# Patient Record
Sex: FEMALE | Race: WHITE | NOT HISPANIC OR LATINO | Employment: OTHER | URBAN - METROPOLITAN AREA
[De-identification: names, ages, dates, MRNs, and addresses within clinical notes are randomized per-mention and may not be internally consistent; named-entity substitution may affect disease eponyms.]

---

## 2017-01-05 ENCOUNTER — ANTICOAGULATION THERAPY VISIT (OUTPATIENT)
Dept: NURSING | Facility: CLINIC | Age: 72
End: 2017-01-05
Payer: COMMERCIAL

## 2017-01-05 DIAGNOSIS — Z79.01 LONG-TERM (CURRENT) USE OF ANTICOAGULANTS: Primary | ICD-10-CM

## 2017-01-05 LAB — INR POINT OF CARE: 1.9 (ref 0.86–1.14)

## 2017-01-05 PROCEDURE — 85610 PROTHROMBIN TIME: CPT | Mod: QW

## 2017-01-05 PROCEDURE — 99207 ZZC NO CHARGE NURSE ONLY: CPT

## 2017-01-05 PROCEDURE — 36416 COLLJ CAPILLARY BLOOD SPEC: CPT

## 2017-01-05 NOTE — MR AVS SNAPSHOT
Nury Cárdenas   1/5/2017 9:15 AM   Anticoagulation Therapy Visit    Description:  71 year old female   Provider:  MONI ANTI COAG   Department:  [facility]           INR as of 1/5/2017     Selected INR 1.9! (1/5/2017)      Anticoagulation Summary as of 1/5/2017     INR goal 2.0-3.0   Selected INR 1.9! (1/5/2017)   Full instructions 4 mg every day   Next INR check 2/16/2017    Indications   Long-term (current) use of anticoagulants [Z79.01] [Z79.01]  PE (pulmonary embolism) [I26.99]         Your next Anticoagulation Clinic appointment(s)     Feb 16, 2017  9:15 AM   Anticoagulation Visit with MONI ANTI COAG   [facility] ([address] [address] [phone]              Contact Numbers     [facility]  Please call [phone] to cancel and/or reschedule your appointment   Please call [phone] with any problems or questions regarding your therapy.        January 2017 Details    Sun Mon Tue Wed Thu Fri Sat     1               2               3               4               5      4 mg   See details      6      4 mg         7      4 mg           8      4 mg         9      4 mg         10      4 mg         11      4 mg         12      4 mg         13      4 mg         14      4 mg           15      4 mg         16      4 mg         17      4 mg         18      4 mg         19      4 mg         20      4 mg         21      4 mg           22      4 mg         23      4 mg         24      4 mg         25      4 mg         26      4 mg         27      4 mg         28      4 mg           29      4 mg         30      4 mg         31      4 mg              Date Details   01/05 This INR check               How to take your warfarin dose     To take:  4 mg Take 2 of the 2 mg tablets.           February 2017 Details    Sun Mon Tue Wed Thu Fri Sat        1      4 mg         2      4 mg         3      4 mg         4      4 mg           5      4 mg         6       4 mg         7      4 mg         8      4 mg         9      4 mg         10      4 mg         11      4 mg           12      4 mg         13      4 mg         14      4 mg         15      4 mg         16            17               18                 19               20               21               22               23               24               25                 26               27               28                    Date Details   No additional details    Date of next INR:  2/16/2017         How to take your warfarin dose     To take:  4 mg Take 2 of the 2 mg tablets.

## 2017-01-05 NOTE — PROGRESS NOTES
ANTICOAGULATION FOLLOW-UP CLINIC VISIT    Patient Name:  Nury Cárdenas  Date:  1/5/2017  Contact Type:  Face to Face    SUBJECTIVE:     Patient Findings     Positives No Problem Findings           OBJECTIVE    INR PROTIME   Date Value Ref Range Status   01/05/2017 1.9* 0.86 - 1.14 Final       ASSESSMENT / PLAN  INR assessment THER    Recheck INR In: 6 WEEKS    INR Location Clinic      Anticoagulation Summary as of 1/5/2017     INR goal 2.0-3.0   Selected INR 1.9! (1/5/2017)   Maintenance plan 4 mg (2 mg x 2) every day   Full instructions 4 mg every day   Weekly total 28 mg   No change documented Brody Chirinos RN   Plan last modified Giselle Price RN (7/7/2016)   Next INR check 2/16/2017   Priority INR   Target end date     Indications   Long-term (current) use of anticoagulants [Z79.01] [Z79.01]  PE (pulmonary embolism) [I26.99]         Anticoagulation Episode Summary     INR check location     Preferred lab     Send INR reminders to [facility] CL[facility]    Comments       Anticoagulation Care Providers     Provider Role Specialty Phone number    Desireeakhil Phil Harris MD [facility] [phone]            See the Encounter Report to view Anticoagulation Flowsheet and Dosing Calendar (Go to Encounters tab in chart review, and find the Anticoagulation Therapy Visit)    RN reviewed patient's weekly warfarin dose.  Pt INR remains within therapeutic range.  Pt will continue with current dosing and monitoring as planned, based on physician directed care plan.    [facility] ANTI-COAG CLINIC     Brody Chirinos RN

## 2017-01-10 DIAGNOSIS — M31.30 GRANULOMATOSIS WITH POLYANGIITIS (H): ICD-10-CM

## 2017-01-10 LAB
ALBUMIN UR-MCNC: NEGATIVE MG/DL
ALT SERPL W P-5'-P-CCNC: 28 U/L (ref 0–50)
APPEARANCE UR: CLEAR
AST SERPL W P-5'-P-CCNC: 19 U/L (ref 0–45)
BASOPHILS # BLD AUTO: 0 10E9/L (ref 0–0.2)
BASOPHILS NFR BLD AUTO: 0.7 %
BILIRUB UR QL STRIP: NEGATIVE
COLOR UR AUTO: YELLOW
CREAT SERPL-MCNC: 0.74 MG/DL (ref 0.52–1.04)
CRP SERPL-MCNC: <2.9 MG/L (ref 0–8)
DIFFERENTIAL METHOD BLD: NORMAL
EOSINOPHIL # BLD AUTO: 0.1 10E9/L (ref 0–0.7)
EOSINOPHIL NFR BLD AUTO: 1 %
ERYTHROCYTE [DISTWIDTH] IN BLOOD BY AUTOMATED COUNT: 13.9 % (ref 10–15)
ERYTHROCYTE [SEDIMENTATION RATE] IN BLOOD BY WESTERGREN METHOD: 6 MM/H (ref 0–30)
GFR SERPL CREATININE-BSD FRML MDRD: 76 ML/MIN/1.7M2
GLUCOSE UR STRIP-MCNC: NEGATIVE MG/DL
HCT VFR BLD AUTO: 43.2 % (ref 35–47)
HGB BLD-MCNC: 14.1 G/DL (ref 11.7–15.7)
HGB UR QL STRIP: ABNORMAL
KETONES UR STRIP-MCNC: NEGATIVE MG/DL
LEUKOCYTE ESTERASE UR QL STRIP: NEGATIVE
LYMPHOCYTES # BLD AUTO: 0.9 10E9/L (ref 0.8–5.3)
LYMPHOCYTES NFR BLD AUTO: 16.1 %
MCH RBC QN AUTO: 31.4 PG (ref 26.5–33)
MCHC RBC AUTO-ENTMCNC: 32.6 G/DL (ref 31.5–36.5)
MCV RBC AUTO: 96 FL (ref 78–100)
MONOCYTES # BLD AUTO: 0.6 10E9/L (ref 0–1.3)
MONOCYTES NFR BLD AUTO: 10.9 %
NEUTROPHILS # BLD AUTO: 4.1 10E9/L (ref 1.6–8.3)
NEUTROPHILS NFR BLD AUTO: 71.3 %
NITRATE UR QL: NEGATIVE
PH UR STRIP: 7 PH (ref 5–7)
PLATELET # BLD AUTO: 206 10E9/L (ref 150–450)
RBC # BLD AUTO: 4.49 10E12/L (ref 3.8–5.2)
RBC #/AREA URNS AUTO: ABNORMAL /HPF (ref 0–2)
SP GR UR STRIP: <=1.005 (ref 1–1.03)
URN SPEC COLLECT METH UR: ABNORMAL
UROBILINOGEN UR STRIP-ACNC: 0.2 EU/DL (ref 0.2–1)
WBC # BLD AUTO: 5.8 10E9/L (ref 4–11)
WBC #/AREA URNS AUTO: ABNORMAL /HPF (ref 0–2)

## 2017-01-10 PROCEDURE — 36415 COLL VENOUS BLD VENIPUNCTURE: CPT | Performed by: INTERNAL MEDICINE

## 2017-01-10 PROCEDURE — 82565 ASSAY OF CREATININE: CPT | Performed by: INTERNAL MEDICINE

## 2017-01-10 PROCEDURE — 83516 IMMUNOASSAY NONANTIBODY: CPT | Performed by: INTERNAL MEDICINE

## 2017-01-10 PROCEDURE — 84450 TRANSFERASE (AST) (SGOT): CPT | Performed by: INTERNAL MEDICINE

## 2017-01-10 PROCEDURE — 84460 ALANINE AMINO (ALT) (SGPT): CPT | Performed by: INTERNAL MEDICINE

## 2017-01-10 PROCEDURE — 86140 C-REACTIVE PROTEIN: CPT | Performed by: INTERNAL MEDICINE

## 2017-01-10 PROCEDURE — 85652 RBC SED RATE AUTOMATED: CPT | Performed by: INTERNAL MEDICINE

## 2017-01-10 PROCEDURE — 85025 COMPLETE CBC W/AUTO DIFF WBC: CPT | Performed by: INTERNAL MEDICINE

## 2017-01-10 PROCEDURE — 81001 URINALYSIS AUTO W/SCOPE: CPT | Performed by: INTERNAL MEDICINE

## 2017-01-11 LAB — PROTEINASE3 IGG SER-ACNC: NORMAL AI (ref 0–0.9)

## 2017-01-11 NOTE — PROGRESS NOTES
Quick Note:    Results released to [facility]:  This is just brief comments on your labs. If you have questions regarding these results, please do not hesitate to ask me during the next clinic visit.   Basic blood cell counts, liver and kidney function labs within normal limits  Inflammatory markers are normal.   Urine again shows blood as it has in the past. We will watch this.   Sincerely    Iain Estes MD  ______

## 2017-01-19 DIAGNOSIS — Z79.01 CHRONIC ANTICOAGULATION: Primary | ICD-10-CM

## 2017-01-19 NOTE — TELEPHONE ENCOUNTER
warfarin (COUMADIN) 2 MG tablet    Last Written Prescription Date: 10/14/16  Last Fill Qty: 180, # refills: 0  Last Office Visit with G, P or [facility] prescribing provider: 11/10/16       Date and Result of Last PT/INR:   INR      1.9   1/5/2017  INR      2.2   11/22/2016  INR     7.45   1/27/2015  INR     1.40   10/31/2014

## 2017-01-23 RX ORDER — WARFARIN SODIUM 2 MG/1
TABLET ORAL
Qty: 180 TABLET | Refills: 0 | Status: SHIPPED | OUTPATIENT
Start: 2017-01-23 | End: 2017-04-20

## 2017-01-23 NOTE — TELEPHONE ENCOUNTER
Signed Prescriptions:                        Disp   Refills    warfarin (COUMADIN) 2 MG tablet            180 ta*0        Sig: TAKE 2 TABLETS(4 MG) BY MOUTH ONCE DAILY  Authorizing Provider: RUPERTO DUDLEY  Ordering User: BRODY PATEL RN refilled requested medication per [facility] protocol.  Brody Patel RN

## 2017-02-17 ENCOUNTER — ANTICOAGULATION THERAPY VISIT (OUTPATIENT)
Dept: NURSING | Facility: CLINIC | Age: 72
End: 2017-02-17
Payer: COMMERCIAL

## 2017-02-17 DIAGNOSIS — Z79.01 LONG-TERM (CURRENT) USE OF ANTICOAGULANTS: ICD-10-CM

## 2017-02-17 LAB — INR POINT OF CARE: 2.2 (ref 0.86–1.14)

## 2017-02-17 PROCEDURE — 36416 COLLJ CAPILLARY BLOOD SPEC: CPT

## 2017-02-17 PROCEDURE — 99207 ZZC NO CHARGE NURSE ONLY: CPT

## 2017-02-17 PROCEDURE — 85610 PROTHROMBIN TIME: CPT | Mod: QW

## 2017-02-17 NOTE — PROGRESS NOTES
ANTICOAGULATION FOLLOW-UP CLINIC VISIT    Patient Name:  Nury Cárdenas  Date:  2/17/2017  Contact Type:  Face to Face    SUBJECTIVE:     Patient Findings     Positives No Problem Findings           OBJECTIVE    INR Protime   Date Value Ref Range Status   02/17/2017 2.2 (A) 0.86 - 1.14 Final       ASSESSMENT / PLAN  INR assessment THER    Recheck INR In: 6 WEEKS    INR Location Clinic      Anticoagulation Summary as of 2/17/2017     INR goal 2.0-3.0   Today's INR 2.2   Maintenance plan 4 mg (2 mg x 2) every day   Full instructions 4 mg every day   Weekly total 28 mg   No change documented Ramy Juarez RN   Plan last modified Giselle Price RN (7/7/2016)   Next INR check 3/31/2017   Priority INR   Target end date     Indications   Long-term (current) use of anticoagulants [Z79.01] [Z79.01]  PE (pulmonary embolism) [I26.99]         Anticoagulation Episode Summary     INR check location     Preferred lab     Send INR reminders to [facility]    Comments       Anticoagulation Care Providers     Provider Role Specialty Phone number    Milena Phil Harris MD [facility] [phone]            See the Encounter Report to view Anticoagulation Flowsheet and Dosing Calendar (Go to Encounters tab in chart review, and find the Anticoagulation Therapy Visit)    Dosage adjustment made based on physician directed care plan.    Ramy Juarez, RN

## 2017-02-17 NOTE — MR AVS SNAPSHOT
Nury Cárdenas   2/17/2017 9:15 AM   Anticoagulation Therapy Visit    Description:  72 year old female   Provider:  MONI ANTI COAG   Department:  [facility]           INR as of 2/17/2017     Today's INR 2.2      Anticoagulation Summary as of 2/17/2017     INR goal 2.0-3.0   Today's INR 2.2   Full instructions 4 mg every day   Next INR check 3/31/2017    Indications   Long-term (current) use of anticoagulants [Z79.01] [Z79.01]  PE (pulmonary embolism) [I26.99]         Your next Anticoagulation Clinic appointment(s)     Mar 31, 2017  9:15 AM CDT   Anticoagulation Visit with MONI ANTI COAG   [facility] ([facility]    [address] [address] [phone]              Contact Numbers     [facility]  Please call [phone] to cancel and/or reschedule your appointment   Please call [phone] with any problems or questions regarding your therapy.        February 2017 Details    Sun Mon Tue Wed Thu Fri Sat        1               2               3               4                 5               6               7               8               9               10               11                 12               13               14               15               16               17      4 mg   See details      18      4 mg           19      4 mg         20      4 mg         21      4 mg         22      4 mg         23      4 mg         24      4 mg         25      4 mg           26      4 mg         27      4 mg         28      4 mg              Date Details   02/17 This INR check               How to take your warfarin dose     To take:  4 mg Take 2 of the 2 mg tablets.           March 2017 Details    Sun Mon Tue Wed u Fri Sat        1      4 mg         2      4 mg         3      4 mg         4      4 mg           5      4 mg         6      4 mg         7      4 mg         8      4 mg         9      4 mg         10      4 mg         11      4 mg           12       4 mg         13      4 mg         14      4 mg         15      4 mg         16      4 mg         17      4 mg         18      4 mg           19      4 mg         20      4 mg         21      4 mg         22      4 mg         23      4 mg         24      4 mg         25      4 mg           26      4 mg         27      4 mg         28      4 mg         29      4 mg         30      4 mg         31              Date Details   No additional details    Date of next INR:  3/31/2017         How to take your warfarin dose     To take:  4 mg Take 2 of the 2 mg tablets.

## 2017-03-08 DIAGNOSIS — M31.30 GRANULOMATOSIS WITH POLYANGIITIS (H): ICD-10-CM

## 2017-03-08 LAB
ALBUMIN UR-MCNC: NEGATIVE MG/DL
ALT SERPL W P-5'-P-CCNC: 26 U/L (ref 0–50)
APPEARANCE UR: CLEAR
AST SERPL W P-5'-P-CCNC: 21 U/L (ref 0–45)
BASOPHILS # BLD AUTO: 0 10E9/L (ref 0–0.2)
BASOPHILS NFR BLD AUTO: 0.6 %
BILIRUB UR QL STRIP: NEGATIVE
COLOR UR AUTO: YELLOW
CREAT SERPL-MCNC: 0.72 MG/DL (ref 0.52–1.04)
CRP SERPL-MCNC: <2.9 MG/L (ref 0–8)
DIFFERENTIAL METHOD BLD: NORMAL
EOSINOPHIL # BLD AUTO: 0 10E9/L (ref 0–0.7)
EOSINOPHIL NFR BLD AUTO: 0.8 %
ERYTHROCYTE [DISTWIDTH] IN BLOOD BY AUTOMATED COUNT: 13.9 % (ref 10–15)
ERYTHROCYTE [SEDIMENTATION RATE] IN BLOOD BY WESTERGREN METHOD: 6 MM/H (ref 0–30)
GFR SERPL CREATININE-BSD FRML MDRD: 80 ML/MIN/1.7M2
GLUCOSE UR STRIP-MCNC: NEGATIVE MG/DL
HCT VFR BLD AUTO: 41.4 % (ref 35–47)
HGB BLD-MCNC: 13.9 G/DL (ref 11.7–15.7)
HGB UR QL STRIP: ABNORMAL
KETONES UR STRIP-MCNC: NEGATIVE MG/DL
LEUKOCYTE ESTERASE UR QL STRIP: NEGATIVE
LYMPHOCYTES # BLD AUTO: 1 10E9/L (ref 0.8–5.3)
LYMPHOCYTES NFR BLD AUTO: 17.8 %
MCH RBC QN AUTO: 32 PG (ref 26.5–33)
MCHC RBC AUTO-ENTMCNC: 33.6 G/DL (ref 31.5–36.5)
MCV RBC AUTO: 95 FL (ref 78–100)
MONOCYTES # BLD AUTO: 0.5 10E9/L (ref 0–1.3)
MONOCYTES NFR BLD AUTO: 9.9 %
NEUTROPHILS # BLD AUTO: 3.8 10E9/L (ref 1.6–8.3)
NEUTROPHILS NFR BLD AUTO: 70.9 %
NITRATE UR QL: NEGATIVE
NON-SQ EPI CELLS #/AREA URNS LPF: ABNORMAL /LPF
PH UR STRIP: 6.5 PH (ref 5–7)
PLATELET # BLD AUTO: 208 10E9/L (ref 150–450)
RBC # BLD AUTO: 4.34 10E12/L (ref 3.8–5.2)
RBC #/AREA URNS AUTO: ABNORMAL /HPF (ref 0–2)
SP GR UR STRIP: <=1.005 (ref 1–1.03)
URN SPEC COLLECT METH UR: ABNORMAL
UROBILINOGEN UR STRIP-ACNC: 0.2 EU/DL (ref 0.2–1)
WBC # BLD AUTO: 5.3 10E9/L (ref 4–11)
WBC #/AREA URNS AUTO: ABNORMAL /HPF (ref 0–2)

## 2017-03-08 PROCEDURE — 85652 RBC SED RATE AUTOMATED: CPT | Performed by: INTERNAL MEDICINE

## 2017-03-08 PROCEDURE — 84460 ALANINE AMINO (ALT) (SGPT): CPT | Performed by: INTERNAL MEDICINE

## 2017-03-08 PROCEDURE — 81001 URINALYSIS AUTO W/SCOPE: CPT | Performed by: INTERNAL MEDICINE

## 2017-03-08 PROCEDURE — 84450 TRANSFERASE (AST) (SGOT): CPT | Performed by: INTERNAL MEDICINE

## 2017-03-08 PROCEDURE — 83516 IMMUNOASSAY NONANTIBODY: CPT | Performed by: INTERNAL MEDICINE

## 2017-03-08 PROCEDURE — 86140 C-REACTIVE PROTEIN: CPT | Performed by: INTERNAL MEDICINE

## 2017-03-08 PROCEDURE — 36415 COLL VENOUS BLD VENIPUNCTURE: CPT | Performed by: INTERNAL MEDICINE

## 2017-03-08 PROCEDURE — 85025 COMPLETE CBC W/AUTO DIFF WBC: CPT | Performed by: INTERNAL MEDICINE

## 2017-03-08 PROCEDURE — 82565 ASSAY OF CREATININE: CPT | Performed by: INTERNAL MEDICINE

## 2017-03-09 DIAGNOSIS — M31.30 WEGENER'S GRANULOMATOSIS: ICD-10-CM

## 2017-03-09 LAB — PROTEINASE3 IGG SER-ACNC: NORMAL AI (ref 0–0.9)

## 2017-03-12 NOTE — TELEPHONE ENCOUNTER
methotrexate 2.5 MG tablet CHEMO      Last Written Prescription Date:  12-21-16  Last Fill Quantity: 60,   # refills: 0  Last Office Visit: 11-10-16  Future Office visit:  3-15-17    CBC RESULTS:   Recent Labs   Lab Test  03/08/17 0913   WBC  5.3   RBC  4.34   HGB  13.9   HCT  41.4   MCV  95   MCH  32.0   MCHC  33.6   RDW  13.9   PLT  208       Creatinine   Date Value Ref Range Status   03/08/2017 0.72 0.52 - 1.04 mg/dL Final   ]    Liver Function Studies -   Recent Labs   Lab Test  03/08/17   0913   05/14/15   0753   PROTTOTAL   --    --   6.2*   ALBUMIN   --    --   3.7   BILITOTAL   --    --   0.4   ALKPHOS   --    --   76   AST  21   < >  28   ALT  26   < >  43    < > = values in this interval not displayed.       Kathleen M Doege RN

## 2017-03-15 ENCOUNTER — OFFICE VISIT (OUTPATIENT)
Dept: RHEUMATOLOGY | Facility: CLINIC | Age: 72
End: 2017-03-15
Attending: INTERNAL MEDICINE
Payer: MEDICARE

## 2017-03-15 VITALS
BODY MASS INDEX: 21.63 KG/M2 | HEART RATE: 75 BPM | OXYGEN SATURATION: 96 % | DIASTOLIC BLOOD PRESSURE: 82 MMHG | WEIGHT: 128 LBS | SYSTOLIC BLOOD PRESSURE: 136 MMHG

## 2017-03-15 DIAGNOSIS — M31.30 WEGENER'S GRANULOMATOSIS (GRANULOMATOSIS WITH POLYANGIITIS): ICD-10-CM

## 2017-03-15 PROCEDURE — 99212 OFFICE O/P EST SF 10 MIN: CPT | Mod: ZF

## 2017-03-15 RX ORDER — PREDNISONE 1 MG/1
TABLET ORAL
Qty: 210 TABLET | Refills: 1 | Status: ON HOLD | COMMUNITY
Start: 2017-03-15 | End: 2017-07-28

## 2017-03-15 ASSESSMENT — PAIN SCALES - GENERAL: PAINLEVEL: NO PAIN (0)

## 2017-03-15 NOTE — MR AVS SNAPSHOT
After Visit Summary   3/15/2017    Nury Cárdenas    MRN: [id number]           Patient Information     Date Of Birth          1945        Visit Information        Provider Department      3/15/2017 4:00 PM Iain Estes MD [facility] Rheumatology        Today's Diagnoses     Wegener's granulomatosis (granulomatosis with polyangiitis) (H)           Follow-ups after your visit        Follow-up notes from your care team     Return in about 3 months (around 6/15/2017).      Your next 10 appointments already scheduled     Mar 31, 2017  9:15 AM CDT   Anticoagulation Visit with FZ ANTI COAG   [facility]    [address] [address] [phone]              Who to contact     If you have questions or need follow up information about today's clinic visit or your schedule please contact [facility] RHEUMATOLOGY directly at [phone].  Normal or non-critical lab and imaging results will be communicated to you by [employer], letter or phone within 4 business days after the clinic has received the results. If you do not hear from us within 7 days, please contact the clinic through [facility] or phone. If you have a critical or abnormal lab result, we will notify you by phone as soon as possible.  Submit refill requests through [employer] or call your pharmacy and they will forward the refill request to us. Please allow 3 business days for your refill to be completed.          Additional Information About Your Visit        MyChart Information     [facility] gives you secure access to your electronic health record. If you see a primary care provider, you can also send messages to your care team and make appointments. If you have questions, please call your primary care clinic.  If you do not have a primary care provider, please call [phone] and they will assist you.        Care EveryWhere ID     This is your Care EveryWhere ID. This could be  used by other organizations to access your [place] medical records  [id number]        Your Vitals Were     Pulse Pulse Oximetry BMI (Body Mass Index)             75 96% 21.63 kg/m2          Blood Pressure from Last 3 Encounters:   03/15/17 136/82   11/10/16 134/81   09/28/16 118/66    Weight from Last 3 Encounters:   03/15/17 58.1 kg (128 lb)   11/10/16 59.8 kg (131 lb 12.8 oz)   09/28/16 59.6 kg (131 lb 8 oz)              Today, you had the following     No orders found for display         Today's Medication Changes          These changes are accurate as of: 3/15/17  4:49 PM.  If you have any questions, ask your nurse or doctor.               These medicines have changed or have updated prescriptions.        Dose/Directions    predniSONE 1 MG tablet   Commonly known as:  DELTASONE   This may have changed:  additional instructions   Used for:  Wegener's granulomatosis (granulomatosis with polyangiitis) (H)   Changed by:  Iain Estes MD        1mg Po daily.   Quantity:  210 tablet   Refills:  1                Primary Care Provider Office Phone # Fax #    Phil Abbott [phone]       [address] [address]        Thank you!     Thank you for choosing [facility] RHEUMATOLOGY  for your care. Our goal is always to provide you with excellent care. Hearing back from our patients is one way we can continue to improve our services. Please take a few minutes to complete the written survey that you may receive in the mail after your visit with us. Thank you!             Your Updated Medication List - Protect others around you: Learn how to safely use, store and throw away your medicines at www.disposemymeds.org.          This list is accurate as of: 3/15/17  4:49 PM.  Always use your most recent med list.                   Brand Name Dispense Instructions for use    acetaminophen 325 MG tablet    TYLENOL    100 tablet    Take 2 tablets (650 mg) by  mouth every 6 hours as needed for mild pain       calcium carbonate-vitamin D 500-400 MG-UNIT Tabs per tablet     180 tablet    Take 1 tablet by mouth 2 times daily       folic acid 1 MG tablet    FOLVITE    90 tablet    Take 1 tablet (1 mg) by mouth daily       methotrexate 2.5 MG tablet CHEMO     60 tablet    Take 5 tablets (12.5 mg) by mouth once a week       predniSONE 1 MG tablet    DELTASONE    210 tablet    1mg Po daily.       propranolol 20 MG tablet    INDERAL    90 tablet    TAKE 1 TABLET(20 MG) BY MOUTH DAILY       VITAMIN D3 PO      Take by mouth daily       warfarin 2 MG tablet    COUMADIN    180 tablet    TAKE 2 TABLETS(4 MG) BY MOUTH ONCE DAILY

## 2017-03-15 NOTE — PROGRESS NOTES
Results released to [facility]:  Chronic blood in urine.   PR3 antibody is normal.   Basic blood cell counts, liver and kidney function labs within normal limits  Inflammatory markers are normal.     Sincerely    Iain Estes MD

## 2017-03-15 NOTE — NURSING NOTE
"Chief Complaint   Patient presents with     RECHECK     inflammatory arthritis       Initial /82  Pulse 75  Wt 58.1 kg (128 lb)  SpO2 96%  BMI 21.63 kg/m2 Estimated body mass index is 21.63 kg/(m^2) as calculated from the following:    Height as of 11/10/16: 1.638 m (5' 4.5\").    Weight as of this encounter: 58.1 kg (128 lb).  Medication Reconciliation: complete    "

## 2017-03-15 NOTE — LETTER
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,constitutional symptoms (weight loss, fatigue), increased inflammatory markers  #2 Likely invasive pulmonary aspergillosis on immunosuppressed host - completed anti-fungal treatment 5/15  #3 Borderline Anti-CCP positive erosive inflammatory arthritis; Destructive arthritis in right wrist resulting in fusion;   #2 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 and dissecting hemorrhage noted in lung biopsy - likely related to #1  #3 Microscopic hematuria - evaluated by urology- has renal stone  #4 Pulmonary embolism - initiated anti-coagulation with coumadin in 2014; Followed by PCP  #5 S/p Rituxan infusions (350mg/m2 IV X4 in June/July 2014); MTX since 5/15  #6 Chronic prednisone therapy; osteopenia; fosamax initiated 2014 and then stopped because of side effect; takes Ca and Vit D; Had steroid induced myopathy which is resolved  #7 Monthly pentamidine inhalation for PCP prophylaxis - stopped Nov 2014  #8 Family history of mitochondrial muscle disease    The lung lesions noted in Oct 2014 CT chest are likely fungal. They are markedly improved with anti-fungal treatment.     Reviewed recent labs: 6/16  Chronic blood in urine.   PR3 antibody is normal.   Basic blood cell counts, liver and kidney function labs within normal limits  Inflammatory markers are normal.     CT chest 4/16 showed stability  . No significant interval change since the prior chest CT. No new or  enlarging lung lesions are evident. No cavitating lung masses are  evident.  2. Evidence of old granulomatous disease.    Left nose congestion- f/u with   "[facility]    GPA IS UNDER CONTROL.     Symptoms since Sep 2014 are likely related to ASPERGILLOSIS. More energetic and feeling dramatically better with voriconazole. \"She feels like a new person\". No worsening symptoms after completing voriconazole course.     Currently on prednisone 1 mg PO daily. Okay to stop prednisone end of this month.     S/p rituximab infusions in July 2014 for GPA.  Did not repeat rituximab for maintenance because of the adverse effect with invasive aspergillosis. TPMT indeterminate activity. So avoided azathioprine. Started METHOTREXATE FOR GPA MAINTENANCE after discussion with Dr. Sol (ID) and Dr. Tyler (pulmonology) who were both okay with MTX. MTX current dose 12.5 mg per week (had nausea with higher dose). Continue.     TB, HCV, HBV screening done and negative.   Folic acid 1mg daily.      Continue calcium and vitamin D. Fosamax was discontinued because of 'discomfort' in legs with fosamax. Patient reports it got better after stopping fosamax. DEXA 7/16 showed worsened osteopenia. Plan is to taper off of prednisone. Repeat DEXA 7/18    Patient wants to use prilosec 20mg three times per week. I told her it is okay to stop it. Patient wants to continue it as it helps with MTX related GI symptoms.     Most Recent Immunizations   Administered Date(s) Administered     TDAP (ADACEL AGES 11-64) 12/13/2007   Did  not want a pneumococcal shot in the past.     Return in about 3 months (around 6/15/2017).    No orders of the defined types were placed in this encounter.      Medications Discontinued During This Encounter   Medication Reason     mupirocin (BACTROBAN) 2 % ointment Therapy completed     predniSONE (DELTASONE) 1 MG tablet Reorder     Current Outpatient Prescriptions   Medication Sig Dispense Refill     predniSONE (DELTASONE) 1 MG tablet 1mg Po daily. 210 tablet 1     methotrexate 2.5 MG tablet CHEMO Take 5 tablets (12.5 mg) by mouth once a week 60 tablet 0     warfarin " (COUMADIN) 2 MG tablet TAKE 2 TABLETS(4 MG) BY MOUTH ONCE DAILY 180 tablet 0     propranolol (INDERAL) 20 MG tablet TAKE 1 TABLET(20 MG) BY MOUTH DAILY 90 tablet 1     folic acid (FOLVITE) 1 MG tablet Take 1 tablet (1 mg) by mouth daily 90 tablet 3     Cholecalciferol (VITAMIN D3 PO) Take by mouth daily       calcium carbonate-vitamin D 500-400 MG-UNIT TABS tablt Take 1 tablet by mouth 2 times daily 180 tablet 3     acetaminophen (TYLENOL) 325 MG tablet Take 2 tablets (650 mg) by mouth every 6 hours as needed for mild pain (Patient not taking: Reported on 3/15/2017) 100 tablet 0         Iain Estes MD    Division of Rheumatology  [facility]  Phone (Patients line): [phone]  Pager (healthcare professionals only): [phone]  Phone (healthcare professionals only line): [phone]            Active Problem List:     Patient Active Problem List    Diagnosis Date Noted     Long-term (current) use of anticoagulants [Z79.01] 03/18/2016     Priority: Medium     Chronic anticoagulation 04/23/2015     Priority: Medium     Per her rheumatologist, there is some association between granulomatosis with polyangiitis and pulmonary embolism. It is reasonable to continue warfarin indefinitely for therapy for this.        Calculus of kidney, right 02/11/2015     Priority: Medium     Cataract, mild, ou 01/08/2015     Priority: Medium     PE (pulmonary embolism) 10/31/2014     Priority: Medium     Dyspnea 10/24/2014     Priority: Medium     Chronic steroid use 08/15/2014     Priority: Medium     Granulomatosis with polyangiitis (H) 06/24/2014     Priority: Medium     Diagnosis updated by automated process. Provider to review and confirm.       Chronic constipation 10/15/2012     Priority: Medium     Inflammatory arthritis      Priority: Medium     thumb       Synovitis of wrist      Priority: Medium     right       Advanced directives, counseling/discussion 06/23/2011     Priority: Medium  "    Received outside advance directive.  HCD:Previously signed by patient and notarized by [employer].  scanned into EMR as Advance Directive/Living Will document. View document and details in Code Status History Report. Please see advance directive for specifics.  Megan Madsen RN  4/25/2012    Discussed advance care planning with patient; information given to patient to review. 6/23/2011            HYPERLIPIDEMIA LDL GOAL <160 10/31/2010     Priority: Medium     GERD (gastroesophageal reflux disease)      Priority: Medium     Fibroids      Priority: Medium     Migraines      Priority: Medium     Hearing loss      Priority: Medium     Osteopenia      Priority: Medium     Monitor closely due to steroid therapy.              History of Present Illness:     Chief Complaint   Patient presents with     RECHECK     inflammatory arthritis     Original presentation:  Nury Hathaway is a 69 yof with history of inflammatory arthritis admitted to the hospital from 6/14-6/23 for progressive cough, fatigue, and weight loss, found to have ANCA+ multisystem inflammatory disease and then diagnosed with granulomatosis with polyangitis.     Symptoms first began in January 2014 with fullness and hearing loss in the left ear. Since April 2014, she has had upper respiratory symptoms and sinus drainage with dry, hacking cough, progressive dyspnea, fatigue, fevers, chills and night sweats. She had a 10 lb weight loss between January and her hospitalization. Additional symptoms included hoarseness. \"hot band\" around eyes, blurred vision, increased clear discharge from the eyes, swelling of the hands with tight, shiny skin. She went to her PCP who got a CXR, followed by CT for patchiness on lungs. Had PET scan as well. Was admitted to the hospital on 6/14 for extreme fatigue and worsening symptoms. On the day of admission she also developed pain, erythema, and swelling on the bridge of her nose Symptoms were so severe that " she didn't care if she lived or .     During hospitalization in 2014 she was seen by Dr. Cabrera in the hospital. She was found to be C-anca PR3+ and was started on prednisone 50mg PO daily (see consult note from  for further details of hospitalization, all lab results included below). After her first dose of prednisone she felt significantly better. Less swelling in her hand and wrist. Pain is now gone. Back to doing ADLs. All symptoms improving. appetite increased with prednisone. She still experiences some nasal crusting and sinus drainage. Her hearing is improving though she still experiences some fullness and occasional popping. Her breathing has improved significantly and she is back to doing house work, walking stairs with only mild dyspnea and occasional coughing. She continues to have some eye drainage but her vision is improved. She had one infusion of rituximab 375mg/m2 on  (day of discharge), which she tolerated without incident. She also had her first dose of pentamidine (300 mg) on the day of discharge. Since discharge she has been taking 25 mg BID prednisone without significant side effect. She has also been taking calcium/vitamin D 400-500 mg-units BID in addition to her previous medications.     Rheumatologic history: Her right wrist has been affected with pain, swelling, and decreased ROM since . Wrist can't bend but still has use of fingers. She was seen at [facility] for wrist pain. Had synovectomy or the radiocarpal and midcarpal joints in  but symptoms recurred in 2 weeks. Samples were negative for infection or crystals, no biopsy was done initially. She also had numerous steroid injections into the wrist. She was referred to rheumatologist Dr. Hernández ([place]) who worked her up for rheumatoid arthritis (positive family history) and found extensive synovitis in the radiocarpal and midcarpal joints with effusions, multifocal erosions, and marrow edema by MRI. Lab work at that  time showed negative RF, negative CCP, negative roverto normal ESR, and normal CRP.  Dr. Hernández referred her back to [facility] to do a biopsy which showed acute and chronic synovitis, no rheumatoid nodules, no malignancy, no crystals. Rheumatologist recommended MTX, and then plaquenil but the patient declined both due to potential side effects. Some arthralgia in base of thumbs developing as well. No other joints affected. Has had DEXA scan in 2006 and 2009. The more recent showed osteopenia and recommended repeat scan in 2 years.     CRP >170 in 6/14. Sed rate >70  RF neg in 2009.   WBC 13 in 2014  Bronchoscopy 6/14: AFB, prelim AFB culture neg. Prelim nocardia culture neg. KOH prep neg.   chronic fungal antibodies negative  HBV, HCV, HIV neg.     Lung biopsy:6/14  Lung, left upper lobe, biopsy:   The clinical concern for  vasculitis is noted. While the presence of organizing pneumonitis and  dissecting hemorrhage may be seen in the setting of vasculitis, there is  no evidence of active vascular injury in this sample.    - Acute and organizing pneumonitis with focal acute lung injury  - Hemorrhage  - Trichrome stain shows patchy fibrosis   - Negative for malignancy    CT chest 5/14:  1. There are two left upper lobe nodules or nodular infiltrates as   well as a subpleural nodule or nodular infiltrate in the left lower   lobe and bilateral apical nodular infiltrates. Malignancy or an   atypical infection could have this appearance. Biopsy or PET/CT should   be considered.   2. No lymph node enlargement.   3. There is a subpleural nodule or pleural plaque at the right lung   base.    CT chest 6/14:  1. No evidence of pulmonary embolism.   2. No significant change in multiple bilateral pulmonary nodules and   irregular shaped masses with the largest in the left lung apex   measuring 3.1 x 5.3 cm. Differential diagnosis still includes   infection and neoplasm.   3. Mild scattered air trapping throughout the lungs suggesting    obstructive lung disease.    PET 6/14:  Impression:   1. Multiple bilateral pulmonary hypermetabolic opacities which have   slightly increased in size from 5/28/2014, several of which   demonstrate air bronchograms and the largest of which demonstrates   central hypoattenuation.   Given their radiographic appearance and evidence of short-term   interval increase in size, differential favors infectious etiology   including necrotizing pneumonia, neoplasm is not excluded and   radiographic followup to resolution is recommended.   Multiple prominent mediastinal nodes which demonstrate mild metabolic   activity, most likely reactive given suspicion for pneumonia.   2. Nonobstructing 8 mm stone in the inferior collecting system of the   right kidney.   3. 2.2 x 1.4 cm fluid density focus in the left adnexa, in this   presumably postmenopausal female, further evaluation recommended with   pelvic ultrasound, this most likely represents an ovarian or   paraovarian cyst.   Per the electronic medical record, patient has been started on   antibiotic therapy by her provider as documented in the clinical note   from office visit 6/4/2014.   Pelvic USG : 1. No left ovarian or paraovarian cyst identified.   2. Trace fluid in the endometrial cavity but no evidence for   endometrial thickening.   3. 1.4 cm myometrial fibroid, 0.5 cm partially calcified right uterine   lesion suggesting a smaller fibroid.    DEXA:   Osteopenia.    Comparison is made to previous study dated 12/14/106.   There has been significant decrease in bone density of the lumbar spine.   There has been a trend toward significant decrease in bone density of the   hip(s).    Interim history:  No hemoptysis. Rare Shortness of breath still there but not worse.   No significant morning stiffness or pain in hand joints    Interim history:   Nausea today. Feeling sick today.   Chills this AM.   No joint pain or stiffness.   Methotrexate 15mg PO weekly.   Prednisone  5mg PO daily.     Interim history 1/16  Tingling sensation in feet and hands are better.   Being treated with antibiotic for UTI.  No joint pains.   No joint stiffness.   Methotrexate 12.5 mg PO weekly.   Prednisone 5mg PO daily.     Interim history November 10, 2016   No active new symptoms except left nasal congestion.   No joint pains, hemoptysis, hematuria, shortness of breath (otherwise occasional non-exertional shortness of breath)    March 15, 2017  No active new symptoms except left nasal congestion.   No joint pains, hemoptysis, hematuria, shortness of breath (otherwise occasional non-exertional shortness of breath)  Bilateral CMC joint swelling and pain. 0/10 but with a hand shake 4/10  No hemoptysis, hematuria, shortness of breath (otherwise occasional non-exertional shortness of breath)  Prednisone dose 1mg PO daily.   On and off back muscle spasms - two episodes. Resolved.          Review of Systems:   Complete ROS negative except for symptoms mentioned in the HPI          Past Medical History:     Past Medical History   Diagnosis Date     Atypical pneumonia 6/14/2014     Coronary artery disease 1982     heart beat hard made me tired     Dyslipidemia      Fibroids      GERD (gastroesophageal reflux disease)      Granulomatosis with polyangiitis (Wegener's)      Hearing loss      Inflammatory arthritis      thumb     Migraines      MVP (mitral valve prolapse)      had an echo that was normal in 2007 she is on Inderal     Osteopenia      PE (pulmonary embolism) 10/2014     ? GPA associated, on warfarin      Synovitis of wrist 2009     right     Past Surgical History   Procedure Laterality Date     Tonsillectomy & adenoidectomy       Appendectomy       Tubal ligation       Cl aff surgical pathology       cone biopsy 1975     Surgical history of -   as a child     strabismus repair right eye     Hc esophagoscopy, diagnostic       Surgical history of -   8-2009     right wrist debridement     Optical  tracking system bronchoscopy  6/19/2014     Procedure: OPTICAL TRACKING SYSTEM BRONCHOSCOPY;  Surgeon: Angel Kathleen MD;  Location:  GI     Abdomen surgery       appendix out     Biopsy  1972     cone biopsy     Colonoscopy       Eye surgery       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     Tonsillectomy       as a child            Social History:     Social History     Occupational History     Not on file.     Social History Main Topics     Smoking status: Former Smoker     Packs/day: 2.00     Years: 18.00     Types: Cigarettes     Quit date: 1/1/1982     Smokeless tobacco: Never Used     Alcohol use No     Drug use: No     Sexual activity: No    with 3 children. Retired, previously worked in  in an office. Lives alone with cat. Currently non-smoker, smoked 25 years quitting in 1982. No etoh, no drugs.          Family History:     Family History   Problem Relation Age of Onset     Respiratory Mother      emphysema     Arthritis Maternal Grandmother      rheumatoid     Neurologic Disorder Paternal Grandmother      migraines     Alzheimer Disease Paternal Grandmother      CANCER Sister      stage 4 anal cancer age 62 years     Cancer - colorectal Brother      Aneurysm Mother      Chronic Obstructive Pulmonary Disease Mother      Colon Cancer Brother      Colon Cancer Sister      Other Cancer Father      Substance Abuse Sister      Anesthesia Reaction Daughter      Unknown/Adopted Paternal Grandfather      CANCER Father      lung cancer     CANCER Sister      HEART DISEASE Maternal Grandmother      unsure of details     HEART DISEASE Maternal Grandfather      Substance Abuse Sister      Thyroid Disease Mother      ,     OSTEOPOROSIS Mother             Allergies:     Allergies   Allergen Reactions     Adhesive Tape      Rash itches     Amoxicillin      vomiting     Augmentin      vomiting     Codeine      vomiting     Erythromycin      vomiting     Nickel      itches rash     Sulfa Drugs Itching      Tegaderm Alginate Ag      LW Other1: -ALL MEDICATIONS MAKE PATIENT VIOLENTLY ILL; ADHESIVE BANDAGES; NICKEL     Zithromax [Azithromycin Dihydrate]      Vomiting/ skin blisters            Medications:     Current Outpatient Prescriptions   Medication Sig Dispense Refill     predniSONE (DELTASONE) 1 MG tablet 1mg Po daily. 210 tablet 1     methotrexate 2.5 MG tablet CHEMO Take 5 tablets (12.5 mg) by mouth once a week 60 tablet 0     warfarin (COUMADIN) 2 MG tablet TAKE 2 TABLETS(4 MG) BY MOUTH ONCE DAILY 180 tablet 0     propranolol (INDERAL) 20 MG tablet TAKE 1 TABLET(20 MG) BY MOUTH DAILY 90 tablet 1     folic acid (FOLVITE) 1 MG tablet Take 1 tablet (1 mg) by mouth daily 90 tablet 3     Cholecalciferol (VITAMIN D3 PO) Take by mouth daily       calcium carbonate-vitamin D 500-400 MG-UNIT TABS tablt Take 1 tablet by mouth 2 times daily 180 tablet 3     acetaminophen (TYLENOL) 325 MG tablet Take 2 tablets (650 mg) by mouth every 6 hours as needed for mild pain (Patient not taking: Reported on 3/15/2017) 100 tablet 0            Physical Exam:   Blood pressure 136/82, pulse 75, weight 58.1 kg (128 lb), SpO2 96 %.  Wt Readings from Last 4 Encounters:   03/15/17 58.1 kg (128 lb)   11/10/16 59.8 kg (131 lb 12.8 oz)   09/28/16 59.6 kg (131 lb 8 oz)   08/02/16 57.8 kg (127 lb 8 oz)     Constitutional: well-developed, appearing stated age; cooperative, NAD  Eyes: nl EOM, PERRL, vision, conjunctiva, sclera non-erythematous, no drainage.   ENT: No mucous membrane lesions, normal saliva pool  Neck: no mass or thyroid enlargement  Resp: lungs clear to auscultation bilaterally. No wheezes or crackles,   CV: RRR, normal S1, S2, no murmurs, rubs or gallops, no edema  GI: not tested  : not tested  Lymph: no cervical, supraclavicular or epitrochlear nodes  MS: Right wrist fused.   All shoulder, elbow, MCP/PIP/DIP, spine, hip, knee, ankle, and foot MTP/IP joints were examined and  found normal except for left carpal joint which  has no ROM, tenderness on palpation, and effusion. No active synovitis or deformity in other joints. Full ROM in other joints.  Normal  strength. No dactylitis,  tenosynovitis, enthesopathy. 5/5 strength in all muscle groups.   Skin: no nail pitting, alopecia, rash, nodules or lesions. Irregular nail contours on some nails.   Psych: nl judgement, orientation, memory, affect.         Data:     Results for orders placed or performed in visit on 03/08/17   CBC with platelets differential   Result Value Ref Range    WBC 5.3 4.0 - 11.0 10e9/L    RBC Count 4.34 3.8 - 5.2 10e12/L    Hemoglobin 13.9 11.7 - 15.7 g/dL    Hematocrit 41.4 35.0 - 47.0 %    MCV 95 78 - 100 fl    MCH 32.0 26.5 - 33.0 pg    MCHC 33.6 31.5 - 36.5 g/dL    RDW 13.9 10.0 - 15.0 %    Platelet Count 208 150 - 450 10e9/L    Diff Method Automated Method     % Neutrophils 70.9 %    % Lymphocytes 17.8 %    % Monocytes 9.9 %    % Eosinophils 0.8 %    % Basophils 0.6 %    Absolute Neutrophil 3.8 1.6 - 8.3 10e9/L    Absolute Lymphocytes 1.0 0.8 - 5.3 10e9/L    Absolute Monocytes 0.5 0.0 - 1.3 10e9/L    Absolute Eosinophils 0.0 0.0 - 0.7 10e9/L    Absolute Basophils 0.0 0.0 - 0.2 10e9/L   AST   Result Value Ref Range    AST 21 0 - 45 U/L   ALT   Result Value Ref Range    ALT 26 0 - 50 U/L   Creatinine   Result Value Ref Range    Creatinine 0.72 0.52 - 1.04 mg/dL    GFR Estimate 80 >60 mL/min/1.7m2    GFR Estimate If Black >90   GFR Calc   >60 mL/min/1.7m2   CRP inflammation   Result Value Ref Range    CRP Inflammation <2.9 0.0 - 8.0 mg/L   Erythrocyte sedimentation rate auto   Result Value Ref Range    Sed Rate 6 0 - 30 mm/h   Proteinase 3 Antibody IgG   Result Value Ref Range    Proteinase 3 Antibody IgG  0.0 - 0.9 AI     <0.2  Negative   Antibody index (AI) values reflect qualitative changes in antibody   concentration that cannot be directly associated with clinical condition or   disease state.     UA with Microscopic reflex to Culture    Result Value Ref Range    Color Urine Yellow     Appearance Urine Clear     Glucose Urine Negative NEG mg/dL    Bilirubin Urine Negative NEG    Ketones Urine Negative NEG mg/dL    Specific Gravity Urine <=1.005 1.003 - 1.035    pH Urine 6.5 5.0 - 7.0 pH    Protein Albumin Urine Negative NEG mg/dL    Urobilinogen Urine 0.2 0.2 - 1.0 EU/dL    Nitrite Urine Negative NEG    Blood Urine Large (A) NEG    Leukocyte Esterase Urine Negative NEG    Source Midstream Urine     WBC Urine O - 2 0 - 2 /HPF    RBC Urine 5-10 (A) 0 - 2 /HPF    Squamous Epithelial /LPF Urine Few FEW /LPF         Iain Estes MD   Clinical   Division of Rheumatology  [facility]  Phone (Patients line): [phone]  Pager (healthcare professionals only): [phone]  Phone (healthcare professionals only line): [phone]

## 2017-03-15 NOTE — PROGRESS NOTES
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 and dissecting hemorrhage noted in lung biopsy - likely related to #1  #3 Microscopic hematuria - evaluated by urology- has renal stone  #4 Pulmonary embolism - initiated anti-coagulation with coumadin in 2014; Followed by PCP  #5 S/p Rituxan infusions (350mg/m2 IV X4 in June/July 2014); MTX since 5/15  #6 Chronic prednisone therapy; osteopenia; fosamax initiated 2014 and then stopped because of side effect; takes Ca and Vit D; Had steroid induced myopathy which is resolved  #7 Monthly pentamidine inhalation for PCP prophylaxis - stopped Nov 2014  #8 Family history of mitochondrial muscle disease    The lung lesions noted in Oct 2014 CT chest are likely fungal. They are markedly improved with anti-fungal treatment.     Reviewed recent labs: 6/16  Chronic blood in urine.   PR3 antibody is normal.   Basic blood cell counts, liver and kidney function labs within normal limits  Inflammatory markers are normal.     CT chest 4/16 showed stability  . No significant interval change since the prior chest CT. No new or  enlarging lung lesions are evident. No cavitating lung masses are  evident.  2. Evidence of old granulomatous disease.    Left nose congestion- f/u with Dr. Kennedy    GPA IS UNDER CONTROL.     Symptoms since Sep 2014 are likely related to  "ASPERGILLOSIS. More energetic and feeling dramatically better with voriconazole. \"She feels like a new person\". No worsening symptoms after completing voriconazole course.     Currently on prednisone 1 mg PO daily. Okay to stop prednisone end of this month.     S/p rituximab infusions in July 2014 for GPA.  Did not repeat rituximab for maintenance because of the adverse effect with invasive aspergillosis. TPMT indeterminate activity. So avoided azathioprine. Started METHOTREXATE FOR GPA MAINTENANCE after discussion with Dr. Sol (ID) and Dr. Tyler (pulmonology) who were both okay with MTX. MTX current dose 12.5 mg per week (had nausea with higher dose). Continue.     TB, HCV, HBV screening done and negative.   Folic acid 1mg daily.      Continue calcium and vitamin D. Fosamax was discontinued because of 'discomfort' in legs with fosamax. Patient reports it got better after stopping fosamax. DEXA 7/16 showed worsened osteopenia. Plan is to taper off of prednisone. Repeat DEXA 7/18    Patient wants to use prilosec 20mg three times per week. I told her it is okay to stop it. Patient wants to continue it as it helps with MTX related GI symptoms.     Most Recent Immunizations   Administered Date(s) Administered     TDAP (ADACEL AGES 11-64) 12/13/2007   Did  not want a pneumococcal shot in the past.     Return in about 3 months (around 6/15/2017).    No orders of the defined types were placed in this encounter.      Medications Discontinued During This Encounter   Medication Reason     mupirocin (BACTROBAN) 2 % ointment Therapy completed     predniSONE (DELTASONE) 1 MG tablet Reorder     Current Outpatient Prescriptions   Medication Sig Dispense Refill     predniSONE (DELTASONE) 1 MG tablet 1mg Po daily. 210 tablet 1     methotrexate 2.5 MG tablet CHEMO Take 5 tablets (12.5 mg) by mouth once a week 60 tablet 0     warfarin (COUMADIN) 2 MG tablet TAKE 2 TABLETS(4 MG) BY MOUTH ONCE DAILY 180 tablet 0     " propranolol (INDERAL) 20 MG tablet TAKE 1 TABLET(20 MG) BY MOUTH DAILY 90 tablet 1     folic acid (FOLVITE) 1 MG tablet Take 1 tablet (1 mg) by mouth daily 90 tablet 3     Cholecalciferol (VITAMIN D3 PO) Take by mouth daily       calcium carbonate-vitamin D 500-400 MG-UNIT TABS tablt Take 1 tablet by mouth 2 times daily 180 tablet 3     acetaminophen (TYLENOL) 325 MG tablet Take 2 tablets (650 mg) by mouth every 6 hours as needed for mild pain (Patient not taking: Reported on 3/15/2017) 100 tablet 0         Iain Estes MD    Division of Rheumatology  [facility]  Phone (Patients line): [phone]  Pager (healthcare professionals only): [phone]  Phone (healthcare professionals only line): [phone]            Active Problem List:     Patient Active Problem List    Diagnosis Date Noted     Long-term (current) use of anticoagulants [Z79.01] 03/18/2016     Priority: Medium     Chronic anticoagulation 04/23/2015     Priority: Medium     Per her rheumatologist, there is some association between granulomatosis with polyangiitis and pulmonary embolism. It is reasonable to continue warfarin indefinitely for therapy for this.        Calculus of kidney, right 02/11/2015     Priority: Medium     Cataract, mild, ou 01/08/2015     Priority: Medium     PE (pulmonary embolism) 10/31/2014     Priority: Medium     Dyspnea 10/24/2014     Priority: Medium     Chronic steroid use 08/15/2014     Priority: Medium     Granulomatosis with polyangiitis (H) 06/24/2014     Priority: Medium     Diagnosis updated by automated process. Provider to review and confirm.       Chronic constipation 10/15/2012     Priority: Medium     Inflammatory arthritis      Priority: Medium     thumb       Synovitis of wrist      Priority: Medium     right       Advanced directives, counseling/discussion 06/23/2011     Priority: Medium     Received outside advance directive.  HCD:Previously signed by patient and  "notarized by .  scanned into EMR as Advance Directive/Living Will document. View document and details in Code Status History Report. Please see advance directive for specifics.  Megan Madsen RN  2012    Discussed advance care planning with patient; information given to patient to review. 2011            HYPERLIPIDEMIA LDL GOAL <160 10/31/2010     Priority: Medium     GERD (gastroesophageal reflux disease)      Priority: Medium     Fibroids      Priority: Medium     Migraines      Priority: Medium     Hearing loss      Priority: Medium     Osteopenia      Priority: Medium     Monitor closely due to steroid therapy.              History of Present Illness:     Chief Complaint   Patient presents with     RECHECK     inflammatory arthritis     Original presentation:  Nury Hathaway is a 69 yof with history of inflammatory arthritis admitted to the hospital from - for progressive cough, fatigue, and weight loss, found to have ANCA+ multisystem inflammatory disease and then diagnosed with granulomatosis with polyangitis.     Symptoms first began in 2014 with fullness and hearing loss in the left ear. Since 2014, she has had upper respiratory symptoms and sinus drainage with dry, hacking cough, progressive dyspnea, fatigue, fevers, chills and night sweats. She had a 10 lb weight loss between January and her hospitalization. Additional symptoms included hoarseness. \"hot band\" around eyes, blurred vision, increased clear discharge from the eyes, swelling of the hands with tight, shiny skin. She went to her PCP who got a CXR, followed by CT for patchiness on lungs. Had PET scan as well. Was admitted to the hospital on  for extreme fatigue and worsening symptoms. On the day of admission she also developed pain, erythema, and swelling on the bridge of her nose Symptoms were so severe that she didn't care if she lived or .     During hospitalization in 2014 " she was seen by Dr. Cabrera in the hospital. She was found to be C-anca PR3+ and was started on prednisone 50mg PO daily (see consult note from 6/20 for further details of hospitalization, all lab results included below). After her first dose of prednisone she felt significantly better. Less swelling in her hand and wrist. Pain is now gone. Back to doing ADLs. All symptoms improving. appetite increased with prednisone. She still experiences some nasal crusting and sinus drainage. Her hearing is improving though she still experiences some fullness and occasional popping. Her breathing has improved significantly and she is back to doing house work, walking stairs with only mild dyspnea and occasional coughing. She continues to have some eye drainage but her vision is improved. She had one infusion of rituximab 375mg/m2 on 6/23 (day of discharge), which she tolerated without incident. She also had her first dose of pentamidine (300 mg) on the day of discharge. Since discharge she has been taking 25 mg BID prednisone without significant side effect. She has also been taking calcium/vitamin D 400-500 mg-units BID in addition to her previous medications.     Rheumatologic history: Her right wrist has been affected with pain, swelling, and decreased ROM since 2009. Wrist can't bend but still has use of fingers. She was seen at [facility] for wrist pain. Had synovectomy or the radiocarpal and midcarpal joints in 2010 but symptoms recurred in 2 weeks. Samples were negative for infection or crystals, no biopsy was done initially. She also had numerous steroid injections into the wrist. She was referred to rheumatologist Dr. Hernández ([place]) who worked her up for rheumatoid arthritis (positive family history) and found extensive synovitis in the radiocarpal and midcarpal joints with effusions, multifocal erosions, and marrow edema by MRI. Lab work at that time showed negative RF, negative CCP, negative roverto normal ESR, and normal CRP.   Dr. Hernánedz referred her back to [facility] to do a biopsy which showed acute and chronic synovitis, no rheumatoid nodules, no malignancy, no crystals. Rheumatologist recommended MTX, and then plaquenil but the patient declined both due to potential side effects. Some arthralgia in base of thumbs developing as well. No other joints affected. Has had DEXA scan in 2006 and 2009. The more recent showed osteopenia and recommended repeat scan in 2 years.     CRP >170 in 6/14. Sed rate >70  RF neg in 2009.   WBC 13 in 2014  Bronchoscopy 6/14: AFB, prelim AFB culture neg. Prelim nocardia culture neg. KOH prep neg.   chronic fungal antibodies negative  HBV, HCV, HIV neg.     Lung biopsy:6/14  Lung, left upper lobe, biopsy:   The clinical concern for  vasculitis is noted. While the presence of organizing pneumonitis and  dissecting hemorrhage may be seen in the setting of vasculitis, there is  no evidence of active vascular injury in this sample.    - Acute and organizing pneumonitis with focal acute lung injury  - Hemorrhage  - Trichrome stain shows patchy fibrosis   - Negative for malignancy    CT chest 5/14:  1. There are two left upper lobe nodules or nodular infiltrates as   well as a subpleural nodule or nodular infiltrate in the left lower   lobe and bilateral apical nodular infiltrates. Malignancy or an   atypical infection could have this appearance. Biopsy or PET/CT should   be considered.   2. No lymph node enlargement.   3. There is a subpleural nodule or pleural plaque at the right lung   base.    CT chest 6/14:  1. No evidence of pulmonary embolism.   2. No significant change in multiple bilateral pulmonary nodules and   irregular shaped masses with the largest in the left lung apex   measuring 3.1 x 5.3 cm. Differential diagnosis still includes   infection and neoplasm.   3. Mild scattered air trapping throughout the lungs suggesting   obstructive lung disease.    PET 6/14:  Impression:   1. Multiple bilateral  pulmonary hypermetabolic opacities which have   slightly increased in size from 5/28/2014, several of which   demonstrate air bronchograms and the largest of which demonstrates   central hypoattenuation.   Given their radiographic appearance and evidence of short-term   interval increase in size, differential favors infectious etiology   including necrotizing pneumonia, neoplasm is not excluded and   radiographic followup to resolution is recommended.   Multiple prominent mediastinal nodes which demonstrate mild metabolic   activity, most likely reactive given suspicion for pneumonia.   2. Nonobstructing 8 mm stone in the inferior collecting system of the   right kidney.   3. 2.2 x 1.4 cm fluid density focus in the left adnexa, in this   presumably postmenopausal female, further evaluation recommended with   pelvic ultrasound, this most likely represents an ovarian or   paraovarian cyst.   Per the electronic medical record, patient has been started on   antibiotic therapy by her provider as documented in the clinical note   from office visit 6/4/2014.   Pelvic USG : 1. No left ovarian or paraovarian cyst identified.   2. Trace fluid in the endometrial cavity but no evidence for   endometrial thickening.   3. 1.4 cm myometrial fibroid, 0.5 cm partially calcified right uterine   lesion suggesting a smaller fibroid.    DEXA:   Osteopenia.    Comparison is made to previous study dated 12/14/106.   There has been significant decrease in bone density of the lumbar spine.   There has been a trend toward significant decrease in bone density of the   hip(s).    Interim history:  No hemoptysis. Rare Shortness of breath still there but not worse.   No significant morning stiffness or pain in hand joints    Interim history:   Nausea today. Feeling sick today.   Chills this AM.   No joint pain or stiffness.   Methotrexate 15mg PO weekly.   Prednisone 5mg PO daily.     Interim history 1/16  Tingling sensation in feet and hands  are better.   Being treated with antibiotic for UTI.  No joint pains.   No joint stiffness.   Methotrexate 12.5 mg PO weekly.   Prednisone 5mg PO daily.     Interim history November 10, 2016   No active new symptoms except left nasal congestion.   No joint pains, hemoptysis, hematuria, shortness of breath (otherwise occasional non-exertional shortness of breath)    March 15, 2017  No active new symptoms except left nasal congestion.   No joint pains, hemoptysis, hematuria, shortness of breath (otherwise occasional non-exertional shortness of breath)  Bilateral CMC joint swelling and pain. 0/10 but with a hand shake 4/10  No hemoptysis, hematuria, shortness of breath (otherwise occasional non-exertional shortness of breath)  Prednisone dose 1mg PO daily.   On and off back muscle spasms - two episodes. Resolved.          Review of Systems:   Complete ROS negative except for symptoms mentioned in the HPI          Past Medical History:     Past Medical History   Diagnosis Date     Atypical pneumonia 6/14/2014     Coronary artery disease 1982     heart beat hard made me tired     Dyslipidemia      Fibroids      GERD (gastroesophageal reflux disease)      Granulomatosis with polyangiitis (Wegener's)      Hearing loss      Inflammatory arthritis      thumb     Migraines      MVP (mitral valve prolapse)      had an echo that was normal in 2007 she is on Inderal     Osteopenia      PE (pulmonary embolism) 10/2014     ? GPA associated, on warfarin      Synovitis of wrist 2009     right     Past Surgical History   Procedure Laterality Date     Tonsillectomy & adenoidectomy       Appendectomy       Tubal ligation       Cl aff surgical pathology       cone biopsy 1975     Surgical history of -   as a child     strabismus repair right eye     Hc esophagoscopy, diagnostic       Surgical history of -   8-2009     right wrist debridement     Optical tracking system bronchoscopy  6/19/2014     Procedure: OPTICAL TRACKING SYSTEM  BRONCHOSCOPY;  Surgeon: Angel Kathleen MD;  Location:  GI     Abdomen surgery       appendix out     Biopsy  1972     cone biopsy     Colonoscopy       Eye surgery       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     Tonsillectomy       as a child            Social History:     Social History     Occupational History     Not on file.     Social History Main Topics     Smoking status: Former Smoker     Packs/day: 2.00     Years: 18.00     Types: Cigarettes     Quit date: 1/1/1982     Smokeless tobacco: Never Used     Alcohol use No     Drug use: No     Sexual activity: No    with 3 children. Retired, previously worked in  in an office. Lives alone with cat. Currently non-smoker, smoked 25 years quitting in 1982. No etoh, no drugs.          Family History:     Family History   Problem Relation Age of Onset     Respiratory Mother      emphysema     Arthritis Maternal Grandmother      rheumatoid     Neurologic Disorder Paternal Grandmother      migraines     Alzheimer Disease Paternal Grandmother      CANCER Sister      stage 4 anal cancer age 62 years     Cancer - colorectal Brother      Aneurysm Mother      Chronic Obstructive Pulmonary Disease Mother      Colon Cancer Brother      Colon Cancer Sister      Other Cancer Father      Substance Abuse Sister      Anesthesia Reaction Daughter      Unknown/Adopted Paternal Grandfather      CANCER Father      lung cancer     CANCER Sister      HEART DISEASE Maternal Grandmother      unsure of details     HEART DISEASE Maternal Grandfather      Substance Abuse Sister      Thyroid Disease Mother      ,     OSTEOPOROSIS Mother             Allergies:     Allergies   Allergen Reactions     Adhesive Tape      Rash itches     Amoxicillin      vomiting     Augmentin      vomiting     Codeine      vomiting     Erythromycin      vomiting     Nickel      itches rash     Sulfa Drugs Itching     Tegaderm Alginate Ag      LW Other1: -ALL MEDICATIONS MAKE PATIENT VIOLENTLY  ILL; ADHESIVE BANDAGES; NICKEL     Zithromax [Azithromycin Dihydrate]      Vomiting/ skin blisters            Medications:     Current Outpatient Prescriptions   Medication Sig Dispense Refill     predniSONE (DELTASONE) 1 MG tablet 1mg Po daily. 210 tablet 1     methotrexate 2.5 MG tablet CHEMO Take 5 tablets (12.5 mg) by mouth once a week 60 tablet 0     warfarin (COUMADIN) 2 MG tablet TAKE 2 TABLETS(4 MG) BY MOUTH ONCE DAILY 180 tablet 0     propranolol (INDERAL) 20 MG tablet TAKE 1 TABLET(20 MG) BY MOUTH DAILY 90 tablet 1     folic acid (FOLVITE) 1 MG tablet Take 1 tablet (1 mg) by mouth daily 90 tablet 3     Cholecalciferol (VITAMIN D3 PO) Take by mouth daily       calcium carbonate-vitamin D 500-400 MG-UNIT TABS tablt Take 1 tablet by mouth 2 times daily 180 tablet 3     acetaminophen (TYLENOL) 325 MG tablet Take 2 tablets (650 mg) by mouth every 6 hours as needed for mild pain (Patient not taking: Reported on 3/15/2017) 100 tablet 0            Physical Exam:   Blood pressure 136/82, pulse 75, weight 58.1 kg (128 lb), SpO2 96 %.  Wt Readings from Last 4 Encounters:   03/15/17 58.1 kg (128 lb)   11/10/16 59.8 kg (131 lb 12.8 oz)   09/28/16 59.6 kg (131 lb 8 oz)   08/02/16 57.8 kg (127 lb 8 oz)     Constitutional: well-developed, appearing stated age; cooperative, NAD  Eyes: nl EOM, PERRL, vision, conjunctiva, sclera non-erythematous, no drainage.   ENT: No mucous membrane lesions, normal saliva pool  Neck: no mass or thyroid enlargement  Resp: lungs clear to auscultation bilaterally. No wheezes or crackles,   CV: RRR, normal S1, S2, no murmurs, rubs or gallops, no edema  GI: not tested  : not tested  Lymph: no cervical, supraclavicular or epitrochlear nodes  MS: Right wrist fused.   All shoulder, elbow, MCP/PIP/DIP, spine, hip, knee, ankle, and foot MTP/IP joints were examined and  found normal except for left carpal joint which has no ROM, tenderness on palpation, and effusion. No active synovitis or  deformity in other joints. Full ROM in other joints.  Normal  strength. No dactylitis,  tenosynovitis, enthesopathy. 5/5 strength in all muscle groups.   Skin: no nail pitting, alopecia, rash, nodules or lesions. Irregular nail contours on some nails.   Psych: nl judgement, orientation, memory, affect.         Data:     Results for orders placed or performed in visit on 03/08/17   CBC with platelets differential   Result Value Ref Range    WBC 5.3 4.0 - 11.0 10e9/L    RBC Count 4.34 3.8 - 5.2 10e12/L    Hemoglobin 13.9 11.7 - 15.7 g/dL    Hematocrit 41.4 35.0 - 47.0 %    MCV 95 78 - 100 fl    MCH 32.0 26.5 - 33.0 pg    MCHC 33.6 31.5 - 36.5 g/dL    RDW 13.9 10.0 - 15.0 %    Platelet Count 208 150 - 450 10e9/L    Diff Method Automated Method     % Neutrophils 70.9 %    % Lymphocytes 17.8 %    % Monocytes 9.9 %    % Eosinophils 0.8 %    % Basophils 0.6 %    Absolute Neutrophil 3.8 1.6 - 8.3 10e9/L    Absolute Lymphocytes 1.0 0.8 - 5.3 10e9/L    Absolute Monocytes 0.5 0.0 - 1.3 10e9/L    Absolute Eosinophils 0.0 0.0 - 0.7 10e9/L    Absolute Basophils 0.0 0.0 - 0.2 10e9/L   AST   Result Value Ref Range    AST 21 0 - 45 U/L   ALT   Result Value Ref Range    ALT 26 0 - 50 U/L   Creatinine   Result Value Ref Range    Creatinine 0.72 0.52 - 1.04 mg/dL    GFR Estimate 80 >60 mL/min/1.7m2    GFR Estimate If Black >90   GFR Calc   >60 mL/min/1.7m2   CRP inflammation   Result Value Ref Range    CRP Inflammation <2.9 0.0 - 8.0 mg/L   Erythrocyte sedimentation rate auto   Result Value Ref Range    Sed Rate 6 0 - 30 mm/h   Proteinase 3 Antibody IgG   Result Value Ref Range    Proteinase 3 Antibody IgG  0.0 - 0.9 AI     <0.2  Negative   Antibody index (AI) values reflect qualitative changes in antibody   concentration that cannot be directly associated with clinical condition or   disease state.     UA with Microscopic reflex to Culture   Result Value Ref Range    Color Urine Yellow     Appearance Urine Clear      Glucose Urine Negative NEG mg/dL    Bilirubin Urine Negative NEG    Ketones Urine Negative NEG mg/dL    Specific Gravity Urine <=1.005 1.003 - 1.035    pH Urine 6.5 5.0 - 7.0 pH    Protein Albumin Urine Negative NEG mg/dL    Urobilinogen Urine 0.2 0.2 - 1.0 EU/dL    Nitrite Urine Negative NEG    Blood Urine Large (A) NEG    Leukocyte Esterase Urine Negative NEG    Source Midstream Urine     WBC Urine O - 2 0 - 2 /HPF    RBC Urine 5-10 (A) 0 - 2 /HPF    Squamous Epithelial /LPF Urine Few FEW /LPF         Iain Estes MD   Clinical   Division of Rheumatology  [facility]  Phone (Patients line): [phone]  Pager (healthcare professionals only): [phone]  Phone (healthcare professionals only line): [phone]

## 2017-03-30 ENCOUNTER — MYC MEDICAL ADVICE (OUTPATIENT)
Dept: RHEUMATOLOGY | Facility: CLINIC | Age: 72
End: 2017-03-30

## 2017-03-30 NOTE — TELEPHONE ENCOUNTER
We have to manage this as a regular common cold with OTC cold medications. If this is persistent for > 10 days, then patient needs to report to us.   If she develops fever and if symptoms are progressively worsening, she needs to see PCP to make sure this is not bacterial infection.

## 2017-03-31 DIAGNOSIS — J01.00 ACUTE NON-RECURRENT MAXILLARY SINUSITIS: Primary | ICD-10-CM

## 2017-03-31 RX ORDER — FLUTICASONE PROPIONATE 50 MCG
2 SPRAY, SUSPENSION (ML) NASAL DAILY
Qty: 16 G | Refills: 1 | Status: ON HOLD | OUTPATIENT
Start: 2017-03-31 | End: 2017-07-28

## 2017-04-05 ENCOUNTER — ANTICOAGULATION THERAPY VISIT (OUTPATIENT)
Dept: NURSING | Facility: CLINIC | Age: 72
End: 2017-04-05
Payer: COMMERCIAL

## 2017-04-05 DIAGNOSIS — Z79.01 LONG-TERM (CURRENT) USE OF ANTICOAGULANTS: ICD-10-CM

## 2017-04-05 LAB — INR POINT OF CARE: 2 (ref 0.86–1.14)

## 2017-04-05 PROCEDURE — 85610 PROTHROMBIN TIME: CPT | Mod: QW

## 2017-04-05 PROCEDURE — 36416 COLLJ CAPILLARY BLOOD SPEC: CPT

## 2017-04-05 PROCEDURE — 99207 ZZC NO CHARGE NURSE ONLY: CPT

## 2017-04-05 NOTE — PROGRESS NOTES
ANTICOAGULATION FOLLOW-UP CLINIC VISIT    Patient Name:  Nury Cárdenas  Date:  4/5/2017  Contact Type:  Face to Face    SUBJECTIVE:     Patient Findings     Positives Change in medications (Patient stopped taking prednisone on 4/1.), No Problem Findings           OBJECTIVE    INR Protime   Date Value Ref Range Status   04/05/2017 2.0 (A) 0.86 - 1.14 Final       ASSESSMENT / PLAN  INR assessment THER    Recheck INR In: 6 WEEKS    INR Location Clinic      Anticoagulation Summary as of 4/5/2017     INR goal 2.0-3.0   Today's INR 2.0   Maintenance plan 4 mg (2 mg x 2) every day   Full instructions 4 mg every day   Weekly total 28 mg   No change documented Kirti Lacy RN   Plan last modified Giselle Price RN (7/7/2016)   Next INR check 5/17/2017   Priority INR   Target end date     Indications   Long-term (current) use of anticoagulants [Z79.01] [Z79.01]  PE (pulmonary embolism) [I26.99]         Anticoagulation Episode Summary     INR check location     Preferred lab     Send INR reminders to [facility]    Comments       Anticoagulation Care Providers     Provider Role Specialty Phone number    Milena Phil Harris MD [facility] [phone]            See the Encounter Report to view Anticoagulation Flowsheet and Dosing Calendar (Go to Encounters tab in chart review, and find the Anticoagulation Therapy Visit)    RN reviewed patient's weekly warfarin dose. Pt INR remains within therapeutic range. Pt will continue with current dosing and monitoring as planned, based on physician directed care plan.      Kirti Lacy, GABBI

## 2017-04-05 NOTE — MR AVS SNAPSHOT
Nury Cárdenas   4/5/2017 9:30 AM   Anticoagulation Therapy Visit    Description:  72 year old female   Provider:  MONI ANTI COAG   Department:  [facility]           INR as of 4/5/2017     Today's INR 2.0      Anticoagulation Summary as of 4/5/2017     INR goal 2.0-3.0   Today's INR 2.0   Full instructions 4 mg every day   Next INR check 5/17/2017    Indications   Long-term (current) use of anticoagulants [Z79.01] [Z79.01]  PE (pulmonary embolism) [I26.99]         Your next Anticoagulation Clinic appointment(s)     Apr 05, 2017  9:30 AM CDT   Anticoagulation Visit with MONI ANTI COAG   [facility] ([place] [id number] [facility] [id number] [phone]            May 17, 2017  9:30 AM CDT   Anticoagulation Visit with MONI ANTI COAG   [facility] ([place] [id number] [facility] [id number] [phone]              Contact Numbers     [facility]  Please call [phone] to cancel and/or reschedule your appointment   Please call [phone] with any problems or questions regarding your therapy.        April 2017 Details    Sun Mon Tue Wed Thu Fri Sat           1                 2               3               4               5      4 mg   See details      6      4 mg         7      4 mg         8      4 mg           9      4 mg         10      4 mg         11      4 mg         12      4 mg         13      4 mg         14      4 mg         15      4 mg           16      4 mg         17      4 mg         18      4 mg         19      4 mg         20      4 mg         21      4 mg         22      4 mg           23      4 mg         24      4 mg         25      4 mg         26      4 mg         27      4 mg         28      4 mg         29      4 mg           30      4 mg                Date Details   04/05 This INR check               How to take your warfarin dose     To take:  4 mg Take 2 of the 2 mg tablets.           May  2017 Details    Sun Mon Tue Wed Thu Fri Sat      1      4 mg         2      4 mg         3      4 mg         4      4 mg         5      4 mg         6      4 mg           7      4 mg         8      4 mg         9      4 mg         10      4 mg         11      4 mg         12      4 mg         13      4 mg           14      4 mg         15      4 mg         16      4 mg         17            18               19               20                 21               22               23               24               25               26               27                 28               29               30               31                   Date Details   No additional details    Date of next INR:  5/17/2017         How to take your warfarin dose     To take:  4 mg Take 2 of the 2 mg tablets.

## 2017-04-20 DIAGNOSIS — Z79.01 CHRONIC ANTICOAGULATION: ICD-10-CM

## 2017-04-20 RX ORDER — WARFARIN SODIUM 2 MG/1
TABLET ORAL
Qty: 180 TABLET | Refills: 0 | Status: SHIPPED | OUTPATIENT
Start: 2017-04-20 | End: 2017-07-24

## 2017-05-17 ENCOUNTER — ANTICOAGULATION THERAPY VISIT (OUTPATIENT)
Dept: NURSING | Facility: CLINIC | Age: 72
End: 2017-05-17
Payer: COMMERCIAL

## 2017-05-17 DIAGNOSIS — Z79.01 LONG-TERM (CURRENT) USE OF ANTICOAGULANTS: ICD-10-CM

## 2017-05-17 LAB — INR POINT OF CARE: 1.9 (ref 0.86–1.14)

## 2017-05-17 PROCEDURE — 99207 ZZC NO CHARGE NURSE ONLY: CPT

## 2017-05-17 PROCEDURE — 85610 PROTHROMBIN TIME: CPT | Mod: QW

## 2017-05-17 PROCEDURE — 36416 COLLJ CAPILLARY BLOOD SPEC: CPT

## 2017-05-17 NOTE — MR AVS SNAPSHOT
Nury Cárdenas   5/17/2017 9:30 AM   Anticoagulation Therapy Visit    Description:  72 year old female   Provider:  MONI ANTI COAG   Department:  [facility]           INR as of 5/17/2017     Today's INR 1.9!      Anticoagulation Summary as of 5/17/2017     INR goal 2.0-3.0   Today's INR 1.9!   Full instructions 4 mg every day   Next INR check 6/28/2017    Indications   Long-term (current) use of anticoagulants [Z79.01] [Z79.01]  PE (pulmonary embolism) [I26.99]         Your next Anticoagulation Clinic appointment(s)     May 17, 2017  9:30 AM CDT   Anticoagulation Visit with  ANTI COAG   [facility] ([facility]    [address] [place] [id number] [phone]            Jun 28, 2017  9:30 AM CDT   Anticoagulation Visit with MONI ANTI COAG   [facility] ([place] [id number] [place] [id number] [phone]              Contact Numbers     [facility]  Please call [phone] to cancel and/or reschedule your appointment   Please call [phone] with any problems or questions regarding your therapy.        May 2017 Details    Sun Mon Tue Wed Thu Fri Sat      1               2               3               4               5               6                 7               8               9               10               11               12               13                 14               15               16               17      4 mg   See details      18      4 mg         19      4 mg         20      4 mg           21      4 mg         22      4 mg         23      4 mg         24      4 mg         25      4 mg         26      4 mg         27      4 mg           28      4 mg         29      4 mg         30      4 mg         31      4 mg             Date Details   05/17 This INR check               How to take your warfarin dose     To take:  4 mg Take 2 of the 2 mg tablets.           June 2017 Details    Sun Mon Tue Wed  Thu Fri Sat         1      4 mg         2      4 mg         3      4 mg           4      4 mg         5      4 mg         6      4 mg         7      4 mg         8      4 mg         9      4 mg         10      4 mg           11      4 mg         12      4 mg         13      4 mg         14      4 mg         15      4 mg         16      4 mg         17      4 mg           18      4 mg         19      4 mg         20      4 mg         21      4 mg         22      4 mg         23      4 mg         24      4 mg           25      4 mg         26      4 mg         27      4 mg         28            29               30                 Date Details   No additional details    Date of next INR:  6/28/2017         How to take your warfarin dose     To take:  4 mg Take 2 of the 2 mg tablets.

## 2017-05-17 NOTE — PROGRESS NOTES
ANTICOAGULATION FOLLOW-UP CLINIC VISIT    Patient Name:  Nury Cárdenas  Date:  5/17/2017  Contact Type:  Face to Face    SUBJECTIVE:     Patient Findings     Positives No Problem Findings           OBJECTIVE    INR Protime   Date Value Ref Range Status   05/17/2017 1.9 (A) 0.86 - 1.14 Final       ASSESSMENT / PLAN  No question data found.  Anticoagulation Summary as of 5/17/2017     INR goal 2.0-3.0   Today's INR 1.9!   Maintenance plan 4 mg (2 mg x 2) every day   Full instructions 4 mg every day   Weekly total 28 mg   No change documented Kirti Lacy RN   Plan last modified Giselle Price RN (7/7/2016)   Next INR check 6/28/2017   Priority INR   Target end date     Indications   Long-term (current) use of anticoagulants [Z79.01] [Z79.01]  PE (pulmonary embolism) [I26.99]         Anticoagulation Episode Summary     INR check location     Preferred lab     Send INR reminders to [facility]    Comments       Anticoagulation Care Providers     Provider Role Specialty Phone number    Milena Phil Harris MD [facility] [phone]            See the Encounter Report to view Anticoagulation Flowsheet and Dosing Calendar (Go to Encounters tab in chart review, and find the Anticoagulation Therapy Visit)    RN reviewed patient's weekly warfarin dose. Pt INR remains within therapeutic range. Pt will continue with current dosing and monitoring as planned, based on physician directed care plan.      Kirti Lacy, GABBI

## 2017-05-26 DIAGNOSIS — G43.909 MIGRAINE WITHOUT STATUS MIGRAINOSUS, NOT INTRACTABLE, UNSPECIFIED MIGRAINE TYPE: ICD-10-CM

## 2017-05-26 RX ORDER — PROPRANOLOL HYDROCHLORIDE 20 MG/1
TABLET ORAL
Qty: 90 TABLET | Refills: 1 | Status: SHIPPED | OUTPATIENT
Start: 2017-05-26 | End: 2017-11-22

## 2017-05-26 NOTE — TELEPHONE ENCOUNTER
Prescription approved per [facility] Refill Protocol.  Kirti Lacy, RN - BC

## 2017-05-26 NOTE — TELEPHONE ENCOUNTER
propranolol (INDERAL) 20 MG tablet      Last Written Prescription Date: 12/1/16  Last Fill Quantity: 90, # refills: 1    Last Office Visit with [facility], P or [facility] prescribing provider:  3/15/17   Future Office Visit:        BP Readings from Last 3 Encounters:   03/15/17 136/82   11/10/16 134/81   09/28/16 118/66

## 2017-06-05 NOTE — PROGRESS NOTES
SUBJECTIVE:                                                    Nury Cárdenas is a 72 year old female who presents to clinic today for the following health issues:  Chief Complaint   Patient presents with     Sore     on back     Health Maintenance     Mammo,PHQ2, FALL RISK     Nury is a 73 yo F with Mercado's Granulomatosis, hx of PE on chronic anticoagulation here due to a red blister noted 5 days ago. It is not painful but uncomfortable when bra laces rub against it    Healthcare Maintenance:   Mammo    Problem list and histories reviewed & adjusted, as indicated.  Additional history: as documented    Patient Active Problem List   Diagnosis     GERD (gastroesophageal reflux disease)     Fibroids     Migraines     Hearing loss     Osteopenia     HYPERLIPIDEMIA LDL GOAL <160     Advanced directives, counseling/discussion     Inflammatory arthritis     Synovitis of wrist     Chronic constipation     Granulomatosis with polyangiitis (H)     Chronic steroid use     Dyspnea     PE (pulmonary embolism)     Cataract, mild, ou     Calculus of kidney, right     Chronic anticoagulation     Long-term (current) use of anticoagulants [Z79.01]     Past Surgical History:   Procedure Laterality Date     ABDOMEN SURGERY      appendix out     APPENDECTOMY       BIOPSY  1972    cone biopsy     CL AFF SURGICAL PATHOLOGY      cone biopsy 1975     COLONOSCOPY       EYE SURGERY      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     HC ESOPHAGOSCOPY, DIAGNOSTIC       OPTICAL TRACKING SYSTEM BRONCHOSCOPY  6/19/2014    Procedure: OPTICAL TRACKING SYSTEM BRONCHOSCOPY;  Surgeon: Angel Kathleen MD;  Location:  GI     SURGICAL HISTORY OF -   as a child    strabismus repair right eye     SURGICAL HISTORY OF -   8-2009    right wrist debridement     TONSILLECTOMY      as a child     TONSILLECTOMY & ADENOIDECTOMY       TUBAL LIGATION         Social History   Substance Use Topics     Smoking status: Former Smoker     Packs/day: 2.00      "Years: 18.00     Types: Cigarettes     Quit date: 1/1/1982     Smokeless tobacco: Never Used     Alcohol use No     Family History   Problem Relation Age of Onset     Respiratory Mother      emphysema     Aneurysm Mother      Chronic Obstructive Pulmonary Disease Mother      Thyroid Disease Mother      ,     OSTEOPOROSIS Mother      Other Cancer Father      CANCER Father      lung cancer     Arthritis Maternal Grandmother      rheumatoid     HEART DISEASE Maternal Grandmother      unsure of details     HEART DISEASE Maternal Grandfather      Neurologic Disorder Paternal Grandmother      migraines     Alzheimer Disease Paternal Grandmother      Unknown/Adopted Paternal Grandfather      CANCER Sister      stage 4 anal cancer age 62 years     Colon Cancer Sister      Cancer - colorectal Brother      Colon Cancer Brother      Substance Abuse Sister      Anesthesia Reaction Daughter            Reviewed and updated as needed this visit by clinical staff       Reviewed and updated as needed this visit by Provider         ROS:  Constitutional, HEENT, cardiovascular, pulmonary, gi and gu systems are negative, except as otherwise noted.    OBJECTIVE:                                                    /82  Pulse 75  Temp 96.6  F (35.9  C) (Oral)  Ht 5' 4.5\" (1.638 m)  Wt 126 lb (57.2 kg)  SpO2 97%  BMI 21.29 kg/m2  Body mass index is 21.29 kg/(m^2).  GENERAL: healthy, alert and no distress  NECK: no adenopathy and thyroid normal to palpation  RESP: lungs clear to auscultation - no rales, rhonchi or wheezes  CV: regular rate and rhythm, no murmur, click or rub, no peripheral edema   MS: no gross musculoskeletal defects noted, no edema  BACK: Rt upper: irritated papular mole. Appears benign.  Diagnostic Test Results:  none      ASSESSMENT/PLAN:                                                    .(D22.9) Melanocytic nevus, unspecified location: Rt upper back  (primary encounter diagnosis)  Comment: Irritated benign " mole  Plan: Watch mole for any changes in size or color    (Z23) Need for prophylactic vaccination against Streptococcus pneumoniae (pneumococcus)  Comment: Reports that was asked not to do by rheumatologist    (Z12.31) Encounter for screening mammogram for breast cancer  Plan: *MA Screening Digital Bilateral      Peter Steven Abbott MD  [facility]

## 2017-06-06 ENCOUNTER — RADIANT APPOINTMENT (OUTPATIENT)
Dept: MAMMOGRAPHY | Facility: CLINIC | Age: 72
End: 2017-06-06
Attending: FAMILY MEDICINE
Payer: COMMERCIAL

## 2017-06-06 ENCOUNTER — OFFICE VISIT (OUTPATIENT)
Dept: FAMILY MEDICINE | Facility: CLINIC | Age: 72
End: 2017-06-06
Payer: COMMERCIAL

## 2017-06-06 VITALS
BODY MASS INDEX: 20.99 KG/M2 | OXYGEN SATURATION: 97 % | WEIGHT: 126 LBS | DIASTOLIC BLOOD PRESSURE: 82 MMHG | TEMPERATURE: 96.6 F | HEART RATE: 75 BPM | SYSTOLIC BLOOD PRESSURE: 132 MMHG | HEIGHT: 65 IN

## 2017-06-06 DIAGNOSIS — Z12.31 ENCOUNTER FOR SCREENING MAMMOGRAM FOR BREAST CANCER: ICD-10-CM

## 2017-06-06 DIAGNOSIS — Z12.31 VISIT FOR SCREENING MAMMOGRAM: ICD-10-CM

## 2017-06-06 DIAGNOSIS — Z23 NEED FOR PROPHYLACTIC VACCINATION AGAINST STREPTOCOCCUS PNEUMONIAE (PNEUMOCOCCUS): ICD-10-CM

## 2017-06-06 DIAGNOSIS — D22.9 MELANOCYTIC NEVUS, UNSPECIFIED LOCATION: Primary | ICD-10-CM

## 2017-06-06 PROCEDURE — G0202 SCR MAMMO BI INCL CAD: HCPCS | Mod: TC

## 2017-06-06 PROCEDURE — 99212 OFFICE O/P EST SF 10 MIN: CPT | Performed by: FAMILY MEDICINE

## 2017-06-06 ASSESSMENT — PAIN SCALES - GENERAL: PAINLEVEL: NO PAIN (0)

## 2017-06-06 NOTE — MR AVS SNAPSHOT
After Visit Summary   6/6/2017    Nury Cárdenas    MRN: [id number]           Patient Information     Date Of Birth          1945        Visit Information        Provider Department      6/6/2017 12:40 PM Phil Abbott MD [facility]        Today's Diagnoses     Melanocytic nevus, unspecified location: Rt upper back    -  1    Need for prophylactic vaccination against Streptococcus pneumoniae (pneumococcus)        Encounter for screening mammogram for breast cancer          Care Instructions    [facility]    If you have any questions regarding to your visit please contact your care team:       Team Purple:   Clinic Hours Telephone Number   Dr. Shanti Berg     7am-7pm  Monday - Thursday   7am-5pm  Fridays  [phone]  (Appointment scheduling available 24/7)    Questions about your Visit?   Team Line:  [phone]   [facility] [place] [facility] 11am-9pm Monday-Friday Saturday-Sunday- 9am-5pm   [place] - 5pm-9pm Monday-Friday Saturday-Sunday- 9am-5pm  [phone] - [place]   [phone] - [place]       What options do I have for visits at the clinic other than the traditional office visit?  To expand how we care for you, many of our providers are utilizing electronic visits (e-visits) and telephone visits, when medically appropriate, for interactions with their patients rather than a visit in the clinic.   We also offer nurse visits for many medical concerns. Just like any other service, we will bill your insurance company for this type of visit based on time spent on the phone with your provider. Not all insurance companies cover these visits. Please check with your medical insurance if this type of visit is covered. You will be responsible for any charges that are not paid by your insurance.      E-visits via [employer]:  generally incur a $35.00 fee.  Telephone visits:  Time spent on  the phone: *charged based on time that is spent on the phone in increments of 10 minutes. Estimated cost:   5-10 mins $30.00   11-20 mins. $59.00   21-30 mins. $85.00     Use [employer] (secure email communication and access to your chart) to send your primary care provider a message or make an appointment. Ask someone on your Team how to sign up for [employer].  For a Price Quote for your services, please call our Consumer Price Line at [phone].  As always, Thank you for trusting us with your health care needs!    Nidia Bonilla MA            Follow-ups after your visit        Your next 10 appointments already scheduled     Jun 09, 2017 10:00 AM CDT   New Visit with Alvaro Maguire MD   [facility] ([facility])    [address] [place] [id number] [phone]            Jun 28, 2017  9:30 AM CDT   Anticoagulation Visit with RAAD ANTI COAG   [facility] ([address] [phone]              Future tests that were ordered for you today     Open Future Orders        Priority Expected Expires Ordered    *MA Screening Digital Bilateral Routine  6/6/2018 6/6/2017            Who to contact     If you have questions or need follow up information about today's clinic visit or your schedule please contact [facility] directly at [phone].  Normal or non-critical lab and imaging results will be communicated to you by MyChart, letter or phone within 4 business days after the clinic has received the results. If you do not hear from us within 7 days, please contact the clinic through MyChart or phone. If you have a critical or abnormal lab result, we will notify you by phone as soon as possible.  Submit refill requests through [employer] or call your pharmacy and they will forward the refill request to us. Please allow 3 business days for your refill to be completed.          Additional  "Information About Your Visit        MyChart Information     [employer] gives you secure access to your electronic health record. If you see a primary care provider, you can also send messages to your care team and make appointments. If you have questions, please call your primary care clinic.  If you do not have a primary care provider, please call [phone] and they will assist you.        Care EveryWhere ID     This is your Care EveryWhere ID. This could be used by other organizations to access your [place] medical records  [id number]        Your Vitals Were     Pulse Temperature Height Pulse Oximetry BMI (Body Mass Index)       75 96.6  F (35.9  C) (Oral) 5' 4.5\" (1.638 m) 97% 21.29 kg/m2        Blood Pressure from Last 3 Encounters:   06/06/17 132/82   03/15/17 136/82   11/10/16 134/81    Weight from Last 3 Encounters:   06/06/17 126 lb (57.2 kg)   03/15/17 128 lb (58.1 kg)   11/10/16 131 lb 12.8 oz (59.8 kg)               Primary Care Provider Office Phone # Fax #    Phil Abbott [phone]       [facility] [address] [place] [address]        Thank you!     Thank you for choosing [facility]  for your care. Our goal is always to provide you with excellent care. Hearing back from our patients is one way we can continue to improve our services. Please take a few minutes to complete the written survey that you may receive in the mail after your visit with us. Thank you!             Your Updated Medication List - Protect others around you: Learn how to safely use, store and throw away your medicines at www.disposemymeds.org.          This list is accurate as of: 6/6/17  1:12 PM.  Always use your most recent med list.                   Brand Name Dispense Instructions for use    acetaminophen 325 MG tablet    TYLENOL    100 tablet    Take 2 tablets (650 mg) by mouth every 6 hours as needed for mild pain       calcium carbonate-vitamin D 500-400 MG-UNIT " Tabs per tablet     180 tablet    Take 1 tablet by mouth 2 times daily       fluticasone 50 MCG/ACT spray    FLONASE    16 g    Spray 2 sprays into both nostrils daily       folic acid 1 MG tablet    FOLVITE    90 tablet    Take 1 tablet (1 mg) by mouth daily       methotrexate 2.5 MG tablet CHEMO     60 tablet    Take 5 tablets (12.5 mg) by mouth once a week       predniSONE 1 MG tablet    DELTASONE    210 tablet    1mg Po daily.       propranolol 20 MG tablet    INDERAL    90 tablet    TAKE 1 TABLET(20 MG) BY MOUTH DAILY       VITAMIN D3 PO      Take by mouth daily       warfarin 2 MG tablet    COUMADIN    180 tablet    TAKE 2 TABLETS(4 MG) BY MOUTH ONCE DAILY

## 2017-06-06 NOTE — NURSING NOTE
"Chief Complaint   Patient presents with     Sore     on back     Health Maintenance     Mammo,PHQ2, FALL RISK       Initial /82  Pulse 75  Temp 96.6  F (35.9  C) (Oral)  Ht 5' 4.5\" (1.638 m)  Wt 126 lb (57.2 kg)  SpO2 97%  BMI 21.29 kg/m2 Estimated body mass index is 21.29 kg/(m^2) as calculated from the following:    Height as of this encounter: 5' 4.5\" (1.638 m).    Weight as of this encounter: 126 lb (57.2 kg).  Medication Reconciliation: complete       Paty Linares CMA      "

## 2017-06-06 NOTE — PATIENT INSTRUCTIONS
[facility]    If you have any questions regarding to your visit please contact your care team:       Team Purple:   Clinic Hours Telephone Number   Dr. Shanti Berg     7am-7pm  Monday - Thursday   7am-5pm  Fridays  [phone]  (Appointment scheduling available 24/7)    Questions about your Visit?   Team Line:  [phone]   Urgent Care - [place] [facility] - 11am-9pm Monday-Friday Saturday-Sunday- 9am-5pm   [place] - 5pm-9pm Monday-Friday Saturday-Sunday- 9am-5pm  [phone] - [facility]  [phone] - [place]       What options do I have for visits at the clinic other than the traditional office visit?  To expand how we care for you, many of our providers are utilizing electronic visits (e-visits) and telephone visits, when medically appropriate, for interactions with their patients rather than a visit in the clinic.   We also offer nurse visits for many medical concerns. Just like any other service, we will bill your insurance company for this type of visit based on time spent on the phone with your provider. Not all insurance companies cover these visits. Please check with your medical insurance if this type of visit is covered. You will be responsible for any charges that are not paid by your insurance.      E-visits via [employer]:  generally incur a $35.00 fee.  Telephone visits:  Time spent on the phone: *charged based on time that is spent on the phone in increments of 10 minutes. Estimated cost:   5-10 mins $30.00   11-20 mins. $59.00   21-30 mins. $85.00     Use [employer] (secure email communication and access to your chart) to send your primary care provider a message or make an appointment. Ask someone on your Team how to sign up for [employer].  For a Price Quote for your services, please call our Consumer Price Line at [phone].  As always, Thank you for trusting us with your health care needs!    Nidia Bonilla MA

## 2017-06-08 ENCOUNTER — PRE VISIT (OUTPATIENT)
Dept: RHEUMATOLOGY | Facility: CLINIC | Age: 72
End: 2017-06-08

## 2017-06-08 NOTE — TELEPHONE ENCOUNTER
PREVISIT INFORMATION                                                    Nury Cárdenas scheduled for future visit at [facility].    Patient is scheduled to see Dr. Maguire 6/9/2017  Reason for visit: We[place]ers Granulomatosis  Referring provider Meera  Has patient seen previous specialist? Yes.  Name of provider Dr. Estes  Medical Records:  Available in chart.  Patient was previously seen at a [facility] or [facility] facility.    REVIEW                                                      New patient packet mailed to patient: N/A  Medication reconciliation complete: Yes      Current Outpatient Prescriptions   Medication Sig Dispense Refill     propranolol (INDERAL) 20 MG tablet TAKE 1 TABLET(20 MG) BY MOUTH DAILY 90 tablet 1     warfarin (COUMADIN) 2 MG tablet TAKE 2 TABLETS(4 MG) BY MOUTH ONCE DAILY 180 tablet 0     methotrexate 2.5 MG tablet CHEMO Take 5 tablets (12.5 mg) by mouth once a week 60 tablet 0     folic acid (FOLVITE) 1 MG tablet Take 1 tablet (1 mg) by mouth daily 90 tablet 3     Cholecalciferol (VITAMIN D3 PO) Take by mouth daily       acetaminophen (TYLENOL) 325 MG tablet Take 2 tablets (650 mg) by mouth every 6 hours as needed for mild pain 100 tablet 0     calcium carbonate-vitamin D 500-400 MG-UNIT TABS tablt Take 1 tablet by mouth 2 times daily 180 tablet 3     fluticasone (FLONASE) 50 MCG/ACT spray Spray 2 sprays into both nostrils daily (Patient not taking: Reported on 6/8/2017) 16 g 1     predniSONE (DELTASONE) 1 MG tablet 1mg Po daily. 210 tablet 1       Allergies: Adhesive tape; Amoxicillin; Augmentin; Codeine; Erythromycin; Nickel; Sulfa drugs; Tegaderm alginate ag; and Zithromax [azithromycin dihydrate]        PLAN/FOLLOW-UP NEEDED                                                          Patient Reminders Given:  Please, make sure you bring an updated list of your medications.   If you are having a procedure, please, present 15  minutes early.  If you need to cancel or reschedule,please call [phone].    Dano Collado

## 2017-06-09 ENCOUNTER — OFFICE VISIT (OUTPATIENT)
Dept: RHEUMATOLOGY | Facility: CLINIC | Age: 72
End: 2017-06-09
Payer: COMMERCIAL

## 2017-06-09 VITALS
BODY MASS INDEX: 21.45 KG/M2 | HEART RATE: 64 BPM | TEMPERATURE: 98.2 F | SYSTOLIC BLOOD PRESSURE: 131 MMHG | DIASTOLIC BLOOD PRESSURE: 73 MMHG | WEIGHT: 126.9 LBS

## 2017-06-09 DIAGNOSIS — M15.0 PRIMARY OSTEOARTHRITIS INVOLVING MULTIPLE JOINTS: ICD-10-CM

## 2017-06-09 DIAGNOSIS — Z79.60 LONG-TERM USE OF IMMUNOSUPPRESSANT MEDICATION: ICD-10-CM

## 2017-06-09 DIAGNOSIS — M31.30 GRANULOMATOSIS WITH POLYANGIITIS (H): Primary | ICD-10-CM

## 2017-06-09 DIAGNOSIS — M31.30 WEGENER'S GRANULOMATOSIS: ICD-10-CM

## 2017-06-09 LAB
ALBUMIN SERPL-MCNC: 3.9 G/DL (ref 3.4–5)
ALBUMIN UR-MCNC: NEGATIVE MG/DL
ALT SERPL W P-5'-P-CCNC: 29 U/L (ref 0–50)
APPEARANCE UR: CLEAR
AST SERPL W P-5'-P-CCNC: 23 U/L (ref 0–45)
BILIRUB UR QL STRIP: NEGATIVE
CK SERPL-CCNC: 75 U/L (ref 30–225)
COLOR UR AUTO: NORMAL
CREAT SERPL-MCNC: 0.71 MG/DL (ref 0.52–1.04)
CRP SERPL-MCNC: <2.9 MG/L (ref 0–8)
ERYTHROCYTE [DISTWIDTH] IN BLOOD BY AUTOMATED COUNT: 14.1 % (ref 10–15)
ERYTHROCYTE [SEDIMENTATION RATE] IN BLOOD BY WESTERGREN METHOD: 7 MM/H (ref 0–30)
GFR SERPL CREATININE-BSD FRML MDRD: 81 ML/MIN/1.7M2
GLUCOSE UR STRIP-MCNC: NEGATIVE MG/DL
HCT VFR BLD AUTO: 40.9 % (ref 35–47)
HGB BLD-MCNC: 14 G/DL (ref 11.7–15.7)
HGB UR QL STRIP: NEGATIVE
KETONES UR STRIP-MCNC: NEGATIVE MG/DL
LEUKOCYTE ESTERASE UR QL STRIP: NEGATIVE
MCH RBC QN AUTO: 32.1 PG (ref 26.5–33)
MCHC RBC AUTO-ENTMCNC: 34.2 G/DL (ref 31.5–36.5)
MCV RBC AUTO: 94 FL (ref 78–100)
NITRATE UR QL: NEGATIVE
PH UR STRIP: 7 PH (ref 5–7)
PLATELET # BLD AUTO: 195 10E9/L (ref 150–450)
RBC # BLD AUTO: 4.36 10E12/L (ref 3.8–5.2)
RBC #/AREA URNS AUTO: NORMAL /HPF (ref 0–2)
SP GR UR STRIP: 1 (ref 1–1.03)
URN SPEC COLLECT METH UR: NORMAL
UROBILINOGEN UR STRIP-MCNC: NORMAL MG/DL (ref 0–2)
WBC # BLD AUTO: 4.5 10E9/L (ref 4–11)
WBC #/AREA URNS AUTO: NORMAL /HPF (ref 0–2)

## 2017-06-09 PROCEDURE — 85027 COMPLETE CBC AUTOMATED: CPT | Performed by: INTERNAL MEDICINE

## 2017-06-09 PROCEDURE — 85652 RBC SED RATE AUTOMATED: CPT | Performed by: INTERNAL MEDICINE

## 2017-06-09 PROCEDURE — 99215 OFFICE O/P EST HI 40 MIN: CPT | Performed by: INTERNAL MEDICINE

## 2017-06-09 PROCEDURE — 36415 COLL VENOUS BLD VENIPUNCTURE: CPT | Performed by: INTERNAL MEDICINE

## 2017-06-09 PROCEDURE — 81001 URINALYSIS AUTO W/SCOPE: CPT | Performed by: INTERNAL MEDICINE

## 2017-06-09 PROCEDURE — 82550 ASSAY OF CK (CPK): CPT | Performed by: INTERNAL MEDICINE

## 2017-06-09 PROCEDURE — 86140 C-REACTIVE PROTEIN: CPT | Performed by: INTERNAL MEDICINE

## 2017-06-09 PROCEDURE — 82040 ASSAY OF SERUM ALBUMIN: CPT | Performed by: INTERNAL MEDICINE

## 2017-06-09 PROCEDURE — 84450 TRANSFERASE (AST) (SGOT): CPT | Performed by: INTERNAL MEDICINE

## 2017-06-09 PROCEDURE — 84460 ALANINE AMINO (ALT) (SGPT): CPT | Performed by: INTERNAL MEDICINE

## 2017-06-09 PROCEDURE — 82565 ASSAY OF CREATININE: CPT | Performed by: INTERNAL MEDICINE

## 2017-06-09 PROCEDURE — 83516 IMMUNOASSAY NONANTIBODY: CPT | Performed by: INTERNAL MEDICINE

## 2017-06-09 RX ORDER — FOLIC ACID 1 MG/1
1 TABLET ORAL DAILY
Qty: 90 TABLET | Refills: 3 | Status: SHIPPED | OUTPATIENT
Start: 2017-06-09 | End: 2017-12-26

## 2017-06-09 ASSESSMENT — PAIN SCALES - GENERAL: PAINLEVEL: NO PAIN (0)

## 2017-06-09 NOTE — NURSING NOTE
"Nury Cárdenas's goals for this visit include:   Chief Complaint   Patient presents with     RECHECK     Follow up Vasculitis former Dr. Estes       She requests these members of her care team be copied on today's visit information: yes     PCP: Phil Abbott    Referring Provider:  [place] [address]    Chief Complaint   Patient presents with     RECHECK     Follow up Vasculitis former Dr. Estes       Initial /76  Pulse 64  Temp 98.2  F (36.8  C)  Wt 57.6 kg (126 lb 14.4 oz)  BMI 21.45 kg/m2 Estimated body mass index is 21.45 kg/(m^2) as calculated from the following:    Height as of 6/6/17: 1.638 m (5' 4.5\").    Weight as of this encounter: 57.6 kg (126 lb 14.4 oz).  Medication Reconciliation: complete    Do you need any medication refills at today's visit?     "

## 2017-06-09 NOTE — PROGRESS NOTES
Ms. Cárdenas presents to transfer care for Granulomatosus with Polyangiitis diagnosed 6-2014. 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 Course complicated by severe upper airway inflammation, Wegener's lung and destructive/erosive joint disease. Treated initially with rituximab and high dose corticsteroids, and most recently with methotrexate 12.5 mg weekly. Folic acid is 1 mg daily. Prednisone taper complete on April 1st, 2017.    She notes very stiff right wrist, but this is pain free. Her hands are generally stiff. She has chronic left shoulder crepitans and stiffness. Otherwise her joint function and comfort are good. No use of aleve, ibuprofen or tylenol for joint pains. No AM stiffness to report. Energy is pretty good. She has some modest weight reduction that is unintentional. She describes some quadriceps pain and weakness that is stable.    Her lung function is very good. She is able to hike, and denies MCDANIEL chest pains or dyspnea. No history of kidney disease. No complaints of recent upper airway problems. Her eyes are very stable.    She received rituximab 375 mg/m2 weekly x4 as well high dose corticosteroid. She recently came off of prednisone, and is maintained with chronic methotrexate 12.5 mg weekly dose. She reports some somnolence and balance problems that last for 3 days after the dosing. She also takes vitamin D for osteopenia. Bisphosphonate therapy complicated by bone pains.    PMI:  Medical-invasive pulmonary aspirgillosis, GPA, osteoarthritis, DVT with pulmonary embolism, osteopenia (T = -2.2 7-2016), GERD, hyperlipidemia, +Tb exposure, nephrolithiasis  Surgical-lung biopsy, appendectomy, eye surgery, tonsillectomy, cone biopsy, right wrist synovectomy  Injuries-left wrist fracture, left 5th toe fracture    SH:  Lives at home alone. Former smoker. No EtOH. Tuberculosis exposure as a child. Ambidextrous.    FH:  Son with mitochondrial myopathy  Sibs dead with colon and anal cancer  Father dead with lung cancer  Mother  dead with emphysema and osteoporosis    ROS:  +weight loss  +tinnitus  +occasional dry cough  +joint swelling and pain  +easy bleeding and bruising  +fragile skin  +allergies to fosamax amoxicillin, augmentin, erythromycin, zithromax, nickel, codeine, adhesive tape  Remainder of the 14 point ROS obtained and found negative.    Physical Exam:  Constitutional: [id number] cooperative  Eyes: nl EOM, PERRLA, vision, conjunctiva, sclera  ENT: nl external ears, nose, hearing, lips, teeth, gums, throat  Neck: no mass or thyroid enlargement  Resp: lungs clear to auscultation, nl to palpation, nl effort  CV: RRR, no murmurs, rubs or gallops, no edema  GI: no ABD mass or tenderness, no HSM  : not tested  Lymph: no cervical or epitrochlear nodes  MS: +Right wrist with no flexion or infection; left wrist extension to 45 degrees; 1st CMC joint squaring bilaterally with some thumb laxity; right neck rotation 30 degrees right and 45 degrees left with head forward position; All other TMJ, neck, shoulder, elbow, wrist, hand, spine, hip, knee, ankle, and foot joints were examined and otherwise found normal. Normal  strength. Kyphosis. Normal tuck and prayer.  Skin: no nail pitting, alopecia, rash  Neuro: nl cranial nerves, strength, sensation, DTRs.   Psych: nl judgement, orientation, memory, affect.    Laboratory:    Component Proteinase 3 Antibody IgG   Latest Ref Rng & Units 0.0 - 0.9 AI   4/13/2009    6/14/2014    6/15/2014    6/20/2014 >8.0 (H) . . .   6/21/2014 8/7/2014 2.7 (H)   9/9/2014 0.5   10/31/2014 0.2   1/5/2015 <0.2 . . .   2/9/2015 0.2   3/2/2015 <0.2 . . .   6/19/2015 <0.2 . . .   7/20/2015 <0.2 . . .   8/10/2015 <0.2 . . .   10/14/2015 <0.2 . . .   12/28/2015 <0.2 . . .   3/23/2016 <0.2 . . .   6/27/2016 <0.2 . . .   9/2/2016 <0.2 . . .   11/4/2016 <0.2 . . .   1/10/2017 <0.2 . . .   3/8/2017 <0.2 . . .     Component Neutrophil Cytoplasmic IgG Antibody   Latest Ref Rng & Units    4/13/2009 6/14/2014     6/15/2014 1:80 (A) . . .   6/20/2014 6/21/2014 8/7/2014 <1:20 . . .   9/9/2014    10/31/2014 <1:20 . . .   1/5/2015    2/9/2015    3/2/2015    6/19/2015    7/20/2015    8/10/2015    10/14/2015    12/28/2015    3/23/2016    6/27/2016    9/2/2016    11/4/2016    1/10/2017    3/8/2017      Component      Latest Ref Rng & Units 3/8/2017   WBC      4.0 - 11.0 10e9/L 5.3   RBC Count      3.8 - 5.2 10e12/L 4.34   Hemoglobin      11.7 - 15.7 g/dL 13.9   Hematocrit      35.0 - 47.0 % 41.4   MCV      78 - 100 fl 95   MCH      26.5 - 33.0 pg 32.0   MCHC      31.5 - 36.5 g/dL 33.6   RDW      10.0 - 15.0 % 13.9   Platelet Count      150 - 450 10e9/L 208   Diff Method       Automated Method   % Neutrophils      % 70.9   % Lymphocytes      % 17.8   % Monocytes      % 9.9   % Eosinophils      % 0.8   % Basophils      % 0.6   Absolute Neutrophil      1.6 - 8.3 10e9/L 3.8   Absolute Lymphocytes      0.8 - 5.3 10e9/L 1.0   Absolute Monocytes      0.0 - 1.3 10e9/L 0.5   Absolute Eosinophils      0.0 - 0.7 10e9/L 0.0   Absolute Basophils      0.0 - 0.2 10e9/L 0.0   Color Urine       Yellow   Appearance Urine       Clear   Glucose Urine      NEG mg/dL Negative   Bilirubin Urine      NEG Negative   Ketones Urine      NEG mg/dL Negative   Specific Gravity Urine      1.003 - 1.035 <=1.005   pH Urine      5.0 - 7.0 pH 6.5   Protein Albumin Urine      NEG mg/dL Negative   Urobilinogen Urine      0.2 - 1.0 EU/dL 0.2   Nitrite Urine      NEG Negative   Blood Urine      NEG Large (A)   Leukocyte Esterase Urine      NEG Negative   Source       Midstream Urine   WBC Urine      0 - 2 /HPF O - 2   RBC Urine      0 - 2 /HPF 5-10 (A)   Squamous Epithelial /LPF Urine      FEW /LPF Few   Creatinine      0.52 - 1.04 mg/dL 0.72   GFR Estimate      >60 mL/min/1.7m2 80   GFR Estimate If Black      >60 mL/min/1.7m2 >90 . . .   AST      0 - 45 U/L 21   ALT      0 - 50 U/L 26   CRP Inflammation      0.0 - 8.0 mg/L <2.9   Sed Rate      0 - 30 mm/h 6    Proteinase 3 Antibody IgG      0.0 - 0.9 AI <0.2 . . .     Study Result   CT CHEST WITHOUT CONTRAST April 15, 2016 10:13 AM      HISTORY: Followup on Wegener's granulomatosis, Wegener's  granulomatosis without renal involvement.      COMPARISON: CT chest 5/12/2015.     TECHNIQUE: Axial images from the thoracic inlet to the lung bases are  performed with additional coronal reformatted images. No contrast is  utilized.     FINDINGS:      Chest: Mild scarring is noted at the lung apices bilaterally. A  cluster of tiny nodules is noted in the medial right lower lobe as  seen on prior exam.  These extend from series 3, image 36 down through  image 43. The largest measures 0.6 cm in size on image 41. Subpleural  scarring is noted in the anterior right middle lobe which is also  stable. Inferior lingular scarring on image 55 measures 0.8 cm in  diameter which appears unchanged. Additional areas of scarring  inferior lingula and inferior left lung base appears stable. Bandlike  area of scarring is noted in the posterior left lower lobe on image  31. Numerous additional 0.3 cm and smaller nodules are scattered  throughout both lungs. Area of architectural distortion in the medial  left upper lobe on image 17 is also stable. No new or enlarging lung  lesions are evident. No cavitating lung masses are appreciated.     No pleural or pericardial fluid. Heart is normal in size. Esophagus is  unremarkable. Thyroid gland appears normal. No enlarged axillary or  intrathoracic lymph nodes. A few calcified mediastinal nodes are  present consistent with old granulomatous disease. Thoracic aorta is  normal in caliber. Scattered calcified plaque is present. Limited  images upper abdomen are unremarkable. Mild degenerative spine changes  are present.  Bony structures are otherwise unremarkable.         IMPRESSION:  1. No significant interval change since the prior chest CT. No new or  enlarging lung lesions are evident. No cavitating  lung masses are  evident.  2. Evidence of old granulomatous disease.     ANTHONY BOLTON MD   Study Result   EXAMINATION: Right hand 3 views; left hand 3 views     DATE: 6/20/2014     HISTORY: Bilateral hand pain     FINDINGS: 3 views of each hand are obtained without prior for  comparison. Bones are diffusely osteopenic bilaterally. No displaced  fracture or acute bone abnormality of either hand is identified. There  is extensive joint space narrowing and osteophyte formation involving  the distal interphalangeal joints of both hands, with the most  significant involvement in the index fingers. No distinct erosions are  identified in the interphalangeal or metacarpophalangeal joints. There  is extensive probable erosive change with secondary developing  ankylosis across the intercarpal joint spaces on the right, as well as  erosive changes of the ulnar styloid. These findings may suggest an  underlying inflammatory arthropathy. There is uniform joint space  narrowing of the intercarpal joint spaces on the left, but lesser  erosive changes are noted.     IMPRESSION  IMPRESSION:  1. Profound osteopenia of both hands, with no acute, displaced  fracture identified.  2. Advanced osteoarthritis of the distal interphalangeal joints is  noted bilaterally, most significantly in the index fingers.  3. Extensive joint space narrowing identified in the right wrist, with  moderate involvement on the left. On the right, there is apparent  ankylosis, with likely extensive erosive changes throughout the carpus  and along the ulnar styloid. Findings are nonspecific, but can be seen  in the setting of an inflammatory arthropathy. Rheumatoid could be  considered, though the asymmetric appearance and ankylosis is somewhat  atypical.     UVALDO BRIZUELA MD       Narrative   The FVC, FEV1, FEV1/FVC ratio and CLG08-96% are within normal limits.  The inspiratory flow rates are within normal limits.  The diffusing capacity is normal.  The results  are within normal limits.  IMPRESSION:  Normal spirometry  Normal diffusion capacity  Compared with testing from 10/2/2014 there has been an increase in the FVC.  Kaitlyn Abdullahi MD  ____________________________________________MKAITLYN CLINE       Impression:    PR3-associated GPA-severe with upper airway, joint, and lung involvement. Responsive to rituximab. She is now 3 years out from diagnosis with no evidence of disease relapse and negative ANCA. She is currently managed with methotrexate to control relapse, but this is complicated by mild fatigue, dizziness and weight loss. Based on this we agree to continue the methotrexate at reduced dose 10 mg weekly. Continue the folic acid. Repeat the CK and PR3 today.    Osteoarthritis-primary in the hands. This is mild at present.    Osteoporosis-with osteopenia by DEXA but kyphosis suggests wilfred osteoporosis. We have discussed additional active management of bone disease, but she declines this at this time.    Long term management of immunosuppression-with stable lab testing. Continue with routine testing. Repeat quantiferon gold prior to any re-infusion of rituximab.    RTC in 4 months.

## 2017-06-09 NOTE — MR AVS SNAPSHOT
After Visit Summary   6/9/2017    Nury Cárdenas    MRN: [id number]           Patient Information     Date Of Birth          1945        Visit Information        Provider Department      6/9/2017 10:00 AM Alvaro Maguire MD [facility]        Today's Diagnoses     Granulomatosis with polyangiitis (H)    -  1    Long-term use of immunosuppressant medication        Primary osteoarthritis involving multiple joints        Wegener's granulomatosis (H)           Follow-ups after your visit        Follow-up notes from your care team     Return in about 4 months (around 10/9/2017).      Your next 10 appointments already scheduled     Jun 28, 2017  9:30 AM CDT   [facility] with  [facility]   [facility]    [address] [place] [id number] [phone]            Oct 13, 2017 11:30 AM CDT   Return Visit with Alvaro Maguire MD   [facility]    [address] [place] [id number] [phone]              Future tests that were ordered for you today     Open Standing Orders        Priority Remaining Interval Expires Ordered    CBC with platelets Routine 6/6 every 2 months 6/9/2018 6/9/2017    UA with Microscopic Routine 6/6 every 2 months 6/9/2018 6/9/2017    Creatinine Routine 6/6 every 2 months 6/9/2018 6/9/2017    AST Routine 6/6 every 2 months 6/9/2018 6/9/2017    ALT Routine 6/6 every 2 months 6/9/2018 6/9/2017    Albumin level Routine 6/6 every 2 months 6/9/2018 6/9/2017    CRP inflammation Routine 6/6 every 2 months 6/9/2018 6/9/2017    Erythrocyte sedimentation rate auto Routine 6/6 every 2 months 6/9/2018 6/9/2017    CK total Routine 6/6 every 2 months 6/9/2018 6/9/2017    Proteinase 3 Antibody IgG Routine 6/6 every 2 months 6/9/2018 6/9/2017            Who to contact     If you have questions or need follow up information about today's clinic visit  or your schedule please contact [facility] directly at [phone].  Normal or non-critical lab and imaging results will be communicated to you by [employer], letter or phone within 4 business days after the clinic has received the results. If you do not hear from us within 7 days, please contact the clinic through [employer] or phone. If you have a critical or abnormal lab result, we will notify you by phone as soon as possible.  Submit refill requests through [employer] or call your pharmacy and they will forward the refill request to us. Please allow 3 business days for your refill to be completed.          Additional Information About Your Visit        [employer] Information     [employer] gives you secure access to your electronic health record. If you see a primary care provider, you can also send messages to your care team and make appointments. If you have questions, please call your primary care clinic.  If you do not have a primary care provider, please call [phone] and they will assist you.      [employer] is an electronic gateway that provides easy, online access to your medical records. With [employer], you can request a clinic appointment, read your test results, renew a prescription or communicate with your care team.     To access your existing account, please contact your [facility] or call [phone] for assistance.        Care EveryWhere ID     This is your Care EveryWhere ID. This could be used by other organizations to access your [facility] medical records  [id number]        Your Vitals Were     Pulse Temperature BMI (Body Mass Index)             64 98.2  F (36.8  C) 21.45 kg/m2          Blood Pressure from Last 3 Encounters:   06/09/17 131/73   06/06/17 132/82   03/15/17 136/82    Weight from Last 3 Encounters:   06/09/17 57.6 kg (126 lb 14.4 oz)   06/06/17 57.2 kg (126 lb)   03/15/17 58.1 kg (128 lb)                 Today's Medication Changes          These  changes are accurate as of: 6/9/17 11:37 AM.  If you have any questions, ask your nurse or doctor.               These medicines have changed or have updated prescriptions.        Dose/Directions    methotrexate 2.5 MG tablet CHEMO   This may have changed:  how much to take   Used for:  Wegener's granulomatosis (H), Granulomatosis with polyangiitis (H), Long-term use of immunosuppressant medication, Primary osteoarthritis involving multiple joints   Changed by:  Alvaro Maguire MD        Dose:  10 mg   Take 4 tablets (10 mg) by mouth once a week   Quantity:  48 tablet   Refills:  1            Where to get your medicines      These medications were sent to [email or web address] [address] [facility] [address] AT [facility] & [place]  [address] [facility] [address]     Phone:  [phone]     folic acid 1 MG tablet    methotrexate 2.5 MG tablet CHEMO                Primary Care Provider Office Phone # Fax #    Phil Abbott [phone]       [facility] [address] [facility] [address]        Thank you!     Thank you for choosing [facility]  for your care. Our goal is always to provide you with excellent care. Hearing back from our patients is one way we can continue to improve our services. Please take a few minutes to complete the written survey that you may receive in the mail after your visit with us. Thank you!             Your Updated Medication List - Protect others around you: Learn how to safely use, store and throw away your medicines at www.disposemymeds.org.          This list is accurate as of: 6/9/17 11:37 AM.  Always use your most recent med list.                   Brand Name Dispense Instructions for use    acetaminophen 325 MG tablet    TYLENOL    100 tablet    Take 2 tablets (650 mg) by mouth every 6 hours as needed for mild pain       calcium carbonate-vitamin D 500-400 MG-UNIT Tabs per tablet     180  tablet    Take 1 tablet by mouth 2 times daily       fluticasone 50 MCG/ACT spray    FLONASE    16 g    Spray 2 sprays into both nostrils daily       folic acid 1 MG tablet    FOLVITE    90 tablet    Take 1 tablet (1 mg) by mouth daily       methotrexate 2.5 MG tablet CHEMO     48 tablet    Take 4 tablets (10 mg) by mouth once a week       predniSONE 1 MG tablet    DELTASONE    210 tablet    1mg Po daily.       propranolol 20 MG tablet    INDERAL    90 tablet    TAKE 1 TABLET(20 MG) BY MOUTH DAILY       VITAMIN D3 PO      Take by mouth daily       warfarin 2 MG tablet    COUMADIN    180 tablet    TAKE 2 TABLETS(4 MG) BY MOUTH ONCE DAILY

## 2017-06-09 NOTE — LETTER
6/9/2017      RE: Nury Cárdenas  [address] [facility] [address]       Ms. Cárdenas presents to transfer care for Granulomatosus with Polyangiitis diagnosed 6-2014. 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 Course complicated by severe upper airway inflammation, Wegener's lung and destructive/erosive joint disease. Treated initially with rituximab and high dose corticsteroids, and most recently with methotrexate 12.5 mg weekly. Folic acid is 1 mg daily. Prednisone taper complete on April 1st, 2017.    She notes very stiff right wrist, but this is pain free. Her hands are generally stiff. She has chronic left shoulder crepitans and stiffness. Otherwise her joint function and comfort are good. No use of aleve, ibuprofen or tylenol for joint pains. No AM stiffness to report. Energy is pretty good. She has some modest weight reduction that is unintentional. She describes some quadriceps pain and weakness that is stable.    Her lung function is very good. She is able to hike, and denies MCDANIEL chest pains or dyspnea. No history of kidney disease. No complaints of recent upper airway problems. Her eyes are very stable.    She received rituximab 375 mg/m2 weekly x4 as well high dose corticosteroid. She recently came off of prednisone, and is maintained with chronic methotrexate 12.5 mg weekly dose. She reports some somnolence and balance problems that last for 3 days after the dosing. She also takes vitamin D for osteopenia. Bisphosphonate therapy complicated by bone pains.    PMI:  Medical-invasive pulmonary aspirgillosis, GPA, osteoarthritis, DVT with pulmonary embolism, osteopenia (T = -2.2 7-2016), GERD, hyperlipidemia, +Tb exposure, nephrolithiasis  Surgical-lung biopsy, appendectomy, eye surgery, tonsillectomy, cone biopsy, right wrist synovectomy  Injuries-left wrist fracture, left 5th toe fracture    SH:  Lives at home alone. Former smoker. No EtOH. Tuberculosis exposure as a child. Ambidextrous.    FH:  Son with  mitochondrial myopathy  Sibs dead with colon and anal cancer  Father dead with lung cancer  Mother dead with emphysema and osteoporosis    ROS:  +weight loss  +tinnitus  +occasional dry cough  +joint swelling and pain  +easy bleeding and bruising  +fragile skin  +allergies to fosamax amoxicillin, augmentin, erythromycin, zithromax, nickel, codeine, adhesive tape  Remainder of the 14 point ROS obtained and found negative.    Physical Exam:  Constitutional: [id number] cooperative  Eyes: nl EOM, PERRLA, vision, conjunctiva, sclera  ENT: nl external ears, nose, hearing, lips, teeth, gums, throat  Neck: no mass or thyroid enlargement  Resp: lungs clear to auscultation, nl to palpation, nl effort  CV: RRR, no murmurs, rubs or gallops, no edema  GI: no ABD mass or tenderness, no HSM  : not tested  Lymph: no cervical or epitrochlear nodes  MS: +Right wrist with no flexion or infection; left wrist extension to 45 degrees; 1st CMC joint squaring bilaterally with some thumb laxity; right neck rotation 30 degrees right and 45 degrees left with head forward position; All other TMJ, neck, shoulder, elbow, wrist, hand, spine, hip, knee, ankle, and foot joints were examined and otherwise found normal. Normal  strength. Kyphosis. Normal tuck and prayer.  Skin: no nail pitting, alopecia, rash  Neuro: nl cranial nerves, strength, sensation, DTRs.   Psych: nl judgement, orientation, memory, affect.    Laboratory:    Component Proteinase 3 Antibody IgG   Latest Ref Rng & Units 0.0 - 0.9 AI   4/13/2009    6/14/2014    6/15/2014    6/20/2014 >8.0 (H) . . .   6/21/2014 8/7/2014 2.7 (H)   9/9/2014 0.5   10/31/2014 0.2   1/5/2015 <0.2 . . .   2/9/2015 0.2   3/2/2015 <0.2 . . .   6/19/2015 <0.2 . . .   7/20/2015 <0.2 . . .   8/10/2015 <0.2 . . .   10/14/2015 <0.2 . . .   12/28/2015 <0.2 . . .   3/23/2016 <0.2 . . .   6/27/2016 <0.2 . . .   9/2/2016 <0.2 . . .   11/4/2016 <0.2 . . .   1/10/2017 <0.2 . . .   3/8/2017 <0.2 . . .      Component Neutrophil Cytoplasmic IgG Antibody   Latest Ref Rng & Units    4/13/2009    6/14/2014    6/15/2014 1:80 (A) . . .   6/20/2014 6/21/2014 8/7/2014 <1:20 . . .   9/9/2014    10/31/2014 <1:20 . . .   1/5/2015    2/9/2015    3/2/2015    6/19/2015    7/20/2015    8/10/2015    10/14/2015    12/28/2015    3/23/2016    6/27/2016    9/2/2016    11/4/2016    1/10/2017    3/8/2017      Component      Latest Ref Rng & Units 3/8/2017   WBC      4.0 - 11.0 10e9/L 5.3   RBC Count      3.8 - 5.2 10e12/L 4.34   Hemoglobin      11.7 - 15.7 g/dL 13.9   Hematocrit      35.0 - 47.0 % 41.4   MCV      78 - 100 fl 95   MCH      26.5 - 33.0 pg 32.0   MCHC      31.5 - 36.5 g/dL 33.6   RDW      10.0 - 15.0 % 13.9   Platelet Count      150 - 450 10e9/L 208   Diff Method       Automated Method   % Neutrophils      % 70.9   % Lymphocytes      % 17.8   % Monocytes      % 9.9   % Eosinophils      % 0.8   % Basophils      % 0.6   Absolute Neutrophil      1.6 - 8.3 10e9/L 3.8   Absolute Lymphocytes      0.8 - 5.3 10e9/L 1.0   Absolute Monocytes      0.0 - 1.3 10e9/L 0.5   Absolute Eosinophils      0.0 - 0.7 10e9/L 0.0   Absolute Basophils      0.0 - 0.2 10e9/L 0.0   Color Urine       Yellow   Appearance Urine       Clear   Glucose Urine      NEG mg/dL Negative   Bilirubin Urine      NEG Negative   Ketones Urine      NEG mg/dL Negative   Specific Gravity Urine      1.003 - 1.035 <=1.005   pH Urine      5.0 - 7.0 pH 6.5   Protein Albumin Urine      NEG mg/dL Negative   Urobilinogen Urine      0.2 - 1.0 EU/dL 0.2   Nitrite Urine      NEG Negative   Blood Urine      NEG Large (A)   Leukocyte Esterase Urine      NEG Negative   Source       Midstream Urine   WBC Urine      0 - 2 /HPF O - 2   RBC Urine      0 - 2 /HPF 5-10 (A)   Squamous Epithelial /LPF Urine      FEW /LPF Few   Creatinine      0.52 - 1.04 mg/dL 0.72   GFR Estimate      >60 mL/min/1.7m2 80   GFR Estimate If Black      >60 mL/min/1.7m2 >90 . . .   AST      0 - 45 U/L 21    ALT      0 - 50 U/L 26   CRP Inflammation      0.0 - 8.0 mg/L <2.9   Sed Rate      0 - 30 mm/h 6   Proteinase 3 Antibody IgG      0.0 - 0.9 AI <0.2 . . .     Study Result   CT CHEST WITHOUT CONTRAST April 15, 2016 10:13 AM      HISTORY: Followup on Wegener's granulomatosis, Wegener's  granulomatosis without renal involvement.      COMPARISON: CT chest 5/12/2015.     TECHNIQUE: Axial images from the thoracic inlet to the lung bases are  performed with additional coronal reformatted images. No contrast is  utilized.     FINDINGS:      Chest: Mild scarring is noted at the lung apices bilaterally. A  cluster of tiny nodules is noted in the medial right lower lobe as  seen on prior exam.  These extend from series 3, image 36 down through  image 43. The largest measures 0.6 cm in size on image 41. Subpleural  scarring is noted in the anterior right middle lobe which is also  stable. Inferior lingular scarring on image 55 measures 0.8 cm in  diameter which appears unchanged. Additional areas of scarring  inferior lingula and inferior left lung base appears stable. Bandlike  area of scarring is noted in the posterior left lower lobe on image  31. Numerous additional 0.3 cm and smaller nodules are scattered  throughout both lungs. Area of architectural distortion in the medial  left upper lobe on image 17 is also stable. No new or enlarging lung  lesions are evident. No cavitating lung masses are appreciated.     No pleural or pericardial fluid. Heart is normal in size. Esophagus is  unremarkable. Thyroid gland appears normal. No enlarged axillary or  intrathoracic lymph nodes. A few calcified mediastinal nodes are  present consistent with old granulomatous disease. Thoracic aorta is  normal in caliber. Scattered calcified plaque is present. Limited  images upper abdomen are unremarkable. Mild degenerative spine changes  are present.  Bony structures are otherwise unremarkable.         IMPRESSION:  1. No significant  interval change since the prior chest CT. No new or  enlarging lung lesions are evident. No cavitating lung masses are  evident.  2. Evidence of old granulomatous disease.     ANTHONY BOLTON MD   Study Result   EXAMINATION: Right hand 3 views; left hand 3 views     DATE: 6/20/2014     HISTORY: Bilateral hand pain     FINDINGS: 3 views of each hand are obtained without prior for  comparison. Bones are diffusely osteopenic bilaterally. No displaced  fracture or acute bone abnormality of either hand is identified. There  is extensive joint space narrowing and osteophyte formation involving  the distal interphalangeal joints of both hands, with the most  significant involvement in the index fingers. No distinct erosions are  identified in the interphalangeal or metacarpophalangeal joints. There  is extensive probable erosive change with secondary developing  ankylosis across the intercarpal joint spaces on the right, as well as  erosive changes of the ulnar styloid. These findings may suggest an  underlying inflammatory arthropathy. There is uniform joint space  narrowing of the intercarpal joint spaces on the left, but lesser  erosive changes are noted.     IMPRESSION  IMPRESSION:  1. Profound osteopenia of both hands, with no acute, displaced  fracture identified.  2. Advanced osteoarthritis of the distal interphalangeal joints is  noted bilaterally, most significantly in the index fingers.  3. Extensive joint space narrowing identified in the right wrist, with  moderate involvement on the left. On the right, there is apparent  ankylosis, with likely extensive erosive changes throughout the carpus  and along the ulnar styloid. Findings are nonspecific, but can be seen  in the setting of an inflammatory arthropathy. Rheumatoid could be  considered, though the asymmetric appearance and ankylosis is somewhat  atypical.     UVALDO BRIZUELA MD       Narrative   The FVC, FEV1, FEV1/FVC ratio and MUB66-37% are within normal limits.   The inspiratory flow rates are within normal limits.  The diffusing capacity is normal.  The results are within normal limits.  IMPRESSION:  Normal spirometry  Normal diffusion capacity  Compared with testing from 10/2/2014 there has been an increase in the FVC.  Kaitlyn Abdullahi MD  ____________________________________________KAITLYN BERKOWITZ       Impression:    PR3-associated GPA-severe with upper airway, joint, and lung involvement. Responsive to rituximab. She is now 3 years out from diagnosis with no evidence of disease relapse and negative ANCA. She is currently managed with methotrexate to control relapse, but this is complicated by mild fatigue, dizziness and weight loss. Based on this we agree to continue the methotrexate at reduced dose 10 mg weekly. Continue the folic acid. Repeat the CK and PR3 today.    Osteoarthritis-primary in the hands. This is mild at present.    Osteoporosis-with osteopenia by DEXA but kyphosis suggests wilfred osteoporosis. We have discussed additional active management of bone disease, but she declines this at this time.    Long term management of immunosuppression-with stable lab testing. Continue with routine testing. Repeat quantiferon gold prior to any re-infusion of rituximab.    RTC in 4 months.    Alvaro Maguire MD

## 2017-06-12 LAB — PROTEINASE3 IGG SER-ACNC: NORMAL AI (ref 0–0.9)

## 2017-06-14 ENCOUNTER — MYC MEDICAL ADVICE (OUTPATIENT)
Dept: RHEUMATOLOGY | Facility: CLINIC | Age: 72
End: 2017-06-14

## 2017-06-14 DIAGNOSIS — G89.29 CHRONIC PAIN OF LEFT UPPER EXTREMITY: ICD-10-CM

## 2017-06-14 DIAGNOSIS — M79.602 CHRONIC PAIN OF LEFT UPPER EXTREMITY: ICD-10-CM

## 2017-06-14 DIAGNOSIS — M15.0 PRIMARY OSTEOARTHRITIS INVOLVING MULTIPLE JOINTS: Primary | ICD-10-CM

## 2017-06-15 NOTE — TELEPHONE ENCOUNTER
Will route [facility] message 6/15/17 to provider to review and advise.  Needing PT referral orders.

## 2017-06-16 NOTE — TELEPHONE ENCOUNTER
Call placed to patient to follow up on PT order referral.  Patient stated that she has sessions for Physical Therapy and will call with any questions or concerns.

## 2017-06-26 ENCOUNTER — THERAPY VISIT (OUTPATIENT)
Dept: PHYSICAL THERAPY | Facility: CLINIC | Age: 72
End: 2017-06-26
Payer: COMMERCIAL

## 2017-06-26 DIAGNOSIS — M54.2 CERVICALGIA: Primary | ICD-10-CM

## 2017-06-26 PROCEDURE — 97161 PT EVAL LOW COMPLEX 20 MIN: CPT | Mod: GP | Performed by: PHYSICAL THERAPIST

## 2017-06-26 PROCEDURE — 97110 THERAPEUTIC EXERCISES: CPT | Mod: GP | Performed by: PHYSICAL THERAPIST

## 2017-06-26 NOTE — MR AVS SNAPSHOT
After Visit Summary   6/26/2017    Nury Cárdenas    MRN: [id number]           Patient Information     Date Of Birth          1945        Visit Information        Provider Department      6/26/2017 10:40 AM Eliceo Vidal, PT [facility]        Today's Diagnoses     Cervicalgia    -  1       Follow-ups after your visit        Your next 10 appointments already scheduled     Jun 28, 2017  9:30 AM CDT   [facility]   [facility]    [address] [facility] [address] [facility] [address]   [phone]            Jul 03, 2017 10:40 AM CDT   TR[facility] Extremity with Eliceo Vidal, PT   [facility] ([facility] [place])    [address]  [address]  [place] [address]   [phone]            Jul 10, 2017 10:40 AM CDT   TR[facility] Extremity with Eliceo Vidal, PT   [facility] ([facility] [place])    [address]  [address]  [place] [address]   [phone]            Aug 14, 2017 10:00 AM CDT   LAB with [facility]Z LAB   [facility]    [address] [facility]  [place] [address] [phone]           Patient must bring picture ID.  Patient should be prepared to give a urine specimen  Please do not eat 10-12 hours before your appointment if you are coming in fasting for labs on lipids, cholesterol, or glucose (sugar).  Pregnant women should follow their Care Team instructions. Water with medications is okay. Do not drink coffee or other fluids.   If you have concerns about taking  your medications, please ask at office or if scheduling via [employer], send a message by clicking on Secure Messaging, Message Your Care Team.            Oct 04, 2017 10:00 AM CDT   LAB with FZ LAB   [facility] [place] ([facility])    [address]  [place] [address]   [phone]           Patient must bring picture ID.  Patient should be prepared to give a urine specimen  Please  do not eat 10-12 hours before your appointment if you are coming in fasting for labs on lipids, cholesterol, or glucose (sugar).  Pregnant women should follow their Care Team instructions. Water with medications is okay. Do not drink coffee or other fluids.   If you have concerns about taking  your medications, please ask at office or if scheduling via [employer], send a message by clicking on Secure Messaging, Message Your Care Team.            Oct 13, 2017 11:30 AM CDT   Return Visit with Alvaro Maguire MD   [facility]    [id number] [place] [address] [phone]              Who to contact     If you have questions or need follow up information about today's clinic visit or your schedule please contact [facility] directly at [phone].  Normal or non-critical lab and imaging results will be communicated to you by [employer], letter or phone within 4 business days after the clinic has received the results. If you do not hear from us within 7 days, please contact the clinic through [employer] or phone. If you have a critical or abnormal lab result, we will notify you by phone as soon as possible.  Submit refill requests through [employer] or call your pharmacy and they will forward the refill request to us. Please allow 3 business days for your refill to be completed.          Additional Information About Your Visit        [employer] Information     [employer] gives you secure access to your electronic health record. If you see a primary care provider, you can also send messages to your care team and make appointments. If you have questions, please call your primary care clinic.  If you do not have a primary care provider, please call [phone] and they will assist you.        Care EveryWhere ID     This is your Care EveryWhere ID. This could be used by other organizations to access your [facility] medical records  [id number]         Blood Pressure  from Last 3 Encounters:   06/09/17 131/73   06/06/17 132/82   03/15/17 136/82    Weight from Last 3 Encounters:   06/09/17 57.6 kg (126 lb 14.4 oz)   06/06/17 57.2 kg (126 lb)   03/15/17 58.1 kg (128 lb)              We Performed the Following     PT Eval, Low Complexity ([address])     Therapeutic Exercises        Primary Care Provider Office Phone # Fax #    Phil Abbott [phone]       [address] [address]        Equal Access to Services     [facility]: Hadii aad christina hadasho Somau, waaxda luqadaha, qaybta kaalmada adejose, lavell castillo . So [facility] [phone].    ATENCIÓN: Si habla español, tiene a fofana disposición servicios gratuitos de asistencia lingüística. [facility] [phone].    We comply with applicable federal civil rights laws and [place] laws. We do not discriminate on the basis of race, color, national origin, age, disability sex, sexual orientation or gender identity.            Thank you!     Thank you for choosing [facility]  for your care. Our goal is always to provide you with excellent care. Hearing back from our patients is one way we can continue to improve our services. Please take a few minutes to complete the written survey that you may receive in the mail after your visit with us. Thank you!             Your Updated Medication List - Protect others around you: Learn how to safely use, store and throw away your medicines at www.disposemymeds.org.          This list is accurate as of: 6/26/17 11:36 AM.  Always use your most recent med list.                   Brand Name Dispense Instructions for use Diagnosis    acetaminophen 325 MG tablet    TYLENOL    100 tablet    Take 2 tablets (650 mg) by mouth every 6 hours as needed for mild pain    Migraines, Pulmonary emboli (H)       calcium carbonate-vitamin D 500-400 MG-UNIT Tabs per tablet     180 tablet    Take 1 tablet by mouth 2 times daily     Osteopenia       fluticasone 50 MCG/ACT spray    FLONASE    16 g    Spray 2 sprays into both nostrils daily    Acute non-recurrent maxillary sinusitis       folic acid 1 MG tablet    FOLVITE    90 tablet    Take 1 tablet (1 mg) by mouth daily    Granulomatosis with polyangiitis (H), Long-term use of immunosuppressant medication, Primary osteoarthritis involving multiple joints, Wegener's granulomatosis (H)       methotrexate 2.5 MG tablet CHEMO     48 tablet    Take 4 tablets (10 mg) by mouth once a week    Wegener's granulomatosis (H), Granulomatosis with polyangiitis (H), Long-term use of immunosuppressant medication, Primary osteoarthritis involving multiple joints       predniSONE 1 MG tablet    DELTASONE    210 tablet    1mg Po daily.    Wegener's granulomatosis (granulomatosis with polyangiitis) (H)       propranolol 20 MG tablet    INDERAL    90 tablet    TAKE 1 TABLET(20 MG) BY MOUTH DAILY    Migraine without status migrainosus, not intractable, unspecified migraine type       VITAMIN D3 PO      Take by mouth daily        warfarin 2 MG tablet    COUMADIN    180 tablet    TAKE 2 TABLETS(4 MG) BY MOUTH ONCE DAILY    Chronic anticoagulation

## 2017-06-26 NOTE — PROGRESS NOTES
Subjective:    Patient is a 72 year old female presenting with rehab left ankle/foot hpi.                                      Pertinent medical history includes:  Rheumatoid arthritis, osteoporosis, migraines, menopausal and other.  Medical allergies: yes.  Other surgeries include:  Orthopedic surgery and other.  Current medications:  Other.  Current occupation is retired.                                    Objective:    System    Physical Exam    General     ROS    Assessment/Plan:

## 2017-06-26 NOTE — PROGRESS NOTES
Subjective:    Patient is a 72 year old female presenting with rehab cervical spine hpi.   Nury Cárdenas is a 72 year old female with a cervical spine condition.  Condition occurred with:  Insidious onset.  Condition occurred: for unknown reasons.  This is a chronic condition  Patient reports onset of neck pain and upper trunk pain in April 2017 without a specific mechanism of injury or change in daily routine.    Patient reports pain:  Cervical right side.    Pain is described as aching and is intermittent and reported as 4/10.  Associated symptoms:  Loss of motion/stiffness. Pain is worse during the day.  Symptoms are exacerbated by rotating head and looking up or down (looking down at her part time job or annabelle) and relieved by rest.  Since onset symptoms are gradually worsening.  Special testing: none.      General health as reported by patient is good.                      Red flags:  None as reported by the patient.                        Objective:    Standing Alignment:    Cervical/Thoracic:  Forward head and thoracic kyphosis increased  Shoulder/UE:  Rounded shoulders                                  Cervical/Thoracic Evaluation    AROM:  AROM Cervical:    Flexion:            WNL, painful  Extension:       Severe loss  Rotation:         Left: 40 deg     Right: 45 deg  Side Bend:      Left: 30 deg     Right:  30 deg      Headaches: none (none reported)  Cervical Myotomes:  Cervical myotomes: grossly 4/5 bilaterally.                      Cervical Dermatomes:  normal                    Cervical Palpation:    Tenderness present at Left:    Rhomboids; Upper Trap; Levator; Erector Spinae; Facet and Suboccipitals  Tenderness present at Right:    Rhomboids; Upper Trap; Levator; Erector Spinae; Facet and Suboccipitals    Cervical Stability/Joint Clearing:  normal      Spinal Segmental Conclusions:    Level:  Hypo at C3, C2, C4, C5, C6, C7, T1, T2, T3, T5, T4, T6, T7, T8, T9, T10, T11 and T12                                                 General     ROS    Assessment/Plan:      Patient is a 72 year old female with cervical complaints.    Patient has the following significant findings with corresponding treatment plan.                Diagnosis 1:  Neck pain/multiple joint pain  Pain -  hot/cold therapy, manual therapy, self management, education and home program  Decreased ROM/flexibility - manual therapy, therapeutic exercise, therapeutic activity and home program  Decreased joint mobility - manual therapy, therapeutic exercise, therapeutic activity and home program  Decreased strength - therapeutic exercise, therapeutic activities and home program  Decreased function - therapeutic activities and home program  Impaired posture - neuro re-education, therapeutic activities and home program    Therapy Evaluation Codes:   1) History comprised of:   Personal factors that impact the plan of care:      Time since onset of symptoms.    Comorbidity factors that impact the plan of care are:      inflammatory arthritis.     Medications impacting care: None.  2) Examination of Body Systems comprised of:   Body structures and functions that impact the plan of care:      Cervical spine.   Activity limitations that impact the plan of care are:      Bending, Lifting, Reading/Computer work, Sitting and Sleeping.  3) Clinical presentation characteristics are:   Stable/Uncomplicated.  4) Decision-Making    Low complexity using standardized patient assessment instrument and/or measureable assessment of functional outcome.  Cumulative Therapy Evaluation is: Low complexity.    Previous and current functional limitations:  (See Goal Flow Sheet for this information)    Short term and Long term goals: (See Goal Flow Sheet for this information)     Communication ability:  Patient appears to be able to clearly communicate and understand verbal and written communication and follow directions correctly.  Treatment Explanation - The following has been  discussed with the patient:   RX ordered/plan of care  Anticipated outcomes  Possible risks and side effects  This patient would benefit from PT intervention to resume normal activities.   Rehab potential is good.    Frequency:  1 X week, once daily  Duration:  for 6 weeks  Discharge Plan:  Achieve all LTG.  Independent in home treatment program.  Reach maximal therapeutic benefit.    Please refer to the daily flowsheet for treatment today, total treatment time and time spent performing 1:1 timed codes.

## 2017-06-26 NOTE — PROGRESS NOTES
Subjective:    HPI                    Objective:    System    Physical Exam    General     ROS    Assessment/Plan:

## 2017-07-03 ENCOUNTER — THERAPY VISIT (OUTPATIENT)
Dept: PHYSICAL THERAPY | Facility: CLINIC | Age: 72
End: 2017-07-03
Payer: COMMERCIAL

## 2017-07-03 DIAGNOSIS — M54.2 CERVICALGIA: Primary | ICD-10-CM

## 2017-07-03 PROCEDURE — 97140 MANUAL THERAPY 1/> REGIONS: CPT | Mod: GP | Performed by: PHYSICAL THERAPIST

## 2017-07-03 PROCEDURE — 97110 THERAPEUTIC EXERCISES: CPT | Mod: GP | Performed by: PHYSICAL THERAPIST

## 2017-07-10 ENCOUNTER — THERAPY VISIT (OUTPATIENT)
Dept: PHYSICAL THERAPY | Facility: CLINIC | Age: 72
End: 2017-07-10
Payer: COMMERCIAL

## 2017-07-10 DIAGNOSIS — M54.2 CERVICALGIA: Primary | ICD-10-CM

## 2017-07-10 DIAGNOSIS — M79.601 PAIN OF RIGHT UPPER EXTREMITY: ICD-10-CM

## 2017-07-10 PROCEDURE — 97110 THERAPEUTIC EXERCISES: CPT | Mod: GP | Performed by: PHYSICAL THERAPIST

## 2017-07-10 PROCEDURE — 97140 MANUAL THERAPY 1/> REGIONS: CPT | Mod: GP | Performed by: PHYSICAL THERAPIST

## 2017-07-17 ENCOUNTER — THERAPY VISIT (OUTPATIENT)
Dept: PHYSICAL THERAPY | Facility: CLINIC | Age: 72
End: 2017-07-17
Payer: COMMERCIAL

## 2017-07-17 DIAGNOSIS — M54.2 CERVICALGIA: Primary | ICD-10-CM

## 2017-07-17 PROCEDURE — 97140 MANUAL THERAPY 1/> REGIONS: CPT | Mod: GP | Performed by: PHYSICAL THERAPIST

## 2017-07-17 PROCEDURE — 97110 THERAPEUTIC EXERCISES: CPT | Mod: GP | Performed by: PHYSICAL THERAPIST

## 2017-07-17 NOTE — MR AVS SNAPSHOT
After Visit Summary   7/17/2017    Nury Cárdenas    MRN: [id number]           Patient Information     Date Of Birth          1945        Visit Information        Provider Department      7/17/2017 11:20 AM Eliceo Vidal, PT [facility]        Today's Diagnoses     Cervicalgia    -  1       Follow-ups after your visit        Your next 10 appointments already scheduled     Aug 14, 2017 10:00 AM CDT   LAB with FZ LAB   [facility]    [address] [facility] [address] [phone]           Patient must bring picture ID.  Patient should be prepared to give a urine specimen  Please do not eat 10-12 hours before your appointment if you are coming in fasting for labs on lipids, cholesterol, or glucose (sugar).  Pregnant women should follow their Care Team instructions. Water with medications is okay. Do not drink coffee or other fluids.   If you have concerns about taking  your medications, please ask at office or if scheduling via [employer] Ric[employer], send a message by clicking on Secure Messaging, Message Your Care Team.            Aug 14, 2017 10:40 AM CDT   TRISHA Extremity with Eliceo Vidal, PT   [facility] ([facility]    [address] [facility]  [address]  [place] [address]   [phone]            Aug 16, 2017  9:45 AM CDT   Anticoagulation Visit with FZ ANTI COAG   [facility]    [address] [facility] [place] [address] [phone]            Oct 04, 2017 10:00 AM CDT   LAB with FZ LAB   [facility]    [address] [facility] [address] [phone]           Patient must bring picture ID.  Patient should be prepared to give a urine specimen  Please do not eat 10-12 hours before your appointment if you are coming in fasting for labs on lipids, cholesterol, or glucose (sugar).  Pregnant women should follow their Care Team instructions.  Water with medications is okay. Do not drink coffee or other fluids.   If you have concerns about taking  your medications, please ask at office or if scheduling via Om[employer]Guide, send a message by clicking on Secure Messaging, Message Your Care Team.            Oct 13, 2017 11:30 AM CDT   Return Visit with Alvaro Maguire MD   [facility]    [address] [place] [address] [phone]              Who to contact     If you have questions or need follow up information about today's clinic visit or your schedule please contact [facility] directly at [phone].  Normal or non-critical lab and imaging results will be communicated to you by [facility] [employer], letter or phone within 4 business days after the clinic has received the results. If you do not hear from us within 7 days, please contact the clinic through BzzAgentt or phone. If you have a critical or abnormal lab result, we will notify you by phone as soon as possible.  Submit refill requests through OmniGuide or call your pharmacy and they will forward the refill request to us. Please allow 3 business days for your refill to be completed.          Additional Information About Your Visit        OmniGuide Information     Om[employer] gives you secure access to your electronic health record. If you see a primary care provider, you can also send messages to your care team and make appointments. If you have questions, please call your primary care clinic.  If you do not have a primary care provider, please call [phone] and they will assist you.        Care EveryWhere ID     This is your Care EveryWhere ID. This could be used by other organizations to access your [place] medical records  [id number]         Blood Pressure from Last 3 Encounters:   06/09/17 131/73   06/06/17 132/82   03/15/17 136/82    Weight from Last 3 Encounters:   06/09/17 57.6 kg (126 lb 14.4 oz)   06/06/17 57.2 kg (126 lb)   03/15/17 58.1  kg (128 lb)              We Performed the Following     Manual Ther Tech, 1+Regions, EA 15 min     Therapeutic Exercises        Primary Care Provider Office Phone # Fax #    Phil Abbott [phone]       [address] [address]        Equal Access to Services     [facility]: Hadii aad ku hadasho Soomaali, waaxda luqadaha, qaybta kaalmada adeegyada, waxay idiin hayaan adeeg kharash la'aan . So [facility] [phone].    ATENCIÓN: Si habla español, tiene a fofana disposición servicios gratuitos de asistencia lingüística. Llame al [phone].    We comply with applicable federal civil rights laws and [place] laws. We do not discriminate on the basis of race, color, national origin, age, disability sex, sexual orientation or gender identity.            Thank you!     Thank you for choosing [facility]  for your care. Our goal is always to provide you with excellent care. Hearing back from our patients is one way we can continue to improve our services. Please take a few minutes to complete the written survey that you may receive in the mail after your visit with us. Thank you!             Your Updated Medication List - Protect others around you: Learn how to safely use, store and throw away your medicines at www.disposemymeds.org.          This list is accurate as of: 7/17/17 11:58 AM.  Always use your most recent med list.                   Brand Name Dispense Instructions for use Diagnosis    acetaminophen 325 MG tablet    TYLENOL    100 tablet    Take 2 tablets (650 mg) by mouth every 6 hours as needed for mild pain    Migraines, Pulmonary emboli (H)       calcium carbonate-vitamin D 500-400 MG-UNIT Tabs per tablet     180 tablet    Take 1 tablet by mouth 2 times daily    Osteopenia       fluticasone 50 MCG/ACT spray    FLONASE    16 g    Spray 2 sprays into both nostrils daily    Acute non-recurrent maxillary sinusitis       folic acid 1 MG tablet     FOLVITE    90 tablet    Take 1 tablet (1 mg) by mouth daily    Granulomatosis with polyangiitis (H), Long-term use of immunosuppressant medication, Primary osteoarthritis involving multiple joints, Wegener's granulomatosis (H)       methotrexate 2.5 MG tablet CHEMO     48 tablet    Take 4 tablets (10 mg) by mouth once a week    Wegener's granulomatosis (H), Granulomatosis with polyangiitis (H), Long-term use of immunosuppressant medication, Primary osteoarthritis involving multiple joints       predniSONE 1 MG tablet    DELTASONE    210 tablet    1mg Po daily.    Wegener's granulomatosis (granulomatosis with polyangiitis) (H)       propranolol 20 MG tablet    INDERAL    90 tablet    TAKE 1 TABLET(20 MG) BY MOUTH DAILY    Migraine without status migrainosus, not intractable, unspecified migraine type       VITAMIN D3 PO      Take by mouth daily        warfarin 2 MG tablet    COUMADIN    180 tablet    TAKE 2 TABLETS(4 MG) BY MOUTH ONCE DAILY    Chronic anticoagulation

## 2017-07-21 ENCOUNTER — MYC MEDICAL ADVICE (OUTPATIENT)
Dept: FAMILY MEDICINE | Facility: CLINIC | Age: 72
End: 2017-07-21

## 2017-07-21 DIAGNOSIS — Z79.01 CHRONIC ANTICOAGULATION: ICD-10-CM

## 2017-07-24 RX ORDER — WARFARIN SODIUM 2 MG/1
TABLET ORAL
Qty: 60 TABLET | Refills: 0 | Status: SHIPPED | OUTPATIENT
Start: 2017-07-24 | End: 2017-08-22

## 2017-07-24 NOTE — TELEPHONE ENCOUNTER
Prescription approved per [facility] Refill Protocol.  Patient due for office visit for further refills    Leila Richey RN

## 2017-07-26 ENCOUNTER — MYC MEDICAL ADVICE (OUTPATIENT)
Dept: RHEUMATOLOGY | Facility: CLINIC | Age: 72
End: 2017-07-26

## 2017-07-26 ENCOUNTER — OFFICE VISIT (OUTPATIENT)
Dept: URGENT CARE | Facility: URGENT CARE | Age: 72
End: 2017-07-26
Payer: COMMERCIAL

## 2017-07-26 VITALS
DIASTOLIC BLOOD PRESSURE: 86 MMHG | TEMPERATURE: 98.6 F | WEIGHT: 127 LBS | BODY MASS INDEX: 21.46 KG/M2 | SYSTOLIC BLOOD PRESSURE: 142 MMHG | HEART RATE: 84 BPM | OXYGEN SATURATION: 98 %

## 2017-07-26 DIAGNOSIS — S80.812A CAT SCRATCH OF LEFT LOWER LEG, INITIAL ENCOUNTER: ICD-10-CM

## 2017-07-26 DIAGNOSIS — L03.116 CELLULITIS OF LEG, LEFT: Primary | ICD-10-CM
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
## 2017-07-26 PROCEDURE — 99214 OFFICE O/P EST MOD 30 MIN: CPT | Performed by: PHYSICIAN ASSISTANT

## 2017-07-26 NOTE — MR AVS SNAPSHOT
After Visit Summary   7/26/2017    Nury Cárdenas    MRN: [id number]           Patient Information     Date Of Birth          1945        Visit Information        Provider Department      7/26/2017 7:40 PM Cindy Yun PA-C [facility]        Today's Diagnoses     Cellulitis of leg, left    -  1    Cat scratch of left lower leg, initial encounter           Follow-ups after your visit        Your next 10 appointments already scheduled     Jul 27, 2017  7:30 AM CDT   SHORT with Josi Monte MD   [facility] ([facility])    [address] [facility] [address]   [phone]            Aug 14, 2017 10:00 AM CDT   LAB with FZ LAB   [facility] ([facility])    [address] [facility] [address] [phone]           Patient must bring picture ID. Patient should be prepared to give a urine specimen  Please do not eat 10-12 hours before your appointment if you are coming in fasting for labs on lipids, cholesterol, or glucose (sugar). Pregnant women should follow their Care Team instructions. Water with medications is okay. Do not drink coffee or other fluids. If you have concerns about taking  your medications, please ask at office or if scheduling via [facility], send a message by clicking on Secure Messaging, Message Your Care Team.            Aug 14, 2017 10:40 AM CDT   TRISHA Extremity with Eliceo Vidal, PT   [facility] ([facility]    [address] [facility]  [address]  [place] [address]   [phone]            Aug 16, 2017  9:45 AM CDT   Anticoagulation Visit with FZ ANTI COAG   [facility]    [address] [facility] [address] [phone]            Oct 04, 2017 10:00 AM CDT   LAB with FZ LAB   [facility] ([facility])    [address] [facility] [place] [address]   [phone]           Patient must  bring picture ID. Patient should be prepared to give a urine specimen  Please do not eat 10-12 hours before your appointment if you are coming in fasting for labs on lipids, cholesterol, or glucose (sugar). Pregnant women should follow their Care Team instructions. Water with medications is okay. Do not drink coffee or other fluids. If you have concerns about taking  your medications, please ask at office or if scheduling via Microl[employer]unchers, send a message by clicking on Secure Messaging, Message Your Care Team.            Oct 13, 2017 11:30 AM CDT   Return Visit with Alvaro Maguire MD   [facility]    [address] [place] [address] [phone]              Who to contact     If you have questions or need follow up information about today's clinic visit or your schedule please contact [facility] directly at [phone].  Normal or non-critical lab and imaging results will be communicated to you by fitogramhart, letter or phone within 4 business days after the clinic has received the results. If you do not hear from us within 7 days, please contact the clinic through [employer] or phone. If you have a critical or abnormal lab result, we will notify you by phone as soon as possible.  Submit refill requests through Microlaunchers or call your pharmacy and they will forward the refill request to us. Please allow 3 business days for your refill to be completed.          Additional Information About Your Visit        Microl[employer]unchers Information     [employer]hers gives you secure access to your electronic health record. If you see a primary care provider, you can also send messages to your care team and make appointments. If you have questions, please call your primary care clinic.  If you do not have a primary care provider, please call [phone] and they will assist you.        Care EveryWhere ID     This is your Care EveryWhere ID. This could be used by other  organizations to access your [place] medical records  [id number]        Your Vitals Were     Pulse Temperature Pulse Oximetry BMI (Body Mass Index)          84 98.6  F (37  C) (Oral) 98% 21.46 kg/m2         Blood Pressure from Last 3 Encounters:   07/26/17 142/86   06/09/17 131/73   06/06/17 132/82    Weight from Last 3 Encounters:   07/26/17 127 lb (57.6 kg)   06/09/17 126 lb 14.4 oz (57.6 kg)   06/06/17 126 lb (57.2 kg)              We Performed the Following     TDAP VACCINE (ADACEL)          Today's Medication Changes          These changes are accurate as of: 7/26/17  8:21 PM.  If you have any questions, ask your nurse or doctor.               Start taking these medicines.        Dose/Directions    amoxicillin-clavulanate 875-125 MG per tablet   Commonly known as:  AUGMENTIN   Used for:  Cellulitis of leg, left        Dose:  1 tablet   Take 1 tablet by mouth 2 times daily for 10 days   Quantity:  20 tablet   Refills:  0            Where to get your medicines      These medications were sent to [facility] [address] [place], [place] [address] - [address] [facility] [address] [address]     Phone:  [phone]     amoxicillin-clavulanate 875-125 MG per tablet                Primary Care Provider Office Phone # Fax #    Phil Abbott [phone]       [facility] [address] [place] [address]        Equal Access to Services     MARTÍN MYERS AH: Hadii abena wyatto Somau, waaxda luqadaha, qaybta kaalmada rocky, waxay idioriana angel. So [facility] [phone].    ATENCIÓN: Si habla español, tiene a fofana disposición servicios gratuitos de asistencia lingüística. Llame al [phone].    We comply with applicable federal civil rights laws and [place] laws. We do not discriminate on the basis of race, color, national origin, age, disability sex, sexual orientation or gender identity.             Thank you!     Thank you for choosing [facility]  for your care. Our goal is always to provide you with excellent care. Hearing back from our patients is one way we can continue to improve our services. Please take a few minutes to complete the written survey that you may receive in the mail after your visit with us. Thank you!             Your Updated Medication List - Protect others around you: Learn how to safely use, store and throw away your medicines at www.disposemymeds.org.          This list is accurate as of: 7/26/17  8:21 PM.  Always use your most recent med list.                   Brand Name Dispense Instructions for use Diagnosis    acetaminophen 325 MG tablet    TYLENOL    100 tablet    Take 2 tablets (650 mg) by mouth every 6 hours as needed for mild pain    Migraines, Pulmonary emboli (H)       amoxicillin-clavulanate 875-125 MG per tablet    AUGMENTIN    20 tablet    Take 1 tablet by mouth 2 times daily for 10 days    Cellulitis of leg, left       calcium carbonate-vitamin D 500-400 MG-UNIT Tabs per tablet     180 tablet    Take 1 tablet by mouth 2 times daily    Osteopenia       fluticasone 50 MCG/ACT spray    FLONASE    16 g    Spray 2 sprays into both nostrils daily    Acute non-recurrent maxillary sinusitis       folic acid 1 MG tablet    FOLVITE    90 tablet    Take 1 tablet (1 mg) by mouth daily    Granulomatosis with polyangiitis (H), Long-term use of immunosuppressant medication, Primary osteoarthritis involving multiple joints, Wegener's granulomatosis (H)       methotrexate 2.5 MG tablet CHEMO     48 tablet    Take 4 tablets (10 mg) by mouth once a week    Wegener's granulomatosis (H), Granulomatosis with polyangiitis (H), Long-term use of immunosuppressant medication, Primary osteoarthritis involving multiple joints       predniSONE 1 MG tablet    DELTASONE    210 tablet    1mg Po daily.    Wegener's granulomatosis (granulomatosis with polyangiitis) (H)       propranolol  20 MG tablet    INDERAL    90 tablet    TAKE 1 TABLET(20 MG) BY MOUTH DAILY    Migraine without status migrainosus, not intractable, unspecified migraine type       VITAMIN D3 PO      Take by mouth daily        warfarin 2 MG tablet    COUMADIN    60 tablet    TAKE 2 TABLETS(4 MG) BY MOUTH ONCE DAILY    Chronic anticoagulation

## 2017-07-27 ENCOUNTER — HOSPITAL ENCOUNTER (INPATIENT)
Facility: CLINIC | Age: 72
LOS: 2 days | Discharge: HOME | DRG: 603 | End: 2017-07-30
Attending: EMERGENCY MEDICINE | Admitting: FAMILY MEDICINE
Payer: MEDICARE

## 2017-07-27 DIAGNOSIS — D84.9 IMMUNOSUPPRESSION (H): ICD-10-CM

## 2017-07-27 DIAGNOSIS — Z79.01 LONG TERM (CURRENT) USE OF ANTICOAGULANTS: ICD-10-CM

## 2017-07-27 DIAGNOSIS — L03.116 CELLULITIS OF LEFT LOWER EXTREMITY: Primary | ICD-10-CM

## 2017-07-27 DIAGNOSIS — Z86.718 H/O DEEP VENOUS THROMBOSIS: ICD-10-CM

## 2017-07-27 DIAGNOSIS — M31.30 WEGENER'S GRANULOMATOSIS: ICD-10-CM
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
## 2017-07-27 LAB
ANION GAP SERPL CALCULATED.3IONS-SCNC: 7 MMOL/L (ref 3–14)
BASOPHILS # BLD AUTO: 0 10E9/L (ref 0–0.2)
BASOPHILS NFR BLD AUTO: 0.3 %
BUN SERPL-MCNC: 9 MG/DL (ref 7–30)
CALCIUM SERPL-MCNC: 9 MG/DL (ref 8.5–10.1)
CHLORIDE SERPL-SCNC: 106 MMOL/L (ref 94–109)
CO2 SERPL-SCNC: 27 MMOL/L (ref 20–32)
CREAT SERPL-MCNC: 0.69 MG/DL (ref 0.52–1.04)
CRP SERPL-MCNC: 24 MG/L (ref 0–8)
DIFFERENTIAL METHOD BLD: NORMAL
EOSINOPHIL # BLD AUTO: 0 10E9/L (ref 0–0.7)
EOSINOPHIL NFR BLD AUTO: 0.5 %
ERYTHROCYTE [DISTWIDTH] IN BLOOD BY AUTOMATED COUNT: 13.7 % (ref 10–15)
ERYTHROCYTE [SEDIMENTATION RATE] IN BLOOD BY WESTERGREN METHOD: 8 MM/H (ref 0–30)
GFR SERPL CREATININE-BSD FRML MDRD: 83 ML/MIN/1.7M2
GLUCOSE SERPL-MCNC: 118 MG/DL (ref 70–99)
HCT VFR BLD AUTO: 40 % (ref 35–47)
HGB BLD-MCNC: 13.7 G/DL (ref 11.7–15.7)
IMM GRANULOCYTES # BLD: 0 10E9/L (ref 0–0.4)
IMM GRANULOCYTES NFR BLD: 0.3 %
INR PPP: 2.09 (ref 0.86–1.14)
LYMPHOCYTES # BLD AUTO: 1.3 10E9/L (ref 0.8–5.3)
LYMPHOCYTES NFR BLD AUTO: 17.6 %
MCH RBC QN AUTO: 31.8 PG (ref 26.5–33)
MCHC RBC AUTO-ENTMCNC: 34.3 G/DL (ref 31.5–36.5)
MCV RBC AUTO: 93 FL (ref 78–100)
MONOCYTES # BLD AUTO: 0.8 10E9/L (ref 0–1.3)
MONOCYTES NFR BLD AUTO: 11 %
NEUTROPHILS # BLD AUTO: 5.3 10E9/L (ref 1.6–8.3)
NEUTROPHILS NFR BLD AUTO: 70.3 %
NRBC # BLD AUTO: 0 10*3/UL
NRBC BLD AUTO-RTO: 0 /100
PLATELET # BLD AUTO: 182 10E9/L (ref 150–450)
POTASSIUM SERPL-SCNC: 4 MMOL/L (ref 3.4–5.3)
RBC # BLD AUTO: 4.31 10E12/L (ref 3.8–5.2)
SODIUM SERPL-SCNC: 141 MMOL/L (ref 133–144)
WBC # BLD AUTO: 7.5 10E9/L (ref 4–11)

## 2017-07-27 PROCEDURE — 85025 COMPLETE CBC W/AUTO DIFF WBC: CPT | Performed by: EMERGENCY MEDICINE

## 2017-07-27 PROCEDURE — 25000128 H RX IP 250 OP 636: Performed by: EMERGENCY MEDICINE

## 2017-07-27 PROCEDURE — 85610 PROTHROMBIN TIME: CPT | Performed by: EMERGENCY MEDICINE

## 2017-07-27 PROCEDURE — 99285 EMERGENCY DEPT VISIT HI MDM: CPT | Mod: Z6 | Performed by: EMERGENCY MEDICINE

## 2017-07-27 PROCEDURE — 96360 HYDRATION IV INFUSION INIT: CPT | Performed by: EMERGENCY MEDICINE

## 2017-07-27 PROCEDURE — 25000132 ZZH RX MED GY IP 250 OP 250 PS 637: Mod: GY | Performed by: STUDENT IN AN ORGANIZED HEALTH CARE EDUCATION/TRAINING PROGRAM

## 2017-07-27 PROCEDURE — 85652 RBC SED RATE AUTOMATED: CPT | Performed by: EMERGENCY MEDICINE

## 2017-07-27 PROCEDURE — 99285 EMERGENCY DEPT VISIT HI MDM: CPT | Mod: 25 | Performed by: EMERGENCY MEDICINE

## 2017-07-27 PROCEDURE — 86140 C-REACTIVE PROTEIN: CPT | Performed by: EMERGENCY MEDICINE

## 2017-07-27 PROCEDURE — 25000128 H RX IP 250 OP 636: Performed by: STUDENT IN AN ORGANIZED HEALTH CARE EDUCATION/TRAINING PROGRAM

## 2017-07-27 PROCEDURE — 80048 BASIC METABOLIC PNL TOTAL CA: CPT | Performed by: EMERGENCY MEDICINE

## 2017-07-27 RX ORDER — FOLIC ACID 1 MG/1
1 TABLET ORAL DAILY
Status: DISCONTINUED | OUTPATIENT
Start: 2017-07-28 | End: 2017-07-30 | Stop reason: HOSPADM

## 2017-07-27 RX ORDER — PROPRANOLOL HYDROCHLORIDE 20 MG/1
20 TABLET ORAL EVERY MORNING
Status: DISCONTINUED | OUTPATIENT
Start: 2017-07-28 | End: 2017-07-30 | Stop reason: HOSPADM

## 2017-07-27 RX ORDER — PIPERACILLIN SODIUM, TAZOBACTAM SODIUM 3; .375 G/15ML; G/15ML
3.38 INJECTION, POWDER, LYOPHILIZED, FOR SOLUTION INTRAVENOUS ONCE
Status: COMPLETED | OUTPATIENT
Start: 2017-07-27 | End: 2017-07-28

## 2017-07-27 RX ORDER — DIPHENHYDRAMINE HCL 25 MG
50 CAPSULE ORAL ONCE
Status: COMPLETED | OUTPATIENT
Start: 2017-07-27 | End: 2017-07-27

## 2017-07-27 RX ORDER — LIDOCAINE 40 MG/G
CREAM TOPICAL
Status: DISCONTINUED | OUTPATIENT
Start: 2017-07-27 | End: 2017-07-28

## 2017-07-27 RX ORDER — VANCOMYCIN HYDROCHLORIDE 1 G/200ML
1000 INJECTION, SOLUTION INTRAVENOUS EVERY 12 HOURS
Status: DISCONTINUED | OUTPATIENT
Start: 2017-07-28 | End: 2017-07-28

## 2017-07-27 RX ORDER — PIPERACILLIN SODIUM, TAZOBACTAM SODIUM 3; .375 G/15ML; G/15ML
3.38 INJECTION, POWDER, LYOPHILIZED, FOR SOLUTION INTRAVENOUS EVERY 6 HOURS
Status: DISCONTINUED | OUTPATIENT
Start: 2017-07-28 | End: 2017-07-28

## 2017-07-27 RX ORDER — ACETAMINOPHEN 325 MG/1
650 TABLET ORAL EVERY 6 HOURS PRN
Status: DISCONTINUED | OUTPATIENT
Start: 2017-07-27 | End: 2017-07-30 | Stop reason: HOSPADM

## 2017-07-27 RX ADMIN — DIPHENHYDRAMINE HYDROCHLORIDE 50 MG: 25 CAPSULE ORAL at 22:31

## 2017-07-27 RX ADMIN — RANITIDINE HYDROCHLORIDE 50 MG: 25 INJECTION INTRAMUSCULAR; INTRAVENOUS at 22:34

## 2017-07-27 RX ADMIN — VANCOMYCIN HYDROCHLORIDE 1250 MG: 10 INJECTION, POWDER, LYOPHILIZED, FOR SOLUTION INTRAVENOUS at 21:03

## 2017-07-27 ASSESSMENT — ENCOUNTER SYMPTOMS
COUGH: 0
NUMBNESS: 0
WOUND: 1
GASTROINTESTINAL NEGATIVE: 1
CHILLS: 1

## 2017-07-27 NOTE — TELEPHONE ENCOUNTER
I see no contraindication to tetanus vaccination and she should get this.  I see no reason not to take the prescription as ordered.

## 2017-07-27 NOTE — PROGRESS NOTES
SUBJECTIVE:                                                      HPI  Nury RENETTA Cárdenas is a 72 year old female who presents today with a cat scratch/bite which occurred this morning.  Also accompanied by daughter today as well.  Sustained a cat scratch/bite to her left leg from a neighbor's cat she is watching.  Since then she has noticed pain, redness and swelling at the injury.  Reports bloody drainage but no fevers.  No radicular pain, numbness, tingling or weakness.  Pain is worse with palpation and movement and is relieved with rest.  Has been applying triple antibiotic ointment and dressing this with non-adhesive dressings.  She is currently on coumadin for PE/DVT and methotrexate for Wegener's.  She is due for a Td.  Cat is vaccinated.       Reviewed PMH.  Patient Active Problem List   Diagnosis     GERD (gastroesophageal reflux disease)     Fibroids     Migraines     Hearing loss     Osteopenia     HYPERLIPIDEMIA LDL GOAL <160     Advanced directives, counseling/discussion     Inflammatory arthritis     Synovitis of wrist     Chronic constipation     Granulomatosis with polyangiitis (H)     Chronic steroid use     Dyspnea     PE (pulmonary embolism)     Cataract, mild, ou     Calculus of kidney, right     Chronic anticoagulation     Long-term (current) use of anticoagulants [Z79.01]     Long-term use of immunosuppressant medication     Primary osteoarthritis involving multiple joints     Current Outpatient Prescriptions   Medication Sig Dispense Refill     warfarin (COUMADIN) 2 MG tablet TAKE 2 TABLETS(4 MG) BY MOUTH ONCE DAILY 60 tablet 0     methotrexate 2.5 MG tablet CHEMO Take 4 tablets (10 mg) by mouth once a week 48 tablet 1     folic acid (FOLVITE) 1 MG tablet Take 1 tablet (1 mg) by mouth daily 90 tablet 3     propranolol (INDERAL) 20 MG tablet TAKE 1 TABLET(20 MG) BY MOUTH DAILY 90 tablet 1     fluticasone (FLONASE) 50 MCG/ACT spray Spray 2 sprays into both nostrils daily 16 g 1     predniSONE  (DELTASONE) 1 MG tablet 1mg Po daily. 210 tablet 1     Cholecalciferol (VITAMIN D3 PO) Take by mouth daily       acetaminophen (TYLENOL) 325 MG tablet Take 2 tablets (650 mg) by mouth every 6 hours as needed for mild pain 100 tablet 0     calcium carbonate-vitamin D 500-400 MG-UNIT TABS tablt Take 1 tablet by mouth 2 times daily 180 tablet 3     Allergies   Allergen Reactions     Adhesive Tape      Rash itches     Amoxicillin      vomiting     Augmentin      vomiting     Codeine      vomiting     Erythromycin      vomiting     Nickel      itches rash     Sulfa Drugs Itching     Tegaderm Alginate Ag      LW Other1: -ALL MEDICATIONS MAKE PATIENT VIOLENTLY ILL; ADHESIVE BANDAGES; NICKEL     Zithromax [Azithromycin Dihydrate]      Vomiting/ skin blisters       ROS  All other ROS are negative.      Physical Exam   Constitutional: She is oriented to person, place, and time and well-developed, well-nourished, and in no distress. No distress.   Musculoskeletal:        Right lower leg: Normal. She exhibits no tenderness, no bony tenderness, no swelling, no edema, no deformity and no laceration.        Left lower leg: She exhibits tenderness and swelling. She exhibits no bony tenderness, no edema, no deformity and no laceration.        Legs:  Neurological: She is alert and oriented to person, place, and time. She has normal motor skills, normal sensation, normal strength and normal reflexes. Gait normal.   Skin: Skin is warm, dry and intact.   Distal pulses are 2+ and symmetric.  No peripheral edema.   Nursing note and vitals reviewed.        Assessment/Plan:  Cellulitis of leg, left:  Will start augmentin [id number] and take with food/probiotics to minimize GI upset.  Augmentin may increase levels of methotrexate and can cause toxicity but because she is on such a low dose (10mg per week) the pharmacist at [facility] did not feel that this should be an issue.  He recommended that she hold her methotrexate dose for this week  while she is on augmentin.  Watch for mouth sores and abnormal blood counts.  She will also discuss this with her rheumatologist as well.  RICE, tylenol as needed for pain.  To the ER if worsening pain, redness, purulent drainage or fevers.    -     amoxicillin-clavulanate (AUGMENTIN) 875-125 MG per tablet; Take 1 tablet by mouth 2 times daily for 10 days    Cat scratch of left lower leg, initial encounter:  Recommend Tdap due to cat bite/scratch.  She has declined this and would like to discuss this with her PCP first prior to getting the Tdap.     -     Cancel: TDAP VACCINE (ADACEL)          Cindy Yun PA-C'

## 2017-07-27 NOTE — NURSING NOTE
"Chief Complaint   Patient presents with     Cat Bite     left leg       Initial /86  Pulse 84  Temp 98.6  F (37  C) (Oral)  Wt 127 lb (57.6 kg)  SpO2 98%  BMI 21.46 kg/m2 Estimated body mass index is 21.46 kg/(m^2) as calculated from the following:    Height as of 6/6/17: 5' 4.5\" (1.638 m).    Weight as of this encounter: 127 lb (57.6 kg).  Medication Reconciliation: complete   Karen Becerra CMA      "

## 2017-07-27 NOTE — IP AVS SNAPSHOT
MRN:[id number]                      After Visit Summary   7/27/2017    Nury Cárdenas    MRN: [id number]           Thank you!     Thank you for choosing [facility] for your care. Our goal is always to provide you with excellent care. Hearing back from our patients is one way we can continue to improve our services. Please take a few minutes to complete the written survey that you may receive in the mail after you visit with us. Thank you!        Patient Information     Date Of Birth          1945        Designated Caregiver       Most Recent Value    Caregiver    Will someone help with your care after discharge? yes    Name of designated caregiver Ina    Phone number of caregiver [phone]    Caregiver address [address]      About your hospital stay     You were admitted on:  July 28, 2017 You last received care in the:  [address] [facility]    You were discharged on:  July 30, 2017        Reason for your hospital stay       You were admitted to the hospital for an infection of your left leg secondary to a cat scratch/bite.  You were started on IV antibiotics for 2 days and transitioned to oral antibiotics.  You improved considerably with the IV antibiotics.  Your dose of methotrexate was held on Friday 7/28/2017 and the plan is to hold the next dose on 8/4/2017.  You may resume methotrexate once antibiotics are complete and infection is resolving.                  Who to Call     For medical emergencies, please call 911.  For non-urgent questions about your medical care, please call your primary care provider or clinic, [phone]          Attending Provider     Provider Specialty    Elis Jackson MD Emergency Medicine    [facility], A[facility]ir Olivo [facility]       Primary Care Provider Office Phone # Fax #    Phil Abbott [phone]       When to contact your care team       Call your  primary doctor if you have any of the following: temperature greater than 101.4, increased shortness of breath, chest pain, increased drainage, increased swelling/redness or increased pain.            When to contact your care team       Call your primary doctor if you have any of the following: temperature greater than 100.4 F, increased redness and oozing from your wound, increased pain in her L leg                  After Care Instructions     Activity       Your activity upon discharge: activity as tolerated            Diet       Follow this diet upon discharge: Orders Placed This Encounter      Combination Diet Regular Diet Adult      Diet            Discharge Instructions       Once you see your primary care provider and finish your antibiotics as directed, you may begin methotrexate (Per Dr. Maguire) either on the following Friday or on another day of your choosing, and then continue methotrexate weekly            Monitor and record       Your wound (borders were marked on discharge)            Supplies       List the supplies the pt needs to go home: Vaseline gaze, kerlex            Wound care and dressings       Instructions to care for your wound at home: daily dressing changes with Vaseline gaze and kerlex.                  Follow-up Appointments     Adult [facility] Follow-up and recommended labs and tests       Follow up with primary care provider, Phil Abbott, within 2-3 days for hospital follow- up.  No follow up labs or test are needed. You wound needs to be evaluated and managed as needed.       Appointments on [place] and/or [facility] (with [facility] or [facility] provider or service). Call [phone] if you haven't heard regarding these appointments within 7 days of discharge.            Follow Up and recommended labs and tests       Follow up with primary care provider, Phil Abbott, within 7 days for hospital follow- up.  If infection is not resolving/resolved, consider longer  course of antibiotics                  Your next 10 appointments already scheduled     Aug 14, 2017 10:00 AM CDT   LAB with [facility]   [facility] ([facility])    [address] [place], [place] [address] [address]   [phone]           Patient must bring picture ID. Patient should be prepared to give a urine specimen  Please do not eat 10-12 hours before your appointment if you are coming in fasting for labs on lipids, cholesterol, or glucose (sugar). Pregnant women should follow their Care Team instructions. Water with medications is okay. Do not drink coffee or other fluids. If you have concerns about taking  your medications, please ask at office or if scheduling via [employer], send a message by clicking on Secure Messaging, Message Your Care Team.            Aug 14, 2017 10:40 AM CDT   [facility] Extremity with Eliceo Vidal, PT   [facility]    [address] [place] [place] [address] [address]   [phone]            Aug 16, 2017  9:45 AM CDT   Anticoagulation Visit with RAAD ANTI COAG   [facility] ([facility])    [address] [place], [place] [address] [address]   [phone]            Oct 04, 2017 10:00 AM CDT   LAB with RAAD LAB   [facility] ([facility])    [address] [place] [address]   [phone]           Patient must bring picture ID. Patient should be prepared to give a urine specimen  Please do not eat 10-12 hours before your appointment if you are coming in fasting for labs on lipids, cholesterol, or glucose (sugar). Pregnant women should follow their Care Team instructions. Water with medications is okay. Do not drink coffee or other fluids. If you have concerns about taking  your medications, please ask at office or if scheduling via [employer], send a message by clicking on Secure Messaging, Message Your Care Team.            Oct 13, 2017 11:30 AM CDT   Return Visit with Alvaro Maguire MD  "  [facility] ([facility])    [address] [address] [place] [address] [address]  [facility] [address] [phone]              Pending Results     No orders found from 7/25/2017 to 7/28/2017.            Statement of Approval     Ordered          07/30/17 1425  I have reviewed and agree with all the recommendations and orders detailed in this document.  EFFECTIVE NOW     Approved and electronically signed by:  Eliana Cain MD             Admission Information     Date & Time Provider Department Dept. Phone    7/27/2017 Sergio Suero MD [address] [facility] [place] [phone]      Your Vitals Were     Blood Pressure Pulse Temperature Respirations Height Weight    124/64 (BP Location: Left arm) 81 97.6  F (36.4  C) (Oral) 16 1.638 m (5' 4.5\") 56.8 kg (125 lb 4.8 oz)    Pulse Oximetry BMI (Body Mass Index)                93% 21.18 kg/m2          [email or web address] Information     "[employer] gives you secure access to your electronic health record. If you see a primary care provider, you can also send messages to your care team and make appointments. If you have questions, please call your primary care clinic.  If you do not have a primary care provider, please call [phone] and they will assist you.        Care EveryWhere ID     This is your Care EveryWhere ID. This could be used by other organizations to access your [facility] medical records  [id number]        Equal Access to Services     [facility] Center[facility] : Hadii abena bahena Somau, waaxda luqadaha, qaybta kaalmada rocky, lavell angel. So [facility] [phone].    ATENCIÓN: Si habla español, tiene a fofana disposición servicios gratuitos de asistencia lingüística. Llame al [phone].    We comply with applicable federal civil rights laws and [place] laws. We do not discriminate on the basis of race, color, national origin, age, disability sex, sexual orientation or gender identity.               Review of your medicines    "   START taking        Dose / Directions    lactobacillus rhamnosus (GG) capsule        Dose:  1 capsule   Take 1 capsule by mouth 3 times daily (before meals)   Quantity:  30 capsule   Refills:  0         CONTINUE these medicines which may have CHANGED, or have new prescriptions. If we are uncertain of the size of tablets/capsules you have at home, strength may be listed as something that might have changed.        Dose / Directions    * amoxicillin-clavulanate 875-125 MG per tablet   Commonly known as:  AUGMENTIN   This may have changed:  Another medication with the same name was added. Make sure you understand how and when to take each.   Used for:  Cellulitis of leg, left        Dose:  1 tablet   Take 1 tablet by mouth 2 times daily for 10 days   Quantity:  20 tablet   Refills:  0       * amoxicillin-clavulanate 875-125 MG per tablet   Commonly known as:  AUGMENTIN   Indication:  Skin and Soft Tissue Infection   This may have changed:  You were already taking a medication with the same name, and this prescription was added. Make sure you understand how and when to take each.        Dose:  1 tablet   Take 1 tablet by mouth every 12 hours   Quantity:  16 tablet   Refills:  0       methotrexate 2.5 MG tablet CHEMO   This may have changed:  additional instructions   Used for:  Wegener's granulomatosis (H), Granulomatosis with polyangiitis (H), Long-term use of immunosuppressant medication, Primary osteoarthritis involving multiple joints        Dose:  10 mg   Take 4 tablets (10 mg) by mouth once a week   Quantity:  48 tablet   Refills:  1       * Notice:  This list has 2 medication(s) that are the same as other medications prescribed for you. Read the directions carefully, and ask your doctor or other care provider to review them with you.      CONTINUE these medicines which have NOT CHANGED        Dose / Directions    acetaminophen 325 MG tablet   Commonly known as:  TYLENOL   Used for:  Migraines, Pulmonary emboli  (H)        Dose:  650 mg   Take 2 tablets (650 mg) by mouth every 6 hours as needed for mild pain   Quantity:  100 tablet   Refills:  0       CALCIUM CARBONATE PO        Dose:  1 tablet   Take 1 tablet by mouth 2 times daily Pt unsure of strength   Refills:  0       folic acid 1 MG tablet   Commonly known as:  FOLVITE   Used for:  Granulomatosis with polyangiitis (H), Long-term use of immunosuppressant medication, Primary osteoarthritis involving multiple joints, Wegener's granulomatosis (H)        Dose:  1 mg   Take 1 tablet (1 mg) by mouth daily   Quantity:  90 tablet   Refills:  3       propranolol 20 MG tablet   Commonly known as:  INDERAL   Used for:  Migraine without status migrainosus, not intractable, unspecified migraine type        TAKE 1 TABLET(20 MG) BY MOUTH DAILY   Quantity:  90 tablet   Refills:  1       VITAMIN D3 PO        Dose:  1 tablet   Take 1 tablet by mouth daily Pt unsure of strength   Refills:  0       warfarin 2 MG tablet   Commonly known as:  COUMADIN   Used for:  Chronic anticoagulation        TAKE 2 TABLETS(4 MG) BY MOUTH ONCE DAILY   Quantity:  60 tablet   Refills:  0            Where to get your medicines      Some of these will need a paper prescription and others can be bought over the counter. Ask your nurse if you have questions.     Bring a paper prescription for each of these medications     lactobacillus rhamnosus (GG) capsule                Protect others around you: Learn how to safely use, store and throw away your medicines at www.disposemymeds.org.             Medication List: This is a list of all your medications and when to take them. Check marks below indicate your daily home schedule. Keep this list as a reference.      Medications           Morning Afternoon Evening Bedtime As Needed    acetaminophen 325 MG tablet   Commonly known as:  TYLENOL   Take 2 tablets (650 mg) by mouth every 6 hours as needed for mild pain   Last time this was given:  650 mg on 7/28/2017   8:53 AM                                * amoxicillin-clavulanate 875-125 MG per tablet   Commonly known as:  AUGMENTIN   Take 1 tablet by mouth 2 times daily for 10 days                                * amoxicillin-clavulanate 875-125 MG per tablet   Commonly known as:  AUGMENTIN   Take 1 tablet by mouth every 12 hours                                CALCIUM CARBONATE PO   Take 1 tablet by mouth 2 times daily Pt unsure of strength                                folic acid 1 MG tablet   Commonly known as:  FOLVITE   Take 1 tablet (1 mg) by mouth daily   Last time this was given:  1 mg on 7/30/2017  8:09 AM                                lactobacillus rhamnosus (GG) capsule   Take 1 capsule by mouth 3 times daily (before meals)   Last time this was given:  1 capsule on 7/30/2017 11:41 AM                                methotrexate 2.5 MG tablet CHEMO   Take 4 tablets (10 mg) by mouth once a week                                propranolol 20 MG tablet   Commonly known as:  INDERAL   TAKE 1 TABLET(20 MG) BY MOUTH DAILY   Last time this was given:  20 mg on 7/30/2017  8:09 AM                                VITAMIN D3 PO   Take 1 tablet by mouth daily Pt unsure of strength                                warfarin 2 MG tablet   Commonly known as:  COUMADIN   TAKE 2 TABLETS(4 MG) BY MOUTH ONCE DAILY   Last time this was given:  4 mg on 7/29/2017  6:04 PM                                * Notice:  This list has 2 medication(s) that are the same as other medications prescribed for you. Read the directions carefully, and ask your doctor or other care provider to review them with you.

## 2017-07-27 NOTE — IP AVS SNAPSHOT
[address] [address]    Phone:  [phone]                                       After Visit Summary   7/27/2017    Nury Cárdenas    MRN: [id number]           After Visit Summary Signature Page     I have received my discharge instructions, and my questions have been answered. I have discussed any challenges I see with this plan with the nurse or doctor.    ..........................................................................................................................................  Patient/Patient Representative Signature      ..........................................................................................................................................  Patient Representative Print Name and Relationship to Patient    ..................................................               ................................................  Date                                            Time    ..........................................................................................................................................  Reviewed by Signature/Title    ...................................................              ..............................................  Date                                                            Time

## 2017-07-27 NOTE — TELEPHONE ENCOUNTER
"Call placed to patient to follow up on [employer] message regarding the cat bite/scratches.  Patient states that she did  the antibiotic ordered by the provider this morning and took one tablet as directed.  She states that so far she has tolerated the medication without any issues.  She states that she has a sensitivity to the Augmentin, and \"wouldn't call it a allergy per say.\"      She reports redness at the site of the bite/scratches.  She has circled the area with a marker to monitor site changes.  Denies any drainage from area.  Denies swelling but notes some warmth at the site.      Patient wants to make certain it is okay to receive the Tetanus Vaccination at this time, and would like Dr. Maguire to confirm that this is okay to do.    Will route message to Dr. Maguire.    Reviewed with patient that this writer will update patient with any new recommendations or orders as instructed by Dr. Maguire.  Patient verbalized understanding and agreeable to plan moving forward.          "

## 2017-07-28 PROBLEM — L03.90 CELLULITIS: Status: ACTIVE | Noted: 2017-07-28

## 2017-07-28 LAB
BASOPHILS # BLD AUTO: 0 10E9/L (ref 0–0.2)
BASOPHILS NFR BLD AUTO: 0.7 %
CRP SERPL-MCNC: 29 MG/L (ref 0–8)
DIFFERENTIAL METHOD BLD: NORMAL
EOSINOPHIL # BLD AUTO: 0.1 10E9/L (ref 0–0.7)
EOSINOPHIL NFR BLD AUTO: 1 %
ERYTHROCYTE [DISTWIDTH] IN BLOOD BY AUTOMATED COUNT: 13.8 % (ref 10–15)
HCT VFR BLD AUTO: 39.2 % (ref 35–47)
HGB BLD-MCNC: 13.4 G/DL (ref 11.7–15.7)
IMM GRANULOCYTES # BLD: 0 10E9/L (ref 0–0.4)
IMM GRANULOCYTES NFR BLD: 0.2 %
INR PPP: 1.9 (ref 0.86–1.14)
LYMPHOCYTES # BLD AUTO: 0.8 10E9/L (ref 0.8–5.3)
LYMPHOCYTES NFR BLD AUTO: 13.9 %
MCH RBC QN AUTO: 31.7 PG (ref 26.5–33)
MCHC RBC AUTO-ENTMCNC: 34.2 G/DL (ref 31.5–36.5)
MCV RBC AUTO: 93 FL (ref 78–100)
MONOCYTES # BLD AUTO: 0.7 10E9/L (ref 0–1.3)
MONOCYTES NFR BLD AUTO: 11.6 %
NEUTROPHILS # BLD AUTO: 4.3 10E9/L (ref 1.6–8.3)
NEUTROPHILS NFR BLD AUTO: 72.6 %
NRBC # BLD AUTO: 0 10*3/UL
NRBC BLD AUTO-RTO: 0 /100
PLATELET # BLD AUTO: 164 10E9/L (ref 150–450)
RBC # BLD AUTO: 4.23 10E12/L (ref 3.8–5.2)
WBC # BLD AUTO: 5.9 10E9/L (ref 4–11)

## 2017-07-28 PROCEDURE — 25000128 H RX IP 250 OP 636: Performed by: STUDENT IN AN ORGANIZED HEALTH CARE EDUCATION/TRAINING PROGRAM

## 2017-07-28 PROCEDURE — 25000125 ZZHC RX 250: Performed by: STUDENT IN AN ORGANIZED HEALTH CARE EDUCATION/TRAINING PROGRAM

## 2017-07-28 PROCEDURE — 86140 C-REACTIVE PROTEIN: CPT | Performed by: STUDENT IN AN ORGANIZED HEALTH CARE EDUCATION/TRAINING PROGRAM

## 2017-07-28 PROCEDURE — 25000128 H RX IP 250 OP 636: Performed by: EMERGENCY MEDICINE

## 2017-07-28 PROCEDURE — 25000132 ZZH RX MED GY IP 250 OP 250 PS 637: Mod: GY | Performed by: FAMILY MEDICINE

## 2017-07-28 PROCEDURE — 90715 TDAP VACCINE 7 YRS/> IM: CPT | Performed by: FAMILY MEDICINE

## 2017-07-28 PROCEDURE — 36415 COLL VENOUS BLD VENIPUNCTURE: CPT | Performed by: STUDENT IN AN ORGANIZED HEALTH CARE EDUCATION/TRAINING PROGRAM

## 2017-07-28 PROCEDURE — 25000128 H RX IP 250 OP 636: Performed by: FAMILY MEDICINE

## 2017-07-28 PROCEDURE — 36415 COLL VENOUS BLD VENIPUNCTURE: CPT | Performed by: FAMILY MEDICINE

## 2017-07-28 PROCEDURE — 25000132 ZZH RX MED GY IP 250 OP 250 PS 637: Mod: GY

## 2017-07-28 PROCEDURE — A9270 NON-COVERED ITEM OR SERVICE: HCPCS | Mod: GY

## 2017-07-28 PROCEDURE — 85025 COMPLETE CBC W/AUTO DIFF WBC: CPT | Performed by: STUDENT IN AN ORGANIZED HEALTH CARE EDUCATION/TRAINING PROGRAM

## 2017-07-28 PROCEDURE — 40000141 ZZH STATISTIC PERIPHERAL IV START W/O US GUIDANCE

## 2017-07-28 PROCEDURE — A9270 NON-COVERED ITEM OR SERVICE: HCPCS | Mod: GY | Performed by: STUDENT IN AN ORGANIZED HEALTH CARE EDUCATION/TRAINING PROGRAM

## 2017-07-28 PROCEDURE — 12000001 ZZH R&B MED SURG/OB UMMC

## 2017-07-28 PROCEDURE — 85610 PROTHROMBIN TIME: CPT | Performed by: FAMILY MEDICINE

## 2017-07-28 PROCEDURE — 25000132 ZZH RX MED GY IP 250 OP 250 PS 637: Mod: GY | Performed by: STUDENT IN AN ORGANIZED HEALTH CARE EDUCATION/TRAINING PROGRAM

## 2017-07-28 PROCEDURE — A9270 NON-COVERED ITEM OR SERVICE: HCPCS | Mod: GY | Performed by: FAMILY MEDICINE

## 2017-07-28 RX ORDER — WARFARIN SODIUM 4 MG/1
4 TABLET ORAL ONCE
Status: COMPLETED | OUTPATIENT
Start: 2017-07-28 | End: 2017-07-28

## 2017-07-28 RX ORDER — NALOXONE HYDROCHLORIDE 0.4 MG/ML
.1-.4 INJECTION, SOLUTION INTRAMUSCULAR; INTRAVENOUS; SUBCUTANEOUS
Status: DISCONTINUED | OUTPATIENT
Start: 2017-07-28 | End: 2017-07-30 | Stop reason: HOSPADM

## 2017-07-28 RX ORDER — WARFARIN SODIUM 4 MG/1
4 TABLET ORAL
Status: COMPLETED | OUTPATIENT
Start: 2017-07-28 | End: 2017-07-28

## 2017-07-28 RX ORDER — LIDOCAINE 40 MG/G
CREAM TOPICAL
Status: DISCONTINUED | OUTPATIENT
Start: 2017-07-28 | End: 2017-07-30 | Stop reason: HOSPADM

## 2017-07-28 RX ORDER — AMPICILLIN AND SULBACTAM 2; 1 G/1; G/1
3 INJECTION, POWDER, FOR SOLUTION INTRAMUSCULAR; INTRAVENOUS EVERY 6 HOURS
Status: COMPLETED | OUTPATIENT
Start: 2017-07-28 | End: 2017-07-30

## 2017-07-28 RX ORDER — LACTOBACILLUS RHAMNOSUS GG 10B CELL
1 CAPSULE ORAL
Status: DISCONTINUED | OUTPATIENT
Start: 2017-07-28 | End: 2017-07-30 | Stop reason: HOSPADM

## 2017-07-28 RX ADMIN — Medication 1 CAPSULE: at 12:36

## 2017-07-28 RX ADMIN — Medication 1 TABLET: at 19:54

## 2017-07-28 RX ADMIN — Medication 1 CAPSULE: at 17:19

## 2017-07-28 RX ADMIN — WARFARIN SODIUM 4 MG: 4 TABLET ORAL at 19:54

## 2017-07-28 RX ADMIN — FOLIC ACID 1 MG: 1 TABLET ORAL at 08:52

## 2017-07-28 RX ADMIN — PIPERACILLIN AND TAZOBACTAM 3.38 G: 3; .375 INJECTION, POWDER, LYOPHILIZED, FOR SOLUTION INTRAVENOUS; PARENTERAL at 06:14

## 2017-07-28 RX ADMIN — CLOSTRIDIUM TETANI TOXOID ANTIGEN (FORMALDEHYDE INACTIVATED), CORYNEBACTERIUM DIPHTHERIAE TOXOID ANTIGEN (FORMALDEHYDE INACTIVATED), BORDETELLA PERTUSSIS TOXOID ANTIGEN (GLUTARALDEHYDE INACTIVATED), BORDETELLA PERTUSSIS FILAMENTOUS HEMAGGLUTININ ANTIGEN (FORMALDEHYDE INACTIVATED), BORDETELLA PERTUSSIS PERTACTIN ANTIGEN, AND BORDETELLA PERTUSSIS FIMBRIAE 2/3 ANTIGEN 0.5 ML: 5; 2; 2.5; 5; 3; 5 INJECTION, SUSPENSION INTRAMUSCULAR at 17:20

## 2017-07-28 RX ADMIN — VANCOMYCIN HYDROCHLORIDE 1000 MG: 1 INJECTION, SOLUTION INTRAVENOUS at 08:54

## 2017-07-28 RX ADMIN — ACETAMINOPHEN 650 MG: 325 TABLET, FILM COATED ORAL at 08:53

## 2017-07-28 RX ADMIN — WARFARIN SODIUM 4 MG: 4 TABLET ORAL at 05:04

## 2017-07-28 RX ADMIN — Medication 1 TABLET: at 08:52

## 2017-07-28 RX ADMIN — AMPICILLIN SODIUM AND SULBACTAM SODIUM 3 G: 2; 1 INJECTION, POWDER, FOR SOLUTION INTRAMUSCULAR; INTRAVENOUS at 12:31

## 2017-07-28 RX ADMIN — AMPICILLIN SODIUM AND SULBACTAM SODIUM 3 G: 2; 1 INJECTION, POWDER, FOR SOLUTION INTRAMUSCULAR; INTRAVENOUS at 18:20

## 2017-07-28 RX ADMIN — PIPERACILLIN AND TAZOBACTAM 3.38 G: 3; .375 INJECTION, POWDER, LYOPHILIZED, FOR SOLUTION INTRAVENOUS; PARENTERAL at 00:39

## 2017-07-28 RX ADMIN — PROPRANOLOL HYDROCHLORIDE 20 MG: 20 TABLET ORAL at 08:52

## 2017-07-28 NOTE — H&P
Attestation:  This patient has been seen and evaluated by me, Aamir Carroll on 7/28/2017.  I saw and discussed the case with the primary resident and the care team. I agree with the findings and plan in this note. I have reviewed today's vital signs, medications, laboratory results and imaging results.    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    Has been cat-sitting for neighbor, at ~11:30 AM yesterday, cat was under the bed and lunged out and clawed left leg, unsure if bitten.  Cat is indoor cat, family verified up to date on vaccinations.  She went to urgent care that night and was prescribed augmentin, did not  prescription until following (7/27) morning and took one dose at that time.  Over course of day left leg became more swollen and erythematous, having pain when walking around wound, not in joints.  No fever measured, did have chills.  No cough, no nausea/vomiting/diarrhea, no abdominal pain.  Able to move foot and all toes, no joint swelling or discomfort. No other symptoms reported.    Reports Wegener's involves lungs but no known kidney involvement.  MTX dosing is once a week on Thursdays.  Has never had infection or needed antibiotics while on MTX.    Old records were reviewed, and any additions to  pertinent history are noted above.          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  Patient Active Problem List   Diagnosis     GERD (gastroesophageal reflux disease)     Fibroids     Migraines     Hearing loss     Osteopenia     HYPERLIPIDEMIA LDL GOAL <160     Advanced directives, counseling/discussion     Inflammatory arthritis     Synovitis of wrist     Chronic constipation     Granulomatosis with polyangiitis (H)     Chronic steroid use     Dyspnea     PE (pulmonary embolism)     Cataract, mild, ou     Calculus of kidney, right     Chronic anticoagulation     Long-term (current) use of anticoagulants [Z79.01]     Long-term use of immunosuppressant medication     Primary osteoarthritis involving multiple joints         Plan:   ## Cellulitis 2/2 cat scratch/bite:  Unclear if actual bite, likely only scratch but will cover regardless.  Only received one dose of augementin, but significantly worsening and increased area of erythema and swelling warrant IV antibiotic treatment.  No involvement of joint spaces at this time, no necrosis at this time.  Patient otherwise stable with no signs of sepsis currently.  At risk given possible immunocomprimised status 2/2 MTX.  - Zosyn for empiric coverage including pasteurella  - Vancomycin for possible skin jah and given immunomprimised status  - Too early for 'cat scratch disease' 2/2 bartonella, no adenopathy noted  - Will monitor extent of erythema closely, demarcated in ED  - Confirm last tetanus vaccine (2007 in epic), administer as appropriate    ## Red Man Syndrome:  While in ED developed erythema of face and neck with pruritis.  No airway concerns.  No other rash or hives.  No history of significant reaction to antibiotics in past although does report vomiting and GI  upset to most antibiotics in past but no true allergy (reviewed records and as per patient).  - Stopped vancomycin  - Ranitidine 50mg IV  - Benadryl 50mg oral  - Will monitor closely, if resolves quickly as expected can restart vancomycin at slow rate  - Monitor airway and for systemic hives/rash if true allergy developing    ## History of PE/DVT  ## Wegener's  Diagnosed with Wegener's after presented with DVT and bilateral PE, reports no known kidney involvement.  - MTX dosage due on 7/28  - Will discuss with rheumatologist in AM before administering, may hold given acute infection  - Pharmacy to dose coumadin  - continue daily folic acid  - Had been on steroids in past, finished taper in April    ## Other Chronic:  Continue home medications  - Propranolol 20mg qam  - Calcium/Vit D    Daily cares -   F: oral  E: wnl  N: regular diet  Lines: PIV  Activity:-Up as tolerated  CODE:Full Code  Prophylaxis: on coumadin  PCP communication:  - Phil Abbott  - Contacted at admission: No  - Concerns or updates: none  Dispo: Expected discharge date 1-2 days depending on clinical course     Discussed with Dr. Weaver and will be examined by faculty, Dr. Carroll.         Past Medical History:     Past Medical History:   Diagnosis Date     Atypical pneumonia 6/14/2014     Coronary artery disease 1982    heart beat hard made me tired     Dyslipidemia      Fibroids      GERD (gastroesophageal reflux disease)      Granulomatosis with polyangiitis (Wegener's)      Hearing loss      Inflammatory arthritis     thumb     Migraines      MVP (mitral valve prolapse)     had an echo that was normal in 2007 she is on Inderal     Osteopenia      PE (pulmonary embolism) 10/2014    ? GPA associated, on warfarin      Synovitis of wrist 2009    right             Past Surgical History:     Past Surgical History:   Procedure Laterality Date     ABDOMEN SURGERY      appendix out     APPENDECTOMY       BIOPSY  1972    cone biopsy     CL AFF  SURGICAL PATHOLOGY      cone biopsy 1975     COLONOSCOPY       EYE SURGERY      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     HC ESOPHAGOSCOPY, DIAGNOSTIC       OPTICAL TRACKING SYSTEM BRONCHOSCOPY  6/19/2014    Procedure: OPTICAL TRACKING SYSTEM BRONCHOSCOPY;  Surgeon: Angel Kathleen MD;  Location:  GI     SURGICAL HISTORY OF -   as a child    strabismus repair right eye     SURGICAL HISTORY OF -   8-2009    right wrist debridement     TONSILLECTOMY      as a child     TONSILLECTOMY & ADENOIDECTOMY       TUBAL LIGATION               Social History:     Social History   Substance Use Topics     Smoking status: Former Smoker     Packs/day: 2.00     Years: 18.00     Types: Cigarettes     Quit date: 1/1/1982     Smokeless tobacco: Never Used     Alcohol use No      Social history confirmed           Family History:     Family History   Problem Relation Age of Onset     Respiratory Mother      emphysema     Aneurysm Mother      Chronic Obstructive Pulmonary Disease Mother      Thyroid Disease Mother      ,     OSTEOPOROSIS Mother      Other Cancer Father      CANCER Father      lung cancer     Arthritis Maternal Grandmother      rheumatoid     HEART DISEASE Maternal Grandmother      unsure of details     HEART DISEASE Maternal Grandfather      Neurologic Disorder Paternal Grandmother      migraines     Alzheimer Disease Paternal Grandmother      Unknown/Adopted Paternal Grandfather      CANCER Sister      stage 4 anal cancer age 62 years     Colon Cancer Sister      Cancer - colorectal Brother      Colon Cancer Brother      Substance Abuse Sister      Anesthesia Reaction Daughter      Family history reviewed and updated in [facility]            Immunizations:   Immunizations are current          Allergies:   Adhesive tape; Amoxicillin; Augmentin; Codeine; Erythromycin; Nickel; Sulfa drugs; Tegaderm alginate ag; and Zithromax [azithromycin dihydrate]          Medications:     Current Facility-Administered Medications on File  "Prior to Encounter:  sodium chloride (PF) 0.9% PF flush 60 mL     Current Outpatient Prescriptions on File Prior to Encounter:  amoxicillin-clavulanate (AUGMENTIN) 875-125 MG per tablet Take 1 tablet by mouth 2 times daily for 10 days   warfarin (COUMADIN) 2 MG tablet TAKE 2 TABLETS(4 MG) BY MOUTH ONCE DAILY   methotrexate 2.5 MG tablet CHEMO Take 4 tablets (10 mg) by mouth once a week   folic acid (FOLVITE) 1 MG tablet Take 1 tablet (1 mg) by mouth daily   propranolol (INDERAL) 20 MG tablet TAKE 1 TABLET(20 MG) BY MOUTH DAILY   fluticasone (FLONASE) 50 MCG/ACT spray Spray 2 sprays into both nostrils daily   predniSONE (DELTASONE) 1 MG tablet 1mg Po daily.   Cholecalciferol (VITAMIN D3 PO) Take by mouth daily   acetaminophen (TYLENOL) 325 MG tablet Take 2 tablets (650 mg) by mouth every 6 hours as needed for mild pain   calcium carbonate-vitamin D 500-400 MG-UNIT TABS tablt Take 1 tablet by mouth 2 times daily            Review of Systems:   CONSTITUTIONAL: + chills, no fatigue, no unexpected change in weight  SKIN: + wound left leg with spreading erythema and swelling, no other worrisome rashes or lesions  EYES: no acute vision problems or changes  ENT: no ear problems, no mouth problems, no throat problems  RESP: no significant cough, no shortness of breath  CV: no chest pain, no palpitations, no new or worsening peripheral edema  GI: no nausea, no vomiting, no constipation, no diarrhea  : no frequency, no dysuria, no hematuria  NEURO: no weakness, no dizziness, no headaches  ENDOCRINE: no temperature intolerance, no skin/hair changes  PSYCHIATRIC: NEGATIVE for changes in mood or trouble with sleep            Physical Exam:     Vitals were reviewed  Patient Vitals for the past 8 hrs:   BP Temp Temp src Pulse Resp SpO2 Height Weight   07/27/17 2156 142/70 97.4  F (36.3  C) Oral 85 16 97 % 1.638 m (5' 4.5\") 57.2 kg (126 lb)     Constitutional:   awake, alert, cooperative, no apparent distress, and appears stated " age   Eyes:   Lids and lashes normal, pupils equal, round and reactive to light, extra ocular muscles intact, sclera clear, conjunctiva normal   ENT:   Normocephalic, without obvious abnormality, atraumatic, sinuses nontender on palpation, external ears without lesions, oral pharynx with moist mucous membranes   Neck:   Supple, symmetrical, trachea midline, no adenopathy   Hematologic / Lymphatic:   no cervical lymphadenopathy   Back:   Symmetric, some mild kyphosis, spinous processes are non-tender on palpation, paraspinous muscles are non-tender on palpation, no costal vertebral tenderness   Lungs:   No increased work of breathing, good air exchange, clear to auscultation bilaterally, no crackles or wheezing   Cardiovascular:   Normal apical impulse, regular rate and rhythm, normal S1 and S2, no S3 or S4, and no murmur noted   Abdomen:   soft, non-distended, non-tender, no masses palpated, no hepatosplenomegally   Genitounirinary:   declined   Musculoskeletal:   Left foot with FROM, sensation intact, able to move all toes, strength intact.  There is no redness, warmth, or swelling of the joints.  Full range of motion noted.  Motor strength is 5 out of 5 all extremities bilaterally.  Tone is normal.   Neurologic:   speech normal, mental status intact, muscle tone normal, muscle strength normal, CN II-XII intact: face symmetric, facial sensation to light touch equal, symmetric cheek puff, eyebrow raise, palate elevation. PERRL, no nystagmus. EOMI. Tongue midline.   Psychiatric:   General: normal, calm and normal eye contact  Level of consciousness: alert / normal  Affect: normal  Orientation: oriented to self, place, time and situation   Skin:   Wound left anterior foreleg.  4 visible punctures with some sanguinous seepage, no purulent discharge.  Significantly erythematous around wounds with old marking of initial region by urgent care night prior.  Has spread significantly since, new area marked in ED.  Tender to  palpation, warm to touch, has spread 2/3 of way up shin, down to medial aspect of ankle.  Mild swelling.  No adenopathy appreciated at this time.            Data:     Results for orders placed or performed during the hospital encounter of 07/27/17 (from the past 24 hour(s))   CBC with platelets differential   Result Value Ref Range    WBC 7.5 4.0 - 11.0 10e9/L    RBC Count 4.31 3.8 - 5.2 10e12/L    Hemoglobin 13.7 11.7 - 15.7 g/dL    Hematocrit 40.0 35.0 - 47.0 %    MCV 93 78 - 100 fl    MCH 31.8 26.5 - 33.0 pg    MCHC 34.3 31.5 - 36.5 g/dL    RDW 13.7 10.0 - 15.0 %    Platelet Count 182 150 - 450 10e9/L    Diff Method Automated Method     % Neutrophils 70.3 %    % Lymphocytes 17.6 %    % Monocytes 11.0 %    % Eosinophils 0.5 %    % Basophils 0.3 %    % Immature Granulocytes 0.3 %    Nucleated RBCs 0 0 /100    Absolute Neutrophil 5.3 1.6 - 8.3 10e9/L    Absolute Lymphocytes 1.3 0.8 - 5.3 10e9/L    Absolute Monocytes 0.8 0.0 - 1.3 10e9/L    Absolute Eosinophils 0.0 0.0 - 0.7 10e9/L    Absolute Basophils 0.0 0.0 - 0.2 10e9/L    Abs Immature Granulocytes 0.0 0 - 0.4 10e9/L    Absolute Nucleated RBC 0.0    Basic metabolic panel   Result Value Ref Range    Sodium 141 133 - 144 mmol/L    Potassium 4.0 3.4 - 5.3 mmol/L    Chloride 106 94 - 109 mmol/L    Carbon Dioxide 27 20 - 32 mmol/L    Anion Gap 7 3 - 14 mmol/L    Glucose 118 (H) 70 - 99 mg/dL    Urea Nitrogen 9 7 - 30 mg/dL    Creatinine 0.69 0.52 - 1.04 mg/dL    GFR Estimate 83 >60 mL/min/1.7m2    GFR Estimate If Black >90   GFR Calc   >60 mL/min/1.7m2    Calcium 9.0 8.5 - 10.1 mg/dL   INR   Result Value Ref Range    INR 2.09 (H) 0.86 - 1.14   CRP inflammation   Result Value Ref Range    CRP Inflammation 24.0 (H) 0.0 - 8.0 mg/L      All cardiac studies reviewed by me.   All imaging studies reviewed by me.      Admitting Physician:Terence Villarreal MD        ADDENDUM TO HISTORY AND PHYSICAL    Subjective:  Patient doing well overnight.  She says that he  "redness has improved on her left anteromedial leg.  Pain is slightly better.  She denies drainage of pus.  No fevers or chills.  She is able to bear weight.  No parasthesias, cool extremities, worsening pain, spreading redness.    Objective:  /77 (BP Location: Left arm)  Pulse 94  Temp 99.1  F (37.3  C) (Oral)  Resp 18  Ht 1.638 m (5' 4.5\")  Wt 57.2 kg (126 lb)  SpO2 95%  BMI 21.29 kg/m2  Gen:NAD, AAOx3  CVS:S1S2 present, no murmurs  Resp: Clear bilaterally  Ext: Dressing left distal leg and ankle.  Redness has decreased within the marked margins.  Dressing is clean/dry.  Mild tenderness with manipulation of dressing.  Antalgic gait.    Lab Results   Component Value Date    WBC 5.9 07/28/2017     Lab Results   Component Value Date    RBC 4.23 07/28/2017     Lab Results   Component Value Date    HGB 13.4 07/28/2017     Lab Results   Component Value Date    HCT 39.2 07/28/2017     Lab Results   Component Value Date    MCV 93 07/28/2017     Lab Results   Component Value Date    MCH 31.7 07/28/2017     Lab Results   Component Value Date    MCHC 34.2 07/28/2017     Lab Results   Component Value Date    RDW 13.8 07/28/2017     Lab Results   Component Value Date     07/28/2017     CRP 29 from 24 yesterday  INR 2.09    Assessment:   72 year old female with a history of DVT/PE on coumadin, Min's granulomatosis on methotrexate admitted for LLE cellulitis without abscess secondary to cat scratch/bite, improving clinically on IV antibiotics    Plan:  ## LLE cellulitis secondary to cat scratch/bite  - De-escelate antibiotics from vancomycin/zosyn to unasyn.  Continue IV antibiotics for 24 to 48 hours and possible longer given that patient is immunosuppressed and clinical picture, then consider de-escelating based on labs/clinical picture  - Tetanus shot (last immunization 9.5 years ago)  - Discussed methotrexate with Dr. Maguire, patient's rheumatologist and agrees to holding weekly methotrexate (currently " weekly doses on fridays) while on antibiotics for active infection  - Cover wounds with adaptic and dry dressing    ## History or recurent DVT/PE on coumadin  - Continue coumadin, goal INR 2-3    ## Min's Granulomatosis, in remission  - Hold Methotrexate this week and next week; may restart once off antibiotics per patient's primary rheumatologist Dr. Maguire.    Estuardo Garcia MD  PGY-1 Resident  [facility]  [phone]

## 2017-07-28 NOTE — SUMMARY OF CARE
Pt. Arrived to  at 2215.  Came with Daughter.  Has cellulitis from a cat scratch on her Left lower leg.  Pt. Came with glasses, sweatshirt,shorts, shirt, shoes, a pair of silver earrings, 2 rings,dentures and a medical bracelet.  Her daughter took her purse home with her.  Pt. Has redmans syndrome from a vanco dose in the ED.  Vanco was stopped upon arrival to    Iv does not work anymore.  Vascular was called.  Needs to finish Vanco and then needs zosyn.  Benadryl was given.  Pt.has a wrap over her left lower leg.  Pt. Oriented to room.  Vitals stable.

## 2017-07-28 NOTE — ED NOTES
Redness to LLE outlined and wound covered with Vaseline gauze and kerlix per Dr. Jackson's request.

## 2017-07-28 NOTE — ED NOTES
Pt presents ambulatory to triage with c/o cat scratches/bite to left lower leg. Wound is red, tender and swollen. States the cat is up to date on shots. States she was started on Augmentin and has taken one dose this morning. Also reports she is on Coumadin.

## 2017-07-28 NOTE — PHARMACY-VANCOMYCIN DOSING SERVICE
Pharmacy Vancomycin Initial Note  Date of Service 2017  Patient's  1945  72 year old, female    Indication: Skin and Soft Tissue Infection    Current estimated CrCl = Estimated Creatinine Clearance: 66 mL/min (based on Cr of 0.69).    Creatinine for last 3 days  2017:  8:56 PM Creatinine 0.69 mg/dL    Recent Vancomycin Level(s) for last 3 days  No results found for requested labs within last 72 hours.      Vancomycin IV Administrations (past 72 hours)                   vancomycin (VANCOCIN) 1,250 mg in NaCl 0.9 % 250 mL intermittent infusion (mg) 1,250 mg New Bag 17                Nephrotoxins and other renal medications (Future)    Start     Dose/Rate Route Frequency Ordered Stop    17 0900  vancomycin (VANCOCIN) 1000 mg in dextrose 5% 200 mL PREMIX      1,000 mg Intravenous EVERY 12 HOURS 17 2030  vancomycin place riley - receiving intermittent dosing      1 each Does not apply SEE ADMIN INSTRUCTIONS 17  piperacillin-tazobactam (ZOSYN) 3.375 g vial to attach to  mL bag     Comments:  DO NOT USE THIS FIELD FOR ADMIN INSTRUCTIONS; INFORMATION DOES NOT SHOW ON MAR. USE THE FIELD ABOVE MARKED ADMIN INSTRUCTIONS    3.375 g  100 mL/hr over 1 Hours Intravenous ONCE 17            Contrast Orders - past 72 hours     None                Plan:  1.  Give vancomycin 1250mg iv x1 now, then continue with vancomycin 1g iv q12 hours  2.  Goal Trough Level: 10-15 mg/L   3.  Pharmacy will check trough levels as appropriate in 1-3 Days.    4. Serum creatinine levels will be ordered daily for the first week of therapy and at least twice weekly for subsequent weeks.    5. [place] method utilized to dose vancomycin therapy: Method 2    Han Samuel, pharmD

## 2017-07-28 NOTE — PHARMACY-ANTICOAGULATION SERVICE
Clinical Pharmacy - Warfarin Dosing Consult     Pharmacy has been consulted to manage this patient s warfarin therapy.  Indication: DVT/PE Prophylaxis  Therapy Goal: INR 2-2.5  Warfarin Prior to Admission: Yes  Warfarin PTA Regimen: 4 mg daily  Dose Comments: Wegener's with history of bilateral PE, Range 2-2.5 as pt briuses easily    INR   Date Value Ref Range Status   07/27/2017 2.09 (H) 0.86 - 1.14 Final     INR Protime   Date Value Ref Range Status   05/17/2017 1.9 (A) 0.86 - 1.14 Final       Recommend warfarin 4 mg today (Home dose). This is a late dose for 7/27 as per pt did not take a dose PTA on 7/27.  Pharmacy will monitor Nuryjoanne Cárdenas daily and order warfarin doses to achieve specified goal.      Please contact pharmacy as soon as possible if the warfarin needs to be held for a procedure or if the warfarin goals change.

## 2017-07-28 NOTE — PHARMACY-ADMISSION MEDICATION HISTORY
Admission medication history interview status for the 7/27/2017 admission is complete. See Epic admission navigator for allergy information, pharmacy, prior to admission medications and immunization status.     Medication history interview sources:  patient, chart review    Changes made to PTA medication list (reason)  Added: None  Deleted:   - fluticasone nasal spray (per patient, has not taken in over a year, did not like)  - prednisone (per patient, was tapered off in April, 2017; replaced by MTX)  Changed:   - calcium carb/vitamin D BID --> calcium carbonate BID, vitamin D QD (per patient, patient is unsure of strengths, but gets them OTC)    Additional medication history information (including reliability of information, actions taken by pharmacist):    1. Patient was an excellent historian.  2. WARFARIN INFORMATION: patient takes 4 mg daily in the evening; patient's goal INR is 2-2.5 due to patient susceptible to purpura.  3. MTX INFORMATION: patient is on 10 mg of MTX which she takes once weekly on Friday's.  4. Patient unsure of OTC strengths of calcium carbonate & vitamin D.  5. Patient's primary pharmacy is [facility] in [place] (on file).      Prior to Admission medications    Medication Sig Last Dose Taking? Auth Provider   CALCIUM CARBONATE PO Take by mouth 2 times daily 7/27/2017 Yes Unknown, Entered By History   amoxicillin-clavulanate (AUGMENTIN) 875-125 MG per tablet Take 1 tablet by mouth 2 times daily for 10 days 7/27/2017 at 0800 Yes Cindy Yun PA-C   warfarin (COUMADIN) 2 MG tablet TAKE 2 TABLETS(4 MG) BY MOUTH ONCE DAILY 7/26/2017 at 2000 Yes Phil Abbott MD   methotrexate 2.5 MG tablet CHEMO Take 4 tablets (10 mg) by mouth once a week 7/21/2017 at 0800 Yes Alvaro Maguire MD   folic acid (FOLVITE) 1 MG tablet Take 1 tablet (1 mg) by mouth daily 7/27/2017 at 0800 Yes Alvaro Maguire MD   propranolol (INDERAL) 20 MG tablet TAKE 1 TABLET(20 MG) BY MOUTH DAILY 7/27/2017 at 0800  Yes Phil Abbott MD   Cholecalciferol (VITAMIN D3 PO) Take by mouth daily 7/27/2017 at 1200 Yes Reported, Patient   acetaminophen (TYLENOL) 325 MG tablet Take 2 tablets (650 mg) by mouth every 6 hours as needed for mild pain 7/26/2017 at 1200 Yes Ari Trinidad MD         Medication history completed by: Shankar Espinal, PharmD Student

## 2017-07-28 NOTE — PLAN OF CARE
Problem: Goal Outcome Summary  Goal: Goal Outcome Summary  Outcome: No Change  D/I/A: Patient A & O X 4, VS afebrile. Denies pain received scheduled medications as ordered, and receiving IV abx ampicillin .  Appetite good. Void adequate. LLE cellulitis, dressing change. Call light in reach, continue with plan of care. Update MD with change.

## 2017-07-28 NOTE — PLAN OF CARE
Problem: Goal Outcome Summary  Goal: Goal Outcome Summary  Outcome: No Change  Patient denies pain. Up independently to bathroom. Sleeping in-between cares. Left leg kerlix wrapped. Receiving IV abx throughout the night as ordered. Will continue to monitor and follow plan of care.

## 2017-07-28 NOTE — ED PROVIDER NOTES
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 Patient has been watching her neighbor's cat since Monday, however the patient was away for 2 days and returned home yesterday. She states yesterday at around 11 AM, the cat was under the bed, jumped out and grabbed the patient's left lower leg and then pulled away. Patient doesn't believe the cat bit her however she notes that it all happened fairly quickly so she is not certain. The cat is up to date on immunizations.     Patient was seen in Urgent Care later that evening and  the wound was cleaned and marked. She was prescribed a course of Augmentin and instructed to return if her condition does not improve. Patient reports taking one dose of Augmentin this morning. She reports increased pain, swelling, and redness of the wound. She notes redness has spread outside of the marked area adn into her ankle. Patient denies fevers however she does notes intermittent chills. She has taken her dose of coumadin today.         Current Medications: Methotrexate, Coumadin, lnderal, vitamin D3, and folic acid     I have reviewed the Medications, Allergies, Past Medical and Surgical History, and Social History in the Epic system.      PAST MEDICAL HISTORY:   Past Medical History:   Diagnosis Date     Atypical pneumonia 6/14/2014     Coronary artery disease 1982    heart beat hard made me tired     Dyslipidemia      Fibroids      GERD (gastroesophageal reflux disease)      Granulomatosis with polyangiitis (Wegener's)      Hearing loss      Inflammatory arthritis     thumb     Migraines      MVP (mitral valve prolapse)     had an echo that was normal in 2007 she is on Inderal     Osteopenia      PE  (pulmonary embolism) 10/2014    ? GPA associated, on warfarin      Synovitis of wrist 2009    right       PAST SURGICAL HISTORY:   Past Surgical History:   Procedure Laterality Date     ABDOMEN SURGERY      appendix out     APPENDECTOMY       BIOPSY  1972    cone biopsy     CL AFF SURGICAL PATHOLOGY      cone biopsy 1975     COLONOSCOPY       EYE SURGERY      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     HC ESOPHAGOSCOPY, DIAGNOSTIC       OPTICAL TRACKING SYSTEM BRONCHOSCOPY  6/19/2014    Procedure: OPTICAL TRACKING SYSTEM BRONCHOSCOPY;  Surgeon: Angel Kathelen MD;  Location:  GI     SURGICAL HISTORY OF -   as a child    strabismus repair right eye     SURGICAL HISTORY OF -   8-2009    right wrist debridement     TONSILLECTOMY      as a child     TONSILLECTOMY & ADENOIDECTOMY       TUBAL LIGATION         FAMILY HISTORY:   Family History   Problem Relation Age of Onset     Respiratory Mother      emphysema     Aneurysm Mother      Chronic Obstructive Pulmonary Disease Mother      Thyroid Disease Mother      ,     OSTEOPOROSIS Mother      Other Cancer Father      CANCER Father      lung cancer     Arthritis Maternal Grandmother      rheumatoid     HEART DISEASE Maternal Grandmother      unsure of details     HEART DISEASE Maternal Grandfather      Neurologic Disorder Paternal Grandmother      migraines     Alzheimer Disease Paternal Grandmother      Unknown/Adopted Paternal Grandfather      CANCER Sister      stage 4 anal cancer age 62 years     Colon Cancer Sister      Cancer - colorectal Brother      Colon Cancer Brother      Substance Abuse Sister      Anesthesia Reaction Daughter        SOCIAL HISTORY:   Social History   Substance Use Topics     Smoking status: Former Smoker     Packs/day: 2.00     Years: 18.00     Types: Cigarettes     Quit date: 1/1/1982     Smokeless tobacco: Never Used     Alcohol use No     Current Facility-Administered Medications   Medication     lidocaine 1 % 1 mL     lidocaine (LMX4) kit  "    sodium chloride (PF) 0.9% PF flush 3 mL     sodium chloride (PF) 0.9% PF flush 3 mL     piperacillin-tazobactam (ZOSYN) 3.375 g vial to attach to  mL bag     vancomycin place riley - receiving intermittent dosing     [START ON 7/28/2017] vancomycin (VANCOCIN) 1000 mg in dextrose 5% 200 mL PREMIX     Facility-Administered Medications Ordered in Other Encounters   Medication     sodium chloride (PF) 0.9% PF flush 60 mL        Allergies   Allergen Reactions     Adhesive Tape      Rash itches     Amoxicillin      vomiting     Augmentin      vomiting     Codeine      vomiting     Erythromycin      vomiting     Nickel      itches rash     Sulfa Drugs Itching     Tegaderm Alginate Ag      LW Other1: -ALL MEDICATIONS MAKE PATIENT VIOLENTLY ILL; ADHESIVE BANDAGES; NICKEL     Zithromax [Azithromycin Dihydrate]      Vomiting/ skin blisters       Review of Systems   Constitutional: Positive for chills.   HENT: Negative.    Respiratory: Negative for cough.    Gastrointestinal: Negative.    Skin: Positive for wound.   Neurological: Negative for numbness.   Hematological:        Anticoagulated   All other systems reviewed and are negative.         Physical Exam   BP: 113/63  Pulse: 89  Temp: 97.7  F (36.5  C)  Resp: 14  Height: 162.6 cm (5' 4\")  Weight: 57.6 kg (127 lb)  SpO2: 95 %    Physical Exam   Gen:A&Ox3, no acute distress  HEENT:PERRL, no facial tenderness or wounds, head atraumatic  CV:RRR without murmurs  PULM:Clear to auscultation bilaterally  Abd:soft, nontender, nondistended. Bowel sounds present and normal  UE:No traumatic injuries, skin normal  LE: right leg atraumatic. Left leg with + DP and PT pulses. Capillary refill 1 second. Cluster of puncture wounds on the inner aspect of the calf. No bleeding. Surrounding erythema, soft tissue swelling and petechial rash with warmth.   Neuro:CN II-XII intact, strength 5/5 throughout, sensation to the left LE intact, gait stable.       ED Course     ED Course "     Procedures       8:10 PM  The patient was seen and examined by Dr. Jackson in Room 4.     Critical Care time:  none  Labs Ordered and Resulted from Time of ED Arrival Up to the Time of Departure from the ED   BASIC METABOLIC PANEL - Abnormal; Notable for the following:        Result Value    Glucose 118 (*)     All other components within normal limits   INR - Abnormal; Notable for the following:     INR 2.09 (*)     All other components within normal limits   CRP INFLAMMATION - Abnormal; Notable for the following:     CRP Inflammation 24.0 (*)     All other components within normal limits   CBC WITH PLATELETS DIFFERENTIAL   ERYTHROCYTE SEDIMENTATION RATE AUTO   PERIPHERAL IV CATHETER            Assessments & Plan (with Medical Decision Making)   73 yo F with a hx of Wegener's granulomatosis, anticoagulated on coumadin for PE/DVT and immunosuppressed.   Here with left lower leg cellulitis after cat bite/scratch. Associated with chills, no fevers.  Vitals stable.  IV access obtained.   CBC and BMP unremarkable. INR therapeutic at 2.09.   Sed rate normal. CRP moderately elevated at 24.   Given pt's failure of outpatient abx, and immunosuppressed status pt was started on broad abx with vancomycin and zosyn.   Discussed with the IM triage hospitalist, Dr. Keith, and admitted to the IM service.       I have reviewed the nursing notes.    I have reviewed the findings, diagnosis, plan and need for follow up with the patient.    Current Discharge Medication List          Final diagnoses:   Cellulitis of right lower extremity     IHerson , am serving as a trained medical scribe to document services personally performed by Elis Jackson MD, based on the provider's statements to me.   IElis MD, was physically present and have reviewed and verified the accuracy of this note documented by Herson Freeman.    7/27/2017   [facility], EMERGENCY DEPARTMENT    MD JOHN Bingham  Elis Gar MD  07/27/17 6039
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
## 2017-07-29 LAB
ANION GAP SERPL CALCULATED.3IONS-SCNC: 5 MMOL/L (ref 3–14)
BASOPHILS # BLD AUTO: 0 10E9/L (ref 0–0.2)
BASOPHILS NFR BLD AUTO: 0.5 %
BUN SERPL-MCNC: 7 MG/DL (ref 7–30)
CALCIUM SERPL-MCNC: 8.4 MG/DL (ref 8.5–10.1)
CHLORIDE SERPL-SCNC: 109 MMOL/L (ref 94–109)
CO2 SERPL-SCNC: 27 MMOL/L (ref 20–32)
CREAT SERPL-MCNC: 0.63 MG/DL (ref 0.52–1.04)
CRP SERPL-MCNC: 24 MG/L (ref 0–8)
DIFFERENTIAL METHOD BLD: NORMAL
EOSINOPHIL # BLD AUTO: 0.1 10E9/L (ref 0–0.7)
EOSINOPHIL NFR BLD AUTO: 1.7 %
ERYTHROCYTE [DISTWIDTH] IN BLOOD BY AUTOMATED COUNT: 13.7 % (ref 10–15)
GFR SERPL CREATININE-BSD FRML MDRD: ABNORMAL ML/MIN/1.7M2
GLUCOSE SERPL-MCNC: 98 MG/DL (ref 70–99)
HCT VFR BLD AUTO: 38.6 % (ref 35–47)
HGB BLD-MCNC: 13.2 G/DL (ref 11.7–15.7)
IMM GRANULOCYTES # BLD: 0 10E9/L (ref 0–0.4)
IMM GRANULOCYTES NFR BLD: 0.2 %
INR PPP: 2.07 (ref 0.86–1.14)
LYMPHOCYTES # BLD AUTO: 0.9 10E9/L (ref 0.8–5.3)
LYMPHOCYTES NFR BLD AUTO: 14.6 %
MAGNESIUM SERPL-MCNC: 2.2 MG/DL (ref 1.6–2.3)
MCH RBC QN AUTO: 32 PG (ref 26.5–33)
MCHC RBC AUTO-ENTMCNC: 34.2 G/DL (ref 31.5–36.5)
MCV RBC AUTO: 94 FL (ref 78–100)
MONOCYTES # BLD AUTO: 0.6 10E9/L (ref 0–1.3)
MONOCYTES NFR BLD AUTO: 9.6 %
NEUTROPHILS # BLD AUTO: 4.4 10E9/L (ref 1.6–8.3)
NEUTROPHILS NFR BLD AUTO: 73.4 %
NRBC # BLD AUTO: 0 10*3/UL
NRBC BLD AUTO-RTO: 0 /100
PHOSPHATE SERPL-MCNC: 2.7 MG/DL (ref 2.5–4.5)
PLATELET # BLD AUTO: 163 10E9/L (ref 150–450)
POTASSIUM SERPL-SCNC: 3.6 MMOL/L (ref 3.4–5.3)
RBC # BLD AUTO: 4.13 10E12/L (ref 3.8–5.2)
SODIUM SERPL-SCNC: 140 MMOL/L (ref 133–144)
WBC # BLD AUTO: 6 10E9/L (ref 4–11)

## 2017-07-29 PROCEDURE — 25000132 ZZH RX MED GY IP 250 OP 250 PS 637: Mod: GY | Performed by: STUDENT IN AN ORGANIZED HEALTH CARE EDUCATION/TRAINING PROGRAM

## 2017-07-29 PROCEDURE — 85610 PROTHROMBIN TIME: CPT | Performed by: STUDENT IN AN ORGANIZED HEALTH CARE EDUCATION/TRAINING PROGRAM

## 2017-07-29 PROCEDURE — A9270 NON-COVERED ITEM OR SERVICE: HCPCS | Mod: GY | Performed by: STUDENT IN AN ORGANIZED HEALTH CARE EDUCATION/TRAINING PROGRAM

## 2017-07-29 PROCEDURE — 40000893 ZZH STATISTIC PT IP EVAL DEFER

## 2017-07-29 PROCEDURE — 36415 COLL VENOUS BLD VENIPUNCTURE: CPT | Performed by: STUDENT IN AN ORGANIZED HEALTH CARE EDUCATION/TRAINING PROGRAM

## 2017-07-29 PROCEDURE — 85025 COMPLETE CBC W/AUTO DIFF WBC: CPT | Performed by: STUDENT IN AN ORGANIZED HEALTH CARE EDUCATION/TRAINING PROGRAM

## 2017-07-29 PROCEDURE — 83735 ASSAY OF MAGNESIUM: CPT | Performed by: STUDENT IN AN ORGANIZED HEALTH CARE EDUCATION/TRAINING PROGRAM

## 2017-07-29 PROCEDURE — 25000125 ZZHC RX 250: Performed by: STUDENT IN AN ORGANIZED HEALTH CARE EDUCATION/TRAINING PROGRAM

## 2017-07-29 PROCEDURE — 84100 ASSAY OF PHOSPHORUS: CPT | Performed by: STUDENT IN AN ORGANIZED HEALTH CARE EDUCATION/TRAINING PROGRAM

## 2017-07-29 PROCEDURE — A9270 NON-COVERED ITEM OR SERVICE: HCPCS | Mod: GY | Performed by: FAMILY MEDICINE

## 2017-07-29 PROCEDURE — 12000001 ZZH R&B MED SURG/OB UMMC

## 2017-07-29 PROCEDURE — 40000982 ZZH STATISTICAL HAIKU-CANTO PHOTO

## 2017-07-29 PROCEDURE — 25000132 ZZH RX MED GY IP 250 OP 250 PS 637: Mod: GY | Performed by: FAMILY MEDICINE

## 2017-07-29 PROCEDURE — 86140 C-REACTIVE PROTEIN: CPT | Performed by: STUDENT IN AN ORGANIZED HEALTH CARE EDUCATION/TRAINING PROGRAM

## 2017-07-29 PROCEDURE — 80048 BASIC METABOLIC PNL TOTAL CA: CPT | Performed by: STUDENT IN AN ORGANIZED HEALTH CARE EDUCATION/TRAINING PROGRAM

## 2017-07-29 RX ORDER — WARFARIN SODIUM 4 MG/1
4 TABLET ORAL
Status: COMPLETED | OUTPATIENT
Start: 2017-07-29 | End: 2017-07-29

## 2017-07-29 RX ORDER — LACTOBACILLUS RHAMNOSUS GG 10B CELL
1 CAPSULE ORAL
Qty: 30 CAPSULE | Refills: 0 | Status: SHIPPED | OUTPATIENT
Start: 2017-07-29 | End: 2017-07-31

## 2017-07-29 RX ADMIN — WARFARIN SODIUM 4 MG: 4 TABLET ORAL at 18:04

## 2017-07-29 RX ADMIN — FOLIC ACID 1 MG: 1 TABLET ORAL at 08:33

## 2017-07-29 RX ADMIN — AMPICILLIN SODIUM AND SULBACTAM SODIUM 3 G: 2; 1 INJECTION, POWDER, FOR SOLUTION INTRAMUSCULAR; INTRAVENOUS at 18:04

## 2017-07-29 RX ADMIN — Medication 1 CAPSULE: at 08:33

## 2017-07-29 RX ADMIN — AMPICILLIN SODIUM AND SULBACTAM SODIUM 3 G: 2; 1 INJECTION, POWDER, FOR SOLUTION INTRAMUSCULAR; INTRAVENOUS at 00:17

## 2017-07-29 RX ADMIN — PROPRANOLOL HYDROCHLORIDE 20 MG: 20 TABLET ORAL at 08:33

## 2017-07-29 RX ADMIN — Medication 1 CAPSULE: at 18:04

## 2017-07-29 RX ADMIN — Medication 1 TABLET: at 08:33

## 2017-07-29 RX ADMIN — Medication 1 TABLET: at 20:47

## 2017-07-29 RX ADMIN — AMPICILLIN SODIUM AND SULBACTAM SODIUM 3 G: 2; 1 INJECTION, POWDER, FOR SOLUTION INTRAMUSCULAR; INTRAVENOUS at 11:58

## 2017-07-29 RX ADMIN — AMPICILLIN SODIUM AND SULBACTAM SODIUM 3 G: 2; 1 INJECTION, POWDER, FOR SOLUTION INTRAMUSCULAR; INTRAVENOUS at 23:34

## 2017-07-29 RX ADMIN — Medication 1 CAPSULE: at 11:58

## 2017-07-29 RX ADMIN — AMPICILLIN SODIUM AND SULBACTAM SODIUM 3 G: 2; 1 INJECTION, POWDER, FOR SOLUTION INTRAMUSCULAR; INTRAVENOUS at 05:35

## 2017-07-29 NOTE — PLAN OF CARE
Problem: Goal Outcome Summary  Goal: Goal Outcome Summary  OT/5B: DEFER-  Per chart review and discussion with PT, pt with no acute OT needs. Will defer evaluation and complete orders.

## 2017-07-29 NOTE — PROGRESS NOTES
[facility] - Inpatient daily progress note    Date of admission: 7/27/2017  Date of service: 7/29/2017.          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 admitted for LLE cellulitis without abscess secondary to cat scratch/bite, improving clinically on IV antibiotics  Patient Active Problem List   Diagnosis     GERD (gastroesophageal reflux disease)     Fibroids     Migraines     Hearing loss     Osteopenia     HYPERLIPIDEMIA LDL GOAL <160     Advanced directives, counseling/discussion     Inflammatory arthritis     Synovitis of wrist     Chronic constipation     Granulomatosis with polyangiitis (H)     Chronic steroid use     Dyspnea     PE (pulmonary embolism)     Cataract, mild, ou     Calculus of kidney, right     Chronic anticoagulation     Long-term (current) use of anticoagulants [Z79.01]     Long-term use of immunosuppressant medication     Primary osteoarthritis involving multiple joints     Cellulitis      Plan:   ## LLE cellulitis secondary to cat scratch/bite- Better- Less swelling/afebrile/less redness.   - Continue IV unasyn for 1 more day (48 hours total).  If improvement continues at that point will transition to PO augmentin and possible discharge home with outpatient follow up  - Tetanus shot (last immunization 9.5 years ago) given 7/28/2017  - Discussed methotrexate with Dr. Maguire, patient's rheumatologist and agrees to holding weekly methotrexate (currently weekly doses on fridays) while on antibiotics for active infection  - Cover wounds with adaptic and dry dressing until skin heals     ## History or recurent DVT/PE on coumadin, chronic, stable.   - Continue coumadin, goal INR 2-3     ## Min's Granulomatosis, in remission  - Hold Methotrexate this week and next week; may restart once off antibiotics per patient's primary rheumatologist   Ting.      Fluids:IVL  Electrolytes:WNL  Nutrition:Regular diet  Lines:PIV  Activity: -Up as tolerated  CODE: Full Code  Prophylaxis: Lovenox, mechanical  PCP communication:  - Phil Abbott  - Contacted at admission: No  - Concerns or updates: N/A  Dispo: Expected Discharge Date: 07/30/17               Interval History:   Nury Cárdenas is doing well overnight.  No acute overnight events.  Swelling, redness, pain improving.  No fevers or chills.  No drainage of pus.  OOB and ambulating.  No chest pain, shortness of breath            Review of Systems:   CONSTITUTIONAL: no fatigue, no unexpected change in weight  SKIN: ERYTHEMA, SWELLING, PUNCTURE WOUNDS WITH SEROSANGUINEOUS DRAINGE LEFT ANTEROMEDIAL LEG  EYES: no acute vision problems or changes  ENT: no ear problems, no mouth problems, no throat problems  RESP: no significant cough, no shortness of breath  CV: no chest pain, no palpitations, no new or worsening peripheral edema  GI: no nausea, no vomiting, no constipation, no diarrhea  : no frequency, no dysuria, no hematuria  NEURO: no weakness, no dizziness, no headaches  ENDOCRINE: no temperature intolerance, no skin/hair changes  PSYCHIATRIC: NEGATIVE for changes in mood or trouble with sleep  Review of Systems       Physical Exam (Resident / Clinician):     Vitals were reviewed  Patient Vitals for the past 12 hrs:   BP Temp Temp src Pulse Resp SpO2   07/29/17 0624 132/65 96.9  F (36.1  C) Oral 84 18 95 %   07/28/17 2300 115/62 97.4  F (36.3  C) Oral 85 18 94 %     I/O last 3 completed shifts:  In: 1020 [P.O.:720; I.V.:300]  Out: -     Physical Exam   Gen:NAD, AAOx3  CVS:S1S2 present, no murmurs  Resp: Clear bilaterally    Ext: Dressing left distal leg and ankle.  Redness and swelling significantly improved from yesterday. Dressing is clean/dry.  Mild tenderness with manipulation of dressing.  Antalgic gait.    Hematologic / Lymphatic:   no inguinal lymphadenopathy     Intake/Output Summary  (Last 24 hours) at 07/29/17 0715  Last data filed at 07/29/17 0537   Gross per 24 hour   Intake             1020 ml   Output                0 ml   Net             1020 ml           Data:   ROUTINE LABS (Last four results)       Data:  All laboratory data reviewed  Lab Results   Component Value Date    WBC 6.0 07/29/2017    HGB 13.2 07/29/2017    HCT 38.6 07/29/2017     07/29/2017     07/29/2017    POTASSIUM 3.6 07/29/2017    CHLORIDE 109 07/29/2017    CO2 27 07/29/2017    BUN 7 07/29/2017    CR 0.63 07/29/2017    GLC 98 07/29/2017    SED 8 07/27/2017    NTBNPI 671 06/14/2014    TROPI  10/28/2014     <0.015  The 99th percentile for upper reference range is 0.045 ug/L.  Troponin values in   the range of 0.045 - 0.120 ug/L may be associated with risks of adverse   clinical events.   Effective 7/30/2014, the reference range for this assay has changed to reflect   new instrumentation/methodology.      AST 23 06/09/2017    ALT 29 06/09/2017    ALKPHOS 76 05/14/2015    BILITOTAL 0.4 05/14/2015    INR 2.07 (H) 07/29/2017         Medications:   Current Facility-Administered Medications   Medication     naloxone (NARCAN) injection 0.1-0.4 mg     lidocaine 1 % 1 mL     lidocaine (LMX4) kit     sodium chloride (PF) 0.9% PF flush 3 mL     sodium chloride (PF) 0.9% PF flush 3 mL     Warfarin Therapy Reminder (Check START DATE - warfarin may be starting in the FUTURE)     ampicillin-sulbactam (UNASYN) 3 g vial to attach to  mL bag     lactobacillus rhamnosus (GG) (CULTURELL) capsule 1 capsule     acetaminophen (TYLENOL) tablet 650 mg     calcium-vitamin D (CALTRATE) 600-400 MG-UNIT per tablet 1 tablet     folic acid (FOLVITE) tablet 1 mg     propranolol (INDERAL) tablet 20 mg     Facility-Administered Medications Ordered in Other Encounters   Medication     sodium chloride (PF) 0.9% PF flush 60 mL       Caring Physician: Estuardo Garcia MD  [facility]  Pager Contact: see Physician sticky  note  -----------------------------  Physician Attestation   I, Sergio Suero, saw and evaluated this patient.  I discussed the patient with the resident and agree with plan of care as documented in the resident note.      I personally reviewed vital signs, medications and labs.    I personally spent 25 minutes on daily care activities.    Sergio Suero  Date of Service (when I saw the patient): 07/29/17

## 2017-07-29 NOTE — PLAN OF CARE
Problem: Goal Outcome Summary  Goal: Goal Outcome Summary  Outcome: Improving  Pt alert and oriented x 4. VSS, afebrile this shift. Administered IV abx as ordered. LLE dressing placed on day shift still clean, dry, intact. Continue to monitor right arm rash (former IV site), patient describes itching feeling getting worse. May want to treat as possible vesicant extravasation if condition worsens. No c/o pain or nausea. Pt up independently to bathroom, sleeping between cares. Continue plan of care, d/c within 1-2 days possible per provider notes.

## 2017-07-29 NOTE — PLAN OF CARE
Care from [id number]    Pt alert and oriented x 4. VSS. Began coumadin as ordered. Pt resting comfortably between cares. Will continue plan of care.

## 2017-07-29 NOTE — PLAN OF CARE
Problem: Goal Outcome Summary  Goal: Goal Outcome Summary  PT 5B: PT orders received and appreciated. Patient demonstrates independence with bed mobility, transfers, and gait ~200 feet. Presents with antalgic gait pattern favoring L LE, however, patient refusing to use cane or walker to ease gait. PT to complete orders as patient with no acute PT needs.

## 2017-07-30 VITALS
OXYGEN SATURATION: 93 % | BODY MASS INDEX: 20.88 KG/M2 | WEIGHT: 125.3 LBS | TEMPERATURE: 97.6 F | DIASTOLIC BLOOD PRESSURE: 64 MMHG | HEIGHT: 65 IN | SYSTOLIC BLOOD PRESSURE: 124 MMHG | RESPIRATION RATE: 16 BRPM | HEART RATE: 81 BPM

## 2017-07-30 LAB
ANION GAP SERPL CALCULATED.3IONS-SCNC: 7 MMOL/L (ref 3–14)
BASOPHILS # BLD AUTO: 0 10E9/L (ref 0–0.2)
BASOPHILS NFR BLD AUTO: 0.4 %
BUN SERPL-MCNC: 7 MG/DL (ref 7–30)
CALCIUM SERPL-MCNC: 9.2 MG/DL (ref 8.5–10.1)
CHLORIDE SERPL-SCNC: 104 MMOL/L (ref 94–109)
CO2 SERPL-SCNC: 28 MMOL/L (ref 20–32)
CREAT SERPL-MCNC: 0.7 MG/DL (ref 0.52–1.04)
CRP SERPL-MCNC: 16 MG/L (ref 0–8)
DIFFERENTIAL METHOD BLD: NORMAL
EOSINOPHIL # BLD AUTO: 0.1 10E9/L (ref 0–0.7)
EOSINOPHIL NFR BLD AUTO: 2.1 %
ERYTHROCYTE [DISTWIDTH] IN BLOOD BY AUTOMATED COUNT: 13.6 % (ref 10–15)
GFR SERPL CREATININE-BSD FRML MDRD: 82 ML/MIN/1.7M2
GLUCOSE SERPL-MCNC: 97 MG/DL (ref 70–99)
HCT VFR BLD AUTO: 41.1 % (ref 35–47)
HGB BLD-MCNC: 14 G/DL (ref 11.7–15.7)
IMM GRANULOCYTES # BLD: 0 10E9/L (ref 0–0.4)
IMM GRANULOCYTES NFR BLD: 0 %
INR PPP: 1.66 (ref 0.86–1.14)
LYMPHOCYTES # BLD AUTO: 0.9 10E9/L (ref 0.8–5.3)
LYMPHOCYTES NFR BLD AUTO: 19.5 %
MAGNESIUM SERPL-MCNC: 2.2 MG/DL (ref 1.6–2.3)
MCH RBC QN AUTO: 31.8 PG (ref 26.5–33)
MCHC RBC AUTO-ENTMCNC: 34.1 G/DL (ref 31.5–36.5)
MCV RBC AUTO: 93 FL (ref 78–100)
MONOCYTES # BLD AUTO: 0.5 10E9/L (ref 0–1.3)
MONOCYTES NFR BLD AUTO: 10.8 %
NEUTROPHILS # BLD AUTO: 3.2 10E9/L (ref 1.6–8.3)
NEUTROPHILS NFR BLD AUTO: 67.2 %
NRBC # BLD AUTO: 0 10*3/UL
NRBC BLD AUTO-RTO: 0 /100
PHOSPHATE SERPL-MCNC: 3.5 MG/DL (ref 2.5–4.5)
PLATELET # BLD AUTO: 185 10E9/L (ref 150–450)
POTASSIUM SERPL-SCNC: 3.6 MMOL/L (ref 3.4–5.3)
RBC # BLD AUTO: 4.4 10E12/L (ref 3.8–5.2)
SODIUM SERPL-SCNC: 140 MMOL/L (ref 133–144)
WBC # BLD AUTO: 4.8 10E9/L (ref 4–11)

## 2017-07-30 PROCEDURE — 80048 BASIC METABOLIC PNL TOTAL CA: CPT | Performed by: STUDENT IN AN ORGANIZED HEALTH CARE EDUCATION/TRAINING PROGRAM

## 2017-07-30 PROCEDURE — 84100 ASSAY OF PHOSPHORUS: CPT | Performed by: STUDENT IN AN ORGANIZED HEALTH CARE EDUCATION/TRAINING PROGRAM

## 2017-07-30 PROCEDURE — 85025 COMPLETE CBC W/AUTO DIFF WBC: CPT | Performed by: STUDENT IN AN ORGANIZED HEALTH CARE EDUCATION/TRAINING PROGRAM

## 2017-07-30 PROCEDURE — 25000132 ZZH RX MED GY IP 250 OP 250 PS 637: Mod: GY | Performed by: STUDENT IN AN ORGANIZED HEALTH CARE EDUCATION/TRAINING PROGRAM

## 2017-07-30 PROCEDURE — 86140 C-REACTIVE PROTEIN: CPT | Performed by: STUDENT IN AN ORGANIZED HEALTH CARE EDUCATION/TRAINING PROGRAM

## 2017-07-30 PROCEDURE — A9270 NON-COVERED ITEM OR SERVICE: HCPCS | Mod: GY | Performed by: STUDENT IN AN ORGANIZED HEALTH CARE EDUCATION/TRAINING PROGRAM

## 2017-07-30 PROCEDURE — 36415 COLL VENOUS BLD VENIPUNCTURE: CPT | Performed by: STUDENT IN AN ORGANIZED HEALTH CARE EDUCATION/TRAINING PROGRAM

## 2017-07-30 PROCEDURE — 25000125 ZZHC RX 250: Performed by: FAMILY MEDICINE

## 2017-07-30 PROCEDURE — 25000125 ZZHC RX 250: Performed by: STUDENT IN AN ORGANIZED HEALTH CARE EDUCATION/TRAINING PROGRAM

## 2017-07-30 PROCEDURE — 83735 ASSAY OF MAGNESIUM: CPT | Performed by: STUDENT IN AN ORGANIZED HEALTH CARE EDUCATION/TRAINING PROGRAM

## 2017-07-30 PROCEDURE — 85610 PROTHROMBIN TIME: CPT | Performed by: STUDENT IN AN ORGANIZED HEALTH CARE EDUCATION/TRAINING PROGRAM

## 2017-07-30 RX ORDER — WARFARIN SODIUM 5 MG/1
5 TABLET ORAL
Status: DISCONTINUED | OUTPATIENT
Start: 2017-07-30 | End: 2017-07-30 | Stop reason: HOSPADM

## 2017-07-30 RX ADMIN — Medication 1 TABLET: at 08:09

## 2017-07-30 RX ADMIN — AMPICILLIN SODIUM AND SULBACTAM SODIUM 3 G: 2; 1 INJECTION, POWDER, FOR SOLUTION INTRAMUSCULAR; INTRAVENOUS at 05:28

## 2017-07-30 RX ADMIN — AMPICILLIN SODIUM AND SULBACTAM SODIUM 3 G: 2; 1 INJECTION, POWDER, FOR SOLUTION INTRAMUSCULAR; INTRAVENOUS at 11:41

## 2017-07-30 RX ADMIN — Medication 1 CAPSULE: at 08:09

## 2017-07-30 RX ADMIN — Medication 1 CAPSULE: at 11:41

## 2017-07-30 RX ADMIN — PROPRANOLOL HYDROCHLORIDE 20 MG: 20 TABLET ORAL at 08:09

## 2017-07-30 RX ADMIN — FOLIC ACID 1 MG: 1 TABLET ORAL at 08:09

## 2017-07-30 NOTE — PLAN OF CARE
Problem: Goal Outcome Summary  Goal: Goal Outcome Summary  Outcome: Improving  Up in halls independently.  Dressing changed this afternoon, family was concerned, thought it was more red, Dr. Suero notified and assessed wound.  Dressing replaced.  Tolerating regular diet.  No c/o pain.  Plan is to discharge to home tomorrow with oral abx, will need teaching on wound care.Vascular access care consult ordered x 2 for vanco extravasation from 7/28.  Cold applied x 2, said it helped.

## 2017-07-30 NOTE — PLAN OF CARE
Problem: Goal Outcome Summary  Goal: Goal Outcome Summary  Outcome: Improving  Pt alert and oriented x 4. VSS. Administered IV abx as ordered. Pt states leg wound is less sore. Dressing is clean, dry and intact. Due for change during day shift. No c/o pain or nausea this shift. Pt up independently to bathroom, voiding spontaneously. Possible d/c to home today per provider notes. Continue plan of care.

## 2017-07-30 NOTE — PROGRESS NOTES
After Visit Summary was reviewed with the patient and her daughter and was given to the patient for home reference. She signed the signature page and this was placed in the chart. Cellulitis wound cares was reviewed with the patient and her daughter and a 3 day supply of dressing change supplies was sent with the patient. She will  discharge scripts on the way home. Refer to AVS, notes, MAR, and doc flow sheets for other specifics. Patient left [address] ambulatory, accompanied by her daughter who will drive her home and assist her with dressing changes.

## 2017-07-30 NOTE — PLAN OF CARE
Problem: Individualization  Goal: Patient Preferences     Patient alert and oriented x 4 and cooperative with nursing cares and medications. Appetite good for breakfast. Peripheral IV at TKO for IV Unasyn that is currently running and is discontinued after this dose with oral Augmentin starting. Cellulitis wound was cleaned and re-dressed by the doctors on morning rounds. Patient denies pain, appetite good, lungs are clear bilaterally, and she has been up ambulating between interruptions with her daughter. Plan to discharge this afternoon per doctors. Refer to doc flow sheets, MAR, and notes for other specifics. Continue nursing cares per care plan. Inform doctors of changes or concerns.

## 2017-07-31 ENCOUNTER — TELEPHONE (OUTPATIENT)
Dept: FAMILY MEDICINE | Facility: CLINIC | Age: 72
End: 2017-07-31

## 2017-07-31 ENCOUNTER — CARE COORDINATION (OUTPATIENT)
Dept: CARE COORDINATION | Facility: CLINIC | Age: 72
End: 2017-07-31

## 2017-07-31 DIAGNOSIS — L02.419 CELLULITIS AND ABSCESS OF LEG: Primary | ICD-10-CM

## 2017-07-31 DIAGNOSIS — L03.119 CELLULITIS AND ABSCESS OF LEG: Primary | ICD-10-CM

## 2017-07-31 RX ORDER — LACTOBACILLUS RHAMNOSUS GG 10B CELL
1 CAPSULE ORAL
Qty: 30 CAPSULE | Refills: 0 | Status: SHIPPED | OUTPATIENT
Start: 2017-07-31 | End: 2017-09-15

## 2017-07-31 RX ORDER — DIPHENHYDRAMINE HCL/ZINC ACET 2 %-0.1 %
1 AEROSOL, SPRAY (GRAM) TOPICAL DAILY
Qty: 20 CAPSULE | Refills: 1 | Status: CANCELLED | OUTPATIENT
Start: 2017-07-31 | End: 2017-08-20

## 2017-07-31 RX ORDER — LACTOBACILLUS RHAMNOSUS GG 10B CELL
1 CAPSULE ORAL
Qty: 30 CAPSULE | Refills: 0 | Status: SHIPPED | OUTPATIENT
Start: 2017-07-31 | End: 2017-07-31

## 2017-07-31 RX ORDER — LACTOBACILLUS ACIDOPHILUS 500MM CELL
1 CAPSULE ORAL DAILY
Qty: 20 CAPSULE | Refills: 1 | Status: ON HOLD | OUTPATIENT
Start: 2017-07-31 | End: 2017-08-02

## 2017-07-31 NOTE — TELEPHONE ENCOUNTER
After Visit Summary   7/31/2017    Shakir Linton    MRN: [id number]           Patient Information     Date Of Birth          2007        Visit Information        Provider Department      7/31/2017 1:45 PM Rhona Rodriguez MD [facility]        Today's Diagnoses     Acute otitis media of right ear with perforated tympanic membrane           Follow-ups after your visit        Your next 10 appointments already scheduled     Sep 26, 2017  6:45 PM CDT   Well Child with Mariella Reynoso MD   [facility]    [address]  [facility] [id number] [phone]              Who to contact     If you have questions or need follow up information about today's clinic visit or your schedule please contact [facility] directly at [phone].  Normal or non-critical lab and imaging results will be communicated to you by [employer], letter or phone within 4 business days after the clinic has received the results. If you do not hear from us within 7 days, please contact the clinic through [employer] or phone. If you have a critical or abnormal lab result, we will notify you by phone as soon as possible.  Submit refill requests through [employer] or call your pharmacy and they will forward the refill request to us. Please allow 3 business days for your refill to be completed.          Additional Information About Your Visit        MyChart Information     [employer] lets you send messages to your doctor, view your test results, renew your prescriptions, schedule appointments and more. To sign up, go to [email or web address], contact your [place] clinic or call [phone] during business hours.            Care EveryWhere ID     This is your Care EveryWhere ID. This could be used by other organizations to access your [place] medical records  [id number]        Your Vitals Were     Pulse Temperature Height Pulse  Prescription approved per [facility] Refill Protocol.  Kirti Lacy, RN - BC       "Oximetry BMI (Body Mass Index)       72 97.4  F (36.3  C) (Oral) 4' 3\" (1.295 m) 100% 17.03 kg/m2        Blood Pressure from Last 3 Encounters:   07/31/17 112/64   07/10/17 (!) 132/91   07/06/17 124/68    Weight from Last 3 Encounters:   07/31/17 63 lb (28.6 kg) (10 %)*   07/10/17 63 lb 0.8 oz (28.6 kg) (11 %)*   07/06/17 61 lb 8 oz (27.9 kg) (8 %)*     * Growth percentiles are based on [facility] 2-20 Years data.              Today, you had the following     No orders found for display         Today's Medication Changes          These changes are accurate as of: 7/31/17 11:59 PM.  If you have any questions, ask your nurse or doctor.               Start taking these medicines.        Dose/Directions    cefPROZIL 250 MG/5ML suspension   Commonly known as:  CEFZIL   Used for:  Acute otitis media of right ear with perforated tympanic membrane   Started by:  Rhona Rodriguez MD        Dose:  15 mg/kg/day   Take 4.2 mLs (210 mg) by mouth 2 times daily   Quantity:  100 mL   Refills:  0       ofloxacin 0.3 % otic solution   Commonly known as:  FLOXIN OTIC   Used for:  Acute otitis media of right ear with perforated tympanic membrane   Started by:  Rhona Rodriguez MD        Dose:  5 drop   Place 5 drops into the right ear 2 times daily   Quantity:  5 mL   Refills:  1            Where to get your medicines      These medications were sent to [facility]/pharmacy #[id number] [facility] [address], [place] [address]     Phone:  [phone]     cefPROZIL 250 MG/5ML suspension    ofloxacin 0.3 % otic solution                Primary Care Provider Office Phone # Fax #    Mariella Reynoso [phone]       [address]  [place] [address]        Equal Access to Services     MAMTA BHATTI AH: Hadii gogo Conway, zion tran, qaybta kaalshantanu hilton. [facility] [phone].    ATENCIÓN: Si matt linda, " tiene a ferrera disposición servicios gratuitos de asistencia lingüística. Marti steinberg [phone].    We comply with applicable federal civil rights laws and [place] laws. We do not discriminate on the basis of race, color, national origin, age, disability sex, sexual orientation or gender identity.            Thank you!     Thank you for choosing [facility]  for your care. Our goal is always to provide you with excellent care. Hearing back from our patients is one way we can continue to improve our services. Please take a few minutes to complete the written survey that you may receive in the mail after your visit with us. Thank you!             Your Updated Medication List - Protect others around you: Learn how to safely use, store and throw away your medicines at www.disposemymeds.org.          This list is accurate as of: 7/31/17 11:59 PM.  Always use your most recent med list.                   Brand Name Dispense Instructions for use Diagnosis    * albuterol 108 (90 BASE) MCG/ACT Inhaler    PROAIR HFA/PROVENTIL HFA/VENTOLIN HFA    2 Inhaler    Inhale 2 puffs into the lungs every 4 hours as needed for shortness of breath / dyspnea or wheezing    Cough, Other eczema, Attention deficit hyperactivity disorder (ADHD), combined type       * albuterol (2.5 MG/3ML) 0.083% neb solution     30 vial    Take 1 vial (2.5 mg) by nebulization every 4 hours as needed    Mild intermittent asthma without complication       cefPROZIL 250 MG/5ML suspension    CEFZIL    100 mL    Take 4.2 mLs (210 mg) by mouth 2 times daily    Acute otitis media of right ear with perforated tympanic membrane       ofloxacin 0.3 % otic solution    FLOXIN OTIC    5 mL    Place 5 drops into the right ear 2 times daily    Acute otitis media of right ear with perforated tympanic membrane       * Notice:  This list has 2 medication(s) that are the same as other medications prescribed for you. Read the directions carefully, and ask your  doctor or other care provider to review them with you.

## 2017-07-31 NOTE — PROGRESS NOTES
"onversation: Hospital F/U   (Newest Message First)   Leila Richey RN        7/31/17 1:16 PM   Note      Hospital/TCU/ED for chronic condition Discharge Protocol     \"Hi, my name is Leila Richey, a registered nurse, and I am calling from [facility].  I am calling to follow up and see how things are going for you after your recent emergency visit/hospital/TCU stay.\"     Tell me how you are doing now that you are home?\" doing good, today is more painful for patient. Patient keeping leg elevated.         Discharge Instructions     \"Let's review your discharge instructions.  What is/are the follow-up recommendations?  Pt. Response: f/u with pcp in 1 week     \"Has an appointment with your primary care provider been scheduled?\"                        Yes. (confirm)     \"When you see the provider, I would recommend that you bring your medications with you.\"     Medications     \"Tell me what changed about your medicines when you discharged?\"                                              Changes to chronic meds?                                              0-1     \"What questions do you have about your medications?\"                                              None                           New diagnoses of heart failure, COPD, diabetes, or MI?                                              No     Medication reconciliation completed? Yes  Was MTM referral placed (*Make sure to put transitions as reason for referral)?                        No     Call Summary     \"What questions or concerns do you have about your recent visit and your follow-up care?\"                          none     \"If you have questions or things don't continue to improve, we encourage you contact us through the main clinic number (give number).  Even if the clinic is not open, triage nurses are available 24/7 to help you.      We would like you to know that our clinic has extended hours (provide information).  We also have urgent care (provide details on " "closest location and hours/contact info)\"        \"Thank you for your time and take care!\"         Leila Richey RN                 Patient already called by care coordinator. No further calls needed  "

## 2017-07-31 NOTE — PROGRESS NOTES
SUBJECTIVE:                                                    Nury Cárdenas is a 72 year old female who presents to clinic today for the following health issues:    Hospital Follow-up Visit:    Hospital/Nursing Home/IP Rehab Facility: [facility]  Date of Admission: 07/27/2017  Date of Discharge: 07/30/2017  Reason(s) for Admission: cat bit resulting in severe cellulitis            Problems taking medications regularly:  None       Medication changes since discharge: None       Problems adhering to non-medication therapy:  None    Summary of hospitalization:  [facility] discharge summary reviewed  Diagnostic Tests/Treatments reviewed.  Follow up needed: with PCP  Other Healthcare Providers Involved in Patient s Care:         None  Update since discharge: worsened.     Post Discharge Medication Reconciliation: unable to reconcile discharge medications due to returning to hospital for possible readmission..  Plan of care communicated with patient and family     Coding guidelines for this visit:  Type of Medical   Decision Making Face-to-Face Visit       within 7 Days of discharge Face-to-Face Visit        within 14 days of discharge   Moderate Complexity [id number]   High Complexity [id number]              PROBLEMS TO ADD ON...    Problem list and histories reviewed & adjusted, as indicated.  Additional history: as documented    Patient Active Problem List   Diagnosis     GERD (gastroesophageal reflux disease)     Fibroids     Migraines     Hearing loss     Osteopenia     HYPERLIPIDEMIA LDL GOAL <160     Advanced directives, counseling/discussion     Inflammatory arthritis     Synovitis of wrist     Chronic constipation     Granulomatosis with polyangiitis (H)     Chronic steroid use     Dyspnea     PE (pulmonary embolism)     Cataract, mild, ou     Calculus of kidney, right     Chronic anticoagulation     Long-term (current) use of anticoagulants [Z79.01]     Long-term use of  immunosuppressant medication     Primary osteoarthritis involving multiple joints     Cellulitis     Cat scratch left lower extremity     Past Surgical History:   Procedure Laterality Date     ABDOMEN SURGERY      appendix out     APPENDECTOMY       BIOPSY  1972    cone biopsy     CL AFF SURGICAL PATHOLOGY      cone biopsy 1975     COLONOSCOPY       EYE SURGERY      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     HC ESOPHAGOSCOPY, DIAGNOSTIC       OPTICAL TRACKING SYSTEM BRONCHOSCOPY  6/19/2014    Procedure: OPTICAL TRACKING SYSTEM BRONCHOSCOPY;  Surgeon: Angel Kathleen MD;  Location:  GI     SURGICAL HISTORY OF -   as a child    strabismus repair right eye     SURGICAL HISTORY OF -   8-2009    right wrist debridement     TONSILLECTOMY      as a child     TONSILLECTOMY & ADENOIDECTOMY       TUBAL LIGATION         Social History   Substance Use Topics     Smoking status: Former Smoker     Packs/day: 2.00     Years: 18.00     Types: Cigarettes     Start date: 1/1/1960     Quit date: 1/1/1982     Smokeless tobacco: Never Used     Alcohol use No     Family History   Problem Relation Age of Onset     Respiratory Mother      emphysema     Aneurysm Mother      Chronic Obstructive Pulmonary Disease Mother      Thyroid Disease Mother      ,     OSTEOPOROSIS Mother      Other Cancer Father      CANCER Father      lung cancer     Arthritis Maternal Grandmother      rheumatoid     HEART DISEASE Maternal Grandmother      unsure of details     HEART DISEASE Maternal Grandfather      Neurologic Disorder Paternal Grandmother      migraines     Alzheimer Disease Paternal Grandmother      Unknown/Adopted Paternal Grandfather      CANCER Sister      stage 4 anal cancer age 62 years     Colon Cancer Sister      Cancer - colorectal Brother      Colon Cancer Brother      Substance Abuse Sister      Anesthesia Reaction Daughter              Reviewed and updated as needed this visit by clinical staff       Reviewed and updated as needed  "this visit by Provider         ROS:  Constitutional, HEENT, cardiovascular, pulmonary, gi and gu systems are negative, except as otherwise noted.      OBJECTIVE:   /70  Pulse 85  Temp 97.9  F (36.6  C) (Oral)  Ht 5' 4.5\" (1.638 m)  Wt 126 lb 6.4 oz (57.3 kg)  SpO2 95%  BMI 21.36 kg/m2  Body mass index is 21.36 kg/(m^2).  GENERAL:frail, alert and no distress  RESP: lungs clear to auscultation - no rales, rhonchi or wheezes  CV: regular rate and rhythm, no murmur, click or rub  ABDOMEN: soft, nontender, no masses and bowel sounds normal  MS: Left lower extremity; erythema which was regressing has increased with increased sensitivity to area affected by cellulitis.  Diagnostic Test Results:  none     ASSESSMENT/PLAN:   (Z09) Hospital discharge follow-up  (primary encounter diagnosis)  Comment: Discharged after improvement on IV antibiotics; been on oral augmentin but cellulitis worsening. Given immunosuppressed status, discussed evaluation in the ER and might need admission for IV antibiotics.   Plan: Return to hospital    (L03.116) Cellulitis of left lower extremity: worsening on oral , Active  Comment: Worsening after discharge  Plan:     (M31.30) Granulomatosis with polyangiitis (H)  Comment: on immunosuppressive therapy    (Z79.899) Long-term use of immunosuppressant medication  Comment: for  Granulocytosis, markers for severe infection might not be obvious.     Phil Abbott MD  [facility]  "

## 2017-07-31 NOTE — TELEPHONE ENCOUNTER
This patient was discharged from [facility] on 07/30/2017.    Discharge Diagnosis: Cellulitis of left lower extremity, Mercado's Granulomatosis    A follow-up visit has been scheduled.  08/01/2017    Number of ED/ER visits in the last 12 months:  1     Please follow-up with patient.    Brandy Heard,

## 2017-07-31 NOTE — TELEPHONE ENCOUNTER
"Hospital/TCU/ED for chronic condition Discharge Protocol    \"Hi, my name is Leila Richey, a registered nurse, and I am calling from [facility].  I am calling to follow up and see how things are going for you after your recent emergency visit/hospital/TCU stay.\"    Tell me how you are doing now that you are home?\" doing good, today is more painful for patient. Patient keeping leg elevated.       Discharge Instructions    \"Let's review your discharge instructions.  What is/are the follow-up recommendations?  Pt. Response: f/u with pcp in 1 week    \"Has an appointment with your primary care provider been scheduled?\"   Yes. (confirm)    \"When you see the provider, I would recommend that you bring your medications with you.\"    Medications    \"Tell me what changed about your medicines when you discharged?\"    Changes to chronic meds?    0-1    \"What questions do you have about your medications?\"    None     New diagnoses of heart failure, COPD, diabetes, or MI?    No    Medication reconciliation completed? Yes  Was MTM referral placed (*Make sure to put transitions as reason for referral)?   No    Call Summary    \"What questions or concerns do you have about your recent visit and your follow-up care?\"     none    \"If you have questions or things don't continue to improve, we encourage you contact us through the main clinic number (give number).  Even if the clinic is not open, triage nurses are available 24/7 to help you.     We would like you to know that our clinic has extended hours (provide information).  We also have urgent care (provide details on closest location and hours/contact info)\"      \"Thank you for your time and take care!\"       Leila Richey RN      "

## 2017-08-01 ENCOUNTER — OFFICE VISIT (OUTPATIENT)
Dept: FAMILY MEDICINE | Facility: CLINIC | Age: 72
End: 2017-08-01
Payer: COMMERCIAL

## 2017-08-01 ENCOUNTER — HOSPITAL ENCOUNTER (INPATIENT)
Facility: CLINIC | Age: 72
LOS: 4 days | Discharge: HOME | DRG: 603 | End: 2017-08-05
Attending: EMERGENCY MEDICINE | Admitting: FAMILY MEDICINE
Payer: MEDICARE

## 2017-08-01 VITALS
DIASTOLIC BLOOD PRESSURE: 70 MMHG | HEIGHT: 65 IN | BODY MASS INDEX: 21.06 KG/M2 | TEMPERATURE: 97.9 F | HEART RATE: 85 BPM | OXYGEN SATURATION: 95 % | SYSTOLIC BLOOD PRESSURE: 116 MMHG | WEIGHT: 126.4 LBS

## 2017-08-01 DIAGNOSIS — M31.30 GRANULOMATOSIS WITH POLYANGIITIS (H): ICD-10-CM

## 2017-08-01 DIAGNOSIS — Z79.01 CHRONIC ANTICOAGULATION: ICD-10-CM
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
## 2017-08-01 DIAGNOSIS — Z79.60 LONG-TERM USE OF IMMUNOSUPPRESSANT MEDICATION: ICD-10-CM

## 2017-08-01 DIAGNOSIS — S81.852A INFECTED CAT BITE OF LOWER LEG, LEFT, INITIAL ENCOUNTER: Primary | ICD-10-CM

## 2017-08-01 DIAGNOSIS — M15.0 PRIMARY OSTEOARTHRITIS INVOLVING MULTIPLE JOINTS: ICD-10-CM

## 2017-08-01 DIAGNOSIS — M31.30 WEGENER'S GRANULOMATOSIS: ICD-10-CM
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
## 2017-08-01 DIAGNOSIS — Z16.20 THERAPY FAILURE DUE TO ANTIBIOTIC RESISTANCE: ICD-10-CM

## 2017-08-01 DIAGNOSIS — Z09 HOSPITAL DISCHARGE FOLLOW-UP: Primary | ICD-10-CM

## 2017-08-01 DIAGNOSIS — L03.116 CELLULITIS OF LEFT LOWER EXTREMITY: ICD-10-CM

## 2017-08-01 DIAGNOSIS — L08.9 INFECTED CAT BITE OF LOWER LEG, LEFT, INITIAL ENCOUNTER: Primary | ICD-10-CM

## 2017-08-01 LAB
ANION GAP SERPL CALCULATED.3IONS-SCNC: 6 MMOL/L (ref 3–14)
BASOPHILS # BLD AUTO: 0 10E9/L (ref 0–0.2)
BASOPHILS NFR BLD AUTO: 0.8 %
BUN SERPL-MCNC: 10 MG/DL (ref 7–30)
CALCIUM SERPL-MCNC: 9.2 MG/DL (ref 8.5–10.1)
CHLORIDE SERPL-SCNC: 104 MMOL/L (ref 94–109)
CO2 SERPL-SCNC: 29 MMOL/L (ref 20–32)
CREAT SERPL-MCNC: 0.69 MG/DL (ref 0.52–1.04)
CRP SERPL-MCNC: 5.9 MG/L (ref 0–8)
DIFFERENTIAL METHOD BLD: NORMAL
EOSINOPHIL # BLD AUTO: 0.2 10E9/L (ref 0–0.7)
EOSINOPHIL NFR BLD AUTO: 3.3 %
ERYTHROCYTE [DISTWIDTH] IN BLOOD BY AUTOMATED COUNT: 13.4 % (ref 10–15)
ERYTHROCYTE [SEDIMENTATION RATE] IN BLOOD BY WESTERGREN METHOD: 12 MM/H (ref 0–30)
GFR SERPL CREATININE-BSD FRML MDRD: 83 ML/MIN/1.7M2
GLUCOSE SERPL-MCNC: 107 MG/DL (ref 70–99)
HCT VFR BLD AUTO: 39.2 % (ref 35–47)
HGB BLD-MCNC: 13.3 G/DL (ref 11.7–15.7)
IMM GRANULOCYTES # BLD: 0 10E9/L (ref 0–0.4)
IMM GRANULOCYTES NFR BLD: 0.2 %
LYMPHOCYTES # BLD AUTO: 1.3 10E9/L (ref 0.8–5.3)
LYMPHOCYTES NFR BLD AUTO: 25.8 %
MCH RBC QN AUTO: 31.5 PG (ref 26.5–33)
MCHC RBC AUTO-ENTMCNC: 33.9 G/DL (ref 31.5–36.5)
MCV RBC AUTO: 93 FL (ref 78–100)
MONOCYTES # BLD AUTO: 0.4 10E9/L (ref 0–1.3)
MONOCYTES NFR BLD AUTO: 8.6 %
NEUTROPHILS # BLD AUTO: 3 10E9/L (ref 1.6–8.3)
NEUTROPHILS NFR BLD AUTO: 61.3 %
NRBC # BLD AUTO: 0 10*3/UL
NRBC BLD AUTO-RTO: 0 /100
PLATELET # BLD AUTO: 210 10E9/L (ref 150–450)
POTASSIUM SERPL-SCNC: 3.7 MMOL/L (ref 3.4–5.3)
RBC # BLD AUTO: 4.22 10E12/L (ref 3.8–5.2)
SODIUM SERPL-SCNC: 139 MMOL/L (ref 133–144)
WBC # BLD AUTO: 4.9 10E9/L (ref 4–11)

## 2017-08-01 PROCEDURE — 80048 BASIC METABOLIC PNL TOTAL CA: CPT | Performed by: EMERGENCY MEDICINE

## 2017-08-01 PROCEDURE — 99214 OFFICE O/P EST MOD 30 MIN: CPT | Performed by: FAMILY MEDICINE

## 2017-08-01 PROCEDURE — 86140 C-REACTIVE PROTEIN: CPT | Performed by: EMERGENCY MEDICINE

## 2017-08-01 PROCEDURE — 25000125 ZZHC RX 250: Performed by: EMERGENCY MEDICINE

## 2017-08-01 PROCEDURE — 85652 RBC SED RATE AUTOMATED: CPT | Performed by: EMERGENCY MEDICINE

## 2017-08-01 PROCEDURE — 87040 BLOOD CULTURE FOR BACTERIA: CPT | Performed by: EMERGENCY MEDICINE

## 2017-08-01 PROCEDURE — 96365 THER/PROPH/DIAG IV INF INIT: CPT | Performed by: EMERGENCY MEDICINE

## 2017-08-01 PROCEDURE — 99285 EMERGENCY DEPT VISIT HI MDM: CPT | Mod: Z6 | Performed by: EMERGENCY MEDICINE

## 2017-08-01 PROCEDURE — 85025 COMPLETE CBC W/AUTO DIFF WBC: CPT | Performed by: EMERGENCY MEDICINE

## 2017-08-01 PROCEDURE — 12000008 ZZH R&B INTERMEDIATE UMMC

## 2017-08-01 PROCEDURE — 99285 EMERGENCY DEPT VISIT HI MDM: CPT | Mod: 25 | Performed by: EMERGENCY MEDICINE

## 2017-08-01 RX ORDER — AMOXICILLIN 250 MG
1-2 CAPSULE ORAL 2 TIMES DAILY PRN
Status: DISCONTINUED | OUTPATIENT
Start: 2017-08-01 | End: 2017-08-05 | Stop reason: HOSPADM

## 2017-08-01 RX ORDER — FOLIC ACID 1 MG/1
1 TABLET ORAL DAILY
Status: DISCONTINUED | OUTPATIENT
Start: 2017-08-02 | End: 2017-08-05 | Stop reason: HOSPADM

## 2017-08-01 RX ORDER — LACTOBACILLUS RHAMNOSUS GG 10B CELL
1 CAPSULE ORAL
Status: DISCONTINUED | OUTPATIENT
Start: 2017-08-02 | End: 2017-08-05 | Stop reason: HOSPADM

## 2017-08-01 RX ORDER — POLYETHYLENE GLYCOL 3350 17 G/17G
17 POWDER, FOR SOLUTION ORAL DAILY PRN
Status: DISCONTINUED | OUTPATIENT
Start: 2017-08-01 | End: 2017-08-05 | Stop reason: HOSPADM

## 2017-08-01 RX ORDER — AMPICILLIN AND SULBACTAM 2; 1 G/1; G/1
3 INJECTION, POWDER, FOR SOLUTION INTRAMUSCULAR; INTRAVENOUS ONCE
Status: COMPLETED | OUTPATIENT
Start: 2017-08-01 | End: 2017-08-01

## 2017-08-01 RX ORDER — PROPRANOLOL HYDROCHLORIDE 20 MG/1
20 TABLET ORAL DAILY
Status: DISCONTINUED | OUTPATIENT
Start: 2017-08-02 | End: 2017-08-05 | Stop reason: HOSPADM

## 2017-08-01 RX ORDER — AMPICILLIN AND SULBACTAM 2; 1 G/1; G/1
3 INJECTION, POWDER, FOR SOLUTION INTRAMUSCULAR; INTRAVENOUS EVERY 6 HOURS
Status: DISCONTINUED | OUTPATIENT
Start: 2017-08-02 | End: 2017-08-03

## 2017-08-01 RX ORDER — ACETAMINOPHEN 325 MG/1
650 TABLET ORAL EVERY 6 HOURS PRN
Status: DISCONTINUED | OUTPATIENT
Start: 2017-08-01 | End: 2017-08-05 | Stop reason: HOSPADM

## 2017-08-01 RX ORDER — ONDANSETRON 4 MG/1
4 TABLET, ORALLY DISINTEGRATING ORAL EVERY 6 HOURS PRN
Status: DISCONTINUED | OUTPATIENT
Start: 2017-08-01 | End: 2017-08-05 | Stop reason: HOSPADM

## 2017-08-01 RX ORDER — NALOXONE HYDROCHLORIDE 0.4 MG/ML
.1-.4 INJECTION, SOLUTION INTRAMUSCULAR; INTRAVENOUS; SUBCUTANEOUS
Status: DISCONTINUED | OUTPATIENT
Start: 2017-08-01 | End: 2017-08-05 | Stop reason: HOSPADM

## 2017-08-01 RX ORDER — ONDANSETRON 2 MG/ML
4 INJECTION INTRAMUSCULAR; INTRAVENOUS EVERY 6 HOURS PRN
Status: DISCONTINUED | OUTPATIENT
Start: 2017-08-01 | End: 2017-08-05 | Stop reason: HOSPADM

## 2017-08-01 RX ORDER — BISACODYL 10 MG
10 SUPPOSITORY, RECTAL RECTAL DAILY PRN
Status: DISCONTINUED | OUTPATIENT
Start: 2017-08-01 | End: 2017-08-05 | Stop reason: HOSPADM

## 2017-08-01 RX ADMIN — AMPICILLIN SODIUM AND SULBACTAM SODIUM 3 G: 2; 1 INJECTION, POWDER, FOR SOLUTION INTRAMUSCULAR; INTRAVENOUS at 23:16

## 2017-08-01 ASSESSMENT — ENCOUNTER SYMPTOMS
ABDOMINAL PAIN: 0
SHORTNESS OF BREATH: 0
FEVER: 0
COLOR CHANGE: 1

## 2017-08-01 NOTE — NURSING NOTE
"Chief Complaint   Patient presents with     Hospital F/U     [facility]        Initial /70  Pulse 85  Temp 97.9  F (36.6  C) (Oral)  Ht 5' 4.5\" (1.638 m)  Wt 126 lb 6.4 oz (57.3 kg)  SpO2 95%  BMI 21.36 kg/m2 Estimated body mass index is 21.36 kg/(m^2) as calculated from the following:    Height as of this encounter: 5' 4.5\" (1.638 m).    Weight as of this encounter: 126 lb 6.4 oz (57.3 kg).  Medication Reconciliation: complete     An RENE Sánchez    "

## 2017-08-01 NOTE — MR AVS SNAPSHOT
After Visit Summary   8/1/2017    Nury Cárdenas    MRN: [id number]           Patient Information     Date Of Birth          1945        Visit Information        Provider Department      8/1/2017 5:00 PM Phil Abbott MD [facility]        Today's Diagnoses     Cellulitis of left lower extremity: worsening on oral     -  1      Care Instructions    Cellulitis getting worse; discussed returning to hospital and might need to resume IV antibiotic(s).    [facility]    If you have any questions regarding to your visit please contact your care team:       Team Purple:   Clinic Hours Telephone Number   Dr. Shanti Berg     7am-7pm  Monday - Thursday   7am-5pm  Fridays  [phone]  (Appointment scheduling available 24/7)    Questions about your Visit?   Team Line:  [phone]   [facility] [place] [facility] 11am-9pm Monday-Friday Saturday-Sunday- 9am-5pm   [place] - 5pm-9pm Monday-Friday Saturday-Sunday- 9am-5pm  [phone] - [place]   [phone] - [place]       What options do I have for visits at the clinic other than the traditional office visit?  To expand how we care for you, many of our providers are utilizing electronic visits (e-visits) and telephone visits, when medically appropriate, for interactions with their patients rather than a visit in the clinic.   We also offer nurse visits for many medical concerns. Just like any other service, we will bill your insurance company for this type of visit based on time spent on the phone with your provider. Not all insurance companies cover these visits. Please check with your medical insurance if this type of visit is covered. You will be responsible for any charges that are not paid by your insurance.      E-visits via [employer]:  generally incur a $35.00 fee.  Telephone visits:  Time spent on the phone: *charged based on time that is  spent on the phone in increments of 10 minutes. Estimated cost:   5-10 mins $30.00   11-20 mins. $59.00   21-30 mins. $85.00     Use NicePeopleAtWorkhart (secure email communication and access to your chart) to send your primary care provider a message or make an appointment. Ask someone on your Team how to sign up for NanoG[employer]t.  For a Price Quote for your services, please call our Tel[employer] Git Line at [phone].  As always, Thank you for trusting us with your health care needs!      Discharged By: An            Follow-ups after your visit        Your next 10 appointments already scheduled     Aug 14, 2017 10:00 AM CDT   LAB with FZ LAB   [facility]    [address] [place] [place] [address] [address] [phone]           Patient must bring picture ID. Patient should be prepared to give a urine specimen  Please do not eat 10-12 hours before your appointment if you are coming in fasting for labs on lipids, cholesterol, or glucose (sugar). Pregnant women should follow their Care Team instructions. Water with medications is okay. Do not drink coffee or other fluids. If you have concerns about taking  your medications, please ask at office or if scheduling via Yellowsmith, send a message by clicking on Secure Messaging, Message Your Care Team.            Aug 14, 2017 10:40 AM CDT   TRISHA Extremity with Eliceo Vidal, PT   [facility]    [address]  [place] [address]   [phone]            Aug 16, 2017  9:45 AM CDT   Anticoagulation Visit with RAAD ANTI COAG   [facility]    [address] [facility] [address] [facility] [place] [address]   [phone]            Oct 04, 2017 10:00 AM CDT   LAB with FZ LAB   [facility] ([facility]    [address] [facility] [address] [phone]           Patient must bring picture ID. Patient should be prepared to give a urine specimen  Please do not  eat 10-12 hours before your appointment if you are coming in fasting for labs on lipids, cholesterol, or glucose (sugar). Pregnant women should follow their Care Team instructions. Water with medications is okay. Do not drink coffee or other fluids. If you have concerns about taking  your medications, please ask at office or if scheduling via Phorm, send a message by clicking on Secure Messaging, Message Your Care Team.            Oct 13, 2017 11:30 AM CDT   Return Visit with Alvaro Maguire MD   [facility]    [address] [place] [address] [phone]              Who to contact     If you have questions or need follow up information about today's clinic visit or your schedule please contact [facility] directly at [phone].  Normal or non-critical lab and imaging results will be communicated to you by ZS Pharmahart, letter or phone within 4 business days after the clinic has received the results. If you do not hear from us within 7 days, please contact the clinic through [employer] or phone. If you have a critical or abnormal lab result, we will notify you by phone as soon as possible.  Submit refill requests through Phorm or call your pharmacy and they will forward the refill request to us. Please allow 3 business days for your refill to be completed.          Additional Information About Your Visit        [employer]S [employer]ritus Information     Phorm gives you secure access to your electronic health record. If you see a primary care provider, you can also send messages to your care team and make appointments. If you have questions, please call your primary care clinic.  If you do not have a primary care provider, please call [phone] and they will assist you.        Care EveryWhere ID     This is your Care EveryWhere ID. This could be used by other organizations to access your [facility] medical records  [id number]        Your Vitals Were   "   Pulse Temperature Height Pulse Oximetry BMI (Body Mass Index)       85 97.9  F (36.6  C) (Oral) 5' 4.5\" (1.638 m) 95% 21.36 kg/m2        Blood Pressure from Last 3 Encounters:   08/01/17 116/70   07/30/17 124/64   07/26/17 142/86    Weight from Last 3 Encounters:   08/01/17 126 lb 6.4 oz (57.3 kg)   07/29/17 125 lb 4.8 oz (56.8 kg)   07/26/17 127 lb (57.6 kg)              Today, you had the following     No orders found for display         Today's Medication Changes          These changes are accurate as of: 8/1/17  5:49 PM.  If you have any questions, ask your nurse or doctor.               These medicines have changed or have updated prescriptions.        Dose/Directions    methotrexate 2.5 MG tablet CHEMO   This may have changed:  additional instructions   Used for:  Wegener's granulomatosis (H), Granulomatosis with polyangiitis (H), Long-term use of immunosuppressant medication, Primary osteoarthritis involving multiple joints        Dose:  10 mg   Take 4 tablets (10 mg) by mouth once a week   Quantity:  48 tablet   Refills:  1                Primary Care Provider Office Phone # Fax #    Phil Abbott [phone]       [address] [address]        Equal Access to Services     ABBI MYERS AH: Jonnie Carr, waaxda lureinaldo, qaybta kaallavell nicole. So [facility] [phone].    ATENCIÓN: Si habla español, tiene a fofana disposición servicios gratuitos de asistencia lingüística. Nancy al [phone].    We comply with applicable federal civil rights laws and [place] laws. We do not discriminate on the basis of race, color, national origin, age, disability sex, sexual orientation or gender identity.            Thank you!     Thank you for choosing [facility]  for your care. Our goal is always to provide you with excellent care. Hearing back from our patients is one way we can " continue to improve our services. Please take a few minutes to complete the written survey that you may receive in the mail after your visit with us. Thank you!             Your Updated Medication List - Protect others around you: Learn how to safely use, store and throw away your medicines at www.disposemymeds.org.          This list is accurate as of: 8/1/17  5:49 PM.  Always use your most recent med list.                   Brand Name Dispense Instructions for use Diagnosis    acetaminophen 325 MG tablet    TYLENOL    100 tablet    Take 2 tablets (650 mg) by mouth every 6 hours as needed for mild pain    Migraines, Pulmonary emboli (H)       Acidophilus Lactobacillus Caps     20 capsule    Take 1 each by mouth daily    Cellulitis and abscess of leg       * amoxicillin-clavulanate 875-125 MG per tablet    AUGMENTIN    20 tablet    Take 1 tablet by mouth 2 times daily for 10 days    Cellulitis of leg, left       * amoxicillin-clavulanate 875-125 MG per tablet    AUGMENTIN    16 tablet    Take 1 tablet by mouth every 12 hours    Cellulitis of left lower extremity       CALCIUM CARBONATE PO      Take 1 tablet by mouth 2 times daily Pt unsure of strength        folic acid 1 MG tablet    FOLVITE    90 tablet    Take 1 tablet (1 mg) by mouth daily    Granulomatosis with polyangiitis (H), Long-term use of immunosuppressant medication, Primary osteoarthritis involving multiple joints, Wegener's granulomatosis (H)       lactobacillus rhamnosus (GG) capsule     30 capsule    Take 1 capsule by mouth 3 times daily (before meals)    Cellulitis of left lower extremity       methotrexate 2.5 MG tablet CHEMO     48 tablet    Take 4 tablets (10 mg) by mouth once a week    Wegener's granulomatosis (H), Granulomatosis with polyangiitis (H), Long-term use of immunosuppressant medication, Primary osteoarthritis involving multiple joints       propranolol 20 MG tablet    INDERAL    90 tablet    TAKE 1 TABLET(20 MG) BY MOUTH DAILY     Migraine without status migrainosus, not intractable, unspecified migraine type       VITAMIN D3 PO      Take 1 tablet by mouth daily Pt unsure of strength        warfarin 2 MG tablet    COUMADIN    60 tablet    TAKE 2 TABLETS(4 MG) BY MOUTH ONCE DAILY    Chronic anticoagulation       * Notice:  This list has 2 medication(s) that are the same as other medications prescribed for you. Read the directions carefully, and ask your doctor or other care provider to review them with you.

## 2017-08-01 NOTE — IP AVS SNAPSHOT
MRN:[id number]                      After Visit Summary   8/1/2017    Nury Cárdenas    MRN: [id number]           Thank you!     Thank you for choosing [facility] for your care. Our goal is always to provide you with excellent care. Hearing back from our patients is one way we can continue to improve our services. Please take a few minutes to complete the written survey that you may receive in the mail after you visit with us. Thank you!        Patient Information     Date Of Birth          1945        Designated Caregiver       Most Recent Value    Caregiver    Will someone help with your care after discharge? no      About your hospital stay     You were admitted on:  August 1, 2017 You last received care in the:  [address] [facility]    You were discharged on:  August 5, 2017        Reason for your hospital stay       You were re-hospitalized for worsening cellulitis of your leg after switching to oral antibiotics.  You were given IV antibiotics for 2 days and transitioned to different oral antibiotics.  Your infection was monitored for more than 48 hours after switching antibiotics and your infection continued to improve on the oral antibiotics                  Who to Call     For medical emergencies, please call 911.  For non-urgent questions about your medical care, please call your primary care provider or clinic, [phone]          Attending Provider     Provider Specialty    Tan Rodriguez MD Emergency Medicine    Nury Singh MD Family Practice       Primary Care Provider Office Phone # Fax #    Phil Abbott [phone]       When to contact your care team       Call your primary doctor if you have any of the following: temperature greater than 101.4, increased swelling, increased pain, increased redness, or drainage of pus.                  After Care Instructions     Activity       Your activity upon discharge: activity as tolerated             Diet       Follow this diet upon discharge: Orders Placed This Encounter      Combination Diet Regular Diet Adult            Discharge Instructions       Resume your weekly methotrexate on any day of the week after you complete antibiotics.  If antibiotic course is increased by your primary care doctor, call your rheumatologist to discuss whether or not to restart methotrexate before the antibiotics are complete                  Follow-up Appointments     Follow Up and recommended labs and tests       Follow up with primary care provider, Phil Abbott, within 7 days for hospital follow- up.  Recommend INR as the antibiotics can affect your coumadin                  Your next 10 appointments already scheduled     Aug 14, 2017 10:00 AM CDT   LAB with [facility]   [facility]    [address] [facility] [address]   [phone]           Patient must bring picture ID. Patient should be prepared to give a urine specimen  Please do not eat 10-12 hours before your appointment if you are coming in fasting for labs on lipids, cholesterol, or glucose (sugar). Pregnant women should follow their Care Team instructions. Water with medications is okay. Do not drink coffee or other fluids. If you have concerns about taking  your medications, please ask at office or if scheduling via [employer], send a message by clicking on Secure Messaging, Message Your Care Team.            Aug 14, 2017 10:40 AM CDT   TRISHA Extremity with Eliceo Vidal, PT   [facility] [place]    [address]  [address]  [place] [address]   [phone]            Aug 16, 2017  9:45 AM CDT   Anticoagulation Visit with RAAD ANTI COAG   [facility] ([facility])    [address] [place] [address]   [phone]            Oct 04, 2017 10:00 AM CDT   LAB with FZ LAB   [facility] ([facility])    [address]  "[place] [address] [phone]           Patient must bring picture ID. Patient should be prepared to give a urine specimen  Please do not eat 10-12 hours before your appointment if you are coming in fasting for labs on lipids, cholesterol, or glucose (sugar). Pregnant women should follow their Care Team instructions. Water with medications is okay. Do not drink coffee or other fluids. If you have concerns about taking  your medications, please ask at office or if scheduling via [employer], send a message by clicking on Secure Messaging, Message Your Care Team.            Oct 13, 2017 11:30 AM CDT   [facility] Visit with Alvaro Maguire MD   [facility]    [address] [place] [address] [phone]              Pending Results     Date and Time Order Name Status Description    8/1/2017 2020 Blood Culture ONE site Preliminary             Statement of Approval     Ordered          08/05/17 2513  I have reviewed and agree with all the recommendations and orders detailed in this document.  EFFECTIVE NOW     Approved and electronically signed by:  Estuardo Garcia MD             Admission Information     Date & Time Provider Department Dept. Phone    8/1/2017 Nury Singh MD [address] [facility] [place] [phone]      Your Vitals Were     Blood Pressure Pulse Temperature Respirations Height Weight    122/74 (BP Location: Left arm) 78 97.3  F (36.3  C) (Oral) 16 1.626 m (5' 4\") 56.3 kg (124 lb 1.6 oz)    Pulse Oximetry BMI (Body Mass Index)                94% 21.3 kg/m2          [employer] Information     [employer] gives you secure access to your electronic health record. If you see a primary care provider, you can also send messages to your care team and make appointments. If you have questions, please call your primary care clinic.  If you do not have a primary care provider, please call [phone] and they will assist you.        Care " EveryWhere ID     This is your Care EveryWhere ID. This could be used by other organizations to access your [facility] medical records  [id number]        Equal Access to Services     ABBI MYERS : Jonnie Carr, angelina fonseca, latanyalevi mirandajung hidalgo, lavell andersonin hayaan robelaishwarya partida lamarcelesley angelMora Mccray [facility] [phone].    ATENCIÓN: Si habla español, tiene a fofana disposición servicios gratuitos de asistencia lingüística. Llame al [phone].    We comply with applicable federal civil rights laws and [place] laws. We do not discriminate on the basis of race, color, national origin, age, disability sex, sexual orientation or gender identity.               Review of your medicines      START taking        Dose / Directions    levofloxacin 750 MG tablet   Commonly known as:  LEVAQUIN   Indication:  Skin and Soft Tissue Infection, infection due to  a cat bite   Used for:  Infected cat bite of lower leg, left, initial encounter        Dose:  750 mg   Start taking on:  8/6/2017   Take 1 tablet (750 mg) by mouth daily for 10 days   Quantity:  10 tablet   Refills:  0       metroNIDAZOLE 500 MG tablet   Commonly known as:  FLAGYL   Indication:  Skin and Soft Tissue Infection, infection due to a cat bite   Used for:  Infected cat bite of lower leg, left, initial encounter        Dose:  500 mg   Take 1 tablet (500 mg) by mouth every 8 hours for 11 days   Quantity:  33 tablet   Refills:  0         CONTINUE these medicines which may have CHANGED, or have new prescriptions. If we are uncertain of the size of tablets/capsules you have at home, strength may be listed as something that might have changed.        Dose / Directions    methotrexate 2.5 MG tablet CHEMO   This may have changed:  additional instructions   Used for:  Wegener's granulomatosis (H), Granulomatosis with polyangiitis (H), Long-term use of immunosuppressant medication, Primary osteoarthritis involving multiple joints        Dose:  10 mg   Take 4  tablets (10 mg) by mouth once a week On Fridays; resume after antibiotics complete   Refills:  0         CONTINUE these medicines which have NOT CHANGED        Dose / Directions    acetaminophen 325 MG tablet   Commonly known as:  TYLENOL   Used for:  Migraines, Pulmonary emboli (H)        Dose:  650 mg   Take 2 tablets (650 mg) by mouth every 6 hours as needed for mild pain   Quantity:  100 tablet   Refills:  0       calcium-magnesium 500-250 MG Tabs per tablet   Commonly known as:  CALMAG        Dose:  1 tablet   Take 1 tablet by mouth 2 times daily (with meals)   Refills:  0       cholecalciferol 1000 UNITS Tabs        Dose:  1000 Units   Take 1,000 Units by mouth daily   Refills:  0       folic acid 1 MG tablet   Commonly known as:  FOLVITE   Used for:  Granulomatosis with polyangiitis (H), Long-term use of immunosuppressant medication, Primary osteoarthritis involving multiple joints, Wegener's granulomatosis (H)        Dose:  1 mg   Take 1 tablet (1 mg) by mouth daily   Quantity:  90 tablet   Refills:  3       lactobacillus rhamnosus (GG) capsule        Dose:  1 capsule   Take 1 capsule by mouth 3 times daily (before meals)   Quantity:  30 capsule   Refills:  0       propranolol 20 MG tablet   Commonly known as:  INDERAL   Used for:  Migraine without status migrainosus, not intractable, unspecified migraine type        TAKE 1 TABLET(20 MG) BY MOUTH DAILY   Quantity:  90 tablet   Refills:  1       warfarin 2 MG tablet   Commonly known as:  COUMADIN   Used for:  Chronic anticoagulation        TAKE 2 TABLETS(4 MG) BY MOUTH ONCE DAILY   Quantity:  60 tablet   Refills:  0         STOP taking     amoxicillin-clavulanate 875-125 MG per tablet   Commonly known as:  AUGMENTIN                Where to get your medicines      These medications were sent to [facility] [place], [place] - [address]  [address] [address]     Phone:  [phone]     levofloxacin 750 MG tablet     metroNIDAZOLE 500 MG tablet         Some of these will need a paper prescription and others can be bought over the counter. Ask your nurse if you have questions.     You don't need a prescription for these medications     methotrexate 2.5 MG tablet CHEMO                Protect others around you: Learn how to safely use, store and throw away your medicines at www.disposemymeds.org.             Medication List: This is a list of all your medications and when to take them. Check marks below indicate your daily home schedule. Keep this list as a reference.      Medications           Morning Afternoon Evening Bedtime As Needed    acetaminophen 325 MG tablet   Commonly known as:  TYLENOL   Take 2 tablets (650 mg) by mouth every 6 hours as needed for mild pain                                calcium-magnesium 500-250 MG Tabs per tablet   Commonly known as:  CALMAG   Take 1 tablet by mouth 2 times daily (with meals)                                cholecalciferol 1000 UNITS Tabs   Take 1,000 Units by mouth daily                                folic acid 1 MG tablet   Commonly known as:  FOLVITE   Take 1 tablet (1 mg) by mouth daily   Last time this was given:  1 mg on 8/5/2017  7:28 AM                                lactobacillus rhamnosus (GG) capsule   Take 1 capsule by mouth 3 times daily (before meals)   Last time this was given:  1 capsule on 8/5/2017 12:28 PM                                levofloxacin 750 MG tablet   Commonly known as:  LEVAQUIN   Take 1 tablet (750 mg) by mouth daily for 10 days   Start taking on:  8/6/2017   Last time this was given:  750 mg on 8/5/2017  7:28 AM                                methotrexate 2.5 MG tablet CHEMO   Take 4 tablets (10 mg) by mouth once a week On Fridays; resume after antibiotics complete                                metroNIDAZOLE 500 MG tablet   Commonly known as:  FLAGYL   Take 1 tablet (500 mg) by mouth every 8 hours for 11 days   Last time this was given:  500 mg  on 8/5/2017  5:45 AM                                propranolol 20 MG tablet   Commonly known as:  INDERAL   TAKE 1 TABLET(20 MG) BY MOUTH DAILY   Last time this was given:  20 mg on 8/5/2017  7:28 AM                                warfarin 2 MG tablet   Commonly known as:  COUMADIN   TAKE 2 TABLETS(4 MG) BY MOUTH ONCE DAILY   Last time this was given:  5 mg on 8/4/2017  7:42 PM

## 2017-08-01 NOTE — PATIENT INSTRUCTIONS
Cellulitis getting worse; discussed returning to hospital and might need to resume IV antibiotic(s).    [facility]    If you have any questions regarding to your visit please contact your care team:       Team Purple:   Clinic Hours Telephone Number   Dr. Shanti Berg     7am-7pm  Monday - Thursday   7am-5pm  Fridays  [phone]  (Appointment scheduling available 24/7)    Questions about your Visit?   Team Line:  [phone]   Urgent Care - [place] - 11am-9pm Monday-Friday Saturday-Sunday- 9am-5pm   [place] - 5pm-9pm Monday-Friday Saturday-Sunday- 9am-5pm  [phone] - [facility]  [phone] - [place]       What options do I have for visits at the clinic other than the traditional office visit?  To expand how we care for you, many of our providers are utilizing electronic visits (e-visits) and telephone visits, when medically appropriate, for interactions with their patients rather than a visit in the clinic.   We also offer nurse visits for many medical concerns. Just like any other service, we will bill your insurance company for this type of visit based on time spent on the phone with your provider. Not all insurance companies cover these visits. Please check with your medical insurance if this type of visit is covered. You will be responsible for any charges that are not paid by your insurance.      E-visits via [employer]:  generally incur a $35.00 fee.  Telephone visits:  Time spent on the phone: *charged based on time that is spent on the phone in increments of 10 minutes. Estimated cost:   5-10 mins $30.00   11-20 mins. $59.00   21-30 mins. $85.00     Use [employer] (secure email communication and access to your chart) to send your primary care provider a message or make an appointment. Ask someone on your Team how to sign up for [employer].  For a Price Quote for your services, please call our Consumer Price Line at  [phone].  As always, Thank you for trusting us with your health care needs!      Discharged By: An

## 2017-08-01 NOTE — IP AVS SNAPSHOT
[address] [address]    Phone:  [phone]                                       After Visit Summary   8/1/2017    Nury Cárdenas    MRN: [id number]           After Visit Summary Signature Page     I have received my discharge instructions, and my questions have been answered. I have discussed any challenges I see with this plan with the nurse or doctor.    ..........................................................................................................................................  Patient/Patient Representative Signature      ..........................................................................................................................................  Patient Representative Print Name and Relationship to Patient    ..................................................               ................................................  Date                                            Time    ..........................................................................................................................................  Reviewed by Signature/Title    ...................................................              ..............................................  Date                                                            Time

## 2017-08-02 LAB — INR PPP: 1.62 (ref 0.86–1.14)

## 2017-08-02 PROCEDURE — 36415 COLL VENOUS BLD VENIPUNCTURE: CPT | Performed by: FAMILY MEDICINE

## 2017-08-02 PROCEDURE — A9270 NON-COVERED ITEM OR SERVICE: HCPCS | Mod: GY | Performed by: FAMILY MEDICINE

## 2017-08-02 PROCEDURE — 25000132 ZZH RX MED GY IP 250 OP 250 PS 637: Mod: GY | Performed by: FAMILY MEDICINE

## 2017-08-02 PROCEDURE — 40000141 ZZH STATISTIC PERIPHERAL IV START W/O US GUIDANCE

## 2017-08-02 PROCEDURE — 99211 OFF/OP EST MAY X REQ PHY/QHP: CPT

## 2017-08-02 PROCEDURE — 85610 PROTHROMBIN TIME: CPT | Performed by: FAMILY MEDICINE

## 2017-08-02 PROCEDURE — 25000125 ZZHC RX 250: Performed by: FAMILY MEDICINE

## 2017-08-02 PROCEDURE — 12000008 ZZH R&B INTERMEDIATE UMMC

## 2017-08-02 PROCEDURE — 25000125 ZZHC RX 250

## 2017-08-02 RX ORDER — WARFARIN SODIUM 1 MG/1
4 TABLET ORAL ONCE
Status: COMPLETED | OUTPATIENT
Start: 2017-08-02 | End: 2017-08-02

## 2017-08-02 RX ORDER — WARFARIN SODIUM 5 MG/1
5 TABLET ORAL
Status: COMPLETED | OUTPATIENT
Start: 2017-08-02 | End: 2017-08-02

## 2017-08-02 RX ADMIN — FOLIC ACID 1 MG: 1 TABLET ORAL at 08:48

## 2017-08-02 RX ADMIN — AMPICILLIN SODIUM AND SULBACTAM SODIUM 3 G: 2; 1 INJECTION, POWDER, FOR SOLUTION INTRAMUSCULAR; INTRAVENOUS at 11:09

## 2017-08-02 RX ADMIN — WARFARIN SODIUM 4 MG: 1 TABLET ORAL at 01:48

## 2017-08-02 RX ADMIN — Medication 1 CAPSULE: at 08:48

## 2017-08-02 RX ADMIN — PROPRANOLOL HYDROCHLORIDE 20 MG: 20 TABLET ORAL at 08:48

## 2017-08-02 RX ADMIN — Medication 1 CAPSULE: at 12:17

## 2017-08-02 RX ADMIN — AMPICILLIN SODIUM AND SULBACTAM SODIUM 3 G: 2; 1 INJECTION, POWDER, FOR SOLUTION INTRAMUSCULAR; INTRAVENOUS at 04:58

## 2017-08-02 RX ADMIN — WARFARIN SODIUM 5 MG: 5 TABLET ORAL at 20:21

## 2017-08-02 RX ADMIN — AMPICILLIN SODIUM AND SULBACTAM SODIUM 3 G: 2; 1 INJECTION, POWDER, FOR SOLUTION INTRAMUSCULAR; INTRAVENOUS at 17:12

## 2017-08-02 RX ADMIN — Medication 1 CAPSULE: at 17:12

## 2017-08-02 ASSESSMENT — ENCOUNTER SYMPTOMS
LIGHT-HEADEDNESS: 0
ARTHRALGIAS: 0
COLOR CHANGE: 1
FEVER: 0
CONSTIPATION: 0
DIARRHEA: 0
COUGH: 0
HEADACHES: 0
ABDOMINAL PAIN: 0
NAUSEA: 0
MYALGIAS: 0
VOMITING: 0
RHINORRHEA: 0
DYSURIA: 0
SORE THROAT: 0
WOUND: 1
CHILLS: 0
SHORTNESS OF BREATH: 0

## 2017-08-02 NOTE — PROGRESS NOTES
Care Coordinator Progress Note     Admission Date/Time:  8/1/2017  Attending MD:  Nury Singh MD     Data  Chart reviewed, discussed with interdisciplinary team.   Patient was admitted for: Infected cat bite of lower leg, left, initial encounter.    Concerns with insurance coverage for discharge needs: None.  Current Living Situation: Patient lives alone.  Support System: Supportive and Involved  Services Involved: none prior to arrival  Transportation: Family or Friend will provide  Barriers to Discharge: pending medical clearance, dc with IV abx, dc planning in progress    Coordination of Care and Referrals: Provided patient/family with options for Home Infusion. 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   WOCN has seen pt and written recommendations for care of leg wound.  Per care team pt will dc with continued IV abx. Pts and her daughter would like referral and benefit check to be completed by [facility], referral faxed and called to [facility].  [facility]- [place] currently q 6 hrs.  Phone: [phone]  Fax: [phone]    Daughter and pt would like benefit check completed and if this is not covered would like to look into TCU or out pt infusion.  Pt could do out pt infusion if only 1-2 times daily abx, TCU if she is needing it more frequently.  Daughter requested [facility] in [place] as an option.       Plan  RNCC will continue to follow up on benefits and present options for pt and family.    Dc possible with in 1-2 days.     [facility]- referral called and sent  [phone]  Fax: [phone]    [facility]-opening confirmed for  bid  PH: [phone]  Fax: [phone]  Scheduling line: [phone]   Hours 8 am to 8 pm.      [facility]    Amanda Ramírez RNCC, BSN    [facility]  [address] [place] [id number]    [email or web address]    Office: [phone] Pager: [phone]

## 2017-08-02 NOTE — ED NOTES
Bed: ED12  Expected date: 8/1/17  Expected time:   Means of arrival: Car  Comments:  Nury Cárdenas worsened cellulitis from [facility]

## 2017-08-02 NOTE — PROGRESS NOTES
[facility] - Inpatient daily progress note    Date of admission: 8/1/2017  Date of service: 8/2/2017.         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 re-admitted for LLE cellulitis without abscess secondary to cat scratch/bite after failing outpatient oral antibiotics  Patient Active Problem List   Diagnosis     GERD (gastroesophageal reflux disease)     Fibroids     Migraines     Hearing loss     Osteopenia     HYPERLIPIDEMIA LDL GOAL <160     Advanced directives, counseling/discussion     Inflammatory arthritis     Synovitis of wrist     Chronic constipation     Granulomatosis with polyangiitis (H)     Chronic steroid use     Dyspnea     PE (pulmonary embolism)     Cataract, mild, ou     Calculus of kidney, right     Chronic anticoagulation     Long-term (current) use of anticoagulants [Z79.01]     Long-term use of immunosuppressant medication     Primary osteoarthritis involving multiple joints     Cellulitis     Cat scratch left lower extremity      Plan:   ## LLE cellulitis secondary to cat scratch/bite  ## Failed oral antibiotics (augmentin) after initial improvement on IV unasyn  Patient hospitalized 7/27-7/30 for cellulitis, improved clinically with IV unasyn (and had adverse reaction to vancomycin).  Transitioned to PO augmentin at discharge; readmitted due to worsening cellulitis on PO antibiotics.  - Continue IV unasyn (improved on unasyn last admission)  - Tetanus shot given last admission  - Consider ID consult  - Will consider less frequently dosed antibiotics and PICC line to facilitate outpatient IV antibiotic infusion therapy  - WOC consult     ## History or recurent DVT/PE on coumadin  - Continue coumadin, goal INR 2-3     ## Min's Granulomatosis, in remission  .- Discussed methotrexate with Dr. Maguire, patient's rheumatologist on her last admission (7/27-7/30) and agrees to holding  "weekly methotrexate (currently weekly doses on fridays) while on antibiotics for active infection.  Last dose 7/21/2017.  May restart any day of the week after antibiotics complete.    Fluids:PO  Electrolytes:WNL  Nutrition:Regular diet  Lines:PIV  Activity: -Up as tolerated  CODE: Full Code  Prophylaxis: mechanical (patient on coumadin for recurrent DVT hx)  PCP communication:  - Phil Abbott  - Contacted at admission: No  - Concerns or updates: N/A  Dispo: Expected discharge date 2-3 days with outpatient IV antibiotics              Interval History:   Nury Cárdenas is doing well.  Since receiving IV antibioitcs in the ED last night she says swelling and redness have improved.  No fevers or chills.  No drainage.  OOB and ambulating well.            Review of Systems:   Review of Systems   Constitutional: Negative for chills and fever.   HENT: Negative for rhinorrhea and sore throat.    Respiratory: Negative for cough and shortness of breath.    Cardiovascular: Negative for chest pain.   Gastrointestinal: Negative for abdominal pain, constipation, diarrhea, nausea and vomiting.   Genitourinary: Negative for dysuria.   Musculoskeletal: Negative for arthralgias and myalgias.   Skin: Positive for color change and wound.        Dark purple erythema left medial leg   Neurological: Negative for light-headedness and headaches.            Physical Exam (Resident / Clinician):     Vitals were reviewed  Patient Vitals for the past 12 hrs:   BP Temp Temp src Pulse Heart Rate Resp SpO2 Height Weight   08/02/17 0000 155/73 97.7  F (36.5  C) Oral 79 - 18 - - 57.3 kg (126 lb 4.8 oz)   08/01/17 2334 136/64 98.2  F (36.8  C) Oral 87 - 18 100 % - -   08/01/17 1932 136/64 98  F (36.7  C) Oral - 87 17 96 % 1.626 m (5' 4\") 57.2 kg (126 lb)          Physical Exam   Constitutional: She is oriented to person, place, and time. She appears well-developed and well-nourished. No distress.   HENT:   Head: Normocephalic and " atraumatic.   Neck: Neck supple.   Cardiovascular: Normal rate, regular rhythm and normal heart sounds.    No murmur heard.  Pulmonary/Chest: Effort normal and breath sounds normal. No respiratory distress. She has no wheezes. She has no rales.   Musculoskeletal: She exhibits no edema.   No inguinal lymphadenopathy   Neurological: She is alert and oriented to person, place, and time.   Skin: Skin is warm and dry. There is erythema.   Purple ecchymosis/erythema in close [place] around wounds.  No evidence of necrosis.  Bedsode US showed no underlying undrained fluid collection.  Erythema is now within boarder of penmarking from 8/1/17.  Still extends beyond erythema at the time of discharger 7/30/17   Psychiatric: She has a normal mood and affect.             Data:   ROUTINE LABS (Last four results)  CMP  Recent Labs  Lab 08/01/17 2012 07/30/17  0606 07/29/17  0636 07/27/17  2056    140 140 141   POTASSIUM 3.7 3.6 3.6 4.0   CHLORIDE 104 104 109 106   CO2 29 28 27 27   ANIONGAP 6 7 5 7   * 97 98 118*   BUN 10 7 7 9   CR 0.69 0.70 0.63 0.69   GFRESTIMATED 83 82 >90Non  GFR Calc 83   GFRESTBLACK >90African American GFR Calc >90African American GFR Calc >90African American GFR Calc >90African American GFR Calc   CL[place]ON 9.2 9.2 8.4* 9.0   MAG  --  2.2 2.2  --    PHOS  --  3.5 2.7  --      CBC  Recent Labs  Lab 08/01/17 2012 07/30/17  0606 07/29/17  0636 07/28/17  0642   WBC 4.9 4.8 6.0 5.9   RBC 4.22 4.40 4.13 4.23   HGB 13.3 14.0 13.2 13.4   HCT 39.2 41.1 38.6 39.2   MCV 93 93 94 93   MCH 31.5 31.8 32.0 31.7   MCHC 33.9 34.1 34.2 34.2   RDW 13.4 13.6 13.7 13.8    185 163 164     INR  Recent Labs  Lab 07/30/17  0606 07/29/17  0636 07/28/17  1058 07/27/17  2056   INR 1.66* 2.07* 1.90* 2.09*     CRP  Recent Labs  Lab 08/01/17 2012 07/30/17  0606 07/29/17  0636 07/28/17  0642   CRP 5.9 16.0* 24.0* 29.0*         Blood culture:  Invalid input(s): BC   Urine culture:  No results for input(s):  URC in the last 168 hours.  All cultures:    Recent Labs  Lab 08/01/17 2012   CULT No growth after 3 hours           Medications:     Current Facility-Administered Medications   Medication     Warfarin Therapy Reminder (Check START DATE - warfarin may be starting in the FUTURE)     acetaminophen (TYLENOL) tablet 650 mg     folic acid (FOLVITE) tablet 1 mg     lactobacillus rhamnosus (GG) (CULTURELL) capsule 1 capsule     propranolol (INDERAL) tablet 20 mg     naloxone (NARCAN) injection 0.1-0.4 mg     Patient is already receiving anticoagulation with heparin, enoxaparin (LOVENOX), warfarin (COUMADIN)  or other anticoagulant medication     senna-docusate (SENOKOT-S;PERICOLACE) 8.6-50 MG per tablet 1-2 tablet     polyethylene glycol (MIRALAX/GLYCOLAX) Packet 17 g     bisacodyl (DULCOLAX) Suppository 10 mg     ondansetron (ZOFRAN-ODT) ODT tab 4 mg    Or     ondansetron (ZOFRAN) injection 4 mg     ampicillin-sulbactam (UNASYN) 3 g vial to attach to  mL bag     Facility-Administered Medications Ordered in Other Encounters   Medication     sodium chloride (PF) 0.9% PF flush 60 mL       Caring Physician: Estuardo Garcia MD  [facility], [place]  Pager Contact: see Physician sticky note

## 2017-08-02 NOTE — ED NOTES
Patient is here for a wound check after receiving a cat bite last Wednesday.  Went into the clinic, was placed on augmentin. Last dose this morning.  Patient states wound has gotten worse since she has started taking the abx.

## 2017-08-02 NOTE — PHARMACY-ANTICOAGULATION SERVICE
Clinical Pharmacy - Warfarin Dosing Consult     Pharmacy has been consulted to manage this patient s warfarin therapy.  Indication: DVT/PE Prophylaxis  Therapy Goal: INR 2-2.5 (as pt bruises easily)  Warfarin Prior to Admission: Yes  Warfarin PTA Regimen: 4 mg daily  Significant drug interactions: Unasyn (increased risk of bleeding)    INR   Date Value Ref Range Status   07/30/2017 1.66 (H) 0.86 - 1.14 Final   07/29/2017 2.07 (H) 0.86 - 1.14 Final       Recommend warfarin 4 mg today.  Pharmacy will monitor Nury RENETTA Cárdenas daily and order warfarin doses to achieve specified goal.      Please contact pharmacy as soon as possible if the warfarin needs to be held for a procedure or if the warfarin goals change.

## 2017-08-02 NOTE — H&P
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     Initially the day before her first admission on 7/26 she was clawed by a neighbor's cat while house-sitting.  The cat was an indoor cat and up to date on vaccinations.  She was started on augmentin by urgent care at that time. Her leg became more swollen and due to her history of immunosuppression on MTX she came to the ED.      Patient was was initially started on vanc and zosyn for and then transitioned to IV unasyn the morning after admission.  She remained on IV unasyn for 48 hours and her cellulitis seemed to improve.  She was discharged on 7/29 (3 days ago) on augmentin.  Starting yesterday she noticed more pain in her left leg.  She had kept her leg wrapped and had her follow up with her PCP today and when he removed the dressing the redness had spread significantly outside the lines.  Her PCP recommended coming back to the hospital for treatment and monitoring.        She denies headaches, blurry vision, fever, chest pain, shortness of breath, abdominal pain, nausea, vomiting, constipation, and lower extremity edema.  She had mild diarrhea since stopping the probiotics 2 days ago.    ED course:   In the ED, labs were unremarkable although  patient has history of Mni's and is on MTX so her inflammatory markers may be unreliable.  Her CRP was normal at 5.9, her WBC was normal at 4.9, and her ESR was normal at 12.  Vitals were normal.  On physical examination, patient was noted to have significant worsening of her cellulitis since discharge and so started back on IV unasyn and transferred to inpatient floor for further evaluation.            Active Problem List :     Patient Active Problem List   Diagnosis     GERD (gastroesophageal reflux disease)     Fibroids     Migraines     Hearing loss     Osteopenia     HYPERLIPIDEMIA LDL GOAL <160     Advanced directives, counseling/discussion     Inflammatory arthritis     Synovitis of wrist     Chronic constipation     Granulomatosis with polyangiitis (H)     Chronic steroid use     Dyspnea     PE (pulmonary embolism)     Cataract, mild, ou     Calculus of kidney, right     Chronic anticoagulation     Long-term (current) use of anticoagulants [Z79.01]     Long-term use of immunosuppressant medication     Primary osteoarthritis involving multiple joints     Cellulitis     Cat scratch left lower extremity          Past Medical History:     Past Medical History:   Diagnosis Date     Atypical pneumonia 6/14/2014     Atypical pneumonia      Coronary artery disease 1982    heart beat hard made me tired     Dyslipidemia      Fibroids      GERD (gastroesophageal reflux disease)      Granulomatosis with polyangiitis (Wegener's)      Hearing loss      Inflammatory arthritis     thumb     Migraines      MVP (mitral valve prolapse)     had an echo that was normal in 2007 she is on Inderal     Osteopenia      PE (pulmonary embolism) 10/2014    ? GPA associated, on warfarin      Synovitis of wrist 2009    right           Past Surgical History:     Past Surgical History:   Procedure Laterality Date     ABDOMEN SURGERY      appendix out     APPENDECTOMY       BIOPSY  1972    cone biopsy     CL AFF SURGICAL PATHOLOGY       cone biopsy 1975     COLONOSCOPY       EYE SURGERY      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     HC ESOPHAGOSCOPY, DIAGNOSTIC       OPTICAL TRACKING SYSTEM BRONCHOSCOPY  6/19/2014    Procedure: OPTICAL TRACKING SYSTEM BRONCHOSCOPY;  Surgeon: Angel Kathleen MD;  Location:  GI     SURGICAL HISTORY OF -   as a child    strabismus repair right eye     SURGICAL HISTORY OF -   8-2009    right wrist debridement     TONSILLECTOMY      as a child     TONSILLECTOMY & ADENOIDECTOMY       TUBAL LIGATION             Social History:     Social History   Substance Use Topics     Smoking status: Former Smoker     Packs/day: 2.00     Years: 18.00     Types: Cigarettes     Start date: 1/1/1960     Quit date: 1/1/1982     Smokeless tobacco: Never Used     Alcohol use No          Family History:     Family History   Problem Relation Age of Onset     Respiratory Mother      emphysema     Aneurysm Mother      Chronic Obstructive Pulmonary Disease Mother      Thyroid Disease Mother      ,     OSTEOPOROSIS Mother      Other Cancer Father      CANCER Father      lung cancer     Arthritis Maternal Grandmother      rheumatoid     HEART DISEASE Maternal Grandmother      unsure of details     HEART DISEASE Maternal Grandfather      Neurologic Disorder Paternal Grandmother      migraines     Alzheimer Disease Paternal Grandmother      Unknown/Adopted Paternal Grandfather      CANCER Sister      stage 4 anal cancer age 62 years     Colon Cancer Sister      Cancer - colorectal Brother      Colon Cancer Brother      Substance Abuse Sister      Anesthesia Reaction Daughter      Family history reviewed and updated in [facility]            Immunizations:   Immunization status is up to date.  Last Tdap 7/28/17.          Allergies:   Adhesive tape; Amoxicillin; Augmentin; Codeine; Erythromycin; Nickel; Sulfa drugs; Tegaderm alginate ag; and Zithromax [azithromycin dihydrate]          Medications:     Current Facility-Administered Medications on File  Prior to Encounter:  sodium chloride (PF) 0.9% PF flush 60 mL     Current Outpatient Prescriptions on File Prior to Encounter:  lactobacillus rhamnosus, GG, (CULTURELL) capsule Take 1 capsule by mouth 3 times daily (before meals)   CALCIUM CARBONATE PO Take 1 tablet by mouth 2 times daily Pt unsure of strength   amoxicillin-clavulanate (AUGMENTIN) 875-125 MG per tablet Take 1 tablet by mouth 2 times daily for 10 days   warfarin (COUMADIN) 2 MG tablet TAKE 2 TABLETS(4 MG) BY MOUTH ONCE DAILY   folic acid (FOLVITE) 1 MG tablet Take 1 tablet (1 mg) by mouth daily   propranolol (INDERAL) 20 MG tablet TAKE 1 TABLET(20 MG) BY MOUTH DAILY   Cholecalciferol (VITAMIN D3 PO) Take 1 tablet by mouth daily Pt unsure of strength   Acidophilus Lactobacillus CAPS Take 1 each by mouth daily   amoxicillin-clavulanate (AUGMENTIN) 875-125 MG per tablet Take 1 tablet by mouth every 12 hours   methotrexate 2.5 MG tablet CHEMO Take 4 tablets (10 mg) by mouth once a week (Patient taking differently: Take 10 mg by mouth once a week On Fridays)   acetaminophen (TYLENOL) 325 MG tablet Take 2 tablets (650 mg) by mouth every 6 hours as needed for mild pain          Review of Systems:   ROS   CONSTITUTIONAL: + chills, no fever  SKIN: + wound left leg with spreading erythema and swelling outside of previously drawn lines, no other worrisome rashes or lesions  EYES: no acute vision problems or changes  ENT: no ear problems, no mouth problems, no throat problems  RESP: no significant cough, no shortness of breath  CV: no chest pain, no palpitations, no new or worsening peripheral edema  GI: no nausea, no vomiting, no constipation.  + diarrhea (since stopping the probiotics 2 days ago and eating yogurt).  : no frequency, no dysuria, no hematuria  NEURO: no weakness, no dizziness, no headaches  ENDOCRINE: no temperature intolerance, no skin/hair changes  PSYCHIATRIC: NEGATIVE for changes in mood or trouble with sleep         Physical Exam:   Vitals  were reviewed  Temp: 98  F (36.7  C) Temp src: Oral BP: 136/64   Heart Rate: 87 Resp: 17 SpO2: 96 % O2 Device: None (Room air)    Physical Exam  Constitutional: She is oriented to person, place, and time and well-developed, well-nourished, and in no distress. No distress.   HENT: Head: Normocephalic and atraumatic.   Right Ear: External ear normal.   Left Ear: External ear normal.   Mouth/Throat: Oropharynx is clear and moist. No oropharyngeal exudate.   Eyes: Conjunctivae are normal. No scleral icterus.   Cardiovascular: Normal rate and regular rhythm.  No murmur heard.  Pulmonary/Chest: Effort normal and breath sounds normal. She has no wheezes.   Abdominal: Soft. She exhibits no distension and no mass. There is no tenderness.   Musculoskeletal: She exhibits no edema on right leg, minimal edema surrounding wound on left leg.  Able to move all toes.  Sensation intact.  Strength intact.  Full range of motion.   Lymphadenopathy: She has no cervical adenopathy.   Neurological: She is alert and oriented to person, place, and time.  CN II-XII grossly intact.  Skin: Skin is warm and dry. She is not diaphoretic. Wound left anterior foreleg.  4 visible punctures with surrounding hematoma.  No drainage.  Erythema surrounding wounds extending past the original purple marking from 7/30.  Tender to palpation.  Minimal edema.    Psychiatric: Mood, memory, affect and judgment normal.                  Data:     Results for orders placed or performed during the hospital encounter of 08/01/17 (from the past 24 hour(s))   CBC with platelets differential   Result Value Ref Range    WBC 4.9 4.0 - 11.0 10e9/L    RBC Count 4.22 3.8 - 5.2 10e12/L    Hemoglobin 13.3 11.7 - 15.7 g/dL    Hematocrit 39.2 35.0 - 47.0 %    MCV 93 78 - 100 fl    MCH 31.5 26.5 - 33.0 pg    MCHC 33.9 31.5 - 36.5 g/dL    RDW 13.4 10.0 - 15.0 %    Platelet Count 210 150 - 450 10e9/L    Diff Method Automated Method     % Neutrophils 61.3 %    % Lymphocytes 25.8 %     % Monocytes 8.6 %    % Eosinophils 3.3 %    % Basophils 0.8 %    % Immature Granulocytes 0.2 %    Nucleated RBCs 0 0 /100    Absolute Neutrophil 3.0 1.6 - 8.3 10e9/L    Absolute Lymphocytes 1.3 0.8 - 5.3 10e9/L    Absolute Monocytes 0.4 0.0 - 1.3 10e9/L    Absolute Eosinophils 0.2 0.0 - 0.7 10e9/L    Absolute Basophils 0.0 0.0 - 0.2 10e9/L    Abs Immature Granulocytes 0.0 0 - 0.4 10e9/L    Absolute Nucleated RBC 0.0    Basic metabolic panel   Result Value Ref Range    Sodium 139 133 - 144 mmol/L    Potassium 3.7 3.4 - 5.3 mmol/L    Chloride 104 94 - 109 mmol/L    Carbon Dioxide 29 20 - 32 mmol/L    Anion Gap 6 3 - 14 mmol/L    Glucose 107 (H) 70 - 99 mg/dL    Urea Nitrogen 10 7 - 30 mg/dL    Creatinine 0.69 0.52 - 1.04 mg/dL    GFR Estimate 83 >60 mL/min/1.7m2    GFR Estimate If Black >90   GFR Calc   >60 mL/min/1.7m2    Calcium 9.2 8.5 - 10.1 mg/dL   Erythrocyte sedimentation rate auto   Result Value Ref Range    Sed Rate 12 0 - 30 mm/h   CRP inflammation   Result Value Ref Range    CRP Inflammation 5.9 0.0 - 8.0 mg/L      All cardiac studies reviewed by me.   All imaging studies reviewed by me.        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        Plan:    ## Cellulitis, recurrent vs incompletely treated  Cellulitis initially improved last admission with IV antibiotics (initially vanc and zosyn but deescalated to IV unasyn the morning after admission). Since being discharged on the augmentin, her cellulitis has worsened.  Initially cause was a cat scratch/bite.  Patient at risk given possible immunocompromise 2/2 MTX.  Patient restarted on IV unasyn in the ED since it seemed to work last admission.  Will consider ID consult or microbial team consult.   May need to monitor patient for longer course when deescalating to oral antibiotics.    - restart IV unasyn 3g Q6H  - continue probiotics  - monitor demarcated area closely  - [facility] nurse consult      ## History or recurent DVT/PE on coumadin  DVT and bilateral PE found incidentally on imaging (2014).  - pharmacy to dose coumadin, goal INR 2-3    ## Wegener's Granulomatosis, in remission  Diagnosed with Wegener's after presented with DVT and bilateral PE, reports no known kidney involvement.  On recent admission discussed methotrexate with Dr. Maguire, patient's rheumatologist and agreed to holding weekly methotrexate (currently weekly doses on fridays) while on antibiotics for active infection (up to 2 weeks). Plan to continue holding MTX.  Consider discussing with Rheumatology if any concerns.    - Last MTX dose 7/21  - continue daily folic acid  - Had been on steroids in past, finished taper in April    ## Reported mitral valve prolapse  Not seen on recent echo.  Propanolol seems to control patient's symptoms and she has not had any episodes since.    - continue propanolol 20mg daily      Daily cares -   F: PO  E: stable  N: Regular diet     Lines: PIV   Activity:-Up as tolerated  CODE: Full  Prophylaxis: on coumadin  PCP communication:  - Phil Abbott  - Contacted at admission: No  - Concerns or updates: None   Dispo: Expected discharge date: 2-3 days pending clinical improvement      Discussed with and examined by faculty Dr. Singh.    Han Magana,   Family Medicine Resident, PGY-3

## 2017-08-02 NOTE — PHARMACY-ADMISSION MEDICATION HISTORY
Admission medication history interview status for the 8/1/2017 admission is complete. See Epic admission navigator for allergy information, pharmacy, prior to admission medications and immunization status.     Medication history interview sources:  Patient    Changes made to PTA medication list (reason)  Added: none  Deleted: none  Changed: Calcium PO 1 tablet twice daily-->  Calcium - magnesium 500- 250 mg one tablet twice daily   Cholecalciferol PO 1 daily ---> Cholecalciferol 1000 units by mouth daily    Additional medication history information (including reliability of information, actions taken by pharmacist):  Patient readily recalls all current medication regimens by name, strength, and frequency of administration.    1.  ID  - Cats scratch/bite -  Patient started on Augmentin 875 -125 mg twice daily for 10 days on 7/27 AM. Patient presented later on 7/27 pm for worsening symptom associated with SSTI. Antibiotic coverage was broadened to Vanco/ Zosyn initially during admission 7/27-7/28 but then later narrowed to Unasyn 7/28-7/30 prior to being discharged on 7/30 with Augmentin 875-125 mg twice daily. Patient reports missing no doses after discharge before again for worsening symptoms of infection on 8/1.       2. 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 Patient's current warfarin schedule is 4 mg on all days. Her INR's are checked every 6 weeks at the [facility] in [place]. Patient reports her current INR goal is 2 - 2.5.      Prior to Admission medications    Medication Sig Last Dose Taking? Auth Provider   calcium-magnesium (CALMAG) 500-250 MG TABS per tablet Take 1 tablet by mouth 2 times daily (with meals) 8/1/2017 at am  Yes Unknown, Entered By History   cholecalciferol 1000 UNITS TABS Take 1,000 Units by mouth daily 8/1/2017 at am  Yes Unknown, Entered By History   lactobacillus rhamnosus, GG, (CULTURELL) capsule  Take 1 capsule by mouth 3 times daily (before meals) 8/1/2017 at Unknown time Yes Sergio Suero MD   amoxicillin-clavulanate (AUGMENTIN) 875-125 MG per tablet Take 1 tablet by mouth 2 times daily for 10 days 8/1/2017 at 0700 Yes Cindy Yun PA-C   warfarin (COUMADIN) 2 MG tablet TAKE 2 TABLETS(4 MG) BY MOUTH ONCE DAILY 7/31/2017 at pm  Yes Phil Abbott MD   folic acid (FOLVITE) 1 MG tablet Take 1 tablet (1 mg) by mouth daily 8/1/2017 at Unknown time Yes Alvaro Maguire MD   propranolol (INDERAL) 20 MG tablet TAKE 1 TABLET(20 MG) BY MOUTH DAILY 8/1/2017 at am  Yes Phil Abbott MD   acetaminophen (TYLENOL) 325 MG tablet Take 2 tablets (650 mg) by mouth every 6 hours as needed for mild pain Past Week at Unknown time Yes Ari Trinidad MD   methotrexate 2.5 MG tablet CHEMO Take 4 tablets (10 mg) by mouth once a week  Patient taking differently: Take 10 mg by mouth once a week On Fridays 7/21/2017 at am   Alvaro Maguire MD         Medication history completed by: Ari Vallecillo, Pharmacy Student

## 2017-08-02 NOTE — PLAN OF CARE
Problem: Individualization  Goal: Patient Preferences  Outcome: No Change  Vitals:     08/01/17 2334 08/02/17 0000 08/02/17 0718 08/02/17 1150   BP: 136/64 155/73 129/73 125/73   BP Location:       Right arm   Pulse: 87 79 85 69   Resp: 18 18 18 18   Temp: 98.2  F (36.8  C) 97.7  F (36.5  C) 97.2  F (36.2  C) 98.1  F (36.7  C)   TempSrc: Oral Oral Oral Oral   SpO2: 100%   98% 98%   Weight:   57.3 kg (126 lb 4.8 oz)       Height:           AVSS. No c/o pain. Pt reports some discomfort when she first steps on to her LLE but pain diminishes when she is up walking. LLE wound with x 4 dark scabbed spots. Minimal drainage. Marked redness area that continues to receede on LLE. Area is worm to touch compared to her RLE. Wound covered with Exudry and kept in place with Surgilast to avoid irritation from tapes. Pt up walking on unit independently. Continues on iv Unasyn q 6hrs. PIV saline locked between antibiotics. Good oral intake with no c/o nausea. Voiding and not saving. Stable. Continue with current cares.

## 2017-08-02 NOTE — PLAN OF CARE
Problem: Goal Outcome Summary  Goal: Goal Outcome Summary  Outcome: No Change  [id number]: Pt admitted from ED at 2345. A&Ox4, VSS. Up SBA, calls appropriately. Hx of Min's syndrome, DVTs/PEs, and mitral valve prolapse. Pt sustained cat bite to LLE 7/26/17. Admitted at [facility] 7/27-7/29 for IV abx. Returned to clinic today for eval and was told to come to ED for evaluation of worsening cellulitis around wound. Four puncture sites, no drainage. Genevieve wound very reddened. Elevated on pillow. RPIV TKO with intermittent Unasyn Q6 hours. Warfarin 4mg given overnight as patient missed HS dose. Denies pain. Regular diet, thin liquids. No  N/V/D. Voiding spont, no BM this shift. Will continue to monitor and follow POC.

## 2017-08-02 NOTE — ED PROVIDER NOTES
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 The patient was hospitalized here 7/27-7/29 under the [facility] service for management of a combination cat bite and scratch wound of the left lower leg. The cat was an indoor cat with up-to-date vaccinations. She failed outpatient Augmentin initially and thus was admitted here on 7/27 where she required IV antibiotics, initially of vancomycin and Zosyn, complicated by red man syndrome, eventually deescalating to IV Unasyn, prior to eventual transition to PO Augmentin to be continued at home. She had significant improvement in the cellulitis while inpatient evident at time of discharge. The patient reports that since her discharge 2 days ago, she has taken 4 doses of Augmentin (once Sunday afternoon, 2 yesterday, 1 this morning). Despite this, she has had recurrence of redness in the area of the wound, which has been worsening. The patient denies any fevers. She was previously on MTX but ceased this during her recent admission, to be continued after resolution of the infection.    Current Facility-Administered Medications   Medication     warfarin (COUMADIN) tablet 5 mg     Warfarin Therapy Reminder (Check START DATE - warfarin may be starting in the FUTURE)     acetaminophen (TYLENOL) tablet 650 mg     folic acid (FOLVITE) tablet 1 mg     lactobacillus rhamnosus (GG) (CULTURELL) capsule 1 capsule     propranolol (INDERAL) tablet 20 mg     naloxone (NARCAN) injection 0.1-0.4 mg     Patient is already receiving anticoagulation with heparin, enoxaparin (LOVENOX), warfarin (COUMADIN)  or other anticoagulant medication     senna-docusate (SENOKOT-S;PERICOLACE) 8.6-50 MG per tablet 1-2 tablet     polyethylene  glycol (MIRALAX/GLYCOLAX) Packet 17 g     bisacodyl (DULCOLAX) Suppository 10 mg     ondansetron (ZOFRAN-ODT) ODT tab 4 mg    Or     ondansetron (ZOFRAN) injection 4 mg     ampicillin-sulbactam (UNASYN) 3 g vial to attach to  mL bag     Facility-Administered Medications Ordered in Other Encounters   Medication     sodium chloride (PF) 0.9% PF flush 60 mL     Past Medical History:   Diagnosis Date     Atypical pneumonia 6/14/2014     Atypical pneumonia      Coronary artery disease 1982    heart beat hard made me tired     Dyslipidemia      Fibroids      GERD (gastroesophageal reflux disease)      Granulomatosis with polyangiitis (Wegener's)      Hearing loss      Inflammatory arthritis     thumb     Migraines      MVP (mitral valve prolapse)     had an echo that was normal in 2007 she is on Inderal     Osteopenia      PE (pulmonary embolism) 10/2014    ? GPA associated, on warfarin      Synovitis of wrist 2009    right       Past Surgical History:   Procedure Laterality Date     ABDOMEN SURGERY      appendix out     APPENDECTOMY       BIOPSY  1972    cone biopsy     CL AFF SURGICAL PATHOLOGY      cone biopsy 1975     COLONOSCOPY       EYE SURGERY      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     HC ESOPHAGOSCOPY, DIAGNOSTIC       OPTICAL TRACKING SYSTEM BRONCHOSCOPY  6/19/2014    Procedure: OPTICAL TRACKING SYSTEM BRONCHOSCOPY;  Surgeon: Angel Kathleen MD;  Location:  GI     SURGICAL HISTORY OF -   as a child    strabismus repair right eye     SURGICAL HISTORY OF -   8-2009    right wrist debridement     TONSILLECTOMY      as a child     TONSILLECTOMY & ADENOIDECTOMY       TUBAL LIGATION         Family History   Problem Relation Age of Onset     Respiratory Mother      emphysema     Aneurysm Mother      Chronic Obstructive Pulmonary Disease Mother      Thyroid Disease Mother      ,     OSTEOPOROSIS Mother      Other Cancer Father      CANCER Father      lung cancer     Arthritis Maternal Grandmother       "rheumatoid     HEART DISEASE Maternal Grandmother      unsure of details     HEART DISEASE Maternal Grandfather      Neurologic Disorder Paternal Grandmother      migraines     Alzheimer Disease Paternal Grandmother      Unknown/Adopted Paternal Grandfather      CANCER Sister      stage 4 anal cancer age 62 years     Colon Cancer Sister      Cancer - colorectal Brother      Colon Cancer Brother      Substance Abuse Sister      Anesthesia Reaction Daughter        Social History   Substance Use Topics     Smoking status: Former Smoker     Packs/day: 2.00     Years: 18.00     Types: Cigarettes     Start date: 1/1/1960     Quit date: 1/1/1982     Smokeless tobacco: Never Used     Alcohol use No        Allergies   Allergen Reactions     Vancomycin Other (See Comments)     Red man's syndrom     Adhesive Tape      Rash itches     Amoxicillin      vomiting     Augmentin      vomiting     Codeine      vomiting     Erythromycin      vomiting     Nickel      itches rash     Sulfa Drugs Itching     Tegaderm Alginate Ag      LW Other1: -ALL MEDICATIONS MAKE PATIENT VIOLENTLY ILL; ADHESIVE BANDAGES; NICKEL     Zithromax [Azithromycin Dihydrate]      Vomiting/ skin blisters       I have reviewed the Medications, Allergies, Past Medical and Surgical History, and Social History in the Epic system.    Review of Systems   Constitutional: Negative for fever.   Respiratory: Negative for shortness of breath.    Cardiovascular: Negative for chest pain.   Gastrointestinal: Negative for abdominal pain.   Skin: Positive for color change (worsening redness of LLE in the area of cat scratch).   All other systems reviewed and are negative.      Physical Exam   BP: 136/64  Heart Rate: 87  Temp: 98  F (36.7  C)  Resp: 17  Height: 162.6 cm (5' 4\")  Weight: 57.2 kg (126 lb)  SpO2: 96 %  Physical Exam   Constitutional: She is oriented to person, place, and time. Vital signs are normal. She appears well-developed and well-nourished.  Non-toxic " appearance. She does not appear ill. No distress.   HENT:   Head: Normocephalic and atraumatic.   Mouth/Throat: Oropharynx is clear and moist. No oropharyngeal exudate.   Eyes: Conjunctivae and EOM are normal. Pupils are equal, round, and reactive to light. No scleral icterus.   Neck: Normal range of motion. Neck supple. No JVD present. No tracheal deviation present. No thyromegaly present.   Cardiovascular: Normal rate, regular rhythm, normal heart sounds and intact distal pulses.  Exam reveals no gallop and no friction rub.    No murmur heard.  Pulmonary/Chest: Effort normal and breath sounds normal. No respiratory distress.   Abdominal: Soft. Bowel sounds are normal. She exhibits no distension and no mass. There is no tenderness.   Musculoskeletal: Normal range of motion. She exhibits no edema or tenderness.   Lymphadenopathy:     She has no cervical adenopathy.   Neurological: She is alert and oriented to person, place, and time. She has normal strength. No cranial nerve deficit or sensory deficit.   Skin: Skin is warm and dry. Ecchymosis noted. No rash noted. There is erythema. No pallor.        Psychiatric: She has a normal mood and affect. Her behavior is normal.   Nursing note and vitals reviewed.      ED Course     ED Course     Procedures        Labs Ordered and Resulted from Time of ED Arrival Up to the Time of Departure from the ED   BASIC METABOLIC PANEL - Abnormal; Notable for the following:        Result Value    Glucose 107 (*)     All other components within normal limits   CBC WITH PLATELETS DIFFERENTIAL   ERYTHROCYTE SEDIMENTATION RATE AUTO   CRP INFLAMMATION            Assessments & Plan (with Medical Decision Making)     This patient presented to the emergency department with worsening clinical symptoms of cellulitis from cat bite/scratch.  She is worsening despite oral antibiotics as an outpatient.  She does not appear septic, but given her relative immunocompromised state and worsening of  symptoms despite outpatient antibiotics, I feel that admitting the patient for reinitiation of IV antibiotics is warranted.  This was discussed with [facility] service who evaluated the patient in the emergency department.  Patient was admitted and transferred upstairs in stable condition.    I have reviewed the nursing notes.    I have reviewed the findings, diagnosis, plan and need for follow up with the patient.    Current Discharge Medication List          Final diagnoses:   Infected cat bite of lower leg, left, initial encounter     I, Jitendra Cary, am serving as a trained medical scribe to document services personally performed by Tan Rodriguez MD, based on the provider's statements to me.      I, Tan Rodriguez MD, was physically present and have reviewed and verified the accuracy of this note documented by Jitendra Cary.    8/1/2017   [facility], [place], EMERGENCY DEPARTMENT     Tan Rodriguez MD  08/03/17 0934

## 2017-08-02 NOTE — PROGRESS NOTES
"Social Work Services Progress Note    Hospital Day: 2  Date of Initial Social Work Evaluation:  Not yet completed  Collaborated with:  Chart review, team rounds, bedside nurse, pt, YRIS Patel    Data:  Pt was admitted with cellulitis from a cat bite and is currently on IV abx. Received update from bedside nurse that pt was requesting to see sw regarding some questions about coverage.     Intervention:  Sw met with pt in pt's room and introduced self and role. Pt reported she is doing well and that her \"owies\" are getting better. Pt explained that there are currently 4 options the medical team is considering for antibiotics, with a final plan still to be determined. Pt provided sw with card for [facility] and requested an IV abx benefit check and would like her demographics and med list faxed to [phone].   Sw explained sw role vs RNCC role and explained that the above information will be given to YRIS Rondon. Sw explained that final abx plan will determine whether or not pt qualifies for a TCU stay. Pt reported that her daughter does a lot of work with different TCUs and would be able to provide some TCU choices just in case this needs to be pursued. Pt plans to talk with her daughter about this.     Sw spoke with YRIS Patel, who reported that she is submitting a referral to pt's choice home care agency to see if pt can get coverage for Q6 IV abx and if this can't be approved it will be explored if pt can do BID IV abx and if that can't work either Amanda will notify sw that TCU needs to be explored. Until IV abx plan is definite TCU referrals cannot be made as facilities would need to know the abx plan before knowing if pt can be accepted.     Assessment:  See bedside RN, medical team notes    Plan:    Anticipated Disposition:  TBD, dependant on IV abx plan    Barriers to d/c plan:  Medical readiness, determination of IV abx plan    Follow Up:  TBD, sw will continue to follow for d/c planning    Felicia" ELVIA Carlos, MSW  7B   [phone] (pager) [phone]  8/2/2017

## 2017-08-02 NOTE — PROGRESS NOTES
[facility] Nurse Inpatient Wound Assessment     Initial Assessment of wound(s) on pt's:   Left lower leg        Data:   Patient History:      per MD note(s): Nury Cárdenas is a 72 year old  female with a significant past medical history of bilateral PE and a DVT 2/2 Wegener's, on coumadin and MTX, with recent hospitalization - for cellulitis who presents with recurrent cellulitis since discharge 3 days ago.      Initially the day before her first admission on  she was clawed by a neighbor's cat while house-sitting.  The cat was an indoor cat and up to date on vaccinations.  She was started on augmentin by urgent care at that time. Her leg became more swollen and due to her history of immunosuppression on MTX she came to the ED.       Moisture Management:  NA    Current Diet / Nutrition:           Active Diet Order      Combination Diet Regular Diet Adult                Jhoan Assessment and sub scores:   Jhoan Score  Av.6  Min: 20  Max: 22     Labs:         Recent Labs   Lab Test  17   1013  17   1143   14   0856   ALBUMIN   --    --    --   3.9   < >   --    HGB   --   13.3   < >  14.0   < >  13.7   RBC   --   4.22   < >  4.36   < >  4.42   WBC   --   4.9   < >  4.5   < >  7.8   PLT   --   210   < >  195   < >  244   INR  1.62*   --    < >   --    < >   --    A1C   --    --    --    --    --   5.5   CRP   --   5.9   < >  <2.9   < >  <2.9    < > = values in this interval not displayed.          Wound Assessment (location #1):   Left lower leg  Wound History:  See above      pic taken- 17      Wound Base: dried blood    Specific Dimensions (length x width x depth, in cm) :   4 small areas of dried blood vs thin superficial scab    Tunneling:  N/A    Undermining: N/A    Palpation of the wound bed:  Minimally firm    Slough appearance:  none    Eschar appearance:  none    Periwound Skin: intact and resolving erythema,      Color:  red    Temperature  warm    Drainage:  none.     Odor: none    Pain:  minimal and moderate with palpation          Intervention:     Patient's chart evaluated.      Wound(s) was assessed    Wound Care: was done:  Applied dried gauze and secured with surgilast     Orders  Written    Supplies  Reviewed    Discussed plan of care with Patient and Nurse          Assessment:       Traumatic injury to left lower extremity 2/2 clawed by a cat.   Resolving infection, she is on IV antibiotics.        Plan:     Nursing to notify the Provider(s) and re-consult the WOC Nurse if wound(s) deteriorate(s).    Plan of care for wound located on Left lower leg: Continue to cover with dry gauze and secure with Surgilast.    WOC Nurse will return: No further visit planned, will sign off.     Face to face time: <15 Minutes

## 2017-08-03 ENCOUNTER — HOME INFUSION (PRE-WILLOW HOME INFUSION) (OUTPATIENT)
Dept: PHARMACY | Facility: CLINIC | Age: 72
End: 2017-08-03

## 2017-08-03 LAB — INR PPP: 1.71 (ref 0.86–1.14)

## 2017-08-03 PROCEDURE — 40000556 ZZH STATISTIC PERIPHERAL IV START W US GUIDANCE

## 2017-08-03 PROCEDURE — A9270 NON-COVERED ITEM OR SERVICE: HCPCS | Mod: GY | Performed by: FAMILY MEDICINE

## 2017-08-03 PROCEDURE — 85610 PROTHROMBIN TIME: CPT | Performed by: FAMILY MEDICINE

## 2017-08-03 PROCEDURE — 12000008 ZZH R&B INTERMEDIATE UMMC

## 2017-08-03 PROCEDURE — 25000132 ZZH RX MED GY IP 250 OP 250 PS 637: Mod: GY | Performed by: FAMILY MEDICINE

## 2017-08-03 PROCEDURE — 36415 COLL VENOUS BLD VENIPUNCTURE: CPT | Performed by: FAMILY MEDICINE

## 2017-08-03 PROCEDURE — 25000125 ZZHC RX 250: Performed by: FAMILY MEDICINE

## 2017-08-03 RX ORDER — METRONIDAZOLE 500 MG/1
500 TABLET ORAL EVERY 8 HOURS SCHEDULED
Status: DISCONTINUED | OUTPATIENT
Start: 2017-08-03 | End: 2017-08-05 | Stop reason: HOSPADM

## 2017-08-03 RX ORDER — WARFARIN SODIUM 5 MG/1
5 TABLET ORAL
Status: COMPLETED | OUTPATIENT
Start: 2017-08-03 | End: 2017-08-03

## 2017-08-03 RX ORDER — LEVOFLOXACIN 750 MG/1
750 TABLET, FILM COATED ORAL DAILY
Status: DISCONTINUED | OUTPATIENT
Start: 2017-08-03 | End: 2017-08-05 | Stop reason: HOSPADM

## 2017-08-03 RX ADMIN — FOLIC ACID 1 MG: 1 TABLET ORAL at 08:06

## 2017-08-03 RX ADMIN — WARFARIN SODIUM 5 MG: 5 TABLET ORAL at 19:44

## 2017-08-03 RX ADMIN — Medication 1 CAPSULE: at 10:47

## 2017-08-03 RX ADMIN — METRONIDAZOLE 500 MG: 500 TABLET ORAL at 13:18

## 2017-08-03 RX ADMIN — Medication 1 CAPSULE: at 08:05

## 2017-08-03 RX ADMIN — LEVOFLOXACIN 750 MG: 750 TABLET, FILM COATED ORAL at 10:47

## 2017-08-03 RX ADMIN — METRONIDAZOLE 500 MG: 500 TABLET ORAL at 22:29

## 2017-08-03 RX ADMIN — Medication 1 CAPSULE: at 17:30

## 2017-08-03 RX ADMIN — PROPRANOLOL HYDROCHLORIDE 20 MG: 20 TABLET ORAL at 08:06

## 2017-08-03 RX ADMIN — AMPICILLIN SODIUM AND SULBACTAM SODIUM 3 G: 2; 1 INJECTION, POWDER, FOR SOLUTION INTRAMUSCULAR; INTRAVENOUS at 05:35

## 2017-08-03 RX ADMIN — AMPICILLIN SODIUM AND SULBACTAM SODIUM 3 G: 2; 1 INJECTION, POWDER, FOR SOLUTION INTRAMUSCULAR; INTRAVENOUS at 00:27

## 2017-08-03 ASSESSMENT — ENCOUNTER SYMPTOMS
WOUND: 1
SORE THROAT: 0
COLOR CHANGE: 1
CHILLS: 0
SHORTNESS OF BREATH: 0
HEADACHES: 0
LIGHT-HEADEDNESS: 0
COUGH: 0
CONSTIPATION: 0
RHINORRHEA: 0
ARTHRALGIAS: 0
NAUSEA: 0
ABDOMINAL PAIN: 0
MYALGIAS: 0
DIARRHEA: 0
VOMITING: 0
FEVER: 0
DYSURIA: 0

## 2017-08-03 NOTE — PLAN OF CARE
Problem: Goal Outcome Summary  Goal: Goal Outcome Summary  Outcome: No Change  Vitals:     08/02/17 1947 08/02/17 2224 08/03/17 0458 08/03/17 0700   BP: 125/69 127/66 143/68 134/77   BP Location: Right arm Right arm Right arm Right arm   Pulse: 81 77 78 76   Resp: 18 18 18 18   Temp: 97.3  F (36.3  C) 97.5  F (36.4  C) 97.5  F (36.4  C) 97.8  F (36.6  C)   TempSrc: Oral Oral Oral Oral   SpO2: 97% 93% 95% 95%   Weight:           Height:             Afebrile. VSS. O2 sats high 90s on RA. Lung sounds clear. Left lower leg reddened, redness within marked area, bite wounds scabbed, no drainage noted. Zosyn administered per orders. Pedal pulses present, CMS intact. Denies pain. Up to bathroom with SBA, voiding, not saving. Slept in between cares. Continue POC.

## 2017-08-03 NOTE — PROGRESS NOTES
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 re-admitted for LLE cellulitis without abscess secondary to cat scratch/bite after failing outpatient oral antibiotics  Patient Active Problem List   Diagnosis     GERD (gastroesophageal reflux disease)     Fibroids     Migraines     Hearing loss     Osteopenia     HYPERLIPIDEMIA LDL GOAL <160     Advanced directives, counseling/discussion     Inflammatory arthritis     Synovitis of wrist     Chronic constipation     Granulomatosis with polyangiitis (H)     Chronic steroid use     Dyspnea     PE (pulmonary embolism)     Cataract, mild, ou     Calculus of kidney, right     Chronic anticoagulation     Long-term (current) use of anticoagulants [Z79.01]     Long-term use of immunosuppressant medication     Primary osteoarthritis involving multiple joints     Cellulitis     Cat scratch left lower extremity      Plan:   ## LLE cellulitis secondary to cat scratch/bite  ## Failed oral antibiotics (augmentin) after initial improvement on IV unasyn  Vancomycin and Zosyn (7/27-7/28);   Unasyn (7/28-7/30)  Augmentin (7/30-8/1)  Unasyn (8/1-8/3)  Levofloxacin and metronidazole (8/3- present)  - Tetanus booster given 7/28  - Discussed PO antibiotic regimen with ID today  - Monitor next 24-48 hours to ensure continued improvement with PO antibiotics  - Lactobacillus for antibiotic associated diarrhea prophylaxis  - Plan for 14 day course antibiotics from re-admission (end date 8/15)     ## History or recurent DVT/PE on coumadin  - Continue coumadin, goal INR 2-3     ## Min's Granulomatosis, in remission  .- Discussed methotrexate with Dr. Maguire, patient's rheumatologist on her last admission (7/27-7/30) and agrees to holding weekly methotrexate (currently weekly doses  on fridays) while on antibiotics for active infection.  Last dose 7/21/2017.  May restart any day of the week after antibiotics complete.    Fluids:PO  Electrolytes:WNL  Nutrition:Regular diet  Lines:PIV  Activity: -Up as tolerated  CODE: Full Code  Prophylaxis: mechanical (patient on coumadin for recurrent DVT hx)  PCP communication:  - Phil Abbott  - Contacted at admission: No  - Concerns or updates: N/A  Dispo: Expected discharge date 1-2 days pending antibiotic plan              Interval History:   Nury Cárdenas is doing well.  She says she continues to improve with decreasing pain, redness, and swelling since receiving IV antibiotics.  No fevers or chills.  No drainage.  OOB and ambulating well.            Review of Systems:   Review of Systems   Constitutional: Negative for chills and fever.   HENT: Negative for rhinorrhea and sore throat.    Respiratory: Negative for cough and shortness of breath.    Cardiovascular: Negative for chest pain.   Gastrointestinal: Negative for abdominal pain, constipation, diarrhea, nausea and vomiting.   Genitourinary: Negative for dysuria.   Musculoskeletal: Negative for arthralgias and myalgias.   Skin: Positive for color change and wound.        Dark purple erythema left medial leg   Neurological: Negative for light-headedness and headaches.            Physical Exam (Resident / Clinician):     Vitals were reviewed  Patient Vitals for the past 12 hrs:   BP Temp Temp src Pulse Resp SpO2   08/03/17 0700 134/77 97.8  F (36.6  C) Oral 76 18 95 %   08/03/17 0458 143/68 97.5  F (36.4  C) Oral 78 18 95 %     I/O last 3 completed shifts:  In: 823 [P.O.:480; I.V.:343]  Out: -     Physical Exam   Constitutional: She is oriented to person, place, and time. She appears well-developed and well-nourished. No distress.   HENT:   Head: Normocephalic and atraumatic.   Neck: Neck supple.   Cardiovascular: Normal rate, regular rhythm and normal heart sounds.    No murmur  heard.  Pulmonary/Chest: Effort normal and breath sounds normal. No respiratory distress. She has no wheezes. She has no rales.   Musculoskeletal: She exhibits no edema.   No inguinal lymphadenopathy   Neurological: She is alert and oriented to person, place, and time.   Skin: Skin is warm and dry. There is erythema.   Purple ecchymosis/erythema in close [place] around wounds.  No evidence of necrosis.  Bedsode US showed no underlying undrained fluid collection.  Erythema continues to improve now within the marked area at the time of previous discharge 7/30/17   Psychiatric: She has a normal mood and affect.             Data:   ROUTINE LABS (Last four results)  CMP    Recent Labs  Lab 08/01/17 2012 07/30/17  0606 07/29/17  0636 07/27/17  2056    140 140 141   POTASSIUM 3.7 3.6 3.6 4.0   CHLORIDE 104 104 109 106   CO2 29 28 27 27   ANIONGAP 6 7 5 7   * 97 98 118*   BUN 10 7 7 9   CR 0.69 0.70 0.63 0.69   GFRESTIMATED 83 82 >90Non  GFR Calc 83   GFRESTBLACK >90African American GFR Calc >90African American GFR Calc >90African American GFR Calc >90African American GFR Calc   CLINTON 9.2 9.2 8.4* 9.0   MAG  --  2.2 2.2  --    PHOS  --  3.5 2.7  --      CBC    Recent Labs  Lab 08/01/17 2012 07/30/17  0606 07/29/17  0636 07/28/17  0642   WBC 4.9 4.8 6.0 5.9   RBC 4.22 4.40 4.13 4.23   HGB 13.3 14.0 13.2 13.4   HCT 39.2 41.1 38.6 39.2   MCV 93 93 94 93   MCH 31.5 31.8 32.0 31.7   MCHC 33.9 34.1 34.2 34.2   RDW 13.4 13.6 13.7 13.8    185 163 164     INR    Recent Labs  Lab 08/03/17  0823 08/02/17  1013 07/30/17  0606 07/29/17  0636   INR 1.71* 1.62* 1.66* 2.07*     CRP    Recent Labs  Lab 08/01/17 2012 07/30/17  0606 07/29/17  0636 07/28/17  0642   CRP 5.9 16.0* 24.0* 29.0*         Blood culture:  Invalid input(s): BC   Urine culture:  No results for input(s): URC in the last 168 hours.  All cultures:    Recent Labs  Lab 08/01/17 2012   CULT No growth after 2 days           Medications:      Current Facility-Administered Medications   Medication     warfarin (COUMADIN) tablet 5 mg     levofloxacin (LEVAQUIN) tablet 750 mg     metroNIDAZOLE (FLAGYL) tablet 500 mg     Warfarin Therapy Reminder (Check START DATE - warfarin may be starting in the FUTURE)     acetaminophen (TYLENOL) tablet 650 mg     folic acid (FOLVITE) tablet 1 mg     lactobacillus rhamnosus (GG) (CULTURELL) capsule 1 capsule     propranolol (INDERAL) tablet 20 mg     naloxone (NARCAN) injection 0.1-0.4 mg     Patient is already receiving anticoagulation with heparin, enoxaparin (LOVENOX), warfarin (COUMADIN)  or other anticoagulant medication     senna-docusate (SENOKOT-S;PERICOLACE) 8.6-50 MG per tablet 1-2 tablet     polyethylene glycol (MIRALAX/GLYCOLAX) Packet 17 g     bisacodyl (DULCOLAX) Suppository 10 mg     ondansetron (ZOFRAN-ODT) ODT tab 4 mg    Or     ondansetron (ZOFRAN) injection 4 mg     Facility-Administered Medications Ordered in Other Encounters   Medication     sodium chloride (PF) 0.9% PF flush 60 mL       Caring Physician: Estuardo Garcia MD  [facility], [place]'s  Pager Contact: see Physician sticky note

## 2017-08-03 NOTE — PLAN OF CARE
Problem: Individualization  Goal: Patient Preferences  Outcome: No Change  Vitals:     08/02/17 1947 08/02/17 2224 08/03/17 0458 08/03/17 0700   BP: 125/69 127/66 143/68 134/77   BP Location: Right arm Right arm Right arm Right arm   Pulse: 81 77 78 76   Resp: 18 18 18 18   Temp: 97.3  F (36.3  C) 97.5  F (36.4  C) 97.5  F (36.4  C) 97.8  F (36.6  C)   TempSrc: Oral Oral Oral Oral   SpO2: 97% 93% 95% 95%   Weight:           Height:           AVSS. Minimal c/o pain. LLE redness continues to decrease. Had a small old drainage that is dried up from earlier when she was up walking. Wound open to air. LLE elevated on pillow. OOB walking independently. Pt is voiding and not saving. Unasyn iv dc/ed this am. Pt is currently on both Flagyl 500mg po TID and Levaquin 750mg po qd started today. Plan is to continue to monitor LLE wound status tonight and response to the current antibiotic thearpy. Possible discharge tomorrow if wound continues to improve.

## 2017-08-03 NOTE — PROGRESS NOTES
Care Coordinator Progress Note     Admission Date/Time:  8/1/2017  Attending MD:  Nury Singh MD     Data  Chart reviewed, discussed with interdisciplinary team.   Patient was admitted for: Infected cat bite of lower leg, left, initial encounter.    Concerns with insurance coverage for discharge needs: None.  Current Living Situation: Patient lives alone.  Support System: Supportive and Involved  Services Involved: none prior to arrival  Transportation: Family or Friend will provide  Barriers to Discharge: pending medical clearance    Coordination of Care  8/3 1043: RNCC received report from [place] Resident and Attending Physicians - patient will be transitioned to PO antibiotic medication today and monitored inpatient for 24 hours. If the PO antibiotic fails plan would be to discharge on IV ABX as originally planned. Anticipate discharge tomorrow or Saturday depending on patients reaction to transition of antibiotics.    Nela SAUCEDAN RN PHN  Patient Care Management Coordinator for    [place], [place] 9, and 5A & 5B Off Service Patients  [facility]  Phone: [phone]  Pager: [phone]      8/3 0957: Copay for home IV ABX- med $69.25 per week and per [facility] charge for compounding medication is $20 per day for a total of $209.00 per week out of pocket.  She would not have home nursing coverage.  [facility] will find out if we can send pt to an out pt infusion center for the picc line cares weekly and then [facility] would just deliver meds-abx &  flushes and pump to administer.    [facility] intake phone: [phone]     Per [facility]: Pt has [employer] (it's a Medicare replacement of part B), will have coverage with no ded, 80% coverage up to max OOP $3400 ($40 met). She would have coverage for RN in home, does not need to be home bound for this service.  RNCC Amanda spoke with daughter Ina regarding  information and she states that their best case would be out pt infusion center if IV abx are needed and RN is not covered in the home through home infusion and pts insurance.  Per [facility] this is covered.  If out pt ABX then would need to be on a qd or bid schedule at the most and she would like [facility] out pt infusion center.    [facility]  PH: [phone]  Fax: [phone]  Scheduling line: [phone] #1   Hours 8 am to 8 pm.       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   [facility] has seen pt and written recommendations for care of leg wound.  Per care team pt will dc with continued IV abx. Pts and her daughter would like referral and benefit check to be completed by [facility], referral faxed and called to [facility].  [facility]- Unasyn currently q 6 hrs.  Phone: [phone]  Fax: [phone]    Daughter and pt would like benefit check completed and if this is not covered would like to look into TCU or out pt infusion.    Pt could do out pt infusion if only 1-2 times daily abx, TCU if she is needing it more frequently.  Daughter requested [facility] in [place] as an option.       Plan  RNCC will continue to follow up on benefits and present options for pt and family.    Dc possible with in 1-2 days.       Referrals: Provided patient/family with options for Home Infusion. Vs TCU vs out pt infusion center at [facility]- referral called and sent  [phone]  Fax: [phone]    [facility]-opening confirmed for bid  PH: [phone]  Fax: [phone]  Scheduling line: [phone] #1   Hours 8 am to 8 pm.      [facility]CU    Amanda Ramírez, RNCC, BSN     [facility]  [facility]  [address] [place] [address]    [email or web address]  [facility]    Office: [phone] Pager: [phone]

## 2017-08-03 NOTE — PROGRESS NOTES
RNCC Update:       Copay for home IV ABX- med $69.25 per week and per [facility] charge for compounding medication is $20 per day for a total of $209.00 per week out of pocket.  She would not have home nursing coverage.  [facility] will find out if we can send pt to an out pt infusion center for the picc line cares weekly and then [facility] would just deliver meds-abx &  flushes and pump to administer.    [facility] intake phone: [phone]    Per [facility]: Pt has [employer] (it's a Medicare replacement of part B), will have coverage with no ded, 80% coverage up to max OOP $3400 ($40 met). She would have coverage for RN in home, does not need to be home bound for this service.      Spoke with daughter Ina regarding information and she states that their best case would be out pt infusion center if IV abx are needed and RN is not covered in the home through home infusion and pts insurance.  Per [facility] this is covered.  If out pt ABX then would need to be on a qd or bid schedule at the most and she would like [facility] pt infusion center  [facility]  PH: [phone]  Fax: [phone]  Scheduling line: [phone] #1   Hours 8 am to 8 pm.      Amanda Ramírez, RNCC, BSN    [facility]    Medicine Group  [address] [place] [address]    [email or web address]  [email or web address]    Office: [phone] Pager: [phone]

## 2017-08-03 NOTE — PROGRESS NOTES
Therapy: IV abx   Insurance: [employer]  Ded: $0  Co-Insurance: 80%  Max Out of Pocket: $3400  Met: $40    In reference to admission on 8/1/17 to check coverage for IV abx.     Please contact Intake with any questions, [phone] or In Basket pool, [facility] ([id number]).

## 2017-08-03 NOTE — PLAN OF CARE
"Problem: Goal Outcome Summary  Goal: Goal Outcome Summary  Outcome: Improving  /66 (BP Location: Right arm)  Pulse 77  Temp 97.5  F (36.4  C) (Oral)  Resp 18  Ht 1.626 m (5' 4\")  Wt 56.3 kg (124 lb 1.6 oz)  SpO2 93%  BMI 21.3 kg/m2  Pt afebrile, vitals stable, denies pain, leg wound redness improving, scant drainage. PIV infiltrated- pt needs new PIV and IV antibiotics restarted. Pt may benefit from a midline IV due to failing peripheral IV's, twice infiltrated today. Tolerating regular diet, voiding, endorses some loose stools. Cont with plan of care.       "

## 2017-08-04 LAB — INR PPP: 1.88 (ref 0.86–1.14)

## 2017-08-04 PROCEDURE — A9270 NON-COVERED ITEM OR SERVICE: HCPCS | Mod: GY | Performed by: FAMILY MEDICINE

## 2017-08-04 PROCEDURE — 25000132 ZZH RX MED GY IP 250 OP 250 PS 637: Mod: GY | Performed by: FAMILY MEDICINE

## 2017-08-04 PROCEDURE — 36415 COLL VENOUS BLD VENIPUNCTURE: CPT | Performed by: FAMILY MEDICINE

## 2017-08-04 PROCEDURE — 12000008 ZZH R&B INTERMEDIATE UMMC

## 2017-08-04 PROCEDURE — 85610 PROTHROMBIN TIME: CPT | Performed by: FAMILY MEDICINE

## 2017-08-04 RX ORDER — WARFARIN SODIUM 5 MG/1
5 TABLET ORAL
Status: COMPLETED | OUTPATIENT
Start: 2017-08-04 | End: 2017-08-04

## 2017-08-04 RX ADMIN — Medication 1 CAPSULE: at 17:14

## 2017-08-04 RX ADMIN — LEVOFLOXACIN 750 MG: 750 TABLET, FILM COATED ORAL at 08:28

## 2017-08-04 RX ADMIN — METRONIDAZOLE 500 MG: 500 TABLET ORAL at 13:24

## 2017-08-04 RX ADMIN — Medication 1 CAPSULE: at 08:28

## 2017-08-04 RX ADMIN — WARFARIN SODIUM 5 MG: 5 TABLET ORAL at 19:42

## 2017-08-04 RX ADMIN — Medication 1 CAPSULE: at 13:24

## 2017-08-04 RX ADMIN — PROPRANOLOL HYDROCHLORIDE 20 MG: 20 TABLET ORAL at 08:28

## 2017-08-04 RX ADMIN — FOLIC ACID 1 MG: 1 TABLET ORAL at 08:28

## 2017-08-04 RX ADMIN — METRONIDAZOLE 500 MG: 500 TABLET ORAL at 05:50

## 2017-08-04 RX ADMIN — METRONIDAZOLE 500 MG: 500 TABLET ORAL at 21:52

## 2017-08-04 ASSESSMENT — ENCOUNTER SYMPTOMS
DYSURIA: 0
ARTHRALGIAS: 0
RHINORRHEA: 0
VOMITING: 0
MYALGIAS: 0
SHORTNESS OF BREATH: 0
ABDOMINAL PAIN: 0
COLOR CHANGE: 1
CHILLS: 0
COUGH: 0
LIGHT-HEADEDNESS: 0
CONSTIPATION: 0
FEVER: 0
SORE THROAT: 0
WOUND: 1
HEADACHES: 0
NAUSEA: 0
DIARRHEA: 0

## 2017-08-04 NOTE — PLAN OF CARE
Problem: Goal Outcome Summary  Goal: Goal Outcome Summary  Outcome: Improving  VSS, afebrile. Denies pain. Ambulating frequently. Appetite good. Dressing changed. Wound c/d/i. Noted redness to area. +2 edema. Improving. Will continue with POC.

## 2017-08-04 NOTE — PLAN OF CARE
"Problem: Goal Outcome Summary  Goal: Goal Outcome Summary  Outcome: Improving  /76 (BP Location: Left arm)  Pulse 76  Temp 96.3  F (35.7  C) (Oral)  Resp 16  Ht 1.626 m (5' 4\")  Wt 56.3 kg (124 lb 1.6 oz)  SpO2 95%  BMI 21.3 kg/m2  Afebrile. VSS. Lung sounds clear. Left lower leg reddened, redness within marked area, bite wounds scabbed, no drainage noted. Tolerating oral antibiotics per orders. Pedal pulses present, CMS intact. Denies pain. Up to bathroom with SBA, voiding, not saving. Slept in between cares. Continue POC.          "

## 2017-08-04 NOTE — PROGRESS NOTES
Social Work Services Progress Note    Hospital Day: 4  Collaborated with:  Chart review, team rounds, YRIS Rondon    Data:  Pt was being followed for potential TCU placement for IV abx. No referrals were made as abx plan was not finalized at time of meeting with pt.   Sw received update from YRIS Rondon that pt has been transitioned to oral abx, which pt will d/c on, and there is no need for TCU placement any longer.     Assessment:  See bedside RN, medical team notes    Plan:    Anticipated Disposition:  Home on oral antibiotics    Barriers to d/c plan:  Medical readiness    Follow Up:  Sw will sign off at this time as RNPAKO to assist with d/c to home, please page if sw needs arise.     Felicia Carlos, ELVIA, MSW  7B   [phone] (pager) [phone]  8/4/2017

## 2017-08-04 NOTE — PLAN OF CARE
"Problem: Goal Outcome Summary  Goal: Goal Outcome Summary  Outcome: Improving  /76 (BP Location: Left arm)  Pulse 76  Temp 96.3  F (35.7  C) (Oral)  Resp 16  Ht 1.626 m (5' 4\")  Wt 56.3 kg (124 lb 1.6 oz)  SpO2 95%  BMI 21.3 kg/m2  Pt afebrile, vitals are stable, pt denies pain. Up ambulating, tolerating regular diet, voiding not saving urine. Tolerating oral antibiotics, redness left LE has not changed. Cont to monitor.       "

## 2017-08-04 NOTE — DISCHARGE SUMMARY
[facility] Medicine  Discharge Summary    Nury Cárdenas MRN# [id number]   Age: 72 year old YOB: 1945     Date of Admission:  8/1/2017  Date of Discharge:  8/5/2017  Admitting Physician:  Nury Singh MD  Discharge Physician:  Sergio Suero MD  Contact: [phone]  Discharging Service:  [facility]     Primary Provider:   Phil Abbott  [address] [address]  [phone]          Discharge Disposition:   Discharged to home           Condition on Discharge:   Discharge condition: Stable   Code status on discharge: Full Code          Admission Diagnoses:   Infected cat bite of lower leg, left        Principle Discharge Problem:   Cellulitis left lower extremity secondary to cat bite/scratch         Additional Discharge Problems:     Patient Active Problem List   Diagnosis     GERD (gastroesophageal reflux disease)     Fibroids     Migraines     Hearing loss     Osteopenia     HYPERLIPIDEMIA LDL GOAL <160     Advanced directives, counseling/discussion     Inflammatory arthritis     Synovitis of wrist     Chronic constipation     Granulomatosis with polyangiitis (H)     Chronic steroid use     Dyspnea     PE (pulmonary embolism)     Cataract, mild, ou     Calculus of kidney, right     Chronic anticoagulation     Long-term (current) use of anticoagulants [Z79.01]     Long-term use of immunosuppressant medication     Primary osteoarthritis involving multiple joints     Cellulitis     Cat scratch left lower extremity            Medications Prior to Admission:       Current Facility-Administered Medications on File Prior to Encounter:  sodium chloride (PF) 0.9% PF flush 60 mL     Current Outpatient Prescriptions on File Prior to Encounter:  lactobacillus rhamnosus, GG, (CULTURELL) capsule Take 1 capsule by mouth 3 times daily (before meals)   warfarin (COUMADIN) 2 MG tablet TAKE 2  TABLETS(4 MG) BY MOUTH ONCE DAILY   folic acid (FOLVITE) 1 MG tablet Take 1 tablet (1 mg) by mouth daily   propranolol (INDERAL) 20 MG tablet TAKE 1 TABLET(20 MG) BY MOUTH DAILY   acetaminophen (TYLENOL) 325 MG tablet Take 2 tablets (650 mg) by mouth every 6 hours as needed for mild pain            Discharge Medications:        Review of your medicines      START taking       Dose / Directions    levofloxacin 750 MG tablet   Commonly known as:  LEVAQUIN   Indication:  Skin and Soft Tissue Infection, infection due to  a cat bite   Used for:  Infected cat bite of lower leg, left, initial encounter        Dose:  750 mg   Start taking on:  8/6/2017   Take 1 tablet (750 mg) by mouth daily for 10 days   Quantity:  10 tablet   Refills:  0       metroNIDAZOLE 500 MG tablet   Commonly known as:  FLAGYL   Indication:  Skin and Soft Tissue Infection, infection due to a cat bite   Used for:  Infected cat bite of lower leg, left, initial encounter        Dose:  500 mg   Take 1 tablet (500 mg) by mouth every 8 hours for 11 days   Quantity:  33 tablet   Refills:  0         CONTINUE these medicines which may have CHANGED, or have new prescriptions. If we are uncertain of the size of tablets/capsules you have at home, strength may be listed as something that might have changed.       Dose / Directions    methotrexate 2.5 MG tablet CHEMO   This may have changed:  additional instructions   Used for:  Wegener's granulomatosis (H), Granulomatosis with polyangiitis (H), Long-term use of immunosuppressant medication, Primary osteoarthritis involving multiple joints        Dose:  10 mg   Take 4 tablets (10 mg) by mouth once a week On Fridays; resume after antibiotics complete   Refills:  0         CONTINUE these medicines which have NOT CHANGED       Dose / Directions    acetaminophen 325 MG tablet   Commonly known as:  TYLENOL   Used for:  Migraines, Pulmonary emboli (H)        Dose:  650 mg   Take 2 tablets (650 mg) by mouth every 6  hours as needed for mild pain   Quantity:  100 tablet   Refills:  0       calcium-magnesium 500-250 MG Tabs per tablet   Commonly known as:  CALMAG        Dose:  1 tablet   Take 1 tablet by mouth 2 times daily (with meals)   Refills:  0       cholecalciferol 1000 UNITS Tabs        Dose:  1000 Units   Take 1,000 Units by mouth daily   Refills:  0       folic acid 1 MG tablet   Commonly known as:  FOLVITE   Used for:  Granulomatosis with polyangiitis (H), Long-term use of immunosuppressant medication, Primary osteoarthritis involving multiple joints, Wegener's granulomatosis (H)        Dose:  1 mg   Take 1 tablet (1 mg) by mouth daily   Quantity:  90 tablet   Refills:  3       lactobacillus rhamnosus (GG) capsule        Dose:  1 capsule   Take 1 capsule by mouth 3 times daily (before meals)   Quantity:  30 capsule   Refills:  0       propranolol 20 MG tablet   Commonly known as:  INDERAL   Used for:  Migraine without status migrainosus, not intractable, unspecified migraine type        TAKE 1 TABLET(20 MG) BY MOUTH DAILY   Quantity:  90 tablet   Refills:  1       warfarin 2 MG tablet   Commonly known as:  COUMADIN   Used for:  Chronic anticoagulation        TAKE 2 TABLETS(4 MG) BY MOUTH ONCE DAILY   Quantity:  60 tablet   Refills:  0         STOP taking          amoxicillin-clavulanate 875-125 MG per tablet   Commonly known as:  AUGMENTIN                Where to get your medicines      These medications were sent to [facility] - [place], [place] - [address]  [address] [facility] [address]     Phone:  [phone]      levofloxacin 750 MG tablet     metroNIDAZOLE 500 MG tablet         Some of these will need a paper prescription and others can be bought over the counter. Ask your nurse if you have questions.     You don't need a prescription for these medications      methotrexate 2.5 MG tablet CHEMO                  Discharge Instructions and Follow-Up:   Discharge diet: Regular    Discharge activity: Activity as tolerated   Discharge follow-up: Follow up with primary care provider in 7 days   Lines and drains: None    Wound care: May wash wound with soap and water, leave open to air.  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      Initially the day before her first admission on 7/26 she was clawed by a neighbor's cat while house-sitting.  The cat was an indoor cat and up to date on vaccinations.  She was started on augmentin by urgent care at that time. Her leg became more swollen and due to her history of immunosuppression on MTX she came to the ED.       Patient was was initially started on vanc and zosyn for and then transitioned to IV unasyn the morning after admission.  She remained on IV unasyn for 48 hours and her cellulitis seemed to improve.  She was discharged on 7/29 (3 days ago) on augmentin.  Starting yesterday she noticed more pain in her left leg.  She had kept her leg wrapped and had her follow up with her PCP today and when he removed the dressing the redness had spread significantly outside the lines.  Her PCP recommended coming back to the hospital for treatment and monitoring.         She denies headaches, blurry vision, fever, chest pain, shortness of breath, abdominal pain, nausea, vomiting, constipation, and lower extremity edema.  She had mild diarrhea since stopping the probiotics 2 days ago.     ED course:   In the ED, labs were unremarkable although patient has history of Min's and is on MTX so her inflammatory markers may be unreliable.  Her CRP was normal at 5.9, her WBC was normal at 4.9, and her ESR was normal at 12.  Vitals were normal.  On  physical examination, patient was noted to have significant worsening of her cellulitis since discharge and so started back on IV unasyn and transferred to inpatient floor for further evaluation.           Hospital Course/Discharge Plan by Problem:     ## LLE cellulitis secondary to cat scratch/bite - improving  ## Failed oral antibiotics (augmentin) therapy  Patient was admitted to the general medical floor where she stayed for the entirety of her hospitalization.  She was continued on IV unasyn for 48 hours with considerable improvement in her redness, swelling, pain.  The redness decreased within the margin that was marked at the time of her previous discharge.  The case was discussed with the infectious disease doctor on call who recommended oral levofloxacin and metronidazole to complete a 10-14 day total course of antibiotics (beginning the day of re-admission).  The patient was monitored clinically (because initial labs this admission including WBC, CRP, and ESR were all normal) and she demonstrated continued improvement for >48 hours on the new oral antibioitic regimen after the IV antibiotics were discontinued.  The patient was discharged home with instruction to follow up within 7 days.  If she fails oral antibiotics again she will need ID consult and IV antibiotics for the duration of treatment.    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 Maguire, patient's rheumatologist on her last admission (7/27-7/30) and agrees to holding weekly methotrexate (currently weekly doses on fridays) while on antibiotics for  active infection.  Last dose 7/21/2017.  May restart any day of the week after antibiotics complete.      Final Day of Progress before Discharge:         Interval History:  Patient feels well today.  She thinks the redness and swelling are a little better. She is still somewhat anxious this morning about going home since she previously failed oral antibiotics.  She denies fever, chills, purulent drainage.  She is OOB and ambulating well without assistance.  Pain is controlled without pain medication.        Physical Exam:  Vitals were reviewed  Patient Vitals for the past 12 hrs:   BP Temp Temp src Heart Rate Resp SpO2   08/05/17 0730 122/74 97.3  F (36.3  C) Oral 82 16 94 %   08/05/17 0200 - 96.7  F (35.9  C) Oral - - -     I/O last 3 completed shifts:  In: 480 [P.O.:480]  Out: -   Constitutional: She is oriented to person, place, and time. She appears well-developed and well-nourished. No distress.   HENT:   Head: Normocephalic and atraumatic.   Cardiovascular: Normal rate.    Pulmonary/Chest: Effort normal. No respiratory distress.   Musculoskeletal: She exhibits no edema.   No inguinal lymphadenopathy   Neurological: She is alert and oriented to person, place, and time.   Skin: Skin is warm and dry. There is erythema.   Erythema improved compared to yesterday's exam, now well within margins demarcated on 7/30.  Some induration with no fluctuance or expressible drainage around 4 x puncture wounds covered with eschars   Psychiatric: She has a normal mood and affect.            Data:  All laboratory data reviewed  Results for orders placed or performed during the hospital encounter of 08/01/17   CBC with platelets differential   Result Value Ref Range    WBC 4.9 4.0 - 11.0 10e9/L    RBC Count 4.22 3.8 - 5.2 10e12/L    Hemoglobin 13.3 11.7 - 15.7 g/dL    Hematocrit 39.2 35.0 - 47.0 %    MCV 93 78 - 100 fl    MCH 31.5 26.5 - 33.0 pg    MCHC 33.9 31.5 - 36.5 g/dL    RDW 13.4 10.0 - 15.0 %    Platelet Count 210 150 -  450 10e9/L    Diff Method Automated Method     % Neutrophils 61.3 %    % Lymphocytes 25.8 %    % Monocytes 8.6 %    % Eosinophils 3.3 %    % Basophils 0.8 %    % Immature Granulocytes 0.2 %    Nucleated RBCs 0 0 /100    Absolute Neutrophil 3.0 1.6 - 8.3 10e9/L    Absolute Lymphocytes 1.3 0.8 - 5.3 10e9/L    Absolute Monocytes 0.4 0.0 - 1.3 10e9/L    Absolute Eosinophils 0.2 0.0 - 0.7 10e9/L    Absolute Basophils 0.0 0.0 - 0.2 10e9/L    Abs Immature Granulocytes 0.0 0 - 0.4 10e9/L    Absolute Nucleated RBC 0.0    Basic metabolic panel   Result Value Ref Range    Sodium 139 133 - 144 mmol/L    Potassium 3.7 3.4 - 5.3 mmol/L    Chloride 104 94 - 109 mmol/L    Carbon Dioxide 29 20 - 32 mmol/L    Anion Gap 6 3 - 14 mmol/L    Glucose 107 (H) 70 - 99 mg/dL    Urea Nitrogen 10 7 - 30 mg/dL    Creatinine 0.69 0.52 - 1.04 mg/dL    GFR Estimate 83 >60 mL/min/1.7m2    GFR Estimate If Black >90   GFR Calc   >60 mL/min/1.7m2    Calcium 9.2 8.5 - 10.1 mg/dL   Erythrocyte sedimentation rate auto   Result Value Ref Range    Sed Rate 12 0 - 30 mm/h   CRP inflammation   Result Value Ref Range    CRP Inflammation 5.9 0.0 - 8.0 mg/L   INR   Result Value Ref Range    INR 1.62 (H) 0.86 - 1.14   INR   Result Value Ref Range    INR 1.71 (H) 0.86 - 1.14   INR   Result Value Ref Range    INR 1.88 (H) 0.86 - 1.14   INR   Result Value Ref Range    INR 2.01 (H) 0.86 - 1.14   Blood Culture ONE site   Result Value Ref Range    Specimen Description Blood Left Arm     Culture Micro No growth after 4 days     Micro Report Status Pending             Pending Results:   None      It was a pleasure to participate in the care of Nury Cárdenas.  If you have questions about her care, please do not hesitate to contact the [facility] team via the hospital becerra on which we are stationed.      [facility]  [place] - [facility] [address] - [phone]

## 2017-08-04 NOTE — PROGRESS NOTES
[facility] - Inpatient daily progress note    Date of admission: 8/1/2017  Date of service: 8/4/2017       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 re-admitted for LLE cellulitis without abscess secondary to cat scratch/bite after failing outpatient oral antibiotics  Patient Active Problem List   Diagnosis     GERD (gastroesophageal reflux disease)     Fibroids     Migraines     Hearing loss     Osteopenia     HYPERLIPIDEMIA LDL GOAL <160     Advanced directives, counseling/discussion     Inflammatory arthritis     Synovitis of wrist     Chronic constipation     Granulomatosis with polyangiitis (H)     Chronic steroid use     Dyspnea     PE (pulmonary embolism)     Cataract, mild, ou     Calculus of kidney, right     Chronic anticoagulation     Long-term (current) use of anticoagulants [Z79.01]     Long-term use of immunosuppressant medication     Primary osteoarthritis involving multiple joints     Cellulitis     Cat scratch left lower extremity      Plan:   ## LLE cellulitis secondary to cat scratch/bite - improving  ## Failed oral antibiotics (augmentin) therapy  Vancomycin and Zosyn (7/27-7/28);   Unasyn (7/28-7/30)  Augmentin (7/30-8/1)  Unasyn (8/1-8/3)  Levofloxacin and metronidazole (8/3- present)  - Tetanus booster given 7/28  - Monitor next 24 hours more to ensure continued improvement with PO antibiotics; if worsening symptoms will complete course of IV antibiotics  - Lactobacillus for antibiotic associated diarrhea prophylaxis  - Plan for 14 day course antibiotics from re-admission (end date 8/15)     ## History or recurent DVT/PE on coumadin  - Continue coumadin, goal INR 2-3     ## Min's Granulomatosis, in remission  .- Discussed methotrexate with Dr. Maguire, patient's rheumatologist on her last admission (7/27-7/30) and agrees to holding weekly methotrexate (currently weekly doses on  fridays) while on antibiotics for active infection.  Last dose 7/21/2017.  May restart any day of the week after antibiotics complete.    Fluids:PO  Electrolytes:WNL  Nutrition:Regular diet  Lines:PIV  Activity: -Up as tolerated  CODE: Full Code  Prophylaxis: mechanical (patient on coumadin for recurrent DVT hx)  PCP communication:  - Phil Abbott  - Contacted at admission: No  - Concerns or updates: N/A  Dispo: Expected discharge date 1-2 days pending continued improvement on oral antibiotics              Interval History:   Nury Cárdenas is doing well.  She thinks that the redness looks about the same as yesterday, maybe very slightly improved.  Pain improved and less tender.  Some intermittent bleeding from healing puncture sites.  No fevers or chills.  No purulent drainage.  OOB and ambulating well.            Review of Systems:   Review of Systems   Constitutional: Negative for chills and fever.   HENT: Negative for rhinorrhea and sore throat.    Respiratory: Negative for cough and shortness of breath.    Cardiovascular: Negative for chest pain.   Gastrointestinal: Negative for abdominal pain, constipation, diarrhea, nausea and vomiting.   Genitourinary: Negative for dysuria.   Musculoskeletal: Negative for arthralgias and myalgias.   Skin: Positive for color change and wound.        Dark purple erythema left medial leg   Neurological: Negative for light-headedness and headaches.            Physical Exam (Resident / Clinician):     Vitals were reviewed  Patient Vitals for the past 12 hrs:   BP Temp Temp src Pulse Resp SpO2   08/04/17 0810 125/61 98.2  F (36.8  C) Oral 74 16 95 %     I/O last 3 completed shifts:  In: 480 [P.O.:480]  Out: -     Physical Exam   Constitutional: She is oriented to person, place, and time. She appears well-developed and well-nourished. No distress.   HENT:   Head: Normocephalic and atraumatic.   Cardiovascular: Normal rate.    Pulmonary/Chest: Effort normal. No  respiratory distress.   Musculoskeletal: She exhibits no edema.   No inguinal lymphadenopathy   Neurological: She is alert and oriented to person, place, and time.   Skin: Skin is warm and dry. There is erythema.   Erythema improved compared to yesterday's exam, now within margins demarcated on 7/30.  Some induration with no fluctuance or expressible drainage around 4 x puncture wounds covered with eschars   Psychiatric: She has a normal mood and affect.             Data:   ROUTINE LABS (Last four results)  CMP    Recent Labs  Lab 08/01/17 2012 07/30/17  0606 07/29/17  0636    140 140   POTASSIUM 3.7 3.6 3.6   CHLORIDE 104 104 109   CO2 29 28 27   ANIONGAP 6 7 5   * 97 98   BUN 10 7 7   CR 0.69 0.70 0.63   GFRESTIMATED 83 82 >90Non  GFR Calc   GFRESTBLACK >90African American GFR Calc >90African American GFR Calc >90African American GFR Calc   CLINTON 9.2 9.2 8.4*   MAG  --  2.2 2.2   PHOS  --  3.5 2.7     CBC    Recent Labs  Lab 08/01/17 2012 07/30/17  0606 07/29/17  0636   WBC 4.9 4.8 6.0   RBC 4.22 4.40 4.13   HGB 13.3 14.0 13.2   HCT 39.2 41.1 38.6   MCV 93 93 94   MCH 31.5 31.8 32.0   MCHC 33.9 34.1 34.2   RDW 13.4 13.6 13.7    185 163     INR    Recent Labs  Lab 08/04/17  0647 08/03/17  0823 08/02/17  1013 07/30/17  0606   INR 1.88* 1.71* 1.62* 1.66*     CRP    Recent Labs  Lab 08/01/17 2012 07/30/17  0606 07/29/17  0636   CRP 5.9 16.0* 24.0*         Blood culture:  Invalid input(s): BC   Urine culture:  No results for input(s): URC in the last 168 hours.  All cultures:    Recent Labs  Lab 08/01/17  2012   CULT No growth after 3 days           Medications:     Current Facility-Administered Medications   Medication     warfarin (COUMADIN) tablet 5 mg     levofloxacin (LEVAQUIN) tablet 750 mg     metroNIDAZOLE (FLAGYL) tablet 500 mg     sodium chloride (PF) 0.9% PF flush 3 mL     Warfarin Therapy Reminder (Check START DATE - warfarin may be starting in the FUTURE)      acetaminophen (TYLENOL) tablet 650 mg     folic acid (FOLVITE) tablet 1 mg     lactobacillus rhamnosus (GG) (CULTURELL) capsule 1 capsule     propranolol (INDERAL) tablet 20 mg     naloxone (NARCAN) injection 0.1-0.4 mg     Patient is already receiving anticoagulation with heparin, enoxaparin (LOVENOX), warfarin (COUMADIN)  or other anticoagulant medication     senna-docusate (SENOKOT-S;PERICOLACE) 8.6-50 MG per tablet 1-2 tablet     polyethylene glycol (MIRALAX/GLYCOLAX) Packet 17 g     bisacodyl (DULCOLAX) Suppository 10 mg     ondansetron (ZOFRAN-ODT) ODT tab 4 mg    Or     ondansetron (ZOFRAN) injection 4 mg     Facility-Administered Medications Ordered in Other Encounters   Medication     sodium chloride (PF) 0.9% PF flush 60 mL       Caring Physician: Estuardo Garcia MD  [facility]  Pager Contact: see Physician sticky note

## 2017-08-04 NOTE — PROGRESS NOTES
Care Coordinator- Discharge Planning     Admission Date/Time:  8/1/2017  Attending MD:  Nury Singh MD     Data  Date of initial CC assessment:  8/2/2017  Chart reviewed, discussed with interdisciplinary team.   Patient was admitted for:   1. Infected cat bite of lower leg, left, initial encounter    2. Granulomatosis with polyangiitis (H)         Assessment  Concerns with insurance coverage for discharge needs: None.  Current Living Situation: Patient lives alone.  Support System: Supportive and Involved  Services Involved: none prior to arrival  Transportation: Family or Friend will provide  Barriers to Discharge: pending medical clearance    Coordination of Care   8/4: Chart reviewed and plan of care discussed with [facility] Resident and Attending Physician; patient has high anxiety regarding a second discharge on oral antibiotics.   Therefore patient discharge is planned for tomorrow after monitoring an additional 24 hours (total of 48 hours) on the new oral antibiotics has been proven successful.   Presently no discharge needs are identified. RN Care Coordinator will remain available for needs that may arise.     8/3: RNCC received report from [facility] Resident and Attending Physicians - patient will be transitioned to PO antibiotic medication today and monitored inpatient for 24 hours.  If the PO antibiotics fail plan would be to discharge on IV ABX as originally planned (please see prior RNCC progress note for IV ABX Insurance information).   Anticipate discharge home Saturday depending on patients reaction to transition of oral antibiotics.     Plan  Anticipated Discharge Date:  Saturday 8/5/2017  Anticipated Discharge Plan:  Home    CTS Handoff completed:  NO    Nela SAUCEDAN RN PHN  Patient Care Management Coordinator for    [place], and 5A & 5B Off Service Patients  [facility]  Phone: [phone]  Pager: [phone]

## 2017-08-04 NOTE — PLAN OF CARE
Problem: Goal Outcome Summary  Goal: Goal Outcome Summary  Outcome: No Change  Pt transferred from ICU to  this evening, pt is DNR/DNI, on comfort cares. Pt is confused and intermittently agitated and restless- bedside attendant remains at bedside. Daughter is at bedside, supportive. Bilateral chest-tubes to single pleura vac to suction- small output, G-tube to gravity- small green output. Phryngostomy tube discontinued prior to transfer to floor- dressing C/D/I. [place] patent-minimal output, pt is NPO except small ice-chips for comfort, no IV fluids. IV dilaudid, IV Lorazepam given for pain and anxiety. Cont to monitor pt.

## 2017-08-04 NOTE — PLAN OF CARE
Problem: Goal Outcome Summary  Goal: Goal Outcome Summary  Outcome: Adequate for Discharge Date Met:  08/04/17  Vitals:     08/03/17 1545 08/03/17 1951 08/03/17 2224 08/04/17 0810   BP: 121/71 125/68 131/76 125/61   BP Location: Right arm Left arm Left arm Left arm   Pulse:       74   Resp: 16 16 16 16   Temp: 98.4  F (36.9  C) 96.5  F (35.8  C) 96.3  F (35.7  C) 98.2  F (36.8  C)   TempSrc: Oral Oral Oral Oral   SpO2: 96% 95% 95% 95%   Weight:           Height:             Afebrile. VSS. Lung sounds clear. Left lower leg reddened, redness within marked area, bite wounds scabbed, no drainage noted. Tolerating oral antibiotics per orders. Pedal pulses present, CMS intact. Denies pain. Up to bathroom with SBA, voiding, not saving. Slept in between cares. Possible d/c tomorrow. Continue POC.

## 2017-08-05 VITALS
RESPIRATION RATE: 16 BRPM | OXYGEN SATURATION: 94 % | TEMPERATURE: 97.3 F | SYSTOLIC BLOOD PRESSURE: 122 MMHG | BODY MASS INDEX: 21.19 KG/M2 | HEIGHT: 64 IN | HEART RATE: 78 BPM | WEIGHT: 124.1 LBS | DIASTOLIC BLOOD PRESSURE: 74 MMHG

## 2017-08-05 LAB — INR PPP: 2.01 (ref 0.86–1.14)

## 2017-08-05 PROCEDURE — A9270 NON-COVERED ITEM OR SERVICE: HCPCS | Mod: GY | Performed by: FAMILY MEDICINE

## 2017-08-05 PROCEDURE — 36415 COLL VENOUS BLD VENIPUNCTURE: CPT | Performed by: FAMILY MEDICINE

## 2017-08-05 PROCEDURE — 85610 PROTHROMBIN TIME: CPT | Performed by: FAMILY MEDICINE

## 2017-08-05 PROCEDURE — 25000132 ZZH RX MED GY IP 250 OP 250 PS 637: Mod: GY | Performed by: FAMILY MEDICINE

## 2017-08-05 RX ORDER — METRONIDAZOLE 500 MG/1
500 TABLET ORAL EVERY 8 HOURS
Qty: 33 TABLET | Refills: 0 | Status: SHIPPED | OUTPATIENT
Start: 2017-08-05 | End: 2017-08-15

## 2017-08-05 RX ORDER — LEVOFLOXACIN 750 MG/1
750 TABLET, FILM COATED ORAL DAILY
Qty: 10 TABLET | Refills: 0 | Status: SHIPPED | OUTPATIENT
Start: 2017-08-06 | End: 2017-08-15

## 2017-08-05 RX ORDER — WARFARIN SODIUM 1 MG/1
4 TABLET ORAL
Status: DISCONTINUED | OUTPATIENT
Start: 2017-08-05 | End: 2017-08-05 | Stop reason: HOSPADM

## 2017-08-05 RX ADMIN — FOLIC ACID 1 MG: 1 TABLET ORAL at 07:28

## 2017-08-05 RX ADMIN — LEVOFLOXACIN 750 MG: 750 TABLET, FILM COATED ORAL at 07:28

## 2017-08-05 RX ADMIN — Medication 1 CAPSULE: at 12:28

## 2017-08-05 RX ADMIN — METRONIDAZOLE 500 MG: 500 TABLET ORAL at 13:43

## 2017-08-05 RX ADMIN — Medication 1 CAPSULE: at 07:27

## 2017-08-05 RX ADMIN — METRONIDAZOLE 500 MG: 500 TABLET ORAL at 05:45

## 2017-08-05 RX ADMIN — PROPRANOLOL HYDROCHLORIDE 20 MG: 20 TABLET ORAL at 07:28

## 2017-08-05 NOTE — PLAN OF CARE
Problem: Goal Outcome Summary  Goal: Goal Outcome Summary  Outcome: No Change  AVSS.  Pt denies pain overnight.  LLE is red, area marked & SHIMON.  No drainage noted. Pt up to bathroom and voided x1.  Probable discharge today.  Cont POC.

## 2017-08-05 NOTE — PLAN OF CARE
Problem: Goal Outcome Summary  Goal: Goal Outcome Summary  Outcome: Adequate for Discharge Date Met:  08/05/17  Admitted for cellulitis from cat bite. Afebrile. Vitals stable. RA. Denies pain. Tolerating diet. Voiding spontaneously. BM today. Ambulating independently. Adequate for d/c. Skin: Inflammation to LLE, 4 scabs from cat bite. AVS printed and reviewed with pt. D/c instructions including diet, activity, care of LLE and s/s of worsening infections, new medications, and f/u appt discussed with pt. Pt verbalizes understanding. PIV removed. Pt ambulates independently to pharmacy and front door. Vy will pick pt up in lobby. Belongings sent with pt.

## 2017-08-05 NOTE — PLAN OF CARE
Problem: Goal Outcome Summary  Goal: Goal Outcome Summary  Outcome: No Change  0802-9864: A&O, VSS on RA. Denied pain and nausea. Up ad elier, ambulating frequently. LLE wound unchanged; bite scabbed over, reddened area, and minor edema present. CDI & no drainage noted. On regular diet. PIV patent and SL'ed. Plan for possible d/c tomorrow.

## 2017-08-07 ENCOUNTER — CARE COORDINATION (OUTPATIENT)
Dept: CARE COORDINATION | Facility: CLINIC | Age: 72
End: 2017-08-07

## 2017-08-07 ENCOUNTER — TELEPHONE (OUTPATIENT)
Dept: FAMILY MEDICINE | Facility: CLINIC | Age: 72
End: 2017-08-07

## 2017-08-07 ENCOUNTER — MYC MEDICAL ADVICE (OUTPATIENT)
Dept: FAMILY MEDICINE | Facility: CLINIC | Age: 72
End: 2017-08-07

## 2017-08-07 ENCOUNTER — TELEPHONE (OUTPATIENT)
Dept: NURSING | Facility: CLINIC | Age: 72
End: 2017-08-07

## 2017-08-07 LAB
BACTERIA SPEC CULT: NO GROWTH
MICRO REPORT STATUS: NORMAL
SPECIMEN SOURCE: NORMAL

## 2017-08-07 NOTE — TELEPHONE ENCOUNTER
This patient was discharged from [facility] on 08/05/2017.    Discharge Diagnosis: Infected Cat Bite of lower leg left.    A follow-up visit has been scheduled.  08/11/2017    Number of ED/ER visits in the last 12 months:  2     Please follow-up with patient.    Brandy Heard,

## 2017-08-07 NOTE — TELEPHONE ENCOUNTER
ED / Discharge Outreach Protocol    Patient Contact    Attempt # 1    Was call answered?  No.  Left message on voicemail with information to call me back.    Leila Richey RN

## 2017-08-07 NOTE — TELEPHONE ENCOUNTER
"Hospital/TCU/ED for chronic condition Discharge Protocol    \"Hi, my name is Leila Richey, a registered nurse, and I am calling from [facility].  I am calling to follow up and see how things are going for you after your recent emergency visit/hospital/TCU stay.\"    Tell me how you are doing now that you are home?\"Doing okay, was in hospital for 5 days.  Patient has concerns about lump on knee- see mychart encounter.       Discharge Instructions    \"Let's review your discharge instructions.  What is/are the follow-up recommendations?  Pt. Response: f/u with PCP    \"Has an appointment with your primary care provider been scheduled?\"   Yes. (confirm)    \"When you see the provider, I would recommend that you bring your medications with you.\"    Medications    \"Tell me what changed about your medicines when you discharged?\"    Changes to chronic meds?    2 or more - Epic MTM referral needed    \"What questions do you have about your medications?\"    None     New diagnoses of heart failure, COPD, diabetes, or MI?    No            On warfarin: \"Were you given any recommendations for follow-up with the anticoagulation clinic?\" Yes - need to schedule/reschedule Anticoagulation clinic appointment    Medication reconciliation completed? Yes  Was MTM referral placed (*Make sure to put transitions as reason for referral)?   No- patient declined   Call Summary    \"What questions or concerns do you have about your recent visit and your follow-up care?\"     See Mobifusionhart message    \"If you have questions or things don't continue to improve, we encourage you contact us through the main clinic number (give number).  Even if the clinic is not open, triage nurses are available 24/7 to help you.     We would like you to know that our clinic has extended hours (provide information).  We also have urgent care (provide details on closest location and hours/contact info)\"      \"Thank you for your time and take care!\"         Leila Richey RN    "

## 2017-08-07 NOTE — PROGRESS NOTES
Patient was seen at [facility] from 8/1 to 8/5          [facility]  Discharge Summary     Nury Cárdenas MRN# [id number]   Age: 72 year old YOB: 1945      Date of Admission:                                      8/1/2017  Date of Discharge:                                      8/5/2017  Admitting Physician:                                   Nury Singh MD  Discharge Physician:                                  Sergio Suero MD  Contact: [phone]  Discharging Service:                                   [facility]

## 2017-08-08 NOTE — PROGRESS NOTES
SUBJECTIVE:                                                    Nury Cárdenas is a 72 year old female who presents to clinic today for the following health issues:    Hospital Follow-up Visit:    Hospital/Nursing Home/IP Rehab Facility: [facility]  Date of Admission: 08/01/2017   Date of Discharge: 08/05/2017  Reason(s) for Admission: Infected Cat Bite     Currently on Levaquin and is getting better, asked to elevate leg as much as possible. Will restart methotraxate 1 week after cellulitis is healed. INR was a little elevated and necessary adjustments have been made.         Problems taking medications regularly:  None       Medication changes since discharge: None       Problems adhering to non-medication therapy:  None    Summary of hospitalization:  [facility] discharge summary reviewed  Diagnostic Tests/Treatments reviewed.  Follow up needed: none  Other Healthcare Providers Involved in Patient s Care:         Specialist appointment - follow with rheumatology  Update since discharge: improved.     Post Discharge Medication Reconciliation: discharge medications reconciled, continue medications without change.  Plan of care communicated with patient and family     Coding guidelines for this visit:  Type of Medical   Decision Making Face-to-Face Visit       within 7 Days of discharge Face-to-Face Visit        within 14 days of discharge   Moderate Complexity 02490 [id number]   High Complexity 71549 [id number]              PROBLEMS TO ADD ON...    Problem list and histories reviewed & adjusted, as indicated.  Additional history: as documented    Patient Active Problem List   Diagnosis     GERD (gastroesophageal reflux disease)     Fibroids     Migraines     Hearing loss     Osteopenia     HYPERLIPIDEMIA LDL GOAL <160     Advanced directives, counseling/discussion     Inflammatory arthritis     Synovitis of wrist     Chronic constipation     Granulomatosis with polyangiitis (H)     Chronic  steroid use     Dyspnea     PE (pulmonary embolism)     Cataract, mild, ou     Calculus of kidney, right     Chronic anticoagulation     Long-term (current) use of anticoagulants [Z79.01]     Long-term use of immunosuppressant medication     Primary osteoarthritis involving multiple joints     Cellulitis     Cat scratch left lower extremity     Past Surgical History:   Procedure Laterality Date     ABDOMEN SURGERY      appendix out     APPENDECTOMY       BIOPSY  1972    cone biopsy     CL AFF SURGICAL PATHOLOGY      cone biopsy 1975     COLONOSCOPY       EYE SURGERY      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     HC ESOPHAGOSCOPY, DIAGNOSTIC       OPTICAL TRACKING SYSTEM BRONCHOSCOPY  6/19/2014    Procedure: OPTICAL TRACKING SYSTEM BRONCHOSCOPY;  Surgeon: Angel Kathleen MD;  Location:  GI     SURGICAL HISTORY OF -   as a child    strabismus repair right eye     SURGICAL HISTORY OF -   8-2009    right wrist debridement     TONSILLECTOMY      as a child     TONSILLECTOMY & ADENOIDECTOMY       TUBAL LIGATION         Social History   Substance Use Topics     Smoking status: Former Smoker     Packs/day: 2.00     Years: 18.00     Types: Cigarettes     Start date: 1/1/1960     Quit date: 1/1/1982     Smokeless tobacco: Never Used     Alcohol use No     Family History   Problem Relation Age of Onset     Respiratory Mother      emphysema     Aneurysm Mother      Chronic Obstructive Pulmonary Disease Mother      Thyroid Disease Mother      ,     OSTEOPOROSIS Mother      Other Cancer Father      CANCER Father      lung cancer     Arthritis Maternal Grandmother      rheumatoid     HEART DISEASE Maternal Grandmother      unsure of details     HEART DISEASE Maternal Grandfather      Neurologic Disorder Paternal Grandmother      migraines     Alzheimer Disease Paternal Grandmother      Unknown/Adopted Paternal Grandfather      CANCER Sister      stage 4 anal cancer age 62 years     Colon Cancer Sister      Cancer - colorectal  Brother      Colon Cancer Brother      Substance Abuse Sister      Anesthesia Reaction Daughter          Reviewed and updated as needed this visit by clinical staff       Reviewed and updated as needed this visit by Provider       ROS:  Constitutional, HEENT, cardiovascular, pulmonary, gi and gu systems are negative, except as otherwise noted.      OBJECTIVE:   /68  Pulse 81  Temp 97  F (36.1  C) (Oral)  Wt 127 lb 3.2 oz (57.7 kg)  SpO2 97%  BMI 21.83 kg/m2  Body mass index is 21.83 kg/(m^2).  GENERAL: frail elderly lady, alert and no distress  NECK: no adenopathy and thyroid normal to palpation  RESP: lungs clear to auscultation - no rales, rhonchi or wheezes  CV: regular rate and rhythm, no murmur, click or rub, no peripheral edema   ABDOMEN: soft, nontender, no masses and bowel sounds normal  Lt LEG     Erythema on the leg regressing, still has bullous lesion on lower border, not warm.     Diagnostic Test Results:  none     ASSESSMENT/PLAN:     (Z09) Hospital discharge follow-up  (primary encounter diagnosis)  Comment: Admitted for cellulitis and is improving greatly    (L03.116) Cellulitis of left lower extremity  Comment: Erythema regressing, no swelling and significant at this time. Still on antibiotic    (Z79.01) Chronic anticoagulation  Comment: INR 3.9, will skip todays dose of warfarin  Plan: Recheck INR in 1 week    (M31.30) Granulomatosis with polyangiitis (H)  Comment: Following with rheumatology, Methotraxate has been held due to infection.    (Z79.899) Long-term use of immunosuppressant medication  Comment: On Methotraxate, held at this time. Will restart a week after infection is cleared.    Follow up with completion of antibiotic in 1 week (mychart) or sooner with concerns    Phil Abbott MD  [facility]

## 2017-08-11 ENCOUNTER — ANTICOAGULATION THERAPY VISIT (OUTPATIENT)
Dept: NURSING | Facility: CLINIC | Age: 72
End: 2017-08-11
Payer: COMMERCIAL

## 2017-08-11 ENCOUNTER — OFFICE VISIT (OUTPATIENT)
Dept: FAMILY MEDICINE | Facility: CLINIC | Age: 72
End: 2017-08-11
Payer: COMMERCIAL

## 2017-08-11 ENCOUNTER — MYC MEDICAL ADVICE (OUTPATIENT)
Dept: RHEUMATOLOGY | Facility: CLINIC | Age: 72
End: 2017-08-11

## 2017-08-11 VITALS
WEIGHT: 127.2 LBS | TEMPERATURE: 97 F | DIASTOLIC BLOOD PRESSURE: 68 MMHG | BODY MASS INDEX: 21.83 KG/M2 | SYSTOLIC BLOOD PRESSURE: 106 MMHG | OXYGEN SATURATION: 97 % | HEART RATE: 81 BPM

## 2017-08-11 DIAGNOSIS — Z79.01 LONG-TERM (CURRENT) USE OF ANTICOAGULANTS: ICD-10-CM

## 2017-08-11 DIAGNOSIS — Z79.60 LONG-TERM USE OF IMMUNOSUPPRESSANT MEDICATION: ICD-10-CM

## 2017-08-11 DIAGNOSIS — L03.116 CELLULITIS OF LEFT LOWER EXTREMITY: ICD-10-CM

## 2017-08-11 DIAGNOSIS — M31.30 GRANULOMATOSIS WITH POLYANGIITIS (H): ICD-10-CM

## 2017-08-11 DIAGNOSIS — Z09 HOSPITAL DISCHARGE FOLLOW-UP: Primary | ICD-10-CM

## 2017-08-11 DIAGNOSIS — Z79.01 CHRONIC ANTICOAGULATION: ICD-10-CM

## 2017-08-11 LAB — INR POINT OF CARE: 3.9 (ref 0.86–1.14)

## 2017-08-11 PROCEDURE — 99495 TRANSJ CARE MGMT MOD F2F 14D: CPT | Performed by: FAMILY MEDICINE

## 2017-08-11 PROCEDURE — 85610 PROTHROMBIN TIME: CPT | Mod: QW

## 2017-08-11 PROCEDURE — 99207 ZZC NO CHARGE NURSE ONLY: CPT

## 2017-08-11 PROCEDURE — 36416 COLLJ CAPILLARY BLOOD SPEC: CPT

## 2017-08-11 NOTE — MR AVS SNAPSHOT
After Visit Summary   8/11/2017    Nury Cárdenas    MRN: [id number]           Patient Information     Date Of Birth          1945        Visit Information        Provider Department      8/11/2017 9:20 AM Phil Abbott MD [facility]        Today's Diagnoses     Hospital discharge follow-up    -  1    Cellulitis of left lower extremity        Chronic anticoagulation        Granulomatosis with polyangiitis (H)        Long-term use of immunosuppressant medication          Care Instructions    [facility]    If you have any questions regarding to your visit please contact your care team:       Team Purple:   Clinic Hours Telephone Number   Dr. Shanti Berg     7am-7pm  Monday - Thursday   7am-5pm  Fridays  [phone]  (Appointment scheduling available 24/7)    Questions about your Visit?   Team Line:  [phone]   [facility] [place] [facility] - 11am-9pm Monday-Friday Saturday-Sunday- 9am-5pm   [place] - 5pm-9pm Monday-Friday Saturday-Sunday- 9am-5pm  [phone] - [place]   [phone] - [place]       What options do I have for visits at the clinic other than the traditional office visit?  To expand how we care for you, many of our providers are utilizing electronic visits (e-visits) and telephone visits, when medically appropriate, for interactions with their patients rather than a visit in the clinic.   We also offer nurse visits for many medical concerns. Just like any other service, we will bill your insurance company for this type of visit based on time spent on the phone with your provider. Not all insurance companies cover these visits. Please check with your medical insurance if this type of visit is covered. You will be responsible for any charges that are not paid by your insurance.      E-visits via [employer]:  generally incur a $35.00 fee.  Telephone visits:  Time spent on  the phone: *charged based on time that is spent on the phone in increments of 10 minutes. Estimated cost:   5-10 mins $30.00   11-20 mins. $59.00   21-30 mins. $85.00     Use [employer] (secure email communication and access to your chart) to send your primary care provider a message or make an appointment. Ask someone on your Team how to sign up for [employer].  For a Price Quote for your services, please call our Consumer Price Line at [phone].  As always, Thank you for trusting us with your health care needs!    Nidia Bonilla MA            Follow-ups after your visit        Your next 10 appointments already scheduled     Aug 14, 2017 10:00 AM CDT   LAB with [facility] LAB   [facility]    [address] [address]   [phone]           Patient must bring picture ID. Patient should be prepared to give a urine specimen  Please do not eat 10-12 hours before your appointment if you are coming in fasting for labs on lipids, cholesterol, or glucose (sugar). Pregnant women should follow their Care Team instructions. Water with medications is okay. Do not drink coffee or other fluids. If you have concerns about taking  your medications, please ask at office or if scheduling via [employer], send a message by clicking on Secure Messaging, Message Your Care Team.            Aug 18, 2017  9:45 AM CDT   Anticoagulation Visit with [facility]Z ANTI COAG   [facility]    [address] [facility] [address]   [phone]            Oct 04, 2017 10:00 AM CDT   LAB with FZ LAB   [facility] ([facility]    [address] [address]   [phone]           Patient must bring picture ID. Patient should be prepared to give a urine specimen  Please do not eat 10-12 hours before your appointment if you are coming in fasting for labs on lipids, cholesterol, or glucose (sugar). Pregnant women should  follow their Care Team instructions. Water with medications is okay. Do not drink coffee or other fluids. If you have concerns about taking  your medications, please ask at office or if scheduling via [employer], send a message by clicking on Secure Messaging, Message Your Care Team.            Oct 13, 2017 11:30 AM CDT   Return Visit with Alvaro Maguire MD   [facility]    [address] [place] [address] [phone]              Who to contact     If you have questions or need follow up information about today's clinic visit or your schedule please contact [facility] directly at [phone].  Normal or non-critical lab and imaging results will be communicated to you by Object[employer], letter or phone within 4 business days after the clinic has received the results. If you do not hear from us within 7 days, please contact the clinic through [employer] or phone. If you have a critical or abnormal lab result, we will notify you by phone as soon as possible.  Submit refill requests through [employer] or call your pharmacy and they will forward the refill request to us. Please allow 3 business days for your refill to be completed.          Additional Information About Your Visit        [employer] Information     [employer] gives you secure access to your electronic health record. If you see a primary care provider, you can also send messages to your care team and make appointments. If you have questions, please call your primary care clinic.  If you do not have a primary care provider, please call [phone] and they will assist you.        Care EveryWhere ID     This is your Care EveryWhere ID. This could be used by other organizations to access your [place] medical records  [id number]        Your Vitals Were     Pulse Temperature Pulse Oximetry BMI (Body Mass Index)          81 97  F (36.1  C) (Oral) 97% 21.83 kg/m2         Blood Pressure from Last 3  Encounters:   08/11/17 106/68   08/05/17 122/74   08/01/17 116/70    Weight from Last 3 Encounters:   08/11/17 127 lb 3.2 oz (57.7 kg)   08/02/17 124 lb 1.6 oz (56.3 kg)   08/01/17 126 lb 6.4 oz (57.3 kg)              Today, you had the following     No orders found for display       Primary Care Provider Office Phone # Fax #    Phil Abbott [phone]       [address] [address]        Equal Access to Services     [facility]: Hadii abena vasquez hadshady Somau, waaxda lureinaldo, qaybta kaalmada rocyk, lavell castillo . So [facility] [phone].    ATENCIÓN: Si habla español, tiene a fofana disposición servicios gratuitos de asistencia lingüística. [facility] [phone].    We comply with applicable federal civil rights laws and [place] laws. We do not discriminate on the basis of race, color, national origin, age, disability sex, sexual orientation or gender identity.            Thank you!     Thank you for choosing [facility]  for your care. Our goal is always to provide you with excellent care. Hearing back from our patients is one way we can continue to improve our services. Please take a few minutes to complete the written survey that you may receive in the mail after your visit with us. Thank you!             Your Updated Medication List - Protect others around you: Learn how to safely use, store and throw away your medicines at www.disposemymeds.org.          This list is accurate as of: 8/11/17  9:57 AM.  Always use your most recent med list.                   Brand Name Dispense Instructions for use Diagnosis    acetaminophen 325 MG tablet    TYLENOL    100 tablet    Take 2 tablets (650 mg) by mouth every 6 hours as needed for mild pain    Migraines, Pulmonary emboli (H)       calcium-magnesium 500-250 MG Tabs per tablet    CALMAG     Take 1 tablet by mouth 2 times daily (with meals)        cholecalciferol 1000 UNITS Tabs       Take 1,000 Units by mouth daily        folic acid 1 MG tablet    FOLVITE    90 tablet    Take 1 tablet (1 mg) by mouth daily    Granulomatosis with polyangiitis (H), Long-term use of immunosuppressant medication, Primary osteoarthritis involving multiple joints, Wegener's granulomatosis (H)       lactobacillus rhamnosus (GG) capsule     30 capsule    Take 1 capsule by mouth 3 times daily (before meals)    Cellulitis of left lower extremity       levofloxacin 750 MG tablet    LEVAQUIN    10 tablet    Take 1 tablet (750 mg) by mouth daily for 10 days    Infected cat bite of lower leg, left, initial encounter       methotrexate 2.5 MG tablet CHEMO      Take 4 tablets (10 mg) by mouth once a week On Fridays; resume after antibiotics complete    Wegener's granulomatosis (H), Granulomatosis with polyangiitis (H), Long-term use of immunosuppressant medication, Primary osteoarthritis involving multiple joints       metroNIDAZOLE 500 MG tablet    FLAGYL    33 tablet    Take 1 tablet (500 mg) by mouth every 8 hours for 11 days    Infected cat bite of lower leg, left, initial encounter       propranolol 20 MG tablet    INDERAL    90 tablet    TAKE 1 TABLET(20 MG) BY MOUTH DAILY    Migraine without status migrainosus, not intractable, unspecified migraine type       warfarin 2 MG tablet    COUMADIN    60 tablet    TAKE 2 TABLETS(4 MG) BY MOUTH ONCE DAILY    Chronic anticoagulation

## 2017-08-11 NOTE — PATIENT INSTRUCTIONS
[facility]    If you have any questions regarding to your visit please contact your care team:       Team Purple:   Clinic Hours Telephone Number   Dr. Shanti Berg     7am-7pm  Monday - Thursday   7am-5pm  Fridays  [phone]  (Appointment scheduling available 24/7)    Questions about your Visit?   Team Line:  [phone]   Urgent Care - [place] and [facility] - 11am-9pm Monday-Friday Saturday-Sunday- 9am-5pm   [place] - 5pm-9pm Monday-Friday Saturday-Sunday- 9am-5pm  [phone] - [facility]  [phone] - [place]       What options do I have for visits at the clinic other than the traditional office visit?  To expand how we care for you, many of our providers are utilizing electronic visits (e-visits) and telephone visits, when medically appropriate, for interactions with their patients rather than a visit in the clinic.   We also offer nurse visits for many medical concerns. Just like any other service, we will bill your insurance company for this type of visit based on time spent on the phone with your provider. Not all insurance companies cover these visits. Please check with your medical insurance if this type of visit is covered. You will be responsible for any charges that are not paid by your insurance.      E-visits via [employer]:  generally incur a $35.00 fee.  Telephone visits:  Time spent on the phone: *charged based on time that is spent on the phone in increments of 10 minutes. Estimated cost:   5-10 mins $30.00   11-20 mins. $59.00   21-30 mins. $85.00     Use [employer] (secure email communication and access to your chart) to send your primary care provider a message or make an appointment. Ask someone on your Team how to sign up for [employer].  For a Price Quote for your services, please call our Consumer Price Line at [phone].  As always, Thank you for trusting us with your health care needs!    Nidia Bonilla MA

## 2017-08-11 NOTE — MR AVS SNAPSHOT
Nury Cárdenas   8/11/2017 9:00 AM   Anticoagulation Therapy Visit    Description:  72 year old female   Provider:  [facility]   Department:  [facility]           INR as of 8/11/2017     Today's INR 3.9!      Anticoagulation Summary as of 8/11/2017     INR goal 2.0-3.0   Today's INR 3.9!   Full instructions 8/11: Hold; Otherwise 4 mg every day   Next INR check 8/18/2017    Indications   Long-term (current) use of anticoagulants [Z79.01] [Z79.01]  PE (pulmonary embolism) [I26.99]         Your next Anticoagulation Clinic appointment(s)     Aug 16, 2017  9:45 AM CDT   Anticoagulation Visit with  ANTI COAG   [facility] ([place] [id number] [facility] [address] [phone]            Aug 18, 2017  9:45 AM CDT   Anticoagulation Visit with  ANTI COAG   [facility] ([place] [id number] [facility] [address] [phone]              Contact Numbers     [facility]  Please call [phone] to cancel and/or reschedule your appointment   Please call [phone] with any problems or questions regarding your therapy.        August 2017 Details    Sun Mon Tue Wed Thu Fri Sat       1               2               3               4               5                 6               7               8               9               10               11      Hold   See details      12      4 mg           13      4 mg         14      4 mg         15      4 mg         16      4 mg         17      4 mg         18            19                 20               21               22               23               24               25               26                 27               28               29               30               31                  Date Details   08/11 This INR check       Date of next INR:  8/18/2017         How to take your warfarin dose     To take:  4 mg Take 2 of the 2 mg tablets.    Hold Do not take your  warfarin dose. See the Details table to the right for additional instructions.

## 2017-08-11 NOTE — NURSING NOTE
"Chief Complaint   Patient presents with     Hospital F/U     [facility]       Initial /68  Pulse 81  Temp 97  F (36.1  C) (Oral)  Wt 127 lb 3.2 oz (57.7 kg)  SpO2 97%  BMI 21.83 kg/m2 Estimated body mass index is 21.83 kg/(m^2) as calculated from the following:    Height as of 8/1/17: 5' 4\" (1.626 m).    Weight as of this encounter: 127 lb 3.2 oz (57.7 kg).  Medication Reconciliation: complete     Yara Bunn MA    "

## 2017-08-11 NOTE — TELEPHONE ENCOUNTER
Klaus Arrington,    Thank you for your message and update.  I am so sorry to hear that you were hospitalized for this.  Please keep us updated and let us know where things stand with your antibiotic treatment.      I will update Dr. Maguire.    Please do not hesitate to call with questions or concerns.      Take Siobhan Mendez RN   Rheumatology Care Coordinator   [phone]

## 2017-08-11 NOTE — PROGRESS NOTES
ANTICOAGULATION FOLLOW-UP CLINIC VISIT    Patient Name:  Nury Cárdenas  Date:  8/11/2017  Contact Type:  Face to Face    SUBJECTIVE:     Patient Findings     Positives No Problem Findings           OBJECTIVE    INR Protime   Date Value Ref Range Status   08/11/2017 3.9 (A) 0.86 - 1.14 Final       ASSESSMENT / PLAN  INR assessment SUPRA    Recheck INR In: 1 WEEK    INR Location Clinic      Anticoagulation Summary as of 8/11/2017     INR goal 2.0-3.0   Today's INR 3.9!   Maintenance plan 4 mg (2 mg x 2) every day   Full instructions 8/11: Hold; Otherwise 4 mg every day   Weekly total 28 mg   Plan last modified Giselle Price RN (7/7/2016)   Next INR check 8/18/2017   Priority INR   Target end date     Indications   Long-term (current) use of anticoagulants [Z79.01] [Z79.01]  PE (pulmonary embolism) [I26.99]         Anticoagulation Episode Summary     INR check location     Preferred lab     Send INR reminders to [facility] CL[facility]    Comments       Anticoagulation Care Providers     Provider Role Specialty Phone number    Yfsayxc Phil Harris MD [facility] [phone]            See the Encounter Report to view Anticoagulation Flowsheet and Dosing Calendar (Go to Encounters tab in chart review, and find the Anticoagulation Therapy Visit)    Dosage adjustment made based on physician directed care plan. Reviewed both bleeding and clotting signs and symptoms with patient this visit. Pt. has no further questions or concerns.  Will call with any changes to diet, medications, or missed doses at INR line [phone].      Cindy Shelby RN

## 2017-08-14 DIAGNOSIS — M31.30 WEGENER'S GRANULOMATOSIS: ICD-10-CM

## 2017-08-14 DIAGNOSIS — M15.0 PRIMARY OSTEOARTHRITIS INVOLVING MULTIPLE JOINTS: ICD-10-CM

## 2017-08-14 DIAGNOSIS — M31.30 GRANULOMATOSIS WITH POLYANGIITIS (H): ICD-10-CM

## 2017-08-14 DIAGNOSIS — Z79.60 LONG-TERM USE OF IMMUNOSUPPRESSANT MEDICATION: ICD-10-CM

## 2017-08-14 LAB
ALBUMIN SERPL-MCNC: 3.2 G/DL (ref 3.4–5)
ALBUMIN UR-MCNC: NEGATIVE MG/DL
ALT SERPL W P-5'-P-CCNC: 27 U/L (ref 0–50)
APPEARANCE UR: CLEAR
AST SERPL W P-5'-P-CCNC: 25 U/L (ref 0–45)
BILIRUB UR QL STRIP: NEGATIVE
CK SERPL-CCNC: 74 U/L (ref 30–225)
COLOR UR AUTO: YELLOW
CREAT SERPL-MCNC: 0.66 MG/DL (ref 0.52–1.04)
CRP SERPL-MCNC: <2.9 MG/L (ref 0–8)
ERYTHROCYTE [DISTWIDTH] IN BLOOD BY AUTOMATED COUNT: 13.9 % (ref 10–15)
ERYTHROCYTE [SEDIMENTATION RATE] IN BLOOD BY WESTERGREN METHOD: 8 MM/H (ref 0–30)
GFR SERPL CREATININE-BSD FRML MDRD: 87 ML/MIN/1.7M2
GLUCOSE UR STRIP-MCNC: NEGATIVE MG/DL
HCT VFR BLD AUTO: 39.1 % (ref 35–47)
HGB BLD-MCNC: 13.6 G/DL (ref 11.7–15.7)
HGB UR QL STRIP: ABNORMAL
KETONES UR STRIP-MCNC: NEGATIVE MG/DL
LEUKOCYTE ESTERASE UR QL STRIP: NEGATIVE
MCH RBC QN AUTO: 32.2 PG (ref 26.5–33)
MCHC RBC AUTO-ENTMCNC: 34.8 G/DL (ref 31.5–36.5)
MCV RBC AUTO: 93 FL (ref 78–100)
NITRATE UR QL: NEGATIVE
PH UR STRIP: 6 PH (ref 5–7)
PLATELET # BLD AUTO: 253 10E9/L (ref 150–450)
RBC # BLD AUTO: 4.22 10E12/L (ref 3.8–5.2)
RBC #/AREA URNS AUTO: ABNORMAL /HPF (ref 0–2)
SP GR UR STRIP: 1.02 (ref 1–1.03)
URN SPEC COLLECT METH UR: ABNORMAL
UROBILINOGEN UR STRIP-ACNC: 0.2 EU/DL (ref 0.2–1)
WBC # BLD AUTO: 6.5 10E9/L (ref 4–11)
WBC #/AREA URNS AUTO: ABNORMAL /HPF (ref 0–2)

## 2017-08-14 PROCEDURE — 82040 ASSAY OF SERUM ALBUMIN: CPT | Performed by: INTERNAL MEDICINE

## 2017-08-14 PROCEDURE — 85652 RBC SED RATE AUTOMATED: CPT | Performed by: INTERNAL MEDICINE

## 2017-08-14 PROCEDURE — 82565 ASSAY OF CREATININE: CPT | Performed by: INTERNAL MEDICINE

## 2017-08-14 PROCEDURE — 84450 TRANSFERASE (AST) (SGOT): CPT | Performed by: INTERNAL MEDICINE

## 2017-08-14 PROCEDURE — 84460 ALANINE AMINO (ALT) (SGPT): CPT | Performed by: INTERNAL MEDICINE

## 2017-08-14 PROCEDURE — 81001 URINALYSIS AUTO W/SCOPE: CPT | Performed by: INTERNAL MEDICINE

## 2017-08-14 PROCEDURE — 86140 C-REACTIVE PROTEIN: CPT | Performed by: INTERNAL MEDICINE

## 2017-08-14 PROCEDURE — 85027 COMPLETE CBC AUTOMATED: CPT | Performed by: INTERNAL MEDICINE

## 2017-08-14 PROCEDURE — 36415 COLL VENOUS BLD VENIPUNCTURE: CPT | Performed by: INTERNAL MEDICINE

## 2017-08-14 PROCEDURE — 82550 ASSAY OF CK (CPK): CPT | Performed by: INTERNAL MEDICINE

## 2017-08-14 PROCEDURE — 83516 IMMUNOASSAY NONANTIBODY: CPT | Performed by: INTERNAL MEDICINE

## 2017-08-15 LAB — PROTEINASE3 IGG SER-ACNC: <0.2 AI (ref 0–0.9)

## 2017-08-17 ENCOUNTER — MYC MEDICAL ADVICE (OUTPATIENT)
Dept: FAMILY MEDICINE | Facility: CLINIC | Age: 72
End: 2017-08-17

## 2017-08-18 ENCOUNTER — MYC MEDICAL ADVICE (OUTPATIENT)
Dept: RHEUMATOLOGY | Facility: CLINIC | Age: 72
End: 2017-08-18

## 2017-08-18 ENCOUNTER — ANTICOAGULATION THERAPY VISIT (OUTPATIENT)
Dept: NURSING | Facility: CLINIC | Age: 72
End: 2017-08-18
Payer: COMMERCIAL

## 2017-08-18 DIAGNOSIS — Z79.01 LONG-TERM (CURRENT) USE OF ANTICOAGULANTS: ICD-10-CM

## 2017-08-18 LAB — INR POINT OF CARE: 3.7 (ref 0.86–1.14)

## 2017-08-18 PROCEDURE — 85610 PROTHROMBIN TIME: CPT | Mod: QW

## 2017-08-18 PROCEDURE — 36416 COLLJ CAPILLARY BLOOD SPEC: CPT

## 2017-08-18 PROCEDURE — 99207 ZZC NO CHARGE NURSE ONLY: CPT

## 2017-08-18 NOTE — PROGRESS NOTES
ANTICOAGULATION FOLLOW-UP CLINIC VISIT    Patient Name:  Nury Cárdenas  Date:  8/18/2017  Contact Type:  Face to Face    SUBJECTIVE:     Patient Findings     Positives Antibiotic use or infection, No Problem Findings           OBJECTIVE    INR Protime   Date Value Ref Range Status   08/18/2017 3.7 (A) 0.86 - 1.14 Final       ASSESSMENT / PLAN  No question data found.  Anticoagulation Summary as of 8/18/2017     INR goal 2.0-3.0   Today's INR 3.7!   Maintenance plan 4 mg (2 mg x 2) every day   Full instructions 8/18: Hold; 8/19: 2 mg; Otherwise 4 mg every day   Weekly total 28 mg   Plan last modified Giselle Price RN (7/7/2016)   Next INR check 8/24/2017   Priority INR   Target end date     Indications   Long-term (current) use of anticoagulants [Z79.01] [Z79.01]  PE (pulmonary embolism) [I26.99]         Anticoagulation Episode Summary     INR check location     Preferred lab     Send INR reminders to [facility]    Comments       Anticoagulation Care Providers     Provider Role Specialty Phone number    Milena Phil Harris MD [facility] [phone]            See the Encounter Report to view Anticoagulation Flowsheet and Dosing Calendar (Go to Encounters tab in chart review, and find the Anticoagulation Therapy Visit)    Dosage adjustment made based on physician directed care plan.    Kirti Lacy RN

## 2017-08-18 NOTE — MR AVS SNAPSHOT
Nury Cárdenas   8/18/2017 9:45 AM   Anticoagulation Therapy Visit    Description:  72 year old female   Provider:  MONI ANTI COAG   Department:  [facility]           INR as of 8/18/2017     Today's INR 3.7!      Anticoagulation Summary as of 8/18/2017     INR goal 2.0-3.0   Today's INR 3.7!   Full instructions 8/18: Hold; 8/19: 2 mg; Otherwise 4 mg every day   Next INR check 8/24/2017    Indications   Long-term (current) use of anticoagulants [Z79.01] [Z79.01]  PE (pulmonary embolism) [I26.99]         Your next Anticoagulation Clinic appointment(s)     Aug 24, 2017  9:45 AM CDT   Anticoagulation Visit with MOIN ANTI TATE   [facility] ([facility]    [address] [id number] [phone]              Contact Numbers     [facility]  Please call [phone] to cancel and/or reschedule your appointment   Please call [phone] with any problems or questions regarding your therapy.        August 2017 Details    Sun Mon Tue Wed Thu Fri Sat       1               2               3               4               5                 6               7               8               9               10               11               12                 13               14               15               16               17               18      Hold   See details      19      2 mg           20      4 mg         21      4 mg         22      4 mg         23      4 mg         24            25               26                 27               28               29               30               31                  Date Details   08/18 This INR check       Date of next INR:  8/24/2017         How to take your warfarin dose     To take:  2 mg Take 1 of the 2 mg tablets.    To take:  4 mg Take 2 of the 2 mg tablets.    Hold Do not take your warfarin dose. See the Details table to the right for additional instructions.

## 2017-08-18 NOTE — TELEPHONE ENCOUNTER
Saw patient during INR check: cellulitis dried out, skin pulling together, no swelling or redness. Completes antibiotic over the weekend, no need to continue. Skin dry will apply ointment (bacitracin) to moisten skin.  For restarting methotrexate, she will touch base with rheumatology.

## 2017-08-21 NOTE — TELEPHONE ENCOUNTER
Mary Arrington, I checked with the Pharmacist here and she stated patients that are immunocompromised should not take probiotics. So you should NOT be taking it. Call if any questions or concerns.

## 2017-08-22 ENCOUNTER — TELEPHONE (OUTPATIENT)
Dept: RHEUMATOLOGY | Facility: CLINIC | Age: 72
End: 2017-08-22

## 2017-08-22 DIAGNOSIS — Z79.01 CHRONIC ANTICOAGULATION: ICD-10-CM

## 2017-08-22 NOTE — TELEPHONE ENCOUNTER
Follow up call placed to patient to check-in to review new plan related to restarting methotrexate and holding the probiotic.    Patient stated that she has been taking the probiotic, as she discussed this with her pharmacist.  This writer reviewed Dr. Maguire's recent communication from 8/18/17 at 0813.  Patient stated that she wasn't aware of that and will stop the probiotic today.  Patient did state that she received the communication to restart her methotrexate and will do so this Friday, as scheduled.      Encouraged patient to call with any additional concerns or questions.  Patient expressed appreciation for follow up.           Alvaro Maguire MD        8:13 AM   Note      Mary Arrington, I checked with the Pharmacist here and she stated patients that are immunocompromised should not take probiotics. So you should NOT be taking it. Call if any questions or concerns.

## 2017-08-23 NOTE — TELEPHONE ENCOUNTER
warfarin (COUMADIN) 2 MG tablet    Last Written Prescription Date: 7/24/2017  Last Fill Qty: 60, # refills: 0  Last Office Visit with [facility], [facility] or [facility] prescribing provider: 8/11/2017  Next 5 appointments (look out 90 days)     Oct 13, 2017 11:30 AM CDT   Return Visit with Alvaro Maguire MD   [facility]    [address] [place] [id number] [phone]                   Date and Result of Last PT/INR:   Lab Results   Component Value Date    INR 3.7 08/18/2017    INR 3.9 08/11/2017    INR 2.01 08/05/2017    INR 1.88 08/04/2017

## 2017-08-24 ENCOUNTER — ANTICOAGULATION THERAPY VISIT (OUTPATIENT)
Dept: NURSING | Facility: CLINIC | Age: 72
End: 2017-08-24
Payer: COMMERCIAL

## 2017-08-24 DIAGNOSIS — Z79.01 LONG-TERM (CURRENT) USE OF ANTICOAGULANTS: ICD-10-CM

## 2017-08-24 LAB — INR POINT OF CARE: 2.1 (ref 0.86–1.14)

## 2017-08-24 PROCEDURE — 36416 COLLJ CAPILLARY BLOOD SPEC: CPT

## 2017-08-24 PROCEDURE — 99207 ZZC NO CHARGE NURSE ONLY: CPT

## 2017-08-24 PROCEDURE — 85610 PROTHROMBIN TIME: CPT | Mod: QW

## 2017-08-24 RX ORDER — WARFARIN SODIUM 2 MG/1
TABLET ORAL
Qty: 180 TABLET | Refills: 0 | Status: SHIPPED | OUTPATIENT
Start: 2017-08-24 | End: 2017-11-22

## 2017-08-24 NOTE — MR AVS SNAPSHOT
Nury Cárdenas   8/24/2017 9:45 AM   Anticoagulation Therapy Visit    Description:  72 year old female   Provider:  MONI ANTI COAG   Department:  [facility]           INR as of 8/24/2017     Today's INR 2.1      Anticoagulation Summary as of 8/24/2017     INR goal 2.0-3.0   Today's INR 2.1   Full instructions 4 mg every day   Next INR check 9/11/2017    Indications   Long-term (current) use of anticoagulants [Z79.01] [Z79.01]  PE (pulmonary embolism) [I26.99]         Your next Anticoagulation Clinic appointment(s)     Sep 11, 2017  9:45 AM CDT   Anticoagulation Visit with MONI ANTI COAG   [facility] ([facility]    [address] [id number] [phone]              Contact Numbers     [facility]  Please call [phone] to cancel and/or reschedule your appointment   Please call [phone] with any problems or questions regarding your therapy.        August 2017 Details    Sun Mon Tue Wed u Fri Sat       1               2               3               4               5                 6               7               8               9               10               11               12                 13               14               15               16               17               18               19                 20               21               22               23               24      4 mg   See details      25      4 mg         26      4 mg           27      4 mg         28      4 mg         29      4 mg         30      4 mg         31      4 mg            Date Details   08/24 This INR check               How to take your warfarin dose     To take:  4 mg Take 2 of the 2 mg tablets.           September 2017 Details    Sun Mon Tue Wed u Fri Sat          1      4 mg         2      4 mg           3      4 mg         4      4 mg         5      4 mg         6      4 mg         7      4 mg         8      4 mg         9      4 mg           10      4 mg          11            12               13               14               15               16                 17               18               19               20               21               22               23                 24               25               26               27               28               29               30                Date Details   No additional details    Date of next INR:  9/11/2017         How to take your warfarin dose     To take:  4 mg Take 2 of the 2 mg tablets.

## 2017-08-24 NOTE — PROGRESS NOTES
ANTICOAGULATION FOLLOW-UP CLINIC VISIT    Patient Name:  Nury Cárdenas  Date:  8/24/2017  Contact Type:  Face to Face    SUBJECTIVE:     Patient Findings     Positives No Problem Findings           OBJECTIVE    INR Protime   Date Value Ref Range Status   08/24/2017 2.1 (A) 0.86 - 1.14 Final       ASSESSMENT / PLAN  No question data found.  Anticoagulation Summary as of 8/24/2017     INR goal 2.0-3.0   Today's INR 2.1   Maintenance plan 4 mg (2 mg x 2) every day   Full instructions 4 mg every day   Weekly total 28 mg   No change documented Kirti Lacy, RN   Plan last modified Giselle Price RN (7/7/2016)   Next INR check 9/11/2017   Priority INR   Target end date     Indications   Long-term (current) use of anticoagulants [Z79.01] [Z79.01]  PE (pulmonary embolism) [I26.99]         Anticoagulation Episode Summary     INR check location     Preferred lab     Send INR reminders to [facility] CLINIC    Comments       Anticoagulation Care Providers     Provider Role Specialty Phone number    Milena Phil Harris MD [facility] [phone]            See the Encounter Report to view Anticoagulation Flowsheet and Dosing Calendar (Go to Encounters tab in chart review, and find the Anticoagulation Therapy Visit)    Dosage adjustment made based on physician directed care plan.    Kirti Lacy, GABBI

## 2017-08-24 NOTE — TELEPHONE ENCOUNTER
Prescription approved per [facility] Refill Protocol.  Kirti Lacy, RN - BC

## 2017-09-11 ENCOUNTER — ANTICOAGULATION THERAPY VISIT (OUTPATIENT)
Dept: NURSING | Facility: CLINIC | Age: 72
End: 2017-09-11
Payer: COMMERCIAL

## 2017-09-11 DIAGNOSIS — Z79.01 LONG-TERM (CURRENT) USE OF ANTICOAGULANTS: ICD-10-CM

## 2017-09-11 LAB — INR POINT OF CARE: 1.4 (ref 0.86–1.14)

## 2017-09-11 PROCEDURE — 99207 ZZC NO CHARGE NURSE ONLY: CPT

## 2017-09-11 PROCEDURE — 85610 PROTHROMBIN TIME: CPT | Mod: QW

## 2017-09-11 PROCEDURE — 36416 COLLJ CAPILLARY BLOOD SPEC: CPT

## 2017-09-11 NOTE — MR AVS SNAPSHOT
Nury Cárdenas   9/11/2017 9:45 AM   Anticoagulation Therapy Visit    Description:  72 year old female   Provider:  MONI ANTI COAG   Department:  [facility]           INR as of 9/11/2017     Today's INR 1.4!      Anticoagulation Summary as of 9/11/2017     INR goal 2.0-3.0   Today's INR 1.4!   Full instructions 9/11: 7 mg; 9/12: 6 mg; Otherwise 4 mg every day   Next INR check 9/15/2017    Indications   Long-term (current) use of anticoagulants [Z79.01] [Z79.01]  PE (pulmonary embolism) [I26.99]         Your next Anticoagulation Clinic appointment(s)     Sep 15, 2017  9:30 AM CDT   Anticoagulation Visit with MONI ANTI COAGILL   [facility] ([facility]    [address] [place] [id number] [phone]              Contact Numbers     [facility]  Please call [phone] to cancel and/or reschedule your appointment   Please call [phone] with any problems or questions regarding your therapy.        September 2017 Details    Sun Mon Tue Wed Thu Fri Sat          1               2                 3               4               5               6               7               8               9                 10               11      7 mg   See details      12      6 mg         13      4 mg         14      4 mg         15            16                 17               18               19               20               21               22               23                 24               25               26               27               28               29               30                Date Details   09/11 This INR check       Date of next INR:  9/15/2017         How to take your warfarin dose     To take:  4 mg Take 2 of the 2 mg tablets.    To take:  6 mg Take 3 of the 2 mg tablets.    To take:  7 mg Take 3.5 of the 2 mg tablets.

## 2017-09-11 NOTE — PROGRESS NOTES
ANTICOAGULATION FOLLOW-UP CLINIC VISIT    Patient Name:  Nury Cárdenas  Date:  9/11/2017  Contact Type:  Face to Face    SUBJECTIVE:     Patient Findings     Positives Unexplained INR or factor level change           OBJECTIVE    INR Protime   Date Value Ref Range Status   09/11/2017 1.4 (A) 0.86 - 1.14 Final       ASSESSMENT / PLAN  INR assessment SUB    Recheck INR In: 4 DAYS    INR Location Clinic      Anticoagulation Summary as of 9/11/2017     INR goal 2.0-3.0   Today's INR 1.4!   Maintenance plan 4 mg (2 mg x 2) every day   Full instructions 9/11: 7 mg; 9/12: 6 mg; Otherwise 4 mg every day   Weekly total 28 mg   Plan last modified Giselle Price RN (7/7/2016)   Next INR check 9/15/2017   Priority INR   Target end date     Indications   Long-term (current) use of anticoagulants [Z79.01] [Z79.01]  PE (pulmonary embolism) [I26.99]         Anticoagulation Episode Summary     INR check location     Preferred lab     Send INR reminders to [facility] CL[facility]    Comments       Anticoagulation Care Providers     Provider Role Specialty Phone number    Celiaapoorva Phil Harris MD [facility] [phone]            See the Encounter Report to view Anticoagulation Flowsheet and Dosing Calendar (Go to Encounters tab in chart review, and find the Anticoagulation Therapy Visit)    Dosage adjustment made based on physician directed care plan.    Ramy Juarez RN

## 2017-09-13 NOTE — PROGRESS NOTES
SUBJECTIVE:   Nury Cárdenas is a 72 year old female who presents to clinic today for the following health issues:    Chief Complaint   Patient presents with     RECHECK     cat bite      Wound LLE; leg;inner lower third with scabbing, mild erythema, non tender, not warm.  Started after a cat bite, was treated aggressively with antibiotic and has improved gradually.    Propranolol for Migraines   Been taking for a while, insurance recommending that she tries something else, she however would rather buy it herself than to change.    Problem list and histories reviewed & adjusted, as indicated.  Additional history: as documented    Patient Active Problem List   Diagnosis     GERD (gastroesophageal reflux disease)     Fibroids     Migraines     Hearing loss     Osteopenia     HYPERLIPIDEMIA LDL GOAL <160     Advanced directives, counseling/discussion     Inflammatory arthritis     Synovitis of wrist     Chronic constipation     Granulomatosis with polyangiitis (H)     Chronic steroid use     Dyspnea     PE (pulmonary embolism)     Cataract, mild, ou     Calculus of kidney, right     Chronic anticoagulation     Long-term (current) use of anticoagulants [Z79.01]     Long-term use of immunosuppressant medication     Primary osteoarthritis involving multiple joints     Cellulitis     Cat scratch left lower extremity     Past Surgical History:   Procedure Laterality Date     ABDOMEN SURGERY      appendix out     APPENDECTOMY       BIOPSY  1972    cone biopsy     CL AFF SURGICAL PATHOLOGY      cone biopsy 1975     COLONOSCOPY       EYE SURGERY      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     HC ESOPHAGOSCOPY, DIAGNOSTIC       OPTICAL TRACKING SYSTEM BRONCHOSCOPY  6/19/2014    Procedure: OPTICAL TRACKING SYSTEM BRONCHOSCOPY;  Surgeon: Angel Kathleen MD;  Location:  GI     SURGICAL HISTORY OF -   as a child    strabismus repair right eye     SURGICAL HISTORY OF -   8-2009    right wrist debridement     TONSILLECTOMY    "   as a child     TONSILLECTOMY & ADENOIDECTOMY       TUBAL LIGATION         Social History   Substance Use Topics     Smoking status: Former Smoker     Packs/day: 2.00     Years: 18.00     Types: Cigarettes     Start date: 1/1/1960     Quit date: 1/1/1982     Smokeless tobacco: Never Used     Alcohol use No     Family History   Problem Relation Age of Onset     Respiratory Mother      emphysema     Aneurysm Mother      Chronic Obstructive Pulmonary Disease Mother      Thyroid Disease Mother      ,     OSTEOPOROSIS Mother      Other Cancer Father      CANCER Father      lung cancer     Arthritis Maternal Grandmother      rheumatoid     HEART DISEASE Maternal Grandmother      unsure of details     HEART DISEASE Maternal Grandfather      Neurologic Disorder Paternal Grandmother      migraines     Alzheimer Disease Paternal Grandmother      Unknown/Adopted Paternal Grandfather      CANCER Sister      stage 4 anal cancer age 62 years     Colon Cancer Sister      Cancer - colorectal Brother      Colon Cancer Brother      Substance Abuse Sister      Anesthesia Reaction Daughter          Reviewed and updated as needed this visit by Provider       ROS:  Constitutional, HEENT, cardiovascular, pulmonary, gi and gu systems are negative, except as otherwise noted.      OBJECTIVE:   /70  Pulse 74  Temp 97.3  F (36.3  C) (Oral)  Resp 14  Ht 5' 4\" (1.626 m)  Wt 123 lb (55.8 kg)  SpO2 97%  Breastfeeding? No  BMI 21.11 kg/m2  Body mass index is 21.11 kg/(m^2).  GENERAL: healthy, alert and no distress  NECK: no adenopathy and thyroid normal to palpation  RESP: lungs clear to auscultation - no rales, rhonchi or wheezes  CV: regular rate and rhythm, no murmur, click or rub, no   LLE: Healing superficial wound lower leg; with dry scabs, no swelling or tenderness, still has residual erythema.  Diagnostic Test Results:  none     ASSESSMENT/PLAN:     (M90.317A) Wound of left leg, subsequent encounter: Slow healing  (primary " encounter diagnosis)  Comment: Healing well; almost completely healed. Has dry scab. Application of lotion or cream okay. Elevation no longer necessary    (G43.009) Nonintractable migraine, unspecified migraine type  Comment: In remission, taking propranolol. Insurance not covering  Plan: Affordable patient would like to buy it herself    Follow up prn or sooner with concerns    Phil Abbott MD  [facility]

## 2017-09-15 ENCOUNTER — ANTICOAGULATION THERAPY VISIT (OUTPATIENT)
Dept: NURSING | Facility: CLINIC | Age: 72
End: 2017-09-15
Payer: COMMERCIAL

## 2017-09-15 ENCOUNTER — OFFICE VISIT (OUTPATIENT)
Dept: FAMILY MEDICINE | Facility: CLINIC | Age: 72
End: 2017-09-15
Payer: COMMERCIAL

## 2017-09-15 VITALS
BODY MASS INDEX: 21 KG/M2 | RESPIRATION RATE: 14 BRPM | HEART RATE: 74 BPM | OXYGEN SATURATION: 97 % | SYSTOLIC BLOOD PRESSURE: 124 MMHG | DIASTOLIC BLOOD PRESSURE: 70 MMHG | HEIGHT: 64 IN | WEIGHT: 123 LBS | TEMPERATURE: 97.3 F

## 2017-09-15 DIAGNOSIS — G43.009 NONINTRACTABLE MIGRAINE, UNSPECIFIED MIGRAINE TYPE: ICD-10-CM

## 2017-09-15 DIAGNOSIS — Z79.01 LONG-TERM (CURRENT) USE OF ANTICOAGULANTS: ICD-10-CM

## 2017-09-15 DIAGNOSIS — S81.802D WOUND OF LEFT LEG, SUBSEQUENT ENCOUNTER: Primary | ICD-10-CM

## 2017-09-15 LAB — INR POINT OF CARE: 2 (ref 0.86–1.14)

## 2017-09-15 PROCEDURE — 99213 OFFICE O/P EST LOW 20 MIN: CPT | Performed by: FAMILY MEDICINE

## 2017-09-15 PROCEDURE — 85610 PROTHROMBIN TIME: CPT | Mod: QW

## 2017-09-15 PROCEDURE — 99207 ZZC NO CHARGE NURSE ONLY: CPT

## 2017-09-15 PROCEDURE — 36416 COLLJ CAPILLARY BLOOD SPEC: CPT

## 2017-09-15 ASSESSMENT — PAIN SCALES - GENERAL: PAINLEVEL: NO PAIN (0)

## 2017-09-15 NOTE — PROGRESS NOTES
ANTICOAGULATION FOLLOW-UP CLINIC VISIT    Patient Name:  Nury Cárdenas  Date:  9/15/2017  Contact Type:  Face to Face    SUBJECTIVE:     Patient Findings     Positives No Problem Findings           OBJECTIVE    INR Protime   Date Value Ref Range Status   09/15/2017 2.0 (A) 0.86 - 1.14 Final       ASSESSMENT / PLAN  No question data found.  Anticoagulation Summary as of 9/15/2017     INR goal 2.0-3.0   Today's INR 2.0   Maintenance plan 4 mg (2 mg x 2) every day   Full instructions 4 mg every day   Weekly total 28 mg   No change documented Kirti Lacy, RN   Plan last modified Giselle Price RN (7/7/2016)   Next INR check 9/22/2017   Priority INR   Target end date     Indications   Long-term (current) use of anticoagulants [Z79.01] [Z79.01]  PE (pulmonary embolism) [I26.99]         Anticoagulation Episode Summary     INR check location     Preferred lab     Send INR reminders to [facility] CLINIC    Comments       Anticoagulation Care Providers     Provider Role Specialty Phone number    Milena Phil Harris MD [facility] [phone]            See the Encounter Report to view Anticoagulation Flowsheet and Dosing Calendar (Go to Encounters tab in chart review, and find the Anticoagulation Therapy Visit)    Dosage adjustment made based on physician directed care plan.    Kirti Lacy, GABBI

## 2017-09-15 NOTE — PATIENT INSTRUCTIONS
[facility]    If you have any questions regarding to your visit please contact your care team:       Team Purple:   Clinic Hours Telephone Number   Dr. Shanti Berg     7am-7pm  Monday - Thursday   7am-5pm  Fridays  [phone]  (Appointment scheduling available 24/7)    Questions about your Visit?   Team Line:  [phone]   Urgent Care - [place] and [facility] - 11am-9pm Monday-Friday Saturday-Sunday- 9am-5pm   [place] - 5pm-9pm Monday-Friday Saturday-Sunday- 9am-5pm  [phone] - [facility]  [phone] - [place]       What options do I have for visits at the clinic other than the traditional office visit?  To expand how we care for you, many of our providers are utilizing electronic visits (e-visits) and telephone visits, when medically appropriate, for interactions with their patients rather than a visit in the clinic.   We also offer nurse visits for many medical concerns. Just like any other service, we will bill your insurance company for this type of visit based on time spent on the phone with your provider. Not all insurance companies cover these visits. Please check with your medical insurance if this type of visit is covered. You will be responsible for any charges that are not paid by your insurance.      E-visits via [employer]:  generally incur a $35.00 fee.  Telephone visits:  Time spent on the phone: *charged based on time that is spent on the phone in increments of 10 minutes. Estimated cost:   5-10 mins $30.00   11-20 mins. $59.00   21-30 mins. $85.00     Use [employer] (secure email communication and access to your chart) to send your primary care provider a message or make an appointment. Ask someone on your Team how to sign up for [employer].  For a Price Quote for your services, please call our Consumer Price Line at [phone].  As always, Thank you for trusting us with your health care needs!    Discharged by [facility]  CMA

## 2017-09-15 NOTE — MR AVS SNAPSHOT
Nury Cárdenas   9/15/2017 9:30 AM   Anticoagulation Therapy Visit    Description:  72 year old female   Provider:  MONI ANTI COAG   Department:  [facility]           INR as of 9/15/2017     Today's INR 2.0      Anticoagulation Summary as of 9/15/2017     INR goal 2.0-3.0   Today's INR 2.0   Full instructions 4 mg every day   Next INR check 9/22/2017    Indications   Long-term (current) use of anticoagulants [Z79.01] [Z79.01]  PE (pulmonary embolism) [I26.99]         Your next Anticoagulation Clinic appointment(s)     Sep 15, 2017  9:30 AM CDT   [facility] MONI ANTI COAG   [facility]    [address] [address] [phone]            Sep 22, 2017 11:15 AM CDT   Anticoagulation Visit with MONI ANTI COAG   [facility] ([place] [id number] [address] [phone]              Contact Numbers     [facility]  Please call [phone] to cancel and/or reschedule your appointment   Please call [phone] with any problems or questions regarding your therapy.        September 2017 Details    Sun Mon Tue Wed Thu Fri Sat          1               2                 3               4               5               6               7               8               9                 10               11               12               13               14               15      4 mg   See details      16      4 mg           17      4 mg         18      4 mg         19      4 mg         20      4 mg         21      4 mg         22            23                 24               25               26               27               28               29               30                Date Details   09/15 This INR check       Date of next INR:  9/22/2017         How to take your warfarin dose     To take:  4 mg Take 2 of the 2 mg tablets.

## 2017-09-15 NOTE — MR AVS SNAPSHOT
After Visit Summary   9/15/2017    Nury Cárdenas    MRN: [id number]           Patient Information     Date Of Birth          1945        Visit Information        Provider Department      9/15/2017 8:20 AM Phil Abbott MD [facility]        Today's Diagnoses     Wound of left leg, subsequent encounter: Slow healing    -  1    Cat scratch left lower extremity        Granulomatosis with polyangiitis (H)        Long-term use of immunosuppressant medication          Care Instructions    [facility]    If you have any questions regarding to your visit please contact your care team:       Team Purple:   Clinic Hours Telephone Number   Dr. Shanti Berg     7am-7pm  Monday - Thursday   7am-5pm  Fridays  [phone]  (Appointment scheduling available 24/7)    Questions about your Visit?   Team Line:  [phone]   [facility] [place] [facility] [facility] 11am-9pm Monday-Friday Saturday-Sunday- 9am-5pm   [place] - 5pm-9pm Monday-Friday Saturday-Sunday- 9am-5pm  [phone] - [place]   [phone] - [place]       What options do I have for visits at the clinic other than the traditional office visit?  To expand how we care for you, many of our providers are utilizing electronic visits (e-visits) and telephone visits, when medically appropriate, for interactions with their patients rather than a visit in the clinic.   We also offer nurse visits for many medical concerns. Just like any other service, we will bill your insurance company for this type of visit based on time spent on the phone with your provider. Not all insurance companies cover these visits. Please check with your medical insurance if this type of visit is covered. You will be responsible for any charges that are not paid by your insurance.      E-visits via Wordeo:  generally incur a $35.00 fee.  Telephone visits:  Time spent on the phone:  *charged based on time that is spent on the phone in increments of 10 minutes. Estimated cost:   5-10 mins $30.00   11-20 mins. $59.00   21-30 mins. $85.00     Use [employer] Customhart (secure email communication and access to your chart) to send your primary care provider a message or make an appointment. Ask someone on your Team how to sign up for [employer].  For a Price Quote for your services, please call our Consumer Price Line at [phone].  As always, Thank you for trusting us with your health care needs!    Discharged by TERESA Castano            Follow-ups after your visit        Your next 10 appointments already scheduled     Sep 15, 2017  9:30 AM CDT   Anticoagulation Visit with RAAD ANTI COAG   [facility] ([facility])    [address] [place] [id number] [phone]            Oct 04, 2017 10:00 AM CDT   LAB with RAAD LAB   [facility] ([address] [id number] [phone]           Patient must bring picture ID. Patient should be prepared to give a urine specimen  Please do not eat 10-12 hours before your appointment if you are coming in fasting for labs on lipids, cholesterol, or glucose (sugar). Pregnant women should follow their Care Team instructions. Water with medications is okay. Do not drink coffee or other fluids. If you have concerns about taking  your medications, please ask at office or if scheduling via [employer], send a message by clicking on Secure Messaging, Message Your Care Team.            Oct 13, 2017 11:30 AM CDT   Return Visit with Alvaro Maguire MD   [facility]    [address] [place] [address] [phone]              Who to contact     If you have questions or need follow up information about today's clinic visit or your schedule please contact [facility] directly at [phone].  Normal or non-critical lab  "and imaging results will be communicated to you by MyChart, letter or phone within 4 business days after the clinic has received the results. If you do not hear from us within 7 days, please contact the clinic through PeeplePasst or phone. If you have a critical or abnormal lab result, we will notify you by phone as soon as possible.  Submit refill requests through [facility] or call your pharmacy and they will forward the refill request to us. Please allow 3 business days for your refill to be completed.          Additional Information About Your Visit        [employer]WorkHands Information     [facility] gives you secure access to your electronic health record. If you see a primary care provider, you can also send messages to your care team and make appointments. If you have questions, please call your primary care clinic.  If you do not have a primary care provider, please call [phone] and they will assist you.        Care EveryWhere ID     This is your Care EveryWhere ID. This could be used by other organizations to access your [place] medical records  [id number]        Your Vitals Were     Pulse Temperature Respirations Height Pulse Oximetry Breastfeeding?    74 97.3  F (36.3  C) (Oral) 14 5' 4\" (1.626 m) 97% No    BMI (Body Mass Index)                   21.11 kg/m2            Blood Pressure from Last 3 Encounters:   09/15/17 124/70   08/11/17 106/68   08/05/17 122/74    Weight from Last 3 Encounters:   09/15/17 123 lb (55.8 kg)   08/11/17 127 lb 3.2 oz (57.7 kg)   08/02/17 124 lb 1.6 oz (56.3 kg)              Today, you had the following     No orders found for display       Primary Care Provider Office Phone # Fax #    Phil Abbott [phone] [address] [facility] [place] [id number]        Equal Access to Services     MARTÍN MYERS : Jonnie Carr, waaxda luqadaha, qaybta kaalmada rocky, lavell angel. So [facility] [phone].    ATENCIÓN: Si " jacobo beltran, tiene a fofana disposición servicios gratuitos de asistencia lingüística. Nancy leahy [phone].    We comply with applicable federal civil rights laws and [place] laws. We do not discriminate on the basis of race, color, national origin, age, disability sex, sexual orientation or gender identity.            Thank you!     Thank you for choosing [facility]  for your care. Our goal is always to provide you with excellent care. Hearing back from our patients is one way we can continue to improve our services. Please take a few minutes to complete the written survey that you may receive in the mail after your visit with us. Thank you!             Your Updated Medication List - Protect others around you: Learn how to safely use, store and throw away your medicines at www.disposemymeds.org.          This list is accurate as of: 9/15/17  8:56 AM.  Always use your most recent med list.                   Brand Name Dispense Instructions for use Diagnosis    acetaminophen 325 MG tablet    TYLENOL    100 tablet    Take 2 tablets (650 mg) by mouth every 6 hours as needed for mild pain    Migraines, Pulmonary emboli (H)       calcium-magnesium 500-250 MG Tabs per tablet    CALMAG     Take 1 tablet by mouth 2 times daily (with meals)        cholecalciferol 1000 UNITS Tabs      Take 1,000 Units by mouth daily        folic acid 1 MG tablet    FOLVITE    90 tablet    Take 1 tablet (1 mg) by mouth daily    Granulomatosis with polyangiitis (H), Long-term use of immunosuppressant medication, Primary osteoarthritis involving multiple joints, Wegener's granulomatosis (H)       methotrexate 2.5 MG tablet CHEMO      Take 4 tablets (10 mg) by mouth once a week On Fridays; resume after antibiotics complete    Wegener's granulomatosis (H), Granulomatosis with polyangiitis (H), Long-term use of immunosuppressant medication, Primary osteoarthritis involving multiple joints       propranolol 20 MG tablet    INDERAL     90 tablet    TAKE 1 TABLET(20 MG) BY MOUTH DAILY    Migraine without status migrainosus, not intractable, unspecified migraine type       warfarin 2 MG tablet    COUMADIN    180 tablet    TAKE 2 TABLETS(4 MG) BY MOUTH EVERY DAY    Chronic anticoagulation

## 2017-09-15 NOTE — NURSING NOTE
"Chief Complaint   Patient presents with     RECHECK     cat bite        Initial /70  Pulse 74  Temp 97.3  F (36.3  C) (Oral)  Resp 14  Ht 5' 4\" (1.626 m)  Wt 123 lb (55.8 kg)  SpO2 97%  Breastfeeding? No  BMI 21.11 kg/m2 Estimated body mass index is 21.11 kg/(m^2) as calculated from the following:    Height as of this encounter: 5' 4\" (1.626 m).    Weight as of this encounter: 123 lb (55.8 kg).  Medication Reconciliation: complete   Dana Colindres CMA (AAMA)      "

## 2017-09-21 ENCOUNTER — MYC MEDICAL ADVICE (OUTPATIENT)
Dept: FAMILY MEDICINE | Facility: CLINIC | Age: 72
End: 2017-09-21

## 2017-09-21 DIAGNOSIS — L03.119 CELLULITIS OF LOWER EXTREMITY, UNSPECIFIED LATERALITY: Primary | ICD-10-CM

## 2017-09-22 ENCOUNTER — ANTICOAGULATION THERAPY VISIT (OUTPATIENT)
Dept: NURSING | Facility: CLINIC | Age: 72
End: 2017-09-22
Payer: COMMERCIAL

## 2017-09-22 DIAGNOSIS — Z79.01 LONG-TERM (CURRENT) USE OF ANTICOAGULANTS: ICD-10-CM

## 2017-09-22 LAB — INR POINT OF CARE: 2 (ref 0.86–1.14)

## 2017-09-22 PROCEDURE — 85610 PROTHROMBIN TIME: CPT | Mod: QW

## 2017-09-22 PROCEDURE — 36416 COLLJ CAPILLARY BLOOD SPEC: CPT

## 2017-09-22 PROCEDURE — 99207 ZZC NO CHARGE NURSE ONLY: CPT

## 2017-09-22 RX ORDER — MUPIROCIN 20 MG/G
OINTMENT TOPICAL 3 TIMES DAILY
Qty: 22 G | Refills: 1 | Status: SHIPPED | OUTPATIENT
Start: 2017-09-22 | End: 2017-10-13

## 2017-09-22 NOTE — TELEPHONE ENCOUNTER
There is a spot/bump in the middle, is it oozing any fluid? I want us to use a topical antibiotic ointment around that spot; and I recommend Mupirocin which covers stubborn bugs; apply a small amount and let us check spot in a week or sooner if appears to worsen.  I have sent a prescription to the pharmacy (stop vaseline or the other topical antibiotic).

## 2017-09-22 NOTE — MR AVS SNAPSHOT
Nury Cárdenas   9/22/2017 11:15 AM   Anticoagulation Therapy Visit    Description:  72 year old female   Provider:  MONI ANTI COAG   Department:  [facility]           INR as of 9/22/2017     Today's INR 2.0      Anticoagulation Summary as of 9/22/2017     INR goal 2.0-3.0   Today's INR 2.0   Full instructions 4 mg every day   Next INR check 10/11/2017    Indications   Long-term (current) use of anticoagulants [Z79.01] [Z79.01]  PE (pulmonary embolism) [I26.99]         Your next Anticoagulation Clinic appointment(s)     Oct 11, 2017  9:15 AM CDT   Anticoagulation Visit with MONI ANTI COAG   [facility] ([facility]    [address] [address] [phone]              Contact Numbers     [facility]  Please call [phone] to cancel and/or reschedule your appointment   Please call [phone] with any problems or questions regarding your therapy.        September 2017 Details    Sun Mon Tue Wed u Fri Sat          1               2                 3               4               5               6               7               8               9                 10               11               12               13               14               15               16                 17               18               19               20               21               22      4 mg   See details      23      4 mg           24      4 mg         25      4 mg         26      4 mg         27      4 mg         28      4 mg         29      4 mg         30      4 mg          Date Details   09/22 This INR check               How to take your warfarin dose     To take:  4 mg Take 2 of the 2 mg tablets.           October 2017 Details    Sun Mon Tue Wed u Fri Sat     1      4 mg         2      4 mg         3      4 mg         4      4 mg         5      4 mg         6      4 mg         7      4 mg           8      4 mg         9      4 mg         10      4 mg         11             12               13               14                 15               16               17               18               19               20               21                 22               23               24               25               26               27               28                 29               30               31                    Date Details   No additional details    Date of next INR:  10/11/2017         How to take your warfarin dose     To take:  4 mg Take 2 of the 2 mg tablets.

## 2017-09-22 NOTE — PROGRESS NOTES
ANTICOAGULATION FOLLOW-UP CLINIC VISIT    Patient Name:  Nury Cárdenas  Date:  9/22/2017  Contact Type:  Face to Face    SUBJECTIVE:     Patient Findings     Positives No Problem Findings           OBJECTIVE    INR Protime   Date Value Ref Range Status   09/22/2017 2.0 (A) 0.86 - 1.14 Final       ASSESSMENT / PLAN  No question data found.  Anticoagulation Summary as of 9/22/2017     INR goal 2.0-3.0   Today's INR 2.0   Maintenance plan 4 mg (2 mg x 2) every day   Full instructions 4 mg every day   Weekly total 28 mg   No change documented Kirti Lacy, RN   Plan last modified Giselle Price RN (7/7/2016)   Next INR check 10/11/2017   Priority INR   Target end date     Indications   Long-term (current) use of anticoagulants [Z79.01] [Z79.01]  PE (pulmonary embolism) [I26.99]         Anticoagulation Episode Summary     INR check location     Preferred lab     Send INR reminders to [facility]    Comments       Anticoagulation Care Providers     Provider Role Specialty Phone number    Milena Phil Harris MD [facility] [phone]            See the Encounter Report to view Anticoagulation Flowsheet and Dosing Calendar (Go to Encounters tab in chart review, and find the Anticoagulation Therapy Visit)    Dosage adjustment made based on physician directed care plan.    Kirti Lacy, GABBI

## 2017-10-02 ENCOUNTER — OFFICE VISIT (OUTPATIENT)
Dept: FAMILY MEDICINE | Facility: CLINIC | Age: 72
End: 2017-10-02
Payer: COMMERCIAL

## 2017-10-02 VITALS
TEMPERATURE: 97 F | DIASTOLIC BLOOD PRESSURE: 84 MMHG | BODY MASS INDEX: 21.51 KG/M2 | OXYGEN SATURATION: 99 % | WEIGHT: 126 LBS | HEART RATE: 78 BPM | HEIGHT: 64 IN | SYSTOLIC BLOOD PRESSURE: 126 MMHG

## 2017-10-02 DIAGNOSIS — M25.562 ACUTE PAIN OF LEFT KNEE: Primary | ICD-10-CM

## 2017-10-02 PROCEDURE — 99213 OFFICE O/P EST LOW 20 MIN: CPT | Performed by: FAMILY MEDICINE

## 2017-10-02 ASSESSMENT — PAIN SCALES - GENERAL: PAINLEVEL: NO PAIN (1)

## 2017-10-02 NOTE — MR AVS SNAPSHOT
After Visit Summary   10/2/2017    Nury Cárdenas    MRN: [id number]           Patient Information     Date Of Birth          1945        Visit Information        Provider Department      10/2/2017 5:40 PM Phil Abbott MD [facility]        Today's Diagnoses     Acute pain of left knee    -  1      Care Instructions    [facility]    If you have any questions regarding to your visit please contact your care team:       Team Purple:   Clinic Hours Telephone Number   Dr. Shanti Berg     7am-7pm  Monday - Thursday   7am-5pm  Fridays  [phone]  (Appointment scheduling available 24/7)    Questions about your Visit?   Team Line:  [phone]   Urgent [facility] - [place] [facility] - 11am-9pm Monday-Friday Saturday-Sunday- 9am-5pm   [place] - 5pm-9pm Monday-Friday Saturday-Sunday- 9am-5pm  [phone] - [place]   [phone] - [place]       What options do I have for visits at the clinic other than the traditional office visit?  To expand how we care for you, many of our providers are utilizing electronic visits (e-visits) and telephone visits, when medically appropriate, for interactions with their patients rather than a visit in the clinic.   We also offer nurse visits for many medical concerns. Just like any other service, we will bill your insurance company for this type of visit based on time spent on the phone with your provider. Not all insurance companies cover these visits. Please check with your medical insurance if this type of visit is covered. You will be responsible for any charges that are not paid by your insurance.      E-visits via FanFound:  generally incur a $35.00 fee.  Telephone visits:  Time spent on the phone: *charged based on time that is spent on the phone in increments of 10 minutes. Estimated cost:   5-10 mins $30.00   11-20 mins. $59.00   21-30 mins. $85.00     Use  [email or web address] (secure email communication and access to your chart) to send your primary care provider a message or make an appointment. Ask someone on your Team how to sign up for GroupSwimt.  For a Price Quote for your services, please call our Consumer Price Line at [phone].  As always, Thank you for trusting us with your health care needs!    Discharge by RENE CABELLO             Follow-ups after your visit        Your next 10 appointments already scheduled     Oct 04, 2017 10:00 AM CDT   LAB with RAAD LAB   [facility]    [address] [id number] [phone]           Patient must bring picture ID. Patient should be prepared to give a urine specimen  Please do not eat 10-12 hours before your appointment if you are coming in fasting for labs on lipids, cholesterol, or glucose (sugar). Pregnant women should follow their Care Team instructions. Water with medications is okay. Do not drink coffee or other fluids. If you have concerns about taking  your medications, please ask at office or if scheduling via GroupSwimt, send a message by clicking on Secure Messaging, Message Your Care Team.            Oct 11, 2017  9:15 AM CDT   Anticoagulation Visit with RAAD ANTI COAG   [facility] ([facility]    [address] [address] [phone]            Oct 13, 2017 11:30 AM CDT   Return Visit with Alvaro Maguire MD   [facility]    [address] [place] [address] [phone]              Who to contact     If you have questions or need follow up information about today's clinic visit or your schedule please contact [facility] directly at [phone].  Normal or non-critical lab and imaging results will be communicated to you by MyChart, letter or phone within 4 business days after the clinic has received the results. If you do not hear from us  "within 7 days, please contact the clinic through [employer] or phone. If you have a critical or abnormal lab result, we will notify you by phone as soon as possible.  Submit refill requests through [employer] or call your pharmacy and they will forward the refill request to us. Please allow 3 business days for your refill to be completed.          Additional Information About Your Visit        [employer]door Information     [employer] gives you secure access to your electronic health record. If you see a primary care provider, you can also send messages to your care team and make appointments. If you have questions, please call your primary care clinic.  If you do not have a primary care provider, please call [phone] and they will assist you.        Care EveryWhere ID     This is your Care EveryWhere ID. This could be used by other organizations to access your [place] medical records  [id number]        Your Vitals Were     Pulse Temperature Height Pulse Oximetry BMI (Body Mass Index)       78 97  F (36.1  C) (Oral) 5' 4\" (1.626 m) 99% 21.63 kg/m2        Blood Pressure from Last 3 Encounters:   10/02/17 126/84   09/15/17 124/70   08/11/17 106/68    Weight from Last 3 Encounters:   10/02/17 126 lb (57.2 kg)   09/15/17 123 lb (55.8 kg)   08/11/17 127 lb 3.2 oz (57.7 kg)              Today, you had the following     No orders found for display       Primary Care Provider Office Phone # Fax #    Phil Abbott [phone]       [address] [facility] [address] [facility] [address]        Equal Access to Services     [facility]: Hadii aad ku hadasho Soomaali, waaxda luqadaha, qaybta kaalmada rocky, lavell castillo . So [facility] [phone].    ATENCIÓN: Si habla español, tiene a fofana disposición servicios gratuitos de asistencia lingüística. Llame al [phone].    We comply with applicable federal civil rights laws and [place] laws. We do not discriminate on the basis of race, color, " national origin, age, disability, sex, sexual orientation, or gender identity.            Thank you!     Thank you for choosing [facility]  for your care. Our goal is always to provide you with excellent care. Hearing back from our patients is one way we can continue to improve our services. Please take a few minutes to complete the written survey that you may receive in the mail after your visit with us. Thank you!             Your Updated Medication List - Protect others around you: Learn how to safely use, store and throw away your medicines at www.disposemymeds.org.          This list is accurate as of: 10/2/17  5:59 PM.  Always use your most recent med list.                   Brand Name Dispense Instructions for use Diagnosis    acetaminophen 325 MG tablet    TYLENOL    100 tablet    Take 2 tablets (650 mg) by mouth every 6 hours as needed for mild pain    Migraines, Pulmonary emboli (H)       calcium-magnesium 500-250 MG Tabs per tablet    CALMAG     Take 1 tablet by mouth 2 times daily (with meals)        cholecalciferol 1000 UNITS Tabs      Take 1,000 Units by mouth daily        folic acid 1 MG tablet    FOLVITE    90 tablet    Take 1 tablet (1 mg) by mouth daily    Granulomatosis with polyangiitis (H), Long-term use of immunosuppressant medication, Primary osteoarthritis involving multiple joints, Wegener's granulomatosis (H)       methotrexate 2.5 MG tablet CHEMO      Take 4 tablets (10 mg) by mouth once a week On Fridays; resume after antibiotics complete    Wegener's granulomatosis (H), Granulomatosis with polyangiitis (H), Long-term use of immunosuppressant medication, Primary osteoarthritis involving multiple joints       mupirocin 2 % ointment    BACTROBAN    22 g    Apply topically 3 times daily    Cellulitis of lower extremity, unspecified laterality       propranolol 20 MG tablet    INDERAL    90 tablet    TAKE 1 TABLET(20 MG) BY MOUTH DAILY    Migraine without status migrainosus, not  intractable, unspecified migraine type       warfarin 2 MG tablet    COUMADIN    180 tablet    TAKE 2 TABLETS(4 MG) BY MOUTH EVERY DAY    Chronic anticoagulation

## 2017-10-02 NOTE — PROGRESS NOTES
SUBJECTIVE:   Nury Cárdenas is a 72 year old female who presents to clinic today for the following health issues:    Joint Pain    Onset: x3 days     Description:   Location: left knee  Character: Sharp and Dull ache    Intensity: mild    Progression of Symptoms: better    Accompanying Signs & Symptoms:  Other symptoms: none    History:   Previous similar pain: no       Precipitating factors:   Trauma or overuse: YES    Alleviating factors:  Improved by: rest/inactivity    Therapies Tried and outcome: None    The pain got so bad that it made her knee give out. Walking down steps hurts worse that even level or up.     Cat bite wound:   Healing well a little scab left.    Problem list and histories reviewed & adjusted, as indicated.  Additional history: as documented    Patient Active Problem List   Diagnosis     GERD (gastroesophageal reflux disease)     Fibroids     Migraines     Hearing loss     Osteopenia     HYPERLIPIDEMIA LDL GOAL <160     Advanced directives, counseling/discussion     Inflammatory arthritis     Synovitis of wrist     Chronic constipation     Granulomatosis with polyangiitis (H)     Chronic steroid use     Dyspnea     PE (pulmonary embolism)     Cataract, mild, ou     Calculus of kidney, right     Chronic anticoagulation     Long-term (current) use of anticoagulants [Z79.01]     Long-term use of immunosuppressant medication     Primary osteoarthritis involving multiple joints     Cellulitis     Cat scratch left lower extremity     Past Surgical History:   Procedure Laterality Date     ABDOMEN SURGERY      appendix out     APPENDECTOMY       BIOPSY  1972    cone biopsy     CL AFF SURGICAL PATHOLOGY      cone biopsy 1975     COLONOSCOPY       EYE SURGERY      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     HC ESOPHAGOSCOPY, DIAGNOSTIC       OPTICAL TRACKING SYSTEM BRONCHOSCOPY  6/19/2014    Procedure: OPTICAL TRACKING SYSTEM BRONCHOSCOPY;  Surgeon: Angel Kathleen MD;  Location: [facility]      "SURGICAL HISTORY OF -   as a child    strabismus repair right eye     SURGICAL HISTORY OF -   8-2009    right wrist debridement     TONSILLECTOMY      as a child     TONSILLECTOMY & ADENOIDECTOMY       TUBAL LIGATION         Social History   Substance Use Topics     Smoking status: Former Smoker     Packs/day: 2.00     Years: 18.00     Types: Cigarettes     Start date: 1/1/1960     Quit date: 1/1/1982     Smokeless tobacco: Never Used     Alcohol use No     Family History   Problem Relation Age of Onset     Respiratory Mother      emphysema     Aneurysm Mother      Chronic Obstructive Pulmonary Disease Mother      Thyroid Disease Mother      ,     OSTEOPOROSIS Mother      Other Cancer Father      CANCER Father      lung cancer     Arthritis Maternal Grandmother      rheumatoid     HEART DISEASE Maternal Grandmother      unsure of details     HEART DISEASE Maternal Grandfather      Neurologic Disorder Paternal Grandmother      migraines     Alzheimer Disease Paternal Grandmother      Unknown/Adopted Paternal Grandfather      CANCER Sister      stage 4 anal cancer age 62 years     Colon Cancer Sister      Cancer - colorectal Brother      Colon Cancer Brother      Substance Abuse Sister      Anesthesia Reaction Daughter            Reviewed and updated as needed this visit by clinical staff   ROS:  Constitutional, HEENT, cardiovascular, pulmonary, gi and gu systems are negative, except as otherwise noted.      OBJECTIVE:   /84  Pulse 78  Temp 97  F (36.1  C) (Oral)  Ht 5' 4\" (1.626 m)  Wt 126 lb (57.2 kg)  SpO2 99%  BMI 21.63 kg/m2  Body mass index is 21.63 kg/(m^2).  GENERAL: healthy, alert and no distress  NECK: no adenopathy and thyroid normal to palpation  RESP: lungs clear to auscultation - no rales, rhonchi or wheezes  CV: regular rate and rhythm, no murmur, click or rub, no peripheral edema   MS:   Lt Knee: No tenderness at this time  Diagnostic Test Results:  none     ASSESSMENT/PLAN:     (M25.562) " Acute pain of left knee  (primary encounter diagnosis)  Comment: Episodic, non specific knee arthralgia, resolved at this time.   Plan: Stay active and knee exercises. Use of cane during long walks discussed.      Phil Abbott MD  [facility]

## 2017-10-02 NOTE — NURSING NOTE
"Chief Complaint   Patient presents with     Musculoskeletal Problem       Initial /84  Pulse 78  Temp 97  F (36.1  C) (Oral)  Ht 5' 4\" (1.626 m)  Wt 126 lb (57.2 kg)  SpO2 99%  BMI 21.63 kg/m2 Estimated body mass index is 21.63 kg/(m^2) as calculated from the following:    Height as of this encounter: 5' 4\" (1.626 m).    Weight as of this encounter: 126 lb (57.2 kg).  Medication Reconciliation: complete     Saida Chavez MA       "

## 2017-10-02 NOTE — PATIENT INSTRUCTIONS
[facility]    If you have any questions regarding to your visit please contact your care team:       Team Purple:   Clinic Hours Telephone Number   Dr. Shanti Berg     7am-7pm  Monday - Thursday   7am-5pm  Fridays  [phone]  (Appointment scheduling available 24/7)    Questions about your Visit?   Team Line:  [phone]   Urgent Care - [place] and [facility] - 11am-9pm Monday-Friday Saturday-Sunday- 9am-5pm   [place] - 5pm-9pm Monday-Friday Saturday-Sunday- 9am-5pm  [phone] - [facility]  [phone] - [place]       What options do I have for visits at the clinic other than the traditional office visit?  To expand how we care for you, many of our providers are utilizing electronic visits (e-visits) and telephone visits, when medically appropriate, for interactions with their patients rather than a visit in the clinic.   We also offer nurse visits for many medical concerns. Just like any other service, we will bill your insurance company for this type of visit based on time spent on the phone with your provider. Not all insurance companies cover these visits. Please check with your medical insurance if this type of visit is covered. You will be responsible for any charges that are not paid by your insurance.      E-visits via [employer]:  generally incur a $35.00 fee.  Telephone visits:  Time spent on the phone: *charged based on time that is spent on the phone in increments of 10 minutes. Estimated cost:   5-10 mins $30.00   11-20 mins. $59.00   21-30 mins. $85.00     Use [employer] (secure email communication and access to your chart) to send your primary care provider a message or make an appointment. Ask someone on your Team how to sign up for [employer].  For a Price Quote for your services, please call our Consumer Price Line at [phone].  As always, Thank you for trusting us with your health care needs!    Discharge by RENE CABELLO

## 2017-10-04 DIAGNOSIS — Z79.60 LONG-TERM USE OF IMMUNOSUPPRESSANT MEDICATION: ICD-10-CM

## 2017-10-04 DIAGNOSIS — M31.30 GRANULOMATOSIS WITH POLYANGIITIS (H): ICD-10-CM

## 2017-10-04 DIAGNOSIS — M15.0 PRIMARY OSTEOARTHRITIS INVOLVING MULTIPLE JOINTS: ICD-10-CM

## 2017-10-04 DIAGNOSIS — M31.30 WEGENER'S GRANULOMATOSIS: ICD-10-CM

## 2017-10-04 LAB
ALBUMIN SERPL-MCNC: 3.6 G/DL (ref 3.4–5)
ALT SERPL W P-5'-P-CCNC: 26 U/L (ref 0–50)
AST SERPL W P-5'-P-CCNC: 18 U/L (ref 0–45)
CREAT SERPL-MCNC: 0.71 MG/DL (ref 0.52–1.04)
CRP SERPL-MCNC: <2.9 MG/L (ref 0–8)
ERYTHROCYTE [DISTWIDTH] IN BLOOD BY AUTOMATED COUNT: 13.8 % (ref 10–15)
GFR SERPL CREATININE-BSD FRML MDRD: 80 ML/MIN/1.7M2
HCT VFR BLD AUTO: 41.9 % (ref 35–47)
HGB BLD-MCNC: 14.2 G/DL (ref 11.7–15.7)
MCH RBC QN AUTO: 31.6 PG (ref 26.5–33)
MCHC RBC AUTO-ENTMCNC: 33.9 G/DL (ref 31.5–36.5)
MCV RBC AUTO: 93 FL (ref 78–100)
PLATELET # BLD AUTO: 199 10E9/L (ref 150–450)
RBC # BLD AUTO: 4.5 10E12/L (ref 3.8–5.2)
WBC # BLD AUTO: 5.7 10E9/L (ref 4–11)

## 2017-10-04 PROCEDURE — 86140 C-REACTIVE PROTEIN: CPT | Performed by: INTERNAL MEDICINE

## 2017-10-04 PROCEDURE — 85027 COMPLETE CBC AUTOMATED: CPT | Performed by: INTERNAL MEDICINE

## 2017-10-04 PROCEDURE — 84450 TRANSFERASE (AST) (SGOT): CPT | Performed by: INTERNAL MEDICINE

## 2017-10-04 PROCEDURE — 36415 COLL VENOUS BLD VENIPUNCTURE: CPT | Performed by: INTERNAL MEDICINE

## 2017-10-04 PROCEDURE — 82040 ASSAY OF SERUM ALBUMIN: CPT | Performed by: INTERNAL MEDICINE

## 2017-10-04 PROCEDURE — 84460 ALANINE AMINO (ALT) (SGPT): CPT | Performed by: INTERNAL MEDICINE

## 2017-10-04 PROCEDURE — 82565 ASSAY OF CREATININE: CPT | Performed by: INTERNAL MEDICINE

## 2017-10-06 DIAGNOSIS — M31.30 WEGENER'S GRANULOMATOSIS: ICD-10-CM

## 2017-10-06 DIAGNOSIS — M31.30 GRANULOMATOSIS WITH POLYANGIITIS (H): ICD-10-CM

## 2017-10-06 DIAGNOSIS — M15.0 PRIMARY OSTEOARTHRITIS INVOLVING MULTIPLE JOINTS: ICD-10-CM

## 2017-10-06 DIAGNOSIS — Z79.60 LONG-TERM USE OF IMMUNOSUPPRESSANT MEDICATION: ICD-10-CM

## 2017-10-06 LAB
ALBUMIN UR-MCNC: NEGATIVE MG/DL
APPEARANCE UR: CLEAR
BILIRUB UR QL STRIP: NEGATIVE
CK SERPL-CCNC: 70 U/L (ref 30–225)
COLOR UR AUTO: YELLOW
ERYTHROCYTE [SEDIMENTATION RATE] IN BLOOD BY WESTERGREN METHOD: 7 MM/H (ref 0–30)
GLUCOSE UR STRIP-MCNC: NEGATIVE MG/DL
HGB UR QL STRIP: NEGATIVE
KETONES UR STRIP-MCNC: NEGATIVE MG/DL
LEUKOCYTE ESTERASE UR QL STRIP: NEGATIVE
NITRATE UR QL: NEGATIVE
PH UR STRIP: 7 PH (ref 5–7)
SOURCE: NORMAL
SP GR UR STRIP: <=1.005 (ref 1–1.03)
UROBILINOGEN UR STRIP-ACNC: 0.2 EU/DL (ref 0.2–1)

## 2017-10-06 PROCEDURE — 81003 URINALYSIS AUTO W/O SCOPE: CPT | Performed by: INTERNAL MEDICINE

## 2017-10-06 PROCEDURE — 36415 COLL VENOUS BLD VENIPUNCTURE: CPT | Performed by: INTERNAL MEDICINE

## 2017-10-06 PROCEDURE — 85652 RBC SED RATE AUTOMATED: CPT | Performed by: INTERNAL MEDICINE

## 2017-10-06 PROCEDURE — 82550 ASSAY OF CK (CPK): CPT | Performed by: INTERNAL MEDICINE

## 2017-10-06 PROCEDURE — 83516 IMMUNOASSAY NONANTIBODY: CPT | Performed by: INTERNAL MEDICINE

## 2017-10-09 LAB — PROTEINASE3 IGG SER-ACNC: <0.2 AI (ref 0–0.9)

## 2017-10-11 ENCOUNTER — ANTICOAGULATION THERAPY VISIT (OUTPATIENT)
Dept: NURSING | Facility: CLINIC | Age: 72
End: 2017-10-11
Payer: COMMERCIAL

## 2017-10-11 DIAGNOSIS — Z79.01 LONG-TERM (CURRENT) USE OF ANTICOAGULANTS: ICD-10-CM

## 2017-10-11 LAB — INR POINT OF CARE: 2.1 (ref 0.86–1.14)

## 2017-10-11 PROCEDURE — 36416 COLLJ CAPILLARY BLOOD SPEC: CPT

## 2017-10-11 PROCEDURE — 99207 ZZC NO CHARGE NURSE ONLY: CPT

## 2017-10-11 PROCEDURE — 85610 PROTHROMBIN TIME: CPT | Mod: QW

## 2017-10-11 NOTE — PROGRESS NOTES
ANTICOAGULATION FOLLOW-UP CLINIC VISIT    Patient Name:  Nury Cárdenas  Date:  10/11/2017  Contact Type:  Face to Face    SUBJECTIVE:     Patient Findings     Positives No Problem Findings           OBJECTIVE    INR Protime   Date Value Ref Range Status   10/11/2017 2.1 (A) 0.86 - 1.14 Final       ASSESSMENT / PLAN  No question data found.  Anticoagulation Summary as of 10/11/2017     INR goal 2.0-3.0   Today's INR 2.1   Maintenance plan 4 mg (2 mg x 2) every day   Full instructions 4 mg every day   Weekly total 28 mg   No change documented Kirti Lacy, RN   Plan last modified Giselle Price RN (7/7/2016)   Next INR check 11/8/2017   Priority INR   Target end date     Indications   Long-term (current) use of anticoagulants [Z79.01] [Z79.01]  PE (pulmonary embolism) [I26.99]         Anticoagulation Episode Summary     INR check location     Preferred lab     Send INR reminders to [facility]    Comments       Anticoagulation Care Providers     Provider Role Specialty Phone number    Milena Phil Harris MD [facility] [phone]            See the Encounter Report to view Anticoagulation Flowsheet and Dosing Calendar (Go to Encounters tab in chart review, and find the Anticoagulation Therapy Visit)    Dosage adjustment made based on physician directed care plan.    Kirti Lacy, GABBI

## 2017-10-11 NOTE — MR AVS SNAPSHOT
Nury Cárdenas   10/11/2017 9:15 AM   Anticoagulation Therapy Visit    Description:  72 year old female   Provider:  [facility]   Department:  [facility]           INR as of 10/11/2017     Today's INR 2.1      Anticoagulation Summary as of 10/11/2017     INR goal 2.0-3.0   Today's INR 2.1   Full instructions 4 mg every day   Next INR check 11/8/2017    Indications   Long-term (current) use of anticoagulants [Z79.01] [Z79.01]  PE (pulmonary embolism) [I26.99]         Your next Anticoagulation Clinic appointment(s)     Nov 08, 2017  9:30 AM CST   Anticoagulation Visit with [facility]   [facility]    [address] [address] [phone]              Contact Numbers     [facility]  Please call [phone] to cancel and/or reschedule your appointment   Please call [phone] with any problems or questions regarding your therapy.        October 2017 Details    Sun Mon Tue Wed Thu Fri Sat     1               2               3               4               5               6               7                 8               9               10               11      4 mg   See details      12      4 mg         13      4 mg         14      4 mg           15      4 mg         16      4 mg         17      4 mg         18      4 mg         19      4 mg         20      4 mg         21      4 mg           22      4 mg         23      4 mg         24      4 mg         25      4 mg         26      4 mg         27      4 mg         28      4 mg           29      4 mg         30      4 mg         31      4 mg              Date Details   10/11 This INR check               How to take your warfarin dose     To take:  4 mg Take 2 of the 2 mg tablets.           November 2017 Details    Sun Mon Tue Wed u Fri Sat        1      4 mg         2      4 mg         3      4 mg         4      4 mg           5      4 mg         6      4 mg         7      4 mg         8             9               10               11                 12               13               14               15               16               17               18                 19               20               21               22               23               24               25                 26               27               28               29               30                  Date Details   No additional details    Date of next INR:  11/8/2017         How to take your warfarin dose     To take:  4 mg Take 2 of the 2 mg tablets.

## 2017-10-13 ENCOUNTER — OFFICE VISIT (OUTPATIENT)
Dept: RHEUMATOLOGY | Facility: CLINIC | Age: 72
End: 2017-10-13
Payer: COMMERCIAL

## 2017-10-13 VITALS
OXYGEN SATURATION: 97 % | SYSTOLIC BLOOD PRESSURE: 127 MMHG | BODY MASS INDEX: 20.54 KG/M2 | TEMPERATURE: 97 F | HEART RATE: 59 BPM | HEIGHT: 65 IN | WEIGHT: 123.3 LBS | DIASTOLIC BLOOD PRESSURE: 77 MMHG

## 2017-10-13 DIAGNOSIS — Z79.60 LONG-TERM USE OF IMMUNOSUPPRESSANT MEDICATION: ICD-10-CM

## 2017-10-13 DIAGNOSIS — M15.0 PRIMARY OSTEOARTHRITIS INVOLVING MULTIPLE JOINTS: ICD-10-CM

## 2017-10-13 DIAGNOSIS — M31.30 WEGENER'S GRANULOMATOSIS: ICD-10-CM

## 2017-10-13 DIAGNOSIS — M31.30 GRANULOMATOSIS WITH POLYANGIITIS (H): ICD-10-CM

## 2017-10-13 PROCEDURE — 99214 OFFICE O/P EST MOD 30 MIN: CPT | Performed by: INTERNAL MEDICINE

## 2017-10-13 ASSESSMENT — PAIN SCALES - GENERAL: PAINLEVEL: NO PAIN (0)

## 2017-10-13 NOTE — LETTER
10/13/2017        RE: Nury Cárdenas  [address] [address]        Ms. 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 Course complicated by severe upper airway inflammation, Wegener's lung and destructive/erosive joint disease. Treated initially with rituximab 375 mg/m2 x4 and high dose corticsteroids, and most recently with methotrexate 10 mg weekly. Folic acid is 1 mg daily. No history of relapse. Prednisone taper complete on April 1st, 2017.    She reports a cat bite to her left leg in July that became infected and was treated with IV ATBs. She currently has one shallow dry scar on the skin. The area around the bite is now darkened and indurate.     She denies any relapse in the GPA in the interim. She denies fatigue, weight loss, or dyspnea. She has never had a GPA relapse.    She has some gelling phenomena in the legs. No AM stiffness. She can also have some chronic muscular back pain.    She restarted the methotrexate 10 mg weekly and folic acid after stopping her antibiotics, and she has tolerated this reduced dose well.    PMI:  Medical-invasive pulmonary aspirgillosis, GPA, osteoarthritis, DVT with pulmonary embolism, osteopenia (T = -2.2 7-2016), GERD, hyperlipidemia, +Tb exposure, nephrolithiasis  Surgical-lung biopsy, appendectomy, eye surgery, tonsillectomy, cone biopsy, right wrist synovectomy  Injuries-left wrist fracture, left 5th toe fracture    SH:  Lives at home alone. Former smoker. No EtOH. Tuberculosis exposure as a child. Ambidextrous.    FH:  Son with mitochondrial myopathy  Sibs dead with colon and anal cancer  Father dead with lung cancer  Mother dead with emphysema and osteoporosis    PMSF history personally obtained and updated by me this visit.    ROS:  +reduced vision  +leg ulcer  +scratchy cough  +tickle in throat  +allergies to fosamax amoxicillin, augmentin, erythromycin, zithromax, nickel, codeine,  adhesive tape  Remainder of the 14 point ROS obtained and found negative.    Physical Exam:  Constitutional: WD-WN-WG cooperative; +rare dry cough  Eyes: nl EOM, PERRLA, vision, conjunctiva, sclera  ENT: nl external ears, nose, hearing, lips, teeth, gums, throat  Neck: no mass or thyroid enlargement  Resp: lungs clear to auscultation, nl to palpation, nl effort  CV: RRR, no murmurs, rubs or gallops, no edema  GI: no ABD mass or tenderness, no HSM  : not tested  Lymph: no cervical or epitrochlear nodes  MS: +Right wrist with no flexion or extension; left wrist extension to 45 degrees; 1st CMC joint squaring bilaterally with some thumb laxity; right neck rotation 30 degrees right and 45 degrees left with head forward position; All other TMJ, neck, shoulder, elbow, wrist, hand, spine, hip, knee, ankle, and foot joints were examined and otherwise found normal. Normal  strength. Kyphosis. Normal tuck and prayer.  Skin: no nail pitting, alopecia, rash; +dry crusting 1/2 cm lesion on the left shin with surrounding induration and hyperpigmentation  Neuro: nl cranial nerves, strength, sensation, DTRs.   Psych: nl judgement, orientation, memory, affect.    Laboratory:    Component      Latest Ref Rng & Units 10/4/2017 10/6/2017 10/11/2017   Color Urine        Yellow    Appearance Urine        Clear    Glucose Urine      NEG:Negative mg/dL  Negative    Bilirubin Urine      NEG:Negative  Negative    Ketones Urine      NEG:Negative mg/dL  Negative    Specific Gravity Urine      1.003 - 1.035  <=1.005    Blood Urine      NEG:Negative  Negative    pH Urine      5.0 - 7.0 pH  7.0    Protein Albumin Urine      NEG:Negative mg/dL  Negative    Urobilinogen Urine      0.2 - 1.0 EU/dL  0.2    Nitrite Urine      NEG:Negative  Negative    Leukocyte Esterase Urine      NEG:Negative  Negative    Source        Midstream Urine    WBC      4.0 - 11.0 10e9/L 5.7     RBC Count      3.8 - 5.2 10e12/L 4.50     Hemoglobin      11.7 - 15.7  g/dL 14.2     Hematocrit      35.0 - 47.0 % 41.9     MCV      78 - 100 fl 93     MCH      26.5 - 33.0 pg 31.6     MCHC      31.5 - 36.5 g/dL 33.9     RDW      10.0 - 15.0 % 13.8     Platelet Count      150 - 450 10e9/L 199     Creatinine      0.52 - 1.04 mg/dL 0.71     GFR Estimate      >60 mL/min/1.7m2 80     GFR Estimate If Black      >60 mL/min/1.7m2 >90     AST      0 - 45 U/L 18     ALT      0 - 50 U/L 26     Albumin      3.4 - 5.0 g/dL 3.6     CRP Inflammation      0.0 - 8.0 mg/L <2.9     Sed Rate      0 - 30 mm/h  7    CK Total      30 - 225 U/L  70    Proteinase 3 Antibody IgG      0.0 - 0.9 AI  <0.2    INR      0.86 - 1.14   2.1 (A)     Impression:    PR3-associated GPA-severe with upper airway, joint, and lung involvement. I see no important sign of vasculitic disease activity. Furthermore, her course was complicated by another infection. No history of GPA relapse. No evidence of systemic inflammation or ANCA by laboratory testing.  Based on all of this, we agree to discontinue the methotrexate and monitor her off of immunosuppression. She agrees to this plan. Get PFTs now. Would get a CT scan should her cough worsen.    Osteoporosis-she declines treatment at this time.    Long term management of immunosuppression-with history of increased episodes of infection. Immunosuppression will now be stopped.    RTC in 4 months.      Sincerely,        Alvaro Maguire MD

## 2017-10-13 NOTE — PROGRESS NOTES
Ms. 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 Course complicated by severe upper airway inflammation, Wegener's lung and destructive/erosive joint disease. Treated initially with rituximab 375 mg/m2 x4 and high dose corticsteroids, and most recently with methotrexate 10 mg weekly. Folic acid is 1 mg daily. No history of relapse. Prednisone taper complete on April 1st, 2017.    She reports a cat bite to her left leg in July that became infected and was treated with IV ATBs. She currently has one shallow dry scar on the skin. The area around the bite is now darkened and indurate.     She denies any relapse in the GPA in the interim. She denies fatigue, weight loss, or dyspnea. She has never had a GPA relapse.    She has some gelling phenomena in the legs. No AM stiffness. She can also have some chronic muscular back pain.    She restarted the methotrexate 10 mg weekly and folic acid after stopping her antibiotics, and she has tolerated this reduced dose well.    PMI:  Medical-invasive pulmonary aspirgillosis, GPA, osteoarthritis, DVT with pulmonary embolism, osteopenia (T = -2.2 7-2016), GERD, hyperlipidemia, +Tb exposure, nephrolithiasis  Surgical-lung biopsy, appendectomy, eye surgery, tonsillectomy, cone biopsy, right wrist synovectomy  Injuries-left wrist fracture, left 5th toe fracture    SH:  Lives at home alone. Former smoker. No EtOH. Tuberculosis exposure as a child. Ambidextrous.    FH:  Son with mitochondrial myopathy  Sibs dead with colon and anal cancer  Father dead with lung cancer  Mother dead with emphysema and osteoporosis    PMSF history personally obtained and updated by me this visit.    ROS:  +reduced vision  +leg ulcer  +scratchy cough  +tickle in throat  +allergies to fosamax amoxicillin, augmentin, erythromycin, zithromax, nickel, codeine, adhesive tape  Remainder of the 14 point ROS obtained and found negative.    Physical  Exam:  Constitutional: WD-WN-WG cooperative; +rare dry cough  Eyes: nl EOM, PERRLA, vision, conjunctiva, sclera  ENT: nl external ears, nose, hearing, lips, teeth, gums, throat  Neck: no mass or thyroid enlargement  Resp: lungs clear to auscultation, nl to palpation, nl effort  CV: RRR, no murmurs, rubs or gallops, no edema  GI: no ABD mass or tenderness, no HSM  : not tested  Lymph: no cervical or epitrochlear nodes  MS: +Right wrist with no flexion or extension; left wrist extension to 45 degrees; 1st CMC joint squaring bilaterally with some thumb laxity; right neck rotation 30 degrees right and 45 degrees left with head forward position; All other TMJ, neck, shoulder, elbow, wrist, hand, spine, hip, knee, ankle, and foot joints were examined and otherwise found normal. Normal  strength. Kyphosis. Normal tuck and prayer.  Skin: no nail pitting, alopecia, rash; +dry crusting 1/2 cm lesion on the left shin with surrounding induration and hyperpigmentation  Neuro: nl cranial nerves, strength, sensation, DTRs.   Psych: nl judgement, orientation, memory, affect.    Laboratory:    Component      Latest Ref Rng & Units 10/4/2017 10/6/2017 10/11/2017   Color Urine        Yellow    Appearance Urine        Clear    Glucose Urine      NEG:Negative mg/dL  Negative    Bilirubin Urine      NEG:Negative  Negative    Ketones Urine      NEG:Negative mg/dL  Negative    Specific Gravity Urine      1.003 - 1.035  <=1.005    Blood Urine      NEG:Negative  Negative    pH Urine      5.0 - 7.0 pH  7.0    Protein Albumin Urine      NEG:Negative mg/dL  Negative    Urobilinogen Urine      0.2 - 1.0 EU/dL  0.2    Nitrite Urine      NEG:Negative  Negative    Leukocyte Esterase Urine      NEG:Negative  Negative    Source        Midstream Urine    WBC      4.0 - 11.0 10e9/L 5.7     RBC Count      3.8 - 5.2 10e12/L 4.50     Hemoglobin      11.7 - 15.7 g/dL 14.2     Hematocrit      35.0 - 47.0 % 41.9     MCV      78 - 100 fl 93     MCH       26.5 - 33.0 pg 31.6     MCHC      31.5 - 36.5 g/dL 33.9     RDW      10.0 - 15.0 % 13.8     Platelet Count      150 - 450 10e9/L 199     Creatinine      0.52 - 1.04 mg/dL 0.71     GFR Estimate      >60 mL/min/1.7m2 80     GFR Estimate If Black      >60 mL/min/1.7m2 >90     AST      0 - 45 U/L 18     ALT      0 - 50 U/L 26     Albumin      3.4 - 5.0 g/dL 3.6     CRP Inflammation      0.0 - 8.0 mg/L <2.9     Sed Rate      0 - 30 mm/h  7    CK Total      30 - 225 U/L  70    Proteinase 3 Antibody IgG      0.0 - 0.9 AI  <0.2    INR      0.86 - 1.14   2.1 (A)     Impression:    PR3-associated GPA-severe with upper airway, joint, and lung involvement. I see no important sign of vasculitic disease activity. Furthermore, her course was complicated by another infection. No history of GPA relapse. No evidence of systemic inflammation or ANCA by laboratory testing.  Based on all of this, we agree to discontinue the methotrexate and monitor her off of immunosuppression. She agrees to this plan. Get PFTs now. Would get a CT scan should her cough worsen.    Osteoporosis-she declines treatment at this time.    Long term management of immunosuppression-with history of increased episodes of infection. Immunosuppression will now be stopped.    RTC in 4 months.

## 2017-10-13 NOTE — NURSING NOTE
"Nury RENETTA Cárdenas's goals for this visit include: return  She requests these members of her care team be copied on today's visit information:     PCP: Phil Abbott    Referring Provider:  No referring provider defined for this encounter.    Chief Complaint   Patient presents with     RECHECK       Initial /77  Pulse 59  Temp 97  F (36.1  C) (Oral)  Ht 1.638 m (5' 4.5\")  Wt 55.9 kg (123 lb 4.8 oz)  SpO2 97%  BMI 20.84 kg/m2 Estimated body mass index is 20.84 kg/(m^2) as calculated from the following:    Height as of this encounter: 1.638 m (5' 4.5\").    Weight as of this encounter: 55.9 kg (123 lb 4.8 oz).  Medication Reconciliation: complete    Do you need any medication refills at today's visit? n  "

## 2017-10-13 NOTE — MR AVS SNAPSHOT
After Visit Summary   10/13/2017    Nury Cárdenas    MRN: [id number]           Patient Information     Date Of Birth          1945        Visit Information        Provider Department      10/13/2017 11:30 AM Alvaro Maguire MD [facility]        Today's Diagnoses     Granulomatosis with polyangiitis (H)        Long-term use of immunosuppressant medication        Primary osteoarthritis involving multiple joints        Wegener's granulomatosis (H)           Follow-ups after your visit        Follow-up notes from your care team     Return in about 4 months (around 2/13/2018).      Your next 10 appointments already scheduled     Oct 16, 2017  2:00 PM CDT   [facility] Visit with PFT LAB   [facility]    [address] [place] [address] [phone]           Bring a current list of meds and any records pertaining to this visit. For Physicals, please bring immunization records and any forms needing to be filled out. Please arrive 10 minutes early to complete paperwork.            Nov 08, 2017  9:30 AM CST   Anticoagulation Visit with FZ ANTI COAG   [facility] ([facility]    [address] [place] [address]   [phone]            Feb 23, 2018  8:00 AM CST   Return Visit with Alvaro Maguire MD   [facility]    [address] [place] [address] [phone]              Future tests that were ordered for you today     Open Future Orders        Priority Expected Expires Ordered    General PFT Lab (Please always keep checked) Routine  10/13/2018 10/13/2017    Pulmonary Function Test Routine  12/30/2026 10/13/2017            Who to contact     If you have questions or need follow up information about today's clinic visit or your schedule please contact [facility] directly at [phone].  Normal or  "non-critical lab and imaging results will be communicated to you by MyChart, letter or phone within 4 business days after the clinic has received the results. If you do not hear from us within 7 days, please contact the clinic through Palm or phone. If you have a critical or abnormal lab result, we will notify you by phone as soon as possible.  Submit refill requests through Palm or call your pharmacy and they will forward the refill request to us. Please allow 3 business days for your refill to be completed.          Additional Information About Your Visit        Palm Information     Palm gives you secure access to your electronic health record. If you see a primary care provider, you can also send messages to your care team and make appointments. If you have questions, please call your primary care clinic.  If you do not have a primary care provider, please call [phone] and they will assist you.      Palm is an electronic gateway that provides easy, online access to your medical records. With Palm, you can request a clinic appointment, read your test results, renew a prescription or communicate with your care team.     To access your existing account, please contact your [facility] or call [phone] for assistance.        Care EveryWhere ID     This is your Care EveryWhere ID. This could be used by other organizations to access your [place] medical records  [id number]        Your Vitals Were     Pulse Temperature Height Pulse Oximetry BMI (Body Mass Index)       59 97  F (36.1  C) (Oral) 1.638 m (5' 4.5\") 97% 20.84 kg/m2        Blood Pressure from Last 3 Encounters:   10/13/17 127/77   10/02/17 126/84   09/15/17 124/70    Weight from Last 3 Encounters:   10/13/17 55.9 kg (123 lb 4.8 oz)   10/02/17 57.2 kg (126 lb)   09/15/17 55.8 kg (123 lb)                 Today's Medication Changes          These changes are accurate as of: 10/13/17 12:17 PM.  If you " have any questions, ask your nurse or doctor.               Stop taking these medicines if you haven't already. Please contact your care team if you have questions.     methotrexate 2.5 MG tablet CHEMO   Stopped by:  Alvaro Maguire MD                    Primary Care Provider Office Phone # Fax #    Phil Abbott [phone] [address] [facility]  [facility] [address]        Equal Access to Services     [facility]: Hadii aad ku hadasho Soomaali, waaxda luqadaha, qaybta kaalmada adeegyada, waxay idiin hayaan adeeg florsh laumer . So [facility] [phone].    ATENCIÓN: Si habla español, tiene a fofana disposición servicios gratuitos de asistencia lingüística. Hectorame al [phone].    We comply with applicable federal civil rights laws and [place] laws. We do not discriminate on the basis of race, color, national origin, age, disability, sex, sexual orientation, or gender identity.            Thank you!     Thank you for choosing [facility]  for your care. Our goal is always to provide you with excellent care. Hearing back from our patients is one way we can continue to improve our services. Please take a few minutes to complete the written survey that you may receive in the mail after your visit with us. Thank you!             Your Updated Medication List - Protect others around you: Learn how to safely use, store and throw away your medicines at www.disposemymeds.org.          This list is accurate as of: 10/13/17 12:17 PM.  Always use your most recent med list.                   Brand Name Dispense Instructions for use Diagnosis    acetaminophen 325 MG tablet    TYLENOL    100 tablet    Take 2 tablets (650 mg) by mouth every 6 hours as needed for mild pain    Migraines, Pulmonary emboli (H)       calcium-magnesium 500-250 MG Tabs per tablet    CALMAG     Take 1 tablet by mouth 2 times daily (with meals)        cholecalciferol 1000 UNITS Tabs      Take 1,000 Units by  mouth daily        folic acid 1 MG tablet    FOLVITE    90 tablet    Take 1 tablet (1 mg) by mouth daily    Granulomatosis with polyangiitis (H), Long-term use of immunosuppressant medication, Primary osteoarthritis involving multiple joints, Wegener's granulomatosis (H)       propranolol 20 MG tablet    INDERAL    90 tablet    TAKE 1 TABLET(20 MG) BY MOUTH DAILY    Migraine without status migrainosus, not intractable, unspecified migraine type       warfarin 2 MG tablet    COUMADIN    180 tablet    TAKE 2 TABLETS(4 MG) BY MOUTH EVERY DAY    Chronic anticoagulation

## 2017-10-16 ENCOUNTER — OFFICE VISIT (OUTPATIENT)
Dept: NURSING | Facility: CLINIC | Age: 72
End: 2017-10-16
Payer: COMMERCIAL

## 2017-10-16 DIAGNOSIS — Z79.60 LONG-TERM USE OF IMMUNOSUPPRESSANT MEDICATION: ICD-10-CM

## 2017-10-16 DIAGNOSIS — M15.0 PRIMARY OSTEOARTHRITIS INVOLVING MULTIPLE JOINTS: ICD-10-CM

## 2017-10-16 DIAGNOSIS — M31.30 WEGENER'S GRANULOMATOSIS: ICD-10-CM

## 2017-10-16 DIAGNOSIS — M31.30 GRANULOMATOSIS WITH POLYANGIITIS (H): ICD-10-CM

## 2017-10-16 PROCEDURE — 99207 ZZC DROP WITH A PROCEDURE: CPT | Performed by: INTERNAL MEDICINE

## 2017-10-16 PROCEDURE — 94729 DIFFUSING CAPACITY: CPT | Performed by: INTERNAL MEDICINE

## 2017-10-16 PROCEDURE — 94375 RESPIRATORY FLOW VOLUME LOOP: CPT | Performed by: INTERNAL MEDICINE

## 2017-10-16 NOTE — LETTER
November 29, 2017      Nury Cárdenas  [address] [address]        Dear ,    We are writing to inform you of your test results.    The lung function testing is normal.     These results do not require a change.  Please discuss at your next visit.  It was a pleasure to see you at your recent visit. Please let me know if you have any questions or concerns.     Resulted Orders   General PFT Lab (Please always keep checked)   Result Value Ref Range    FVC-Pred 2.71 L    FVC-Pre 2.85 L    FVC-%Pred-Pre 105 %    FEV1-Pre 2.07 L    FEV1-%Pred-Pre 98 %    FEV1FVC-Pred 78 %    FEV1FVC-Pre 72 %    FEFMax-Pred 5.32 L/sec    FEFMax-Pre 7.03 L/sec    FEFMax-%Pred-Pre 132 %    FEF2575-Pred 1.74 L/sec    FEF2575-Pre 1.38 L/sec    UEP8689-%Pred-Pre 79 %    ExpTime-Pre 8.13 sec    FIFMax-Pre 3.66 L/sec    VC-Pred 2.95 L    VC-Pre 2.78 L    VC-%Pred-Pre 94 %    IC-Pred 2.14 L    IC-Pre 1.98 L    IC-%Pred-Pre 92 %    ERV-Pred 0.81 L    ERV-Pre 0.80 L    ERV-%Pred-Pre 98 %    FEV1FEV6-Pred 79 %    FEV1FEV6-Pre 73 %    DLCOunc-Pred 20.00 ml/min/mmHg    DLCOunc-Pre 17.57 ml/min/mmHg    DLCOunc-%Pred-Pre 87 %    VA-Pre 4.61 L    VA-%Pred-Pre 91 %    FEV1SVC-Pred 71 %    FEV1SVC-Pre 74 %    Narrative    The FVC, FEV1, FEV1/FVC ratio and MEA11-86% are within normal limits.  The inspiratory flow rates are within normal limits.  The diffusing capacity is normal.  However, the diffusing capacity was not corrected for the patient's hemoglobin.  The results are within normal limits.  IMPRESSION:  Normal Pulmonary Function  ____________________________________________M.D. PERLMAN, DAVID       If you have any questions or concerns, please call the clinic at the number listed above.       Sincerely,    Alvaro Maguire MD   Professor of Medicine   Director, Division of Rheumatic and Autoimmune Diseases

## 2017-10-16 NOTE — MR AVS SNAPSHOT
After Visit Summary   10/16/2017    Nury Cárdenas    MRN: [id number]           Patient Information     Date Of Birth          1945        Visit Information        Provider Department      10/16/2017 2:00 PM PFT LAB [facility]        Today's Diagnoses     Granulomatosis with polyangiitis (H)        Long-term use of immunosuppressant medication        Primary osteoarthritis involving multiple joints        Wegener's granulomatosis (H)           Follow-ups after your visit        Your next 10 appointments already scheduled     Nov 08, 2017  9:30 AM CST   Anticoagulation Visit with RAAD ANTI COAG   [facility]    [address] [place] [id number] [phone]            Feb 23, 2018  8:00 AM CST   Return Visit with Alvaro Maguire MD   [facility] ([facility])    [address] [place] [address] [phone]              Who to contact     If you have questions or need follow up information about today's clinic visit or your schedule please contact [facility] directly at [phone].  Normal or non-critical lab and imaging results will be communicated to you by MyChart, letter or phone within 4 business days after the clinic has received the results. If you do not hear from us within 7 days, please contact the clinic through MyChart or phone. If you have a critical or abnormal lab result, we will notify you by phone as soon as possible.  Submit refill requests through [employer] or call your pharmacy and they will forward the refill request to us. Please allow 3 business days for your refill to be completed.          Additional Information About Your Visit        MyChart Information     Apica gives you secure access to your electronic health record. If you see a primary care provider, you can also send messages to your care team and make appointments. If you  have questions, please call your primary care clinic.  If you do not have a primary care provider, please call [phone] and they will assist you.      HeadMix is an electronic gateway that provides easy, online access to your medical records. With HeadMix, you can request a clinic appointment, read your test results, renew a prescription or communicate with your care team.     To access your existing account, please contact your [facility] or call [phone] for assistance.        Care EveryWhere ID     This is your Care EveryWhere ID. This could be used by other organizations to access your [place] medical records  [id number]         Blood Pressure from Last 3 Encounters:   10/13/17 127/77   10/02/17 126/84   09/15/17 124/70    Weight from Last 3 Encounters:   10/13/17 55.9 kg (123 lb 4.8 oz)   10/02/17 57.2 kg (126 lb)   09/15/17 55.8 kg (123 lb)              We Performed the Following     General PFT Lab (Please always keep checked)     HC DIFFUSING CAPACITY     Pulmonary Function Test        Primary Care Provider Office Phone # Fax #    Phil Abbott [phone] [address] [facility] [address]        Equal Access to Services     ABBI MYERS : Hadii aad ku hadasho Soomaali, waaxda luqadaha, qaybta kaalmada adeegyada, waxay andersonin haynat angel. So [facility] [phone].    ATENCIÓN: Si habla español, tiene a fofana disposición servicios gratuitos de asistencia lingüística. Llame al [phone].    We comply with applicable federal civil rights laws and [place] laws. We do not discriminate on the basis of race, color, national origin, age, disability, sex, sexual orientation, or gender identity.            Thank you!     Thank you for choosing [facility]  for your care. Our goal is always to provide you with excellent care. Hearing back from our patients is one way we can continue to improve our services.  Please take a few minutes to complete the written survey that you may receive in the mail after your visit with us. Thank you!             Your Updated Medication List - Protect others around you: Learn how to safely use, store and throw away your medicines at www.disposemymeds.org.          This list is accurate as of: 10/16/17  2:58 PM.  Always use your most recent med list.                   Brand Name Dispense Instructions for use Diagnosis    acetaminophen 325 MG tablet    TYLENOL    100 tablet    Take 2 tablets (650 mg) by mouth every 6 hours as needed for mild pain    Migraines, Pulmonary emboli (H)       calcium-magnesium 500-250 MG Tabs per tablet    CALMAG     Take 1 tablet by mouth 2 times daily (with meals)        cholecalciferol 1000 UNITS Tabs      Take 1,000 Units by mouth daily        folic acid 1 MG tablet    FOLVITE    90 tablet    Take 1 tablet (1 mg) by mouth daily    Granulomatosis with polyangiitis (H), Long-term use of immunosuppressant medication, Primary osteoarthritis involving multiple joints, Wegener's granulomatosis (H)       propranolol 20 MG tablet    INDERAL    90 tablet    TAKE 1 TABLET(20 MG) BY MOUTH DAILY    Migraine without status migrainosus, not intractable, unspecified migraine type       warfarin 2 MG tablet    COUMADIN    180 tablet    TAKE 2 TABLETS(4 MG) BY MOUTH EVERY DAY    Chronic anticoagulation

## 2017-10-20 ENCOUNTER — THERAPY VISIT (OUTPATIENT)
Dept: PHYSICAL THERAPY | Facility: CLINIC | Age: 72
End: 2017-10-20
Payer: COMMERCIAL

## 2017-10-20 DIAGNOSIS — M54.2 CERVICALGIA: Primary | ICD-10-CM

## 2017-10-20 PROCEDURE — 97110 THERAPEUTIC EXERCISES: CPT | Mod: GP | Performed by: PHYSICAL THERAPIST

## 2017-10-20 PROCEDURE — 97140 MANUAL THERAPY 1/> REGIONS: CPT | Mod: GP | Performed by: PHYSICAL THERAPIST

## 2017-10-20 NOTE — MR AVS SNAPSHOT
After Visit Summary   10/20/2017    Nury Cárdenas    MRN: [id number]           Patient Information     Date Of Birth          1945        Visit Information        Provider Department      10/20/2017 2:10 PM Eliceo Vidal, PT [facility]        Today's Diagnoses     Cervicalgia    -  1       Follow-ups after your visit        Your next 10 appointments already scheduled     Oct 30, 2017 11:30 AM CDT   TRISHA Extremity with Natalie Shipman PTA   [facility] ([facility]    [address] [facility]  [address]  [facility] [address]   [phone]            Nov 08, 2017  9:30 AM CST   Anticoagulation Visit with RAAD ANTI COAG   [facility] ([facility])    [address] [facility] [address] [phone]            Feb 23, 2018  8:00 AM CST   Return Visit with Alvaro Maguire MD   [facility] ([facility])    [address] [place] [id number] [phone]              Who to contact     If you have questions or need follow up information about today's clinic visit or your schedule please contact [facility] directly at [phone].  Normal or non-critical lab and imaging results will be communicated to you by MyChart, letter or phone within 4 business days after the clinic has received the results. If you do not hear from us within 7 days, please contact the clinic through MyChart or phone. If you have a critical or abnormal lab result, we will notify you by phone as soon as possible.  Submit refill requests through [employer] or call your pharmacy and they will forward the refill request to us. Please allow 3 business days for your refill to be completed.          Additional Information About Your Visit        [employer] Information     [employer] gives you secure access to your electronic health record. If you see a primary care provider, you can also send messages to your care team and make appointments. If  you have questions, please call your primary care clinic.  If you do not have a primary care provider, please call [phone] and they will assist you.        Care EveryWhere ID     This is your Care EveryWhere ID. This could be used by other organizations to access your [place] medical records  [id number]         Blood Pressure from Last 3 Encounters:   10/13/17 127/77   10/02/17 126/84   09/15/17 124/70    Weight from Last 3 Encounters:   10/13/17 55.9 kg (123 lb 4.8 oz)   10/02/17 57.2 kg (126 lb)   09/15/17 55.8 kg (123 lb)              We Performed the Following     Manual Ther Tech, 1+Regions, EA 15 min     Therapeutic Exercises        Primary Care Provider Office Phone # Fax #    Phil Abbott [phone] [address] [facility]  [place] [address]        Equal Access to Services     MARTÍN MYERS : Hadii aad ku hadasho Soomaali, waaxda luqadaha, qaybta kaalmada adeegyada, waxay andersonin haymessin sharath castillo . So [facility] [phone].    ATENCIÓN: Si habla español, tiene a fofana disposición servicios gratuitos de asistencia lingüística. Llame al [phone].    We comply with applicable federal civil rights laws and [place] laws. We do not discriminate on the basis of race, color, national origin, age, disability, sex, sexual orientation, or gender identity.            Thank you!     Thank you for choosing [facility]  for your care. Our goal is always to provide you with excellent care. Hearing back from our patients is one way we can continue to improve our services. Please take a few minutes to complete the written survey that you may receive in the mail after your visit with us. Thank you!             Your Updated Medication List - Protect others around you: Learn how to safely use, store and throw away your medicines at www.disposemymeds.org.          This list is accurate as of: 10/20/17  3:01 PM.  Always use your most recent med list.                   Brand Name  Dispense Instructions for use Diagnosis    acetaminophen 325 MG tablet    TYLENOL    100 tablet    Take 2 tablets (650 mg) by mouth every 6 hours as needed for mild pain    Migraines, Pulmonary emboli (H)       calcium-magnesium 500-250 MG Tabs per tablet    CALMAG     Take 1 tablet by mouth 2 times daily (with meals)        cholecalciferol 1000 UNITS Tabs      Take 1,000 Units by mouth daily        folic acid 1 MG tablet    FOLVITE    90 tablet    Take 1 tablet (1 mg) by mouth daily    Granulomatosis with polyangiitis (H), Long-term use of immunosuppressant medication, Primary osteoarthritis involving multiple joints, Wegener's granulomatosis (H)       propranolol 20 MG tablet    INDERAL    90 tablet    TAKE 1 TABLET(20 MG) BY MOUTH DAILY    Migraine without status migrainosus, not intractable, unspecified migraine type       warfarin 2 MG tablet    COUMADIN    180 tablet    TAKE 2 TABLETS(4 MG) BY MOUTH EVERY DAY    Chronic anticoagulation

## 2017-10-20 NOTE — PROGRESS NOTES
Subjective:    HPI                    Objective:    System    Physical Exam    General     ROS    Assessment/Plan:      PROGRESS  REPORT    Progress reporting period is from6/26/2017 to 10/20/2017.       SUBJECTIVE  Subjective changes noted by patient:   Subjective: Patient returns to physical therapy after several months due to being sedentary in a hospital while being treated for an infected cat bite. Patient reports her neck pain is mostly over her upper traps    Current pain level is  Current Pain level: 0/10.     Previous pain level was   Initial Pain level: 5/10.   Changes in function:  Yes (See Goal flowsheet attached for changes in current functional level)  Adverse reaction to treatment or activity: None    OBJECTIVE  Changes noted in objective findings:  Yes,   Objective: Poor postural awareness with forward head and rounded shoulders. significant tenderness over suboccipitals.Hypomobile thoracic spine. Cervical AROM flexion WNL, extension moderate limitation, SB left 30 deg, SB right 20 deg, rotation left 30 deg, SB right 30 deg. Cervical myotomes grossly 4/5 bilaterally    ASSESSMENT/PLAN  Updated problem list and treatment plan: Diagnosis 1:  Neck pain  Pain -  hot/cold therapy, manual therapy, self management, education and home program  Decreased ROM/flexibility - manual therapy, therapeutic exercise, therapeutic activity and home program  Decreased joint mobility - manual therapy, therapeutic exercise, therapeutic activity and home program  Decreased strength - therapeutic exercise, therapeutic activities and home program  Impaired muscle performance - neuro re-education and home program  Decreased function - therapeutic activities and home program  Impaired posture - neuro re-education, therapeutic activities and home program  STG/LTGs have been met or progress has been made towards goals:  Yes (See Goal flow sheet completed today.)  Assessment of Progress: The patient's condition has  exacerbated.  Self Management Plans:  Patient has been instructed in a home treatment program.  Patient  has been instructed in self management of symptoms.  I have re-evaluated this patient and find that the nature, scope, duration and intensity of the therapy is appropriate for the medical condition of the patient.  Nury continues to require the following intervention to meet STG and LTG's:  PT    Recommendations:  This patient would benefit from continued therapy.     Frequency:  1 X week, once daily  Duration:  for 4 weeks          Please refer to the daily flowsheet for treatment today, total treatment time and time spent performing 1:1 timed codes.

## 2017-10-30 ENCOUNTER — THERAPY VISIT (OUTPATIENT)
Dept: PHYSICAL THERAPY | Facility: CLINIC | Age: 72
End: 2017-10-30
Payer: COMMERCIAL

## 2017-10-30 DIAGNOSIS — M54.2 CERVICALGIA: Primary | ICD-10-CM

## 2017-10-30 PROCEDURE — 97110 THERAPEUTIC EXERCISES: CPT | Mod: GP

## 2017-10-30 PROCEDURE — 97140 MANUAL THERAPY 1/> REGIONS: CPT | Mod: GP

## 2017-10-30 NOTE — PROGRESS NOTES
SUBJECTIVE:   Nury Cárdenas is a 72 year old female who presents to clinic today for the following health issues:    Chief Complaint   Patient presents with     RECHECK     cat bit on left leg, last scab fell off but still having pain      Health Maintenance     Colon Cancer Screening     Scab on Lt Leg: wound been healing very slowly now almost complete    Due for colonosocopy: 11/19/2019   The USPSTF recommends screening for adults age 50 to 75 years. Most guidelines recommend that screening for colorectal cancer stop when the patient's life expectancy is less than 10 years. The USPSTF guidelines recommend that patients over age 85 not be screened, and recommend against screening in adults 76 to 85 years, unless there are individual considerations that favor screening    Will need to hold warfarin for 5 days.  Granulomatosis:   Rheumatology stopped Methotraxate.    Problem list and histories reviewed & adjusted, as indicated.  Additional history: as documented    Patient Active Problem List   Diagnosis     GERD (gastroesophageal reflux disease)     Fibroids     Migraines     Hearing loss     Osteopenia     HYPERLIPIDEMIA LDL GOAL <160     Advanced directives, counseling/discussion     Inflammatory arthritis     Synovitis of wrist     Chronic constipation     Granulomatosis with polyangiitis (H)     Chronic steroid use     Dyspnea     PE (pulmonary embolism)     Cataract, mild, ou     Calculus of kidney, right     Chronic anticoagulation     Long-term (current) use of anticoagulants [Z79.01]     Long-term use of immunosuppressant medication     Primary osteoarthritis involving multiple joints     Cellulitis     Cat scratch left lower extremity     Cervicalgia     Past Surgical History:   Procedure Laterality Date     ABDOMEN SURGERY      appendix out     APPENDECTOMY       BIOPSY  1972    cone biopsy     CL AFF SURGICAL PATHOLOGY      cone biopsy 1975     COLONOSCOPY       EYE SURGERY      lazy eye corrected  muscle on both     HC ESOPHAGOSCOPY, DIAGNOSTIC       OPTICAL TRACKING SYSTEM BRONCHOSCOPY  6/19/2014    Procedure: OPTICAL TRACKING SYSTEM BRONCHOSCOPY;  Surgeon: Angel Kathleen MD;  Location:  GI     SURGICAL HISTORY OF -   as a child    strabismus repair right eye     SURGICAL HISTORY OF -   8-2009    right wrist debridement     TONSILLECTOMY      as a child     TONSILLECTOMY & ADENOIDECTOMY       TUBAL LIGATION         Social History   Substance Use Topics     Smoking status: Former Smoker     Packs/day: 2.00     Years: 18.00     Types: Cigarettes     Start date: 1/1/1960     Quit date: 1/1/1982     Smokeless tobacco: Never Used     Alcohol use No     Family History   Problem Relation Age of Onset     Respiratory Mother      emphysema     Aneurysm Mother      Chronic Obstructive Pulmonary Disease Mother      Thyroid Disease Mother      ,     OSTEOPOROSIS Mother      Other Cancer Father      CANCER Father      lung cancer     Arthritis Maternal Grandmother      rheumatoid     HEART DISEASE Maternal Grandmother      unsure of details     HEART DISEASE Maternal Grandfather      Neurologic Disorder Paternal Grandmother      migraines     Alzheimer Disease Paternal Grandmother      Unknown/Adopted Paternal Grandfather      CANCER Sister      stage 4 anal cancer age 62 years     Colon Cancer Sister      Cancer - colorectal Brother      Colon Cancer Brother      Substance Abuse Sister      Anesthesia Reaction Daughter          Reviewed and updated as needed this visit by clinical staffTobacco  Allergies  Meds       ROS:  Constitutional, HEENT, cardiovascular, pulmonary, gi and gu systems are negative, except as otherwise noted.      OBJECTIVE:   /60  Pulse 74  Temp 96.2  F (35.7  C) (Oral)  Wt 122 lb 8 oz (55.6 kg)  SpO2 96%  BMI 20.7 kg/m2  Body mass index is 20.7 kg/(m^2).  GENERAL: frail, alert and no distress  NECK: no adenopathy and thyroid normal to palpation  RESP: lungs clear to  auscultation - no rales, rhonchi or wheezes  CV: regular rate and rhythm, normal S1 S2, no S3 or S4, no murmur, click or rub  ABDOMEN: soft, nontender, no masses and bowel sounds normal  Lt LEG: small dark scan on shin with a little scarring of surrounding skin with tenderness on upper border.  Diagnostic Test Results:  none     ASSESSMENT/PLAN:     (Z09) Follow up  (primary encounter diagnosis)  Comment: Scab on normal healing process; pain could be sensitivity from superficial nerve endings but should she notice redness and swelling should return    (E78.5) Hyperlipidemia LDL goal <160  Comment: Due for LDL check  Plan: Lipid panel reflex to direct LDL Fasting    (Z12.11) Screen for colon cancer  Comment: Will call GI and schedule; prefers where had one and prep was moderate  Plan:GASTROENTEROLOGY ADULT REF PROCEDURE ONLY    (Z13.1) Screening for diabetes mellitus  Plan: Glucose      Phil Abbott MD  [facility]

## 2017-10-31 ENCOUNTER — OFFICE VISIT (OUTPATIENT)
Dept: FAMILY MEDICINE | Facility: CLINIC | Age: 72
End: 2017-10-31
Payer: COMMERCIAL

## 2017-10-31 VITALS
TEMPERATURE: 96.2 F | HEART RATE: 74 BPM | BODY MASS INDEX: 20.7 KG/M2 | OXYGEN SATURATION: 96 % | DIASTOLIC BLOOD PRESSURE: 60 MMHG | WEIGHT: 122.5 LBS | SYSTOLIC BLOOD PRESSURE: 100 MMHG

## 2017-10-31 DIAGNOSIS — Z12.11 SCREEN FOR COLON CANCER: ICD-10-CM

## 2017-10-31 DIAGNOSIS — Z09 FOLLOW UP: Primary | ICD-10-CM

## 2017-10-31 DIAGNOSIS — Z23 NEED FOR PROPHYLACTIC VACCINATION AND INOCULATION AGAINST INFLUENZA: ICD-10-CM

## 2017-10-31 DIAGNOSIS — Z23 NEED FOR PROPHYLACTIC VACCINATION AGAINST STREPTOCOCCUS PNEUMONIAE (PNEUMOCOCCUS): ICD-10-CM

## 2017-10-31 DIAGNOSIS — Z13.1 SCREENING FOR DIABETES MELLITUS: ICD-10-CM

## 2017-10-31 DIAGNOSIS — E78.5 HYPERLIPIDEMIA LDL GOAL <160: ICD-10-CM

## 2017-10-31 PROCEDURE — 99213 OFFICE O/P EST LOW 20 MIN: CPT | Performed by: FAMILY MEDICINE

## 2017-10-31 ASSESSMENT — PAIN SCALES - GENERAL: PAINLEVEL: NO PAIN (0)

## 2017-10-31 NOTE — MR AVS SNAPSHOT
After Visit Summary   10/31/2017    Nury Cárdenas    MRN: [id number]           Patient Information     Date Of Birth          1945        Visit Information        Provider Department      10/31/2017 1:40 PM Phil Abbott MD [facility]        Today's Diagnoses     Follow up    -  1    Hyperlipidemia LDL goal <160        Screen for colon cancer        Need for prophylactic vaccination and inoculation against influenza        Need for prophylactic vaccination against Streptococcus pneumoniae (pneumococcus)        Screening for diabetes mellitus          Care Instructions    [facility]    If you have any questions regarding to your visit please contact your care team:       Team Purple:   Clinic Hours Telephone Number   Dr. Shanti Prabhakar   7am-7pm  Monday - Thursday   7am-5pm  Fridays  [phone]  (Appointment scheduling available 24/7)    Questions about your Visit?   Team Line:  [phone]   Urgent Care - [place] and [place] - 11am-9pm Monday-Friday Saturday-Sunday- 9am-5pm   [place] - 5pm-9pm Monday-Friday Saturday-Sunday- 9am-5pm  [phone] - [place]   [phone] - [place]       What options do I have for visits at the clinic other than the traditional office visit?  To expand how we care for you, many of our providers are utilizing electronic visits (e-visits) and telephone visits, when medically appropriate, for interactions with their patients rather than a visit in the clinic.   We also offer nurse visits for many medical concerns. Just like any other service, we will bill your insurance company for this type of visit based on time spent on the phone with your provider. Not all insurance companies cover these visits. Please check with your medical insurance if this type of visit is covered. You will be responsible for any charges that are not paid by your  insurance.      E-visits via [employer]:  generally incur a $35.00 fee.  Telephone visits:  Time spent on the phone: *charged based on time that is spent on the phone in increments of 10 minutes. Estimated cost:   5-10 mins $30.00   11-20 mins. $59.00   21-30 mins. $85.00     Use [employer] (secure email communication and access to your chart) to send your primary care provider a message or make an appointment. Ask someone on your Team how to sign up for [employer].  For a Price Quote for your services, please call our Consumer Price Line at [phone].  As always, Thank you for trusting us with your health care needs!    Nidia Bonilla MA            Follow-ups after your visit        Additional Services     GASTROENTEROLOGY ADULT REF PROCEDURE ONLY       Last Lab Result: Creatinine (mg/dL)       Date                     Value                 10/04/2017               0.71             ----------  There is no height or weight on file to calculate BMI.     Needed:  No  Language:  English    Patient will be contacted to schedule procedure.     Please be aware that coverage of these services is subject to the terms and limitations of your health insurance plan.  Call member services at your health plan with any benefit or coverage questions.  Any procedures must be performed at a [place] facility OR coordinated by your clinic's referral office.    Please bring the following with you to your appointment:    (1) Any X-Rays, CTs or MRIs which have been performed.  Contact the facility where they were done to arrange for  prior to your scheduled appointment.    (2) List of current medications   (3) This referral request   (4) Any documents/labs given to you for this referral                  Your next 10 appointments already scheduled     Nov 08, 2017  9:30 AM CST   Anticoagulation Visit with RAAD ANTI COAG   [facility] [place] ([facility] [place])    [address]  [place] [address]    [phone]            Feb 23, 2018  8:00 AM CST   Return Visit with Alvaro Maguire MD   [facility]    [address] [place] [address] [phone]              Future tests that were ordered for you today     Open Future Orders        Priority Expected Expires Ordered    Lipid panel reflex to direct LDL Fasting Routine 12/13/2017 10/31/2018 10/31/2017    Glucose Routine 12/13/2017 10/31/2018 10/31/2017            Who to contact     If you have questions or need follow up information about today's clinic visit or your schedule please contact [facility] directly at [phone].  Normal or non-critical lab and imaging results will be communicated to you by MyChart, letter or phone within 4 business days after the clinic has received the results. If you do not hear from us within 7 days, please contact the clinic through [employer] or phone. If you have a critical or abnormal lab result, we will notify you by phone as soon as possible.  Submit refill requests through [employer]u or call your pharmacy and they will forward the refill request to us. Please allow 3 business days for your refill to be completed.          Additional Information About Your Visit        Quu Information     Quu gives you secure access to your electronic health record. If you see a primary care provider, you can also send messages to your care team and make appointments. If you have questions, please call your primary care clinic.  If you do not have a primary care provider, please call [phone] and they will assist you.        Care EveryWhere ID     This is your Care EveryWhere ID. This could be used by other organizations to access your [place] medical records  [id number]        Your Vitals Were     Pulse Temperature Pulse Oximetry BMI (Body Mass Index)          74 96.2  F (35.7  C) (Oral) 96% 20.7 kg/m2         Blood Pressure from Last 3  Encounters:   10/31/17 100/60   10/13/17 127/77   10/02/17 126/84    Weight from Last 3 Encounters:   10/31/17 122 lb 8 oz (55.6 kg)   10/13/17 123 lb 4.8 oz (55.9 kg)   10/02/17 126 lb (57.2 kg)              We Performed the Following     GASTROENTEROLOGY ADULT REF PROCEDURE ONLY        Primary Care Provider Office Phone # Fax #    Phil Abbott [phone]       [address] [address]        Equal Access to Services     [facility]: Hadii aad ku hadasho Soomaali, waaxda luqadaha, qaybta kaalmada rocky, lavell castillo . So [facility] [phone].    ATENCIÓN: Si habla español, tiene a fofana disposición servicios gratuitos de asistencia lingüística. [facility] [phone].    We comply with applicable federal civil rights laws and [place] laws. We do not discriminate on the basis of race, color, national origin, age, disability, sex, sexual orientation, or gender identity.            Thank you!     Thank you for choosing [facility]  for your care. Our goal is always to provide you with excellent care. Hearing back from our patients is one way we can continue to improve our services. Please take a few minutes to complete the written survey that you may receive in the mail after your visit with us. Thank you!             Your Updated Medication List - Protect others around you: Learn how to safely use, store and throw away your medicines at www.disposemymeds.org.          This list is accurate as of: 10/31/17  2:17 PM.  Always use your most recent med list.                   Brand Name Dispense Instructions for use Diagnosis    acetaminophen 325 MG tablet    TYLENOL    100 tablet    Take 2 tablets (650 mg) by mouth every 6 hours as needed for mild pain    Migraines, Pulmonary emboli (H)       calcium-magnesium 500-250 MG Tabs per tablet    CALMAG     Take 1 tablet by mouth 2 times daily (with meals)        cholecalciferol 1000 UNITS Tabs       Take 1,000 Units by mouth daily        folic acid 1 MG tablet    FOLVITE    90 tablet    Take 1 tablet (1 mg) by mouth daily    Granulomatosis with polyangiitis (H), Long-term use of immunosuppressant medication, Primary osteoarthritis involving multiple joints, Wegener's granulomatosis (H)       propranolol 20 MG tablet    INDERAL    90 tablet    TAKE 1 TABLET(20 MG) BY MOUTH DAILY    Migraine without status migrainosus, not intractable, unspecified migraine type       warfarin 2 MG tablet    COUMADIN    180 tablet    TAKE 2 TABLETS(4 MG) BY MOUTH EVERY DAY    Chronic anticoagulation

## 2017-10-31 NOTE — NURSING NOTE
"Chief Complaint   Patient presents with     RECHECK     cat bit on left leg, last scab fell off but still having pain      Health Maintenance     Colon Cancer Screening       Initial /60  Pulse 74  Temp 96.2  F (35.7  C) (Oral)  Wt 122 lb 8 oz (55.6 kg)  SpO2 96%  BMI 20.7 kg/m2 Estimated body mass index is 20.7 kg/(m^2) as calculated from the following:    Height as of 10/13/17: 5' 4.5\" (1.638 m).    Weight as of this encounter: 122 lb 8 oz (55.6 kg).  Medication Reconciliation: complete    "

## 2017-11-08 ENCOUNTER — ANTICOAGULATION THERAPY VISIT (OUTPATIENT)
Dept: NURSING | Facility: CLINIC | Age: 72
End: 2017-11-08
Payer: COMMERCIAL

## 2017-11-08 ENCOUNTER — TELEPHONE (OUTPATIENT)
Dept: NURSING | Facility: CLINIC | Age: 72
End: 2017-11-08

## 2017-11-08 DIAGNOSIS — Z79.01 CHRONIC ANTICOAGULATION: ICD-10-CM

## 2017-11-08 DIAGNOSIS — Z79.01 LONG-TERM (CURRENT) USE OF ANTICOAGULANTS: ICD-10-CM

## 2017-11-08 DIAGNOSIS — M31.30 WEGENER'S GRANULOMATOSIS: Primary | ICD-10-CM

## 2017-11-08 LAB — INR POINT OF CARE: 1.9 (ref 0.86–1.14)

## 2017-11-08 PROCEDURE — 85610 PROTHROMBIN TIME: CPT | Mod: QW

## 2017-11-08 PROCEDURE — 99207 ZZC NO CHARGE NURSE ONLY: CPT

## 2017-11-08 PROCEDURE — 36416 COLLJ CAPILLARY BLOOD SPEC: CPT

## 2017-11-08 NOTE — MR AVS SNAPSHOT
Nury Cárdenas   11/8/2017 9:30 AM   Anticoagulation Therapy Visit    Description:  72 year old female   Provider:  MONI ANTI COAG   Department:  [facility]           INR as of 11/8/2017     Today's INR 1.9!      Anticoagulation Summary as of 11/8/2017     INR goal 2.0-3.0   Today's INR 1.9!   Full instructions 4 mg every day   Next INR check 12/13/2017    Indications   Long-term (current) use of anticoagulants [Z79.01] [Z79.01]  PE (pulmonary embolism) [I26.99]         Your next Anticoagulation Clinic appointment(s)     Dec 13, 2017  9:00 AM CST   Anticoagulation Visit with MONI ANTI COAG   [facility] ([facility]    [address] [address] [phone]              Contact Numbers     [facility]  Please call [phone] to cancel and/or reschedule your appointment   Please call [phone] with any problems or questions regarding your therapy.        November 2017 Details    Sun Mon Tue Wed Thu Fri Sat        1               2               3               4                 5               6               7               8      4 mg   See details      9      4 mg         10      4 mg         11      4 mg           12      4 mg         13      4 mg         14      4 mg         15      4 mg         16      4 mg         17      4 mg         18      4 mg           19      4 mg         20      4 mg         21      4 mg         22      4 mg         23      4 mg         24      4 mg         25      4 mg           26      4 mg         27      4 mg         28      4 mg         29      4 mg         30      4 mg            Date Details   11/08 This INR check               How to take your warfarin dose     To take:  4 mg Take 2 of the 2 mg tablets.           December 2017 Details    Sun Mon Tue Wed Thu Fri Sat          1      4 mg         2      4 mg           3      4 mg         4      4 mg         5      4 mg         6      4 mg         7      4 mg         8      4  mg         9      4 mg           10      4 mg         11      4 mg         12      4 mg         13            14               15               16                 17               18               19               20               21               22               23                 24               25               26               27               28               29               30                 31                      Date Details   No additional details    Date of next INR:  12/13/2017         How to take your warfarin dose     To take:  4 mg Take 2 of the 2 mg tablets.

## 2017-11-08 NOTE — PROGRESS NOTES
ANTICOAGULATION FOLLOW-UP CLINIC VISIT    Patient Name:  Nury Cárdenas  Date:  11/8/2017  Contact Type:  Face to Face    SUBJECTIVE:     Patient Findings     Positives Bruising (Bruising on wrists)           OBJECTIVE    INR Protime   Date Value Ref Range Status   11/08/2017 1.9 (A) 0.86 - 1.14 Final       ASSESSMENT / PLAN  INR assessment THER    Recheck INR In: 5 WEEKS    INR Location Clinic      Anticoagulation Summary as of 11/8/2017     INR goal 2.0-3.0   Today's INR 1.9!   Maintenance plan 4 mg (2 mg x 2) every day   Full instructions 4 mg every day   Weekly total 28 mg   No change documented Kirti Lacy, RN   Plan last modified Giselle Price RN (7/7/2016)   Next INR check 12/13/2017   Priority INR   Target end date Indefinite    Indications   Long-term (current) use of anticoagulants [Z79.01] [Z79.01]  PE (pulmonary embolism) [I26.99]         Anticoagulation Episode Summary     INR check location     Preferred lab     Send INR reminders to [facility] CL[facility]IC    Comments       Anticoagulation Care Providers     Provider Role Specialty Phone number    Phil Abbott MD [facility] [phone]            See the Encounter Report to view Anticoagulation Flowsheet and Dosing Calendar (Go to Encounters tab in chart review, and find the Anticoagulation Therapy Visit)    Dosage adjustment made based on physician directed care plan.    Kirti Lacy, GABBI

## 2017-11-27 LAB
DLCOUNC-%PRED-PRE: 87 %
DLCOUNC-PRE: 17.57 ML/MIN/MMHG
DLCOUNC-PRED: 20 ML/MIN/MMHG
ERV-%PRED-PRE: 98 %
ERV-PRE: 0.8 L
ERV-PRED: 0.81 L
EXPTIME-PRE: 8.13 SEC
FEF2575-%PRED-PRE: 79 %
FEF2575-PRE: 1.38 L/SEC
FEF2575-PRED: 1.74 L/SEC
FEFMAX-%PRED-PRE: 132 %
FEFMAX-PRE: 7.03 L/SEC
FEFMAX-PRED: 5.32 L/SEC
FEV1-%PRED-PRE: 98 %
FEV1-PRE: 2.07 L
FEV1FEV6-PRE: 73 %
FEV1FEV6-PRED: 79 %
FEV1FVC-PRE: 72 %
FEV1FVC-PRED: 78 %
FEV1SVC-PRE: 74 %
FEV1SVC-PRED: 71 %
FIFMAX-PRE: 3.66 L/SEC
FVC-%PRED-PRE: 105 %
FVC-PRE: 2.85 L
FVC-PRED: 2.71 L
IC-%PRED-PRE: 92 %
IC-PRE: 1.98 L
IC-PRED: 2.14 L
VA-%PRED-PRE: 91 %
VA-PRE: 4.61 L
VC-%PRED-PRE: 94 %
VC-PRE: 2.78 L
VC-PRED: 2.95 L

## 2017-12-05 ENCOUNTER — TRANSFERRED RECORDS (OUTPATIENT)
Dept: HEALTH INFORMATION MANAGEMENT | Facility: CLINIC | Age: 72
End: 2017-12-05

## 2017-12-13 ENCOUNTER — ANTICOAGULATION THERAPY VISIT (OUTPATIENT)
Dept: NURSING | Facility: CLINIC | Age: 72
End: 2017-12-13
Payer: COMMERCIAL

## 2017-12-13 DIAGNOSIS — E78.5 HYPERLIPIDEMIA LDL GOAL <160: ICD-10-CM

## 2017-12-13 DIAGNOSIS — Z13.1 SCREENING FOR DIABETES MELLITUS: ICD-10-CM

## 2017-12-13 DIAGNOSIS — M31.30 GRANULOMATOSIS WITH POLYANGIITIS (H): ICD-10-CM

## 2017-12-13 DIAGNOSIS — M31.30 WEGENER'S GRANULOMATOSIS: ICD-10-CM

## 2017-12-13 DIAGNOSIS — Z79.01 LONG-TERM (CURRENT) USE OF ANTICOAGULANTS: ICD-10-CM

## 2017-12-13 DIAGNOSIS — Z79.60 LONG-TERM USE OF IMMUNOSUPPRESSANT MEDICATION: ICD-10-CM

## 2017-12-13 DIAGNOSIS — M15.0 PRIMARY OSTEOARTHRITIS INVOLVING MULTIPLE JOINTS: ICD-10-CM

## 2017-12-13 LAB
ALBUMIN SERPL-MCNC: 3.9 G/DL (ref 3.4–5)
ALBUMIN UR-MCNC: NEGATIVE MG/DL
ALT SERPL W P-5'-P-CCNC: 25 U/L (ref 0–50)
APPEARANCE UR: CLEAR
AST SERPL W P-5'-P-CCNC: 24 U/L (ref 0–45)
BILIRUB UR QL STRIP: NEGATIVE
CHOLEST SERPL-MCNC: 250 MG/DL
CK SERPL-CCNC: 108 U/L (ref 30–225)
COLOR UR AUTO: YELLOW
CREAT SERPL-MCNC: 0.76 MG/DL (ref 0.52–1.04)
CRP SERPL-MCNC: <2.9 MG/L (ref 0–8)
ERYTHROCYTE [DISTWIDTH] IN BLOOD BY AUTOMATED COUNT: 13.9 % (ref 10–15)
ERYTHROCYTE [SEDIMENTATION RATE] IN BLOOD BY WESTERGREN METHOD: 6 MM/H (ref 0–30)
GFR SERPL CREATININE-BSD FRML MDRD: 74 ML/MIN/1.7M2
GLUCOSE SERPL-MCNC: 98 MG/DL (ref 70–99)
GLUCOSE UR STRIP-MCNC: NEGATIVE MG/DL
HCT VFR BLD AUTO: 43.5 % (ref 35–47)
HDLC SERPL-MCNC: 101 MG/DL
HGB BLD-MCNC: 14.6 G/DL (ref 11.7–15.7)
HGB UR QL STRIP: NEGATIVE
INR POINT OF CARE: 2.3 (ref 0.86–1.14)
KETONES UR STRIP-MCNC: NEGATIVE MG/DL
LDLC SERPL CALC-MCNC: 129 MG/DL
LEUKOCYTE ESTERASE UR QL STRIP: NEGATIVE
MCH RBC QN AUTO: 30.9 PG (ref 26.5–33)
MCHC RBC AUTO-ENTMCNC: 33.6 G/DL (ref 31.5–36.5)
MCV RBC AUTO: 92 FL (ref 78–100)
NITRATE UR QL: NEGATIVE
NONHDLC SERPL-MCNC: 149 MG/DL
PH UR STRIP: 7 PH (ref 5–7)
PLATELET # BLD AUTO: 203 10E9/L (ref 150–450)
RBC # BLD AUTO: 4.72 10E12/L (ref 3.8–5.2)
SOURCE: NORMAL
SP GR UR STRIP: 1.01 (ref 1–1.03)
TRIGL SERPL-MCNC: 101 MG/DL
UROBILINOGEN UR STRIP-ACNC: 0.2 EU/DL (ref 0.2–1)
WBC # BLD AUTO: 4.8 10E9/L (ref 4–11)

## 2017-12-13 PROCEDURE — 82040 ASSAY OF SERUM ALBUMIN: CPT | Performed by: INTERNAL MEDICINE

## 2017-12-13 PROCEDURE — 85652 RBC SED RATE AUTOMATED: CPT | Performed by: INTERNAL MEDICINE

## 2017-12-13 PROCEDURE — 83516 IMMUNOASSAY NONANTIBODY: CPT | Performed by: INTERNAL MEDICINE

## 2017-12-13 PROCEDURE — 82550 ASSAY OF CK (CPK): CPT | Performed by: INTERNAL MEDICINE

## 2017-12-13 PROCEDURE — 84450 TRANSFERASE (AST) (SGOT): CPT | Performed by: INTERNAL MEDICINE

## 2017-12-13 PROCEDURE — 85027 COMPLETE CBC AUTOMATED: CPT | Performed by: INTERNAL MEDICINE

## 2017-12-13 PROCEDURE — 85610 PROTHROMBIN TIME: CPT | Mod: QW

## 2017-12-13 PROCEDURE — 82565 ASSAY OF CREATININE: CPT | Performed by: INTERNAL MEDICINE

## 2017-12-13 PROCEDURE — 82947 ASSAY GLUCOSE BLOOD QUANT: CPT | Performed by: INTERNAL MEDICINE

## 2017-12-13 PROCEDURE — 80061 LIPID PANEL: CPT | Performed by: INTERNAL MEDICINE

## 2017-12-13 PROCEDURE — 36416 COLLJ CAPILLARY BLOOD SPEC: CPT

## 2017-12-13 PROCEDURE — 86140 C-REACTIVE PROTEIN: CPT | Performed by: INTERNAL MEDICINE

## 2017-12-13 PROCEDURE — 99207 ZZC NO CHARGE NURSE ONLY: CPT

## 2017-12-13 PROCEDURE — 81003 URINALYSIS AUTO W/O SCOPE: CPT | Performed by: INTERNAL MEDICINE

## 2017-12-13 PROCEDURE — 84460 ALANINE AMINO (ALT) (SGPT): CPT | Performed by: INTERNAL MEDICINE

## 2017-12-13 NOTE — PROGRESS NOTES
ANTICOAGULATION FOLLOW-UP CLINIC VISIT    Patient Name:  Nury Cárdenas  Date:  12/13/2017  Contact Type:  Face to Face    SUBJECTIVE:     Patient Findings     Positives No Problem Findings           OBJECTIVE    INR Protime   Date Value Ref Range Status   12/13/2017 2.3 (A) 0.86 - 1.14 Final       ASSESSMENT / PLAN  INR assessment THER    Recheck INR In: 2 WEEKS    INR Location Clinic      Anticoagulation Summary as of 12/13/2017     INR goal 2.0-3.0   Today's INR 2.3   Maintenance plan 4 mg (2 mg x 2) every day   Full instructions 12/15: Hold; 12/16: Hold; 12/17: Hold; 12/18: Hold; 12/19: Hold; Otherwise 4 mg every day   Weekly total 28 mg   Plan last modified Giselle Price RN (7/7/2016)   Next INR check 12/27/2017   Priority INR   Target end date Indefinite    Indications   Long-term (current) use of anticoagulants [Z79.01] [Z79.01]  PE (pulmonary embolism) [I26.99]         Anticoagulation Episode Summary     INR check location     Preferred lab     Send INR reminders to [facility] CL[facility]    Comments       Anticoagulation Care Providers     Provider Role Specialty Phone number    Phil Abbott MD [facility] [phone]            See the Encounter Report to view Anticoagulation Flowsheet and Dosing Calendar (Go to Encounters tab in chart review, and find the Anticoagulation Therapy Visit)    Dosage adjustment made based on physician directed care plan.    Kirti Lacy RN

## 2017-12-13 NOTE — MR AVS SNAPSHOT
Nury Cárdenas   12/13/2017 9:00 AM   Anticoagulation Therapy Visit    Description:  72 year old female   Provider:  MONI ANTI COAG   Department:  [facility]           INR as of 12/13/2017     Today's INR 2.3      Anticoagulation Summary as of 12/13/2017     INR goal 2.0-3.0   Today's INR 2.3   Full instructions 12/15: Hold; 12/16: Hold; 12/17: Hold; 12/18: Hold; 12/19: Hold; Otherwise 4 mg every day   Next INR check 12/27/2017    Indications   Long-term (current) use of anticoagulants [Z79.01] [Z79.01]  PE (pulmonary embolism) [I26.99]         Your next Anticoagulation Clinic appointment(s)     Dec 13, 2017  9:00 AM CST   Anticoagulation Visit with MONI ANTI COAG   [facility] ([facility]    [address] [place] [address] [phone]            Dec 27, 2017  9:30 AM CST   Anticoagulation Visit with MONI ANTI COAG   [facility] ([address] [address] [phone]              Contact Numbers     [facility]  Please call [phone] to cancel and/or reschedule your appointment   Please call [phone] with any problems or questions regarding your therapy.        December 2017 Details    Sun Mon Tue Wed Thu Fri Sat          1               2                 3               4               5               6               7               8               9                 10               11               12               13      4 mg   See details      14      4 mg         15      Hold         16      Hold           17      Hold         18      Hold         19      Hold         20      4 mg         21      4 mg         22      4 mg         23      4 mg           24      4 mg         25      4 mg         26      4 mg         27            28               29               30                 31                      Date Details   12/13 This INR check       Date of next INR:  12/27/2017         How to take  your warfarin dose     To take:  4 mg Take 2 of the 2 mg tablets.    Hold Do not take your warfarin dose. See the Details table to the right for additional instructions.

## 2017-12-14 LAB — PROTEINASE3 IGG SER-ACNC: <0.2 AI (ref 0–0.9)

## 2017-12-14 NOTE — PROGRESS NOTES
[facility]  [address] [place] [id number]  [phone]  Dept: [phone]    PRE-OP EVALUATION:  Today's date: 2017    Nury Cárdenas (: 1945) presents for pre-operative evaluation assessment as requested by Dr. Uday Petersen.  She requires evaluation and anesthesia risk assessment prior to undergoing surgery/procedure for treatment of Macula Hole  .  Proposed procedure: Vitrectomy left eye     Date of Surgery/ Procedure: 2018  Time of Surgery/ Procedure: 7:30am  Hospital/Surgical Facility: [facility]  Fax:   [facility] Center:         [phone]  Preop Center: :                 [phone]  Primary Physician: Phil Abbott  Type of Anesthesia Anticipated: to be determined    Patient has a Health Care Directive or Living Will:  YES     Preop Questions 2017   1.  Do you have a history of heart attack, stroke, stent, bypass or surgery on an artery in the head, neck, heart or legs? No   2.  Do you ever have any pain or discomfort in your chest? No   3.  Do you have a history of  Heart Failure? No   4.   Are you troubled by shortness of breath when:  walking on a level surface, or up a slight hill, or at night? No   5.  Do you currently have a cold, bronchitis or other respiratory infection? No   6.  Do you have a cough, shortness of breath, or wheezing? No   7.  Do you sometimes get pains in the calves of your legs when you walk? No   8. Do you or anyone in your family have previous history of blood clots? YES - Self    9.  Do you or does anyone in your family have a serious bleeding problem such as prolonged bleeding following surgeries or cuts? No   10. Have you ever had problems with anemia or been told to take iron pills? No   11. Have you had any abnormal blood loss such as black, tarry or bloody stools, or abnormal vaginal bleeding? No   12. Have you ever had a blood transfusion? No   13. Have you or any of your relatives ever  had problems with anesthesia? YES - Daughter and Self    14. Do you have sleep apnea, excessive snoring or daytime drowsiness? No   15. Do you have any prosthetic heart valves? No   16. Do you have prosthetic joints? No   17. Is there any chance that you may be pregnant? No     HPI:                                                      Brief HPI related to upcoming procedure: Left Vitrectomy    See problem list for active medical problems.  Problems all longstanding and stable, except as noted/documented.  See ROS for pertinent symptoms related to these conditions.                                                                                                  .    Been off Methotrexate: 8/2017.  On Warfarin for history of PE with Granulomatosis.    MEDICAL HISTORY:                                                    Patient Active Problem List    Diagnosis Date Noted     Cervicalgia 10/30/2017     Priority: Medium     Cat scratch left lower extremity 07/29/2017     Priority: Medium     Cellulitis 07/28/2017     Priority: Medium     Long-term use of immunosuppressant medication 06/09/2017     Priority: Medium     Primary osteoarthritis involving multiple joints 06/09/2017     Priority: Medium     Long-term (current) use of anticoagulants [Z79.01] 03/18/2016     Priority: Medium     Chronic anticoagulation 04/23/2015     Priority: Medium     Per her rheumatologist, there is some association between granulomatosis with polyangiitis and pulmonary embolism. It is reasonable to continue warfarin indefinitely for therapy for this.        Calculus of kidney, right 02/11/2015     Priority: Medium     Cataract, mild, ou 01/08/2015     Priority: Medium     PE (pulmonary embolism) 10/31/2014     Priority: Medium     Dyspnea 10/24/2014     Priority: Medium     Chronic steroid use 08/15/2014     Priority: Medium     Granulomatosis with polyangiitis (H) 06/24/2014     Priority: Medium     Diagnosis updated by automated process.  Provider to review and confirm.       Chronic constipation 10/15/2012     Priority: Medium     Inflammatory arthritis      Priority: Medium     thumb       Synovitis of wrist      Priority: Medium     right       Advanced directives, counseling/discussion 06/23/2011     Priority: Medium     Received outside advance directive.  HCD:Previously signed by patient and notarized by .  scanned into EMR as Advance Directive/Living Will document. View document and details in Code Status History Report. Please see advance directive for specifics.  Megan Madsen RN  4/25/2012    Discussed advance care planning with patient; information given to patient to review. 6/23/2011            HYPERLIPIDEMIA LDL GOAL <160 10/31/2010     Priority: Medium     GERD (gastroesophageal reflux disease)      Priority: Medium     Fibroids      Priority: Medium     Migraines      Priority: Medium     Hearing loss      Priority: Medium     Osteopenia      Priority: Medium     Monitor closely due to steroid therapy.        Past Medical History:   Diagnosis Date     Atypical pneumonia 6/14/2014     Atypical pneumonia      Atypical pneumonia      Coronary artery disease 1982    heart beat hard made me tired     Dyslipidemia      Fibroids      GERD (gastroesophageal reflux disease)      Granulomatosis with polyangiitis (Wegener's)      Hearing loss      Inflammatory arthritis     thumb     Migraines      MVP (mitral valve prolapse)     had an echo that was normal in 2007 she is on Inderal     Osteopenia      PE (pulmonary embolism) 10/2014    ? GPA associated, on warfarin      Synovitis of wrist 2009    right     Past Surgical History:   Procedure Laterality Date     ABDOMEN SURGERY      appendix out     APPENDECTOMY       BIOPSY  1972    cone biopsy     CL AFF SURGICAL PATHOLOGY      cone biopsy 1975     COLONOSCOPY       EYE SURGERY      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     HC ESOPHAGOSCOPY, DIAGNOSTIC       OPTICAL TRACKING  SYSTEM BRONCHOSCOPY  6/19/2014    Procedure: OPTICAL TRACKING SYSTEM BRONCHOSCOPY;  Surgeon: Angel Kathleen MD;  Location:  GI     SURGICAL HISTORY OF -   as a child    strabismus repair right eye     SURGICAL HISTORY OF -   8-2009    right wrist debridement     TONSILLECTOMY      as a child     TONSILLECTOMY & ADENOIDECTOMY       TUBAL LIGATION       Current Outpatient Prescriptions   Medication Sig Dispense Refill     warfarin (COUMADIN) 2 MG tablet TAKE 2 TABLETS(4 MG) BY MOUTH EVERY  tablet 0     propranolol (INDERAL) 20 MG tablet TAKE 1 TABLET(20 MG) BY MOUTH DAILY 90 tablet 2     calcium-magnesium (CALMAG) 500-250 MG TABS per tablet Take 1 tablet by mouth 2 times daily (with meals)       cholecalciferol 1000 UNITS TABS Take 1,000 Units by mouth daily       acetaminophen (TYLENOL) 325 MG tablet Take 2 tablets (650 mg) by mouth every 6 hours as needed for mild pain 100 tablet 0     OTC products: None, except as noted above    Allergies   Allergen Reactions     Vancomycin Other (See Comments)     Red man's syndrom     Adhesive Tape      Rash itches     Amoxicillin      vomiting     Augmentin      vomiting     Codeine      vomiting     Erythromycin      vomiting     Nickel      itches rash     Sulfa Drugs Itching     Tegaderm Alginate Ag      LW Other1: -ALL MEDICATIONS MAKE PATIENT VIOLENTLY ILL; ADHESIVE BANDAGES; NICKEL     Zithromax [Azithromycin Dihydrate]      Vomiting/ skin blisters      Latex Allergy: NO    Social History   Substance Use Topics     Smoking status: Former Smoker     Packs/day: 2.00     Years: 18.00     Types: Cigarettes     Start date: 1/1/1960     Quit date: 1/1/1982     Smokeless tobacco: Never Used     Alcohol use No     History   Drug Use No       REVIEW OF SYSTEMS:                                                    Constitutional, HEENT, cardiovascular, pulmonary, gi and gu systems are negative, except as otherwise noted.    EXAM:                                          "           /80  Pulse 68  Temp 96.9  F (36.1  C) (Oral)  Ht 5' 4.5\" (1.638 m)  Wt 122 lb (55.3 kg)  SpO2 98%  BMI 20.62 kg/m2    GENERAL APPEARANCE: frail elderly lady alert and no distress     EYES: EOMI, PERRL     HENT: ear canals and TM's normal and nose and mouth without ulcers or lesions     NECK: no adenopathy and thyroid normal to palpation     RESP: lungs clear to auscultation - no rales, rhonchi or wheezes     CV: regular rates and rhythm, normal S1 S2, no S3 or S4 and no murmur, click or rub     ABDOMEN:  soft, nontender, no HSM or masses and bowel sounds normal     MS: extremities normal- no gross deformities noted, no evidence of inflammation in joints     NEURO: Normal strength and tone, sensory exam grossly normal, mentation intact and speech normal     PSYCH: mentation appears normal. and affect normal/bright    DIAGNOSTICS:                                                    No labs or EKG required for low risk surgery (cataract, skin procedure, breast biopsy, etc)    Recent Labs   Lab Test  12/13/17   0836 12/13/17 11/08/17   10/04/17   0955   08/01/17 2012 07/30/17   0606   12/01/14   0856   HGB  14.6   --    --    --   14.2   < >  13.3  14.0   < >  13.7   PLT  203   --    --    --   199   < >  210  185   < >  244   INR   --   2.3*  1.9*   < >   --    < >   --   1.66*   < >   --    NA   --    --    --    --    --    --   139  140   < >  141   POTASSIUM   --    --    --    --    --    --   3.7  3.6   < >  3.7   CR  0.76   --    --    --   0.71   < >  0.69  0.70   < >  0.76   A1C   --    --    --    --    --    --    --    --    --   5.5    < > = values in this interval not displayed.      IMPRESSION:                                                    Reason for surgery/procedure: Macular hole/Vitrectomy Lt Eye  Diagnosis/reason for consult: Macular hole/PreOP    The proposed surgical procedure is considered LOW risk.    REVISED CARDIAC RISK INDEX  The patient has the following serious " cardiovascular risks for perioperative complications such as (MI, PE, VFib and 3  AV Block):  No serious cardiac risks  INTERPRETATION: 1 risks: Class II (low risk - 0.9% complication rate)    The patient has the following additional risks for perioperative complications:  Chronic anticoagulation with warfarin.      ICD-10-CM    1. Preop general physical exam Z01.818    2. Macular hole of left eye H35.342    3. Chronic anticoagulation Z79.01    4. Granulomatosis with polyangiitis (H) M31.30    5. Screen for colon cancer Z12.11    6. Need for prophylactic vaccination and inoculation against influenza Z23    7. Need for prophylactic vaccination against Streptococcus pneumoniae (pneumococcus) Z23        RECOMMENDATIONS:                                                      --Patient is to take all scheduled medications on the day of surgery EXCEPT for modifications listed below.    Anticoagulant or Antiplatelet Medication Use  WARFARIN: Bleeding risk is low for this procedure and warfarin should be continued in the perioperative period; as long as INR is less than 3.0 per Ophthalmologist.      APPROVAL GIVEN to proceed with proposed procedure, without further diagnostic evaluation       Signed Electronically by: Phil Abbott MD    Copy of this evaluation report is provided to requesting physician.    [place] Preop Guidelines    Praveen Ochoa  : 6

## 2017-12-14 NOTE — PATIENT INSTRUCTIONS
Before Your Surgery      Call your surgeon if there is any change in your health. This includes signs of a cold or flu (such as a sore throat, runny nose, cough, rash or fever).    Do not smoke, drink alcohol or take over the counter medicine (unless your surgeon or primary care doctor tells you to) for the 24 hours before and after surgery.    If you take prescribed drugs: Follow your doctor s orders about which medicines to take and which to stop until after surgery.    Eating and drinking prior to surgery: follow the instructions from your surgeon    Take a shower or bath the night before surgery. Use the soap your surgeon gave you to gently clean your skin. If you do not have soap from your surgeon, use your regular soap. Do not shave or scrub the surgery site.  Wear clean pajamas and have clean sheets on your bed.     [facility]    If you have any questions regarding to your visit please contact your care team:       Team Purple:   Clinic Hours Telephone Number   Dr. Shanti Prabhakar   7am-7pm  Monday - Thursday   7am-5pm  Fridays  [phone]  (Appointment scheduling available 24/7)    Questions about your Visit?   Team Line:  [phone]   Urgent Care - [place] and [facility] - 11am-9pm Monday-Friday Saturday-Sunday- 9am-5pm   [place] - 5pm-9pm Monday-Friday Saturday-Sunday- 9am-5pm  [phone] - [place]   [phone] - [place]       What options do I have for visits at the clinic other than the traditional office visit?  To expand how we care for you, many of our providers are utilizing electronic visits (e-visits) and telephone visits, when medically appropriate, for interactions with their patients rather than a visit in the clinic.   We also offer nurse visits for many medical concerns. Just like any other service, we will bill your insurance company for this type of visit based on time spent on the phone  with your provider. Not all insurance companies cover these visits. Please check with your medical insurance if this type of visit is covered. You will be responsible for any charges that are not paid by your insurance.      E-visits via [employer]:  generally incur a $35.00 fee.  Telephone visits:  Time spent on the phone: *charged based on time that is spent on the phone in increments of 10 minutes. Estimated cost:   5-10 mins $30.00   11-20 mins. $59.00   21-30 mins. $85.00     Use [employer] (secure email communication and access to your chart) to send your primary care provider a message or make an appointment. Ask someone on your Team how to sign up for [employer].  For a Price Quote for your services, please call our Consumer Price Line at [phone].  As always, Thank you for trusting us with your health care needs!    Discharge by RENE CABELLO

## 2017-12-20 ENCOUNTER — SURGERY (OUTPATIENT)
Age: 72
End: 2017-12-20

## 2017-12-20 ENCOUNTER — HOSPITAL ENCOUNTER (OUTPATIENT)
Facility: AMBULATORY SURGERY CENTER | Age: 72
Discharge: HOME | End: 2017-12-20
Attending: INTERNAL MEDICINE | Admitting: INTERNAL MEDICINE
Payer: COMMERCIAL

## 2017-12-20 VITALS
SYSTOLIC BLOOD PRESSURE: 126 MMHG | HEART RATE: 76 BPM | RESPIRATION RATE: 16 BRPM | DIASTOLIC BLOOD PRESSURE: 74 MMHG | OXYGEN SATURATION: 97 % | TEMPERATURE: 97.5 F

## 2017-12-20 DIAGNOSIS — Z79.01 LONG-TERM (CURRENT) USE OF ANTICOAGULANTS: Primary | ICD-10-CM

## 2017-12-20 LAB
COLONOSCOPY: NORMAL
INR BLD: 1 (ref 0.86–1.14)

## 2017-12-20 PROCEDURE — 36416 COLLJ CAPILLARY BLOOD SPEC: CPT | Performed by: FAMILY MEDICINE

## 2017-12-20 PROCEDURE — 88305 TISSUE EXAM BY PATHOLOGIST: CPT | Performed by: INTERNAL MEDICINE

## 2017-12-20 PROCEDURE — G8918 PT W/O PREOP ORDER IV AB PRO: HCPCS

## 2017-12-20 PROCEDURE — 45385 COLONOSCOPY W/LESION REMOVAL: CPT

## 2017-12-20 PROCEDURE — 85610 PROTHROMBIN TIME: CPT | Mod: QW | Performed by: FAMILY MEDICINE

## 2017-12-20 PROCEDURE — G8907 PT DOC NO EVENTS ON DISCHARG: HCPCS

## 2017-12-20 RX ORDER — ONDANSETRON 2 MG/ML
4 INJECTION INTRAMUSCULAR; INTRAVENOUS
Status: DISCONTINUED | OUTPATIENT
Start: 2017-12-20 | End: 2017-12-21 | Stop reason: HOSPADM

## 2017-12-20 RX ORDER — FENTANYL CITRATE 50 UG/ML
INJECTION, SOLUTION INTRAMUSCULAR; INTRAVENOUS PRN
Status: DISCONTINUED | OUTPATIENT
Start: 2017-12-20 | End: 2017-12-20 | Stop reason: HOSPADM

## 2017-12-20 RX ORDER — LIDOCAINE 40 MG/G
CREAM TOPICAL
Status: DISCONTINUED | OUTPATIENT
Start: 2017-12-20 | End: 2017-12-21 | Stop reason: HOSPADM

## 2017-12-20 RX ADMIN — FENTANYL CITRATE 50 MCG: 50 INJECTION, SOLUTION INTRAMUSCULAR; INTRAVENOUS at 10:02

## 2017-12-20 RX ADMIN — FENTANYL CITRATE 50 MCG: 50 INJECTION, SOLUTION INTRAMUSCULAR; INTRAVENOUS at 09:57

## 2017-12-21 LAB — COPATH REPORT: NORMAL

## 2017-12-26 ENCOUNTER — OFFICE VISIT (OUTPATIENT)
Dept: FAMILY MEDICINE | Facility: CLINIC | Age: 72
End: 2017-12-26
Payer: COMMERCIAL

## 2017-12-26 VITALS
WEIGHT: 122 LBS | DIASTOLIC BLOOD PRESSURE: 80 MMHG | HEIGHT: 65 IN | BODY MASS INDEX: 20.33 KG/M2 | TEMPERATURE: 96.9 F | HEART RATE: 68 BPM | SYSTOLIC BLOOD PRESSURE: 126 MMHG | OXYGEN SATURATION: 98 %

## 2017-12-26 DIAGNOSIS — Z79.01 CHRONIC ANTICOAGULATION: ICD-10-CM

## 2017-12-26 DIAGNOSIS — Z23 NEED FOR PROPHYLACTIC VACCINATION AGAINST STREPTOCOCCUS PNEUMONIAE (PNEUMOCOCCUS): ICD-10-CM

## 2017-12-26 DIAGNOSIS — Z12.11 SCREEN FOR COLON CANCER: ICD-10-CM

## 2017-12-26 DIAGNOSIS — Z23 NEED FOR PROPHYLACTIC VACCINATION AND INOCULATION AGAINST INFLUENZA: ICD-10-CM

## 2017-12-26 DIAGNOSIS — H35.342 MACULAR HOLE OF LEFT EYE: ICD-10-CM

## 2017-12-26 DIAGNOSIS — M31.30 GRANULOMATOSIS WITH POLYANGIITIS (H): ICD-10-CM

## 2017-12-26 DIAGNOSIS — Z01.818 PREOP GENERAL PHYSICAL EXAM: Primary | ICD-10-CM

## 2017-12-26 PROCEDURE — 99214 OFFICE O/P EST MOD 30 MIN: CPT | Performed by: FAMILY MEDICINE

## 2017-12-26 ASSESSMENT — PAIN SCALES - GENERAL: PAINLEVEL: NO PAIN (0)

## 2017-12-26 NOTE — MR AVS SNAPSHOT
After Visit Summary   12/26/2017    Nury Cárdenas    MRN: [id number]           Patient Information     Date Of Birth          1945        Visit Information        Provider Department      12/26/2017 10:20 AM Phil Abbott MD [facility]        Today's Diagnoses     Preop general physical exam    -  1    Macular hole of left eye        Chronic anticoagulation        Granulomatosis with polyangiitis (H)        Screen for colon cancer        Need for prophylactic vaccination and inoculation against influenza        Need for prophylactic vaccination against Streptococcus pneumoniae (pneumococcus)          Care Instructions      Before Your Surgery      Call your surgeon if there is any change in your health. This includes signs of a cold or flu (such as a sore throat, runny nose, cough, rash or fever).    Do not smoke, drink alcohol or take over the counter medicine (unless your surgeon or primary care doctor tells you to) for the 24 hours before and after surgery.    If you take prescribed drugs: Follow your doctor s orders about which medicines to take and which to stop until after surgery.    Eating and drinking prior to surgery: follow the instructions from your surgeon    Take a shower or bath the night before surgery. Use the soap your surgeon gave you to gently clean your skin. If you do not have soap from your surgeon, use your regular soap. Do not shave or scrub the surgery site.  Wear clean pajamas and have clean sheets on your bed.     [facility]    If you have any questions regarding to your visit please contact your care team:       Team Purple:   Clinic Hours Telephone Number   Dr. Shanti Prabhakar   7am-7pm  Monday - Thursday   7am-5pm  Fridays  [phone]  (Appointment scheduling available 24/7)    Questions about your Visit?   Team Line:  [phone]   Urgent Care - [place] and  [place] - 11am-9pm Monday-Friday Saturday-Sunday- 9am-5pm   [place] - 5pm-9pm Monday-Friday Saturday-Sunday- 9am-5pm  [phone] - [place]  [phone] - [place]       What options do I have for visits at the clinic other than the traditional office visit?  To expand how we care for you, many of our providers are utilizing electronic visits (e-visits) and telephone visits, when medically appropriate, for interactions with their patients rather than a visit in the clinic.   We also offer nurse visits for many medical concerns. Just like any other service, we will bill your insurance company for this type of visit based on time spent on the phone with your provider. Not all insurance companies cover these visits. Please check with your medical insurance if this type of visit is covered. You will be responsible for any charges that are not paid by your insurance.      E-visits via [employer]:  generally incur a $35.00 fee.  Telephone visits:  Time spent on the phone: *charged based on time that is spent on the phone in increments of 10 minutes. Estimated cost:   5-10 mins $30.00   11-20 mins. $59.00   21-30 mins. $85.00     Use [employer] (secure email communication and access to your chart) to send your primary care provider a message or make an appointment. Ask someone on your Team how to sign up for [employer].  For a Price Quote for your services, please call our Consumer Price Line at [phone].  As always, Thank you for trusting us with your health care needs!    Discharge by RENE CBAELLO             Follow-ups after your visit        Your next 10 appointments already scheduled     Dec 27, 2017  9:30 AM CST   Anticoagulation Visit with RAAD ANTI COAG   [facility]    [address] [place] [address]   [phone]            Feb 14, 2018  8:45 AM CST   LAB with RAAD LAB   [facility]    [address]  [place]  [place] [address]   [phone]           Please do not eat 10-12 hours before your appointment if you are coming in fasting for labs on lipids, cholesterol, or glucose (sugar). This does not apply to pregnant women. Water, hot tea and black coffee (with nothing added) are okay. Do not drink other fluids, diet soda or chew gum.            Feb 23, 2018  8:00 AM CST   Return Visit with Alvaro Maguire MD   [facility]    [address] [place] [id number] [phone]              Who to contact     If you have questions or need follow up information about today's clinic visit or your schedule please contact [facility] directly at [phone].  Normal or non-critical lab and imaging results will be communicated to you by MyChart, letter or phone within 4 business days after the clinic has received the results. If you do not hear from us within 7 days, please contact the clinic through [employer] or phone. If you have a critical or abnormal lab result, we will notify you by phone as soon as possible.  Submit refill requests through [employer] or call your pharmacy and they will forward the refill request to us. Please allow 3 business days for your refill to be completed.          Additional Information About Your Visit        [employer] Information     [employer] gives you secure access to your electronic health record. If you see a primary care provider, you can also send messages to your care team and make appointments. If you have questions, please call your primary care clinic.  If you do not have a primary care provider, please call [phone] and they will assist you.        Care EveryWhere ID     This is your Care EveryWhere ID. This could be used by other organizations to access your [place] medical records  [id number]        Your Vitals Were     Pulse Temperature Height Pulse Oximetry BMI (Body Mass Index)       68 96.9  F (36.1  " C) (Oral) 5' 4.5\" (1.638 m) 98% 20.62 kg/m2        Blood Pressure from Last 3 Encounters:   12/26/17 126/80   12/20/17 126/74   10/31/17 100/60    Weight from Last 3 Encounters:   12/26/17 122 lb (55.3 kg)   10/31/17 122 lb 8 oz (55.6 kg)   10/13/17 123 lb 4.8 oz (55.9 kg)              Today, you had the following     No orders found for display       Primary Care Provider Office Phone # Fax #    Phil Abbott [phone]       [address] [facility] [address]        Equal Access to Services     [facility] LUCIA : Jonnie wyatto Somau, waaxda luqadaha, qaybta kaalmada adeegyada, lavell angel. So [facility] [phone].    ATENCIÓN: Si habla español, tiene a fofana disposición servicios gratuitos de asistencia lingüística. [facility] [phone].    We comply with applicable federal civil rights laws and [place] laws. We do not discriminate on the basis of race, color, national origin, age, disability, sex, sexual orientation, or gender identity.            Thank you!     Thank you for choosing [facility]  for your care. Our goal is always to provide you with excellent care. Hearing back from our patients is one way we can continue to improve our services. Please take a few minutes to complete the written survey that you may receive in the mail after your visit with us. Thank you!             Your Updated Medication List - Protect others around you: Learn how to safely use, store and throw away your medicines at www.disposemymeds.org.          This list is accurate as of: 12/26/17 11:02 AM.  Always use your most recent med list.                   Brand Name Dispense Instructions for use Diagnosis    acetaminophen 325 MG tablet    TYLENOL    100 tablet    Take 2 tablets (650 mg) by mouth every 6 hours as needed for mild pain    Migraines, Pulmonary emboli (H)       calcium-magnesium 500-250 MG Tabs per tablet    CALMAG     Take 1 tablet by mouth " 2 times daily (with meals)        cholecalciferol 1000 UNITS Tabs      Take 1,000 Units by mouth daily        propranolol 20 MG tablet    INDERAL    90 tablet    TAKE 1 TABLET(20 MG) BY MOUTH DAILY    Migraine without status migrainosus, not intractable, unspecified migraine type       warfarin 2 MG tablet    COUMADIN    180 tablet    TAKE 2 TABLETS(4 MG) BY MOUTH EVERY DAY    Chronic anticoagulation

## 2017-12-26 NOTE — NURSING NOTE
"Chief Complaint   Patient presents with     Pre-Op Exam     Health Maintenance     Physical       Initial /80  Pulse 68  Temp 96.9  F (36.1  C) (Oral)  Ht 5' 4.5\" (1.638 m)  Wt 122 lb (55.3 kg)  SpO2 98%  BMI 20.62 kg/m2 Estimated body mass index is 20.62 kg/(m^2) as calculated from the following:    Height as of this encounter: 5' 4.5\" (1.638 m).    Weight as of this encounter: 122 lb (55.3 kg).  Medication Reconciliation: complete     Saida Chavez MA       "

## 2017-12-27 ENCOUNTER — ANTICOAGULATION THERAPY VISIT (OUTPATIENT)
Dept: NURSING | Facility: CLINIC | Age: 72
End: 2017-12-27
Payer: COMMERCIAL

## 2017-12-27 DIAGNOSIS — Z79.01 LONG-TERM (CURRENT) USE OF ANTICOAGULANTS: ICD-10-CM

## 2017-12-27 DIAGNOSIS — M31.30 GRANULOMATOSIS WITH POLYANGIITIS (H): Primary | ICD-10-CM

## 2017-12-27 LAB — INR POINT OF CARE: 1.5 (ref 0.86–1.14)

## 2017-12-27 PROCEDURE — 85610 PROTHROMBIN TIME: CPT | Mod: QW

## 2017-12-27 PROCEDURE — 36416 COLLJ CAPILLARY BLOOD SPEC: CPT

## 2017-12-27 PROCEDURE — 99207 ZZC NO CHARGE NURSE ONLY: CPT

## 2017-12-27 NOTE — MR AVS SNAPSHOT
Nury Cárdenas   12/27/2017 9:30 AM   Anticoagulation Therapy Visit    Description:  72 year old female   Provider:  MONI ANTI COAG   Department:  [facility]           INR as of 12/27/2017     Today's INR 1.5!      Anticoagulation Summary as of 12/27/2017     INR goal 2.0-3.0   Today's INR 1.5!   Full instructions 12/27: 6 mg; Otherwise 4 mg every day   Next INR check 1/4/2018    Indications   Long-term (current) use of anticoagulants [Z79.01] [Z79.01]  PE (pulmonary embolism) [I26.99]         Your next Anticoagulation Clinic appointment(s)     Jan 04, 2018  9:30 AM CST   Anticoagulation Visit with MONI ANTI COAGILL   [facility] ([facility]    [address] [place] [address] [phone]              Contact Numbers     [facility]  Please call [phone] to cancel and/or reschedule your appointment   Please call [phone] with any problems or questions regarding your therapy.        December 2017 Details    Sun Mon Tue Wed u Fri Sat          1               2                 3               4               5               6               7               8               9                 10               11               12               13               14               15               16                 17               18               19               20               21               22               23                 24               25               26               27      6 mg   See details      28      4 mg         29      4 mg         30      4 mg           31      4 mg                Date Details   12/27 This INR check               How to take your warfarin dose     To take:  4 mg Take 2 of the 2 mg tablets.    To take:  6 mg Take 3 of the 2 mg tablets.           January 2018 Details    Sun Mon Tue Wed u Fri Sat      1      4 mg         2      4 mg         3      4 mg         4            5               6                 7               8                9               10               11               12               13                 14               15               16               17               18               19               20                 21               22               23               24               25               26               27                 28               29               30               31                   Date Details   No additional details    Date of next INR:  1/4/2018         How to take your warfarin dose     To take:  4 mg Take 2 of the 2 mg tablets.

## 2017-12-27 NOTE — TELEPHONE ENCOUNTER
Medication/Dose: methotrexate 2.5 MG tablet CHEMO  Take 4 tablets (10 mg) by mouth once a week Take 4 tablets (10mg) by mouth weekly  Last Written Prescription Date: 8/4/17  Last Fill Quantity: 48, # refills: 0  Last Office Visit with Rheumatology Provider: 10/13/17    Next 5 appointments (look out 90 days)     Feb 23, 2018  8:00 AM CST   Return Visit with Alvaro Maguire MD   [facility] ([facility])    [address] [address] [place] [address] [phone]                   WBC   Date Value Ref Range Status   12/13/2017 4.8 4.0 - 11.0 10e9/L Final     RBC Count   Date Value Ref Range Status   12/13/2017 4.72 3.8 - 5.2 10e12/L Final     Hemoglobin   Date Value Ref Range Status   12/13/2017 14.6 11.7 - 15.7 g/dL Final     Hematocrit   Date Value Ref Range Status   12/13/2017 43.5 35.0 - 47.0 % Final     MCV   Date Value Ref Range Status   12/13/2017 92 78 - 100 fl Final     MCH   Date Value Ref Range Status   12/13/2017 30.9 26.5 - 33.0 pg Final     MCHC   Date Value Ref Range Status   12/13/2017 33.6 31.5 - 36.5 g/dL Final     RDW   Date Value Ref Range Status   12/13/2017 13.9 10.0 - 15.0 % Final     Platelet Count   Date Value Ref Range Status   12/13/2017 203 150 - 450 10e9/L Final     AST   Date Value Ref Range Status   12/13/2017 24 0 - 45 U/L Final     ALT   Date Value Ref Range Status   12/13/2017 25 0 - 50 U/L Final     Creatinine   Date Value Ref Range Status   12/13/2017 0.76 0.52 - 1.04 mg/dL Final     Albumin   Date Value Ref Range Status   12/13/2017 3.9 3.4 - 5.0 g/dL Final     Color Urine (no units)   Date Value   12/13/2017 Yellow     Appearance Urine (no units)   Date Value   12/13/2017 Clear     Glucose Urine (mg/dL)   Date Value   12/13/2017 Negative     Bilirubin Urine (no units)   Date Value   12/13/2017 Negative     Ketones Urine (mg/dL)   Date Value   12/13/2017 Negative     Specific Gravity Urine (no units)   Date Value   12/13/2017 1.015     pH  Urine (pH)   Date Value   12/13/2017 7.0     Protein Albumin Urine (mg/dL)   Date Value   12/13/2017 Negative     Urobilinogen Urine (EU/dL)   Date Value   12/13/2017 0.2     Nitrite Urine (no units)   Date Value   12/13/2017 Negative     Leukocyte Esterase Urine (no units)   Date Value   12/13/2017 Negative     Prescription pended per Rheumatology Refill Protocol for 90 day supply and 0 refills.  Yodit Garrett LPN  [facility]  Rheumatology

## 2017-12-27 NOTE — PROGRESS NOTES
ANTICOAGULATION FOLLOW-UP CLINIC VISIT    Patient Name:  Nury Cárdenas  Date:  12/27/2017  Contact Type:  Face to Face    SUBJECTIVE:     Patient Findings     Positives Intentional hold of therapy, No Problem Findings           OBJECTIVE    INR Protime   Date Value Ref Range Status   12/27/2017 1.5 (A) 0.86 - 1.14 Final       ASSESSMENT / PLAN  INR assessment SUB    Recheck INR In: 1 WEEK    INR Location Clinic      Anticoagulation Summary as of 12/27/2017     INR goal 2.0-3.0   Today's INR 1.5!   Maintenance plan 4 mg (2 mg x 2) every day   Full instructions 12/27: 6 mg; Otherwise 4 mg every day   Weekly total 28 mg   Plan last modified Giselle Price RN (7/7/2016)   Next INR check 1/4/2018   Priority INR   Target end date Indefinite    Indications   Long-term (current) use of anticoagulants [Z79.01] [Z79.01]  PE (pulmonary embolism) [I26.99]         Anticoagulation Episode Summary     INR check location     Preferred lab     Send INR reminders to [facility] CL[facility]    Comments       Anticoagulation Care Providers     Provider Role Specialty Phone number    Phil Abbott MD [facility] [phone]            See the Encounter Report to view Anticoagulation Flowsheet and Dosing Calendar (Go to Encounters tab in chart review, and find the Anticoagulation Therapy Visit)    Dosage adjustment made based on physician directed care plan.    Kirti Lacy RN

## 2018-01-04 ENCOUNTER — ANTICOAGULATION THERAPY VISIT (OUTPATIENT)
Dept: NURSING | Facility: CLINIC | Age: 73
End: 2018-01-04
Payer: COMMERCIAL

## 2018-01-04 DIAGNOSIS — Z79.01 LONG-TERM (CURRENT) USE OF ANTICOAGULANTS: ICD-10-CM

## 2018-01-04 LAB — INR POINT OF CARE: 2.1 (ref 0.86–1.14)

## 2018-01-04 PROCEDURE — 99207 ZZC NO CHARGE NURSE ONLY: CPT

## 2018-01-04 PROCEDURE — 85610 PROTHROMBIN TIME: CPT | Mod: QW

## 2018-01-04 PROCEDURE — 36416 COLLJ CAPILLARY BLOOD SPEC: CPT

## 2018-01-04 NOTE — PROGRESS NOTES
ANTICOAGULATION FOLLOW-UP CLINIC VISIT    Patient Name:  Nury Cárdenas  Date:  1/4/2018  Contact Type:  Face to Face    SUBJECTIVE:     Patient Findings     Positives No Problem Findings           OBJECTIVE    INR Protime   Date Value Ref Range Status   01/04/2018 2.1 (A) 0.86 - 1.14 Final       ASSESSMENT / PLAN  INR assessment THER    Recheck INR In: 3 WEEKS    INR Location Clinic      Anticoagulation Summary as of 1/4/2018     INR goal 2.0-3.0   Today's INR 2.1   Maintenance plan 4 mg (2 mg x 2) every day   Full instructions 4 mg every day   Weekly total 28 mg   No change documented Kirti Lacy, RN   Plan last modified Giselle Price RN (7/7/2016)   Next INR check 1/25/2018   Priority INR   Target end date Indefinite    Indications   Long-term (current) use of anticoagulants [Z79.01] [Z79.01]  PE (pulmonary embolism) [I26.99]         Anticoagulation Episode Summary     INR check location     Preferred lab     Send INR reminders to [facility]    Comments       Anticoagulation Care Providers     Provider Role Specialty Phone number    DesireePhil landaverde MD [facility] [phone]            See the Encounter Report to view Anticoagulation Flowsheet and Dosing Calendar (Go to Encounters tab in chart review, and find the Anticoagulation Therapy Visit)    Dosage adjustment made based on physician directed care plan.    Kirti Lacy RN

## 2018-01-22 PROBLEM — M54.2 CERVICALGIA: Status: RESOLVED | Noted: 2017-10-30 | Resolved: 2018-01-22

## 2018-01-22 NOTE — PROGRESS NOTES
Subjective:  HPI                    Objective:  System    Physical Exam    General     ROS    Assessment/Plan:    DISCHARGE REPORT  Patient did not return to PT following her 2nd PT session. See below for the most recent subjective and objective measures from that session  SUBJECTIVE  Subjective changes noted by patient:   Subjective: Patient doesnt feel as stiff anymore and she feels her posture has improved and she reports less pain    Current pain level is  Current Pain level: 0/10.     Previous pain level was   Initial Pain level: 5/10.   Changes in function:  Yes (See Goal flowsheet attached for changes in current functional level)  Adverse reaction to treatment or activity: None    OBJECTIVE  Changes noted in objective findings:  Yes,   Objective: significantly reduced forward head posture with improved cervical AROM into extension     ASSESSMENT/PLAN  Updated problem list and treatment plan: Diagnosis 1:  Neck pain    STG/LTGs have been met or progress has been made towards goals:  Yes (See Goal flow sheet completed today.)  Assessment of Progress: The patient has not returned to therapy. Current status is unknown.  Self Management Plans:  Patient has been instructed in a home treatment program.  Patient  has been instructed in self management of symptoms.  I have re-evaluated this patient and find that the nature, scope, duration and intensity of the therapy is appropriate for the medical condition of the patient.    Recommendations:  This patient is to be discharged from therapy and continue their home treatment program.    Please refer to the daily flowsheet for treatment today, total treatment time and time spent performing 1:1 timed codes.

## 2018-01-25 ENCOUNTER — ANTICOAGULATION THERAPY VISIT (OUTPATIENT)
Dept: NURSING | Facility: CLINIC | Age: 73
End: 2018-01-25
Payer: COMMERCIAL

## 2018-01-25 DIAGNOSIS — Z79.01 LONG-TERM (CURRENT) USE OF ANTICOAGULANTS: ICD-10-CM

## 2018-01-25 LAB — INR POINT OF CARE: 2.2 (ref 0.86–1.14)

## 2018-01-25 PROCEDURE — 36416 COLLJ CAPILLARY BLOOD SPEC: CPT

## 2018-01-25 PROCEDURE — 99207 ZZC NO CHARGE NURSE ONLY: CPT

## 2018-01-25 PROCEDURE — 85610 PROTHROMBIN TIME: CPT | Mod: QW

## 2018-01-25 NOTE — MR AVS SNAPSHOT
Nury Cárdenas   1/25/2018 9:30 AM   Anticoagulation Therapy Visit    Description:  72 year old female   Provider:  MONI ANTI COAG   Department:  [facility]           INR as of 1/25/2018     Today's INR 2.2      Anticoagulation Summary as of 1/25/2018     INR goal 2.0-3.0   Today's INR 2.2   Full instructions 4 mg every day   Next INR check 2/22/2018    Indications   Long-term (current) use of anticoagulants [Z79.01] [Z79.01]  PE (pulmonary embolism) [I26.99]         Your next Anticoagulation Clinic appointment(s)     Jan 25, 2018  9:30 AM CST   [facility]agulation [facility] with MONI ANTI COAG   [facility]    [address] [place] [id number] [phone]            Feb 22, 2018  9:30 AM CST   Anticoagulation Visit with MONI ANTI COAG   [facility]    [address] [place] [id number] [phone]              Contact Numbers     [facility]  Please call [phone] to cancel and/or reschedule your appointment   Please call [phone] with any problems or questions regarding your therapy.        January 2018 Details    Sun Mon Tue Wed Thu Fri Sat      1               2               3               4               5               6                 7               8               9               10               11               12               13                 14               15               16               17               18               19               20                 21               22               23               24               25      4 mg   See details      26      4 mg         27      4 mg           28      4 mg         29      4 mg         30      4 mg         31      4 mg             Date Details   01/25 This INR check               How to take your warfarin dose     To take:  4 mg Take 2 of the 2 mg tablets.           February 2018 Details    Sun Mon Tue Wed Thu Fri Sat         1       4 mg         2      4 mg         3      4 mg           4      4 mg         5      4 mg         6      4 mg         7      4 mg         8      4 mg         9      4 mg         10      4 mg           11      4 mg         12      4 mg         13      4 mg         14      4 mg         15      4 mg         16      4 mg         17      4 mg           18      4 mg         19      4 mg         20      4 mg         21      4 mg         22            23               24                 25               26               27               28                   Date Details   No additional details    Date of next INR:  2/22/2018         How to take your warfarin dose     To take:  4 mg Take 2 of the 2 mg tablets.

## 2018-01-25 NOTE — PROGRESS NOTES
ANTICOAGULATION FOLLOW-UP CLINIC VISIT    Patient Name:  Nury Cárdenas  Date:  1/25/2018  Contact Type:  Face to Face    SUBJECTIVE:     Patient Findings     Positives No Problem Findings           OBJECTIVE    INR Protime   Date Value Ref Range Status   01/25/2018 2.2 (A) 0.86 - 1.14 Final       ASSESSMENT / PLAN  INR assessment THER    Recheck INR In: 4 WEEKS    INR Location Clinic      Anticoagulation Summary as of 1/25/2018     INR goal 2.0-3.0   Today's INR 2.2   Maintenance plan 4 mg (2 mg x 2) every day   Full instructions 4 mg every day   Weekly total 28 mg   No change documented Kirti Lacy, RN   Plan last modified Giselle Price RN (7/7/2016)   Next INR check 2/22/2018   Priority INR   Target end date Indefinite    Indications   Long-term (current) use of anticoagulants [Z79.01] [Z79.01]  PE (pulmonary embolism) [I26.99]         Anticoagulation Episode Summary     INR check location     Preferred lab     Send INR reminders to [facility]    Comments       Anticoagulation Care Providers     Provider Role Specialty Phone number    DesireePhil landaverde MD [facility] [phone]            See the Encounter Report to view Anticoagulation Flowsheet and Dosing Calendar (Go to Encounters tab in chart review, and find the Anticoagulation Therapy Visit)    Dosage adjustment made based on physician directed care plan.    Kirti Lacy RN

## 2018-02-14 DIAGNOSIS — M31.30 WEGENER'S GRANULOMATOSIS: ICD-10-CM

## 2018-02-14 DIAGNOSIS — Z79.60 LONG-TERM USE OF IMMUNOSUPPRESSANT MEDICATION: ICD-10-CM

## 2018-02-14 DIAGNOSIS — M31.30 GRANULOMATOSIS WITH POLYANGIITIS (H): ICD-10-CM

## 2018-02-14 DIAGNOSIS — M15.0 PRIMARY OSTEOARTHRITIS INVOLVING MULTIPLE JOINTS: ICD-10-CM

## 2018-02-14 LAB
ALBUMIN SERPL-MCNC: 3.6 G/DL (ref 3.4–5)
ALBUMIN UR-MCNC: NEGATIVE MG/DL
ALT SERPL W P-5'-P-CCNC: 24 U/L (ref 0–50)
AMORPH CRY #/AREA URNS HPF: ABNORMAL /HPF
APPEARANCE UR: CLEAR
AST SERPL W P-5'-P-CCNC: 21 U/L (ref 0–45)
BILIRUB UR QL STRIP: NEGATIVE
CK SERPL-CCNC: 82 U/L (ref 30–225)
COLOR UR AUTO: YELLOW
CREAT SERPL-MCNC: 0.73 MG/DL (ref 0.52–1.04)
CRP SERPL-MCNC: <2.9 MG/L (ref 0–8)
ERYTHROCYTE [DISTWIDTH] IN BLOOD BY AUTOMATED COUNT: 13.6 % (ref 10–15)
ERYTHROCYTE [SEDIMENTATION RATE] IN BLOOD BY WESTERGREN METHOD: 4 MM/H (ref 0–30)
GFR SERPL CREATININE-BSD FRML MDRD: 78 ML/MIN/1.7M2
GLUCOSE UR STRIP-MCNC: NEGATIVE MG/DL
HCT VFR BLD AUTO: 44.2 % (ref 35–47)
HGB BLD-MCNC: 14.9 G/DL (ref 11.7–15.7)
HGB UR QL STRIP: NEGATIVE
KETONES UR STRIP-MCNC: NEGATIVE MG/DL
LEUKOCYTE ESTERASE UR QL STRIP: NEGATIVE
MCH RBC QN AUTO: 30.8 PG (ref 26.5–33)
MCHC RBC AUTO-ENTMCNC: 33.7 G/DL (ref 31.5–36.5)
MCV RBC AUTO: 92 FL (ref 78–100)
NITRATE UR QL: NEGATIVE
PH UR STRIP: 7.5 PH (ref 5–7)
PLATELET # BLD AUTO: 186 10E9/L (ref 150–450)
RBC # BLD AUTO: 4.83 10E12/L (ref 3.8–5.2)
RBC #/AREA URNS AUTO: ABNORMAL /HPF
SOURCE: ABNORMAL
SP GR UR STRIP: 1.01 (ref 1–1.03)
UROBILINOGEN UR STRIP-ACNC: 0.2 EU/DL (ref 0.2–1)
WBC # BLD AUTO: 5.3 10E9/L (ref 4–11)
WBC #/AREA URNS AUTO: ABNORMAL /HPF

## 2018-02-14 PROCEDURE — 82040 ASSAY OF SERUM ALBUMIN: CPT | Performed by: INTERNAL MEDICINE

## 2018-02-14 PROCEDURE — 84450 TRANSFERASE (AST) (SGOT): CPT | Performed by: INTERNAL MEDICINE

## 2018-02-14 PROCEDURE — 36415 COLL VENOUS BLD VENIPUNCTURE: CPT | Performed by: INTERNAL MEDICINE

## 2018-02-14 PROCEDURE — 85027 COMPLETE CBC AUTOMATED: CPT | Performed by: INTERNAL MEDICINE

## 2018-02-14 PROCEDURE — 83516 IMMUNOASSAY NONANTIBODY: CPT | Performed by: INTERNAL MEDICINE

## 2018-02-14 PROCEDURE — 86140 C-REACTIVE PROTEIN: CPT | Performed by: INTERNAL MEDICINE

## 2018-02-14 PROCEDURE — 81001 URINALYSIS AUTO W/SCOPE: CPT | Performed by: INTERNAL MEDICINE

## 2018-02-14 PROCEDURE — 85652 RBC SED RATE AUTOMATED: CPT | Performed by: INTERNAL MEDICINE

## 2018-02-14 PROCEDURE — 84460 ALANINE AMINO (ALT) (SGPT): CPT | Performed by: INTERNAL MEDICINE

## 2018-02-14 PROCEDURE — 82565 ASSAY OF CREATININE: CPT | Performed by: INTERNAL MEDICINE

## 2018-02-14 PROCEDURE — 82550 ASSAY OF CK (CPK): CPT | Performed by: INTERNAL MEDICINE

## 2018-02-15 LAB — PROTEINASE3 IGG SER-ACNC: <0.2 AI (ref 0–0.9)

## 2018-02-22 ENCOUNTER — ANTICOAGULATION THERAPY VISIT (OUTPATIENT)
Dept: NURSING | Facility: CLINIC | Age: 73
End: 2018-02-22
Payer: COMMERCIAL

## 2018-02-22 DIAGNOSIS — Z79.01 LONG-TERM (CURRENT) USE OF ANTICOAGULANTS: ICD-10-CM

## 2018-02-22 LAB — INR POINT OF CARE: 2.2 (ref 0.86–1.14)

## 2018-02-22 PROCEDURE — 99207 ZZC NO CHARGE NURSE ONLY: CPT

## 2018-02-22 PROCEDURE — 36416 COLLJ CAPILLARY BLOOD SPEC: CPT

## 2018-02-22 PROCEDURE — 85610 PROTHROMBIN TIME: CPT | Mod: QW

## 2018-02-22 NOTE — PROGRESS NOTES
ANTICOAGULATION FOLLOW-UP CLINIC VISIT    Patient Name:  Nury Cárdenas  Date:  2/22/2018  Contact Type:  Face to Face    SUBJECTIVE:     Patient Findings     Positives No Problem Findings           OBJECTIVE    INR Protime   Date Value Ref Range Status   02/22/2018 2.2 (A) 0.86 - 1.14 Final       ASSESSMENT / PLAN  INR assessment THER    Recheck INR In: 5 WEEKS    INR Location Clinic      Anticoagulation Summary as of 2/22/2018     INR goal 2.0-3.0   Today's INR 2.2   Maintenance plan 4 mg (2 mg x 2) every day   Full instructions 4 mg every day   Weekly total 28 mg   No change documented Kirti Lacy, RN   Plan last modified Giselle Price RN (7/7/2016)   Next INR check 3/29/2018   Priority INR   Target end date Indefinite    Indications   Long-term (current) use of anticoagulants [Z79.01] [Z79.01]  PE (pulmonary embolism) [I26.99]         Anticoagulation Episode Summary     INR check location     Preferred lab     Send INR reminders to [facility]    Comments       Anticoagulation Care Providers     Provider Role Specialty Phone number    LauritaPhil celis MD [facility] [phone]            See the Encounter Report to view Anticoagulation Flowsheet and Dosing Calendar (Go to Encounters tab in chart review, and find the Anticoagulation Therapy Visit)    Dosage adjustment made based on physician directed care plan.    Kirti Lacy RN

## 2018-02-22 NOTE — MR AVS SNAPSHOT
Nury Linaresert   2/22/2018 9:00 AM   Anticoagulation Therapy Visit    Description:  73 year old female   Provider:  MONI ANTI COAG   Department:  [facility]           INR as of 2/22/2018     Today's INR 2.2      Anticoagulation Summary as of 2/22/2018     INR goal 2.0-3.0   Today's INR 2.2   Full instructions 4 mg every day   Next INR check 3/29/2018    Indications   Long-term (current) use of anticoagulants [Z79.01] [Z79.01]  PE (pulmonary embolism) [I26.99]         Your next Anticoagulation Clinic appointment(s)     Mar 29, 2018  9:00 AM CDT   Anticoagulation Visit with MONI ANTI COAG   [facility] ([facility]    [address] [facility] [address] [phone]              Contact Numbers     [facility]  Please call [phone] to cancel and/or reschedule your appointment   Please call [phone] with any problems or questions regarding your therapy.        February 2018 Details    Sun Mon Tue Wed Thu Fri Sat         1               2               3                 4               5               6               7               8               9               10                 11               12               13               14               15               16               17                 18               19               20               21               22      4 mg   See details      23      4 mg         24      4 mg           25      4 mg         26      4 mg         27      4 mg         28      4 mg             Date Details   02/22 This INR check               How to take your warfarin dose     To take:  4 mg Take 2 of the 2 mg tablets.           March 2018 Details    Sun Mon Tue Wed Thu Fri Sat         1      4 mg         2      4 mg         3      4 mg           4      4 mg         5      4 mg         6      4 mg         7      4 mg         8      4 mg         9      4 mg         10      4 mg           11      4 mg         12      4 mg         13       4 mg         14      4 mg         15      4 mg         16      4 mg         17      4 mg           18      4 mg         19      4 mg         20      4 mg         21      4 mg         22      4 mg         23      4 mg         24      4 mg           25      4 mg         26      4 mg         27      4 mg         28      4 mg         29            30               31                Date Details   No additional details    Date of next INR:  3/29/2018         How to take your warfarin dose     To take:  4 mg Take 2 of the 2 mg tablets.

## 2018-02-23 ENCOUNTER — OFFICE VISIT (OUTPATIENT)
Dept: RHEUMATOLOGY | Facility: CLINIC | Age: 73
End: 2018-02-23
Payer: COMMERCIAL

## 2018-02-23 VITALS
BODY MASS INDEX: 20.33 KG/M2 | OXYGEN SATURATION: 97 % | HEIGHT: 65 IN | TEMPERATURE: 98 F | DIASTOLIC BLOOD PRESSURE: 73 MMHG | SYSTOLIC BLOOD PRESSURE: 129 MMHG | WEIGHT: 122 LBS | HEART RATE: 74 BPM

## 2018-02-23 DIAGNOSIS — M31.30 GRANULOMATOSIS WITH POLYANGIITIS (H): Primary | ICD-10-CM

## 2018-02-23 DIAGNOSIS — I26.99 OTHER PULMONARY EMBOLISM WITHOUT ACUTE COR PULMONALE, UNSPECIFIED CHRONICITY (H): ICD-10-CM

## 2018-02-23 DIAGNOSIS — Z79.60 LONG-TERM USE OF IMMUNOSUPPRESSANT MEDICATION: ICD-10-CM

## 2018-02-23 PROCEDURE — 99214 OFFICE O/P EST MOD 30 MIN: CPT | Performed by: INTERNAL MEDICINE

## 2018-02-23 NOTE — PROGRESS NOTES
Ms. 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 Course complicated by severe upper airway inflammation, Wegener's lung and destructive/erosive joint disease. Treated initially with rituximab 375 mg/m2 x4 and high dose corticsteroids, and most recently with methotrexate.  No history of relapse. Prednisone taper complete on April 1st, 2017. No recent methotrexate.    She complains of some red rash eruption over the bridge of her nose and right cheek. This has lasted for one month, but it comes and goes. She notes some obstruction of her left nares. Her exercise tolerance remains fine. No fatigue and no AM stiffness.    Her joints are good. No loss of function or new deformity. No pain, swelling, redness, or warmth. No fevers, chills, sweats or weight loss.    PMI:  Medical-invasive pulmonary aspirgillosis, GPA, osteoarthritis, DVT with pulmonary embolism, osteopenia (T = -2.2 7-2016), GERD, hyperlipidemia, +Tb exposure, nephrolithiasis  Surgical-lung biopsy, appendectomy, eye surgery, tonsillectomy, cone biopsy, right wrist synovectomy  Injuries-left wrist fracture, left 5th toe fracture    SH:  Lives at home alone. Former smoker. No EtOH. Tuberculosis exposure as a child. Ambidextrous.    FH:  Son with mitochondrial myopathy  Sibs dead with colon and anal cancer  Father dead with lung cancer  Mother dead with emphysema and osteoporosis    PMSF history personally obtained and updated by me this visit in the clinic.    ROS:  +paresthesia of the toes  +allergies to fosamax amoxicillin, augmentin, erythromycin, zithromax, nickel, codeine, adhesive tape  Remainder of the 14 point ROS obtained and found negative.    Physical Exam:  Constitutional: WD-WN-WG cooperative; +rare dry cough  Eyes: nl EOM, PERRLA, vision, conjunctiva, sclera  ENT: nl external ears, nose, hearing, lips, teeth, gums, throat  Neck: no mass or thyroid enlargement  Resp: lungs clear to  auscultation, nl to palpation, nl effort  CV: RRR, no murmurs, rubs or gallops, no edema  GI: no ABD mass or tenderness, no HSM  : not tested  Lymph: no cervical or epitrochlear nodes  MS: +Right wrist with no flexion or extension; left wrist extension to 45 degrees; 1st CMC joint squaring bilaterally with some thumb laxity; right neck rotation 30 degrees right and 45 degrees left with head forward position and minimal neck extension; All other TMJ, neck, shoulder, elbow, wrist, hand, spine, hip, knee, ankle, and foot joints were examined and otherwise found normal. Normal  strength. Kyphosis. Normal tuck and prayer.  Skin: no nail pitting, alopecia; +dry erythematous papules over the nose and left cheek  Neuro: nl cranial nerves, strength, sensation, DTRs.   Psych: nl judgement, orientation, memory, affect.    Laboratory:    Component Results   Component Value Flag Ref Range Units Status Collected Lab   FVC-Pred 2.71   L  10/16/2017  1:59    FVC-Pre 2.85   L  10/16/2017  1:59    FVC-%Pred-Pre 105   %  10/16/2017  1:59    FEV1-Pre 2.07   L  10/16/2017  1:59    FEV1-%Pred-Pre 98   %  10/16/2017  1:59    FEV1FVC-Pred 78   %  10/16/2017  1:59    FEV1FVC-Pre 72   %  10/16/2017  1:59    FEFMax-Pred 5.32   L/sec  10/16/2017  1:59    FEFMax-Pre 7.03   L/sec  10/16/2017  1:59    FEFMax-%Pred-Pre 132   %  10/16/2017  1:59    FEF2575-Pred 1.74   L/sec  10/16/2017  1:59    FEF2575-Pre 1.38   L/sec  10/16/2017  1:59    QEZ4796-%Pred-Pre 79   %  10/16/2017  1:59    ExpTime-Pre 8.13   sec  10/16/2017  1:59    FIFMax-Pre 3.66   L/sec  10/16/2017  1:59    VC-Pred 2.95   L  10/16/2017  1:59    VC-Pre 2.78   L  10/16/2017  1:59    VC-%Pred-Pre 94   %  10/16/2017  1:59    IC-Pred 2.14   L  10/16/2017  1:59    IC-Pre 1.98   L  10/16/2017  1:59    IC-%Pred-Pre 92   %  10/16/2017  1:59    ERV-Pred 0.81   L   10/16/2017  1:59    ERV-Pre 0.80   L  10/16/2017  1:59    ERV-%Pred-Pre 98   %  10/16/2017  1:59    FEV1FEV6-Pred 79   %  10/16/2017  1:59    FEV1FEV6-Pre 73   %  10/16/2017  1:59    DLCOunc-Pred 20.00   ml/min/mmHg  10/16/2017  1:59    DLCOunc-Pre 17.57   ml/min/mmHg  10/16/2017  1:59    DLCOunc-%Pred-Pre 87   %  10/16/2017  1:59    VA-Pre 4.61   L  10/16/2017  1:59    VA-%Pred-Pre 91   %  10/16/2017  1:59    FEV1SVC-Pred 71   %  10/16/2017  1:59    FEV1SVC-Pre 74   %  10/16/2017  1:59    Narrative   The FVC, FEV1, FEV1/FVC ratio and QSO25-70% are within normal limits.  The inspiratory flow rates are within normal limits.  The diffusing capacity is normal.  However, the diffusing capacity was not corrected for the patient's hemoglobin.  The results are within normal limits.  IMPRESSION:  Normal Pulmonary Function  ____________________________________________MMoraD. PERLMAN, DAVID       Component      Latest Ref Rng & Units 2/14/2018 2/22/2018   Color Urine       Yellow    Appearance Urine       Clear    Glucose Urine      NEG:Negative mg/dL Negative    Bilirubin Urine      NEG:Negative Negative    Ketones Urine      NEG:Negative mg/dL Negative    Specific Gravity Urine      1.003 - 1.035 1.010    pH Urine      5.0 - 7.0 pH 7.5 (H)    Protein Albumin Urine      NEG:Negative mg/dL Negative    Urobilinogen Urine      0.2 - 1.0 EU/dL 0.2    Nitrite Urine      NEG:Negative Negative    Blood Urine      NEG:Negative Negative    Leukocyte Esterase Urine      NEG:Negative Negative    Source       Midstream Urine    WBC Urine      OTO2:O - 2 /HPF O - 2    RBC Urine      OTO2:O - 2 /HPF O - 2    Amorphous Crystals      NEG:Negative /HPF Few (A)    WBC      4.0 - 11.0 10e9/L 5.3    RBC Count      3.8 - 5.2 10e12/L 4.83    Hemoglobin      11.7 - 15.7 g/dL 14.9    Hematocrit      35.0 - 47.0 % 44.2    MCV      78 - 100 fl 92    MCH      26.5 - 33.0 pg 30.8     MCHC      31.5 - 36.5 g/dL 33.7    RDW      10.0 - 15.0 % 13.6    Platelet Count      150 - 450 10e9/L 186    Creatinine      0.52 - 1.04 mg/dL 0.73    GFR Estimate      >60 mL/min/1.7m2 78    GFR Estimate If Black      >60 mL/min/1.7m2 >90    AST      0 - 45 U/L 21    ALT      0 - 50 U/L 24    Albumin      3.4 - 5.0 g/dL 3.6    CRP Inflammation      0.0 - 8.0 mg/L <2.9    Sed Rate      0 - 30 mm/h 4    CK Total      30 - 225 U/L 82    Proteinase 3 Antibody IgG      0.0 - 0.9 AI <0.2    INR      0.86 - 1.14  2.2 (A)       Impression:    PR3-associated GPA-now with no evidence of active vasculitis. Good resolution of lung disease with only rare cough. Good function. I think she has low risk for relapse. Based on this we will continue to monitor off of immunosuppression.    Osteoporosis-she declines treatment at this time. But she would consider a repeat DEXA later this year. Consideration could be given to Forteo, given she has failed alendronate.    Long term management of immunosuppression-with history of increased episodes of infection. Immunosuppression will now be stopped.    RTC in October.

## 2018-02-23 NOTE — NURSING NOTE
"Nury Cárdenas's goals for this visit include: Granulomatosis with polyangiitis   She requests these members of her care team be copied on today's visit information: no    PCP: Phil Abbott    Referring Provider:  No referring provider defined for this encounter.    Chief Complaint   Patient presents with     RECHECK       Initial /73 (BP Location: Left arm, Patient Position: Chair, Cuff Size: Adult Regular)  Pulse 74  Temp 98  F (36.7  C) (Oral)  Ht 1.638 m (5' 4.5\")  Wt 55.3 kg (122 lb)  SpO2 97%  BMI 20.62 kg/m2 Estimated body mass index is 20.62 kg/(m^2) as calculated from the following:    Height as of this encounter: 1.638 m (5' 4.5\").    Weight as of this encounter: 55.3 kg (122 lb).  Medication Reconciliation: complete    Do you need any medication refills at today's visit? no    "

## 2018-02-23 NOTE — MR AVS SNAPSHOT
After Visit Summary   2/23/2018    Nury Cárdenas    MRN: [id number]           Patient Information     Date Of Birth          1945        Visit Information        Provider Department      2/23/2018 8:00 AM Alvaro Maguire MD [facility]        Today's Diagnoses     Granulomatosis with polyangiitis (H)    -  1    Long-term use of immunosuppressant medication        Other pulmonary embolism without acute cor pulmonale, unspecified chronicity (H)           Follow-ups after your visit        Additional Services     ONC/HEME ADULT REFERRAL       Your provider has referred you to: [facility]: Cancer Care/Hematology (All Cancer Related Services) - [place] [phone]   [email or web address]    Please be aware that coverage of these services is subject to the terms and limitations of your health insurance plan.  Call member services at your health plan with any benefit or coverage questions.      Please bring the following with you to your appointment:    (1) Any X-Rays, CTs or MRIs which have been performed.  Contact the facility where they were done to arrange for  prior to your scheduled appointment.   (2) List of current medications  (3) This referral request   (4) Any documents/labs given to you for this referral                  Follow-up notes from your care team     Return for return in October.      Your next 10 appointments already scheduled     Mar 12, 2018  2:45 PM CDT   New Visit with Isael Richter MD   [facility] ([facility]    [address] [place] [id number] [phone]            Mar 29, 2018  9:00 AM CDT   Anticoagulation Visit with RAAD ANTI COAG   [facility] ([facility])    [address] [place] [id number]   [phone]            Oct 12, 2018 10:30 AM CDT   LAB with LAB FIRST FLOOR [facility]  ([facility])    [address] [place] [id number]   [phone]           Please do not eat 10-12 hours before your appointment if you are coming in fasting for labs on lipids, cholesterol, or glucose (sugar). This does not apply to pregnant women. Water, hot tea and black coffee (with nothing added) are okay. Do not drink other fluids, diet soda or chew gum.            Oct 12, 2018 11:00 AM CDT   Return Visit with Alvaro Maguire MD   [facility] ([facility])    [address] [place] [id number]   [phone]              Future tests that were ordered for you today     Open Standing Orders        Priority Remaining Interval Expires Ordered    CBC with platelets differential Routine 3/3 every 6 months 2/23/2019 2/23/2018    Comprehensive metabolic panel Routine 3/3 every 6 months 2/23/2019 2/23/2018    CRP inflammation Routine 3/3 every 6 months 2/23/2019 2/23/2018    Erythrocyte sedimentation rate auto Routine 3/3 every 6 months 2/23/2019 2/23/2018    Antineutrophil cytoplasmic Erika IgG Routine 3/3 every 6 months 2/23/2019 2/23/2018    UA with Microscopic Routine 3/3 every 6 months 2/23/2019 2/23/2018            Who to contact     If you have questions or need follow up information about today's clinic visit or your schedule please contact [facility] directly at [phone].  Normal or non-critical lab and imaging results will be communicated to you by MyChart, letter or phone within 4 business days after the clinic has received the results. If you do not hear from us within 7 days, please contact the clinic through MyChart or phone. If you have a critical or abnormal lab result, we will notify you by phone as soon as possible.  Submit refill requests through [employer] or call your pharmacy and they will forward the refill request to us. Please allow 3 business days for your refill to be completed.           "Additional Information About Your Visit        [employer] Information     "[employer] gives you secure access to your electronic health record. If you see a primary care provider, you can also send messages to your care team and make appointments. If you have questions, please call your primary care clinic.  If you do not have a primary care provider, please call [phone] and they will assist you.      "[employer] is an electronic gateway that provides easy, online access to your medical records. With "[employer] you can request a clinic appointment, read your test results, renew a prescription or communicate with your care team.     To access your existing account, please contact your [facility] or call [phone] for assistance.        Care EveryWhere ID     This is your Care EveryWhere ID. This could be used by other organizations to access your [facility] medical records  [id number]        Your Vitals Were     Pulse Temperature Height Pulse Oximetry BMI (Body Mass Index)       74 98  F (36.7  C) (Oral) 1.638 m (5' 4.5\") 97% 20.62 kg/m2        Blood Pressure from Last 3 Encounters:   02/23/18 129/73   12/26/17 126/80   12/20/17 126/74    Weight from Last 3 Encounters:   02/23/18 55.3 kg (122 lb)   12/26/17 55.3 kg (122 lb)   10/31/17 55.6 kg (122 lb 8 oz)              We Performed the Following     ONC/HEME ADULT REFERRAL        Primary Care Provider Office Phone # Fax #    Phil Abbott [phone] [address] [facility] [address]        Equal Access to Services     [facility]: Hadii aad ku hadasho Soomaali, waaxda luqadaha, qaybta kaalmada rocky, lavell castillo . So [facility] [phone].    ATENCIÓN: Si habla español, tiene a fofana disposición servicios gratuitos de asistencia lingüística. Llame al [phone].    We comply with applicable federal civil rights laws and [place] laws. We do not discriminate on the basis of " race, color, national origin, age, disability, sex, sexual orientation, or gender identity.            Thank you!     Thank you for choosing [facility]  for your care. Our goal is always to provide you with excellent care. Hearing back from our patients is one way we can continue to improve our services. Please take a few minutes to complete the written survey that you may receive in the mail after your visit with us. Thank you!             Your Updated Medication List - Protect others around you: Learn how to safely use, store and throw away your medicines at www.disposemymeds.org.          This list is accurate as of 2/23/18  8:44 AM.  Always use your most recent med list.                   Brand Name Dispense Instructions for use Diagnosis    acetaminophen 325 MG tablet    TYLENOL    100 tablet    Take 2 tablets (650 mg) by mouth every 6 hours as needed for mild pain    Migraines, Pulmonary emboli (H)       calcium-magnesium 500-250 MG Tabs per tablet    CALMAG     Take 1 tablet by mouth 2 times daily (with meals)        cholecalciferol 1000 UNITS Tabs      Take 1,000 Units by mouth daily        propranolol 20 MG tablet    INDERAL    90 tablet    TAKE 1 TABLET(20 MG) BY MOUTH DAILY    Migraine without status migrainosus, not intractable, unspecified migraine type       warfarin 2 MG tablet    COUMADIN    180 tablet    TAKE 2 TABLETS(4 MG) BY MOUTH EVERY DAY    Chronic anticoagulation

## 2018-03-12 ENCOUNTER — ONCOLOGY VISIT (OUTPATIENT)
Dept: ONCOLOGY | Facility: CLINIC | Age: 73
End: 2018-03-12
Payer: COMMERCIAL

## 2018-03-12 VITALS
WEIGHT: 122.19 LBS | BODY MASS INDEX: 20.36 KG/M2 | DIASTOLIC BLOOD PRESSURE: 88 MMHG | SYSTOLIC BLOOD PRESSURE: 150 MMHG | TEMPERATURE: 97.7 F | HEART RATE: 67 BPM | HEIGHT: 65 IN | OXYGEN SATURATION: 97 %

## 2018-03-12 DIAGNOSIS — Z86.711 HISTORY OF PULMONARY EMBOLISM: Primary | ICD-10-CM

## 2018-03-12 PROCEDURE — 99204 OFFICE O/P NEW MOD 45 MIN: CPT | Performed by: INTERNAL MEDICINE

## 2018-03-12 ASSESSMENT — PAIN SCALES - GENERAL: PAINLEVEL: NO PAIN (0)

## 2018-03-12 NOTE — NURSING NOTE
"Oncology Rooming Note    March 12, 2018 2:52 PM   Nury Cárdenas is a 73 year old female who presents for:    Chief Complaint   Patient presents with     Oncology Clinic Visit     PE/Rec in system     Initial Vitals: /88  Pulse 67  Temp 97.7  F (36.5  C)  Ht 1.638 m (5' 4.5\")  Wt 55.4 kg (122 lb 3 oz)  SpO2 97%  BMI 20.65 kg/m2 Estimated body mass index is 20.65 kg/(m^2) as calculated from the following:    Height as of this encounter: 1.638 m (5' 4.5\").    Weight as of this encounter: 55.4 kg (122 lb 3 oz). Body surface area is 1.59 meters squared.  No Pain (0) Comment: Data Unavailable   No LMP recorded. Patient is postmenopausal.  Allergies reviewed: Yes  Medications reviewed: Yes    Medications: Medication refills not needed today.  Pharmacy name entered into [facility]:    [facility] - [address] [facility] - [place], [place] - [address] AT [facility]        5 minutes for nursing intake (face to face time)     Babs Pereira LPN              "

## 2018-03-12 NOTE — PROGRESS NOTES
DATE OF VISIT: Mar 12, 2018    REASON FOR REFERRAL:   History of pulmonary embolism    CHIEF COMPLAINT:   Chief Complaint   Patient presents with     Oncology Clinic Visit     PE/Rec in system       HISTORY OF PRESENT ILLNESS:     73 -year-old female with past medical history significant for Wegener's granulomatosis diagnosed in 06 2014. She was subsequently treated with Rituxan as well as methotrexate and high-dose steroids initially. Follow-up CT scan in October 2014 she was noted to have bilateral small pulmonary emboli and was started on anticoagulation with Coumadin. She has been on Coumadin since then and has been tolerating it well with no complications of bleeding or excess bruising.     For the last couple of years, Min granulomatosis has been under good control and appears to be in remission as she has not required any treatment. She recently saw her rheumatologist and given excellent control of the rheumatological disorder, question the role for continuing of Coumadin. Referred to hematology for further discussion.    She is accompanied in the clinic today by her daughter. Clinically doing well with no active complaints. No Prior history of DVT/PE prior to the one in 2014. Denies chest pain, dyspnea on exertion, leg pain/leg swelling, bleeding/excess bruising, dominant pain, weight loss. She is up-to-date with her screening tests.    Family history significant for mitochondrial disorder [place]-Bianka syndrome in his son as well as who also has developed blood clot and is on anticoagulation. No other family history of venous, embolism     REVIEW OF SYSTEMS:      ROS: 14 point ROS neg other than the symptoms noted above in the HPI.    PAST MEDICAL HISTORY:   Past Medical History:   Diagnosis Date     Atypical pneumonia 6/14/2014     Atypical pneumonia      Atypical pneumonia      Coronary artery disease 1982    heart beat hard made me tired     Dyslipidemia      Fibroids      GERD (gastroesophageal reflux  disease)      Granulomatosis with polyangiitis (Wegener's)      Hearing loss      Inflammatory arthritis     thumb     Migraines      MVP (mitral valve prolapse)     had an echo that was normal in 2007 she is on Inderal     Osteopenia      PE (pulmonary embolism) 10/2014    ? GPA associated, on warfarin      Synovitis of wrist 2009    right       PAST SURGICAL HISTORY:   Past Surgical History:   Procedure Laterality Date     ABDOMEN SURGERY      appendix out     APPENDECTOMY       BIOPSY  1972    cone biopsy     CL AFF SURGICAL PATHOLOGY      cone biopsy 1975     COLONOSCOPY       COLONOSCOPY WITH CO2 INSUFFLATION N/A 12/20/2017    Procedure: COLONOSCOPY WITH CO2 INSUFFLATION;  Screening  BMI: 20.7  Pharmacy: [facility]  [phone]  [facility]  Referring: [facility]  Patient get ill from [facility];  Surgeon: Duane, William Charles, MD;  Location:  OR     EYE SURGERY      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     HC ESOPHAGOSCOPY, DIAGNOSTIC       OPTICAL TRACKING SYSTEM BRONCHOSCOPY  6/19/2014    Procedure: OPTICAL TRACKING SYSTEM BRONCHOSCOPY;  Surgeon: Angel Kathleen MD;  Location:  GI     SURGICAL HISTORY OF -   as a child    strabismus repair right eye     SURGICAL HISTORY OF -   8-2009    right wrist debridement     TONSILLECTOMY      as a child     TONSILLECTOMY & ADENOIDECTOMY       TUBAL LIGATION         ALLERGIES:   Allergies as of 03/12/2018 - Review Complete 03/12/2018   Allergen Reaction Noted     Vancomycin Other (See Comments) 08/02/2017     Adhesive tape  04/22/2010     Amoxicillin  04/22/2010     Augmentin  04/22/2010     Codeine  04/22/2010     Erythromycin  04/22/2010     Nickel  04/22/2010     Sulfa drugs Itching 06/23/2014     Tegaderm alginate ag  06/30/2014     Zithromax [azithromycin dihydrate]  04/22/2010       MEDICATIONS:   Current Outpatient Prescriptions   Medication Sig Dispense Refill     warfarin (COUMADIN) 2 MG tablet TAKE 2 TABLETS(4 MG) BY MOUTH EVERY   "tablet 0     propranolol (INDERAL) 20 MG tablet TAKE 1 TABLET(20 MG) BY MOUTH DAILY 90 tablet 1     calcium-magnesium (CALMAG) 500-250 MG TABS per tablet Take 1 tablet by mouth 2 times daily (with meals)       cholecalciferol 1000 UNITS TABS Take 1,000 Units by mouth daily       acetaminophen (TYLENOL) 325 MG tablet Take 2 tablets (650 mg) by mouth every 6 hours as needed for mild pain 100 tablet 0        FAMILY HISTORY:   Family History   Problem Relation Age of Onset     Respiratory Mother      emphysema     Aneurysm Mother      Chronic Obstructive Pulmonary Disease Mother      Thyroid Disease Mother      ,     OSTEOPOROSIS Mother      Other Cancer Father      CANCER Father      lung cancer     Arthritis Maternal Grandmother      rheumatoid     HEART DISEASE Maternal Grandmother      unsure of details     HEART DISEASE Maternal Grandfather      Neurologic Disorder Paternal Grandmother      migraines     Alzheimer Disease Paternal Grandmother      Unknown/Adopted Paternal Grandfather      CANCER Sister      stage 4 anal cancer age 62 years     Colon Cancer Sister      Cancer - colorectal Brother      Colon Cancer Brother      Substance Abuse Sister      Anesthesia Reaction Daughter        SOCIAL HISTORY:   Social History     Social History     Marital status:      Spouse name: N/A     Number of children: N/A     Years of education: N/A     Social History Main Topics     Smoking status: Former Smoker     Packs/day: 2.00     Years: 18.00     Types: Cigarettes     Start date: 1/1/1960     Quit date: 1/1/1982     Smokeless tobacco: Never Used     Alcohol use No     Drug use: No     Sexual activity: No     Other Topics Concern     Parent/Sibling W/ Cabg, Mi Or Angioplasty Before 65f 55m? No     Social History Narrative       PHYSICAL EXAMINATION:   /88  Pulse 67  Temp 97.7  F (36.5  C)  Ht 1.638 m (5' 4.5\")  Wt 55.4 kg (122 lb 3 oz)  SpO2 97%  BMI 20.65 kg/m2  Wt Readings from Last 10 Encounters: "   03/12/18 55.4 kg (122 lb 3 oz)   02/23/18 55.3 kg (122 lb)   12/26/17 55.3 kg (122 lb)   10/31/17 55.6 kg (122 lb 8 oz)   10/13/17 55.9 kg (123 lb 4.8 oz)   10/02/17 57.2 kg (126 lb)   09/15/17 55.8 kg (123 lb)   08/11/17 57.7 kg (127 lb 3.2 oz)   08/02/17 56.3 kg (124 lb 1.6 oz)   08/01/17 57.3 kg (126 lb 6.4 oz)        Exam:  Constitutional: healthy, alert and no distress  Head: Normocephalic. No masses, lesions, tenderness or abnormalities  Neck: Neck supple. No adenopathy.  ENT: ENT exam normal, no neck nodes or sinus tenderness  Cardiovascular: S1S2  Respiratory: Lungs clear  Gastrointestinal: Abdomen soft, non-tender. BS normal.   Musculoskeletal: extremities normal  Skin: no suspicious lesions or rashes  Neurologic: Gait normal. Sensation grossly WNL.  Psychiatric: mentation appears normal and affect normal/bright  Hematologic/Lymphatic/Immunologic: Normal cervical lymph nodes        LABORATORY RESULTS:    Recent Labs   Lab Test  02/14/18   0847  12/13/17   0836   08/01/17 2012 07/30/17   0606 07/29/17   0636   NA   --    --    --   139  140  140   POTASSIUM   --    --    --   3.7  3.6  3.6   CHLORIDE   --    --    --   104  104  109   BUN   --    --    --   10  7  7   CR  0.73  0.76   < >  0.69  0.70  0.63   GLC   --   98   --   107*  97  98   CLINTON   --    --    --   9.2  9.2  8.4*   MAG   --    --    --    --   2.2  2.2   PHOS   --    --    --    --   3.5  2.7    < > = values in this interval not displayed.     Recent Labs   Lab Test  02/14/18   0847  12/13/17   0836  10/04/17   0955   08/01/17 2012 07/30/17 0606 07/29/17   0636   WBC  5.3  4.8  5.7   < >  4.9  4.8  6.0   HGB  14.9  14.6  14.2   < >  13.3  14.0  13.2   PLT  186  203  199   < >  210  185  163   MCV  92  92  93   < >  93  93  94   NEUTROPHIL   --    --    --    --   61.3  67.2  73.4    < > = values in this interval not displayed.     Recent Labs   Lab Test  02/14/18   0847  12/13/17   0836  10/04/17   0955   05/14/15   0753   04/24/15   1009  04/13/15   0927   BILITOTAL   --    --    --    --   0.4  0.3  0.3   ALKPHOS   --    --    --    --   76  74  78   ALT  24  25  26   < >  43  59*  91*   AST  21  24  18   < >  28  31  50*   ALBUMIN  3.6  3.9  3.6   < >  3.7  3.7  3.7    < > = values in this interval not displayed.     TSH   Date Value Ref Range Status   10/13/2014 1.00 0.40 - 4.00 mU/L Final     Comment:     Effective 7/30/2014, the reference range for this assay has changed to reflect   new instrumentation/methodology.     ]    IMAGING RESULTS:  US RLE 10/29/14  IMPRESSION:  1. Occlusive thrombus within the right femoral vein, likely subacute.  2. Subacute nonocclusive thrombus within the distal right common  femoral vein and popliteal vein.    10/28/14    IMPRESSION  1. A few acute emboli in the central pulmonary arteries bilaterally.  2. A few patchy opacities scattered within both lungs, including a  dominant 4 x 2 cm opacity in the left lower lobe. These are new since  the comparison study dated 6/14/2014; however, there were several  other patchy areas of consolidation in different locations on the  comparison study that have nearly completely resolved. The findings  are nonspecific, but compatible with the given diagnosis of Wegener's  granulomatosis.      ASSESSMENT AND PLAN:    73 -year-old female with past medical history significant for Wegener's granulomatosis diagnosed in 06/ 2014 appears to be in good control currently who was referred to hematology clinic today for recommendations on anticoagulation given history of venous thromboembolism.    - History of venous thromboembolism likely secondary to Wegener's  She was informed that it is a reported increased risk of venous thromboembolism in patients with underlying diagnosis of Wegener's granulomatosis. Had an extensive discussion with her today about the benefits and the risk of undergoing lesion in the current setting. She was informed about the potential risk of  recurrent DVT/PE. She has tolerated anticoagulation well so far with no complications.  My recommendation would be to continue with anticoagulation indefinitely consideration to discontinue it in future if she develops any contraindications( bleeding/excess bruising).      Return to clinic as needed     The patient is ready to learn, no apparent learning barriers were identified, Diagnosis and treatment plans were explained to the patient. The patient expressed understanding of the content. The patient questions were answered to her satisfaction.    Chart documentation with Dragon Voice recognition Software. Although reviewed after completion, some words and grammatical errors may remain.    Isael Richter MD  Attending Physician   Hematology/Medical Oncology

## 2018-03-12 NOTE — Clinical Note
3/12/2018         RE: Nury Cárdenas  [address] [facility] [id number]        Dear Colleague,    Thank you for referring your patient, Nury Cárdenas, to the [facility]. Please see a copy of my visit note below.    DATE OF VISIT: Mar 12, 2018    REASON FOR REFERRAL:   History of pulmonary embolism    CHIEF COMPLAINT:   Chief Complaint   Patient presents with     Oncology Clinic Visit     PE/Rec in system       HISTORY OF PRESENT ILLNESS:     73 -year-old female with past medical history significant for Wegener's granulomatosis diagnosed in 06 2014. She was subsequently treated with Rituxan as well as methotrexate and high-dose steroids initially. Follow-up CT scan in October 2014 she was noted to have bilateral small pulmonary emboli and was started on anticoagulation with Coumadin. She has been on Coumadin since then and has been tolerating it well with no complications of bleeding or excess bruising.     For the last couple of years, Min granulomatosis has been under good control and appears to be in remission as she has not required any treatment. She recently saw her rheumatologist and given excellent control of the rheumatological disorder, question the role for continuing of Coumadin. Referred to hematology for further discussion.    She is accompanied in the clinic today by her daughter. Clinically doing well with no active complaints. No Prior history of DVT/PE prior to the one in 2014. Denies chest pain, dyspnea on exertion, leg pain/leg swelling, bleeding/excess bruising, dominant pain, weight loss. She is up-to-date with her screening tests.    Family history significant for mitochondrial disorder [place] syndrome in his son as well as who also has developed blood clot and is on anticoagulation. No other family history of venous, embolism     REVIEW OF SYSTEMS:      ROS: 14 point ROS neg other than the symptoms noted above in the HPI.    PAST MEDICAL  HISTORY:   Past Medical History:   Diagnosis Date     Atypical pneumonia 6/14/2014     Atypical pneumonia      Atypical pneumonia      Coronary artery disease 1982    heart beat hard made me tired     Dyslipidemia      Fibroids      GERD (gastroesophageal reflux disease)      Granulomatosis with polyangiitis (Wegener's)      Hearing loss      Inflammatory arthritis     thumb     Migraines      MVP (mitral valve prolapse)     had an echo that was normal in 2007 she is on Inderal     Osteopenia      PE (pulmonary embolism) 10/2014    ? GPA associated, on warfarin      Synovitis of wrist 2009    right       PAST SURGICAL HISTORY:   Past Surgical History:   Procedure Laterality Date     ABDOMEN SURGERY      appendix out     APPENDECTOMY       BIOPSY  1972    cone biopsy     CL AFF SURGICAL PATHOLOGY      cone biopsy 1975     COLONOSCOPY       COLONOSCOPY WITH CO2 INSUFFLATION N/A 12/20/2017    Procedure: COLONOSCOPY WITH CO2 INSUFFLATION;  Screening  BMI: 20.7  [facility]  [phone]  [facility]  Referring: [facility]  Patient get ill from [facility];  Surgeon: Duane, William Charles, MD;  Location:  OR     EYE SURGERY      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     HC ESOPHAGOSCOPY, DIAGNOSTIC       OPTICAL TRACKING SYSTEM BRONCHOSCOPY  6/19/2014    Procedure: OPTICAL TRACKING SYSTEM BRONCHOSCOPY;  Surgeon: Angel Kathleen MD;  Location:  GI     SURGICAL HISTORY OF -   as a child    strabismus repair right eye     SURGICAL HISTORY OF -   8-2009    right wrist debridement     TONSILLECTOMY      as a child     TONSILLECTOMY & ADENOIDECTOMY       TUBAL LIGATION         ALLERGIES:   Allergies as of 03/12/2018 - Review Complete 03/12/2018   Allergen Reaction Noted     Vancomycin Other (See Comments) 08/02/2017     Adhesive tape  04/22/2010     Amoxicillin  04/22/2010     Augmentin  04/22/2010     Codeine  04/22/2010     Erythromycin  04/22/2010     Nickel  04/22/2010     Sulfa drugs Itching  06/23/2014     Tegaderm alginate ag  06/30/2014     Zithromax [azithromycin dihydrate]  04/22/2010       MEDICATIONS:   Current Outpatient Prescriptions   Medication Sig Dispense Refill     warfarin (COUMADIN) 2 MG tablet TAKE 2 TABLETS(4 MG) BY MOUTH EVERY  tablet 0     propranolol (INDERAL) 20 MG tablet TAKE 1 TABLET(20 MG) BY MOUTH DAILY 90 tablet 1     calcium-magnesium (CALMAG) 500-250 MG TABS per tablet Take 1 tablet by mouth 2 times daily (with meals)       cholecalciferol 1000 UNITS TABS Take 1,000 Units by mouth daily       acetaminophen (TYLENOL) 325 MG tablet Take 2 tablets (650 mg) by mouth every 6 hours as needed for mild pain 100 tablet 0        FAMILY HISTORY:   Family History   Problem Relation Age of Onset     Respiratory Mother      emphysema     Aneurysm Mother      Chronic Obstructive Pulmonary Disease Mother      Thyroid Disease Mother      ,     OSTEOPOROSIS Mother      Other Cancer Father      CANCER Father      lung cancer     Arthritis Maternal Grandmother      rheumatoid     HEART DISEASE Maternal Grandmother      unsure of details     HEART DISEASE Maternal Grandfather      Neurologic Disorder Paternal Grandmother      migraines     Alzheimer Disease Paternal Grandmother      Unknown/Adopted Paternal Grandfather      CANCER Sister      stage 4 anal cancer age 62 years     Colon Cancer Sister      Cancer - colorectal Brother      Colon Cancer Brother      Substance Abuse Sister      Anesthesia Reaction Daughter        SOCIAL HISTORY:   Social History     Social History     Marital status:      Spouse name: N/A     Number of children: N/A     Years of education: N/A     Social History Main Topics     Smoking status: Former Smoker     Packs/day: 2.00     Years: 18.00     Types: Cigarettes     Start date: 1/1/1960     Quit date: 1/1/1982     Smokeless tobacco: Never Used     Alcohol use No     Drug use: No     Sexual activity: No     Other Topics Concern     Parent/Sibling W/  "Cabg, Mi Or Angioplasty Before 65f 55m? No     Social History Narrative       PHYSICAL EXAMINATION:   /88  Pulse 67  Temp 97.7  F (36.5  C)  Ht 1.638 m (5' 4.5\")  Wt 55.4 kg (122 lb 3 oz)  SpO2 97%  BMI 20.65 kg/m2  Wt Readings from Last 10 Encounters:   03/12/18 55.4 kg (122 lb 3 oz)   02/23/18 55.3 kg (122 lb)   12/26/17 55.3 kg (122 lb)   10/31/17 55.6 kg (122 lb 8 oz)   10/13/17 55.9 kg (123 lb 4.8 oz)   10/02/17 57.2 kg (126 lb)   09/15/17 55.8 kg (123 lb)   08/11/17 57.7 kg (127 lb 3.2 oz)   08/02/17 56.3 kg (124 lb 1.6 oz)   08/01/17 57.3 kg (126 lb 6.4 oz)        Exam:  Constitutional: healthy, alert and no distress  Head: Normocephalic. No masses, lesions, tenderness or abnormalities  Neck: Neck supple. No adenopathy.  ENT: ENT exam normal, no neck nodes or sinus tenderness  Cardiovascular: S1S2  Respiratory: Lungs clear  Gastrointestinal: Abdomen soft, non-tender. BS normal.   Musculoskeletal: extremities normal  Skin: no suspicious lesions or rashes  Neurologic: Gait normal. Sensation grossly WNL.  Psychiatric: mentation appears normal and affect normal/bright  Hematologic/Lymphatic/Immunologic: Normal cervical lymph nodes        LABORATORY RESULTS:    Recent Labs   Lab Test  02/14/18   0847  12/13/17   0836   08/01/17 2012 07/30/17   0606  07/29/17   0636   NA   --    --    --   139  140  140   POTASSIUM   --    --    --   3.7  3.6  3.6   CHLORIDE   --    --    --   104  104  109   BUN   --    --    --   10  7  7   CR  0.73  0.76   < >  0.69  0.70  0.63   GLC   --   98   --   107*  97  98   CLINTON   --    --    --   9.2  9.2  8.4*   MAG   --    --    --    --   2.2  2.2   PHOS   --    --    --    --   3.5  2.7    < > = values in this interval not displayed.     Recent Labs   Lab Test  02/14/18   0847  12/13/17   0836  10/04/17   0955   08/01/17 2012 07/30/17   0606  07/29/17   0636   WBC  5.3  4.8  5.7   < >  4.9  4.8  6.0   HGB  14.9  14.6  14.2   < >  13.3  14.0  13.2   PLT  186  203  " 199   < >  210  185  163   MCV  92  92  93   < >  93  93  94   NEUTROPHIL   --    --    --    --   61.3  67.2  73.4    < > = values in this interval not displayed.     Recent Labs   Lab Test  02/14/18   0847  12/13/17   0836  10/04/17   0955   05/14/15   0753  04/24/15   1009  04/13/15   0927   BILITOTAL   --    --    --    --   0.4  0.3  0.3   ALKPHOS   --    --    --    --   76  74  78   ALT  24  25  26   < >  43  59*  91*   AST  21  24  18   < >  28  31  50*   ALBUMIN  3.6  3.9  3.6   < >  3.7  3.7  3.7    < > = values in this interval not displayed.     TSH   Date Value Ref Range Status   10/13/2014 1.00 0.40 - 4.00 mU/L Final     Comment:     Effective 7/30/2014, the reference range for this assay has changed to reflect   new instrumentation/methodology.     ]    IMAGING RESULTS:  US RLE 10/29/14  IMPRESSION:  1. Occlusive thrombus within the right femoral vein, likely subacute.  2. Subacute nonocclusive thrombus within the distal right common  femoral vein and popliteal vein.    10/28/14    IMPRESSION  1. A few acute emboli in the central pulmonary arteries bilaterally.  2. A few patchy opacities scattered within both lungs, including a  dominant 4 x 2 cm opacity in the left lower lobe. These are new since  the comparison study dated 6/14/2014; however, there were several  other patchy areas of consolidation in different locations on the  comparison study that have nearly completely resolved. The findings  are nonspecific, but compatible with the given diagnosis of Wegener's  granulomatosis.      ASSESSMENT AND PLAN:    73 -year-old female with past medical history significant for Wegener's granulomatosis diagnosed in 06/ 2014 appears to be in good control currently who was referred to hematology clinic today for recommendations on anticoagulation given history of venous thromboembolism.    - History of venous thromboembolism likely secondary to Wegener's  She was informed that it is a reported increased risk  of venous thromboembolism in patients with underlying diagnosis of Wegener's granulomatosis. Had an extensive discussion with her today about the benefits and the risk of undergoing lesion in the current setting. She was informed about the potential risk of recurrent DVT/PE. She has tolerated anticoagulation well so far with no complications.  My recommendation would be to continue with anticoagulation indefinitely consideration to discontinue it in future if she develops any contraindications( bleeding/excess bruising).      Return to clinic as needed     The patient is ready to learn, no apparent learning barriers were identified, Diagnosis and treatment plans were explained to the patient. The patient expressed understanding of the content. The patient questions were answered to her satisfaction.    Chart documentation with Dragon Voice recognition Software. Although reviewed after completion, some words and grammatical errors may remain.    Isael Richter MD  Attending Physician   Hematology/Medical Oncology        Again, thank you for allowing me to participate in the care of your patient.        Sincerely,        Isael Richter MD

## 2018-03-12 NOTE — MR AVS SNAPSHOT
After Visit Summary   3/12/2018    Nury Cárdenas    MRN: [id number]           Patient Information     Date Of Birth          1945        Visit Information        Provider Department      3/12/2018 2:45 PM Isael Richter MD [facility]        Today's Diagnoses     History of pulmonary embolism    -  1       Follow-ups after your visit        Follow-up notes from your care team     Return if symptoms worsen or fail to improve.      Your next 10 appointments already scheduled     Mar 29, 2018  9:00 AM CDT   Antico[facility] Visit with RAAD ANTI COAG   [facility]    [address] [place] [address]   [phone]            Oct 12, 2018 10:30 AM CDT   LAB with LAB FIRST FLOOR [facility] ([facility])    [address] [place] [address] [phone]           Please do not eat 10-12 hours before your appointment if you are coming in fasting for labs on lipids, cholesterol, or glucose (sugar). This does not apply to pregnant women. Water, hot tea and black coffee (with nothing added) are okay. Do not drink other fluids, diet soda or chew gum.            Oct 12, 2018 11:00 AM CDT   Return Visit with Alvaro Maguire MD   [facility]    [address] [place] [address] [phone]              Who to contact     If you have questions or need follow up information about today's clinic visit or your schedule please contact [facility] directly at [phone].  Normal or non-critical lab and imaging results will be communicated to you by MyChart, letter or phone within 4 business days after the clinic has received the results. If you do not hear from us within 7 days, please contact the clinic through MyChart or phone. If you have a critical or abnormal lab result, we will notify you by phone as soon  "as possible.  Submit refill requests through [employer] or call your pharmacy and they will forward the refill request to us. Please allow 3 business days for your refill to be completed.          Additional Information About Your Visit        [employer] Information     [employer] gives you secure access to your electronic health record. If you see a primary care provider, you can also send messages to your care team and make appointments. If you have questions, please call your primary care clinic.  If you do not have a primary care provider, please call [phone] and they will assist you.      [employer] is an electronic gateway that provides easy, online access to your medical records. With [employer], you can request a clinic appointment, read your test results, renew a prescription or communicate with your care team.     To access your existing account, please contact your [facility] or call [phone] for assistance.        Care EveryWhere ID     This is your Care EveryWhere ID. This could be used by other organizations to access your [facility] medical records  [id number]        Your Vitals Were     Pulse Temperature Height Pulse Oximetry BMI (Body Mass Index)       67 97.7  F (36.5  C) 1.638 m (5' 4.5\") 97% 20.65 kg/m2        Blood Pressure from Last 3 Encounters:   No data found for BP    Weight from Last 3 Encounters:   No data found for Wt              Today, you had the following     No orders found for display       Primary Care Provider Office Phone # Fax #    Phil Abbott [phone]       [address] [facility] [address]        Equal Access to Services     [facility] AH: Hadii aad ku hadasho Soomaali, waaxda luqadaha, qaybta kaalmada adeegyada, lavell carr'nat . So [facility] [phone].    ATENCIÓN: Si habla español, tiene a fofana disposición servicios gratuitos de asistencia lingüística. Llame al [phone].    We comply " with applicable federal civil rights laws and [place] laws. We do not discriminate on the basis of race, color, national origin, age, disability, sex, sexual orientation, or gender identity.            Thank you!     Thank you for choosing [facility]  for your care. Our goal is always to provide you with excellent care. Hearing back from our patients is one way we can continue to improve our services. Please take a few minutes to complete the written survey that you may receive in the mail after your visit with us. Thank you!             Your Updated Medication List - Protect others around you: Learn how to safely use, store and throw away your medicines at www.disposemymeds.org.          This list is accurate as of 3/12/18 11:59 PM.  Always use your most recent med list.                   Brand Name Dispense Instructions for use Diagnosis    acetaminophen 325 MG tablet    TYLENOL    100 tablet    Take 2 tablets (650 mg) by mouth every 6 hours as needed for mild pain    Migraines, Pulmonary emboli (H)       calcium-magnesium 500-250 MG Tabs per tablet    CALMAG     Take 1 tablet by mouth 2 times daily (with meals)        cholecalciferol 1000 UNITS Tabs      Take 1,000 Units by mouth daily        propranolol 20 MG tablet    INDERAL    90 tablet    TAKE 1 TABLET(20 MG) BY MOUTH DAILY    Migraine without status migrainosus, not intractable, unspecified migraine type       warfarin 2 MG tablet    COUMADIN    180 tablet    TAKE 2 TABLETS(4 MG) BY MOUTH EVERY DAY    Chronic anticoagulation

## 2018-03-16 ENCOUNTER — TRANSFERRED RECORDS (OUTPATIENT)
Dept: HEALTH INFORMATION MANAGEMENT | Facility: CLINIC | Age: 73
End: 2018-03-16

## 2018-03-29 ENCOUNTER — ANTICOAGULATION THERAPY VISIT (OUTPATIENT)
Dept: NURSING | Facility: CLINIC | Age: 73
End: 2018-03-29
Payer: COMMERCIAL

## 2018-03-29 DIAGNOSIS — Z79.01 LONG-TERM (CURRENT) USE OF ANTICOAGULANTS: ICD-10-CM

## 2018-03-29 DIAGNOSIS — I26.99 OTHER PULMONARY EMBOLISM WITHOUT ACUTE COR PULMONALE, UNSPECIFIED CHRONICITY (H): ICD-10-CM

## 2018-03-29 LAB — INR POINT OF CARE: 2 (ref 0.86–1.14)

## 2018-03-29 PROCEDURE — 36416 COLLJ CAPILLARY BLOOD SPEC: CPT

## 2018-03-29 PROCEDURE — 99207 ZZC NO CHARGE NURSE ONLY: CPT

## 2018-03-29 PROCEDURE — 85610 PROTHROMBIN TIME: CPT | Mod: QW

## 2018-03-29 NOTE — PROGRESS NOTES
ANTICOAGULATION FOLLOW-UP CLINIC VISIT    Patient Name:  Nury Cárdenas  Date:  3/29/2018  Contact Type:  Face to Face    SUBJECTIVE:     Patient Findings     Positives No Problem Findings           OBJECTIVE    INR Protime   Date Value Ref Range Status   03/29/2018 2.0 (A) 0.86 - 1.14 Final       ASSESSMENT / PLAN  INR assessment THER    Recheck INR In: 6 WEEKS    INR Location Clinic      Anticoagulation Summary as of 3/29/2018     INR goal 2.0-3.0   Today's INR 2.0   Maintenance plan 4 mg (2 mg x 2) every day   Full instructions 4 mg every day   Weekly total 28 mg   No change documented Kirti Lacy, RN   Plan last modified Giselle Price RN (7/7/2016)   Next INR check 5/9/2018   Priority INR   Target end date Indefinite    Indications   Long-term (current) use of anticoagulants [Z79.01] [Z79.01]  Pulmonary embolism (H) [I26.99]         Anticoagulation Episode Summary     INR check location     Preferred lab     Send INR reminders to [facility] CL[facility]    Comments       Anticoagulation Care Providers     Provider Role Specialty Phone number    LauritaPhil celis MD [facility] [phone]            See the Encounter Report to view Anticoagulation Flowsheet and Dosing Calendar (Go to Encounters tab in chart review, and find the Anticoagulation Therapy Visit)    Dosage adjustment made based on physician directed care plan.    Kirti Lacy RN

## 2018-03-29 NOTE — MR AVS SNAPSHOT
Nury Cárdenas   3/29/2018 9:00 AM   Anticoagulation Therapy Visit    Description:  73 year old female   Provider:  MONI ANTI COAG   Department:  [facility]           INR as of 3/29/2018     Today's INR 2.0      Anticoagulation Summary as of 3/29/2018     INR goal 2.0-3.0   Today's INR 2.0   Full instructions 4 mg every day   Next INR check 5/9/2018    Indications   Long-term (current) use of anticoagulants [Z79.01] [Z79.01]  Pulmonary embolism (H) [I26.99]         Your next Anticoagulation Clinic appointment(s)     Mar 29, 2018  9:00 AM CDT   Anticoagulation Visit with MONI ANTI COAG   [facility] ([address] [address] [phone]            May 09, 2018  9:30 AM CDT   Anticoagulation Visit with MONI ANTI COAG   [facility] ([place] [id number] [facility] [address] [phone]              Contact Numbers     [facility]  Please call [phone] to cancel and/or reschedule your appointment   Please call [phone] with any problems or questions regarding your therapy.        March 2018 Details    Sun Mon Tue Wed Thu Fri Sat         1               2               3                 4               5               6               7               8               9               10                 11               12               13               14               15               16               17                 18               19               20               21               22               23               24                 25               26               27               28               29      4 mg   See details      30      4 mg         31      4 mg          Date Details   03/29 This INR check               How to take your warfarin dose     To take:  4 mg Take 2 of the 2 mg tablets.           April 2018 Details    Sun Mon Tue Wed u Fri Sat     1      4 mg         2      4 mg          3      4 mg         4      4 mg         5      4 mg         6      4 mg         7      4 mg           8      4 mg         9      4 mg         10      4 mg         11      4 mg         12      4 mg         13      4 mg         14      4 mg           15      4 mg         16      4 mg         17      4 mg         18      4 mg         19      4 mg         20      4 mg         21      4 mg           22      4 mg         23      4 mg         24      4 mg         25      4 mg         26      4 mg         27      4 mg         28      4 mg           29      4 mg         30      4 mg               Date Details   No additional details            How to take your warfarin dose     To take:  4 mg Take 2 of the 2 mg tablets.           May 2018 Details    Sun Mon Tue Wed Thu Fri Sat       1      4 mg         2      4 mg         3      4 mg         4      4 mg         5      4 mg           6      4 mg         7      4 mg         8      4 mg         9            10               11               12                 13               14               15               16               17               18               19                 20               21               22               23               24               25               26                 27               28               29               30               31                  Date Details   No additional details    Date of next INR:  5/9/2018         How to take your warfarin dose     To take:  4 mg Take 2 of the 2 mg tablets.

## 2018-04-11 DIAGNOSIS — Z79.01 CHRONIC ANTICOAGULATION: ICD-10-CM

## 2018-04-11 RX ORDER — WARFARIN SODIUM 2 MG/1
TABLET ORAL
Qty: 180 TABLET | Refills: 0 | Status: SHIPPED | OUTPATIENT
Start: 2018-04-11 | End: 2018-07-02

## 2018-05-09 ENCOUNTER — ANTICOAGULATION THERAPY VISIT (OUTPATIENT)
Dept: NURSING | Facility: CLINIC | Age: 73
End: 2018-05-09
Payer: COMMERCIAL

## 2018-05-09 DIAGNOSIS — I26.99 OTHER PULMONARY EMBOLISM WITHOUT ACUTE COR PULMONALE, UNSPECIFIED CHRONICITY (H): ICD-10-CM

## 2018-05-09 DIAGNOSIS — Z79.01 LONG-TERM (CURRENT) USE OF ANTICOAGULANTS: ICD-10-CM

## 2018-05-09 LAB — INR POINT OF CARE: 2 (ref 0.86–1.14)

## 2018-05-09 PROCEDURE — 85610 PROTHROMBIN TIME: CPT | Mod: QW

## 2018-05-09 PROCEDURE — 36416 COLLJ CAPILLARY BLOOD SPEC: CPT

## 2018-05-09 PROCEDURE — 99207 ZZC NO CHARGE NURSE ONLY: CPT

## 2018-05-09 NOTE — MR AVS SNAPSHOT
Nury Cárdenas   5/9/2018 9:30 AM   Anticoagulation Therapy Visit    Description:  73 year old female   Provider:  MONI ANTI COAG   Department:  [facility]           INR as of 5/9/2018     Today's INR 2.0      Anticoagulation Summary as of 5/9/2018     INR goal 2.0-3.0   Today's INR 2.0   Full instructions 4 mg every day   Next INR check 6/20/2018    Indications   Long-term (current) use of anticoagulants [Z79.01] [Z79.01]  Pulmonary embolism (H) [I26.99]         Your next Anticoagulation Clinic appointment(s)     Jun 20, 2018  9:30 AM CDT   Anticoagulation Visit with MONI ANTI COAG   [facility] ([facility]    [address] [facility] [address] [phone]              Contact Numbers     [facility]  Please call [phone] to cancel and/or reschedule your appointment   Please call [phone] with any problems or questions regarding your therapy.        May 2018 Details    Sun Mon Tue Wed Thu Fri Sat       1               2               3               4               5                 6               7               8               9      4 mg   See details      10      4 mg         11      4 mg         12      4 mg           13      4 mg         14      4 mg         15      4 mg         16      4 mg         17      4 mg         18      4 mg         19      4 mg           20      4 mg         21      4 mg         22      4 mg         23      4 mg         24      4 mg         25      4 mg         26      4 mg           27      4 mg         28      4 mg         29      4 mg         30      4 mg         31      4 mg            Date Details   05/09 This INR check               How to take your warfarin dose     To take:  4 mg Take 2 of the 2 mg tablets.           June 2018 Details    Sun Mon Tue Wed Thu Fri Sat          1      4 mg         2      4 mg           3      4 mg         4      4 mg         5      4 mg         6      4 mg         7      4 mg         8      4  mg         9      4 mg           10      4 mg         11      4 mg         12      4 mg         13      4 mg         14      4 mg         15      4 mg         16      4 mg           17      4 mg         18      4 mg         19      4 mg         20            21               22               23                 24               25               26               27               28               29               30                Date Details   No additional details    Date of next INR:  6/20/2018         How to take your warfarin dose     To take:  4 mg Take 2 of the 2 mg tablets.

## 2018-05-09 NOTE — PROGRESS NOTES
ANTICOAGULATION FOLLOW-UP CLINIC VISIT    Patient Name:  Nury Cárdenas  Date:  5/9/2018  Contact Type:  Face to Face    SUBJECTIVE:     Patient Findings     Positives No Problem Findings           OBJECTIVE    INR Protime   Date Value Ref Range Status   05/09/2018 2.0 (A) 0.86 - 1.14 Final       ASSESSMENT / PLAN  INR assessment THER    Recheck INR In: 6 WEEKS    INR Location Clinic      Anticoagulation Summary as of 5/9/2018     INR goal 2.0-3.0   Today's INR 2.0   Maintenance plan 4 mg (2 mg x 2) every day   Full instructions 4 mg every day   Weekly total 28 mg   No change documented Katharine Rizzo RN   Plan last modified Giselle Price RN (7/7/2016)   Next INR check 6/20/2018   Priority INR   Target end date Indefinite    Indications   Long-term (current) use of anticoagulants [Z79.01] [Z79.01]  Pulmonary embolism (H) [I26.99]         Anticoagulation Episode Summary     INR check location     Preferred lab     Send INR reminders to [facility]    Comments       Anticoagulation Care Providers     Provider Role Specialty Phone number    DesireePhil landaverde MD [facility] [phone]            See the Encounter Report to view Anticoagulation Flowsheet and Dosing Calendar (Go to Encounters tab in chart review, and find the Anticoagulation Therapy Visit)    Dosage adjustment made based on physician directed care plan.    Katharine Rizzo RN

## 2018-05-29 ENCOUNTER — TRANSFERRED RECORDS (OUTPATIENT)
Dept: HEALTH INFORMATION MANAGEMENT | Facility: CLINIC | Age: 73
End: 2018-05-29

## 2018-06-12 ENCOUNTER — MYC MEDICAL ADVICE (OUTPATIENT)
Dept: RHEUMATOLOGY | Facility: CLINIC | Age: 73
End: 2018-06-12

## 2018-06-12 DIAGNOSIS — A60.09 HERPES GENITALIS IN WOMEN: Primary | ICD-10-CM

## 2018-06-13 NOTE — TELEPHONE ENCOUNTER
Encounter routed to Dr. Granger to address medication refill request for valACYclovir (VALTREX) 500 MG tablet.    Lyly SALGUERO RN, BSN  Pulmonary Care Coordinator

## 2018-06-14 RX ORDER — VALACYCLOVIR HYDROCHLORIDE 500 MG/1
500 TABLET, FILM COATED ORAL 2 TIMES DAILY
Qty: 6 TABLET | Refills: 1 | Status: SHIPPED | OUTPATIENT
Start: 2018-06-14 | End: 2018-10-12

## 2018-06-20 ENCOUNTER — ANTICOAGULATION THERAPY VISIT (OUTPATIENT)
Dept: NURSING | Facility: CLINIC | Age: 73
End: 2018-06-20
Payer: COMMERCIAL

## 2018-06-20 DIAGNOSIS — I26.99 OTHER PULMONARY EMBOLISM WITHOUT ACUTE COR PULMONALE, UNSPECIFIED CHRONICITY (H): ICD-10-CM

## 2018-06-20 DIAGNOSIS — Z79.01 LONG-TERM (CURRENT) USE OF ANTICOAGULANTS: ICD-10-CM

## 2018-06-20 LAB — INR POINT OF CARE: 2.1 (ref 0.86–1.14)

## 2018-06-20 PROCEDURE — 99207 ZZC NO CHARGE NURSE ONLY: CPT

## 2018-06-20 PROCEDURE — 36416 COLLJ CAPILLARY BLOOD SPEC: CPT

## 2018-06-20 PROCEDURE — 85610 PROTHROMBIN TIME: CPT | Mod: QW

## 2018-06-20 NOTE — PROGRESS NOTES
ANTICOAGULATION FOLLOW-UP CLINIC VISIT    Patient Name:  Nury Cárdenas  Date:  6/20/2018  Contact Type:  Face to Face    SUBJECTIVE:     Patient Findings     Positives No Problem Findings           OBJECTIVE    INR Protime   Date Value Ref Range Status   06/20/2018 2.1 (A) 0.86 - 1.14 Final       ASSESSMENT / PLAN  INR assessment THER    Recheck INR In: 6 WEEKS    INR Location Clinic      Anticoagulation Summary as of 6/20/2018     INR goal 2.0-3.0   Today's INR 2.1   Warfarin maintenance plan 4 mg (2 mg x 2) every day   Full warfarin instructions 4 mg every day   Weekly warfarin total 28 mg   No change documented Kirti Lacy RN   Plan last modified Giselle Price RN (7/7/2016)   Next INR check 8/1/2018   Priority INR   Target end date Indefinite    Indications   Long-term (current) use of anticoagulants [Z79.01] [Z79.01]  Pulmonary embolism (H) [I26.99]         Anticoagulation Episode Summary     INR check location     Preferred lab     Send INR reminders to [facility]    Comments       Anticoagulation Care Providers     Provider Role Specialty Phone number    Phil Abbott MD [facility] [phone]            See the Encounter Report to view Anticoagulation Flowsheet and Dosing Calendar (Go to Encounters tab in chart review, and find the Anticoagulation Therapy Visit)    Dosage adjustment made based on physician directed care plan.    Kirti Lacy RN

## 2018-06-20 NOTE — MR AVS SNAPSHOT
Nury Cárdenas   6/20/2018 9:30 AM   Anticoagulation Therapy Visit    Description:  73 year old female   Provider:  MONI ANTI COAG   Department:  [facility]           INR as of 6/20/2018     Today's INR 2.1      Anticoagulation Summary as of 6/20/2018     INR goal 2.0-3.0   Today's INR 2.1   Full warfarin instructions 4 mg every day   Next INR check 8/1/2018    Indications   Long-term (current) use of anticoagulants [Z79.01] [Z79.01]  Pulmonary embolism (H) [I26.99]         Your next Anticoagulation Clinic appointment(s)     Jun 20, 2018  9:30 AM CDT   Anticoagulation Visit with MONI ANTI COAG   [facility] ([address] [address] [phone]            Aug 01, 2018  9:30 AM CDT   Anticoagulation Visit with MONI ANTI COAG   [facility] ([address] [address] [phone]              Contact Numbers     [facility] [address] [facility]  Please call [phone] to cancel and/or reschedule your appointment   Please call [phone] with any problems or questions regarding your therapy.        June 2018 Details    Sun Mon Tue Wed Thu Fri Sat          1               2                 3               4               5               6               7               8               9                 10               11               12               13               14               15               16                 17               18               19               20      4 mg   See details      21      4 mg         22      4 mg         23      4 mg           24      4 mg         25      4 mg         26      4 mg         27      4 mg         28      4 mg         29      4 mg         30      4 mg          Date Details   06/20 This INR check               How to take your warfarin dose     To take:  4 mg Take 2 of the 2 mg tablets.           July 2018 Details    Sun Mon Tue Wed Thu Fri Sat     1      4 mg          2      4 mg         3      4 mg         4      4 mg         5      4 mg         6      4 mg         7      4 mg           8      4 mg         9      4 mg         10      4 mg         11      4 mg         12      4 mg         13      4 mg         14      4 mg           15      4 mg         16      4 mg         17      4 mg         18      4 mg         19      4 mg         20      4 mg         21      4 mg           22      4 mg         23      4 mg         24      4 mg         25      4 mg         26      4 mg         27      4 mg         28      4 mg           29      4 mg         30      4 mg         31      4 mg              Date Details   No additional details            How to take your warfarin dose     To take:  4 mg Take 2 of the 2 mg tablets.           August 2018 Details    Sun Mon Tue Wed Thu Fri Sat        1            2               3               4                 5               6               7               8               9               10               11                 12               13               14               15               16               17               18                 19               20               21               22               23               24               25                 26               27               28               29               30               31                 Date Details   No additional details    Date of next INR:  8/1/2018         How to take your warfarin dose     To take:  4 mg Take 2 of the 2 mg tablets.

## 2018-06-22 DIAGNOSIS — G43.909 MIGRAINE WITHOUT STATUS MIGRAINOSUS, NOT INTRACTABLE, UNSPECIFIED MIGRAINE TYPE: ICD-10-CM

## 2018-06-22 RX ORDER — PROPRANOLOL HYDROCHLORIDE 20 MG/1
TABLET ORAL
Qty: 90 TABLET | Refills: 0 | Status: SHIPPED | OUTPATIENT
Start: 2018-06-22 | End: 2018-10-29

## 2018-06-22 NOTE — TELEPHONE ENCOUNTER
Pending Prescriptions:                       Disp   Refills    propranolol (INDERAL) 20 MG tablet        90 tab*1            Sig: TAKE 1 TABLET(20 MG) BY MOUTH DAILY    Britany refill given. Patient must follow up with provider before any further refills will be sent.     Nita Atkins RN

## 2018-06-22 NOTE — TELEPHONE ENCOUNTER
Requested Prescriptions   Pending Prescriptions Disp Refills     propranolol (INDERAL) 20 MG tablet 90 tablet 1     Sig: TAKE 1 TABLET(20 MG) BY MOUTH DAILY    There is no refill protocol information for this order        Last Written Prescription Date:  1/19/2018  Last Fill Quantity: 90,  # refills: 1   Last office visit: 12/26/2017 with prescribing provider:  Milena   Future Office Visit:

## 2018-07-02 ENCOUNTER — OFFICE VISIT (OUTPATIENT)
Dept: FAMILY MEDICINE | Facility: CLINIC | Age: 73
End: 2018-07-02
Payer: COMMERCIAL

## 2018-07-02 VITALS
HEIGHT: 65 IN | DIASTOLIC BLOOD PRESSURE: 82 MMHG | WEIGHT: 120.8 LBS | TEMPERATURE: 97.9 F | RESPIRATION RATE: 13 BRPM | HEART RATE: 65 BPM | OXYGEN SATURATION: 98 % | SYSTOLIC BLOOD PRESSURE: 140 MMHG | BODY MASS INDEX: 20.12 KG/M2

## 2018-07-02 DIAGNOSIS — I10 HTN, GOAL BELOW 140/90: ICD-10-CM

## 2018-07-02 DIAGNOSIS — R20.2 PARESTHESIA: Primary | ICD-10-CM

## 2018-07-02 DIAGNOSIS — I26.99 OTHER PULMONARY EMBOLISM WITHOUT ACUTE COR PULMONALE, UNSPECIFIED CHRONICITY (H): ICD-10-CM

## 2018-07-02 PROCEDURE — 99213 OFFICE O/P EST LOW 20 MIN: CPT | Performed by: FAMILY MEDICINE

## 2018-07-02 RX ORDER — AMLODIPINE BESYLATE 2.5 MG/1
2.5 TABLET ORAL DAILY
Qty: 30 TABLET | Refills: 1 | Status: SHIPPED | OUTPATIENT
Start: 2018-07-02 | End: 2018-07-16

## 2018-07-02 NOTE — NURSING NOTE
"Chief Complaint   Patient presents with     Pain     Deep Sharp Ache, on the right side of the forehead      Initial /68 (BP Location: Left arm, Patient Position: Chair, Cuff Size: Adult Regular)  Pulse 65  Temp 97.9  F (36.6  C) (Oral)  Resp 13  Ht 5' 4.5\" (1.638 m)  Wt 120 lb 12.8 oz (54.8 kg)  SpO2 98%  Breastfeeding? No  BMI 20.42 kg/m2 Estimated body mass index is 20.42 kg/(m^2) as calculated from the following:    Height as of this encounter: 5' 4.5\" (1.638 m).    Weight as of this encounter: 120 lb 12.8 oz (54.8 kg).  BP completed using cuff size: sam Coronado  "

## 2018-07-02 NOTE — MR AVS SNAPSHOT
After Visit Summary   7/2/2018    Nury Cárdenas    MRN: [id number]           Patient Information     Date Of Birth          1945        Visit Information        Provider Department      7/2/2018 10:00 AM Phil Abbott MD [facility]        Today's Diagnoses     Paresthesia: forehead Rt    -  1    HTN, goal below 140/90        Other pulmonary embolism without acute cor pulmonale, unspecified chronicity (H)          Care Instructions    [facility]    If you have any questions regarding to your visit please contact your care team:       Team Purple:   Clinic Hours Telephone Number   Dr. Shanti Prabhakar   7am-7pm  Monday - Thursday   7am-5pm  Fridays  [phone]  (Appointment scheduling available 24/7)    Questions about your recent visit?   Team Line:  [phone]   Urgent Care - [place] and [facility] - 11am-9pm Monday-Friday Saturday-Sunday- 9am-5pm   [place] - 5pm-9pm Monday-Friday Saturday-Sunday- 9am-5pm  [phone] - [place]  [phone] - [place]       What options do I have for a visit other than an office visit? We offer electronic visits (e-visits) and telephone visits, when medically appropriate.  Please check with your medical insurance to see if these types of visits are covered, as you will be responsible for any charges that are not paid by your insurance.      You can use [address] (secure electronic communication) to access to your chart, send your primary care provider a message, or make an appointment. Ask a team member how to get started.     For a price quote for your services, please call our Consumer Price Line at [phone] or our Imaging Cost estimation line at [phone] (for imaging tests).    Eze Coronado            Follow-ups after your visit        Follow-up notes from your care team     Return in about 2 weeks (around 7/16/2018).      Your next 10  appointments already scheduled     Aug 01, 2018  9:30 AM CDT   Anticoagulation Visit with FZ ANTI COAG   [facility] ([facility])    [address]  [place] [address] [phone]            Oct 12, 2018 10:30 AM CDT   LAB with LAB FIRST FLOOR [facility] ([facility])    [address] [place] [phone]           Please do not eat 10-12 hours before your appointment if you are coming in fasting for labs on lipids, cholesterol, or glucose (sugar). This does not apply to pregnant women. Water, hot tea and black coffee (with nothing added) are okay. Do not drink other fluids, diet soda or chew gum.            Oct 12, 2018 11:00 AM CDT   Return Visit with Alvaro Maguire MD   [facility] ([facility])    [address] [place] [phone]              Who to contact     If you have questions or need follow up information about today's clinic visit or your schedule please contact [facility] directly at [phone].  Normal or non-critical lab and imaging results will be communicated to you by bluebottlebizhart, letter or phone within 4 business days after the clinic has received the results. If you do not hear from us within 7 days, please contact the clinic through [employer]bottle[employer] or phone. If you have a critical or abnormal lab result, we will notify you by phone as soon as possible.  Submit refill requests through [employer] or call your pharmacy and they will forward the refill request to us. Please allow 3 business days for your refill to be completed.          Additional Information About Your Visit        [employer] Information     [employer] gives you secure access to your electronic health record. If you see a primary care provider, you can also send messages to your care team and make appointments. If you have questions, please call your primary care  "clinic.  If you do not have a primary care provider, please call [phone] and they will assist you.        Care EveryWhere ID     This is your Care EveryWhere ID. This could be used by other organizations to access your [facility] medical records  [id number]        Your Vitals Were     Pulse Temperature Respirations Height Pulse Oximetry Breastfeeding?    65 97.9  F (36.6  C) (Oral) 13 5' 4.5\" (1.638 m) 98% No    BMI (Body Mass Index)                   20.42 kg/m2            Blood Pressure from Last 3 Encounters:   07/02/18 140/82   03/12/18 150/88   02/23/18 129/73    Weight from Last 3 Encounters:   07/02/18 120 lb 12.8 oz (54.8 kg)   03/12/18 122 lb 3 oz (55.4 kg)   02/23/18 122 lb (55.3 kg)              Today, you had the following     No orders found for display         Today's Medication Changes          These changes are accurate as of 7/2/18 10:48 AM.  If you have any questions, ask your nurse or doctor.               Start taking these medicines.        Dose/Directions    amLODIPine 2.5 MG tablet   Commonly known as:  NORVASC   Used for:  HTN, goal below 140/90   Started by:  Phil Abbott MD        Dose:  2.5 mg   Take 1 tablet (2.5 mg) by mouth daily   Quantity:  30 tablet   Refills:  1            Where to get your medicines      These medications were sent to [facility] [place]  [place] - [address] [place] [address]  [address], [place] [address]     Phone:  [phone]     amLODIPine 2.5 MG tablet                Primary Care Provider Office Phone # Fax #    Phil Abbott [phone] [address]  [place] [address]        Equal Access to Services     [facility] LUCIA AH: Jonnie wyatto Somau, waaxda luqadaha, qaybta kaalmada adeegyada, lavell angel. So [facility] [phone].    ATENCIÓN: Si habla español, tiene a fofana disposición servicios gratuitos de asistencia lingüística. Llame al " [phone].    We comply with applicable federal civil rights laws and [place] laws. We do not discriminate on the basis of race, color, national origin, age, disability, sex, sexual orientation, or gender identity.            Thank you!     Thank you for choosing [facility]  for your care. Our goal is always to provide you with excellent care. Hearing back from our patients is one way we can continue to improve our services. Please take a few minutes to complete the written survey that you may receive in the mail after your visit with us. Thank you!             Your Updated Medication List - Protect others around you: Learn how to safely use, store and throw away your medicines at www.disposemymeds.org.          This list is accurate as of 7/2/18 10:48 AM.  Always use your most recent med list.                   Brand Name Dispense Instructions for use Diagnosis    acetaminophen 325 MG tablet    TYLENOL    100 tablet    Take 2 tablets (650 mg) by mouth every 6 hours as needed for mild pain    Migraines, Pulmonary emboli (H)       amLODIPine 2.5 MG tablet    NORVASC    30 tablet    Take 1 tablet (2.5 mg) by mouth daily    HTN, goal below 140/90       calcium-magnesium 500-250 MG Tabs per tablet    CALMAG     Take 1 tablet by mouth 2 times daily (with meals)        cholecalciferol 1000 units Tabs      Take 1,000 Units by mouth daily        propranolol 20 MG tablet    INDERAL    90 tablet    TAKE 1 TABLET(20 MG) BY MOUTH DAILY. Follow up for further refills    Migraine without status migrainosus, not intractable, unspecified migraine type       valACYclovir 500 MG tablet    VALTREX    6 tablet    Take 1 tablet (500 mg) by mouth 2 times daily    Herpes genitalis in women       warfarin 2 MG tablet    COUMADIN    180 tablet    TAKE 2 TABLETS(4 MG) BY MOUTH EVERY DAY    Chronic anticoagulation

## 2018-07-02 NOTE — PATIENT INSTRUCTIONS
[facility]    If you have any questions regarding to your visit please contact your care team:       Team Purple:   Clinic Hours Telephone Number   Dr. Shanti Prabhakar   7am-7pm  Monday - Thursday   7am-5pm  Fridays  [phone]  (Appointment scheduling available 24/7)    Questions about your recent visit?   Team Line:  [phone]   Urgent Care - [place] and [facility] - 11am-9pm Monday-Friday Saturday-Sunday- 9am-5pm   [place] - 5pm-9pm Monday-Friday Saturday-Sunday- 9am-5pm  [phone] - [place]  [phone] - [place]       What options do I have for a visit other than an office visit? We offer electronic visits (e-visits) and telephone visits, when medically appropriate.  Please check with your medical insurance to see if these types of visits are covered, as you will be responsible for any charges that are not paid by your insurance.      You can use [employer] (secure electronic communication) to access to your chart, send your primary care provider a message, or make an appointment. Ask a team member how to get started.     For a price quote for your services, please call our Consumer Price Line at [phone] or our Imaging Cost estimation line at [phone] (for imaging tests).    Eze Coronado

## 2018-07-02 NOTE — PROGRESS NOTES
SUBJECTIVE:   Nury Cárdenas is a 73 year old female who presents to clinic today for the following health issues:    Pain    Duration: x intermitted for months    Description (location/character/radiation): Right side of the forehead     Intensity:  moderate    Accompanying signs and symptoms: pain with pressure    History (similar episodes/previous evaluation): None    Precipitating or alleviating factors: None    Therapies tried and outcome: None     73-year-old female with a history of granulomatosis presenting with a concern about a sore spot on the forehead; on and off.  She does not have an pain unless she rubs or touches her forehead.    Elevated BP   Been elevated in last few months.  BP Readings from Last 6 Encounters:   07/02/18 146/68   03/12/18 150/88   02/23/18 129/73   12/26/17 126/80   12/20/17 126/74   10/31/17 100/60     Long term use of Propranolol:   Was having pounding heart beat and episodes of getting dizzy/weakness spells and since started on propranolol.    HCM:   Due for a pneumonia shot and a shingles shots.  Due to granulomatosis patient hesitant to do any shots      Problem list and histories reviewed & adjusted, as indicated.  Additional history: as documented    Patient Active Problem List   Diagnosis     GERD (gastroesophageal reflux disease)     Fibroids     Migraines     Hearing loss     Osteopenia     HYPERLIPIDEMIA LDL GOAL <160     Advanced directives, counseling/discussion     Inflammatory arthritis     Synovitis of wrist     Chronic constipation     Granulomatosis with polyangiitis (H)     Chronic steroid use     Dyspnea     Pulmonary embolism (H)     Cataract, mild, ou     Calculus of kidney, right     Chronic anticoagulation     Long-term (current) use of anticoagulants [Z79.01]     Long-term use of immunosuppressant medication     Primary osteoarthritis involving multiple joints     Cellulitis     Cat scratch left lower extremity     Other pulmonary embolism without  acute cor pulmonale, unspecified chronicity (H)     Past Surgical History:   Procedure Laterality Date     ABDOMEN SURGERY      appendix out     APPENDECTOMY       BIOPSY  1972    cone biopsy     CL AFF SURGICAL PATHOLOGY      cone biopsy 1975     COLONOSCOPY       COLONOSCOPY WITH CO2 INSUFFLATION N/A 12/20/2017    Procedure: COLONOSCOPY WITH CO2 INSUFFLATION;  Screening  BMI: 20.7  Pharmacy: [facility]  [phone]  Medica/Medicare  Referring: [facility]  Patient get ill from [facility];  Surgeon: Duane, William Charles, MD;  Location: [facility] OR     EYE SURGERY      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     HC ESOPHAGOSCOPY, DIAGNOSTIC       OPTICAL TRACKING SYSTEM BRONCHOSCOPY  6/19/2014    Procedure: OPTICAL TRACKING SYSTEM BRONCHOSCOPY;  Surgeon: Angel Kathleen MD;  Location:  GI     SOFT TISSUE SURGERY      remove senovial fluid from right wrist     SURGICAL HISTORY OF -   as a child    strabismus repair right eye     SURGICAL HISTORY OF -   8-2009    right wrist debridement     TONSILLECTOMY      as a child     TONSILLECTOMY & ADENOIDECTOMY       TUBAL LIGATION         Social History   Substance Use Topics     Smoking status: Former Smoker     Packs/day: 2.00     Years: 20.00     Types: Cigarettes     Start date: 2/8/1961     Quit date: 8/1/1983     Smokeless tobacco: Never Used     Alcohol use No     Family History   Problem Relation Age of Onset     Respiratory Mother      emphysema     Aneurysm Mother      Chronic Obstructive Pulmonary Disease Mother      Thyroid Disease Mother      ,     Osteoperosis Mother      Other Cancer Father      Cancer Father      lung cancer     Arthritis Maternal Grandmother      rheumatoid     HEART DISEASE Maternal Grandmother      unsure of details     HEART DISEASE Maternal Grandfather      Neurologic Disorder Paternal Grandmother      migraines     Alzheimer Disease Paternal Grandmother      Unknown/Adopted Paternal Grandfather      Cancer Sister      stage 4 anal  "cancer age 62 years     Colon Cancer Sister      Cancer - colorectal Brother      Colon Cancer Brother      Substance Abuse Sister      Anesthesia Reaction Daughter          Reviewed and updated as needed this visit by clinical staff  Tobacco  Allergies  Meds  Med Hx  Surg Hx  Fam Hx  Soc Hx      ROS:  Constitutional, HEENT, cardiovascular, pulmonary, gi and gu systems are negative, except as otherwise noted.    OBJECTIVE:     /68 (BP Location: Left arm, Patient Position: Chair, Cuff Size: Adult Regular)  Pulse 65  Temp 97.9  F (36.6  C) (Oral)  Resp 13  Ht 5' 4.5\" (1.638 m)  Wt 120 lb 12.8 oz (54.8 kg)  SpO2 98%  Breastfeeding? No  BMI 20.42 kg/m2  Body mass index is 20.42 kg/(m^2).  GENERAL: healthy, alert and no distress  FACE: Rt forehead; vague localized patch with brownish discoloration and scattered papular spots, center sensitive to touch.  NECK: no adenopathy and thyroid normal to palpation  RESP: lungs clear to auscultation - no rales, rhonchi or wheezes  CV: regular rate and rhythm, normal S1 S2, no S3 or S4, no murmur, click or rub, no peripheral edema  MS: no gross musculoskeletal defects noted, no edema    ASSESSMENT/PLAN:     (R20.2) Paresthesia: forehead Rt  (primary encounter diagnosis)  Comment: Etiology not clear, possible possibly neuralgia from a past viral exposure.  Plan: Expectant management    (I10) HTN, goal below 140/90  Comment: Blood pressure has been elevated recently, discussed starting a low-dose of amlodipine and recheck in 1 week.  Plan: amLODIPine (NORVASC) 2.5 MG tablet    (I26.99) Other pulmonary embolism without acute cor pulmonale, unspecified chronicity (H)  Comment: On warfarin    Check BP in 1-2 weeks or sooner with concerns.    Phil Abbott MD  [facility]  "

## 2018-07-03 ENCOUNTER — ALLIED HEALTH/NURSE VISIT (OUTPATIENT)
Dept: FAMILY MEDICINE | Facility: CLINIC | Age: 73
End: 2018-07-03
Payer: COMMERCIAL

## 2018-07-03 ENCOUNTER — MYC MEDICAL ADVICE (OUTPATIENT)
Dept: FAMILY MEDICINE | Facility: CLINIC | Age: 73
End: 2018-07-03

## 2018-07-03 VITALS — DIASTOLIC BLOOD PRESSURE: 80 MMHG | SYSTOLIC BLOOD PRESSURE: 130 MMHG

## 2018-07-03 DIAGNOSIS — Z01.30 BP CHECK: Primary | ICD-10-CM

## 2018-07-03 PROCEDURE — 99207 ZZC NO CHARGE NURSE ONLY: CPT | Performed by: FAMILY MEDICINE

## 2018-07-03 NOTE — MR AVS SNAPSHOT
After Visit Summary   7/3/2018    Nury Cárdenas    MRN: [id number]           Patient Information     Date Of Birth          1945        Visit Information        Provider Department      7/3/2018 10:25 AM Phil Abbott MD [facility]        Today's Diagnoses     BP check    -  1       Follow-ups after your visit        Your next 10 appointments already scheduled     Jul 16, 2018 11:00 AM CDT   Office Visit with Phil Abbott MD   [facility]    [address] [facility] [address]   [phone]           Bring a current list of meds and any records pertaining to this visit. For Physicals, please bring immunization records and any forms needing to be filled out. Please arrive 10 minutes early to complete paperwork.            Aug 01, 2018  9:30 AM CDT   Anticoagulation Visit with FZ ANTI COAG   [facility] ([facility])    [address] [place] [id number] [phone]            Oct 12, 2018 10:30 AM CDT   LAB with LAB FIRST FLOOR [facility] ([facility])    [address] [place] [id number] [phone]           Please do not eat 10-12 hours before your appointment if you are coming in fasting for labs on lipids, cholesterol, or glucose (sugar). This does not apply to pregnant women. Water, hot tea and black coffee (with nothing added) are okay. Do not drink other fluids, diet soda or chew gum.            Oct 12, 2018 11:00 AM CDT   Return Visit with Alvaro Maguire MD   [facility] ([facility]    [address] [facility] [phone]              Who to contact     If you have questions or need follow up information about today's clinic visit or your schedule please contact [facility] directly at [phone].  Normal or non-critical lab and  imaging results will be communicated to you by MyChart, letter or phone within 4 business days after the clinic has received the results. If you do not hear from us within 7 days, please contact the clinic through [employer] or phone. If you have a critical or abnormal lab result, we will notify you by phone as soon as possible.  Submit refill requests through [employer] or call your pharmacy and they will forward the refill request to us. Please allow 3 business days for your refill to be completed.          Additional Information About Your Visit        [employer] Information     [employer] gives you secure access to your electronic health record. If you see a primary care provider, you can also send messages to your care team and make appointments. If you have questions, please call your primary care clinic.  If you do not have a primary care provider, please call [phone] and they will assist you.        Care EveryWhere ID     This is your Care EveryWhere ID. This could be used by other organizations to access your [place] medical records  [id number]         Blood Pressure from Last 3 Encounters:   07/03/18 130/80   07/02/18 140/82   03/12/18 150/88    Weight from Last 3 Encounters:   07/02/18 120 lb 12.8 oz (54.8 kg)   03/12/18 122 lb 3 oz (55.4 kg)   02/23/18 122 lb (55.3 kg)              Today, you had the following     No orders found for display       Primary Care Provider Office Phone # Fax #    Phil Abbott [phone] [address] [facility] [address]        Equal Access to Services     MARTÍN MYERS : Hadii abena ku hadasho Soprabhuali, waaxda luqadaha, qaybta kaalmada rocky, lavell angel. So [facility] [phone].    ATENCIÓN: Si habla español, tiene a fofana disposición servicios gratuitos de asistencia lingüística. Llame al [phone].    We comply with applicable federal civil rights laws and [place] laws. We do not discriminate on the basis  of race, color, national origin, age, disability, sex, sexual orientation, or gender identity.            Thank you!     Thank you for choosing [facility]  for your care. Our goal is always to provide you with excellent care. Hearing back from our patients is one way we can continue to improve our services. Please take a few minutes to complete the written survey that you may receive in the mail after your visit with us. Thank you!             Your Updated Medication List - Protect others around you: Learn how to safely use, store and throw away your medicines at www.disposemymeds.org.          This list is accurate as of 7/3/18 10:26 AM.  Always use your most recent med list.                   Brand Name Dispense Instructions for use Diagnosis    acetaminophen 325 MG tablet    TYLENOL    100 tablet    Take 2 tablets (650 mg) by mouth every 6 hours as needed for mild pain    Migraines, Pulmonary emboli (H)       amLODIPine 2.5 MG tablet    NORVASC    30 tablet    Take 1 tablet (2.5 mg) by mouth daily    HTN, goal below 140/90       calcium-magnesium 500-250 MG Tabs per tablet    CALMAG     Take 1 tablet by mouth 2 times daily (with meals)        cholecalciferol 1000 units Tabs      Take 1,000 Units by mouth daily        propranolol 20 MG tablet    INDERAL    90 tablet    TAKE 1 TABLET(20 MG) BY MOUTH DAILY. Follow up for further refills    Migraine without status migrainosus, not intractable, unspecified migraine type       valACYclovir 500 MG tablet    VALTREX    6 tablet    Take 1 tablet (500 mg) by mouth 2 times daily    Herpes genitalis in women       warfarin 2 MG tablet    COUMADIN    180 tablet    TAKE 2 TABLETS(4 MG) BY MOUTH EVERY DAY    Chronic anticoagulation

## 2018-07-03 NOTE — PROGRESS NOTES
Nury Cárdenas is enrolled/participating in the retail pharmacy Blood Pressure Goals Achievement Program (BPGAP).  Nury Cárdenas was evaluated at [facility] on July 3, 2018 at which time her blood pressure was:    BP Readings from Last 3 Encounters:   07/03/18 130/80   07/02/18 140/82   03/12/18 150/88     Reviewed lifestyle modifications for blood pressure control and reduction: including making healthy food choices, managing weight, getting regular exercise, smoking cessation, reducing alcohol consumption, monitoring blood pressure regularly.     Nury Cárdenas is not experiencing symptoms.    Follow-Up: BP is at goal of < 140/90mmHg (patient 18+ years of age with or without diabetes).  Recommended follow-up at pharmacy in 6 months.     Recommendation to Provider: None at this time.     Nury Cárdenas was evaluated for enrollment into the PGEN study today.    Patient eligible for enrollment:  No  Patient interested in enrollment:  No    Completed by: Thank you,  Berenice Leavitt, PharmD  [facility]  [phone]

## 2018-07-05 ENCOUNTER — ALLIED HEALTH/NURSE VISIT (OUTPATIENT)
Dept: FAMILY MEDICINE | Facility: CLINIC | Age: 73
End: 2018-07-05
Payer: COMMERCIAL

## 2018-07-05 VITALS — DIASTOLIC BLOOD PRESSURE: 72 MMHG | SYSTOLIC BLOOD PRESSURE: 128 MMHG

## 2018-07-05 DIAGNOSIS — Z01.30 BP CHECK: Primary | ICD-10-CM

## 2018-07-05 PROCEDURE — 99207 ZZC NO CHARGE NURSE ONLY: CPT | Performed by: FAMILY MEDICINE

## 2018-07-05 NOTE — PROGRESS NOTES
Nury Cárdenas is enrolled/participating in the retail pharmacy Blood Pressure Goals Achievement Program (BPGAP).  Nury Cárdenas was evaluated at [facility] on July 5, 2018 at which time her blood pressure was:    BP Readings from Last 3 Encounters:   07/05/18 128/72   07/03/18 130/80   07/02/18 140/82     Reviewed lifestyle modifications for blood pressure control and reduction: including making healthy food choices, managing weight, getting regular exercise, smoking cessation, reducing alcohol consumption, monitoring blood pressure regularly.     Nury Cárdenas is not experiencing symptoms.    Follow-Up: BP is at goal of < 140/90mmHg (patient 18+ years of age with or without diabetes).  Recommended follow-up at pharmacy in 6 months.     Recommendation to Provider: None at this time.     Nury Cárdenas was evaluated for enrollment into the PGEN study today.    Patient eligible for enrollment:  No  Patient interested in enrollment:  No    Completed by: Thank you,  Berenice Leavitt, PharmD  [facility]  [phone]

## 2018-07-06 ENCOUNTER — ALLIED HEALTH/NURSE VISIT (OUTPATIENT)
Dept: FAMILY MEDICINE | Facility: CLINIC | Age: 73
End: 2018-07-06
Payer: COMMERCIAL

## 2018-07-06 VITALS — SYSTOLIC BLOOD PRESSURE: 128 MMHG | DIASTOLIC BLOOD PRESSURE: 74 MMHG

## 2018-07-06 DIAGNOSIS — Z01.30 BP CHECK: Primary | ICD-10-CM

## 2018-07-06 PROCEDURE — 99207 ZZC NO CHARGE NURSE ONLY: CPT | Performed by: FAMILY MEDICINE

## 2018-07-06 NOTE — PROGRESS NOTES
Nury Cárdenas is enrolled/participating in the retail pharmacy Blood Pressure Goals Achievement Program (BPGAP).  Nury Cárdenas was evaluated at [facility] on July 6, 2018 at which time her blood pressure was:    BP Readings from Last 3 Encounters:   07/06/18 128/74   07/05/18 128/72   07/03/18 130/80     Reviewed lifestyle modifications for blood pressure control and reduction: including making healthy food choices, managing weight, getting regular exercise, smoking cessation, reducing alcohol consumption, monitoring blood pressure regularly.     Nury Cárdenas is not experiencing symptoms.    Follow-Up: BP is at goal of < 140/90mmHg (patient 18+ years of age with or without diabetes).  Recommended follow-up at pharmacy in 6 months.     Recommendation to Provider: none    Nury Cárdenas was evaluated for enrollment into the PGEN study today.    Patient eligible for enrollment:  No  Patient interested in enrollment:  No    Completed by: Sierra Wynn RPh  [facility]  Phone [phone]  Fax      [phone]

## 2018-07-06 NOTE — MR AVS SNAPSHOT
After Visit Summary   7/6/2018    Nury Cárdenas    MRN: [id number]           Patient Information     Date Of Birth          1945        Visit Information        Provider Department      7/6/2018 12:14 PM Phil Abbott MD [facility]        Today's Diagnoses     BP check    -  1       Follow-ups after your visit        Your next 10 appointments already scheduled     Jul 16, 2018 11:00 AM CDT   Office Visit with Phil Abbott MD   [facility] ([facility])    [address] [facility] [address]   [phone]           Bring a current list of meds and any records pertaining to this visit. For Physicals, please bring immunization records and any forms needing to be filled out. Please arrive 10 minutes early to complete paperwork.            Aug 01, 2018  9:30 AM CDT   Anticoagulation Visit with FZ ANTI COAG   [facility] ([facility])    [address] [place] [address] [phone]            Oct 12, 2018 10:30 AM CDT   LAB with LAB FIRST FLOOR [facility] ([facility])    [address] [place] [address] [phone]           Please do not eat 10-12 hours before your appointment if you are coming in fasting for labs on lipids, cholesterol, or glucose (sugar). This does not apply to pregnant women. Water, hot tea and black coffee (with nothing added) are okay. Do not drink other fluids, diet soda or chew gum.            Oct 12, 2018 11:00 AM CDT   Return Visit with Alvaro Maguire MD   [facility] ([facility])    [address] [facility] [address] [phone]              Who to contact     If you have questions or need follow up information about today's clinic visit or your schedule please contact [facility]\"" directly at [phone].  Normal or non-critical lab and  imaging results will be communicated to you by MyChart, letter or phone within 4 business days after the clinic has received the results. If you do not hear from us within 7 days, please contact the clinic through Velomedixt or phone. If you have a critical or abnormal lab result, we will notify you by phone as soon as possible.  Submit refill requests through Blottr or call your pharmacy and they will forward the refill request to us. Please allow 3 business days for your refill to be completed.          Additional Information About Your Visit        Inteligistics[employer]OrganizedWisdom Information     Blottr gives you secure access to your electronic health record. If you see a primary care provider, you can also send messages to your care team and make appointments. If you have questions, please call your primary care clinic.  If you do not have a primary care provider, please call [phone] and they will assist you.        Care EveryWhere ID     This is your Care EveryWhere ID. This could be used by other organizations to access your [place] medical records  [id number]         Blood Pressure from Last 3 Encounters:   07/06/18 128/74   07/05/18 128/72   07/03/18 130/80    Weight from Last 3 Encounters:   07/02/18 120 lb 12.8 oz (54.8 kg)   03/12/18 122 lb 3 oz (55.4 kg)   02/23/18 122 lb (55.3 kg)              Today, you had the following     No orders found for display       Primary Care Provider Office Phone # Fax #    Phil Abbott [phone] [address] [facility] [address]        Equal Access to Services     MARTÍN MYERS : Hadii abena ku hadasho Soprabhuali, waaxda luqadaha, qaybta kaalmada rocky, lavell angel. So [facility] [phone].    ATENCIÓN: Si habla español, tiene a fofana disposición servicios gratuitos de asistencia lingüística. Llame al [phone].    We comply with applicable federal civil rights laws and [place] laws. We do not discriminate on the basis  of race, color, national origin, age, disability, sex, sexual orientation, or gender identity.            Thank you!     Thank you for choosing [facility]  for your care. Our goal is always to provide you with excellent care. Hearing back from our patients is one way we can continue to improve our services. Please take a few minutes to complete the written survey that you may receive in the mail after your visit with us. Thank you!             Your Updated Medication List - Protect others around you: Learn how to safely use, store and throw away your medicines at www.disposemymeds.org.          This list is accurate as of 7/6/18 11:59 PM.  Always use your most recent med list.                   Brand Name Dispense Instructions for use Diagnosis    acetaminophen 325 MG tablet    TYLENOL    100 tablet    Take 2 tablets (650 mg) by mouth every 6 hours as needed for mild pain    Migraines, Pulmonary emboli (H)       amLODIPine 2.5 MG tablet    NORVASC    30 tablet    Take 1 tablet (2.5 mg) by mouth daily    HTN, goal below 140/90       calcium-magnesium 500-250 MG Tabs per tablet    CALMAG     Take 1 tablet by mouth 2 times daily (with meals)        cholecalciferol 1000 units Tabs      Take 1,000 Units by mouth daily        propranolol 20 MG tablet    INDERAL    90 tablet    TAKE 1 TABLET(20 MG) BY MOUTH DAILY. Follow up for further refills    Migraine without status migrainosus, not intractable, unspecified migraine type       valACYclovir 500 MG tablet    VALTREX    6 tablet    Take 1 tablet (500 mg) by mouth 2 times daily    Herpes genitalis in women       warfarin 2 MG tablet    COUMADIN    180 tablet    TAKE 2 TABLETS(4 MG) BY MOUTH EVERY DAY    Chronic anticoagulation

## 2018-07-06 NOTE — Clinical Note
Blood Pressure in pharmacy today 128/74, sending as FYI only Sierra Wynn, [facility] Phone [phone] Fax      [phone]

## 2018-07-10 ENCOUNTER — ALLIED HEALTH/NURSE VISIT (OUTPATIENT)
Dept: FAMILY MEDICINE | Facility: CLINIC | Age: 73
End: 2018-07-10
Payer: COMMERCIAL

## 2018-07-10 VITALS — DIASTOLIC BLOOD PRESSURE: 64 MMHG | SYSTOLIC BLOOD PRESSURE: 100 MMHG

## 2018-07-10 DIAGNOSIS — Z01.30 BP CHECK: Primary | ICD-10-CM

## 2018-07-10 PROCEDURE — 99207 ZZC NO CHARGE NURSE ONLY: CPT | Performed by: FAMILY MEDICINE

## 2018-07-10 NOTE — PROGRESS NOTES
Nury Cárdenas is enrolled/participating in the retail pharmacy Blood Pressure Goals Achievement Program (BPGAP).  Nury Cárdenas was evaluated at [facility] on July 10, 2018 at which time her blood pressure was:    BP Readings from Last 3 Encounters:   07/10/18 100/64   07/06/18 128/74   07/05/18 128/72     Reviewed lifestyle modifications for blood pressure control and reduction: including making healthy food choices, managing weight, getting regular exercise, smoking cessation, reducing alcohol consumption, monitoring blood pressure regularly.     Nury Cárdenas is not experiencing symptoms.    Follow-Up: BP is at goal of < 140/90mmHg (patient 18+ years of age with or without diabetes).  Recommended follow-up at pharmacy in 6 months.     Recommendation to Provider: none    Nury Cárdenas was evaluated for enrollment into the PGEN study today.    Patient eligible for enrollment:  No  Patient interested in enrollment:  No    Completed by: Sierra Wynn RPh  [facility]  Phone [phone]  Fax      [phone]

## 2018-07-10 NOTE — MR AVS SNAPSHOT
After Visit Summary   7/10/2018    Nury Cárdenas    MRN: [id number]           Patient Information     Date Of Birth          1945        Visit Information        Provider Department      7/10/2018 3:49 PM Phil Abbott MD [facility]        Today's Diagnoses     BP check    -  1       Follow-ups after your visit        Your next 10 appointments already scheduled     Aug 01, 2018  9:30 AM CDT   Anticoagulation Visit with FZ ANTI COAG   [facility] ([facility])    [address] [facility] [address] [phone]            Oct 12, 2018 10:30 AM CDT   [facility]    [address] [place] [address] [phone]           Please do not eat 10-12 hours before your appointment if you are coming in fasting for labs on lipids, cholesterol, or glucose (sugar). This does not apply to pregnant women. Water, hot tea and black coffee (with nothing added) are okay. Do not drink other fluids, diet soda or chew gum.            Oct 12, 2018 11:00 AM CDT   Return Visit with Alvaro Maguire MD   [facility] ([facility])    [address] [place] [address] [phone]              Who to contact     If you have questions or need follow up information about today's clinic visit or your schedule please contact [facility] [place] [facility] directly at [phone].  Normal or non-critical lab and imaging results will be communicated to you by MyChart, letter or phone within 4 business days after the clinic has received the results. If you do not hear from us within 7 days, please contact the clinic through MyChart or phone. If you have a critical or abnormal lab result, we will notify you by phone as soon as possible.  Submit refill requests through H-care or call your pharmacy and they will forward the refill  request to us. Please allow 3 business days for your refill to be completed.          Additional Information About Your Visit        MyChart Information     Enjoihart gives you secure access to your electronic health record. If you see a primary care provider, you can also send messages to your care team and make appointments. If you have questions, please call your primary care clinic.  If you do not have a primary care provider, please call [phone] and they will assist you.        Care EveryWhere ID     This is your Care EveryWhere ID. This could be used by other organizations to access your [place] medical records  [id number]         Blood Pressure from Last 3 Encounters:   07/20/18 110/62   07/16/18 122/70   07/13/18 119/66    Weight from Last 3 Encounters:   07/16/18 121 lb 8 oz (55.1 kg)   07/02/18 120 lb 12.8 oz (54.8 kg)   03/12/18 122 lb 3 oz (55.4 kg)              Today, you had the following     No orders found for display       Primary Care Provider Office Phone # Fax #    Phil Abbott [phone]       [address] [facility] [address]        Equal Access to Services     MARTÍN MYERS AH: Hadii aad ku hadasho Soprabhuali, waaxda luqadaha, qaybta kaalmada adeegyada, lavell angel. So [facility] [phone].    ATENCIÓN: Si habla español, tiene a fofana disposición servicios gratuitos de asistencia lingüística. [facility] [phone].    We comply with applicable federal civil rights laws and [place] laws. We do not discriminate on the basis of race, color, national origin, age, disability, sex, sexual orientation, or gender identity.            Thank you!     Thank you for choosing [facility]  for your care. Our goal is always to provide you with excellent care. Hearing back from our patients is one way we can continue to improve our services. Please take a few minutes to complete the written survey that you may receive in the mail after  your visit with us. Thank you!             Your Updated Medication List - Protect others around you: Learn how to safely use, store and throw away your medicines at www.disposemymeds.org.          This list is accurate as of 7/10/18 11:59 PM.  Always use your most recent med list.                   Brand Name Dispense Instructions for use Diagnosis    acetaminophen 325 MG tablet    TYLENOL    100 tablet    Take 2 tablets (650 mg) by mouth every 6 hours as needed for mild pain    Migraines, Pulmonary emboli (H)       calcium-magnesium 500-250 MG Tabs per tablet    CALMAG     Take 1 tablet by mouth 2 times daily (with meals)        cholecalciferol 1000 units Tabs      Take 1,000 Units by mouth daily        propranolol 20 MG tablet    INDERAL    90 tablet    TAKE 1 TABLET(20 MG) BY MOUTH DAILY. Follow up for further refills    Migraine without status migrainosus, not intractable, unspecified migraine type       valACYclovir 500 MG tablet    VALTREX    6 tablet    Take 1 tablet (500 mg) by mouth 2 times daily    Herpes genitalis in women       warfarin 2 MG tablet    COUMADIN    180 tablet    TAKE 2 TABLETS(4 MG) BY MOUTH EVERY DAY    Chronic anticoagulation

## 2018-07-11 ENCOUNTER — ALLIED HEALTH/NURSE VISIT (OUTPATIENT)
Dept: FAMILY MEDICINE | Facility: CLINIC | Age: 73
End: 2018-07-11
Payer: COMMERCIAL

## 2018-07-11 VITALS — SYSTOLIC BLOOD PRESSURE: 118 MMHG | DIASTOLIC BLOOD PRESSURE: 64 MMHG

## 2018-07-11 DIAGNOSIS — Z01.30 BP CHECK: Primary | ICD-10-CM

## 2018-07-11 PROCEDURE — 99207 ZZC NO CHARGE NURSE ONLY: CPT | Performed by: FAMILY MEDICINE

## 2018-07-11 NOTE — PROGRESS NOTES
Nury Cárdenas is enrolled/participating in the retail pharmacy Blood Pressure Goals Achievement Program (BPGAP).  Nury Cárdenas was evaluated at [facility] on July 11, 2018 at which time her blood pressure was:    BP Readings from Last 3 Encounters:   07/11/18 118/64   07/10/18 100/64   07/06/18 128/74     Reviewed lifestyle modifications for blood pressure control and reduction: including making healthy food choices, managing weight, getting regular exercise, smoking cessation, reducing alcohol consumption, monitoring blood pressure regularly.     Nury Cárdenas is not experiencing symptoms.    Follow-Up: BP is at goal of < 140/90mmHg (patient 18+ years of age with or without diabetes).  Recommended follow-up at pharmacy in 6 months.     Recommendation to Provider: None at this time.    Nury Cárdenas was evaluated for enrollment into the PGEN study today.    Patient eligible for enrollment:  No  Patient interested in enrollment:  No    Completed by: Thank you,  Berenice Leavitt, PharmD  [facility]  [phone]

## 2018-07-11 NOTE — MR AVS SNAPSHOT
After Visit Summary   7/11/2018    Nury Cárdenas    MRN: [id number]           Patient Information     Date Of Birth          1945        Visit Information        Provider Department      7/11/2018 8:20 AM Phil Abbott MD [facility]        Today's Diagnoses     BP check    -  1       Follow-ups after your visit        Your next 10 appointments already scheduled     Jul 16, 2018 11:00 AM CDT   Office Visit with Phil Abbott MD   [facility] ([facility])    [address] [facility] [address]   [phone]           Bring a current list of meds and any records pertaining to this visit. For Physicals, please bring immunization records and any forms needing to be filled out. Please arrive 10 minutes early to complete paperwork.            Aug 01, 2018  9:30 AM CDT   Anticoagulation Visit with FZ ANTI COAG   [facility] ([facility])    [address] [facility] [address] [phone]            Oct 12, 2018 10:30 AM CDT   LAB with LAB FIRST FLOOR [facility] ([facility])    [address] [place] [address] [phone]           Please do not eat 10-12 hours before your appointment if you are coming in fasting for labs on lipids, cholesterol, or glucose (sugar). This does not apply to pregnant women. Water, hot tea and black coffee (with nothing added) are okay. Do not drink other fluids, diet soda or chew gum.            Oct 12, 2018 11:00 AM CDT   Return Visit with Alvaro Maguire MD   [facility] ([facility])    [address] [facility] [address] [phone]              Who to contact     If you have questions or need follow up information about today's clinic visit or your schedule please contact [facility] directly at [phone].  Normal or non-critical lab and  imaging results will be communicated to you by MyChart, letter or phone within 4 business days after the clinic has received the results. If you do not hear from us within 7 days, please contact the clinic through Kranemt or phone. If you have a critical or abnormal lab result, we will notify you by phone as soon as possible.  Submit refill requests through GuestDriven or call your pharmacy and they will forward the refill request to us. Please allow 3 business days for your refill to be completed.          Additional Information About Your Visit        [facility] Information     GuestDriven gives you secure access to your electronic health record. If you see a primary care provider, you can also send messages to your care team and make appointments. If you have questions, please call your primary care clinic.  If you do not have a primary care provider, please call [phone] and they will assist you.        Care EveryWhere ID     This is your Care EveryWhere ID. This could be used by other organizations to access your [place] medical records  [id number]         Blood Pressure from Last 3 Encounters:   07/11/18 118/64   07/10/18 100/64   07/06/18 128/74    Weight from Last 3 Encounters:   07/02/18 120 lb 12.8 oz (54.8 kg)   03/12/18 122 lb 3 oz (55.4 kg)   02/23/18 122 lb (55.3 kg)              Today, you had the following     No orders found for display       Primary Care Provider Office Phone # Fax #    Phil Abbott [phone] [address] [facility] [address]        Equal Access to Services     MARTÍN MYERS : Hadii abena ku hadasho Soprabhuali, waaxda luqadaha, qaybta kaalmada rocky, lavell angel. So [facility] [phone].    ATENCIÓN: Si habla español, tiene a fofana disposición servicios gratuitos de asistencia lingüística. Llame al [phone].    We comply with applicable federal civil rights laws and [place] laws. We do not discriminate on the basis  of race, color, national origin, age, disability, sex, sexual orientation, or gender identity.            Thank you!     Thank you for choosing [facility]  for your care. Our goal is always to provide you with excellent care. Hearing back from our patients is one way we can continue to improve our services. Please take a few minutes to complete the written survey that you may receive in the mail after your visit with us. Thank you!             Your Updated Medication List - Protect others around you: Learn how to safely use, store and throw away your medicines at www.disposemymeds.org.          This list is accurate as of 7/11/18  8:22 AM.  Always use your most recent med list.                   Brand Name Dispense Instructions for use Diagnosis    acetaminophen 325 MG tablet    TYLENOL    100 tablet    Take 2 tablets (650 mg) by mouth every 6 hours as needed for mild pain    Migraines, Pulmonary emboli (H)       amLODIPine 2.5 MG tablet    NORVASC    30 tablet    Take 1 tablet (2.5 mg) by mouth daily    HTN, goal below 140/90       calcium-magnesium 500-250 MG Tabs per tablet    CALMAG     Take 1 tablet by mouth 2 times daily (with meals)        cholecalciferol 1000 units Tabs      Take 1,000 Units by mouth daily        propranolol 20 MG tablet    INDERAL    90 tablet    TAKE 1 TABLET(20 MG) BY MOUTH DAILY. Follow up for further refills    Migraine without status migrainosus, not intractable, unspecified migraine type       valACYclovir 500 MG tablet    VALTREX    6 tablet    Take 1 tablet (500 mg) by mouth 2 times daily    Herpes genitalis in women       warfarin 2 MG tablet    COUMADIN    180 tablet    TAKE 2 TABLETS(4 MG) BY MOUTH EVERY DAY    Chronic anticoagulation

## 2018-07-12 ENCOUNTER — ALLIED HEALTH/NURSE VISIT (OUTPATIENT)
Dept: FAMILY MEDICINE | Facility: CLINIC | Age: 73
End: 2018-07-12
Payer: COMMERCIAL

## 2018-07-12 VITALS — DIASTOLIC BLOOD PRESSURE: 72 MMHG | SYSTOLIC BLOOD PRESSURE: 104 MMHG

## 2018-07-12 DIAGNOSIS — Z01.30 BP CHECK: Primary | ICD-10-CM

## 2018-07-12 PROCEDURE — 99207 ZZC NO CHARGE NURSE ONLY: CPT | Performed by: FAMILY MEDICINE

## 2018-07-12 NOTE — PROGRESS NOTES
Nury Cárdenas is enrolled/participating in the retail pharmacy Blood Pressure Goals Achievement Program (BPGAP).  Nury Cárdenas was evaluated at [facility] on July 12, 2018 at which time her blood pressure was:    BP Readings from Last 3 Encounters:   07/12/18 104/72   07/11/18 118/64   07/10/18 100/64     Reviewed lifestyle modifications for blood pressure control and reduction: including making healthy food choices, managing weight, getting regular exercise, smoking cessation, reducing alcohol consumption, monitoring blood pressure regularly.     Nury Cárdenas is not experiencing symptoms.    Follow-Up: BP is at goal of < 140/90mmHg (patient 18+ years of age with or without diabetes).  Recommended follow-up at pharmacy in 6 months.     Recommendation to Provider: None at this time.     Nury Cárdenas was evaluated for enrollment into the PGEN study today.    Patient eligible for enrollment:  No  Patient interested in enrollment:  No    Completed by: Thank you,  Berenice Leavitt, PharmD  [facility]  [phone]

## 2018-07-12 NOTE — MR AVS SNAPSHOT
After Visit Summary   7/12/2018    Nury Cárdenas    MRN: [id number]           Patient Information     Date Of Birth          1945        Visit Information        Provider Department      7/12/2018 1:00 PM Phil Abbott MD [facility]        Today's Diagnoses     BP check    -  1       Follow-ups after your visit        Your next 10 appointments already scheduled     Aug 01, 2018  9:30 AM CDT   Anticoagulation Visit with FZ ANTI COAG   [facility] ([facility])    [address] [facility] [address] [phone]            Oct 12, 2018 10:30 AM CDT   LAB with LAB FIRST FLOOR [facility] ([facility])    [address] [place] [address] [phone]           Please do not eat 10-12 hours before your appointment if you are coming in fasting for labs on lipids, cholesterol, or glucose (sugar). This does not apply to pregnant women. Water, hot tea and black coffee (with nothing added) are okay. Do not drink other fluids, diet soda or chew gum.            Oct 12, 2018 11:00 AM CDT   Return Visit with Alvaro Maguire MD   [facility] ([facility])    [address] [place] [address] [phone]              Who to contact     If you have questions or need follow up information about today's clinic visit or your schedule please contact [facility] directly at [phone].  Normal or non-critical lab and imaging results will be communicated to you by MyChart, letter or phone within 4 business days after the clinic has received the results. If you do not hear from us within 7 days, please contact the clinic through MyChart or phone. If you have a critical or abnormal lab result, we will notify you by phone as soon as possible.  Submit refill requests through Campus [employer] or call your pharmacy and they will forward the refill  request to us. Please allow 3 business days for your refill to be completed.          Additional Information About Your Visit        MyChart Information     [employer] gives you secure access to your electronic health record. If you see a primary care provider, you can also send messages to your care team and make appointments. If you have questions, please call your primary care clinic.  If you do not have a primary care provider, please call [phone] and they will assist you.        Care EveryWhere ID     This is your Care EveryWhere ID. This could be used by other organizations to access your [facility] medical records  [id number]         Blood Pressure from Last 3 Encounters:   07/20/18 110/62   07/16/18 122/70   07/13/18 119/66    Weight from Last 3 Encounters:   07/16/18 121 lb 8 oz (55.1 kg)   07/02/18 120 lb 12.8 oz (54.8 kg)   03/12/18 122 lb 3 oz (55.4 kg)              Today, you had the following     No orders found for display       Primary Care Provider Office Phone # Fax #    Phil Abbott [phone]       [address] [facility] [address]        Equal Access to Services     MARTÍN MYERS AH: Hadii aad ku hadasho Soprabhuali, waaxda luqadaha, qaybta kaalmada adeegyada, lavell angel. So [facility] [phone].    ATENCIÓN: Si habla español, tiene a fofana disposición servicios gratuitos de asistencia lingüística. [facility] [phone].    We comply with applicable federal civil rights laws and [place] laws. We do not discriminate on the basis of race, color, national origin, age, disability, sex, sexual orientation, or gender identity.            Thank you!     Thank you for choosing [facility]  for your care. Our goal is always to provide you with excellent care. Hearing back from our patients is one way we can continue to improve our services. Please take a few minutes to complete the written survey that you may receive in the mail after  your visit with us. Thank you!             Your Updated Medication List - Protect others around you: Learn how to safely use, store and throw away your medicines at www.disposemymeds.org.          This list is accurate as of 7/12/18 11:59 PM.  Always use your most recent med list.                   Brand Name Dispense Instructions for use Diagnosis    acetaminophen 325 MG tablet    TYLENOL    100 tablet    Take 2 tablets (650 mg) by mouth every 6 hours as needed for mild pain    Migraines, Pulmonary emboli (H)       calcium-magnesium 500-250 MG Tabs per tablet    CALMAG     Take 1 tablet by mouth 2 times daily (with meals)        cholecalciferol 1000 units Tabs      Take 1,000 Units by mouth daily        propranolol 20 MG tablet    INDERAL    90 tablet    TAKE 1 TABLET(20 MG) BY MOUTH DAILY. Follow up for further refills    Migraine without status migrainosus, not intractable, unspecified migraine type       valACYclovir 500 MG tablet    VALTREX    6 tablet    Take 1 tablet (500 mg) by mouth 2 times daily    Herpes genitalis in women       warfarin 2 MG tablet    COUMADIN    180 tablet    TAKE 2 TABLETS(4 MG) BY MOUTH EVERY DAY    Chronic anticoagulation

## 2018-07-13 ENCOUNTER — ALLIED HEALTH/NURSE VISIT (OUTPATIENT)
Dept: FAMILY MEDICINE | Facility: CLINIC | Age: 73
End: 2018-07-13
Payer: COMMERCIAL

## 2018-07-13 VITALS — DIASTOLIC BLOOD PRESSURE: 66 MMHG | SYSTOLIC BLOOD PRESSURE: 119 MMHG

## 2018-07-13 DIAGNOSIS — Z01.30 BP CHECK: Primary | ICD-10-CM

## 2018-07-13 PROCEDURE — 99207 ZZC NO CHARGE NURSE ONLY: CPT | Performed by: FAMILY MEDICINE

## 2018-07-13 NOTE — PROGRESS NOTES
Nury Cárdenas is enrolled/participating in the retail pharmacy Blood Pressure Goals Achievement Program (BPGAP).  Nury Cárdenas was evaluated at [facility] on July 13, 2018 at which time her blood pressure was:    BP Readings from Last 3 Encounters:   07/13/18 119/66   07/12/18 104/72   07/11/18 118/64     Reviewed lifestyle modifications for blood pressure control and reduction: including making healthy food choices, managing weight, getting regular exercise, smoking cessation, reducing alcohol consumption, monitoring blood pressure regularly.     Nury Cárdenas is not experiencing symptoms.    Follow-Up: BP is at goal of < 140/90mmHg (patient 18+ years of age with or without diabetes).  Recommended follow-up at pharmacy in 6 months.     Recommendation to Provider: None at this time.     Nury Cárdenas was evaluated for enrollment into the PGEN study today.    Patient eligible for enrollment:  No  Patient interested in enrollment:  No    Completed by: Thank you,  Berenice Leavitt, PharmD  [facility]  [phone]

## 2018-07-13 NOTE — MR AVS SNAPSHOT
After Visit Summary   7/13/2018    Nury Cárdenas    MRN: [id number]           Patient Information     Date Of Birth          1945        Visit Information        Provider Department      7/13/2018 10:42 AM Phil Abbott MD [facility]        Today's Diagnoses     BP check    -  1       Follow-ups after your visit        Your next 10 appointments already scheduled     Jul 16, 2018 11:00 AM CDT   Office Visit with Phil Abbott MD   [facility] ([facility])    [address] [facility] [address]   [phone]           Bring a current list of meds and any records pertaining to this visit. For Physicals, please bring immunization records and any forms needing to be filled out. Please arrive 10 minutes early to complete paperwork.            Aug 01, 2018  9:30 AM CDT   Anticoagulation Visit with FZ ANTI COAG   [facility] ([facility])    [address] [place] [address] [phone]            Oct 12, 2018 10:30 AM CDT   LAB with LAB FIRST FLOOR [facility] ([facility])    [address] [place] [address] [phone]           Please do not eat 10-12 hours before your appointment if you are coming in fasting for labs on lipids, cholesterol, or glucose (sugar). This does not apply to pregnant women. Water, hot tea and black coffee (with nothing added) are okay. Do not drink other fluids, diet soda or chew gum.            Oct 12, 2018 11:00 AM CDT   Return Visit with Alvaro Maguire MD   [facility] ([facility])    [address] [facility] [address] [phone]              Who to contact     If you have questions or need follow up information about today's clinic visit or your schedule please contact [facility] directly at [phone].  Normal or non-critical lab and  imaging results will be communicated to you by MyChart, letter or phone within 4 business days after the clinic has received the results. If you do not hear from us within 7 days, please contact the clinic through Wangdaizhijiat or phone. If you have a critical or abnormal lab result, we will notify you by phone as soon as possible.  Submit refill requests through [employer] or call your pharmacy and they will forward the refill request to us. Please allow 3 business days for your refill to be completed.          Additional Information About Your Visit        VitrueharKihon Information     [employer] gives you secure access to your electronic health record. If you see a primary care provider, you can also send messages to your care team and make appointments. If you have questions, please call your primary care clinic.  If you do not have a primary care provider, please call [phone] and they will assist you.        Care EveryWhere ID     This is your Care EveryWhere ID. This could be used by other organizations to access your [place] medical records  [id number]         Blood Pressure from Last 3 Encounters:   07/13/18 119/66   07/12/18 104/72   07/11/18 118/64    Weight from Last 3 Encounters:   07/02/18 120 lb 12.8 oz (54.8 kg)   03/12/18 122 lb 3 oz (55.4 kg)   02/23/18 122 lb (55.3 kg)              Today, you had the following     No orders found for display       Primary Care Provider Office Phone # Fax #    Phil Abbott [phone] [address] [facility] [address]        Equal Access to Services     MARTÍN MYERS : Hadii abena ku hadasho Soprabhuali, waaxda luqadaha, qaybta kaalmada rocky, lavell angel. So [facility] [phone].    ATENCIÓN: Si habla español, tiene a fofana disposición servicios gratuitos de asistencia lingüística. Llame al [phone].    We comply with applicable federal civil rights laws and [place] laws. We do not discriminate on the basis  of race, color, national origin, age, disability, sex, sexual orientation, or gender identity.            Thank you!     Thank you for choosing [facility]  for your care. Our goal is always to provide you with excellent care. Hearing back from our patients is one way we can continue to improve our services. Please take a few minutes to complete the written survey that you may receive in the mail after your visit with us. Thank you!             Your Updated Medication List - Protect others around you: Learn how to safely use, store and throw away your medicines at www.disposemymeds.org.          This list is accurate as of 7/13/18 10:46 AM.  Always use your most recent med list.                   Brand Name Dispense Instructions for use Diagnosis    acetaminophen 325 MG tablet    TYLENOL    100 tablet    Take 2 tablets (650 mg) by mouth every 6 hours as needed for mild pain    Migraines, Pulmonary emboli (H)       amLODIPine 2.5 MG tablet    NORVASC    30 tablet    Take 1 tablet (2.5 mg) by mouth daily    HTN, goal below 140/90       calcium-magnesium 500-250 MG Tabs per tablet    CALMAG     Take 1 tablet by mouth 2 times daily (with meals)        cholecalciferol 1000 units Tabs      Take 1,000 Units by mouth daily        propranolol 20 MG tablet    INDERAL    90 tablet    TAKE 1 TABLET(20 MG) BY MOUTH DAILY. Follow up for further refills    Migraine without status migrainosus, not intractable, unspecified migraine type       valACYclovir 500 MG tablet    VALTREX    6 tablet    Take 1 tablet (500 mg) by mouth 2 times daily    Herpes genitalis in women       warfarin 2 MG tablet    COUMADIN    180 tablet    TAKE 2 TABLETS(4 MG) BY MOUTH EVERY DAY    Chronic anticoagulation

## 2018-07-16 ENCOUNTER — OFFICE VISIT (OUTPATIENT)
Dept: FAMILY MEDICINE | Facility: CLINIC | Age: 73
End: 2018-07-16
Payer: COMMERCIAL

## 2018-07-16 VITALS
WEIGHT: 121.5 LBS | BODY MASS INDEX: 20.24 KG/M2 | SYSTOLIC BLOOD PRESSURE: 122 MMHG | TEMPERATURE: 97.5 F | HEIGHT: 65 IN | RESPIRATION RATE: 15 BRPM | DIASTOLIC BLOOD PRESSURE: 70 MMHG | HEART RATE: 70 BPM | OXYGEN SATURATION: 99 %

## 2018-07-16 DIAGNOSIS — R03.0 ELEVATED BLOOD PRESSURE READING WITHOUT DIAGNOSIS OF HYPERTENSION: Primary | ICD-10-CM

## 2018-07-16 PROCEDURE — 99213 OFFICE O/P EST LOW 20 MIN: CPT | Performed by: FAMILY MEDICINE

## 2018-07-16 NOTE — MR AVS SNAPSHOT
After Visit Summary   7/16/2018    Nury Cárdenas    MRN: [id number]           Patient Information     Date Of Birth          1945        Visit Information        Provider Department      7/16/2018 11:00 AM Phil Abbott MD [facility]        Today's Diagnoses     Elevated blood pressure reading without diagnosis of hypertension    -  1      Care Instructions    [facility]    If you have any questions regarding to your visit please contact your care team:       Team Purple:   Clinic Hours Telephone Number   Dr. Shanti Prabhakar   7am-7pm  Monday - Thursday   7am-5pm  Fridays  [phone]  (Appointment scheduling available 24/7)    Questions about your recent visit?   Team Line:  [phone]   Urgent Care - [place] and [facility] - 11am-9pm Monday-Friday Saturday-Sunday- 9am-5pm   [place] - 5pm-9pm Monday-Friday Saturday-Sunday- 9am-5pm  [phone] - [place]  [phone] [facility]       What options do I have for a visit other than an office visit? We offer electronic visits (e-visits) and telephone visits, when medically appropriate.  Please check with your medical insurance to see if these types of visits are covered, as you will be responsible for any charges that are not paid by your insurance.      You can use [employer] (secure electronic communication) to access to your chart, send your primary care provider a message, or make an appointment. Ask a team member how to get started.     For a price quote for your services, please call our Consumer Price Line at [phone] or our Imaging Cost estimation line at [phone] (for imaging tests).    Eze Coronado            Follow-ups after your visit        Your next 10 appointments already scheduled     Aug 01, 2018  9:30 AM CDT   Anticoagulation Visit with FZ ANTI COAG   [facility] ([facility])    [address] [place] [address] [address]   [phone]            Oct 12, 2018 10:30 AM CDT   LAB with LAB FIRST FLOOR [facility] ([facility])    [address] [place] [address] [phone]           Please do not eat 10-12 hours before your appointment if you are coming in fasting for labs on lipids, cholesterol, or glucose (sugar). This does not apply to pregnant women. Water, hot tea and black coffee (with nothing added) are okay. Do not drink other fluids, diet soda or chew gum.            Oct 12, 2018 11:00 AM CDT   Return Visit with Alvaro Maguire MD   [facility] ([facility])    [address] [place] [address] [phone]              Who to contact     If you have questions or need follow up information about today's clinic visit or your schedule please contact [facility] directly at [phone].  Normal or non-critical lab and imaging results will be communicated to you by Dato [employer], letter or phone within 4 business days after the clinic has received the results. If you do not hear from us within 7 days, please contact the clinic through Carminet or phone. If you have a critical or abnormal lab result, we will notify you by phone as soon as possible.  Submit refill requests through Carmine or call your pharmacy and they will forward the refill request to us. Please allow 3 business days for your refill to be completed.          Additional Information About Your Visit        Carmine Information     Carmine gives you secure access to your electronic health record. If you see a primary care provider, you can also send messages to your care team and make appointments. If you have questions, please call your primary care clinic.  If you do not have a primary care provider, please call [phone] and they will assist you.        Care EveryWhere ID     This is your Care EveryWhere ID.  "This could be used by other organizations to access your [place] medical records  [id number]        Your Vitals Were     Pulse Temperature Respirations Height Pulse Oximetry Breastfeeding?    70 97.5  F (36.4  C) (Oral) 15 5' 4.5\" (1.638 m) 99% No    BMI (Body Mass Index)                   20.53 kg/m2            Blood Pressure from Last 3 Encounters:   07/16/18 122/70   07/13/18 119/66   07/12/18 104/72    Weight from Last 3 Encounters:   07/16/18 121 lb 8 oz (55.1 kg)   07/02/18 120 lb 12.8 oz (54.8 kg)   03/12/18 122 lb 3 oz (55.4 kg)              Today, you had the following     No orders found for display       Primary Care Provider Office Phone # Fax #    Phil Abbott [phone]       [address] [facility] [address]        Equal Access to Services     [facility]: Hadii aad ku hadasho Soomaali, waaxda luqadaha, qaybta kaalmada adeegyada, waxay idiin haymessin sharath conradaramavis laumer . So [facility] [phone].    ATENCIÓN: Si habla español, tiene a fofana disposición servicios gratuitos de asistencia lingüística. Llame al [phone].    We comply with applicable federal civil rights laws and [place] laws. We do not discriminate on the basis of race, color, national origin, age, disability, sex, sexual orientation, or gender identity.            Thank you!     Thank you for choosing [facility]  for your care. Our goal is always to provide you with excellent care. Hearing back from our patients is one way we can continue to improve our services. Please take a few minutes to complete the written survey that you may receive in the mail after your visit with us. Thank you!             Your Updated Medication List - Protect others around you: Learn how to safely use, store and throw away your medicines at www.disposemymeds.org.          This list is accurate as of 7/16/18 11:16 AM.  Always use your most recent med list.                   Brand Name Dispense Instructions " for use Diagnosis    acetaminophen 325 MG tablet    TYLENOL    100 tablet    Take 2 tablets (650 mg) by mouth every 6 hours as needed for mild pain    Migraines, Pulmonary emboli (H)       calcium-magnesium 500-250 MG Tabs per tablet    CALMAG     Take 1 tablet by mouth 2 times daily (with meals)        cholecalciferol 1000 units Tabs      Take 1,000 Units by mouth daily        propranolol 20 MG tablet    INDERAL    90 tablet    TAKE 1 TABLET(20 MG) BY MOUTH DAILY. Follow up for further refills    Migraine without status migrainosus, not intractable, unspecified migraine type       valACYclovir 500 MG tablet    VALTREX    6 tablet    Take 1 tablet (500 mg) by mouth 2 times daily    Herpes genitalis in women       warfarin 2 MG tablet    COUMADIN    180 tablet    TAKE 2 TABLETS(4 MG) BY MOUTH EVERY DAY    Chronic anticoagulation

## 2018-07-16 NOTE — NURSING NOTE
"Chief Complaint   Patient presents with     Hypertension     2 week follow up for HTN     Initial /70 (BP Location: Left arm, Patient Position: Chair, Cuff Size: Adult Regular)  Pulse 70  Temp 97.5  F (36.4  C) (Oral)  Resp 15  Ht 5' 4.5\" (1.638 m)  Wt 121 lb 8 oz (55.1 kg)  SpO2 99%  Breastfeeding? No  BMI 20.53 kg/m2 Estimated body mass index is 20.53 kg/(m^2) as calculated from the following:    Height as of this encounter: 5' 4.5\" (1.638 m).    Weight as of this encounter: 121 lb 8 oz (55.1 kg).  BP completed using cuff size: sam Coronado  "

## 2018-07-16 NOTE — PATIENT INSTRUCTIONS
[facility]    If you have any questions regarding to your visit please contact your care team:       Team Purple:   Clinic Hours Telephone Number   Dr. Shanti Prabhakar   7am-7pm  Monday - Thursday   7am-5pm  Fridays  [phone]  (Appointment scheduling available 24/7)    Questions about your recent visit?   Team Line:  [phone]   Urgent Care - [place] and [facility] - 11am-9pm Monday-Friday Saturday-Sunday- 9am-5pm   [place] - 5pm-9pm Monday-Friday Saturday-Sunday- 9am-5pm  [phone] - [place]  [phone] - [place]       What options do I have for a visit other than an office visit? We offer electronic visits (e-visits) and telephone visits, when medically appropriate.  Please check with your medical insurance to see if these types of visits are covered, as you will be responsible for any charges that are not paid by your insurance.      You can use [employer] (secure electronic communication) to access to your chart, send your primary care provider a message, or make an appointment. Ask a team member how to get started.     For a price quote for your services, please call our Consumer Price Line at [phone] or our Imaging Cost estimation line at [phone] (for imaging tests).    Eze Coronado

## 2018-07-16 NOTE — PROGRESS NOTES
SUBJECTIVE:   Nury Cárdenas is a 73 year old female with stable Granulomatosis with polyangiitis who presents to clinic today for the following health issues:    Hypertension Follow-up    Outpatient blood pressures are being checked at home.  Results are 120-100/70-60.    Low Salt Diet: low salt  BP Readings from Last 6 Encounters:   07/16/18 122/70   07/13/18 119/66   07/12/18 104/72   07/11/18 118/64   07/10/18 100/64   07/06/18 128/74     Patient decided to make some lifestyle adjustments mainly tailored towards reducing her stress and her BP been within normal limits.  She says she will want to avoid too many medications and will make the necessary sacrifices and has given up a lot of social media.      Amount of exercise or physical activity: 6-7 days/week for an average of 15-30 minutes    Problems taking medications regularly: No    Medication side effects: none    Diet: regular (no restrictions) and low salt    Problem list and histories reviewed & adjusted, as indicated.  Additional history: as documented    Patient Active Problem List   Diagnosis     GERD (gastroesophageal reflux disease)     Fibroids     Migraines     Hearing loss     Osteopenia     HYPERLIPIDEMIA LDL GOAL <160     Advanced directives, counseling/discussion     Inflammatory arthritis     Synovitis of wrist     Chronic constipation     Granulomatosis with polyangiitis (H)     Chronic steroid use     Dyspnea     Pulmonary embolism (H)     Cataract, mild, ou     Calculus of kidney, right     Chronic anticoagulation     Long-term (current) use of anticoagulants [Z79.01]     Long-term use of immunosuppressant medication     Primary osteoarthritis involving multiple joints     Cellulitis     Cat scratch left lower extremity     Other pulmonary embolism without acute cor pulmonale, unspecified chronicity (H)     Past Surgical History:   Procedure Laterality Date     ABDOMEN SURGERY      appendix out     APPENDECTOMY       BIOPSY  1972     cone biopsy     CL AFF SURGICAL PATHOLOGY      cone biopsy 1975     COLONOSCOPY       COLONOSCOPY WITH CO2 INSUFFLATION N/A 12/20/2017    Procedure: COLONOSCOPY WITH CO2 INSUFFLATION;  Screening  BMI: 20.7  Pharmacy: [facility]  [phone]  [facility]  Referring: Milena  Patient get ill from [facility];  Surgeon: Duane, William Charles, MD;  Location:  OR     EYE SURGERY      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     HC ESOPHAGOSCOPY, DIAGNOSTIC       OPTICAL TRACKING SYSTEM BRONCHOSCOPY  6/19/2014    Procedure: OPTICAL TRACKING SYSTEM BRONCHOSCOPY;  Surgeon: Angel Kathleen MD;  Location:  GI     SOFT TISSUE SURGERY      remove senovial fluid from right wrist     SURGICAL HISTORY OF -   as a child    strabismus repair right eye     SURGICAL HISTORY OF -   8-2009    right wrist debridement     TONSILLECTOMY      as a child     TONSILLECTOMY & ADENOIDECTOMY       TUBAL LIGATION         Social History   Substance Use Topics     Smoking status: Former Smoker     Packs/day: 2.00     Years: 20.00     Types: Cigarettes     Start date: 2/8/1961     Quit date: 8/1/1983     Smokeless tobacco: Never Used     Alcohol use No     Family History   Problem Relation Age of Onset     Respiratory Mother      emphysema     Aneurysm Mother      Chronic Obstructive Pulmonary Disease Mother      Thyroid Disease Mother      ,     Osteoperosis Mother      Other Cancer Father      Cancer Father      lung cancer     Arthritis Maternal Grandmother      rheumatoid     HEART DISEASE Maternal Grandmother      unsure of details     HEART DISEASE Maternal Grandfather      Neurologic Disorder Paternal Grandmother      migraines     Alzheimer Disease Paternal Grandmother      Unknown/Adopted Paternal Grandfather      Cancer Sister      stage 4 anal cancer age 62 years     Colon Cancer Sister      Cancer - colorectal Brother      Colon Cancer Brother      Substance Abuse Sister      Anesthesia Reaction Daughter       "    Reviewed and updated as needed this visit by clinical staff    ROS:  Constitutional, HEENT, cardiovascular, pulmonary, gi and gu systems are negative, except as otherwise noted.    OBJECTIVE:     /70 (BP Location: Left arm, Patient Position: Chair, Cuff Size: Adult Regular)  Pulse 70  Temp 97.5  F (36.4  C) (Oral)  Resp 15  Ht 5' 4.5\" (1.638 m)  Wt 121 lb 8 oz (55.1 kg)  SpO2 99%  Breastfeeding? No  BMI 20.53 kg/m2  Body mass index is 20.53 kg/(m^2).  GENERAL: Pleasant frail elderly female, alert and no distress  NECK: no adenopathy and thyroid normal to palpation  RESP: lungs clear to auscultation - no rales, rhonchi or wheezes  CV: regular rate and rhythm, normal S1 S2, no S3 or S4, no murmur, click or rub, no peripheral edema.  ABDOMEN: soft, nontender, no masses and bowel sounds normal  MS: no gross musculoskeletal defects noted, no edema    Diagnostic Test Results:  none     ASSESSMENT/PLAN:     (R03.0) Elevated blood pressure reading without diagnosis of hypertension  (primary encounter diagnosis)  Comment: BP reading are within normal range. Maintain the stress free lifestyle and reduce salt intake  Plan: Lifestyle modification.    Follow up in 3 months or sooner with concerns    Phil Abbott MD  [facility]  "

## 2018-07-20 ENCOUNTER — ALLIED HEALTH/NURSE VISIT (OUTPATIENT)
Dept: FAMILY MEDICINE | Facility: CLINIC | Age: 73
End: 2018-07-20
Payer: COMMERCIAL

## 2018-07-20 VITALS — DIASTOLIC BLOOD PRESSURE: 62 MMHG | SYSTOLIC BLOOD PRESSURE: 110 MMHG

## 2018-07-20 DIAGNOSIS — Z01.30 BP CHECK: Primary | ICD-10-CM

## 2018-07-20 PROCEDURE — 99207 ZZC NO CHARGE NURSE ONLY: CPT | Performed by: FAMILY MEDICINE

## 2018-07-20 NOTE — PROGRESS NOTES
Nury Cárdenas is enrolled/participating in the retail pharmacy Blood Pressure Goals Achievement Program (BPGAP).  Nury Cárdenas was evaluated at [facility] [place] on July 20, 2018 at which time her blood pressure was:    BP Readings from Last 3 Encounters:   07/20/18 110/62   07/16/18 122/70   07/13/18 119/66     Reviewed lifestyle modifications for blood pressure control and reduction: including making healthy food choices, managing weight, getting regular exercise, smoking cessation, reducing alcohol consumption, monitoring blood pressure regularly.     Nury Cárdenas is not experiencing symptoms.    Follow-Up: BP is at goal of < 150/90 mmHg (patient 60+ years of age without diabetes).  Recommended follow-up at pharmacy in 6 months.     Recommendation to Provider: n/a    Nury Cárdenas was evaluated for enrollment into the PGEN study today.    Patient eligible for enrollment:  No  Patient interested in enrollment:  No    Completed by: Betzaida Bonilla  Pharm.D.  [facility]  Float Pharmacist  On Behalf of [facility]

## 2018-07-20 NOTE — MR AVS SNAPSHOT
After Visit Summary   7/20/2018    Nury Cárdenas    MRN: [id number]           Patient Information     Date Of Birth          1945        Visit Information        Provider Department      7/20/2018 9:25 AM Phil Abbott MD [facility]        Today's Diagnoses     BP check    -  1       Follow-ups after your visit        Your next 10 appointments already scheduled     Aug 01, 2018  9:30 AM CDT   Anticoagulation Visit with FZ ANTI COAG   [facility] ([facility])    [address] [address] [phone]            Oct 12, 2018 10:30 AM CDT   LAB with LAB FIRST FLOOR [facility] ([facility])    [address] [place] [address] [phone]           Please do not eat 10-12 hours before your appointment if you are coming in fasting for labs on lipids, cholesterol, or glucose (sugar). This does not apply to pregnant women. Water, hot tea and black coffee (with nothing added) are okay. Do not drink other fluids, diet soda or chew gum.            Oct 12, 2018 11:00 AM CDT   Return Visit with Alvaro Maguire MD   [facility] ([facility])    [address] [place] [address] [phone]              Who to contact     If you have questions or need follow up information about today's clinic visit or your schedule please contact [facility] directly at [phone].  Normal or non-critical lab and imaging results will be communicated to you by MyChart, letter or phone within 4 business days after the clinic has received the results. If you do not hear from us within 7 days, please contact the clinic through MyChart or phone. If you have a critical or abnormal lab result, we will notify you by phone as soon as possible.  Submit refill requests through [employer] or call your pharmacy and they will forward the refill  request to us. Please allow 3 business days for your refill to be completed.          Additional Information About Your Visit        MyChart Information     [employer] gives you secure access to your electronic health record. If you see a primary care provider, you can also send messages to your care team and make appointments. If you have questions, please call your primary care clinic.  If you do not have a primary care provider, please call [phone] and they will assist you.        Care EveryWhere ID     This is your Care EveryWhere ID. This could be used by other organizations to access your [facility] medical records  [id number]         Blood Pressure from Last 3 Encounters:   07/20/18 110/62   07/16/18 122/70   07/13/18 119/66    Weight from Last 3 Encounters:   07/16/18 121 lb 8 oz (55.1 kg)   07/02/18 120 lb 12.8 oz (54.8 kg)   03/12/18 122 lb 3 oz (55.4 kg)              Today, you had the following     No orders found for display       Primary Care Provider Office Phone # Fax #    Phil Abbott [phone]       [address] [facility] [address]        Equal Access to Services     MARTÍN MYERS AH: Hadii aad ku hadasho Soprabhuali, waaxda luqadaha, qaybta kaalmada adeegyada, lavell angel. So [facility] [phone].    ATENCIÓN: Si habla español, tiene a fofana disposición servicios gratuitos de asistencia lingüística. [facility] [phone].    We comply with applicable federal civil rights laws and [place] laws. We do not discriminate on the basis of race, color, national origin, age, disability, sex, sexual orientation, or gender identity.            Thank you!     Thank you for choosing [facility]  for your care. Our goal is always to provide you with excellent care. Hearing back from our patients is one way we can continue to improve our services. Please take a few minutes to complete the written survey that you may receive in the mail after  your visit with us. Thank you!             Your Updated Medication List - Protect others around you: Learn how to safely use, store and throw away your medicines at www.disposemymeds.org.          This list is accurate as of 7/20/18  9:26 AM.  Always use your most recent med list.                   Brand Name Dispense Instructions for use Diagnosis    acetaminophen 325 MG tablet    TYLENOL    100 tablet    Take 2 tablets (650 mg) by mouth every 6 hours as needed for mild pain    Migraines, Pulmonary emboli (H)       calcium-magnesium 500-250 MG Tabs per tablet    CALMAG     Take 1 tablet by mouth 2 times daily (with meals)        cholecalciferol 1000 units Tabs      Take 1,000 Units by mouth daily        propranolol 20 MG tablet    INDERAL    90 tablet    TAKE 1 TABLET(20 MG) BY MOUTH DAILY. Follow up for further refills    Migraine without status migrainosus, not intractable, unspecified migraine type       valACYclovir 500 MG tablet    VALTREX    6 tablet    Take 1 tablet (500 mg) by mouth 2 times daily    Herpes genitalis in women       warfarin 2 MG tablet    COUMADIN    180 tablet    TAKE 2 TABLETS(4 MG) BY MOUTH EVERY DAY    Chronic anticoagulation

## 2018-07-27 ENCOUNTER — ALLIED HEALTH/NURSE VISIT (OUTPATIENT)
Dept: FAMILY MEDICINE | Facility: CLINIC | Age: 73
End: 2018-07-27
Payer: COMMERCIAL

## 2018-07-27 VITALS — SYSTOLIC BLOOD PRESSURE: 108 MMHG | DIASTOLIC BLOOD PRESSURE: 70 MMHG

## 2018-07-27 DIAGNOSIS — Z01.30 BP CHECK: Primary | ICD-10-CM

## 2018-07-27 PROCEDURE — 99207 ZZC NO CHARGE NURSE ONLY: CPT | Performed by: FAMILY MEDICINE

## 2018-07-27 NOTE — PROGRESS NOTES
Nury Cárdenas is enrolled/participating in the retail pharmacy Blood Pressure Goals Achievement Program (BPGAP).  Nury Cárdenas was evaluated at [facility] on July 27, 2018 at which time her blood pressure was:    BP Readings from Last 3 Encounters:   07/27/18 108/70   07/20/18 110/62   07/16/18 122/70     Reviewed lifestyle modifications for blood pressure control and reduction: including making healthy food choices, managing weight, getting regular exercise, smoking cessation, reducing alcohol consumption, monitoring blood pressure regularly.     Nury Cárdenas is not experiencing symptoms.    Follow-Up: BP is at goal of < 140/90mmHg (patient 18+ years of age with or without diabetes).  Recommended follow-up at pharmacy in 6 months.     Recommendation to Provider: None at this time.     Nury Cárdenas was evaluated for enrollment into the PGEN study today.    Patient eligible for enrollment:  No  Patient interested in enrollment:  No    Completed by: Thank you,  Berenice Leavitt, PharmD  [facility]  [phone]

## 2018-07-27 NOTE — MR AVS SNAPSHOT
After Visit Summary   7/27/2018    Nury Cárdenas    MRN: [id number]           Patient Information     Date Of Birth          1945        Visit Information        Provider Department      7/27/2018 10:23 AM Phil Abbott MD [facility] [place]        Today's Diagnoses     BP check    -  1       Follow-ups after your visit        Your next 10 appointments already scheduled     Aug 01, 2018  9:30 AM CDT   Anticoagulation Visit with FZ ANTI COAG   [facility] [place] ([facility] [place])    [address] [facility] [address] [phone]            Oct 12, 2018 10:30 AM CDT   LAB with LAB FIRST FLOOR [facility] ([facility])    [address] [place] [address] [phone]           Please do not eat 10-12 hours before your appointment if you are coming in fasting for labs on lipids, cholesterol, or glucose (sugar). This does not apply to pregnant women. Water, hot tea and black coffee (with nothing added) are okay. Do not drink other fluids, diet soda or chew gum.            Oct 12, 2018 11:00 AM CDT   Return Visit with Alvaro Maguire MD   [facility] ([facility])    [address] [place] [address] [phone]              Who to contact     If you have questions or need follow up information about today's clinic visit or your schedule please contact [facility] directly at [phone].  Normal or non-critical lab and imaging results will be communicated to you by MyChart, letter or phone within 4 business days after the clinic has received the results. If you do not hear from us within 7 days, please contact the clinic through MyChart or phone. If you have a critical or abnormal lab result, we will notify you by phone as soon as possible.  Submit refill requests through [employer] or call your pharmacy and they will forward the refill  request to us. Please allow 3 business days for your refill to be completed.          Additional Information About Your Visit        MyChart Information     [employer] gives you secure access to your electronic health record. If you see a primary care provider, you can also send messages to your care team and make appointments. If you have questions, please call your primary care clinic.  If you do not have a primary care provider, please call [phone] and they will assist you.        Care EveryWhere ID     This is your Care EveryWhere ID. This could be used by other organizations to access your [facility] medical records  [id number]         Blood Pressure from Last 3 Encounters:   07/27/18 108/70   07/20/18 110/62   07/16/18 122/70    Weight from Last 3 Encounters:   07/16/18 121 lb 8 oz (55.1 kg)   07/02/18 120 lb 12.8 oz (54.8 kg)   03/12/18 122 lb 3 oz (55.4 kg)              Today, you had the following     No orders found for display       Primary Care Provider Office Phone # Fax #    Phil Abbott [phone] [address] [facility] [address]        Equal Access to Services     MARTÍN MYERS AH: Hadii aad ku hadasho Soprabhuali, waaxda luqadaha, qaybta kaalmada adeegyada, lavell angel. So [facility] [phone].    ATENCIÓN: Si habla español, tiene a fofana disposición servicios gratuitos de asistencia lingüística. [facility] [phone].    We comply with applicable federal civil rights laws and [place] laws. We do not discriminate on the basis of race, color, national origin, age, disability, sex, sexual orientation, or gender identity.            Thank you!     Thank you for choosing [facility]  for your care. Our goal is always to provide you with excellent care. Hearing back from our patients is one way we can continue to improve our services. Please take a few minutes to complete the written survey that you may receive in the mail after  your visit with us. Thank you!             Your Updated Medication List - Protect others around you: Learn how to safely use, store and throw away your medicines at www.disposemymeds.org.          This list is accurate as of 7/27/18 10:25 AM.  Always use your most recent med list.                   Brand Name Dispense Instructions for use Diagnosis    acetaminophen 325 MG tablet    TYLENOL    100 tablet    Take 2 tablets (650 mg) by mouth every 6 hours as needed for mild pain    Migraines, Pulmonary emboli (H)       calcium-magnesium 500-250 MG Tabs per tablet    CALMAG     Take 1 tablet by mouth 2 times daily (with meals)        cholecalciferol 1000 units Tabs      Take 1,000 Units by mouth daily        propranolol 20 MG tablet    INDERAL    90 tablet    TAKE 1 TABLET(20 MG) BY MOUTH DAILY. Follow up for further refills    Migraine without status migrainosus, not intractable, unspecified migraine type       valACYclovir 500 MG tablet    VALTREX    6 tablet    Take 1 tablet (500 mg) by mouth 2 times daily    Herpes genitalis in women       warfarin 2 MG tablet    COUMADIN    180 tablet    TAKE 2 TABLETS(4 MG) BY MOUTH EVERY DAY    Chronic anticoagulation

## 2018-08-01 ENCOUNTER — ANTICOAGULATION THERAPY VISIT (OUTPATIENT)
Dept: NURSING | Facility: CLINIC | Age: 73
End: 2018-08-01
Payer: COMMERCIAL

## 2018-08-01 DIAGNOSIS — Z79.01 LONG-TERM (CURRENT) USE OF ANTICOAGULANTS: ICD-10-CM

## 2018-08-01 DIAGNOSIS — I26.99 OTHER PULMONARY EMBOLISM WITHOUT ACUTE COR PULMONALE, UNSPECIFIED CHRONICITY (H): ICD-10-CM

## 2018-08-01 LAB — INR POINT OF CARE: 2.3 (ref 0.86–1.14)

## 2018-08-01 PROCEDURE — 36416 COLLJ CAPILLARY BLOOD SPEC: CPT

## 2018-08-01 PROCEDURE — 99207 ZZC NO CHARGE NURSE ONLY: CPT

## 2018-08-01 PROCEDURE — 85610 PROTHROMBIN TIME: CPT | Mod: QW

## 2018-08-01 NOTE — MR AVS SNAPSHOT
Nury Cárdenas   8/1/2018 9:30 AM   Anticoagulation Therapy Visit    Description:  73 year old female   Provider:  MONI ANTI COAG   Department:  [facility]           INR as of 8/1/2018     Today's INR 2.3      Anticoagulation Summary as of 8/1/2018     INR goal 2.0-3.0   Today's INR 2.3   Full warfarin instructions 4 mg every day   Next INR check 9/12/2018    Indications   Long-term (current) use of anticoagulants [Z79.01] [Z79.01]  Pulmonary embolism (H) [I26.99]         Your next Anticoagulation Clinic appointment(s)     Aug 01, 2018  9:30 AM CDT   Anticoagulation Visit with MONI ANTI COAG   [facility] ([place] [id number] [facility] [address] [phone]            Sep 12, 2018  9:30 AM CDT   Anticoagulation Visit with MONI ANTI COAG   [facility] ([address] [address] [phone]              Contact Numbers     [facility]  Please call [phone] to cancel and/or reschedule your appointment   Please call [phone] with any problems or questions regarding your therapy.        August 2018 Details    Sun Mon Tue Wed Thu Fri Sat        1      4 mg   See details      2      4 mg         3      4 mg         4      4 mg           5      4 mg         6      4 mg         7      4 mg         8      4 mg         9      4 mg         10      4 mg         11      4 mg           12      4 mg         13      4 mg         14      4 mg         15      4 mg         16      4 mg         17      4 mg         18      4 mg           19      4 mg         20      4 mg         21      4 mg         22      4 mg         23      4 mg         24      4 mg         25      4 mg           26      4 mg         27      4 mg         28      4 mg         29      4 mg         30      4 mg         31      4 mg           Date Details   08/01 This INR check               How to take your warfarin dose     To take:  4 mg Take 2  of the 2 mg tablets.           September 2018 Details    Sun Mon Tue Wed Thu Fri Sat           1      4 mg           2      4 mg         3      4 mg         4      4 mg         5      4 mg         6      4 mg         7      4 mg         8      4 mg           9      4 mg         10      4 mg         11      4 mg         12            13               14               15                 16               17               18               19               20               21               22                 23               24               25               26               27               28               29                 30                      Date Details   No additional details    Date of next INR:  9/12/2018         How to take your warfarin dose     To take:  4 mg Take 2 of the 2 mg tablets.

## 2018-08-01 NOTE — PROGRESS NOTES
ANTICOAGULATION FOLLOW-UP CLINIC VISIT    Patient Name:  Nury Cárdenas  Date:  8/1/2018  Contact Type:  Face to Face    SUBJECTIVE:     Patient Findings     Positives No Problem Findings           OBJECTIVE    INR Protime   Date Value Ref Range Status   08/01/2018 2.3 (A) 0.86 - 1.14 Final       ASSESSMENT / PLAN  INR assessment THER    Recheck INR In: 6 WEEKS    INR Location Clinic      Anticoagulation Summary as of 8/1/2018     INR goal 2.0-3.0   Today's INR 2.3   Warfarin maintenance plan 4 mg (2 mg x 2) every day   Full warfarin instructions 4 mg every day   Weekly warfarin total 28 mg   No change documented Kirti Lacy RN   Plan last modified Giselle Price RN (7/7/2016)   Next INR check 9/12/2018   Priority INR   Target end date Indefinite    Indications   Long-term (current) use of anticoagulants [Z79.01] [Z79.01]  Pulmonary embolism (H) [I26.99]         Anticoagulation Episode Summary     INR check location     Preferred lab     Send INR reminders to [facility]    Comments       Anticoagulation Care Providers     Provider Role Specialty Phone number    Phil Abbott MD [facility] [phone]            See the Encounter Report to view Anticoagulation Flowsheet and Dosing Calendar (Go to Encounters tab in chart review, and find the Anticoagulation Therapy Visit)    Dosage adjustment made based on physician directed care plan.    Kirti Lacy RN

## 2018-08-03 ENCOUNTER — ALLIED HEALTH/NURSE VISIT (OUTPATIENT)
Dept: FAMILY MEDICINE | Facility: CLINIC | Age: 73
End: 2018-08-03
Payer: COMMERCIAL

## 2018-08-03 VITALS — SYSTOLIC BLOOD PRESSURE: 132 MMHG | DIASTOLIC BLOOD PRESSURE: 74 MMHG

## 2018-08-03 DIAGNOSIS — Z01.30 BP CHECK: Primary | ICD-10-CM

## 2018-08-03 PROCEDURE — 99207 ZZC NO CHARGE NURSE ONLY: CPT | Performed by: FAMILY MEDICINE

## 2018-08-03 NOTE — PROGRESS NOTES
Nury Cárdenas is enrolled/participating in the retail pharmacy Blood Pressure Goals Achievement Program (BPGAP).  Nury Cárdenas was evaluated at [facility] on August 3, 2018 at which time her blood pressure was:    BP Readings from Last 3 Encounters:   08/03/18 132/74   07/27/18 108/70   07/20/18 110/62     Reviewed lifestyle modifications for blood pressure control and reduction: including making healthy food choices, managing weight, getting regular exercise, smoking cessation, reducing alcohol consumption, monitoring blood pressure regularly.     Nury Cárdenas is not experiencing symptoms.    Follow-Up: BP is at goal of < 150/90 mmHg (patient 60+ years of age without diabetes).  Recommended follow-up at pharmacy in 6 months.     Recommendation to Provider: The patient is currently at goal of < 150/90 mmHg with today's reading of 132/74 mmHg. The patient will be followed-up in the future for an additional blood pressure reading. The patient expressed concern of 132/74 mmHg of being higher than usual. The patient stated she experiences no dizziness or other side effects. No changes recommended at this time.    Completed by:  Sean Root  [facility]  Student, P4  [facility]

## 2018-08-03 NOTE — MR AVS SNAPSHOT
After Visit Summary   8/3/2018    Nury Cárdenas    MRN: [id number]           Patient Information     Date Of Birth          1945        Visit Information        Provider Department      8/3/2018 8:17 AM Phil Abbott MD [facility]        Today's Diagnoses     BP check    -  1       Follow-ups after your visit        Your next 10 appointments already scheduled     Sep 12, 2018  9:30 AM CDT   Anticoagulation Visit with FZ ANTI COAG   [facility] ([facility])    [address] [address] [phone]            Oct 12, 2018 10:30 AM CDT   LAB with LAB FIRST FLOOR [facility] ([facility])    [address] [place] [address] [phone]           Please do not eat 10-12 hours before your appointment if you are coming in fasting for labs on lipids, cholesterol, or glucose (sugar). This does not apply to pregnant women. Water, hot tea and black coffee (with nothing added) are okay. Do not drink other fluids, diet soda or chew gum.            Oct 12, 2018 11:00 AM CDT   Return Visit with Alvaro Maguire MD   [facility] ([facility])    [address] [place] [address] [phone]              Who to contact     If you have questions or need follow up information about today's clinic visit or your schedule please contact [facility] directly at [phone].  Normal or non-critical lab and imaging results will be communicated to you by MyChart, letter or phone within 4 business days after the clinic has received the results. If you do not hear from us within 7 days, please contact the clinic through MyChart or phone. If you have a critical or abnormal lab result, we will notify you by phone as soon as possible.  Submit refill requests through [employer] or call your pharmacy and they will forward the  refill request to us. Please allow 3 business days for your refill to be completed.          Additional Information About Your Visit        [employer] Information     [employer] gives you secure access to your electronic health record. If you see a primary care provider, you can also send messages to your care team and make appointments. If you have questions, please call your primary care clinic.  If you do not have a primary care provider, please call [phone] and they will assist you.        Care EveryWhere ID     This is your Care EveryWhere ID. This could be used by other organizations to access your [place] medical records  [id number]         Blood Pressure from Last 3 Encounters:   08/03/18 132/74   07/27/18 108/70   07/20/18 110/62    Weight from Last 3 Encounters:   07/16/18 55.1 kg (121 lb 8 oz)   07/02/18 54.8 kg (120 lb 12.8 oz)   03/12/18 55.4 kg (122 lb 3 oz)              Today, you had the following     No orders found for display       Primary Care Provider Office Phone # Fax #    Phil Abbott [phone]       [address] [place] [address]        Equal Access to Services     MARTÍN MYERS : Hadii abena vasquez hadasho Soprabhuali, waaxda luqadaha, qaybta kaalmada adeegyada, lavell angel. So [facility] [phone].    ATENCIÓN: Si habla español, tiene a fofana disposición servicios gratuitos de asistencia lingüística. Nancy al [phone].    We comply with applicable federal civil rights laws and [place] laws. We do not discriminate on the basis of race, color, national origin, age, disability, sex, sexual orientation, or gender identity.            Thank you!     Thank you for choosing [facility]  for your care. Our goal is always to provide you with excellent care. Hearing back from our patients is one way we can continue to improve our services. Please take a few minutes to complete the written survey that you may receive in the  mail after your visit with us. Thank you!             Your Updated Medication List - Protect others around you: Learn how to safely use, store and throw away your medicines at www.disposemymeds.org.          This list is accurate as of 8/3/18  8:38 AM.  Always use your most recent med list.                   Brand Name Dispense Instructions for use Diagnosis    acetaminophen 325 MG tablet    TYLENOL    100 tablet    Take 2 tablets (650 mg) by mouth every 6 hours as needed for mild pain    Migraines, Pulmonary emboli (H)       calcium-magnesium 500-250 MG Tabs per tablet    CALMAG     Take 1 tablet by mouth 2 times daily (with meals)        cholecalciferol 1000 units Tabs      Take 1,000 Units by mouth daily        propranolol 20 MG tablet    INDERAL    90 tablet    TAKE 1 TABLET(20 MG) BY MOUTH DAILY. Follow up for further refills    Migraine without status migrainosus, not intractable, unspecified migraine type       valACYclovir 500 MG tablet    VALTREX    6 tablet    Take 1 tablet (500 mg) by mouth 2 times daily    Herpes genitalis in women       warfarin 2 MG tablet    COUMADIN    180 tablet    TAKE 2 TABLETS(4 MG) BY MOUTH EVERY DAY    Chronic anticoagulation

## 2018-08-24 ENCOUNTER — ALLIED HEALTH/NURSE VISIT (OUTPATIENT)
Dept: FAMILY MEDICINE | Facility: CLINIC | Age: 73
End: 2018-08-24
Payer: COMMERCIAL

## 2018-08-24 VITALS — SYSTOLIC BLOOD PRESSURE: 120 MMHG | DIASTOLIC BLOOD PRESSURE: 68 MMHG

## 2018-08-24 DIAGNOSIS — Z01.30 BP CHECK: Primary | ICD-10-CM

## 2018-08-24 PROCEDURE — 99207 ZZC NO CHARGE NURSE ONLY: CPT | Performed by: FAMILY MEDICINE

## 2018-08-24 NOTE — MR AVS SNAPSHOT
After Visit Summary   8/24/2018    Nury Cárdenas    MRN: [id number]           Patient Information     Date Of Birth          1945        Visit Information        Provider Department      8/24/2018 10:13 AM Phil Abbott MD [facility]        Today's Diagnoses     BP check    -  1       Follow-ups after your visit        Your next 10 appointments already scheduled     Sep 12, 2018  9:30 AM CDT   Anticoagulation Visit with FZ ANTI COAG   [facility] ([facility])    [address] [address] [phone]            Oct 12, 2018 10:30 AM CDT   LAB with LAB FIRST FLOOR [facility] ([facility])    [address] [place] [address] [phone]           Please do not eat 10-12 hours before your appointment if you are coming in fasting for labs on lipids, cholesterol, or glucose (sugar). This does not apply to pregnant women. Water, hot tea and black coffee (with nothing added) are okay. Do not drink other fluids, diet soda or chew gum.            Oct 12, 2018 11:00 AM CDT   Return Visit with Alvaro Maguire MD   [facility] ([facility])    [address] [place] [address] [phone]              Who to contact     If you have questions or need follow up information about today's clinic visit or your schedule please contact [facility] directly at [phone].  Normal or non-critical lab and imaging results will be communicated to you by MyChart, letter or phone within 4 business days after the clinic has received the results. If you do not hear from us within 7 days, please contact the clinic through MyChart or phone. If you have a critical or abnormal lab result, we will notify you by phone as soon as possible.  Submit refill requests through "[employer]" or call your pharmacy and they will forward the refill  request to us. Please allow 3 business days for your refill to be completed.          Additional Information About Your Visit        MyChart Information     [employer] gives you secure access to your electronic health record. If you see a primary care provider, you can also send messages to your care team and make appointments. If you have questions, please call your primary care clinic.  If you do not have a primary care provider, please call [phone] and they will assist you.        Care EveryWhere ID     This is your Care EveryWhere ID. This could be used by other organizations to access your [facility] medical records  [id number]         Blood Pressure from Last 3 Encounters:   08/24/18 120/68   08/03/18 132/74   07/27/18 108/70    Weight from Last 3 Encounters:   07/16/18 121 lb 8 oz (55.1 kg)   07/02/18 120 lb 12.8 oz (54.8 kg)   03/12/18 122 lb 3 oz (55.4 kg)              Today, you had the following     No orders found for display       Primary Care Provider Office Phone # Fax #    Phil Abbott [phone] [address] [address]        Equal Access to Services     MARTÍN MYERS AH: Hadii aad ku hadasho Soprabhuali, waaxda luqadaha, qaybta kaalmada adeegyada, lavell angel. So [facility] [phone].    ATENCIÓN: Si habla español, tiene a fofana disposición servicios gratuitos de asistencia lingüística. [facility] [phone].    We comply with applicable federal civil rights laws and [place] laws. We do not discriminate on the basis of race, color, national origin, age, disability, sex, sexual orientation, or gender identity.            Thank you!     Thank you for choosing [facility]  for your care. Our goal is always to provide you with excellent care. Hearing back from our patients is one way we can continue to improve our services. Please take a few minutes to complete the written survey that you may receive in the mail after  your visit with us. Thank you!             Your Updated Medication List - Protect others around you: Learn how to safely use, store and throw away your medicines at www.disposemymeds.org.          This list is accurate as of 8/24/18 10:15 AM.  Always use your most recent med list.                   Brand Name Dispense Instructions for use Diagnosis    acetaminophen 325 MG tablet    TYLENOL    100 tablet    Take 2 tablets (650 mg) by mouth every 6 hours as needed for mild pain    Migraines, Pulmonary emboli (H)       calcium-magnesium 500-250 MG Tabs per tablet    CALMAG     Take 1 tablet by mouth 2 times daily (with meals)        cholecalciferol 1000 units Tabs      Take 1,000 Units by mouth daily        propranolol 20 MG tablet    INDERAL    90 tablet    TAKE 1 TABLET(20 MG) BY MOUTH DAILY. Follow up for further refills    Migraine without status migrainosus, not intractable, unspecified migraine type       valACYclovir 500 MG tablet    VALTREX    6 tablet    Take 1 tablet (500 mg) by mouth 2 times daily    Herpes genitalis in women       warfarin 2 MG tablet    COUMADIN    180 tablet    TAKE 2 TABLETS(4 MG) BY MOUTH EVERY DAY    Chronic anticoagulation

## 2018-08-24 NOTE — PROGRESS NOTES
Nury Cárdenas is enrolled/participating in the retail pharmacy Blood Pressure Goals Achievement Program (BPGAP).  Nury Cárdenas was evaluated at [facility] on August 24, 2018 at which time her blood pressure was:    BP Readings from Last 3 Encounters:   08/24/18 120/68   08/03/18 132/74   07/27/18 108/70     Reviewed lifestyle modifications for blood pressure control and reduction: including making healthy food choices, managing weight, getting regular exercise, smoking cessation, reducing alcohol consumption, monitoring blood pressure regularly.     Nury Cárdenas is not experiencing symptoms.    Follow-Up: BP is at goal of < 140/90mmHg (patient 18+ years of age with or without diabetes).  Recommended follow-up at pharmacy in 6 months.     Recommendation to Provider: None at this time.     Nury Cárdenas was evaluated for enrollment into the PGEN study today.    Patient eligible for enrollment:  No  Patient interested in enrollment:  No    Completed by: Thank you,  Berenice Leavitt, PharmD  [facility]  [phone]

## 2018-08-27 NOTE — PROGRESS NOTES
SUBJECTIVE:   Nury Cárdenas is a 73 year old female who presents to clinic today for the following health issues:    Musculoskeletal problem/pain      Duration: 1 day    Description  Location: right lower leg    Intensity:  moderate, severe    Accompanying signs and symptoms: pain    History  Previous similar problem: YES  Previous evaluation:  none    Precipitating or alleviating factors:  Trauma or overuse: no   Aggravating factors include: if touched    Therapies tried and outcome: rest/inactivity    Pain on the lateral aspect of the RLE; with pain and sensitivity of the skin.  Been happening about once a week but was worse yesterday but is better overnight.    Problem list and histories reviewed & adjusted, as indicated.  Additional history: as documented    Patient Active Problem List   Diagnosis     GERD (gastroesophageal reflux disease)     Fibroids     Migraines     Hearing loss     Osteopenia     HYPERLIPIDEMIA LDL GOAL <160     Advanced directives, counseling/discussion     Inflammatory arthritis     Synovitis of wrist     Chronic constipation     Granulomatosis with polyangiitis (H)     Chronic steroid use     Dyspnea     Pulmonary embolism (H)     Cataract, mild, ou     Calculus of kidney, right     Chronic anticoagulation     Long-term (current) use of anticoagulants [Z79.01]     Long-term use of immunosuppressant medication     Primary osteoarthritis involving multiple joints     Cellulitis     Cat scratch left lower extremity     Other pulmonary embolism without acute cor pulmonale, unspecified chronicity (H)     Past Surgical History:   Procedure Laterality Date     ABDOMEN SURGERY      appendix out     APPENDECTOMY       BIOPSY  1972    cone biopsy     CL AFF SURGICAL PATHOLOGY      cone biopsy 1975     COLONOSCOPY       COLONOSCOPY WITH CO2 INSUFFLATION N/A 12/20/2017    Procedure: COLONOSCOPY WITH CO2 INSUFFLATION;  Screening  BMI: 20.7  Pharmacy: [facility]  [phone]  [facility]  Referring: Milena  Patient get ill from [facility];  Surgeon: Duane, William Charles, MD;  Location:  OR     EYE SURGERY      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     HC ESOPHAGOSCOPY, DIAGNOSTIC       OPTICAL TRACKING SYSTEM BRONCHOSCOPY  6/19/2014    Procedure: OPTICAL TRACKING SYSTEM BRONCHOSCOPY;  Surgeon: Angel Kathleen MD;  Location:  GI     SOFT TISSUE SURGERY      remove senovial fluid from right wrist     SURGICAL HISTORY OF -   as a child    strabismus repair right eye     SURGICAL HISTORY OF -   8-2009    right wrist debridement     TONSILLECTOMY      as a child     TONSILLECTOMY & ADENOIDECTOMY       TUBAL LIGATION         Social History   Substance Use Topics     Smoking status: Former Smoker     Packs/day: 2.00     Years: 20.00     Types: Cigarettes     Start date: 2/8/1961     Quit date: 8/1/1983     Smokeless tobacco: Never Used     Alcohol use No     Family History   Problem Relation Age of Onset     Respiratory Mother      emphysema     Aneurysm Mother      Chronic Obstructive Pulmonary Disease Mother      Thyroid Disease Mother      ,     Osteoperosis Mother      Other Cancer Father      Cancer Father      lung cancer     Arthritis Maternal Grandmother      rheumatoid     HEART DISEASE Maternal Grandmother      unsure of details     HEART DISEASE Maternal Grandfather      Neurologic Disorder Paternal Grandmother      migraines     Alzheimer Disease Paternal Grandmother      Unknown/Adopted Paternal Grandfather      Cancer Sister      stage 4 anal cancer age 62 years     Colon Cancer Sister      Cancer - colorectal Brother      Colon Cancer Brother      Substance Abuse Sister      Anesthesia Reaction Daughter          Reviewed and updated as needed this visit by clinical staff  Tobacco  Allergies  Meds  Med Hx  Surg Hx  Fam Hx  Soc Hx      ROS:  Constitutional, HEENT, cardiovascular, pulmonary, gi and gu systems are negative, except as  otherwise noted.    OBJECTIVE:     /76  Pulse 65  Resp 14  Wt 121 lb 8 oz (55.1 kg)  SpO2 100%  BMI 20.53 kg/m2  Body mass index is 20.53 kg/(m^2).  GENERAL: healthy, alert and no distress  NECK: no adenopathy and thyroid normal to palpation  RESP: lungs clear to auscultation - no rales, rhonchi or wheezes  CV: regular rate and rhythm, normal S1 S2, no S3 or S4, no murmur, click or rub, no peripheral edema   ABDOMEN: soft, nontender, no masses and bowel sounds normal  MS: RLE:  no gross musculoskeletal defects noted, no edema, vague sensitivity on lateral shin    ASSESSMENT/PLAN:     (R20.3) Hyperesthesia: Rt Leg  (primary encounter diagnosis)  Comment: Cause not clear, but spontaneously getting better  Plan: Expectant management    (M79.661) Pain of right lower leg  Comment: Tylenol as needed      Phil Abbott MD  [facility]

## 2018-08-28 ENCOUNTER — OFFICE VISIT (OUTPATIENT)
Dept: FAMILY MEDICINE | Facility: CLINIC | Age: 73
End: 2018-08-28
Payer: COMMERCIAL

## 2018-08-28 VITALS
SYSTOLIC BLOOD PRESSURE: 124 MMHG | HEART RATE: 65 BPM | BODY MASS INDEX: 20.53 KG/M2 | WEIGHT: 121.5 LBS | OXYGEN SATURATION: 100 % | DIASTOLIC BLOOD PRESSURE: 76 MMHG | RESPIRATION RATE: 14 BRPM

## 2018-08-28 DIAGNOSIS — R20.3 HYPERESTHESIA: Primary | ICD-10-CM

## 2018-08-28 DIAGNOSIS — M79.661 PAIN OF RIGHT LOWER LEG: ICD-10-CM

## 2018-08-28 PROCEDURE — 99213 OFFICE O/P EST LOW 20 MIN: CPT | Performed by: FAMILY MEDICINE

## 2018-08-28 NOTE — PATIENT INSTRUCTIONS
[facility]    If you have any questions regarding to your visit please contact your care team:       Team Purple:   Clinic Hours Telephone Number   Dr. Shanti Prabhakar   7am-7pm  Monday - Thursday   7am-5pm  Fridays  [phone]  (Appointment scheduling available 24/7)    Questions about your recent visit?   Team Line:  [phone]   Urgent Care - [place] and [facility] - 11am-9pm Monday-Friday Saturday-Sunday- 9am-5pm   [place] - 5pm-9pm Monday-Friday Saturday-Sunday- 9am-5pm  [phone] - [place]  [phone] - [place]       What options do I have for a visit other than an office visit? We offer electronic visits (e-visits) and telephone visits, when medically appropriate.  Please check with your medical insurance to see if these types of visits are covered, as you will be responsible for any charges that are not paid by your insurance.      You can use [employer] (secure electronic communication) to access to your chart, send your primary care provider a message, or make an appointment. Ask a team member how to get started.     For a price quote for your services, please call our Consumer Price Line at [phone] or our Imaging Cost estimation line at [phone] (for imaging tests).    Eze Coronado

## 2018-08-28 NOTE — MR AVS SNAPSHOT
After Visit Summary   8/28/2018    Nury Cárdenas    MRN: [id number]           Patient Information     Date Of Birth          1945        Visit Information        Provider Department      8/28/2018 1:40 PM Phil Abbott MD [facility]        Today's Diagnoses     Hyperesthesia: Rt Leg    -  1    Pain of right lower leg          Care Instructions    [facility]    If you have any questions regarding to your visit please contact your care team:       Team Purple:   Clinic Hours Telephone Number   Dr. Shanti Prabhakar   7am-7pm  Monday - Thursday   7am-5pm  Fridays  [phone]  (Appointment scheduling available 24/7)    Questions about your recent visit?   Team Line:  [phone]   Urgent Care - [place] and [facility] - 11am-9pm Monday-Friday Saturday-Sunday- 9am-5pm   [place] - 5pm-9pm Monday-Friday Saturday-Sunday- 9am-5pm  [phone] - [place]  [phone] - [place]       What options do I have for a visit other than an office visit? We offer electronic visits (e-visits) and telephone visits, when medically appropriate.  Please check with your medical insurance to see if these types of visits are covered, as you will be responsible for any charges that are not paid by your insurance.      You can use Gekko (secure electronic communication) to access to your chart, send your primary care provider a message, or make an appointment. Ask a team member how to get started.     For a price quote for your services, please call our Consumer Price Line at [phone] or our Imaging Cost estimation line at [phone] (for imaging tests).    Eze Coronado            Follow-ups after your visit        Your next 10 appointments already scheduled     Sep 12, 2018  9:30 AM CDT   Anticoagulation Visit with RAAD ANTI COAG   [facility] ([facility])    [address] [place] [address]  [place] [address]   [phone]            Oct 12, 2018 10:30 AM CDT   LAB with LAB [facility]OR [facility]    [address] [place] [id number] [phone]           Please do not eat 10-12 hours before your appointment if you are coming in fasting for labs on lipids, cholesterol, or glucose (sugar). This does not apply to pregnant women. Water, hot tea and black coffee (with nothing added) are okay. Do not drink other fluids, diet soda or chew gum.            Oct 12, 2018 11:00 AM CDT   [facility] Visit with Al[facility]o [facility]uire [facility]   [facility]    [address] [place] [id number] [phone]              Who to contact     If you have questions or need follow up information about today's clinic visit or your schedule please contact [facility] directly at [phone].  Normal or non-critical lab and imaging results will be communicated to you by 36Krhart, letter or phone within 4 business days after the clinic has received the results. If you do not hear from us within 7 days, please contact the clinic through Playtikat or phone. If you have a critical or abnormal lab result, we will notify you by phone as soon as possible.  Submit refill requests through TechForward or call your pharmacy and they will forward the refill request to us. Please allow 3 business days for your refill to be completed.          Additional Information About Your Visit        36Krhar[employer] Information     TechForward gives you secure access to your electronic health record. If you see a primary care provider, you can also send messages to your care team and make appointments. If you have questions, please call your primary care clinic.  If you do not have a primary care provider, please call [phone] and they will assist you.        Care EveryWhere ID     This is your Care EveryWhere ID. This could be  used by other organizations to access your [place] medical records  [id number]        Your Vitals Were     Pulse Respirations Pulse Oximetry BMI (Body Mass Index)          65 14 100% 20.53 kg/m2         Blood Pressure from Last 3 Encounters:   08/28/18 124/76   08/24/18 120/68   08/03/18 132/74    Weight from Last 3 Encounters:   08/28/18 121 lb 8 oz (55.1 kg)   07/16/18 121 lb 8 oz (55.1 kg)   07/02/18 120 lb 12.8 oz (54.8 kg)              Today, you had the following     No orders found for display       Primary Care Provider Office Phone # Fax #    Phil Abbott [phone] [address] [facility] [address]        Equal Access to Services     [facility]: Hadii aad ku hadasho Soomaali, waaxda luqadaha, qaybta kaalmada rocky, lavell castillo . So [facility] [phone].    ATENCIÓN: Si habla español, tiene a fofana disposición servicios gratuitos de asistencia lingüística. Nancy al [phone].    We comply with applicable federal civil rights laws and [place] laws. We do not discriminate on the basis of race, color, national origin, age, disability, sex, sexual orientation, or gender identity.            Thank you!     Thank you for choosing [facility]  for your care. Our goal is always to provide you with excellent care. Hearing back from our patients is one way we can continue to improve our services. Please take a few minutes to complete the written survey that you may receive in the mail after your visit with us. Thank you!             Your Updated Medication List - Protect others around you: Learn how to safely use, store and throw away your medicines at www.disposemymeds.org.          This list is accurate as of 8/28/18  2:10 PM.  Always use your most recent med list.                   Brand Name Dispense Instructions for use Diagnosis    acetaminophen 325 MG tablet    TYLENOL    100 tablet    Take 2 tablets (650 mg) by mouth  every 6 hours as needed for mild pain    Migraines, Pulmonary emboli (H)       calcium-magnesium 500-250 MG Tabs per tablet    CALMAG     Take 1 tablet by mouth 2 times daily (with meals)        cholecalciferol 1000 units Tabs      Take 1,000 Units by mouth daily        propranolol 20 MG tablet    INDERAL    90 tablet    TAKE 1 TABLET(20 MG) BY MOUTH DAILY. Follow up for further refills    Migraine without status migrainosus, not intractable, unspecified migraine type       valACYclovir 500 MG tablet    VALTREX    6 tablet    Take 1 tablet (500 mg) by mouth 2 times daily    Herpes genitalis in women       warfarin 2 MG tablet    COUMADIN    180 tablet    TAKE 2 TABLETS(4 MG) BY MOUTH EVERY DAY    Chronic anticoagulation

## 2018-09-07 ENCOUNTER — OFFICE VISIT (OUTPATIENT)
Dept: FAMILY MEDICINE | Facility: CLINIC | Age: 73
End: 2018-09-07
Payer: COMMERCIAL

## 2018-09-07 VITALS
OXYGEN SATURATION: 95 % | WEIGHT: 121.6 LBS | RESPIRATION RATE: 13 BRPM | DIASTOLIC BLOOD PRESSURE: 70 MMHG | HEIGHT: 65 IN | HEART RATE: 75 BPM | SYSTOLIC BLOOD PRESSURE: 134 MMHG | TEMPERATURE: 97.5 F | BODY MASS INDEX: 20.26 KG/M2

## 2018-09-07 DIAGNOSIS — T14.8XXA SUPERFICIAL GRAZE: Primary | ICD-10-CM

## 2018-09-07 PROCEDURE — 99213 OFFICE O/P EST LOW 20 MIN: CPT | Performed by: FAMILY MEDICINE

## 2018-09-07 NOTE — PATIENT INSTRUCTIONS
[facility]    If you have any questions regarding to your visit please contact your care team:       Team Purple:   Clinic Hours Telephone Number   Dr. Shanti Prabhakar   7am-7pm  Monday - Thursday   7am-5pm  Fridays  [phone]  (Appointment scheduling available 24/7)   Urgent Care - [place] and [facility] - 11am-9pm Monday-Friday Saturday-Sunday- 9am-5pm   [place] - 5pm-9pm Monday-Friday Saturday-Sunday- 9am-5pm  [phone] - [place]  [phone] - [place]       What options do I have for a visit other than an office visit? We offer electronic visits (e-visits) and telephone visits, when medically appropriate.  Please check with your medical insurance to see if these types of visits are covered, as you will be responsible for any charges that are not paid by your insurance.      You can use [employer] (secure electronic communication) to access to your chart, send your primary care provider a message, or make an appointment. Ask a team member how to get started.     For a price quote for your services, please call our Consumer Price Line at [phone] or our Imaging Cost estimation line at [phone] (for imaging tests).    Eze Coronado

## 2018-09-07 NOTE — MR AVS SNAPSHOT
After Visit Summary   9/7/2018    Nury Cárdenas    MRN: [id number]           Patient Information     Date Of Birth          1945        Visit Information        Provider Department      9/7/2018 10:00 AM Phil Abbott MD [facility]        Today's Diagnoses     Superficial grazes: RUE    -  1      Care Instructions    [facility]    If you have any questions regarding to your visit please contact your care team:       Team Purple:   Clinic Hours Telephone Number   Dr. Shanti Prabhakar   7am-7pm  Monday - Thursday   7am-5pm  Fridays  [phone]  (Appointment scheduling available 24/7)   Urgent Care - [place] and [facility] - 11am-9pm Monday-Friday Saturday-Sunday- 9am-5pm   [place] - 5pm-9pm Monday-Friday Saturday-Sunday- 9am-5pm  [phone] - [place]  [phone] - [place]       What options do I have for a visit other than an office visit? We offer electronic visits (e-visits) and telephone visits, when medically appropriate.  Please check with your medical insurance to see if these types of visits are covered, as you will be responsible for any charges that are not paid by your insurance.      You can use [employer] (secure electronic communication) to access to your chart, send your primary care provider a message, or make an appointment. Ask a team member how to get started.     For a price quote for your services, please call our Consumer Price Line at [phone] or our Imaging Cost estimation line at [phone] (for imaging tests).    Eze Coronado            Follow-ups after your visit        Your next 10 appointments already scheduled     Sep 12, 2018  9:30 AM CDT   Anticoagulation Visit with RAAD ANTI COAG   [facility] ([facility])    [address] [facility] [address]   [phone]            Oct 12, 2018 10:30 AM CDT   LAB with LAB  FIRST FLOOR [facility] ([facility])    [address] [place] [id number] [phone]           Please do not eat 10-12 hours before your appointment if you are coming in fasting for labs on lipids, cholesterol, or glucose (sugar). This does not apply to pregnant women. Water, hot tea and black coffee (with nothing added) are okay. Do not drink other fluids, diet soda or chew gum.            Oct 12, 2018 11:00 AM CDT   Return Visit with Alvaro Maguire MD   [facility] ([facility])    [address] [place] [id number] [phone]              Who to contact     If you have questions or need follow up information about today's clinic visit or your schedule please contact [facility] directly at [phone].  Normal or non-critical lab and imaging results will be communicated to you by MyChart, letter or phone within 4 business days after the clinic has received the results. If you do not hear from us within 7 days, please contact the clinic through Valconhart or phone. If you have a critical or abnormal lab result, we will notify you by phone as soon as possible.  Submit refill requests through [employer] or call your pharmacy and they will forward the refill request to us. Please allow 3 business days for your refill to be completed.          Additional Information About Your Visit        Valconhart Information     [employer] gives you secure access to your electronic health record. If you see a primary care provider, you can also send messages to your care team and make appointments. If you have questions, please call your primary care clinic.  If you do not have a primary care provider, please call [phone] and they will assist you.        Care EveryWhere ID     This is your Care EveryWhere ID. This could be used by other organizations to access your [place] medical records  [id number]       "  Your Vitals Were     Pulse Temperature Respirations Height Pulse Oximetry Breastfeeding?    75 97.5  F (36.4  C) (Oral) 13 5' 4.5\" (1.638 m) 95% No    BMI (Body Mass Index)                   20.55 kg/m2            Blood Pressure from Last 3 Encounters:   09/07/18 134/70   08/28/18 124/76   08/24/18 120/68    Weight from Last 3 Encounters:   09/07/18 121 lb 9.6 oz (55.2 kg)   08/28/18 121 lb 8 oz (55.1 kg)   07/16/18 121 lb 8 oz (55.1 kg)              We Performed the Following     Dressing        Primary Care Provider Office Phone # Fax #    Phil Abbott [phone] [address] [facility] [address]        Equal Access to Services     [facility]: Hadii aad ku hadasho Somau, waaxda luqadaha, qaybta kaalmada rocky, lavell castillo . So [facility] [place] [facility] [phone].    ATENCIÓN: Si habla español, tiene a fofana disposición servicios gratuitos de asistencia lingüística. Nancy al [phone].    We comply with applicable federal civil rights laws and [place] laws. We do not discriminate on the basis of race, color, national origin, age, disability, sex, sexual orientation, or gender identity.            Thank you!     Thank you for choosing [facility]  for your care. Our goal is always to provide you with excellent care. Hearing back from our patients is one way we can continue to improve our services. Please take a few minutes to complete the written survey that you may receive in the mail after your visit with us. Thank you!             Your Updated Medication List - Protect others around you: Learn how to safely use, store and throw away your medicines at www.disposemymeds.org.          This list is accurate as of 9/7/18 10:28 AM.  Always use your most recent med list.                   Brand Name Dispense Instructions for use Diagnosis    acetaminophen 325 MG tablet    TYLENOL    100 tablet    Take 2 tablets (650 mg) by mouth every 6 hours " as needed for mild pain    Migraines, Pulmonary emboli (H)       calcium-magnesium 500-250 MG Tabs per tablet    CALMAG     Take 1 tablet by mouth 2 times daily (with meals)        cholecalciferol 1000 units Tabs      Take 1,000 Units by mouth daily        propranolol 20 MG tablet    INDERAL    90 tablet    TAKE 1 TABLET(20 MG) BY MOUTH DAILY. Follow up for further refills    Migraine without status migrainosus, not intractable, unspecified migraine type       valACYclovir 500 MG tablet    VALTREX    6 tablet    Take 1 tablet (500 mg) by mouth 2 times daily    Herpes genitalis in women       warfarin 2 MG tablet    COUMADIN    180 tablet    TAKE 2 TABLETS(4 MG) BY MOUTH EVERY DAY    Chronic anticoagulation

## 2018-09-07 NOTE — NURSING NOTE
"Chief Complaint   Patient presents with     Check Scratches     on right arm     Health Maintenance     Fall Risk, Pneumococcal, Wellness Visit, ADP, Eye Exam      Initial /70 (BP Location: Left arm, Patient Position: Chair, Cuff Size: Adult Regular)  Pulse 75  Temp 97.5  F (36.4  C) (Oral)  Resp 13  Ht 5' 4.5\" (1.638 m)  Wt 121 lb 9.6 oz (55.2 kg)  SpO2 95%  Breastfeeding? No  BMI 20.55 kg/m2 Estimated body mass index is 20.55 kg/(m^2) as calculated from the following:    Height as of this encounter: 5' 4.5\" (1.638 m).    Weight as of this encounter: 121 lb 9.6 oz (55.2 kg).  BP completed using cuff size: sam Coronado  "

## 2018-09-07 NOTE — PROGRESS NOTES
SUBJECTIVE:   Nury Cárdenas is a 73 year old female who presents to clinic today for the following health issues:    Chief Complaint   Patient presents with     Check Scratches     on right arm     Health Maintenance     Fall Risk, Pneumococcal, Wellness Visit, ADP, Eye Exam      Patient was working in her yard yesterday when grazed her arm. She cleaned the wound and applied dressing but wants it checked to make sure is not getting infected.  Tetanus shot is up to date.    Problem list and histories reviewed & adjusted, as indicated.  Additional history: as documented    Patient Active Problem List   Diagnosis     GERD (gastroesophageal reflux disease)     Fibroids     Migraines     Hearing loss     Osteopenia     HYPERLIPIDEMIA LDL GOAL <160     Advanced directives, counseling/discussion     Inflammatory arthritis     Synovitis of wrist     Chronic constipation     Granulomatosis with polyangiitis (H)     Chronic steroid use     Dyspnea     Pulmonary embolism (H)     Cataract, mild, ou     Calculus of kidney, right     Chronic anticoagulation     Long-term (current) use of anticoagulants [Z79.01]     Long-term use of immunosuppressant medication     Primary osteoarthritis involving multiple joints     Cellulitis     Cat scratch left lower extremity     Other pulmonary embolism without acute cor pulmonale, unspecified chronicity (H)     Past Surgical History:   Procedure Laterality Date     ABDOMEN SURGERY      appendix out     APPENDECTOMY       BIOPSY  1972    cone biopsy     CL AFF SURGICAL PATHOLOGY      cone biopsy 1975     COLONOSCOPY       COLONOSCOPY WITH CO2 INSUFFLATION N/A 12/20/2017    Procedure: COLONOSCOPY WITH CO2 INSUFFLATION;  Screening  BMI: 20.7  Pharmacy: [facility]  [phone]  Medica/Medicare  Referring: [facility]  Patient get ill from [employer];  Surgeon: Duane, William Charles, MD;  Location: [facility] OR     EYE SURGERY      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     HC ESOPHAGOSCOPY,  "DIAGNOSTIC       OPTICAL TRACKING SYSTEM BRONCHOSCOPY  6/19/2014    Procedure: OPTICAL TRACKING SYSTEM BRONCHOSCOPY;  Surgeon: Angel Kathleen MD;  Location:  GI     SOFT TISSUE SURGERY      remove senovial fluid from right wrist     SURGICAL HISTORY OF -   as a child    strabismus repair right eye     SURGICAL HISTORY OF -   8-2009    right wrist debridement     TONSILLECTOMY      as a child     TONSILLECTOMY & ADENOIDECTOMY       TUBAL LIGATION         Social History   Substance Use Topics     Smoking status: Former Smoker     Packs/day: 2.00     Years: 20.00     Types: Cigarettes     Start date: 2/8/1961     Quit date: 8/1/1983     Smokeless tobacco: Never Used     Alcohol use No     Family History   Problem Relation Age of Onset     Respiratory Mother      emphysema     Aneurysm Mother      Chronic Obstructive Pulmonary Disease Mother      Thyroid Disease Mother      ,     Osteoporosis Mother      Other Cancer Father      Cancer Father      lung cancer     Arthritis Maternal Grandmother      rheumatoid     HEART DISEASE Maternal Grandmother      unsure of details     HEART DISEASE Maternal Grandfather      Neurologic Disorder Paternal Grandmother      migraines     Alzheimer Disease Paternal Grandmother      Unknown/Adopted Paternal Grandfather      Cancer Sister      stage 4 anal cancer age 62 years     Colon Cancer Sister      Cancer - colorectal Brother      Colon Cancer Brother      Substance Abuse Sister      Anesthesia Reaction Daughter          Reviewed and updated as needed this visit by clinical staff    ROS:  Constitutional, HEENT, cardiovascular, pulmonary, gi and gu systems are negative, except as otherwise noted.    OBJECTIVE:     /70 (BP Location: Left arm, Patient Position: Chair, Cuff Size: Adult Regular)  Pulse 75  Temp 97.5  F (36.4  C) (Oral)  Resp 13  Ht 5' 4.5\" (1.638 m)  Wt 121 lb 9.6 oz (55.2 kg)  SpO2 95%  Breastfeeding? No  BMI 20.55 kg/m2  Body mass index is 20.55 " kg/(m^2).  GENERAL: healthy, alert and no distress  SKIN: 2 superficial grazes in Rt forearm, clean.  NECK: no adenopathy and thyroid normal to palpation  RESP: lungs clear to auscultation - no rales, rhonchi or wheezes  CV: regular rate and rhythm, normal S1 S2, no S3 or S4, no murmur, click or rub, no   MS: no gross musculoskeletal defects noted, no edema    Diagnostic Test Results:  none     ASSESSMENT/PLAN:     1. Superficial grazes: LATRICE PAYAN discussed basic concepts of wound care and made specific recommendations to keep the injured skin moist and clean to facilitate healing.  Plan: Dressing.    Call or return to clinic prn if these symptoms worsen or fail to improve as anticipated in 1 week.    Phil Abbott MD  [facility]

## 2018-09-07 NOTE — NURSING NOTE
Patient has 2 V shaped skin tears on the top of right forearm.  Wound cleansed with normal saline, skin is approximated and not bleeding.  1 steri strip applied to each wound to hold skin together.  Patient had large foam bandages covering area with minimal blood noted.  She is sensitive to adhesives but has not reacted to the adhesive on the foam bandage.  advised to remove steri strips if reactions occurs and to call clinic.  Wound covered with non adherent dressing and covered with foam dressing patient brought. Patient denied pain during dressing change.  Patient will watch for signs of infection and call or return to clinic symptoms occur.    Leila Richey RN

## 2018-09-12 ENCOUNTER — ANTICOAGULATION THERAPY VISIT (OUTPATIENT)
Dept: NURSING | Facility: CLINIC | Age: 73
End: 2018-09-12
Payer: COMMERCIAL

## 2018-09-12 DIAGNOSIS — Z79.01 LONG-TERM (CURRENT) USE OF ANTICOAGULANTS: ICD-10-CM

## 2018-09-12 DIAGNOSIS — I26.99 OTHER PULMONARY EMBOLISM WITHOUT ACUTE COR PULMONALE, UNSPECIFIED CHRONICITY (H): ICD-10-CM

## 2018-09-12 LAB — INR POINT OF CARE: 2.1 (ref 0.86–1.14)

## 2018-09-12 PROCEDURE — 36416 COLLJ CAPILLARY BLOOD SPEC: CPT

## 2018-09-12 PROCEDURE — 99207 ZZC NO CHARGE NURSE ONLY: CPT

## 2018-09-12 PROCEDURE — 85610 PROTHROMBIN TIME: CPT | Mod: QW

## 2018-09-12 NOTE — MR AVS SNAPSHOT
Nury Cárdenas   9/12/2018 9:30 AM   Anticoagulation Therapy Visit    Description:  73 year old female   Provider:  MONI ANTI COAG   Department:  [facility] Nurse           INR as of 9/12/2018     Today's INR 2.1      Anticoagulation Summary as of 9/12/2018     INR goal 2.0-3.0   Today's INR 2.1   Full warfarin instructions 4 mg every day   Next INR check 10/25/2018    Indications   Long-term (current) use of anticoagulants [Z79.01] [Z79.01]  Pulmonary embolism (H) [I26.99]         Your next Anticoagulation Clinic appointment(s)     Sep 12, 2018  9:30 AM CDT   Anticoagulation Visit with MONI ANTI COAG   [facility] ([place] [id number] [facility] [id number] [phone]            Oct 25, 2018  9:30 AM CDT   Anticoagulation Visit with MONI ANTI COAG   [facility] ([place] [id number] [facility] [address] [phone]              Contact Numbers     [facility]  Please call [phone] to cancel and/or reschedule your appointment   Please call [phone] with any problems or questions regarding your therapy.        September 2018 Details    Sun Mon Tue Wed Thu Fri Sat           1                 2               3               4               5               6               7               8                 9               10               11               12      4 mg   See details      13      4 mg         14      4 mg         15      4 mg           16      4 mg         17      4 mg         18      4 mg         19      4 mg         20      4 mg         21      4 mg         22      4 mg           23      4 mg         24      4 mg         25      4 mg         26      4 mg         27      4 mg         28      4 mg         29      4 mg           30      4 mg                Date Details   09/12 This INR check               How to take your warfarin dose     To take:  4 mg Take 2 of the 2 mg tablets.           October 2018  Details    Sun Mon Tue Wed Thu Fri Sat      1      4 mg         2      4 mg         3      4 mg         4      4 mg         5      4 mg         6      4 mg           7      4 mg         8      4 mg         9      4 mg         10      4 mg         11      4 mg         12      4 mg         13      4 mg           14      4 mg         15      4 mg         16      4 mg         17      4 mg         18      4 mg         19      4 mg         20      4 mg           21      4 mg         22      4 mg         23      4 mg         24      4 mg         25            26               27                 28               29               30               31                   Date Details   No additional details    Date of next INR:  10/25/2018         How to take your warfarin dose     To take:  4 mg Take 2 of the 2 mg tablets.

## 2018-09-12 NOTE — PROGRESS NOTES
ANTICOAGULATION FOLLOW-UP CLINIC VISIT    Patient Name:  Nury Cárdenas  Date:  9/12/2018  Contact Type:  Face to Face    SUBJECTIVE:     Patient Findings     Positives No Problem Findings    Comments Bleeding Signs/Symptoms: NO  Thromboembolic Signs/Symptoms: NO     Medication Changes:  NO  Dietary Changes:  NO  Bacterial/Viral Infection: NO     Missed Coumadin Doses: NO  Other Concerns:  NO             OBJECTIVE    INR Protime   Date Value Ref Range Status   09/12/2018 2.1 (A) 0.86 - 1.14 Final       ASSESSMENT / PLAN  INR assessment THER    Recheck INR In: 6 WEEKS    INR Location Clinic      Anticoagulation Summary as of 9/12/2018     INR goal 2.0-3.0   Today's INR 2.1   Warfarin maintenance plan 4 mg (2 mg x 2) every day   Full warfarin instructions 4 mg every day   Weekly warfarin total 28 mg   No change documented Kirti Lacy RN   Plan last modified Giselle Price RN (7/7/2016)   Next INR check 10/25/2018   Priority INR   Target end date Indefinite    Indications   Long-term (current) use of anticoagulants [Z79.01] [Z79.01]  Pulmonary embolism (H) [I26.99]         Anticoagulation Episode Summary     INR check location     Preferred lab     Send INR reminders to [facility]    Comments       Anticoagulation Care Providers     Provider Role Specialty Phone number    CeliakateyPhil landaverde MD [facility] [phone]            See the Encounter Report to view Anticoagulation Flowsheet and Dosing Calendar (Go to Encounters tab in chart review, and find the Anticoagulation Therapy Visit)    Dosage adjustment made based on physician directed care plan.    Kirti Lacy RN

## 2018-10-12 ENCOUNTER — OFFICE VISIT (OUTPATIENT)
Dept: RHEUMATOLOGY | Facility: CLINIC | Age: 73
End: 2018-10-12
Payer: COMMERCIAL

## 2018-10-12 VITALS
DIASTOLIC BLOOD PRESSURE: 83 MMHG | TEMPERATURE: 97.1 F | HEART RATE: 66 BPM | WEIGHT: 120.4 LBS | OXYGEN SATURATION: 98 % | BODY MASS INDEX: 20.06 KG/M2 | SYSTOLIC BLOOD PRESSURE: 132 MMHG | HEIGHT: 65 IN

## 2018-10-12 DIAGNOSIS — Z79.60 LONG-TERM USE OF IMMUNOSUPPRESSANT MEDICATION: ICD-10-CM

## 2018-10-12 DIAGNOSIS — M85.89 OSTEOPENIA OF MULTIPLE SITES: Primary | ICD-10-CM

## 2018-10-12 DIAGNOSIS — M31.30 GRANULOMATOSIS WITH POLYANGIITIS (H): ICD-10-CM

## 2018-10-12 DIAGNOSIS — I26.99 OTHER PULMONARY EMBOLISM WITHOUT ACUTE COR PULMONALE, UNSPECIFIED CHRONICITY (H): ICD-10-CM

## 2018-10-12 LAB
ALBUMIN SERPL-MCNC: 3.7 G/DL (ref 3.4–5)
ALBUMIN UR-MCNC: NEGATIVE MG/DL
ALP SERPL-CCNC: 92 U/L (ref 40–150)
ALT SERPL W P-5'-P-CCNC: 26 U/L (ref 0–50)
ANION GAP SERPL CALCULATED.3IONS-SCNC: 5 MMOL/L (ref 3–14)
APPEARANCE UR: CLEAR
AST SERPL W P-5'-P-CCNC: 23 U/L (ref 0–45)
BASOPHILS # BLD AUTO: 0.1 10E9/L (ref 0–0.2)
BASOPHILS NFR BLD AUTO: 0.8 %
BILIRUB SERPL-MCNC: 0.4 MG/DL (ref 0.2–1.3)
BILIRUB UR QL STRIP: NEGATIVE
BUN SERPL-MCNC: 15 MG/DL (ref 7–30)
CALCIUM SERPL-MCNC: 9.2 MG/DL (ref 8.5–10.1)
CHLORIDE SERPL-SCNC: 106 MMOL/L (ref 94–109)
CO2 SERPL-SCNC: 32 MMOL/L (ref 20–32)
COLOR UR AUTO: YELLOW
CREAT SERPL-MCNC: 0.72 MG/DL (ref 0.52–1.04)
CRP SERPL-MCNC: <2.9 MG/L (ref 0–8)
DIFFERENTIAL METHOD BLD: NORMAL
EOSINOPHIL # BLD AUTO: 0.1 10E9/L (ref 0–0.7)
EOSINOPHIL NFR BLD AUTO: 1.1 %
ERYTHROCYTE [DISTWIDTH] IN BLOOD BY AUTOMATED COUNT: 13.2 % (ref 10–15)
ERYTHROCYTE [SEDIMENTATION RATE] IN BLOOD BY WESTERGREN METHOD: 6 MM/H (ref 0–30)
GFR SERPL CREATININE-BSD FRML MDRD: 79 ML/MIN/1.7M2
GLUCOSE SERPL-MCNC: 82 MG/DL (ref 70–99)
GLUCOSE UR STRIP-MCNC: NEGATIVE MG/DL
HCT VFR BLD AUTO: 45.1 % (ref 35–47)
HGB BLD-MCNC: 14.7 G/DL (ref 11.7–15.7)
HGB UR QL STRIP: NEGATIVE
IMM GRANULOCYTES # BLD: 0 10E9/L (ref 0–0.4)
IMM GRANULOCYTES NFR BLD: 0.2 %
KETONES UR STRIP-MCNC: NEGATIVE MG/DL
LEUKOCYTE ESTERASE UR QL STRIP: NEGATIVE
LYMPHOCYTES # BLD AUTO: 1.6 10E9/L (ref 0.8–5.3)
LYMPHOCYTES NFR BLD AUTO: 26.1 %
MCH RBC QN AUTO: 29.8 PG (ref 26.5–33)
MCHC RBC AUTO-ENTMCNC: 32.6 G/DL (ref 31.5–36.5)
MCV RBC AUTO: 92 FL (ref 78–100)
MONOCYTES # BLD AUTO: 0.5 10E9/L (ref 0–1.3)
MONOCYTES NFR BLD AUTO: 8.7 %
NEUTROPHILS # BLD AUTO: 3.8 10E9/L (ref 1.6–8.3)
NEUTROPHILS NFR BLD AUTO: 63.1 %
NITRATE UR QL: NEGATIVE
PH UR STRIP: 7.5 PH (ref 5–7)
PLATELET # BLD AUTO: 197 10E9/L (ref 150–450)
POTASSIUM SERPL-SCNC: 4.1 MMOL/L (ref 3.4–5.3)
PROT SERPL-MCNC: 6.8 G/DL (ref 6.8–8.8)
RBC # BLD AUTO: 4.93 10E12/L (ref 3.8–5.2)
RBC #/AREA URNS AUTO: ABNORMAL /HPF
SODIUM SERPL-SCNC: 143 MMOL/L (ref 133–144)
SOURCE: ABNORMAL
SP GR UR STRIP: 1 (ref 1–1.03)
UROBILINOGEN UR STRIP-MCNC: NORMAL MG/DL (ref 0–2)
WBC # BLD AUTO: 6.1 10E9/L (ref 4–11)
WBC #/AREA URNS AUTO: ABNORMAL /HPF

## 2018-10-12 PROCEDURE — 86140 C-REACTIVE PROTEIN: CPT | Performed by: INTERNAL MEDICINE

## 2018-10-12 PROCEDURE — 36415 COLL VENOUS BLD VENIPUNCTURE: CPT | Performed by: INTERNAL MEDICINE

## 2018-10-12 PROCEDURE — 99215 OFFICE O/P EST HI 40 MIN: CPT | Performed by: INTERNAL MEDICINE

## 2018-10-12 PROCEDURE — 85025 COMPLETE CBC W/AUTO DIFF WBC: CPT | Performed by: INTERNAL MEDICINE

## 2018-10-12 PROCEDURE — 85652 RBC SED RATE AUTOMATED: CPT | Performed by: INTERNAL MEDICINE

## 2018-10-12 PROCEDURE — 86255 FLUORESCENT ANTIBODY SCREEN: CPT | Performed by: INTERNAL MEDICINE

## 2018-10-12 PROCEDURE — 81001 URINALYSIS AUTO W/SCOPE: CPT | Performed by: INTERNAL MEDICINE

## 2018-10-12 PROCEDURE — 80053 COMPREHEN METABOLIC PANEL: CPT | Performed by: INTERNAL MEDICINE

## 2018-10-12 ASSESSMENT — PAIN SCALES - GENERAL: PAINLEVEL: NO PAIN (0)

## 2018-10-12 NOTE — MR AVS SNAPSHOT
After Visit Summary   10/12/2018    Nury Cárdenas    MRN: [id number]           Patient Information     Date Of Birth          1945        Visit Information        Provider Department      10/12/2018 11:00 AM Alvaro Maguire MD [facility]        Today's Diagnoses     Osteopenia of multiple sites    -  1    Granulomatosis with polyangiitis (H)           Follow-ups after your visit        Additional Services     PHYSICAL THERAPY REFERRAL       If you have not heard from the scheduling office within 2 business days, please call [phone] for all locations, with the exception of [place], please call [phone] and [facility], please call [phone].    Please be aware that coverage of these services is subject to the terms and limitations of your health insurance plan.  Call member services at your health plan with any benefit or coverage questions.                  Follow-up notes from your care team     Return in about 6 months (around 4/12/2019).      Your next 10 appointments already scheduled     Oct 24, 2018 11:00 AM CDT   DX HIP/PELVIS/SPINE with FKDX1   [facility] ([facility]    [address] [place] [address] [id number] [phone]           How do I prepare for my exam? (Food and drink instructions) No Food and Drink Restrictions.  How do I prepare for my exam? (Other instructions) Please do not take any of the following 24 hours prior to the day of your exam: vitamins, calcium tablets, antacids.  What should I wear: If possible, please wear clothes without metal (snaps, zippers). A sweat suit works well.  How long does the exam take: The exam takes about 20 minutes.  What should I bring: Bring a list of your current medicines to your exam (including vitamins, minerals and over-the-counter drugs).  Do I need a :  No  is needed.  What should I do after the exam: No restrictions, You may resume normal activities.   How do I prepare for my exam? (Food and drink instructions) A DEXA scan is a bone-density scan. It uses a low level of radiation to check the strength of your bones. As you lie on a padded table, a machine will take X-rays. We most often scan the hips and lower spine.  Who should I call with questions: If you have any questions, please call the Imaging Department where you will have your exam. Directions, parking instructions, and other information is available on our website, [email or web address].            Oct 24, 2018 11:30 AM CDT   DX VERTEBRAL FRACTURE ANALYSIS LAT ONLY with FKDX1   [facility] ([facility])    [address] [place] [id number] [phone]           How do I prepare for my exam? (Food and drink instructions) No Food and Drink Restrictions.  How do I prepare for my exam? (Other instructions) Please do not take any of the following 24 hours prior to the day of your exam: vitamins, calcium tablets, antacids.  What should I wear: If possible, please wear clothes without metal (snaps, zippers). A sweat suit works well.  How long does the exam take: The exam takes about 20 minutes.  What should I bring: Bring a list of your current medicines to your exam (including vitamins, minerals and over-the-counter drugs).  Do I need a :  No  is needed.  What should I do after the exam: No restrictions, You may resume normal activities.  How do I prepare for my exam? (Food and drink instructions) A DEXA scan is a bone-density scan. It uses a low level of radiation to check the strength of your bones. As you lie on a padded table, a machine will take X-rays. We most often scan the hips and lower spine.  Who should I call with questions: If you have any questions, please call the Imaging Department where you will have your exam. Directions, parking instructions, and other information is available on our website, [email or web address].            Oct 25, 2018   9:30 AM CDT   Anticoagulation Visit with FZ ANTI COAG   [facility] ([facility])    [address]  [place] [address]   [phone]            Oct 29, 2018 10:30 AM CDT   Office Visit with PFT LAB   [facility] ([facility])    [id number] [place] [phone]           Bring a current list of meds and any records pertaining to this visit. For Physicals, please bring immunization records and any forms needing to be filled out. Please arrive 10 minutes early to complete paperwork.            Apr 19, 2019 10:30 AM CDT   LAB with LAB FIRST FLOOR [facility] ([facility])    [address] [place] [phone]           Please do not eat 10-12 hours before your appointment if you are coming in fasting for labs on lipids, cholesterol, or glucose (sugar). This does not apply to pregnant women. Water, hot tea and black coffee (with nothing added) are okay. Do not drink other fluids, diet soda or chew gum.            Apr 19, 2019 11:00 AM CDT   Return Visit with Alvaro Maguire MD   [facility] ([facility])    [address] [place] [phone]              Future tests that were ordered for you today     Open Standing Orders        Priority Remaining Interval Expires Ordered    ANCA IgG by IFA with Reflex to Titer Routine 2/2 EVERY 6 MONTHS 10/12/2019 10/12/2018          Open Future Orders        Priority Expected Expires Ordered    PHYSICAL THERAPY REFERRAL Routine  10/12/2019 10/12/2018    General PFT Lab (Please always keep checked) Routine  10/12/2019 10/12/2018    Pulmonary Function Test Routine  10/12/2019 10/12/2018    DX Vertebral Fracture Analysis Lat Only Routine  10/12/2019 10/12/2018    DX Hip/Pelvis/Spine Routine  10/12/2019 10/12/2018            Who to contact     If you have  "questions or need follow up information about today's clinic visit or your schedule please contact [facility] directly at [phone].  Normal or non-critical lab and imaging results will be communicated to you by [employer], letter or phone within 4 business days after the clinic has received the results. If you do not hear from us within 7 days, please contact the clinic through [employer] or phone. If you have a critical or abnormal lab result, we will notify you by phone as soon as possible.  Submit refill requests through [employer] or call your pharmacy and they will forward the refill request to us. Please allow 3 business days for your refill to be completed.          Additional Information About Your Visit        [employer] Information     [employer] gives you secure access to your electronic health record. If you see a primary care provider, you can also send messages to your care team and make appointments. If you have questions, please call your primary care clinic.  If you do not have a primary care provider, please call [phone] and they will assist you.      [employer] is an electronic gateway that provides easy, online access to your medical records. With [employer], you can request a clinic appointment, read your test results, renew a prescription or communicate with your care team.     To access your existing account, please contact your [facility] or call [phone] for assistance.        Care EveryWhere ID     This is your Care EveryWhere ID. This could be used by other organizations to access your [place] medical records  [id number]        Your Vitals Were     Pulse Temperature Height Pulse Oximetry BMI (Body Mass Index)       66 97.1  F (36.2  C) (Oral) 1.638 m (5' 4.5\") 98% 20.35 kg/m2        Blood Pressure from Last 3 Encounters:   10/12/18 132/83   09/07/18 134/70   08/28/18 124/76    Weight from Last 3 Encounters:   10/12/18 54.6 kg (120 lb 6.4 oz) "   09/07/18 55.2 kg (121 lb 9.6 oz)   08/28/18 55.1 kg (121 lb 8 oz)               Primary Care Provider Office Phone # Fax #    Phil Abbott [phone]       [address]  [place] [address]        Equal Access to Services     [facility]: Hadii aad ku hadasho Soomaali, waaxda luqadaha, qaybta kaalmada adeegyada, waxay idiin hayaan adeeg helga lamarcen . So [facility] [phone].    ATENCIÓN: Si habla español, tiene a fofana disposición servicios gratuitos de asistencia lingüística. Hectorame al [phone].    We comply with applicable federal civil rights laws and [place] laws. We do not discriminate on the basis of race, color, national origin, age, disability, sex, sexual orientation, or gender identity.            Thank you!     Thank you for choosing [facility]  for your care. Our goal is always to provide you with excellent care. Hearing back from our patients is one way we can continue to improve our services. Please take a few minutes to complete the written survey that you may receive in the mail after your visit with us. Thank you!             Your Updated Medication List - Protect others around you: Learn how to safely use, store and throw away your medicines at www.disposemymeds.org.          This list is accurate as of 10/12/18 12:20 PM.  Always use your most recent med list.                   Brand Name Dispense Instructions for use Diagnosis    acetaminophen 325 MG tablet    TYLENOL    100 tablet    Take 2 tablets (650 mg) by mouth every 6 hours as needed for mild pain    Migraines, Pulmonary emboli (H)       calcium-magnesium 500-250 MG Tabs per tablet    CALMAG     Take 1 tablet by mouth 2 times daily (with meals)        cholecalciferol 1000 units Tabs      Take 1,000 Units by mouth daily        propranolol 20 MG tablet    INDERAL    90 tablet    TAKE 1 TABLET(20 MG) BY MOUTH DAILY. Follow up for further refills    Migraine without status migrainosus,  not intractable, unspecified migraine type       warfarin 2 MG tablet    COUMADIN    180 tablet    TAKE 2 TABLETS(4 MG) BY MOUTH EVERY DAY    Chronic anticoagulation

## 2018-10-12 NOTE — PROGRESS NOTES
Ms. 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 Course complicated by severe upper airway inflammation, Wegener's lung and destructive/erosive joint disease. Treated initially with rituximab 375 mg/m2 x4 and high dose corticsteroids, and most recently with methotrexate.  No history of relapse. Prednisone taper complete on April 1st, 2017. No recent methotrexate.    She reports midback pain with extended periods of standing. She admits to neck stiffness and head forward position, and she has had some physical therapy.    She reports some breathlessness that she relates to her bending forward. She admits to dry cough. Intermittently she will have some fleeting chest/rib pain. She has osteopenia.    Her peripheral joints are doing well. No skin to report. Her energy is good. No AM stiffness.     Her joints are good. No loss of function or new deformity. No pain, swelling, redness, or warmth. No fevers, chills, sweats or weight loss.    PMI:  Medical-invasive pulmonary aspirgillosis, GPA, osteoarthritis, DVT with pulmonary embolism, osteopenia (T = -2.2 7-2016), GERD, hyperlipidemia, +Tb exposure, nephrolithiasis, retinal tear, cataract  Surgical-lung biopsy, appendectomy, eye surgery, tonsillectomy, cone biopsy, right wrist synovectomy  Injuries-left wrist fracture, left 5th toe fracture    SH:  Lives at home alone. Former smoker. No EtOH. Tuberculosis exposure as a child. Ambidextrous.    FH:  Son with mitochondrial myopathy  Sibs dead with colon and anal cancer  Father dead with lung cancer  Mother dead with emphysema and osteoporosis    PMSF history personally obtained and updated by me this visit in the clinic.    ROS:  +minor LE edema  +hearing loss with tinnitis  +reduced vision left eye  +minor rhinorrhea  +allergies to fosamax amoxicillin, augmentin, erythromycin, zithromax, nickel, codeine, adhesive tape  Remainder of the 14 point ROS obtained and found  negative.    Physical Exam:  Constitutional: WD-WN-WG cooperative; +rare dry cough  Eyes: nl EOM, PERRLA, vision, conjunctiva, sclera  ENT: nl external ears, nose, hearing, lips, teeth, gums, throat  Neck: no mass or thyroid enlargement  Resp: lungs clear to auscultation, nl to palpation, nl effort  CV: RRR, no murmurs, rubs or gallops, no edema  GI: no ABD mass or tenderness, no HSM  : not tested  Lymph: no cervical or epitrochlear nodes  MS: +Right wrist with no flexion or extension; left wrist extension to 45 degrees; 1st CMC joint squaring bilaterally with some thumb laxity; right neck rotation 30 degrees right and 45 degrees left with head forward position and minimal neck extension; All other TMJ, neck, shoulder, elbow, wrist, hand, spine, hip, knee, ankle, and foot joints were examined and otherwise found normal. Normal  strength. +severe kyphosis. Normal tuck and prayer.  Skin: no nail pitting, alopecia; +dry erythematous papules over the nose and left cheek  Neuro: nl cranial nerves, strength, sensation, DTRs.   Psych: nl judgement, orientation, memory, affect.    Laboratory:    Component      Latest Ref Rng & Units 10/12/2018   WBC      4.0 - 11.0 10e9/L 6.1   RBC Count      3.8 - 5.2 10e12/L 4.93   Hemoglobin      11.7 - 15.7 g/dL 14.7   Hematocrit      35.0 - 47.0 % 45.1   MCV      78 - 100 fl 92   MCH      26.5 - 33.0 pg 29.8   MCHC      31.5 - 36.5 g/dL 32.6   RDW      10.0 - 15.0 % 13.2   Platelet Count      150 - 450 10e9/L 197   % Neutrophils      % 63.1   % Lymphocytes      % 26.1   % Monocytes      % 8.7   % Eosinophils      % 1.1   % Basophils      % 0.8   % Immature Granulocytes      % 0.2   Absolute Neutrophil      1.6 - 8.3 10e9/L 3.8   Absolute Lymphocytes      0.8 - 5.3 10e9/L 1.6   Absolute Monocytes      0.0 - 1.3 10e9/L 0.5   Absolute Eosinophils      0.0 - 0.7 10e9/L 0.1   Absolute Basophils      0.0 - 0.2 10e9/L 0.1   Abs Immature Granulocytes      0 - 0.4 10e9/L 0.0   Diff  Method       Automated Method   Sodium      133 - 144 mmol/L 143   Potassium      3.4 - 5.3 mmol/L 4.1   Chloride      94 - 109 mmol/L 106   Carbon Dioxide      20 - 32 mmol/L 32   Anion Gap      3 - 14 mmol/L 5   Glucose      70 - 99 mg/dL 82   Urea Nitrogen      7 - 30 mg/dL 15   Creatinine      0.52 - 1.04 mg/dL 0.72   GFR Estimate      >60 mL/min/1.7m2 79   GFR Estimate If Black      >60 mL/min/1.7m2 >90   Calcium      8.5 - 10.1 mg/dL 9.2   Bilirubin Total      0.2 - 1.3 mg/dL 0.4   Albumin      3.4 - 5.0 g/dL 3.7   Protein Total      6.8 - 8.8 g/dL 6.8   Alkaline Phosphatase      40 - 150 U/L 92   ALT      0 - 50 U/L 26   AST      0 - 45 U/L 23   Color Urine       Yellow   Appearance Urine       Clear   Glucose Urine      NEG:Negative mg/dL Negative   Bilirubin Urine      NEG:Negative Negative   Ketones Urine      NEG:Negative mg/dL Negative   Specific Gravity Urine      1.003 - 1.035 1.005   Blood Urine      NEG:Negative Negative   pH Urine      5.0 - 7.0 pH 7.5 (H)   Protein Albumin Urine      NEG:Negative mg/dL Negative   Urobilinogen mg/dL      0.0 - 2.0 mg/dL Normal   Nitrite Urine      NEG:Negative Negative   Leukocyte Esterase Urine      NEG:Negative Negative   Source       Midstream Urine   WBC Urine      OTO5:0 - 5 /HPF 0 - 5   RBC Urine      OTO2:O - 2 /HPF O - 2   CRP Inflammation      0.0 - 8.0 mg/L <2.9   Sed Rate      0 - 30 mm/h 6     Component Proteinase 3 Antibody IgG GBM Antibody IgG   Latest Ref Rng & Units 0.0 - 0.9 AI 0.0 - 0.9 AI   6/15/2014     6/20/2014 >8.0 (H) . . . <0.2 . . .   8/7/2014 2.7 (H)    9/9/2014 0.5    10/31/2014 0.2    1/5/2015 <0.2 . . .    2/9/2015 0.2    3/2/2015 <0.2 . . .    6/19/2015 <0.2 . . .    7/20/2015 <0.2 . . .    8/10/2015 <0.2 . . .    10/14/2015 <0.2 . . .    12/28/2015 <0.2 . . .    3/23/2016 <0.2 . . .    6/27/2016 <0.2 . . .    9/2/2016 <0.2 . . .    11/4/2016 <0.2 . . .    1/10/2017 <0.2 . . .    3/8/2017 <0.2 . . .    6/9/2017 <0.2 . . .    8/14/2017  <0.2    10/6/2017 <0.2    12/13/2017 <0.2    2/14/2018 <0.2      Component Neutrophil Cytoplasmic IgG Antibody   Latest Ref Rng & Units    6/15/2014 1:80 (A) . . .   6/20/2014 8/7/2014 <1:20 . . .   9/9/2014    10/31/2014 <1:20 . . .   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 PR3 is pending but inflammatory markers are negative. No treatment indicated at this time. Get PFT testing.     Osteoporosis-with back pain, severe kyphosis and I fear compression fracture. I will get a repeat DEXA and get a vertebral fracture analysis.  Consideration could be given to Forteo, as she has failed alendronate. I will prescribe PT for back strengthening exercises.    Plan RTC in 6 months.

## 2018-10-12 NOTE — NURSING NOTE
"Nury Cárdenas's goals for this visit include: return  She requests these members of her care team be copied on today's visit information:     PCP: Phil Abbott    Referring Provider:  No referring provider defined for this encounter.    /83  Pulse 66  Temp 97.1  F (36.2  C) (Oral)  Ht 1.638 m (5' 4.5\")  Wt 54.6 kg (120 lb 6.4 oz)  SpO2 98%  BMI 20.35 kg/m2    Do you need any medication refills at today's visit? n  "

## 2018-10-15 LAB
ANCA AB PATTERN SER IF-IMP: NORMAL
C-ANCA TITR SER IF: NORMAL {TITER}

## 2018-10-24 ENCOUNTER — RADIANT APPOINTMENT (OUTPATIENT)
Dept: BONE DENSITY | Facility: CLINIC | Age: 73
End: 2018-10-24
Attending: INTERNAL MEDICINE
Payer: COMMERCIAL

## 2018-10-24 DIAGNOSIS — M85.89 OSTEOPENIA OF MULTIPLE SITES: ICD-10-CM

## 2018-10-24 DIAGNOSIS — M31.30 GRANULOMATOSIS WITH POLYANGIITIS (H): ICD-10-CM

## 2018-10-24 PROCEDURE — 77085 DXA BONE DENSITY AXL VRT FX: CPT | Performed by: INTERNAL MEDICINE

## 2018-10-24 PROCEDURE — 77086 VRT FRACTURE ASSMT VIA DXA: CPT | Performed by: INTERNAL MEDICINE

## 2018-10-25 ENCOUNTER — ANTICOAGULATION THERAPY VISIT (OUTPATIENT)
Dept: NURSING | Facility: CLINIC | Age: 73
End: 2018-10-25
Payer: COMMERCIAL

## 2018-10-25 DIAGNOSIS — I26.99 OTHER PULMONARY EMBOLISM WITHOUT ACUTE COR PULMONALE, UNSPECIFIED CHRONICITY (H): ICD-10-CM

## 2018-10-25 LAB — INR POINT OF CARE: 1.7 (ref 0.86–1.14)

## 2018-10-25 PROCEDURE — 99207 ZZC NO CHARGE NURSE ONLY: CPT

## 2018-10-25 PROCEDURE — 85610 PROTHROMBIN TIME: CPT | Mod: QW

## 2018-10-25 PROCEDURE — 36416 COLLJ CAPILLARY BLOOD SPEC: CPT

## 2018-10-25 NOTE — PATIENT INSTRUCTIONS
Some signs and symptoms of clots include: pain or tenderness in arm or leg, swelling in arm or leg, changes in skin color, or area is warm to touch, shortness or breath, trouble breathing.  Numbness or weakness especially on 1 side of the body, sudden trouble speaking or swallowing, sudden trouble seeing, sudden confusion, dizzy spells or headache.  If you have these please call 911 or seek medical care immediately.

## 2018-10-25 NOTE — PROGRESS NOTES
ANTICOAGULATION FOLLOW-UP CLINIC VISIT    Patient Name:  Nury Cárdenas  Date:  10/25/2018  Contact Type:  Face to Face    SUBJECTIVE:     Patient Findings     Positives No Problem Findings, Unexplained INR or factor level change    Comments Bleeding Signs/Symptoms: NO  Thromboembolic Signs/Symptoms: NO     Medication Changes:  NO  Dietary Changes:  NO  Bacterial/Viral Infection: NO     Missed Coumadin Doses: NO  Other Concerns:  NO             OBJECTIVE    INR Protime   Date Value Ref Range Status   10/25/2018 1.7 (A) 0.86 - 1.14 Final       ASSESSMENT / PLAN  INR assessment SUB    Recheck INR In: 2 WEEKS    INR Location Clinic      Anticoagulation Summary as of 10/25/2018     INR goal 2.0-3.0   Today's INR 1.7!   Warfarin maintenance plan 4 mg (2 mg x 2) every day   Full warfarin instructions 10/25: 6 mg; Otherwise 4 mg every day   Weekly warfarin total 28 mg   Plan last modified Giselle Price RN (7/7/2016)   Next INR check 11/9/2018   Priority INR   Target end date Indefinite    Indications   Long-term (current) use of anticoagulants [Z79.01] [Z79.01]  Pulmonary embolism (H) [I26.99]         Anticoagulation Episode Summary     INR check location     Preferred lab     Send INR reminders to [facility]    Comments       Anticoagulation Care Providers     Provider Role Specialty Phone number    Milena Phil Harris MD [facility] [phone]            See the Encounter Report to view Anticoagulation Flowsheet and Dosing Calendar (Go to Encounters tab in chart review, and find the Anticoagulation Therapy Visit)    Dosage adjustment made based on physician directed care plan.    Kirti Lacy RN

## 2018-10-25 NOTE — MR AVS SNAPSHOT
Nury Cárdenas   10/25/2018 9:30 AM   Anticoagulation Therapy Visit    Description:  73 year old female   Provider:  MONI ANTI COAGILL   Department:  [facility]           INR as of 10/25/2018     Today's INR 1.7!      Anticoagulation Summary as of 10/25/2018     INR goal 2.0-3.0   Today's INR 1.7!   Full warfarin instructions 10/25: 6 mg; Otherwise 4 mg every day   Next INR check 11/9/2018    Indications   Long-term (current) use of anticoagulants [Z79.01] [Z79.01]  Pulmonary embolism (H) [I26.99]         Instructions    Some signs and symptoms of clots include: pain or tenderness in arm or leg, swelling in arm or leg, changes in skin color, or area is warm to touch, shortness or breath, trouble breathing.  Numbness or weakness especially on 1 side of the body, sudden trouble speaking or swallowing, sudden trouble seeing, sudden confusion, dizzy spells or headache.  If you have these please call 911 or seek medical care immediately.           Your next Anticoagulation Clinic appointment(s)     Nov 09, 2018  9:30 AM CST   Anticoagulation Visit with MONI ANTI TATE   [facility]    [address] [address] [phone]              Contact Numbers     [facility]  Please call [phone] to cancel and/or reschedule your appointment   Please call [phone] with any problems or questions regarding your therapy.        October 2018 Details    Sun Mon Tue Wed Thu Fri Sat      1               2               3               4               5               6                 7               8               9               10               11               12               13                 14               15               16               17               18               19               20                 21               22               23               24               25      6 mg   See details      26      4 mg         27      4 mg           28       4 mg         29      4 mg         30      4 mg         31      4 mg             Date Details   10/25 This INR check               How to take your warfarin dose     To take:  4 mg Take 2 of the 2 mg tablets.    To take:  6 mg Take 3 of the 2 mg tablets.           November 2018 Details    Sun Mon Tue Wed Thu Fri Sat         1      4 mg         2      4 mg         3      4 mg           4      4 mg         5      4 mg         6      4 mg         7      4 mg         8      4 mg         9            10                 11               12               13               14               15               16               17                 18               19               20               21               22               23               24                 25               26               27               28               29               30                 Date Details   No additional details    Date of next INR:  11/9/2018         How to take your warfarin dose     To take:  4 mg Take 2 of the 2 mg tablets.

## 2018-10-29 ENCOUNTER — TELEPHONE (OUTPATIENT)
Dept: RHEUMATOLOGY | Facility: CLINIC | Age: 73
End: 2018-10-29

## 2018-10-29 ENCOUNTER — OFFICE VISIT (OUTPATIENT)
Dept: NURSING | Facility: CLINIC | Age: 73
End: 2018-10-29
Payer: COMMERCIAL

## 2018-10-29 DIAGNOSIS — G43.909 MIGRAINE WITHOUT STATUS MIGRAINOSUS, NOT INTRACTABLE, UNSPECIFIED MIGRAINE TYPE: ICD-10-CM

## 2018-10-29 DIAGNOSIS — M31.30 GRANULOMATOSIS WITH POLYANGIITIS (H): ICD-10-CM

## 2018-10-29 DIAGNOSIS — M85.89 OSTEOPENIA OF MULTIPLE SITES: ICD-10-CM

## 2018-10-29 PROCEDURE — 94375 RESPIRATORY FLOW VOLUME LOOP: CPT | Performed by: INTERNAL MEDICINE

## 2018-10-29 PROCEDURE — 99207 ZZC DROP WITH A PROCEDURE: CPT | Performed by: INTERNAL MEDICINE

## 2018-10-29 PROCEDURE — 94729 DIFFUSING CAPACITY: CPT | Performed by: INTERNAL MEDICINE

## 2018-10-29 RX ORDER — PROPRANOLOL HYDROCHLORIDE 20 MG/1
TABLET ORAL
Qty: 90 TABLET | Refills: 2 | Status: SHIPPED | OUTPATIENT
Start: 2018-10-29 | End: 2019-08-29

## 2018-10-29 NOTE — PROGRESS NOTES
Please help Nury make an appointment with me or with Endocrinology to treat her severe osteoporosis.

## 2018-10-29 NOTE — TELEPHONE ENCOUNTER
Requested Prescriptions   Pending Prescriptions Disp Refills     propranolol (INDERAL) 20 MG tablet 90 tablet 0     Sig: TAKE 1 TABLET(20 MG) BY MOUTH DAILY. Follow up for further refills    There is no refill protocol information for this order        Last Written Prescription Date:  6/22/2018  Last Fill Quantity: 90,  # refills: 0   Last office visit: 9/7/2018 with prescribing provider:  Milena   Future Office Visit:   Next 5 appointments (look out 90 days)     Oct 29, 2018 10:30 AM CDT   [facility] Visit with PFT LAB   [facility]    [address] [place] [address] [phone]

## 2018-10-29 NOTE — MR AVS SNAPSHOT
After Visit Summary   10/29/2018    Nury Cárdenas    MRN: [id number]           Patient Information     Date Of Birth          1945        Visit Information        Provider Department      10/29/2018 10:30 AM PFT LAB [facility]        Today's Diagnoses     Osteopenia of multiple sites        Granulomatosis with polyangiitis (H)           Follow-ups after your visit        Your next 10 appointments already scheduled     Oct 31, 2018 10:20 AM CDT   (Arrive by 10:05 AM)   [facility] with Eliceo Vidal, PT   [facility] ([facility]    [address]  [address]  [place] [address]   [phone]            Nov 09, 2018  9:30 AM CST   [facility] RAAD [facility]TI COAG   [facility]    [address] [address]   [phone]            Apr 19, 2019 10:30 AM CDT   LAB with LAB FIRST FLOOR [facility] ([facility])    [address] [place] [id number] [phone]           Please do not eat 10-12 hours before your appointment if you are coming in fasting for labs on lipids, cholesterol, or glucose (sugar). This does not apply to pregnant women. Water, hot tea and black coffee (with nothing added) are okay. Do not drink other fluids, diet soda or chew gum.            Apr 19, 2019 11:00 AM CDT   Return Visit with Alvaro Maguire MD   [facility] ([facility]    [address] [place] [address] [phone]              Who to contact     If you have questions or need follow up information about today's clinic visit or your schedule please contact [facility] directly at [phone].  Normal or non-critical lab and imaging results will be communicated to you by MyChart, letter or phone within 4 business days after the clinic has received the results. If you do not hear from us  within 7 days, please contact the clinic through [employer] or phone. If you have a critical or abnormal lab result, we will notify you by phone as soon as possible.  Submit refill requests through [employer] or call your pharmacy and they will forward the refill request to us. Please allow 3 business days for your refill to be completed.          Additional Information About Your Visit        [employer] Information     [employer] gives you secure access to your electronic health record. If you see a primary care provider, you can also send messages to your care team and make appointments. If you have questions, please call your primary care clinic.  If you do not have a primary care provider, please call [phone] and they will assist you.      [employer] is an electronic gateway that provides easy, online access to your medical records. With [employer], you can request a clinic appointment, read your test results, renew a prescription or communicate with your care team.     To access your existing account, please contact your [facility] or call [phone] for assistance.        Care EveryWhere ID     This is your Care EveryWhere ID. This could be used by other organizations to access your [place] medical records  [id number]         Blood Pressure from Last 3 Encounters:   10/12/18 132/83   09/07/18 134/70   08/28/18 124/76    Weight from Last 3 Encounters:   10/12/18 54.6 kg (120 lb 6.4 oz)   09/07/18 55.2 kg (121 lb 9.6 oz)   08/28/18 55.1 kg (121 lb 8 oz)              We Performed the Following     General PFT Lab (Please always keep checked)     HC DIFFUSING CAPACITY     Pulmonary Function Test     RESPIRATORY FLOW VOLUME LOOP        Primary Care Provider Office Phone # Fax #    Phil Abbott [phone] [address] [facility] [address] [place] [address]        Equal Access to Services     ABBI MYERS : Jonnie Carr, angelina fonseca, mikayla gaona  lavell hidalgoaishwarya houstonbravo michelleaan ah. So [facility] [phone].    ATENCIÓN: Si aidenla devin, tiene a fofana disposición servicios gratuitos de asistencia lingüística. Nancy al [phone].    We comply with applicable federal civil rights laws and [place] laws. We do not discriminate on the basis of race, color, national origin, age, disability, sex, sexual orientation, or gender identity.            Thank you!     Thank you for choosing [facility]  for your care. Our goal is always to provide you with excellent care. Hearing back from our patients is one way we can continue to improve our services. Please take a few minutes to complete the written survey that you may receive in the mail after your visit with us. Thank you!             Your Updated Medication List - Protect others around you: Learn how to safely use, store and throw away your medicines at www.disposemymeds.org.          This list is accurate as of 10/29/18 10:47 AM.  Always use your most recent med list.                   Brand Name Dispense Instructions for use Diagnosis    acetaminophen 325 MG tablet    TYLENOL    100 tablet    Take 2 tablets (650 mg) by mouth every 6 hours as needed for mild pain    Migraines, Pulmonary emboli (H)       calcium-magnesium 500-250 MG Tabs per tablet    CALMAG     Take 1 tablet by mouth 2 times daily (with meals)        cholecalciferol 1000 units Tabs      Take 1,000 Units by mouth daily        propranolol 20 MG tablet    INDERAL    90 tablet    TAKE 1 TABLET(20 MG) BY MOUTH DAILY. Follow up for further refills    Migraine without status migrainosus, not intractable, unspecified migraine type       warfarin 2 MG tablet    COUMADIN    180 tablet    TAKE 2 TABLETS(4 MG) BY MOUTH EVERY DAY    Chronic anticoagulation

## 2018-10-29 NOTE — LETTER
November 2, 2018      Nury Cárdenas  [address] [address]        Dear ,    We are writing to inform you of your test results.              The lung function testing is normal.     These results do not require a change.  Please discuss at your next visit.  It was a pleasure to see you at your recent visit. Please let me know if you have any questions or concerns.     Alvaro Maguire MD   Professor of Medicine   Director, Division of Rheumatic and Autoimmune Diseases          Resulted Orders   General PFT Lab (Please always keep checked)   Result Value Ref Range    FVC-Pred 2.68 L    FVC-Pre 2.88 L    FVC-%Pred-Pre 107 %    FEV1-Pre 2.00 L    FEV1-%Pred-Pre 96 %    FEV1FVC-Pred 75 %    FEV1FVC-Pre 69 %    FEFMax-Pred 5.23 L/sec    FEFMax-Pre 6.91 L/sec    FEFMax-%Pred-Pre 132 %    FEF2575-Pred 1.70 L/sec    FEF2575-Pre 1.17 L/sec    PYG7490-%Pred-Pre 68 %    ExpTime-Pre 7.27 sec    FIFMax-Pre 2.91 L/sec    VC-Pred 2.93 L    VC-Pre 2.82 L    VC-%Pred-Pre 96 %    IC-Pred 2.12 L    IC-Pre 2.00 L    IC-%Pred-Pre 94 %    ERV-Pred 0.81 L    ERV-Pre 0.83 L    ERV-%Pred-Pre 102 %    FEV1FEV6-Pred 78 %    FEV1FEV6-Pre 70 %    DLCOunc-Pred 19.89 ml/min/mmHg    DLCOunc-Pre 17.61 ml/min/mmHg    DLCOunc-%Pred-Pre 88 %    VA-Pre 4.73 L    VA-%Pred-Pre 94 %    FEV1SVC-Pred 70 %    FEV1SVC-Pre 71 %    Narrative    IMPRESSION:  Normal spirometry  The diffusing capacity is normal.  Compared with testing from 10/16/2017 there has been no significant change in the FEV1 or FVC.  Leelee Abdullahi MD  ____________________________________________LEELEE BERKOWITZ

## 2018-10-29 NOTE — TELEPHONE ENCOUNTER
1st attempt to reach the patient and schedule an appointment with either Dr. Maguire(first available is 12/7) or Endocrinology, possibly at the [facility], for severe osteoporosis; [email or web address].    Juanita Varghese  [facility]~Specialty/Med Surg   [phone]

## 2018-10-31 ENCOUNTER — THERAPY VISIT (OUTPATIENT)
Dept: PHYSICAL THERAPY | Facility: CLINIC | Age: 73
End: 2018-10-31
Attending: INTERNAL MEDICINE
Payer: MEDICARE

## 2018-10-31 DIAGNOSIS — M54.2 NECK PAIN: Primary | ICD-10-CM

## 2018-10-31 PROCEDURE — G8982 BODY POS GOAL STATUS: HCPCS | Mod: GP | Performed by: PHYSICAL THERAPIST

## 2018-10-31 PROCEDURE — 97140 MANUAL THERAPY 1/> REGIONS: CPT | Mod: GP | Performed by: PHYSICAL THERAPIST

## 2018-10-31 PROCEDURE — 97110 THERAPEUTIC EXERCISES: CPT | Mod: GP | Performed by: PHYSICAL THERAPIST

## 2018-10-31 PROCEDURE — 97161 PT EVAL LOW COMPLEX 20 MIN: CPT | Mod: GP | Performed by: PHYSICAL THERAPIST

## 2018-10-31 PROCEDURE — G8981 BODY POS CURRENT STATUS: HCPCS | Mod: GP | Performed by: PHYSICAL THERAPIST

## 2018-10-31 NOTE — LETTER
DEPARTMENT OF HEALTH AND HUMAN SERVICES  CENTERS FOR MEDICARE & MEDICAID SERVICES    PLAN/UPDATED PLAN OF PROGRESS FOR OUTPATIENT REHABILITATION    PATIENTS NAME:  Nury Cárdenas   : 1945  PROVIDER NUMBER:    [id number]  [place]:   A  PROVIDER NAME: [facility]  MEDICAL RECORD NUMBER: [id number]     START OF CARE DATE:  SOC Date: 10/31/18   TYPE:  PT    PRIMARY/TREATMENT DIAGNOSIS: (Pertinent Medical Diagnosis)  Neck pain    VISITS FROM START OF CARE:  Rxs Used: 1     [place] [facility] Initial Evaluation  Subjective:  Patient is a 73 year old female presenting with rehab cervical spine hpi.   Nury Cárdenas is a 73 year old female with a cervical spine and thoracic spine condition.  Condition occurred with:  Repetition/overuse.  Condition occurred: for unknown reasons.  This is a chronic condition  Patient reports an exacerbation of neck pain in 2018 and feels it is related to standing and leaning forward over a filing cabinet and reading at home with a forward head position   Patient reports her neck pain had improved following an episode of PT in 2017 but she stopped doing her exercises. .    Patient reports pain: Cervical right side and cervical left side.  Radiates to:  None.  Pain is described as aching and is constant and reported as 2/10.  Associated symptoms:  Loss of motion/stiffness. Pain is worse during the day.  Symptoms are exacerbated by looking up or down and relieved by rest.  Since onset symptoms are gradually worsening.  Special tests:  Bone scan (see epic). General health as reported by patient is good.  MD order 10/12/2018.               Red flags:  None as reported by the patient.    Objective:  Standing Alignment:    Cervical/Thoracic:  Forward head  Shoulder/UE:  Rounded shoulders       Cervical/Thoracic Evaluation  AROM:  AROM Cervical:  Flexion:            WNL, painful  Extension:       5 deg, painful  Rotation:         Left: 30 deg, painful      Right: 30 deg, painful  Side Bend:      Left: 10 deg, painful     Right:  10 deg, painful    Headaches: none  Cervical Myotomes:  Cervical myotomes: grossly 4+/5 bilaterally.    PATIENTS NAME:  Nury Cárdenas   : 1945    Cervical Palpation:    Tenderness present at Left:    Upper Trap; Erector Spinae; Facet and Suboccipitals  Tenderness present at Right:    Upper Trap; Erector Spinae and Suboccipitals    Cervical Stability/Joint Clearing:    Left positive at:Mobility  Right positive at:  Mobility    Spinal Segmental Conclusions:    Level:  Hypo at T7, T6, T5, T4, C7, C6, C5, C3, C4, T1, T2, T3, C1, C2 and T8    Assessment/Plan:    Patient is a 73 year old female with cervical complaints.    Patient has the following significant findings with corresponding treatment plan.                Diagnosis 1:  Neck pain  Pain -  hot/cold therapy, manual therapy, self management, education and home program  Decreased ROM/flexibility - manual therapy, therapeutic exercise, therapeutic activity and home program  Decreased joint mobility - manual therapy, therapeutic exercise, therapeutic activity and home program  Decreased strength - therapeutic exercise, therapeutic activities and home program  Decreased function - therapeutic activities and home program  Impaired posture - neuro re-education    Therapy Evaluation Codes:   1) History comprised of:   Personal factors that impact the plan of care:      None.    Comorbidity factors that impact the plan of care are:      Osteoporosis/penia.     Medications impacting care: Bone density.  2) Examination of Body Systems comprised of:   Body structures and functions that impact the plan of care:      Cervical spine.   Activity limitations that impact the plan of care are:      Bending and Reading/Computer work.  3) Clinical presentation characteristics are:   Stable/Uncomplicated.  4) Decision-Making    Low complexity using standardized patient assessment instrument and/or  "measureable assessment of functional outcome.  Cumulative Therapy Evaluation is: Low complexity.    Previous and current functional limitations:  (See Goal Flow Sheet for this information)    Short term and Long term goals: (See Goal Flow Sheet for this information)     Communication ability:  Patient appears to be able to clearly communicate and understand verbal and written communication and follow directions correctly.  Treatment Explanation - The following has been discussed with the patient:   RX ordered/plan of care  PATIENTS NAME:  Nury Cárdenas   : 1945    Anticipated outcomes  Possible risks and side effects  This patient would benefit from PT intervention to resume normal activities.   Rehab potential is good.    Frequency:  1 X week, once daily  Duration:  for 6 weeks  Discharge Plan:  Achieve all LTG.  Independent in home treatment program.  Reach maximal therapeutic benefit.    Please refer to the daily flowsheet for treatment today, total treatment time and time spent performing 1:1 timed codes.     Caregiver Signature/Credentials _____________________________ Date ________       Treating Provider: Eliceo Vidal DPT   I have reviewed and certified the need for these services and plan of treatment while under my care.        PHYSICIAN'S SIGNATURE:   _________________________________________  Date___________   Alvaro Maguire MD    Certification period:  Beginning of Cert date period: 10/31/18 to  End of Cert period date: 18     Functional Level Progress Report: Please see attached \"Goal Flow sheet for Functional level.\"    ____X____ Continue Services or       ________ DC Services                Service dates: From  SOC Date: 10/31/18 date to present                         "

## 2018-10-31 NOTE — PROGRESS NOTES
[place] Initial Evaluation  Subjective:  Patient is a 73 year old female presenting with rehab cervical spine hpi.   Nury Cárdenas is a 73 year old female with a cervical spine and thoracic spine condition.  Condition occurred with:  Repetition/overuse.  Condition occurred: for unknown reasons.  This is a chronic condition  Patient reports an exacerbation of neck pain in January 2018 and feels it is related to standing and leaning forward over a filing cabinet and reading at home with a forward head position   Patient reports her neck pain had improved following an episode of PT in June 2017 but she stopped doing her exercises. .    Patient reports pain:  Cervical right side and cervical left side.  Radiates to:  None.  Pain is described as aching and is constant and reported as 2/10.  Associated symptoms:  Loss of motion/stiffness. Pain is worse during the day.  Symptoms are exacerbated by looking up or down and relieved by rest.  Since onset symptoms are gradually worsening.  Special tests:  Bone scan (see epic).      General health as reported by patient is good.                      Red flags:  None as reported by the patient.                        Objective:  Standing Alignment:    Cervical/Thoracic:  Forward head  Shoulder/UE:  Rounded shoulders                                  Cervical/Thoracic Evaluation    AROM:  AROM Cervical:    Flexion:            WNL, painful  Extension:       5 deg, painful  Rotation:         Left: 30 deg, painful     Right: 30 deg, painful  Side Bend:      Left: 10 deg, painful     Right:  10 deg, painful      Headaches: none  Cervical Myotomes:  Cervical myotomes: grossly 4+/5 bilaterally.                        Cervical Palpation:    Tenderness present at Left:    Upper Trap; Erector Spinae; Facet and Suboccipitals  Tenderness present at Right:    Upper Trap; Erector Spinae and Suboccipitals    Cervical Stability/Joint Clearing:    Left positive  at:Mobility  Right positive at:  Mobility  Spinal Segmental Conclusions:    Level:  Hypo at T7, T6, T5, T4, C7, C6, C5, C3, C4, T1, T2, T3, C1, C2 and T8                                                General     ROS    Assessment/Plan:    Patient is a 73 year old female with cervical complaints.    Patient has the following significant findings with corresponding treatment plan.                Diagnosis 1:  Neck pain  Pain -  hot/cold therapy, manual therapy, self management, education and home program  Decreased ROM/flexibility - manual therapy, therapeutic exercise, therapeutic activity and home program  Decreased joint mobility - manual therapy, therapeutic exercise, therapeutic activity and home program  Decreased strength - therapeutic exercise, therapeutic activities and home program  Decreased function - therapeutic activities and home program  Impaired posture - neuro re-education    Therapy Evaluation Codes:   1) History comprised of:   Personal factors that impact the plan of care:      None.    Comorbidity factors that impact the plan of care are:      Osteoporosis/penia.     Medications impacting care: Bone density.  2) Examination of Body Systems comprised of:   Body structures and functions that impact the plan of care:      Cervical spine.   Activity limitations that impact the plan of care are:      Bending and Reading/Computer work.  3) Clinical presentation characteristics are:   Stable/Uncomplicated.  4) Decision-Making    Low complexity using standardized patient assessment instrument and/or measureable assessment of functional outcome.  Cumulative Therapy Evaluation is: Low complexity.    Previous and current functional limitations:  (See Goal Flow Sheet for this information)    Short term and Long term goals: (See Goal Flow Sheet for this information)     Communication ability:  Patient appears to be able to clearly communicate and understand verbal and written communication and follow  directions correctly.  Treatment Explanation - The following has been discussed with the patient:   RX ordered/plan of care  Anticipated outcomes  Possible risks and side effects  This patient would benefit from PT intervention to resume normal activities.   Rehab potential is good.    Frequency:  1 X week, once daily  Duration:  for 6 weeks  Discharge Plan:  Achieve all LTG.  Independent in home treatment program.  Reach maximal therapeutic benefit.    Please refer to the daily flowsheet for treatment today, total treatment time and time spent performing 1:1 timed codes.

## 2018-11-01 LAB
DLCOUNC-%PRED-PRE: 88 %
DLCOUNC-PRE: 17.61 ML/MIN/MMHG
DLCOUNC-PRED: 19.89 ML/MIN/MMHG
ERV-%PRED-PRE: 102 %
ERV-PRE: 0.83 L
ERV-PRED: 0.81 L
EXPTIME-PRE: 7.27 SEC
FEF2575-%PRED-PRE: 68 %
FEF2575-PRE: 1.17 L/SEC
FEF2575-PRED: 1.7 L/SEC
FEFMAX-%PRED-PRE: 132 %
FEFMAX-PRE: 6.91 L/SEC
FEFMAX-PRED: 5.23 L/SEC
FEV1-%PRED-PRE: 96 %
FEV1-PRE: 2 L
FEV1FEV6-PRE: 70 %
FEV1FEV6-PRED: 78 %
FEV1FVC-PRE: 69 %
FEV1FVC-PRED: 75 %
FEV1SVC-PRE: 71 %
FEV1SVC-PRED: 70 %
FIFMAX-PRE: 2.91 L/SEC
FVC-%PRED-PRE: 107 %
FVC-PRE: 2.88 L
FVC-PRED: 2.68 L
IC-%PRED-PRE: 94 %
IC-PRE: 2 L
IC-PRED: 2.12 L
VA-%PRED-PRE: 94 %
VA-PRE: 4.73 L
VC-%PRED-PRE: 96 %
VC-PRE: 2.82 L
VC-PRED: 2.93 L

## 2018-11-01 NOTE — PROGRESS NOTES
[facility] [facility] Initial Evaluation  Subjective:  Patient is a 73 year old female presenting with rehab left ankle/foot hpi.                                      Pertinent medical history includes:  Migraines/headaches, osteoporosis and rheumatoid arthritis.  Medical allergies: yes.  Other surgeries include:  None reported.  Current medications:  Heparin/coumadin and high blood pressure medication.  Current occupation is retired.                                    Objective:  System    Physical Exam    General     ROS    Assessment/Plan:

## 2018-11-02 ENCOUNTER — MYC MEDICAL ADVICE (OUTPATIENT)
Dept: FAMILY MEDICINE | Facility: CLINIC | Age: 73
End: 2018-11-02

## 2018-11-09 ENCOUNTER — TELEPHONE (OUTPATIENT)
Dept: NURSING | Facility: CLINIC | Age: 73
End: 2018-11-09

## 2018-11-09 ENCOUNTER — TELEPHONE (OUTPATIENT)
Dept: RHEUMATOLOGY | Facility: CLINIC | Age: 73
End: 2018-11-09

## 2018-11-09 ENCOUNTER — ANTICOAGULATION THERAPY VISIT (OUTPATIENT)
Dept: NURSING | Facility: CLINIC | Age: 73
End: 2018-11-09
Payer: COMMERCIAL

## 2018-11-09 ENCOUNTER — OFFICE VISIT (OUTPATIENT)
Dept: RHEUMATOLOGY | Facility: CLINIC | Age: 73
End: 2018-11-09
Payer: COMMERCIAL

## 2018-11-09 VITALS
OXYGEN SATURATION: 97 % | BODY MASS INDEX: 20.01 KG/M2 | SYSTOLIC BLOOD PRESSURE: 138 MMHG | TEMPERATURE: 97.3 F | WEIGHT: 117.2 LBS | HEART RATE: 66 BPM | HEIGHT: 64 IN | DIASTOLIC BLOOD PRESSURE: 79 MMHG | RESPIRATION RATE: 16 BRPM

## 2018-11-09 DIAGNOSIS — M31.30 GRANULOMATOSIS WITH POLYANGIITIS (H): Primary | ICD-10-CM

## 2018-11-09 DIAGNOSIS — M80.00XA AGE-RELATED OSTEOPOROSIS WITH CURRENT PATHOLOGICAL FRACTURE, INITIAL ENCOUNTER: ICD-10-CM

## 2018-11-09 DIAGNOSIS — G51.4 FACIAL TWITCHING: ICD-10-CM

## 2018-11-09 DIAGNOSIS — I26.99 OTHER PULMONARY EMBOLISM WITHOUT ACUTE COR PULMONALE, UNSPECIFIED CHRONICITY (H): ICD-10-CM

## 2018-11-09 LAB
ALP SERPL-CCNC: 83 U/L (ref 40–150)
CALCIUM SERPL-MCNC: 9.7 MG/DL (ref 8.5–10.1)
INR POINT OF CARE: 1.8 (ref 0.86–1.14)
MAGNESIUM SERPL-MCNC: 2.7 MG/DL (ref 1.6–2.3)
PHOSPHATE SERPL-MCNC: 3.7 MG/DL (ref 2.5–4.5)
PTH-INTACT SERPL-MCNC: 29 PG/ML (ref 18–80)

## 2018-11-09 PROCEDURE — 84100 ASSAY OF PHOSPHORUS: CPT | Performed by: INTERNAL MEDICINE

## 2018-11-09 PROCEDURE — 84075 ASSAY ALKALINE PHOSPHATASE: CPT | Performed by: INTERNAL MEDICINE

## 2018-11-09 PROCEDURE — 82306 VITAMIN D 25 HYDROXY: CPT | Performed by: INTERNAL MEDICINE

## 2018-11-09 PROCEDURE — 85610 PROTHROMBIN TIME: CPT | Mod: QW

## 2018-11-09 PROCEDURE — 83735 ASSAY OF MAGNESIUM: CPT | Performed by: INTERNAL MEDICINE

## 2018-11-09 PROCEDURE — 99215 OFFICE O/P EST HI 40 MIN: CPT | Performed by: INTERNAL MEDICINE

## 2018-11-09 PROCEDURE — 99207 ZZC NO CHARGE NURSE ONLY: CPT

## 2018-11-09 PROCEDURE — 82310 ASSAY OF CALCIUM: CPT | Performed by: INTERNAL MEDICINE

## 2018-11-09 PROCEDURE — 36416 COLLJ CAPILLARY BLOOD SPEC: CPT

## 2018-11-09 PROCEDURE — 83970 ASSAY OF PARATHORMONE: CPT | Performed by: INTERNAL MEDICINE

## 2018-11-09 ASSESSMENT — PAIN SCALES - GENERAL: PAINLEVEL: NO PAIN (0)

## 2018-11-09 NOTE — MR AVS SNAPSHOT
After Visit Summary   11/9/2018    Nury Cárdenas    MRN: [id number]           Patient Information     Date Of Birth          1945        Visit Information        Provider Department      11/9/2018 10:30 AM Alvaro Maguire MD [facility]        Today's Diagnoses     Granulomatosis with polyangiitis (H)    -  1    Age-related osteoporosis with current pathological fracture, initial encounter        Facial twitching           Follow-ups after your visit        Follow-up notes from your care team     Return in about 3 months (around 2/9/2019).      Your next 10 appointments already scheduled     Nov 12, 2018  2:00 PM CST   (Arrive by 1:45 PM)   MR BRAIN W/O & W CONTRAST with BEMR1   [facility] ([facility]    [address] [address] [phone]           How do I prepare for my exam? (Food and drink instructions) **If you will be receiving sedation or general anesthesia, please see special notes below.**  How do I prepare for my exam? (Other instructions) Take your medicines as usual, unless your doctor tells you not to. You may or may not receive intravenous (IV) contrast for this exam pending the discretion of the Radiologist.  You do not need to do anything special to prepare.  **If you will be receiving sedation or general anesthesia, please see special notes below.**  What should I wear: The MRI machine uses a strong magnet. Please wear clothes without metal (snaps, zippers). A sweatsuit works well, or we may give you a hospital gown. Please remove any body piercings and hair extensions before you arrive. You will also remove watches, jewelry, hairpins, wallets, dentures, partial dental plates and hearing aids. You may wear contact lenses, and you may be able to wear your rings. We have a safe place to keep your personal items, but it is safer to leave them at home.  How long does the exam take: Most tests take 30 to 60  minutes.  HOWEVER, IF YOUR DOCTOR PRESCRIBES ANESTHESIA please plan on spending four to five hours in the recovery room.  What should I bring:  Bring a list of your current medicines to your exam (including vitamins, minerals and over-the-counter drugs).  Do I need a :  **If you will be receiving sedation or general anesthesia, please see special notes below.**  What should I do after the exam: No Restrictions, You may resume normal activities.  What is this test: MRI (magnetic resonance imaging) uses a strong magnet and radio waves to look inside the body. An MRA (magnetic resonance angiogram) does the same thing, but it lets us look at your blood vessels. A computer turns the radio waves into pictures showing cross sections of the body, much like slices of bread. This helps us see any problems more clearly. You may receive fluid (called  contrast ) before or during your scan. The fluid helps us see the pictures better. We give the fluid through an IV (small needle in your arm).  Who should I call with questions:  Please call the Imaging Department at your exam site with any questions. Directions, parking instructions, and other information is available on our website, [email or web address].  How do I prepare if I m having sedation or anesthesia? **IMPORTANT** THE INSTRUCTIONS BELOW ARE ONLY FOR THOSE PATIENTS WHO HAVE BEEN TOLD THEY WILL RECEIVE SEDATION OR GENERAL ANESTHESIA DURING THEIR MRI PROCEDURE:  IF YOU WILL RECEIVE SEDATION (take medicine to help you relax during your exam): You must get the medicine from your doctor before you arrive. Bring the medicine to the exam. Do not take it at home. Arrive one hour early. Bring someone who can take you home after the test. Your medicine will make you sleepy. After the exam, you may not drive, take a bus or take a taxi by yourself. No eating 8 hours before your exam. You may have clear liquids up until 4 hours before your exam. (Clear liquids include water,  clear tea, black coffee and fruit juice without pulp.)  IF YOU WILL RECEIVE ANESTHESIA (be asleep for your exam): Arrive 1 1/2 hours early. Bring someone who can take you home after the test. You may not drive, take a bus or take a taxi by yourself. No eating 8 hours before your exam. You may have clear liquids up until 4 hours before your exam. (Clear liquids include water, clear tea, black coffee and fruit juice without pulp.)            Nov 21, 2018 10:20 AM CST   [facility] with Eliceo Vidal, PT   [facility] ([facility])    [address]  [address]  [facility] [address]   [phone]            Nov 23, 2018  9:45 AM CST   Anticoagulation Visit with FZ ANTI COAG   [facility] ([facility])    [address] [place] [address]   [phone]            Feb 15, 2019 11:30 AM CST   Return Visit with Alvaro Maguire MD   [facility])    [address] [place] [address]   [phone]            Apr 19, 2019 10:30 AM CDT   LAB with LAB FIRST FLOOR [facility])    [address] [place] [id number] [phone]           Please do not eat 10-12 hours before your appointment if you are coming in fasting for labs on lipids, cholesterol, or glucose (sugar). This does not apply to pregnant women. Water, hot tea and black coffee (with nothing added) are okay. Do not drink other fluids, diet soda or chew gum.            Apr 19, 2019 11:00 AM CDT   Return Visit with Alvaro Maguire MD   [facility])    [address] [place] [address] [phone]              Future tests that were ordered for you today     Open Future Orders        Priority Expected Expires Ordered    MR Brain w/o & w Contrast Routine  11/9/2019 11/9/2018            Who to contact     If you have questions  "or need follow up information about today's clinic visit or your schedule please contact [facility] directly at [phone].  Normal or non-critical lab and imaging results will be communicated to you by [employer], letter or phone within 4 business days after the clinic has received the results. If you do not hear from us within 7 days, please contact the clinic through [employer] or phone. If you have a critical or abnormal lab result, we will notify you by phone as soon as possible.  Submit refill requests through [employer] or call your pharmacy and they will forward the refill request to us. Please allow 3 business days for your refill to be completed.          Additional Information About Your Visit        [employer] Information     [employer] gives you secure access to your electronic health record. If you see a primary care provider, you can also send messages to your care team and make appointments. If you have questions, please call your primary care clinic.  If you do not have a primary care provider, please call [phone] and they will assist you.      [employer] is an electronic gateway that provides easy, online access to your medical records. With [employer], you can request a clinic appointment, read your test results, renew a prescription or communicate with your care team.     To access your existing account, please contact your [facility] or call [phone] for assistance.        Care EveryWhere ID     This is your Care EveryWhere ID. This could be used by other organizations to access your [place] medical records  [id number]        Your Vitals Were     Pulse Temperature Respirations Height Pulse Oximetry BMI (Body Mass Index)    66 97.3  F (36.3  C) 16 1.613 m (5' 3.5\") 97% 20.44 kg/m2       Blood Pressure from Last 3 Encounters:   11/09/18 138/79   10/12/18 132/83   09/07/18 134/70    Weight from Last 3 Encounters:   11/09/18 53.2 kg (117 lb 3.2 oz) "   10/12/18 54.6 kg (120 lb 6.4 oz)   09/07/18 55.2 kg (121 lb 9.6 oz)              We Performed the Following     25 Hydroxyvitamin D2 and D3     Alkaline phosphatase     Calcium     Magnesium     Parathyroid Hormone Intact     Phosphorus          Today's Medication Changes          These changes are accurate as of 11/9/18 11:53 AM.  If you have any questions, ask your nurse or doctor.               Start taking these medicines.        Dose/Directions    teriparatide (recombinant) 600 MCG/2.4ML Soln injection   Commonly known as:  FORTEO   Used for:  Granulomatosis with polyangiitis (H), Age-related osteoporosis with current pathological fracture, initial encounter, Facial twitching   Started by:  Alvaro Maguire MD        Dose:  20 mcg   Inject 0.08 mLs (20 mcg) Subcutaneous daily   Quantity:  2.4 mL   Refills:  0            Where to get your medicines      Call your pharmacy to confirm that your medication is ready for pickup. It may take up to 24 hours for them to receive the prescription. If the prescription is not ready within 3 business days, please contact your clinic or your provider.     We will let you know when these medications are ready. If you don't hear back within 3 business days, please contact us.     teriparatide (recombinant) 600 MCG/2.4ML Soln injection                Primary Care Provider Office Phone # Fax #    Phil Abbott [phone]       [address] [facility] [address] [facility] [address]        Equal Access to Services     [facility]: Hadii abena bahena Somau, waaxda luqadaha, qaybta kaalmalavell jay . So [facility] [phone].    ATENCIÓN: Si habla español, tiene a fofana disposición servicios gratuitos de asistencia lingüística. Llame al [phone].    We comply with applicable federal civil rights laws and [place] laws. We do not discriminate on the basis of race, color, national origin, age, disability, sex, sexual  orientation, or gender identity.            Thank you!     Thank you for choosing [facility]  for your care. Our goal is always to provide you with excellent care. Hearing back from our patients is one way we can continue to improve our services. Please take a few minutes to complete the written survey that you may receive in the mail after your visit with us. Thank you!             Your Updated Medication List - Protect others around you: Learn how to safely use, store and throw away your medicines at www.disposemymeds.org.          This list is accurate as of 11/9/18 11:53 AM.  Always use your most recent med list.                   Brand Name Dispense Instructions for use Diagnosis    acetaminophen 325 MG tablet    TYLENOL    100 tablet    Take 2 tablets (650 mg) by mouth every 6 hours as needed for mild pain    Migraines, Pulmonary emboli (H)       calcium-magnesium 500-250 MG Tabs per tablet    CALMAG     Take 1 tablet by mouth 2 times daily (with meals)        cholecalciferol 1000 units Tabs      Take 1,000 Units by mouth daily        propranolol 20 MG tablet    INDERAL    90 tablet    TAKE 1 TABLET(20 MG) BY MOUTH DAILY.    Migraine without status migrainosus, not intractable, unspecified migraine type       teriparatide (recombinant) 600 MCG/2.4ML Soln injection    FORTEO    2.4 mL    Inject 0.08 mLs (20 mcg) Subcutaneous daily    Granulomatosis with polyangiitis (H), Age-related osteoporosis with current pathological fracture, initial encounter, Facial twitching       warfarin 2 MG tablet    COUMADIN    180 tablet    TAKE 2 TABLETS(4 MG) BY MOUTH EVERY DAY    Chronic anticoagulation

## 2018-11-09 NOTE — PROGRESS NOTES
Ms. 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 Course complicated by severe upper airway inflammation, Wegener's lung and destructive/erosive joint disease. Treated initially with rituximab 375 mg/m2 x4 and high dose corticsteroids, and most recently with methotrexate.  No history of relapse. Prednisone taper complete on April 1st, 2017. No recent methotrexate. Alendronate therapy complicated by LE pain.    She is here for evaluation and management her bones. Recent complaint of mid-back pain associated with kyphosis that was evaluated by vertebral fracture analysis and vertebral fracture was detected. Prolonged standing with forward posture can bring on pain. She takes a calcium and vitamin D supplement.  No recent prednisone use. No recent fracture. She has significant family history of osteoporosis.     PMI:  Medical-invasive pulmonary aspirgillosis, GPA, osteoarthritis, DVT with pulmonary embolism, osteoporosis with vertebral fracture (T = -2.3 ), GERD, hyperlipidemia, +Tb exposure, nephrolithiasis, retinal tear, cataract  Surgical-lung biopsy, appendectomy, eye surgery, tonsillectomy, cone biopsy, right wrist synovectomy  Injuries-left wrist fracture x2, left 5th toe fracture, vertebral fracture    SH:  Lives at home alone. Former smoker. No EtOH. Tuberculosis exposure as a child. Ambidextrous.    FH:  Son with mitochondrial myopathy  Sibs dead with colon and anal cancer  Father dead with lung cancer  Mother dead with emphysema and osteoporosis    PMSF history personally obtained and updated by me this visit in the clinic.    ROS:  +occasional chronic cough  +occasional confusion  +frequent left facial twitch  +dysesthesia bottoms of feet  +unintentional weight weight  +allergies to fosamax amoxicillin, augmentin, erythromycin, zithromax, nickel, codeine, adhesive tape  Remainder of the 14 point ROS obtained and found negative.    Physical  Exam:  Constitutional: WD-WN-WG cooperative; +rare dry cough  Eyes: nl EOM, PERRLA, vision, conjunctiva, sclera  ENT: nl external ears, nose, hearing, lips, teeth, gums, throat  Neck: no mass or thyroid enlargement  Resp: lungs clear to auscultation, nl to palpation, nl effort  CV: RRR, no murmurs, rubs or gallops, no edema  GI: no ABD mass or tenderness, no HSM  : not tested  Lymph: no cervical or epitrochlear nodes  MS: +Right wrist with no flexion or extension; left wrist extension to 45 degrees; 1st CMC joint squaring bilaterally with some thumb laxity; right neck rotation 30 degrees right and 45 degrees left with head forward position and minimal neck extension; All other TMJ, neck, shoulder, elbow, wrist, hand, spine, hip, knee, ankle, and foot joints were examined and otherwise found normal. Normal  strength. +severe kyphosis. Normal tuck and prayer.  Skin: no nail pitting, alopecia; +dry erythematous papules over the nose and left cheek  Neuro: nl cranial nerves, strength, sensation, DTRs.   Psych: nl judgement, orientation, memory, affect.    Laboratory:    Normal PFTs.    Study Result   BONE DENSITOMETRY  [address] [address]  10/24/2018       PATIENT: Nury Cárdenas  CHART: [id number]   :  1945  AGE:  73 year old  SEX:  female   REFERRING PROVIDER:  Alvaro Maguire MD      PROCEDURE:  Bone density scanning was performed using DXA technology of the lumbar spine and hip.  Scanning was performed on a Lunar Prodigy scanner.  Reporting is completed in the form of a T-score.  The T-score represents the standard deviation from peak bone mass based on a young healthy adult.      REFERENCE T-SCORES:       Normal                -1.0 and greater                                 Osteopenia         Between -1.0 and -2.5                                           Osteoporosis     -2.5 and less                                       RISK FACTORS:  " Post-menopausal, Height loss of 3 inches, Parent history of osteoporosis, longterm corticosteroid treatment, follow up osteopenia     CURRENT TREATMENT:  Calcium, Vitamin D, previous Fosamax, stopped 2014      FINDINGS:               Lumbar Spine (L1-L4) T-score: -2.3               Left Femoral Neck T-score:  -1.7               Right Femoral Neck T-score:  -1.4                             Lumbar (L1-L4) BMD: 0.906            Previous: 0.914                                              Total Hip Mean BMD: 0.708            Previous: 0.725      Comparison is made to another DXA performed on the same Lunar Prodigy  machine on 7/1/2016.       LATERAL VERTEBRAL ASSESSMENT  Procedure:  Vertebral fracture assessment was performed in the lateral decubitus position using a Lunar Prodigy  densitometer.  Indications for VFA: T-score of -1.0 or worse, age (female>69), height loss > 1.5\" and long term corticosteroid therapy    Confounding factors for VFA: Arthritis/degenerative disc disease and rib shadows.  The LVA scan is interpretable from T6 to L4.     VFA Findings: Using the semi-quantitative analysis of [employer] there was evidence of spinal deformity as follows: possible moderate (grade 2) wedge fracture of T7  VFA Impression: Nury Cárdenas has one possible vertebral fracture identified on the LVA.  If alternative etiologies for the presence of vertebral fractures are excluded, the diagnosis is consistent with severe osteoporosis.   Further evaluation may be warranted due to equivocal fracture.      IMPRESSION  Severe osteoporosis if vertebral fracture is confirmed. Otherwise the diagnosis is osteopenia.      There has been no significant change in bone density of the lumbar spine. There has been no significant change in bone density of the hip(s).     Recommendations include ensuring adequate Calcium and Vitamin D.     The current NOF Guidelines recommend treatment for patients with prior hip or vertebral fracture, " "T-score -2.5 or below, or 10 year risk of any major osteoporotic fracture >20% or 10 year risk of hip fracture >3%, as calculated using the FRAX calculator (www.shef.ac.uk/FRAX or you can google \"FRAX\").       This patient's risks based on available information, with the use of FRAX, are 16 % for major osteoporotic fracture and 4.1 % for hip fracture if there is no vertebral fracture present.   Based on these guidelines, treatment (in addition to calcium and vitamin D) is recommended for this patient, after ruling out other causes of osteoporosis.  This is meant as an aid to clinical decision making; one must still use clinical judgement.        Follow up can be considered in 1-2 years.   ___________________  Berenice Short M.D.  Electronically signed       Impression:    PR3-associated GPA-previously severe, but this is quiescent at present and there is no indication for immunosuppression.    Osteoporosis-back pain with diagnosed vertebral fracture. Now with increased risk for future fracture. Intolerant of alendronate. We have discussed the risks and benefits of forteo 600 mcg daily therapy and she agrees to its use. Get bone health and electrolyte testing.    Facial twitch on the left-with weight loss and intermittent confusion. I will ask for an MRI of the brain to evaluate for tumor.    Plan RTC in 3 months.    "

## 2018-11-09 NOTE — PROGRESS NOTES
ANTICOAGULATION FOLLOW-UP CLINIC VISIT    Patient Name:  Nury Cárdenas  Date:  11/9/2018  Contact Type:  Face to Face    SUBJECTIVE:     Patient Findings     Positives No Problem Findings    Comments Bleeding Signs/Symptoms: Has bruise on the back of left hand from an IV insertion attempt for her cataract surgery  Thromboembolic Signs/Symptoms: NO     Medication Changes:  NO  Dietary Changes:  NO  Bacterial/Viral Infection: NO     Missed Coumadin Doses: NO  Other Concerns:  Maintenance dose increased by 7.1 % due to 2 sub therapeutic INR's             OBJECTIVE    INR Protime   Date Value Ref Range Status   11/09/2018 1.8 (A) 0.86 - 1.14 Final       ASSESSMENT / PLAN  INR assessment SUB    Recheck INR In: 2 WEEKS    INR Location Clinic      Anticoagulation Summary as of 11/9/2018     INR goal 2.0-3.0   Today's INR 1.8!   Warfarin maintenance plan 6 mg (2 mg x 3) on Fri; 4 mg (2 mg x 2) all other days   Full warfarin instructions 11/9: 6 mg; 11/16: 6 mg; Otherwise 6 mg on Fri; 4 mg all other days   Weekly warfarin total 30 mg   Plan last modified Kirti Lacy, RN (11/9/2018)   Next INR check 11/23/2018   Priority INR   Target end date Indefinite    Indications   Long-term (current) use of anticoagulants [Z79.01] [Z79.01]  Pulmonary embolism (H) [I26.99]         Anticoagulation Episode Summary     INR check location     Preferred lab     Send INR reminders to [facility]    Comments       Anticoagulation Care Providers     Provider Role Specialty Phone number    Celiapmakhil Phil Harris MD [facility] [phone]            See the Encounter Report to view Anticoagulation Flowsheet and Dosing Calendar (Go to Encounters tab in chart review, and find the Anticoagulation Therapy Visit)    Dosage adjustment made based on physician directed care plan.    Kirti Lacy RN

## 2018-11-09 NOTE — NURSING NOTE
"Nury Cárdenas's goals for this visit include: Return  She requests these members of her care team be copied on today's visit information: PCP    PCP: Phil Abbott    Referring Provider:  No referring provider defined for this encounter.    /79  Pulse 66  Temp 97.3  F (36.3  C)  Resp 16  Ht 1.613 m (5' 3.5\")  Wt 53.2 kg (117 lb 3.2 oz)  SpO2 97%  BMI 20.44 kg/m2    Do you need any medication refills at today's visit? N    "

## 2018-11-09 NOTE — TELEPHONE ENCOUNTER
Has the patient previously taken warfarin? yes  If yes, for what indication? PE    Does the patient have any of the following indications for a higher range of 2.5-3.5:    Mitral position mechanical valve? no    Aster-Shiley, Ball and Cage or Monoleaflet valve (regardless of position) no    Other (if yes, please explain) no    Annual INR referral needed.  Orders teed up.    Kitri Lacy RN - [facility]

## 2018-11-09 NOTE — MR AVS SNAPSHOT
Nury Cárdenas   11/9/2018 8:45 AM   Anticoagulation Therapy Visit    Description:  73 year old female   Provider:  MONI ANTI COAG   Department:  [facility]           INR as of 11/9/2018     Today's INR 1.8!      Anticoagulation Summary as of 11/9/2018     INR goal 2.0-3.0   Today's INR 1.8!   Full warfarin instructions 11/9: 6 mg; 11/16: 6 mg; Otherwise 4 mg every day   Next INR check 11/23/2018    Indications   Long-term (current) use of anticoagulants [Z79.01] [Z79.01]  Pulmonary embolism (H) [I26.99]         Instructions    Some signs and symptoms of clots include: pain or tenderness in arm or leg, swelling in arm or leg, changes in skin color, or area is warm to touch, shortness or breath, trouble breathing.  Numbness or weakness especially on 1 side of the body, sudden trouble speaking or swallowing, sudden trouble seeing, sudden confusion, dizzy spells or headache.  If you have these please call 911 or seek medical care immediately.           Your next Anticoagulation Clinic appointment(s)     Nov 23, 2018  9:45 AM CST   Anticoagulation Visit with MONI ANTI COAGILL   [facility]    [address] [facility] [address] [phone]              Contact Numbers     [facility]  Please call [phone] to cancel and/or reschedule your appointment   Please call [phone] with any problems or questions regarding your therapy.        November 2018 Details    Sun Mon Tue Wed Thu Fri Sat         1               2               3                 4               5               6               7               8               9      6 mg   See details      10      4 mg           11      4 mg         12      4 mg         13      4 mg         14      4 mg         15      4 mg         16      6 mg         17      4 mg           18      4 mg         19      4 mg         20      4 mg         21      4 mg         22      4 mg         23            24                 25                26               27               28               29               30                 Date Details   11/09 This INR check       Date of next INR:  11/23/2018         How to take your warfarin dose     To take:  4 mg Take 2 of the 2 mg tablets.    To take:  6 mg Take 3 of the 2 mg tablets.

## 2018-11-09 NOTE — TELEPHONE ENCOUNTER
PA Initiation    Medication: Foreto 600MCG/0.8ML (PENDING)  Insurance Company: [employer] - Phone [phone] Fax [phone]  Pharmacy Filling the Rx: [facility] - [place], [place] - [address]  Filling Pharmacy Phone: [phone]  Filling Pharmacy Fax: [phone]  Start Date: 11/9/2018

## 2018-11-11 ENCOUNTER — MYC MEDICAL ADVICE (OUTPATIENT)
Dept: RHEUMATOLOGY | Facility: CLINIC | Age: 73
End: 2018-11-11

## 2018-11-12 ENCOUNTER — RADIANT APPOINTMENT (OUTPATIENT)
Dept: MRI IMAGING | Facility: CLINIC | Age: 73
End: 2018-11-12
Attending: INTERNAL MEDICINE
Payer: COMMERCIAL

## 2018-11-12 DIAGNOSIS — G51.4 FACIAL TWITCHING: ICD-10-CM

## 2018-11-12 DIAGNOSIS — M80.00XA AGE-RELATED OSTEOPOROSIS WITH CURRENT PATHOLOGICAL FRACTURE, INITIAL ENCOUNTER: ICD-10-CM

## 2018-11-12 DIAGNOSIS — M31.30 GRANULOMATOSIS WITH POLYANGIITIS (H): ICD-10-CM

## 2018-11-12 LAB
CREAT BLD-MCNC: 0.7 MG/DL (ref 0.5–1.2)
DEPRECATED CALCIDIOL+CALCIFEROL SERPL-MC: <61 UG/L (ref 20–75)
GFR SERPL CREATININE-BSD FRML MDRD: 85 ML/MIN/{1.73_M2}
GFRB: 80
VITAMIN D2 SERPL-MCNC: <5 UG/L
VITAMIN D3 SERPL-MCNC: 56 UG/L

## 2018-11-12 PROCEDURE — A9585 GADOBUTROL INJECTION: HCPCS

## 2018-11-12 PROCEDURE — 82565 ASSAY OF CREATININE: CPT

## 2018-11-12 PROCEDURE — 70553 MRI BRAIN STEM W/O & W/DYE: CPT | Mod: TC

## 2018-11-12 RX ORDER — GADOBUTROL 604.72 MG/ML
7.5 INJECTION INTRAVENOUS ONCE
Status: COMPLETED | OUTPATIENT
Start: 2018-11-12 | End: 2018-11-12

## 2018-11-12 RX ADMIN — GADOBUTROL 5.5 ML: 604.72 INJECTION INTRAVENOUS at 15:11

## 2018-11-12 NOTE — TELEPHONE ENCOUNTER
Prior Authorization Approval    Authorization Effective Date: 11/9/2018  Authorization Expiration Date: 11/9/2019  Medication: Foreto 600MCG/0.8ML (APPROVED)  Approved Dose/Quantity: 2.4ML  Reference #: CMM KEY: [id number]   Insurance Company: [employer] - Phone [phone] Fax [phone]  Expected CoPay:       CoPay Card Available: No   Foundation Assistance Needed: For Your Imagination  Which Pharmacy is filling the prescription (Not needed for infusion/clinic administered): [facility] - [place], [place] - [address] [place] [address] [facility]  Pharmacy Notified: Yes  Patient Notified: Yes    PA Approved. Awaiting approval letter from [facility] [place] Part D.     Enrolled patient in [employer] [place] for Forteo (Post Menopausal Osteoporisis)

## 2018-11-13 ENCOUNTER — MYC MEDICAL ADVICE (OUTPATIENT)
Dept: RHEUMATOLOGY | Facility: CLINIC | Age: 73
End: 2018-11-13

## 2018-11-14 NOTE — TELEPHONE ENCOUNTER
Travel bags are available from the [facility] to keep the medication cold during travel. Please provider her with travel information.    It is my impression that she would suffer the same bone pain with Reclast that she experienced with alendronate.    While I recommend the daily Forteo injections, consideration can be given to IV Prolia therapy. Please provider her with information.

## 2018-11-20 ENCOUNTER — OFFICE VISIT (OUTPATIENT)
Dept: PHARMACY | Facility: CLINIC | Age: 73
End: 2018-11-20
Payer: COMMERCIAL

## 2018-11-20 ENCOUNTER — MYC MEDICAL ADVICE (OUTPATIENT)
Dept: RHEUMATOLOGY | Facility: CLINIC | Age: 73
End: 2018-11-20

## 2018-11-20 DIAGNOSIS — M80.00XS AGE-RELATED OSTEOPOROSIS WITH CURRENT PATHOLOGICAL FRACTURE, SEQUELA: Primary | ICD-10-CM

## 2018-11-20 PROCEDURE — 99207 ZZC NO CHARGE LOS: CPT | Performed by: PHARMACIST

## 2018-11-20 NOTE — PROGRESS NOTES
Clinical Consult:                                                    Nury Cárdenas is a 73 year old female coming in for a clinical pharmacist consult.  She was referred to me from Rheumatology.  Berenice aHn RN care coordinator for rheumatology was also present during the visit today.    Reason for Consult: Forteo Injection teaching.    Discussion: Patient is not sure she is wanting to take Forteo because of the side effects she is reading about it online. Patient had taken alendronate in the past and she experienced leg weakness, where she had a hard time standing.  This frightened her so she stopped taking the alendronate.  Patient's symptoms resolved upon discontinuation of the alendronate.  Patient is wondering if there is a more natural way of treating her osteoporosis.  We reviewed the [place] Osteoporosis Decision making aid to show Pat her risk of fracture and how the medication can help. Patient did agree that she did need to do something to help with her bones.  We had a lengthy conversation and discussed in depth the side effects and frequency of the side effects for Forteo.  We also discussed an alternative option, Prolia, administration and side effect profile.  After lengthy discussion patient did think Prolia might be a better option for her as she liked that it was only every 6 months.  We also discussed the science and the data behind the use of these medications for osteoporosis and that I do not have experience with natural ways to treat osteoporosis but patient is welcome to explore those options as well.  Patient would like to see if Dr. Maguire will consider prescribing Prolia in place of Forteo.  Patient did  the prescription for Forteo pen but has not used it yet.  We did review storage and administration technique of Forteo while patient was in clinic today in case Dr. Maguire does decide he would like to continue with Forteo.  Patient was able to demonstrate understanding of the use  of the Forteo pen using a demonstration device.       Plan:  1.  We will send Dr. Maguire a message asking if he would consider Prolia in the place of Forteo.

## 2018-11-20 NOTE — Clinical Note
Hi Dr. Dixon, I discussed Forteo with patient today she had many concerns and questions regarding the medication.  In addition to Forteo we also discussed Prolia.  Patient thought Prolia might be a better option for her because she liked the ease of administration every 6 months.  Would you consider prescribing Prolia in place of the Forteo? Thank you for considering, Katharine Hayward, Pharm.D, [facility] Med[facility] Therapy Management Pharmacist

## 2018-11-20 NOTE — MR AVS SNAPSHOT
After Visit Summary   11/20/2018    Nury Cárdenas    MRN: [id number]           Patient Information     Date Of Birth          1945        Visit Information        Provider Department      11/20/2018 9:30 AM Katharine Hayward [facility] MTM        Today's Diagnoses     Age-related osteoporosis with current pathological fracture, sequela    -  1       Follow-ups after your visit        Your next 10 appointments already scheduled     Nov 23, 2018  9:45 AM CST   [facility] with RAAD ANTI COAG   [facility]    [address] [facility] [address]   [phone]            Feb 15, 2019 11:30 AM CST   Return Visit with Alvaro Maguire MD   [facility])    [address] [place] [address] [id number] [phone]            Apr 19, 2019 10:30 AM CDT   LAB with LAB FIRST FLOOR [facility])    [address] [place] [address] [phone] [phone]           Please do not eat 10-12 hours before your appointment if you are coming in fasting for labs on lipids, cholesterol, or glucose (sugar). This does not apply to pregnant women. Water, hot tea and black coffee (with nothing added) are okay. Do not drink other fluids, diet soda or chew gum.            Apr 19, 2019 11:00 AM CDT   Return Visit with Alvaro Maguire MD   [facility])    [address] [place] [address] [phone]              Who to contact     If you have questions or need follow up information about today's clinic visit or your schedule please contact [facility] directly at [phone].  Normal or non-critical lab and imaging results will be communicated to you by MyChart, letter or phone within 4 business days after the clinic has received the results. If  you do not hear from us within 7 days, please contact the clinic through [employer] or phone. If you have a critical or abnormal lab result, we will notify you by phone as soon as possible.  Submit refill requests through [employer] or call your pharmacy and they will forward the refill request to us. Please allow 3 business days for your refill to be completed.          Additional Information About Your Visit        [employer] Information     [employer] gives you secure access to your electronic health record. If you see a primary care provider, you can also send messages to your care team and make appointments. If you have questions, please call your primary care clinic.  If you do not have a primary care provider, please call [phone] and they will assist you.        Care EveryWhere ID     This is your Care EveryWhere ID. This could be used by other organizations to access your [employer] medical records  [id number]         Blood Pressure from Last 3 Encounters:   11/09/18 138/79   10/12/18 132/83   09/07/18 134/70    Weight from Last 3 Encounters:   11/09/18 117 lb 3.2 oz (53.2 kg)   10/12/18 120 lb 6.4 oz (54.6 kg)   09/07/18 121 lb 9.6 oz (55.2 kg)              Today, you had the following     No orders found for display       Primary Care Provider Office Phone # Fax #    Phil Abbott [phone] [address] [facility] [address]        Equal Access to Services     [facility]: Hadii aad ku hadasho Soomaali, waaxda luqadaha, qaybta kaalmada adeegyada, lavell martin adeaishwarya castillo . So [facility] [phone].    ATENCIÓN: Si habla español, tiene a fofana disposición servicios gratuitos de asistencia lingüística. Nancy al [phone].    We comply with applicable federal civil rights laws and [place] laws. We do not discriminate on the basis of race, color, national origin, age, disability, sex, sexual orientation, or gender identity.            Thank you!     Thank you for  choosing [facility]  for your care. Our goal is always to provide you with excellent care. Hearing back from our patients is one way we can continue to improve our services. Please take a few minutes to complete the written survey that you may receive in the mail after your visit with us. Thank you!             Your Updated Medication List - Protect others around you: Learn how to safely use, store and throw away your medicines at www.disposemymeds.org.          This list is accurate as of 11/20/18  2:24 PM.  Always use your most recent med list.                   Brand Name Dispense Instructions for use Diagnosis    acetaminophen 325 MG tablet    TYLENOL    100 tablet    Take 2 tablets (650 mg) by mouth every 6 hours as needed for mild pain    Migraines, Pulmonary emboli (H)       calcium-magnesium 500-250 MG Tabs per tablet    CALMAG     Take 1 tablet by mouth 2 times daily (with meals)        cholecalciferol 1000 units Tabs      Take 1,000 Units by mouth daily        propranolol 20 MG tablet    INDERAL    90 tablet    TAKE 1 TABLET(20 MG) BY MOUTH DAILY.    Migraine without status migrainosus, not intractable, unspecified migraine type       teriparatide (recombinant) 600 MCG/2.4ML Soln injection    FORTEO    2.4 mL    Inject 0.08 mLs (20 mcg) Subcutaneous daily    Granulomatosis with polyangiitis (H), Age-related osteoporosis with current pathological fracture, initial encounter, Facial twitching       warfarin 2 MG tablet    COUMADIN    180 tablet    TAKE 2 TABLETS(4 MG) BY MOUTH EVERY DAY    Chronic anticoagulation

## 2018-11-23 ENCOUNTER — ANTICOAGULATION THERAPY VISIT (OUTPATIENT)
Dept: NURSING | Facility: CLINIC | Age: 73
End: 2018-11-23
Payer: COMMERCIAL

## 2018-11-23 DIAGNOSIS — I26.99 OTHER PULMONARY EMBOLISM WITHOUT ACUTE COR PULMONALE, UNSPECIFIED CHRONICITY (H): ICD-10-CM

## 2018-11-23 LAB — INR POINT OF CARE: 2.3 (ref 0.86–1.14)

## 2018-11-23 PROCEDURE — 85610 PROTHROMBIN TIME: CPT | Mod: QW

## 2018-11-23 PROCEDURE — 99207 ZZC NO CHARGE NURSE ONLY: CPT

## 2018-11-23 PROCEDURE — 36416 COLLJ CAPILLARY BLOOD SPEC: CPT

## 2018-11-23 NOTE — PROGRESS NOTES
ANTICOAGULATION FOLLOW-UP CLINIC VISIT    Patient Name:  Nury Cárdenas  Date:  11/23/2018  Contact Type:  Face to Face    SUBJECTIVE:     Patient Findings     Positives No Problem Findings    Comments Bleeding Signs/Symptoms: NO  Thromboembolic Signs/Symptoms: NO     Medication Changes:  Starting Forteo  Dietary Changes:  NO  Bacterial/Viral Infection: NO     Missed Coumadin Doses: NO  Other Concerns:  NO             OBJECTIVE    INR Protime   Date Value Ref Range Status   11/23/2018 2.3 (A) 0.86 - 1.14 Final       ASSESSMENT / PLAN  INR assessment THER    Recheck INR In: 3 WEEKS    INR Location Clinic      Anticoagulation Summary as of 11/23/2018     INR goal 2.0-3.0   Today's INR 2.3   Warfarin maintenance plan 6 mg (2 mg x 3) on Fri; 4 mg (2 mg x 2) all other days   Full warfarin instructions 6 mg on Fri; 4 mg all other days   Weekly warfarin total 30 mg   No change documented Kirti Layc RN   Plan last modified Kirti Lacy RN (11/9/2018)   Next INR check 12/14/2018   Priority INR   Target end date Indefinite    Indications   Long-term (current) use of anticoagulants [Z79.01] [Z79.01]  Pulmonary embolism (H) [I26.99]         Anticoagulation Episode Summary     INR check location     Preferred lab     Send INR reminders to [facility]    Comments       Anticoagulation Care Providers     Provider Role Specialty Phone number    Phil Abbott MD [facility] [phone]            See the Encounter Report to view Anticoagulation Flowsheet and Dosing Calendar (Go to Encounters tab in chart review, and find the Anticoagulation Therapy Visit)    Dosage adjustment made based on physician directed care plan.    Kirti Lacy RN

## 2018-11-23 NOTE — MR AVS SNAPSHOT
Nury Cárdenas   11/23/2018 9:45 AM   Anticoagulation Therapy Visit    Description:  73 year old female   Provider:  MONI ANTI COAG   Department:  [facility]           INR as of 11/23/2018     Today's INR 2.3      Anticoagulation Summary as of 11/23/2018     INR goal 2.0-3.0   Today's INR 2.3   Full warfarin instructions 6 mg on Fri; 4 mg all other days   Next INR check 12/14/2018    Indications   Long-term (current) use of anticoagulants [Z79.01] [Z79.01]  Pulmonary embolism (H) [I26.99]         Your next Anticoagulation Clinic appointment(s)     Dec 14, 2018 10:15 AM CST   Anticoagulation Visit with MONI ANTI COAGILL   [facility]    [address] [facility] [address] [phone]              Contact Numbers     [facility]  Please call [phone] to cancel and/or reschedule your appointment   Please call [phone] with any problems or questions regarding your therapy.        November 2018 Details    Sun Mon Tue Wed Thu Fri Sat         1               2               3                 4               5               6               7               8               9               10                 11               12               13               14               15               16               17                 18               19               20               21               22               23      6 mg   See details      24      4 mg           25      4 mg         26      4 mg         27      4 mg         28      4 mg         29      4 mg         30      6 mg           Date Details   11/23 This INR check               How to take your warfarin dose     To take:  4 mg Take 2 of the 2 mg tablets.    To take:  6 mg Take 3 of the 2 mg tablets.           December 2018 Details    Sun Mon Tue Wed Thu Fri Sat           1      4 mg           2      4 mg         3      4 mg         4      4 mg         5      4 mg         6      4 mg         7      6 mg          8      4 mg           9      4 mg         10      4 mg         11      4 mg         12      4 mg         13      4 mg         14            15                 16               17               18               19               20               21               22                 23               24               25               26               27               28               29                 30               31                     Date Details   No additional details    Date of next INR:  12/14/2018         How to take your warfarin dose     To take:  4 mg Take 2 of the 2 mg tablets.    To take:  6 mg Take 3 of the 2 mg tablets.

## 2018-11-27 ENCOUNTER — OFFICE VISIT (OUTPATIENT)
Dept: PHARMACY | Facility: CLINIC | Age: 73
End: 2018-11-27
Payer: COMMERCIAL

## 2018-11-27 DIAGNOSIS — M80.00XS AGE-RELATED OSTEOPOROSIS WITH CURRENT PATHOLOGICAL FRACTURE, SEQUELA: Primary | ICD-10-CM

## 2018-11-27 PROCEDURE — 99207 ZZC NO CHARGE LOS: CPT | Performed by: PHARMACIST

## 2018-11-27 NOTE — MR AVS SNAPSHOT
After Visit Summary   11/27/2018    Nury Cárdenas    MRN: [id number]           Patient Information     Date Of Birth          1945        Visit Information        Provider Department      11/27/2018 11:30 AM Katharine Hayward [facility] MTM        Today's Diagnoses     Age-related osteoporosis with current pathological fracture, sequela    -  1       Follow-ups after your visit        Your next 10 appointments already scheduled     Dec 14, 2018 10:15 AM CST   Anticoagulation Visit with RAAD ANTI COAG   [facility]    [address] [facility] [address]   [phone]            Feb 15, 2019 11:30 AM CST   Return Visit with Alvaro Maguire MD   [facility])    [address] [place] [address] [phone]            Apr 19, 2019 10:30 AM CDT   LAB with LAB FIRST FLOOR [facility])    [address] [place] [address] [phone] [phone]           Please do not eat 10-12 hours before your appointment if you are coming in fasting for labs on lipids, cholesterol, or glucose (sugar). This does not apply to pregnant women. Water, hot tea and black coffee (with nothing added) are okay. Do not drink other fluids, diet soda or chew gum.            Apr 19, 2019 11:00 AM CDT   Return Visit with Alvaro Maguire MD   [facility])    [address] [place] [address] [phone]              Who to contact     If you have questions or need follow up information about today's clinic visit or your schedule please contact [facility] directly at [phone].  Normal or non-critical lab and imaging results will be communicated to you by MyChart, letter or phone within 4 business days after the clinic has received the results. If  you do not hear from us within 7 days, please contact the clinic through [employer] or phone. If you have a critical or abnormal lab result, we will notify you by phone as soon as possible.  Submit refill requests through [employer] or call your pharmacy and they will forward the refill request to us. Please allow 3 business days for your refill to be completed.          Additional Information About Your Visit        [employer] Information     [employer] gives you secure access to your electronic health record. If you see a primary care provider, you can also send messages to your care team and make appointments. If you have questions, please call your primary care clinic.  If you do not have a primary care provider, please call [phone] and they will assist you.        Care EveryWhere ID     This is your Care EveryWhere ID. This could be used by other organizations to access your [place] medical records  [id number]         Blood Pressure from Last 3 Encounters:   11/09/18 138/79   10/12/18 132/83   09/07/18 134/70    Weight from Last 3 Encounters:   11/09/18 117 lb 3.2 oz (53.2 kg)   10/12/18 120 lb 6.4 oz (54.6 kg)   09/07/18 121 lb 9.6 oz (55.2 kg)              Today, you had the following     No orders found for display       Primary Care Provider Office Phone # Fax #    Phil Abbott [phone] [address] [facility] [address]        Equal Access to Services     [facility] AH: Hadii aad ku hadasho Soomaali, waaxda luqadaha, qaybta kaalmada adeegyada, lavell martin adeaishwarya castillo . So [facility] [phone].    ATENCIÓN: Si habla español, tiene a fofana disposición servicios gratuitos de asistencia lingüística. Nancy al [phone].    We comply with applicable federal civil rights laws and [place] laws. We do not discriminate on the basis of race, color, national origin, age, disability, sex, sexual orientation, or gender identity.            Thank you!     Thank you for  choosing [facility]  for your care. Our goal is always to provide you with excellent care. Hearing back from our patients is one way we can continue to improve our services. Please take a few minutes to complete the written survey that you may receive in the mail after your visit with us. Thank you!             Your Updated Medication List - Protect others around you: Learn how to safely use, store and throw away your medicines at www.disposemymeds.org.          This list is accurate as of 11/27/18 12:04 PM.  Always use your most recent med list.                   Brand Name Dispense Instructions for use Diagnosis    acetaminophen 325 MG tablet    TYLENOL    100 tablet    Take 2 tablets (650 mg) by mouth every 6 hours as needed for mild pain    Migraines, Pulmonary emboli (H)       calcium-magnesium 500-250 MG Tabs per tablet    CALMAG     Take 1 tablet by mouth 2 times daily (with meals)        cholecalciferol 1000 units Tabs      Take 1,000 Units by mouth daily        propranolol 20 MG tablet    INDERAL    90 tablet    TAKE 1 TABLET(20 MG) BY MOUTH DAILY.    Migraine without status migrainosus, not intractable, unspecified migraine type       teriparatide (recombinant) 600 MCG/2.4ML Soln injection    FORTEO    2.4 mL    Inject 0.08 mLs (20 mcg) Subcutaneous daily    Granulomatosis with polyangiitis (H), Age-related osteoporosis with current pathological fracture, initial encounter, Facial twitching       warfarin 2 MG tablet    COUMADIN    180 tablet    TAKE 2 TABLETS(4 MG) BY MOUTH EVERY DAY    Chronic anticoagulation

## 2018-11-27 NOTE — PROGRESS NOTES
Clinical Consult:                                                    Nury Cárdenas is a 73 year old female coming in for a clinical pharmacist consult.  Patient is accompanied by her daughter Celia. She was referred to me from rheumatology.     Reason for Consult: Forteo Pen teaching.    Discussion: Patient was here a week ago for Forteo pen injection teaching. At that time patient was unsure if she wanted to use Forteo due to side effects.  Today she comes in for reteaching of the Forteo Pen. Storage, preparation and proper administration of Forteo was discussed with patient today. Patient was able to demonstrate appropriate technique with a demonstration pen as well as self-administer her first injection today in clinic. Patient had all of her questions answered and felt confident in her ability to administer Forteo.    Katharine Hayward, Pharm.D, BCACP  Medication Therapy Management Pharmacist

## 2018-11-29 ENCOUNTER — MYC MEDICAL ADVICE (OUTPATIENT)
Dept: FAMILY MEDICINE | Facility: CLINIC | Age: 73
End: 2018-11-29

## 2018-12-07 ENCOUNTER — MYC MEDICAL ADVICE (OUTPATIENT)
Dept: RHEUMATOLOGY | Facility: CLINIC | Age: 73
End: 2018-12-07

## 2018-12-07 DIAGNOSIS — M80.00XA AGE-RELATED OSTEOPOROSIS WITH CURRENT PATHOLOGICAL FRACTURE, INITIAL ENCOUNTER: ICD-10-CM

## 2018-12-07 DIAGNOSIS — M31.30 GRANULOMATOSIS WITH POLYANGIITIS (H): ICD-10-CM

## 2018-12-07 DIAGNOSIS — G51.4 FACIAL TWITCHING: ICD-10-CM

## 2018-12-07 NOTE — TELEPHONE ENCOUNTER
Medication/Dose:    teriparatide, recombinant, (FORTEO) 600 MCG/2.4ML SOLN injection 2.4 mL 0 11/9/2018  --      Sig - Route: Inject 0.08 mLs (20 mcg) Subcutaneous daily - Subcutaneous     Last Written Prescription Date: 11/09/2018  Last Fill Quantity: 2.4 mL, # refills: 0  Last Office Visit:  11/9/2018  Next Enc: 12/21/2018    Next 5 appointments (look out 90 days)     Dec 21, 2018  8:30 AM CST   [facility]   [facility]    [address] [place] [address]   [phone]            Feb 15, 2019 11:30 AM CST   Return Visit with Alvaro Maguire MD   [facility]    [address] [place] [address]   [phone]                  Standing lab orders every 6 months    WBC   Date Value Ref Range Status   10/12/2018 6.1 4.0 - 11.0 10e9/L Final     RBC Count   Date Value Ref Range Status   10/12/2018 4.93 3.8 - 5.2 10e12/L Final     Hemoglobin   Date Value Ref Range Status   10/12/2018 14.7 11.7 - 15.7 g/dL Final     Hematocrit   Date Value Ref Range Status   10/12/2018 45.1 35.0 - 47.0 % Final     MCV   Date Value Ref Range Status   10/12/2018 92 78 - 100 fl Final     MCH   Date Value Ref Range Status   10/12/2018 29.8 26.5 - 33.0 pg Final     MCHC   Date Value Ref Range Status   10/12/2018 32.6 31.5 - 36.5 g/dL Final     RDW   Date Value Ref Range Status   10/12/2018 13.2 10.0 - 15.0 % Final     Platelet Count   Date Value Ref Range Status   10/12/2018 197 150 - 450 10e9/L Final     AST   Date Value Ref Range Status   10/12/2018 23 0 - 45 U/L Final     ALT   Date Value Ref Range Status   10/12/2018 26 0 - 50 U/L Final     Creatinine   Date Value Ref Range Status   10/12/2018 0.72 0.52 - 1.04 mg/dL Final     Albumin   Date Value Ref Range Status   10/12/2018 3.7 3.4 - 5.0 g/dL Final     No results found for: CKTOTAL  CRP Inflammation   Date Value Ref Range Status    10/12/2018 <2.9 0.0 - 8.0 mg/L Final   02/14/2018 <2.9 0.0 - 8.0 mg/L Final   12/13/2017 <2.9 0.0 - 8.0 mg/L Final     Sed Rate   Date Value Ref Range Status   10/12/2018 6 0 - 30 mm/h Final   02/14/2018 4 0 - 30 mm/h Final   12/13/2017 6 0 - 30 mm/h Final     Color Urine (no units)   Date Value   10/12/2018 Yellow     Appearance Urine (no units)   Date Value   10/12/2018 Clear     Glucose Urine (mg/dL)   Date Value   10/12/2018 Negative     Bilirubin Urine (no units)   Date Value   10/12/2018 Negative     Ketones Urine (mg/dL)   Date Value   10/12/2018 Negative     Specific Gravity Urine (no units)   Date Value   10/12/2018 1.005     pH Urine (pH)   Date Value   10/12/2018 7.5 (H)     Protein Albumin Urine (mg/dL)   Date Value   10/12/2018 Negative     Urobilinogen Urine (EU/dL)   Date Value   02/14/2018 0.2     Nitrite Urine (no units)   Date Value   10/12/2018 Negative     Leukocyte Esterase Urine (no units)   Date Value   10/12/2018 Negative

## 2018-12-14 ENCOUNTER — ANTICOAGULATION THERAPY VISIT (OUTPATIENT)
Dept: NURSING | Facility: CLINIC | Age: 73
End: 2018-12-14
Payer: COMMERCIAL

## 2018-12-14 DIAGNOSIS — I26.99 OTHER PULMONARY EMBOLISM WITHOUT ACUTE COR PULMONALE, UNSPECIFIED CHRONICITY (H): ICD-10-CM

## 2018-12-14 LAB — INR POINT OF CARE: 2.8 (ref 0.86–1.14)

## 2018-12-14 PROCEDURE — 99207 ZZC NO CHARGE NURSE ONLY: CPT

## 2018-12-14 PROCEDURE — 85610 PROTHROMBIN TIME: CPT | Mod: QW

## 2018-12-14 PROCEDURE — 36416 COLLJ CAPILLARY BLOOD SPEC: CPT

## 2018-12-14 NOTE — PATIENT INSTRUCTIONS
[facility]:  Please call [phone] to cancel and/or reschedule your appointment   Please call [phone] with any problems or questions regarding your Warfarin therapy.

## 2018-12-14 NOTE — PROGRESS NOTES
ANTICOAGULATION FOLLOW-UP CLINIC VISIT    Patient Name:  Nury Cárdenas  Date:  2018  Contact Type:  Face to Face    SUBJECTIVE:     Patient Findings     Positives:   No Problem Findings    Comments:   Bleeding Signs/Symptoms: Patient is having increased bruising on the back of her hands  Thromboembolic Signs/Symptoms: NO     Medication Changes:  NO  Dietary Changes:  NO  Bacterial/Viral Infection: NO     Missed Coumadin Doses: NO  Other Concerns:  Patient requests to go back to 4 mg daily. Based of today's results would bring INR to 2.2 or so. Advised we could try it but if low next visit would need to increase dose again. Patient verbalized understanding. She would prefer to remain below 2.5.             OBJECTIVE    INR Protime   Date Value Ref Range Status   2018 2.8 (A) 0.86 - 1.14 Final       ASSESSMENT / PLAN  No question data found.  Anticoagulation Summary  As of 2018    INR goal:   2.0-3.0   TTR:   70.9 % (2.4 y)   INR used for dosin.8 (2018)   Warfarin maintenance plan:   4 mg (2 mg x 2) every day   Full warfarin instructions:   4 mg every day   Weekly warfarin total:   28 mg   Plan last modified:   Kirti Lacy RN (2018)   Next INR check:   2019   Priority:   INR   Target end date:   Indefinite    Indications    Long-term (current) use of anticoagulants [Z79.01] [Z79.01]  Pulmonary embolism (H) [I26.99]             Anticoagulation Episode Summary     INR check location:       Preferred lab:       Send INR reminders to:   [facility]    Comments:         Anticoagulation Care Providers     Provider Role Specialty Phone number    Milena Phil Harris MD [facility] [phone]            See the Encounter Report to view Anticoagulation Flowsheet and Dosing Calendar (Go to Encounters tab in chart review, and find the Anticoagulation Therapy Visit)        Kirti Lacy RN

## 2018-12-19 NOTE — TELEPHONE ENCOUNTER
Free Drug Application Initiated  Medication: Forteo  Sponsor: [facility] Patient Assistance Program  Phone #: [phone]  Fax #: [phone]  Additional Information: Applying for free drug for Forteo. Obtained patient signature and income documentation. Placed in Dr. Maguire's folder for signatures to complete application.

## 2019-01-02 ENCOUNTER — ANTICOAGULATION THERAPY VISIT (OUTPATIENT)
Dept: NURSING | Facility: CLINIC | Age: 74
End: 2019-01-02
Payer: COMMERCIAL

## 2019-01-02 DIAGNOSIS — I26.99 OTHER PULMONARY EMBOLISM WITHOUT ACUTE COR PULMONALE, UNSPECIFIED CHRONICITY (H): ICD-10-CM

## 2019-01-02 LAB — INR POINT OF CARE: 2.2 (ref 0.86–1.14)

## 2019-01-02 PROCEDURE — 99207 ZZC NO CHARGE NURSE ONLY: CPT

## 2019-01-02 PROCEDURE — 36416 COLLJ CAPILLARY BLOOD SPEC: CPT

## 2019-01-02 PROCEDURE — 85610 PROTHROMBIN TIME: CPT | Mod: QW

## 2019-01-02 NOTE — PATIENT INSTRUCTIONS
[facility]:  Please call [phone] to cancel and/or reschedule your appointment   Please call [phone] with any problems or questions regarding your Warfarin therapy.

## 2019-01-02 NOTE — PROGRESS NOTES
ANTICOAGULATION FOLLOW-UP CLINIC VISIT    Patient Name:  Nury Cárdenas  Date:  2019  Contact Type:  Face to Face    SUBJECTIVE:     Patient Findings     Positives:   No Problem Findings    Comments:   Bleeding Signs/Symptoms: NO  Thromboembolic Signs/Symptoms: NO     Medication Changes:  NO  Dietary Changes:  NO  Bacterial/Viral Infection: NO     Missed Coumadin Doses: NO  Other Concerns:  NO             OBJECTIVE    INR Protime   Date Value Ref Range Status   2019 2.2 (A) 0.86 - 1.14 Final       ASSESSMENT / PLAN  No question data found.  Anticoagulation Summary  As of 2019    INR goal:   2.0-3.0   TTR:   71.5 % (2.4 y)   INR used for dosin.2 (2019)   Warfarin maintenance plan:   4 mg (2 mg x 2) every day   Full warfarin instructions:   4 mg every day   Weekly warfarin total:   28 mg   No change documented:   Kirti Lacy RN   Plan last modified:   Kirti Lacy RN (2018)   Next INR check:   2019   Priority:   INR   Target end date:   Indefinite    Indications    Long-term (current) use of anticoagulants [Z79.01] [Z79.01]  Pulmonary embolism (H) [I26.99]             Anticoagulation Episode Summary     INR check location:       Preferred lab:       Send INR reminders to:   [facility]    Comments:         Anticoagulation Care Providers     Provider Role Specialty Phone number    Phil Abbott MD [facility] [phone]            See the Encounter Report to view Anticoagulation Flowsheet and Dosing Calendar (Go to Encounters tab in chart review, and find the Anticoagulation Therapy Visit)        Kirti Lacy RN

## 2019-01-09 DIAGNOSIS — M31.30 GRANULOMATOSIS WITH POLYANGIITIS (H): ICD-10-CM

## 2019-01-09 DIAGNOSIS — G51.4 FACIAL TWITCHING: ICD-10-CM

## 2019-01-09 DIAGNOSIS — M80.00XA AGE-RELATED OSTEOPOROSIS WITH CURRENT PATHOLOGICAL FRACTURE, INITIAL ENCOUNTER: ICD-10-CM

## 2019-01-09 NOTE — TELEPHONE ENCOUNTER
Spoke with [facility]. They received the application and should have a determination by next week (they're still working on applications submitted 12/18. We faxed her application in on 12/21). The representative stated from what she could see Pat would qualify for their program. I'll have Dr. Maguire send a new prescription for the Forteo to them now to get ahead of the process. [facility] Patient Assistance program's phone number is [phone].

## 2019-01-11 PROBLEM — M54.2 NECK PAIN: Status: RESOLVED | Noted: 2018-10-31 | Resolved: 2019-01-11

## 2019-01-11 NOTE — PROGRESS NOTES
Patient did not return to PT following the initial evaluation. Patient will be discharged at this time.

## 2019-01-28 ENCOUNTER — MYC MEDICAL ADVICE (OUTPATIENT)
Dept: RHEUMATOLOGY | Facility: CLINIC | Age: 74
End: 2019-01-28

## 2019-01-28 DIAGNOSIS — M31.30 GRANULOMATOSIS WITH POLYANGIITIS (H): ICD-10-CM

## 2019-01-28 DIAGNOSIS — G51.4 FACIAL TWITCHING: ICD-10-CM

## 2019-01-28 DIAGNOSIS — M80.00XA AGE-RELATED OSTEOPOROSIS WITH CURRENT PATHOLOGICAL FRACTURE, INITIAL ENCOUNTER: ICD-10-CM

## 2019-02-01 ENCOUNTER — ANTICOAGULATION THERAPY VISIT (OUTPATIENT)
Dept: NURSING | Facility: CLINIC | Age: 74
End: 2019-02-01
Payer: COMMERCIAL

## 2019-02-01 DIAGNOSIS — I26.99 OTHER PULMONARY EMBOLISM WITHOUT ACUTE COR PULMONALE, UNSPECIFIED CHRONICITY (H): ICD-10-CM

## 2019-02-01 LAB — INR POINT OF CARE: 2.1 (ref 0.86–1.14)

## 2019-02-01 PROCEDURE — 99207 ZZC NO CHARGE NURSE ONLY: CPT

## 2019-02-01 PROCEDURE — 36416 COLLJ CAPILLARY BLOOD SPEC: CPT

## 2019-02-01 PROCEDURE — 85610 PROTHROMBIN TIME: CPT | Mod: QW

## 2019-02-01 NOTE — PATIENT INSTRUCTIONS
[facility]:  Please call [phone] to cancel and/or reschedule your appointment   Please call [phone] with any problems or questions regarding your Warfarin therapy.

## 2019-02-01 NOTE — PROGRESS NOTES
ANTICOAGULATION FOLLOW-UP CLINIC VISIT    Patient Name:  Nury Cárdenas  Date:  2019  Contact Type:  Face to Face    SUBJECTIVE:     Patient Findings     Comments:   Bleeding Signs/Symptoms: Patient had bruise and on right pinky. Patient also states kidney stone moved and caused bleeding in her urine that lasted for 24 hours  Thromboembolic Signs/Symptoms: NO     Medication Changes:  NO  Dietary Changes:  NO  Bacterial/Viral Infection: NO     Missed Coumadin Doses: NO  Other Concerns:  NO             OBJECTIVE    INR Protime   Date Value Ref Range Status   2019 2.1 (A) 0.86 - 1.14 Final       ASSESSMENT / PLAN  INR assessment THER    Recheck INR In: 6 WEEKS    INR Location Clinic      Anticoagulation Summary  As of 2019    INR goal:   2.0-3.0   TTR:   72.5 % (2.5 y)   INR used for dosin.1 (2019)   Warfarin maintenance plan:   4 mg (2 mg x 2) every day   Full warfarin instructions:   4 mg every day   Weekly warfarin total:   28 mg   No change documented:   Kirti Lacy RN   Plan last modified:   Kirti Lacy RN (2018)   Next INR check:   3/22/2019   Priority:   INR   Target end date:   Indefinite    Indications    Long-term (current) use of anticoagulants [Z79.01] [Z79.01]  Pulmonary embolism (H) [I26.99]             Anticoagulation Episode Summary     INR check location:       Preferred lab:       Send INR reminders to:   [facility]    Comments:         Anticoagulation Care Providers     Provider Role Specialty Phone number    Milena Phil Harris MD [facility] [phone]            See the Encounter Report to view Anticoagulation Flowsheet and Dosing Calendar (Go to Encounters tab in chart review, and find the Anticoagulation Therapy Visit)        Kirti Lacy RN

## 2019-02-03 ENCOUNTER — MYC MEDICAL ADVICE (OUTPATIENT)
Dept: RHEUMATOLOGY | Facility: CLINIC | Age: 74
End: 2019-02-03

## 2019-02-04 NOTE — TELEPHONE ENCOUNTER
Closing this encounter for redundancy. Please see other MyChart encounter with same date (2/3/2019).

## 2019-02-14 NOTE — TELEPHONE ENCOUNTER
Free Drug Application Approved  Effective Dates: 02/13/2019-12/31/2019  Patient notified?: Yes  Additional Information: [facility] will contact patient within 24 hours to go over their services and set up an order.

## 2019-02-15 ENCOUNTER — OFFICE VISIT (OUTPATIENT)
Dept: RHEUMATOLOGY | Facility: CLINIC | Age: 74
End: 2019-02-15
Payer: COMMERCIAL

## 2019-02-15 ENCOUNTER — MYC MEDICAL ADVICE (OUTPATIENT)
Dept: RHEUMATOLOGY | Facility: CLINIC | Age: 74
End: 2019-02-15

## 2019-02-15 VITALS
OXYGEN SATURATION: 100 % | RESPIRATION RATE: 14 BRPM | BODY MASS INDEX: 20.95 KG/M2 | SYSTOLIC BLOOD PRESSURE: 155 MMHG | TEMPERATURE: 98.1 F | HEIGHT: 64 IN | WEIGHT: 122.7 LBS | DIASTOLIC BLOOD PRESSURE: 93 MMHG | HEART RATE: 71 BPM

## 2019-02-15 DIAGNOSIS — M31.30 GRANULOMATOSIS WITH POLYANGIITIS (H): ICD-10-CM

## 2019-02-15 DIAGNOSIS — M81.0 AGE-RELATED OSTEOPOROSIS WITHOUT CURRENT PATHOLOGICAL FRACTURE: Primary | ICD-10-CM

## 2019-02-15 DIAGNOSIS — I26.99 OTHER PULMONARY EMBOLISM WITHOUT ACUTE COR PULMONALE, UNSPECIFIED CHRONICITY (H): ICD-10-CM

## 2019-02-15 PROCEDURE — 99214 OFFICE O/P EST MOD 30 MIN: CPT | Performed by: INTERNAL MEDICINE

## 2019-02-15 ASSESSMENT — MIFFLIN-ST. JEOR: SCORE: 1028.62

## 2019-02-15 ASSESSMENT — PAIN SCALES - GENERAL: PAINLEVEL: NO PAIN (1)

## 2019-02-15 NOTE — NURSING NOTE
"Nury Cárdenas's goals for this visit include: Return  She requests these members of her care team be copied on today's visit information: PCP    PCP: Phil Abbott    Referring Provider:  No referring provider defined for this encounter.    /79   Pulse 71   Temp 98.1  F (36.7  C)   Resp 14   Ht 1.613 m (5' 3.5\")   Wt 55.7 kg (122 lb 11.2 oz)   SpO2 100%   BMI 21.39 kg/m      Do you need any medication refills at today's visit? N    "

## 2019-02-15 NOTE — PROGRESS NOTES
Ms. 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 Course complicated by severe upper airway inflammation, Wegener's lung and destructive/erosive joint disease. Treated initially with rituximab 375 mg/m2 x4 and high dose corticsteroids, and most recently with methotrexate.  No history of relapse. Prednisone taper complete on April 1st, 2017. No recent methotrexate. Alendronate therapy complicated by LE pain.    She is here for evaluation and management her bones. She took the forteo for exactly one month, and then she stopped this when her prescription ran out. This is available to her again at this time, but she has not restarted this because she is fearful of the medication.    No GPA symptoms to report and no fracture.    She is also concerned about toxicity and adverse events with long term coumadin use.    PMI:  Medical-invasive pulmonary aspirgillosis, GPA, osteoarthritis, DVT with pulmonary embolism, osteoporosis with vertebral fracture (T = -2.3 ), GERD, hyperlipidemia, +Tb exposure, nephrolithiasis, retinal tear, cataract  Surgical-lung biopsy, appendectomy, eye surgery, tonsillectomy, cone biopsy, right wrist synovectomy  Injuries-left wrist fracture x2, left 5th toe fracture, vertebral fracture    SH:  Lives at home alone. Former smoker. No EtOH. Tuberculosis exposure as a child. Ambidextrous.    FH:  Son with mitochondrial myopathy  Sibs dead with colon and anal cancer  Father dead with lung cancer  Mother dead with emphysema and osteoporosis    PMSF history personally obtained and updated by me this visit in the clinic.    ROS:  +allergies to fosamax amoxicillin, augmentin, erythromycin, zithromax, nickel, codeine, adhesive tape  Remainder of the 14 point ROS obtained and found negative.    Physical Exam:  Constitutional: WD-WN-WG cooperative; +rare dry cough  Eyes: nl EOM, PERRLA, vision, conjunctiva, sclera  ENT: nl external ears, nose,  hearing, lips, teeth, gums, throat  Neck: no mass or thyroid enlargement  Resp: lungs clear to auscultation, nl to palpation, nl effort  CV: RRR, no murmurs, rubs or gallops, no edema  GI: no ABD mass or tenderness, no HSM  : not tested  Lymph: no cervical or epitrochlear nodes  MS: +Right wrist with no flexion or extension; left wrist extension to 45 degrees; 1st CMC joint squaring bilaterally with some thumb laxity; right neck rotation 30 degrees right and 45 degrees left with head forward position and minimal neck extension; All other TMJ, neck, shoulder, elbow, wrist, hand, spine, hip, knee, ankle, and foot joints were examined and otherwise found normal. Normal  strength. +severe kyphosis. Normal tuck and prayer.  Skin: no nail pitting, alopecia; +dry erythematous papules over the nose and left cheek  Neuro: nl cranial nerves, strength, sensation, DTRs.   Psych: nl judgement, orientation, memory, affect.    Laboratory:      Component      Latest Ref Rng & Units 11/9/2018   25 OH Vit D2      ug/L <5   25 OH Vit D3      ug/L 56   25 OH Vit D total      20 - 75 ug/L <61   Parathyroid Hormone Intact      18 - 80 pg/mL 29   Magnesium      1.6 - 2.3 mg/dL 2.7 (H)   Phosphorus      2.5 - 4.5 mg/dL 3.7   Calcium      8.5 - 10.1 mg/dL 9.7   Alkaline Phosphatase      40 - 150 U/L 83     Component      Latest Ref Rng & Units 10/12/2018   WBC      4.0 - 11.0 10e9/L 6.1   RBC Count      3.8 - 5.2 10e12/L 4.93   Hemoglobin      11.7 - 15.7 g/dL 14.7   Hematocrit      35.0 - 47.0 % 45.1   MCV      78 - 100 fl 92   MCH      26.5 - 33.0 pg 29.8   MCHC      31.5 - 36.5 g/dL 32.6   RDW      10.0 - 15.0 % 13.2   Platelet Count      150 - 450 10e9/L 197   % Neutrophils      % 63.1   % Lymphocytes      % 26.1   % Monocytes      % 8.7   % Eosinophils      % 1.1   % Basophils      % 0.8   % Immature Granulocytes      % 0.2   Absolute Neutrophil      1.6 - 8.3 10e9/L 3.8   Absolute Lymphocytes      0.8 - 5.3 10e9/L 1.6   Absolute  Monocytes      0.0 - 1.3 10e9/L 0.5   Absolute Eosinophils      0.0 - 0.7 10e9/L 0.1   Absolute Basophils      0.0 - 0.2 10e9/L 0.1   Abs Immature Granulocytes      0 - 0.4 10e9/L 0.0   Diff Method       Automated Method   Sodium      133 - 144 mmol/L 143   Potassium      3.4 - 5.3 mmol/L 4.1   Chloride      94 - 109 mmol/L 106   Carbon Dioxide      20 - 32 mmol/L 32   Anion Gap      3 - 14 mmol/L 5   Glucose      70 - 99 mg/dL 82   Urea Nitrogen      7 - 30 mg/dL 15   Creatinine      0.52 - 1.04 mg/dL 0.72   GFR Estimate      >60 mL/min/1.7m2 79   GFR Estimate If Black      >60 mL/min/1.7m2 >90   Calcium      8.5 - 10.1 mg/dL 9.2   Bilirubin Total      0.2 - 1.3 mg/dL 0.4   Albumin      3.4 - 5.0 g/dL 3.7   Protein Total      6.8 - 8.8 g/dL 6.8   Alkaline Phosphatase      40 - 150 U/L 92   ALT      0 - 50 U/L 26   AST      0 - 45 U/L 23   Color Urine       Yellow   Appearance Urine       Clear   Glucose Urine      NEG:Negative mg/dL Negative   Bilirubin Urine      NEG:Negative Negative   Ketones Urine      NEG:Negative mg/dL Negative   Specific Gravity Urine      1.003 - 1.035 1.005   Blood Urine      NEG:Negative Negative   pH Urine      5.0 - 7.0 pH 7.5 (H)   Protein Albumin Urine      NEG:Negative mg/dL Negative   Urobilinogen mg/dL      0.0 - 2.0 mg/dL Normal   Nitrite Urine      NEG:Negative Negative   Leukocyte Esterase Urine      NEG:Negative Negative   Source       Midstream Urine   WBC Urine      OTO5:0 - 5 /HPF 0 - 5   RBC Urine      OTO2:O - 2 /HPF O - 2   Neutrophil Cytoplasmic Antibody      <1:10 titer <1:10   Neutrophil Cytoplasmic Antibody Pattern       The ANCA IFA is <1:10.  No further testing will be performed.   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 The risk of DVT and PE is increased in patients with active systemic inflammatory  disease secondary to GPA. Nevertheless, the risk for recurrence is unclear now that her GPA is quiescent. She previously was evaluated in hematology, and the recommendation was to continue the warfarin. We have discussed this again in clinic, and she will consider discontinuing this per her desire.     Osteoporosis-back pain with diagnosed vertebral fracture. Intolerant of bisphosphonate due to leg weakness and pain. She has taken a single 30 days of forteo therapy, but then discontinued this due to financial concerns. She is not inclined to restart this as she is concerned about side effects and is interested in natural therapies. We agree to an endocrinology evaluation of this problem.    Plan RTC in April.    Greater than 50% of the 30 minute encounter was spent in counseling about her bone and blood vessel health.

## 2019-02-20 ENCOUNTER — ANTICOAGULATION THERAPY VISIT (OUTPATIENT)
Dept: FAMILY MEDICINE | Facility: CLINIC | Age: 74
End: 2019-02-20

## 2019-02-20 DIAGNOSIS — I26.99 OTHER PULMONARY EMBOLISM WITHOUT ACUTE COR PULMONALE, UNSPECIFIED CHRONICITY (H): ICD-10-CM

## 2019-03-05 ENCOUNTER — TRANSFERRED RECORDS (OUTPATIENT)
Dept: HEALTH INFORMATION MANAGEMENT | Facility: CLINIC | Age: 74
End: 2019-03-05

## 2019-03-07 ENCOUNTER — ALLIED HEALTH/NURSE VISIT (OUTPATIENT)
Dept: FAMILY MEDICINE | Facility: CLINIC | Age: 74
End: 2019-03-07
Payer: COMMERCIAL

## 2019-03-07 VITALS — DIASTOLIC BLOOD PRESSURE: 74 MMHG | SYSTOLIC BLOOD PRESSURE: 120 MMHG

## 2019-03-07 DIAGNOSIS — Z01.30 BP CHECK: Primary | ICD-10-CM

## 2019-03-07 PROCEDURE — 99207 ZZC NO CHARGE NURSE ONLY: CPT | Performed by: FAMILY MEDICINE

## 2019-03-07 NOTE — PROGRESS NOTES
Nury Cárdenas is enrolled/participating in the retail pharmacy Blood Pressure Goals Achievement Program (BPGAP).  Nury Cárdenas was evaluated at [facility] on March 7, 2019 at which time her blood pressure was:    BP Readings from Last 3 Encounters:   03/07/19 120/74   02/15/19 (!) 155/93   11/09/18 138/79     Reviewed lifestyle modifications for blood pressure control and reduction: including making healthy food choices, managing weight, getting regular exercise, smoking cessation, reducing alcohol consumption, monitoring blood pressure regularly.     Nury Cárdenas is not experiencing symptoms.    Follow-Up: BP is at goal of < 140/90mmHg (patient 18+ years of age with or without diabetes).  Recommended follow-up at pharmacy in 6 months.     Recommendation to Provider: None at this time.     Nury Cárdenas was evaluated for enrollment into the PGEN study today.    PLEASE INITIATE ENROLLMENT DISCUSSION WITH HTN PTS  1) Between 30-80 years old                                                                                                               2) BMI between 19-50                                                                                                        3) BP ?140/90 AND ?170/110 patients aged 30-59         BP ?150/90 AND ?170/110 non-diabetic patients aged 60-80       BP ?140/90 AND ?170/110 diabetic patients aged 60-80  4) Additional requirements for uncontrolled HTN patients:        Pt on only 1 class of medication  5) EXCLUDE patient if confirmation of:                  ? Cardiac disease                  ? Chronic Kidney Disease                  ? Pregnancy/Breastfeeding                  ? Secondary Hypertension/Pre-eclampsia                                 ? Vascular disease    Patient eligible for enrollment:  No  Patient interested in enrollment:  No    This note completed by: Thank you,  Berenice Leavitt, PharmD  [facility]  [phone]

## 2019-03-11 ENCOUNTER — MYC MEDICAL ADVICE (OUTPATIENT)
Dept: RHEUMATOLOGY | Facility: CLINIC | Age: 74
End: 2019-03-11

## 2019-04-08 ENCOUNTER — TELEPHONE (OUTPATIENT)
Dept: ENDOCRINOLOGY | Facility: CLINIC | Age: 74
End: 2019-04-08

## 2019-04-08 NOTE — TELEPHONE ENCOUNTER
PREVISIT INFORMATION                                                    Nury Cárdenas scheduled for future visit at [facility] specialty clinics.    Patient is scheduled to see Dr. Lebron on 04/22/19 at 9 am.  Reason for visit: Osteoporosis - Wagners  Referring provider Dr. Maguire  Has patient seen previous specialist? No  Medical Records:  Available in chart.  Patient was previously seen at a [facility] or [facility] facility.    REVIEW                                                      New patient packet mailed to patient: N/A  Medication reconciliation complete: Yes      Current Outpatient Medications   Medication Sig Dispense Refill     Vitamin K 100 MCG TABS Take 1 tablet by mouth daily       acetaminophen (TYLENOL) 325 MG tablet Take 2 tablets (650 mg) by mouth every 6 hours as needed for mild pain 100 tablet 0     calcium-magnesium (CALMAG) 500-250 MG TABS per tablet Take 1 tablet by mouth 2 times daily (with meals)       cholecalciferol 1000 UNITS TABS Take 1,000 Units by mouth daily       propranolol (INDERAL) 20 MG tablet TAKE 1 TABLET(20 MG) BY MOUTH DAILY. 90 tablet 2     teriparatide, recombinant, (FORTEO) 600 MCG/2.4ML SOLN injection Inject 0.08 mLs (20 mcg) Subcutaneous daily 7.2 mL 3     teriparatide, recombinant, (FORTEO) 600 MCG/2.4ML SOLN injection Inject 0.08 mLs (20 mcg) Subcutaneous daily 28.8 mL 0       Allergies: Vancomycin; Adhesive tape; Amoxicillin; Augmentin; Codeine; Erythromycin; Nickel; Sulfa drugs; Tegaderm alginate ag; and Zithromax [azithromycin dihydrate]    PLAN/FOLLOW-UP NEEDED                                                      Previsit review complete.  Patient will see provider at future scheduled appointment.     Patient Reminders Given:  Please, make sure you bring an updated list of your medications.   If you are having a procedure, please, present 15 minutes early.  If you need to cancel or reschedule,please call  [phone].    Portia Dobbs, [facility]  Adult Endocrinology  [facility]

## 2019-04-19 ENCOUNTER — OFFICE VISIT (OUTPATIENT)
Dept: RHEUMATOLOGY | Facility: CLINIC | Age: 74
End: 2019-04-19
Payer: COMMERCIAL

## 2019-04-19 VITALS
SYSTOLIC BLOOD PRESSURE: 141 MMHG | RESPIRATION RATE: 16 BRPM | BODY MASS INDEX: 20.74 KG/M2 | DIASTOLIC BLOOD PRESSURE: 80 MMHG | WEIGHT: 121.5 LBS | TEMPERATURE: 97.6 F | HEIGHT: 64 IN | OXYGEN SATURATION: 97 % | HEART RATE: 71 BPM

## 2019-04-19 DIAGNOSIS — M31.30 GRANULOMATOSIS WITH POLYANGIITIS (H): Primary | ICD-10-CM

## 2019-04-19 DIAGNOSIS — I26.99 OTHER PULMONARY EMBOLISM WITHOUT ACUTE COR PULMONALE, UNSPECIFIED CHRONICITY (H): ICD-10-CM

## 2019-04-19 DIAGNOSIS — M31.30 GRANULOMATOSIS WITH POLYANGIITIS (H): ICD-10-CM

## 2019-04-19 DIAGNOSIS — Z79.60 LONG-TERM USE OF IMMUNOSUPPRESSANT MEDICATION: ICD-10-CM

## 2019-04-19 LAB
ALBUMIN SERPL-MCNC: 3.7 G/DL (ref 3.4–5)
ALBUMIN UR-MCNC: NEGATIVE MG/DL
ALP SERPL-CCNC: 87 U/L (ref 40–150)
ALT SERPL W P-5'-P-CCNC: 24 U/L (ref 0–50)
ANION GAP SERPL CALCULATED.3IONS-SCNC: 2 MMOL/L (ref 3–14)
APPEARANCE UR: CLEAR
AST SERPL W P-5'-P-CCNC: 18 U/L (ref 0–45)
BASOPHILS # BLD AUTO: 0.1 10E9/L (ref 0–0.2)
BASOPHILS NFR BLD AUTO: 1.2 %
BILIRUB SERPL-MCNC: 0.5 MG/DL (ref 0.2–1.3)
BILIRUB UR QL STRIP: NEGATIVE
BUN SERPL-MCNC: 13 MG/DL (ref 7–30)
CALCIUM SERPL-MCNC: 9.4 MG/DL (ref 8.5–10.1)
CHLORIDE SERPL-SCNC: 108 MMOL/L (ref 94–109)
CO2 SERPL-SCNC: 32 MMOL/L (ref 20–32)
COLOR UR AUTO: ABNORMAL
CREAT SERPL-MCNC: 0.67 MG/DL (ref 0.52–1.04)
CRP SERPL-MCNC: <2.9 MG/L (ref 0–8)
DIFFERENTIAL METHOD BLD: NORMAL
EOSINOPHIL # BLD AUTO: 0.1 10E9/L (ref 0–0.7)
EOSINOPHIL NFR BLD AUTO: 1.5 %
ERYTHROCYTE [DISTWIDTH] IN BLOOD BY AUTOMATED COUNT: 13.2 % (ref 10–15)
ERYTHROCYTE [SEDIMENTATION RATE] IN BLOOD BY WESTERGREN METHOD: 5 MM/H (ref 0–30)
GFR SERPL CREATININE-BSD FRML MDRD: 86 ML/MIN/{1.73_M2}
GLUCOSE SERPL-MCNC: 85 MG/DL (ref 70–99)
GLUCOSE UR STRIP-MCNC: NEGATIVE MG/DL
HCT VFR BLD AUTO: 43.8 % (ref 35–47)
HGB BLD-MCNC: 14.8 G/DL (ref 11.7–15.7)
HGB UR QL STRIP: NEGATIVE
IMM GRANULOCYTES # BLD: 0 10E9/L (ref 0–0.4)
IMM GRANULOCYTES NFR BLD: 0.2 %
KETONES UR STRIP-MCNC: NEGATIVE MG/DL
LEUKOCYTE ESTERASE UR QL STRIP: NEGATIVE
LYMPHOCYTES # BLD AUTO: 1.5 10E9/L (ref 0.8–5.3)
LYMPHOCYTES NFR BLD AUTO: 25.1 %
MCH RBC QN AUTO: 31 PG (ref 26.5–33)
MCHC RBC AUTO-ENTMCNC: 33.8 G/DL (ref 31.5–36.5)
MCV RBC AUTO: 92 FL (ref 78–100)
MONOCYTES # BLD AUTO: 0.4 10E9/L (ref 0–1.3)
MONOCYTES NFR BLD AUTO: 7.2 %
NEUTROPHILS # BLD AUTO: 3.8 10E9/L (ref 1.6–8.3)
NEUTROPHILS NFR BLD AUTO: 64.8 %
NITRATE UR QL: NEGATIVE
NON-SQ EPI CELLS #/AREA URNS LPF: ABNORMAL /LPF
PH UR STRIP: 7 PH (ref 5–7)
PLATELET # BLD AUTO: 189 10E9/L (ref 150–450)
POTASSIUM SERPL-SCNC: 4 MMOL/L (ref 3.4–5.3)
PROT SERPL-MCNC: 6.6 G/DL (ref 6.8–8.8)
RBC # BLD AUTO: 4.78 10E12/L (ref 3.8–5.2)
RBC #/AREA URNS AUTO: ABNORMAL /HPF
SODIUM SERPL-SCNC: 142 MMOL/L (ref 133–144)
SOURCE: ABNORMAL
SP GR UR STRIP: 1 (ref 1–1.03)
UROBILINOGEN UR STRIP-MCNC: NORMAL MG/DL (ref 0–2)
WBC # BLD AUTO: 5.9 10E9/L (ref 4–11)
WBC #/AREA URNS AUTO: ABNORMAL /HPF

## 2019-04-19 PROCEDURE — 99214 OFFICE O/P EST MOD 30 MIN: CPT | Performed by: INTERNAL MEDICINE

## 2019-04-19 PROCEDURE — 85025 COMPLETE CBC W/AUTO DIFF WBC: CPT | Performed by: INTERNAL MEDICINE

## 2019-04-19 PROCEDURE — 84443 ASSAY THYROID STIM HORMONE: CPT | Performed by: INTERNAL MEDICINE

## 2019-04-19 PROCEDURE — 81001 URINALYSIS AUTO W/SCOPE: CPT | Performed by: INTERNAL MEDICINE

## 2019-04-19 PROCEDURE — 36415 COLL VENOUS BLD VENIPUNCTURE: CPT | Performed by: INTERNAL MEDICINE

## 2019-04-19 PROCEDURE — 85652 RBC SED RATE AUTOMATED: CPT | Performed by: INTERNAL MEDICINE

## 2019-04-19 PROCEDURE — 86140 C-REACTIVE PROTEIN: CPT | Performed by: INTERNAL MEDICINE

## 2019-04-19 PROCEDURE — 86255 FLUORESCENT ANTIBODY SCREEN: CPT | Performed by: INTERNAL MEDICINE

## 2019-04-19 PROCEDURE — 80053 COMPREHEN METABOLIC PANEL: CPT | Performed by: INTERNAL MEDICINE

## 2019-04-19 PROCEDURE — 82306 VITAMIN D 25 HYDROXY: CPT | Performed by: INTERNAL MEDICINE

## 2019-04-19 ASSESSMENT — MIFFLIN-ST. JEOR: SCORE: 1023.18

## 2019-04-19 ASSESSMENT — PAIN SCALES - GENERAL: PAINLEVEL: NO PAIN (0)

## 2019-04-19 NOTE — PROGRESS NOTES
Ms. 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 Course complicated by severe upper airway inflammation, Wegener's lung and destructive/erosive joint disease. Treated initially with rituximab 375 mg/m2 x4 and high dose corticsteroids, and most recently with methotrexate.  No history of relapse. Prednisone taper complete on April 1st, 2017. No recent methotrexate. Alendronate therapy complicated by LE pain.    She has continued to hold her coumadin. She is reluctant to take this due to her concern that she bleeds easily, and because of her desire to build her bones with vitamin K. No recent fractures. No recent forteo, and upcoming endocrinology appointment.    Her breathing is fine, but she has some sinus drainage and cough. No hemoptysis.  Her joints are generally good. She can have pain in her right thumb. No redness, warmth, or swelling She continues to exercise. Energy is good and no AM stiffness.     PMI:  Medical-invasive pulmonary aspirgillosis, GPA, osteoarthritis, DVT with pulmonary embolism, osteoporosis with vertebral fracture (T = -2.3 ), GERD, hyperlipidemia, +Tb exposure, nephrolithiasis, retinal tear, cataract  Surgical-lung biopsy, appendectomy, eye surgery, tonsillectomy, cone biopsy, right wrist synovectomy  Injuries-left wrist fracture x2, left 5th toe fracture, vertebral fracture    SH:  Lives at home alone. Former smoker. No EtOH. Tuberculosis exposure as a child. Ambidextrous.    FH:  Son with mitochondrial myopathy  Daughter with palpitations  Sibs dead with colon and anal cancer  Father dead with lung cancer  Mother dead with emphysema and osteoporosis    PMSF history personally obtained and updated by me this visit in the clinic.    ROS:  +allergies to fosamax amoxicillin, augmentin, erythromycin, zithromax, nickel, codeine, adhesive tape  Remainder of the 14 point ROS obtained and found negative.    Physical  Exam:  Constitutional: WD-[id number] cooperative; +rare dry cough  Eyes: nl EOM, PERRLA, vision, conjunctiva, sclera  ENT: nl external ears, nose, hearing, lips, teeth, gums, throat  Neck: no mass or thyroid enlargement  Resp: lungs clear to auscultation, nl to palpation, nl effort  CV: RRR, no murmurs, rubs or gallops, no edema  GI: no ABD mass or tenderness, no HSM  : not tested  Lymph: no cervical or epitrochlear nodes  MS: +Right wrist with no flexion or extension; left wrist extension to 45 degrees; 1st CMC joint squaring bilaterally with some thumb laxity; right neck rotation 30 degrees right and 45 degrees left with head forward position and minimal neck extension; All other TMJ, neck, shoulder, elbow, wrist, hand, spine, hip, knee, ankle, and foot joints were examined and otherwise found normal. Normal  strength. +severe kyphosis. Normal tuck and prayer.  Skin: no nail pitting, alopecia; +dry erythematous papules over the nose and left cheek  Neuro: nl cranial nerves, strength, sensation, DTRs.   Psych: nl judgement, orientation, memory, affect.    Laboratory:    Component      Latest Ref Rng & Units 4/19/2019   WBC      4.0 - 11.0 10e9/L 5.9   RBC Count      3.8 - 5.2 10e12/L 4.78   Hemoglobin      11.7 - 15.7 g/dL 14.8   Hematocrit      35.0 - 47.0 % 43.8   MCV      78 - 100 fl 92   MCH      26.5 - 33.0 pg 31.0   MCHC      31.5 - 36.5 g/dL 33.8   RDW      10.0 - 15.0 % 13.2   Platelet Count      150 - 450 10e9/L 189   Diff Method       Automated Method   % Neutrophils      % 64.8   % Lymphocytes      % 25.1   % Monocytes      % 7.2   % Eosinophils      % 1.5   % Basophils      % 1.2   % Immature Granulocytes      % 0.2   Absolute Neutrophil      1.6 - 8.3 10e9/L 3.8   Absolute Lymphocytes      0.8 - 5.3 10e9/L 1.5   Absolute Monocytes      0.0 - 1.3 10e9/L 0.4   Absolute Eosinophils      0.0 - 0.7 10e9/L 0.1   Absolute Basophils      0.0 - 0.2 10e9/L 0.1   Abs Immature Granulocytes      0 - 0.4 10e9/L 0.0    Sodium      133 - 144 mmol/L 142   Potassium      3.4 - 5.3 mmol/L 4.0   Chloride      94 - 109 mmol/L 108   Carbon Dioxide      20 - 32 mmol/L 32   Anion Gap      3 - 14 mmol/L 2 (L)   Glucose      70 - 99 mg/dL 85   Urea Nitrogen      7 - 30 mg/dL 13   Creatinine      0.52 - 1.04 mg/dL 0.67   GFR Estimate      >60 mL/min/1.73:m2 86   GFR Estimate If Black      >60 mL/min/1.73:m2 >90   Calcium      8.5 - 10.1 mg/dL 9.4   Bilirubin Total      0.2 - 1.3 mg/dL 0.5   Albumin      3.4 - 5.0 g/dL 3.7   Protein Total      6.8 - 8.8 g/dL 6.6 (L)   Alkaline Phosphatase      40 - 150 U/L 87   ALT      0 - 50 U/L 24   AST      0 - 45 U/L 18   Color Urine       Straw   Appearance Urine       Clear   Glucose Urine      NEG:Negative mg/dL Negative   Bilirubin Urine      NEG:Negative Negative   Ketones Urine      NEG:Negative mg/dL Negative   Specific Gravity Urine      1.003 - 1.035 1.002 (L)   Blood Urine      NEG:Negative Negative   pH Urine      5.0 - 7.0 pH 7.0   Protein Albumin Urine      NEG:Negative mg/dL Negative   Urobilinogen mg/dL      0.0 - 2.0 mg/dL Normal   Nitrite Urine      NEG:Negative Negative   Leukocyte Esterase Urine      NEG:Negative Negative   Source       Midstream Urine   WBC Urine      OTO5:0 - 5 /HPF 0 - 5   RBC Urine      OTO2:O - 2 /HPF O - 2   Squamous Epithelial /LPF Urine      FEW:Few /LPF Few   Sed Rate      0 - 30 mm/h 5   CRP Inflammation      0.0 - 8.0 mg/L <2.9       Impression:    PR3-associated GPA-with no evidence of disease activity at present off of immunosuppression. We will continue with watchful waiting.    Pulmonary embolism and DVT-now off of prophylaxis with discontinuation of the warfarin. We have discussed the risks and benefits of stopping the warfarin, and she will continue to hold this.    Osteoporosis-with occasional back pain associated with history of vertebral fracture. Pending endocrinology appointment.     Plan RTC in 6 months    Greater than 50% of the 30 minute  encounter was spent in counseling about her bone and blood vessel health.

## 2019-04-19 NOTE — NURSING NOTE
"Nury Cárdenas's goals for this visit include: Return  She requests these members of her care team be copied on today's visit information: PCP    PCP: Phil Abbott    Referring Provider:  No referring provider defined for this encounter.    /80   Pulse 71   Temp 97.6  F (36.4  C)   Resp 16   Ht 1.613 m (5' 3.5\")   Wt 55.1 kg (121 lb 8 oz)   SpO2 97%   BMI 21.19 kg/m      Do you need any medication refills at today's visit? N    "

## 2019-04-22 ENCOUNTER — ANCILLARY PROCEDURE (OUTPATIENT)
Dept: GENERAL RADIOLOGY | Facility: CLINIC | Age: 74
End: 2019-04-22
Attending: INTERNAL MEDICINE
Payer: COMMERCIAL

## 2019-04-22 ENCOUNTER — OFFICE VISIT (OUTPATIENT)
Dept: ENDOCRINOLOGY | Facility: CLINIC | Age: 74
End: 2019-04-22
Attending: INTERNAL MEDICINE
Payer: COMMERCIAL

## 2019-04-22 VITALS
OXYGEN SATURATION: 95 % | DIASTOLIC BLOOD PRESSURE: 78 MMHG | SYSTOLIC BLOOD PRESSURE: 134 MMHG | HEART RATE: 76 BPM | BODY MASS INDEX: 21.29 KG/M2 | WEIGHT: 122.1 LBS

## 2019-04-22 DIAGNOSIS — R93.89 ABNORMAL FINDINGS ON DIAGNOSTIC IMAGING OF OTHER SPECIFIED BODY STRUCTURES: ICD-10-CM

## 2019-04-22 DIAGNOSIS — K21.9 GASTROESOPHAGEAL REFLUX DISEASE, ESOPHAGITIS PRESENCE NOT SPECIFIED: ICD-10-CM

## 2019-04-22 DIAGNOSIS — M80.00XS AGE-RELATED OSTEOPOROSIS WITH CURRENT PATHOLOGICAL FRACTURE, SEQUELA: ICD-10-CM

## 2019-04-22 DIAGNOSIS — T50.905S ADVERSE EFFECT OF DRUG, SEQUELA: ICD-10-CM

## 2019-04-22 DIAGNOSIS — M80.00XS AGE-RELATED OSTEOPOROSIS WITH CURRENT PATHOLOGICAL FRACTURE, SEQUELA: Primary | ICD-10-CM

## 2019-04-22 PROBLEM — T50.905A ADVERSE EFFECTS OF MEDICATION: Status: ACTIVE | Noted: 2019-04-22

## 2019-04-22 LAB
ANCA AB PATTERN SER IF-IMP: NORMAL
C-ANCA TITR SER IF: NORMAL {TITER}
TSH SERPL DL<=0.005 MIU/L-ACNC: 1.39 MU/L (ref 0.4–4)

## 2019-04-22 PROCEDURE — 72070 X-RAY EXAM THORAC SPINE 2VWS: CPT | Performed by: RADIOLOGY

## 2019-04-22 PROCEDURE — 72100 X-RAY EXAM L-S SPINE 2/3 VWS: CPT | Performed by: RADIOLOGY

## 2019-04-22 PROCEDURE — 99204 OFFICE O/P NEW MOD 45 MIN: CPT | Performed by: INTERNAL MEDICINE

## 2019-04-22 NOTE — NURSING NOTE
Writer tried to schedule the patient for prolia injection. The patient refused until she knows how much she will have to pay out of pocket. Message sent to Perry Dixon in financial services, we will call the patient when we hear back from him.    Portia Dobbs, [facility]  Adult Endocrinology  [facility]

## 2019-04-22 NOTE — NURSING NOTE
Nury Cárdenas's goals for this visit include:   Chief Complaint   Patient presents with     Consult     Osteoporosis     She requests these members of her care team be copied on today's visit information: Yes    PCP: Phil Abbott    Referring Provider:  Alvaro Maguire MD  [address] [place] [id number]    /78 (BP Location: Left arm, Patient Position: Sitting, Cuff Size: Adult Regular)   Pulse 76   Wt 55.4 kg (122 lb 1.6 oz)   SpO2 95%   BMI 21.29 kg/m      Do you need any medication refills at today's visit? No

## 2019-04-22 NOTE — PROGRESS NOTES
Endocrinology Clinic Visit    Chief Complaint: Consult and Osteoporosis     Information obtained from:Patient she is here with her daughter who is a nurse.    Subjective:         HPI: Nury Cárdenas is a 74 year old female with history of osteoporosis who is seen in consultation at Alvaro Maguire's request for the same.  10/2018  FINDINGS:               Lumbar Spine (L1-L4) T-score: -2.3               Left Femoral Neck T-score:  -1.7               Right Femoral Neck T-score:  -1.4                              Lumbar (L1-L4) BMD: 0.906            Previous: 0.914                                              Total Hip Mean BMD: 0.708            Previous: 0.725   VFA Findings: Using the semi-quantitative analysis of [employer] there was evidence of spinal deformity as follows: possible moderate (grade 2) wedge fracture of T7      Lost about 3 inches of height.    Vertebral fracture as documented above.      Body mass index is 21.29 kg/m .    History of ocampo's granulomatosis: Diagnosed 2014 patient was treated with steroid between 2014 - 2017.  High-dose steroid initially for the first 6 months and then a slow taper.  She is not currently on steroid therapy and the last dose of steroid therapy was 2 years ago.    She has also family history of osteoporosis.    She was initially diagnosed with osteopenia and 2014 and she was started on Fosamax at the time of 35 mg weekly.  She took Fosamax for a couple of months however stopped it after that because of left leg weakness.  She reported that she could not even get up from a toilet chair already reculture because of leg weakness when she was on Fosamax which improved after she took herself off of Fosamax.  She is adamant that the leg weakness was from Fosamax.    More recently after her DEXA scan in October 2018 she was started on Forteo and treated with Forteo for 30 days; however she stopped it due to fear of side effects and also because she has to do the injection  every day and she thought this affects her quality of life.    Secondary causes of osteoporosis: Has chronic connective tissue disorder for which she has been on therapy previously but currently in remission.  She is not currently on any steroid.  She is currently on calcium citrate; dose is not clear and also takes vitamin D 1000 international units daily.  She walks at least 3 times per week 3 miles each time.   No current dental procedures planned she has dentures.    History of gastroesophageal reflux disease.    ENDO CALCIUM LABS-[facility] Latest Ref Rng & Units 11/9/2018   CALCIUM 8.5 - 10.1 mg/dL 9.7   PHOSPHOROUS 2.5 - 4.5 mg/dL 3.7   MAGNESIUM 1.6 - 2.3 mg/dL 2.7 (H)   ALBUMIN 3.4 - 5.0 g/dL    BUN 7 - 30 mg/dL    CREATININE 0.52 - 1.04 mg/dL    PARATHYROID HORMONE INTACT 18 - 80 pg/mL 29   ALKPHOS 40 - 150 U/L 83   25 OH VIT D2 ug/L <5   25 OH VIT D3 ug/L 56   25 OH VIT D TOTAL 20 - 75 ug/L <61     ENDO CALCIUM LABS-UMP Latest Ref Rng & Units 4/19/2019   CALCIUM 8.5 - 10.1 mg/dL 9.4     Allergies   Allergen Reactions     Vancomycin Other (See Comments)     Red man's syndrom     Adhesive Tape      Rash itches     Amoxicillin      vomiting     Augmentin      vomiting     Codeine      vomiting     Erythromycin      vomiting     Nickel      itches rash     Sulfa Drugs Itching     Tegaderm Alginate Ag      LW Other1: -ALL MEDICATIONS MAKE PATIENT VIOLENTLY ILL; ADHESIVE BANDAGES; NICKEL     Zithromax [Azithromycin Dihydrate]      Vomiting/ skin blisters       Current Outpatient Medications   Medication Sig Dispense Refill     acetaminophen (TYLENOL) 325 MG tablet Take 2 tablets (650 mg) by mouth every 6 hours as needed for mild pain 100 tablet 0     calcium-magnesium (CALMAG) 500-250 MG TABS per tablet Take 1 tablet by mouth 2 times daily (with meals)       cholecalciferol 1000 UNITS TABS Take 1,000 Units by mouth daily       propranolol (INDERAL) 20 MG tablet TAKE 1 TABLET(20 MG) BY MOUTH DAILY. 90 tablet 2      Vitamin K 100 MCG TABS Take 1 tablet by mouth daily         Review of Systems     11 point review system (Constitutional, HENT, Eyes, Respiratory, Cardiovascular, Gastrointestinal, Genitourinary, Musculoskeletal,Neurological, Psychiatric/Behavioural, Endocrine) is negative or is as per HPI above with back pain and occasional shortness of breath.    Past Medical History:   Diagnosis Date     Atypical pneumonia 6/14/2014     Atypical pneumonia      Atypical pneumonia      Atypical pneumonia      Coronary artery disease 1982    heart beat hard made me tired     Dyslipidemia      Fibroids      GERD (gastroesophageal reflux disease)      Granulomatosis with polyangiitis (Wegener's)      Hearing loss      Inflammatory arthritis     thumb     Migraines      MVP (mitral valve prolapse)     had an echo that was normal in 2007 she is on Inderal     Osteopenia      PE (pulmonary embolism) 10/2014    ? GPA associated, on warfarin      Synovitis of wrist 2009    right       Past Surgical History:   Procedure Laterality Date     ABDOMEN SURGERY      appendix out     APPENDECTOMY       BIOPSY  1972    cone biopsy     CL AFF SURGICAL PATHOLOGY      cone biopsy 1975     COLONOSCOPY       COLONOSCOPY WITH CO2 INSUFFLATION N/A 12/20/2017    Procedure: COLONOSCOPY WITH CO2 INSUFFLATION;  Screening  BMI: 20.7  Pharmacy: [facility]  [phone]  [facility]  Referring: lenard  Patient get ill from [facility];  Surgeon: Duane, William Charles, MD;  Location:  OR     EYE SURGERY      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     HC ESOPHAGOSCOPY, DIAGNOSTIC       OPTICAL TRACKING SYSTEM BRONCHOSCOPY  6/19/2014    Procedure: OPTICAL TRACKING SYSTEM BRONCHOSCOPY;  Surgeon: Angel Kathleen MD;  Location:  GI     SOFT TISSUE SURGERY      remove senovial fluid from right wrist     SURGICAL HISTORY OF -   as a child    strabismus repair right eye     SURGICAL HISTORY OF -   8-2009    right wrist debridement     TONSILLECTOMY       as a child     TONSILLECTOMY & ADENOIDECTOMY       TUBAL LIGATION         Family History   Problem Relation Age of Onset     Respiratory Mother         emphysema     Aneurysm Mother      Chronic Obstructive Pulmonary Disease Mother      Thyroid Disease Mother         ,     Osteoporosis Mother      Other Cancer Father      Cancer Father         lung cancer     Arthritis Maternal Grandmother         rheumatoid     Heart Disease Maternal Grandmother         unsure of details     Heart Disease Maternal Grandfather      Neurologic Disorder Paternal Grandmother         migraines     Alzheimer Disease Paternal Grandmother      Unknown/Adopted Paternal Grandfather      Cancer Sister         stage 4 anal cancer age 62 years     Colon Cancer Sister      Cancer - colorectal Brother      Colon Cancer Brother      Substance Abuse Sister      Anesthesia Reaction Daughter        Social History     Socioeconomic History     Marital status:      Spouse name: None     Number of children: None     Years of education: None     Highest education level: None   Occupational History     None   Social Needs     Financial resource strain: None     Food insecurity:     Worry: None     Inability: None     Transportation needs:     Medical: None     Non-medical: None   Tobacco Use     Smoking status: Former Smoker     Packs/day: 2.00     Years: 20.00     Pack years: 40.00     Types: Cigarettes     Start date: 1961     Last attempt to quit: 1983     Years since quittin.7     Smokeless tobacco: Never Used   Substance and Sexual Activity     Alcohol use: No     Alcohol/week: 0.0 oz     Drug use: No     Sexual activity: Never   Lifestyle     Physical activity:     Days per week: None     Minutes per session: None     Stress: None   Relationships     Social connections:     Talks on phone: None     Gets together: None     Attends Confucianism service: None     Active member of club or organization: None     Attends meetings of  clubs or organizations: None     Relationship status: None     Intimate partner violence:     Fear of current or ex partner: None     Emotionally abused: None     Physically abused: None     Forced sexual activity: None   Other Topics Concern     Parent/sibling w/ CABG, MI or angioplasty before 65F 55M? No   Social History Narrative     None       Objective:   /78 (BP Location: Left arm, Patient Position: Sitting, Cuff Size: Adult Regular)   Pulse 76   Wt 55.4 kg (122 lb 1.6 oz)   SpO2 95%   BMI 21.29 kg/m    Constitutional: Pleasant no acute cardiopulmonary distress.   EYES: anicteric, normal extra-ocular movements, no lid lag or retraction.  HEENT: Mouth/Throat: Mucous membrane is moist.  Upper and lower dentures in place.  Cardiovascular: RRR, S1, S2 normal.   Pulmonary/Chest: CTAB. No wheezing or rales.   Abdominal: +BS. Non tender to palpation.    Neurological: Alert and oriented.  No tremor and reflexes are symmetrical bilaterally and within the normal limits. Muscle strength 5/5.   Extremities: No edema.  Back: Scoliosis no thoracic or lumbar spine tenderness.  Psychological: appropriate mood and affect       TSH   Date Value Ref Range Status   10/13/2014 1.00 0.40 - 4.00 mU/L Final     Comment:     Effective 7/30/2014, the reference range for this assay has changed to reflect   new instrumentation/methodology.         Creatinine   Date Value Ref Range Status   04/19/2019 0.67 0.52 - 1.04 mg/dL Final       Assessment/Treatment Plan:      Osteoporosis with current pathological fracture involving the spine: Lumbar spine x-rays pending.  History of Wegener's granulomatosis previously treated with oral prednisone between 4745-2116.  With high-dose steroid for the first 6 months and then slow steroid taper.  She was treated with Fosamax 35 weekly in 2014 however treatment was discontinued after a few months due to leg weakness which improved after stopping Fosamax.  Recently started on Forteo; this was  stopped after 30 days by the patient due to concern regarding side effects from the medication also problem with keeping up with a daily injection.    We had a long discussion regarding management of osteoporosis and discussed in detail with the risk and benefits of each medications including Reclast, denosumab, and Forteo.  Initially, patient wanted to treat her osteoporosis using natural methods and took some time to convince her that bone specific therapy is indicated at this point in time.  Discussed that the proceed with denosumab or Forteo; post medications need follow-up with either bone specific medications to preserve the gain achieved with each treatment plans; both patient and her daughter verbalized understanding.  At this point, patient would like to proceed with denusumab/Prolia and admit plan was placed and a prior authorization would be started.  To rule out other possible secondary causes of osteoporosis; have added a TSH to be done to the blood work which was done 3 days ago.  Parathyroid hormone checked previously and was within the normal limits.  Questions answered.    Patient Instructions     Weight bearing Exercises -     Fall prevention    Adequate Calcium and vitamin D intake: for maintenance calcium 1200 mg daily and vitamin D 1000 IU daily.      Cessation of tobacco use    Avoidance of excessive alcohol intake.     CALCIUM    Milk/[place]                            8 oz            300 mg  Yogurt                          1 cup           400 mg  Hard cheese                     1.5 oz          300 mg  Cottage cheese                  2 cup           300 mg  Orange juice with Calcium       8 oz            300 mg  Low fat dairy sources are recommended          I will contact the patient with the test results.  Return to clinic in 6 months.    Test and/or medications prescribed today:  Orders Placed This Encounter   Procedures     X-ray lumbar spine 2-3 views     XR Thoracic Spine 2 Views      Vitamin D Deficiency (D3 Only)     TSH with free T4 reflex         Russ Lebron MD  Staff Endocrinologist    [phone]  Division of Endocrinology and Diabetes

## 2019-04-22 NOTE — LETTER
4/22/2019         RE: Nury Cárdenas  [address] [facility] [address]        Dear Colleague,    Thank you for referring your patient, Nury Cárdenas, to the [facility]. Please see a copy of my visit note below.    Endocrinology Clinic Visit    Chief Complaint: Consult and Osteoporosis     Information obtained from:Patient she is here with her daughter who is a nurse.    Subjective:         HPI: Nury Cárdenas is a 74 year old female with history of osteoporosis who is seen in consultation at Alvaro Maguire's request for the same.  10/2018  FINDINGS:               Lumbar Spine (L1-L4) T-score: -2.3               Left Femoral Neck T-score:  -1.7               Right Femoral Neck T-score:  -1.4                              Lumbar (L1-L4) BMD: 0.906            Previous: 0.914                                              Total Hip Mean BMD: 0.708            Previous: 0.725   VFA Findings: Using the semi-quantitative analysis of [employer]ant there was evidence of spinal deformity as follows: possible moderate (grade 2) wedge fracture of T7      Lost about 3 inches of height.    Vertebral fracture as documented above.      Body mass index is 21.29 kg/m .    History of ocampo's granulomatosis: Diagnosed 2014 patient was treated with steroid between 2014 - 2017.  High-dose steroid initially for the first 6 months and then a slow taper.  She is not currently on steroid therapy and the last dose of steroid therapy was 2 years ago.    She has also family history of osteoporosis.    She was initially diagnosed with osteopenia and 2014 and she was started on Fosamax at the time of 35 mg weekly.  She took Fosamax for a couple of months however stopped it after that because of left leg weakness.  She reported that she could not even get up from a toilet chair already reculture because of leg weakness when she was on Fosamax which improved after she took herself off of Fosamax.  She is adamant  that the leg weakness was from Fosamax.    More recently after her DEXA scan in October 2018 she was started on Forteo and treated with Forteo for 30 days; however she stopped it due to fear of side effects and also because she has to do the injection every day and she thought this affects her quality of life.    Secondary causes of osteoporosis: Has chronic connective tissue disorder for which she has been on therapy previously but currently in remission.  She is not currently on any steroid.  She is currently on calcium citrate; dose is not clear and also takes vitamin D 1000 international units daily.  She walks at least 3 times per week 3 miles each time.   No current dental procedures planned she has dentures.    History of gastroesophageal reflux disease.    ENDO CALCIUM LABS-UMP Latest Ref Rng & Units 11/9/2018   CALCIUM 8.5 - 10.1 mg/dL 9.7   PHOSPHOROUS 2.5 - 4.5 mg/dL 3.7   MAGNESIUM 1.6 - 2.3 mg/dL 2.7 (H)   ALBUMIN 3.4 - 5.0 g/dL    BUN 7 - 30 mg/dL    CREATININE 0.52 - 1.04 mg/dL    PARATHYROID HORMONE INTACT 18 - 80 pg/mL 29   ALKPHOS 40 - 150 U/L 83   25 OH VIT D2 ug/L <5   25 OH VIT D3 ug/L 56   25 OH VIT D TOTAL 20 - 75 ug/L <61     ENDO CALCIUM LABS-UMP Latest Ref Rng & Units 4/19/2019   CALCIUM 8.5 - 10.1 mg/dL 9.4     Allergies   Allergen Reactions     Vancomycin Other (See Comments)     Red man's syndrom     Adhesive Tape      Rash itches     Amoxicillin      vomiting     Augmentin      vomiting     Codeine      vomiting     Erythromycin      vomiting     Nickel      itches rash     Sulfa Drugs Itching     Tegaderm Alginate Ag      LW Other1: -ALL MEDICATIONS MAKE PATIENT VIOLENTLY ILL; ADHESIVE BANDAGES; NICKEL     Zithromax [Azithromycin Dihydrate]      Vomiting/ skin blisters       Current Outpatient Medications   Medication Sig Dispense Refill     acetaminophen (TYLENOL) 325 MG tablet Take 2 tablets (650 mg) by mouth every 6 hours as needed for mild pain 100 tablet 0     calcium-magnesium  (CALMAG) 500-250 MG TABS per tablet Take 1 tablet by mouth 2 times daily (with meals)       cholecalciferol 1000 UNITS TABS Take 1,000 Units by mouth daily       propranolol (INDERAL) 20 MG tablet TAKE 1 TABLET(20 MG) BY MOUTH DAILY. 90 tablet 2     Vitamin K 100 MCG TABS Take 1 tablet by mouth daily         Review of Systems     11 point review system (Constitutional, HENT, Eyes, Respiratory, Cardiovascular, Gastrointestinal, Genitourinary, Musculoskeletal,Neurological, Psychiatric/Behavioural, Endocrine) is negative or is as per HPI above with back pain and occasional shortness of breath.    Past Medical History:   Diagnosis Date     Atypical pneumonia 6/14/2014     Atypical pneumonia      Atypical pneumonia      Atypical pneumonia      Coronary artery disease 1982    heart beat hard made me tired     Dyslipidemia      Fibroids      GERD (gastroesophageal reflux disease)      Granulomatosis with polyangiitis (Wegener's)      Hearing loss      Inflammatory arthritis     thumb     Migraines      MVP (mitral valve prolapse)     had an echo that was normal in 2007 she is on Inderal     Osteopenia      PE (pulmonary embolism) 10/2014    ? GPA associated, on warfarin      Synovitis of wrist 2009    right       Past Surgical History:   Procedure Laterality Date     ABDOMEN SURGERY      appendix out     APPENDECTOMY       BIOPSY  1972    cone biopsy     CL AFF SURGICAL PATHOLOGY      cone biopsy 1975     COLONOSCOPY       COLONOSCOPY WITH CO2 INSUFFLATION N/A 12/20/2017    Procedure: COLONOSCOPY WITH CO2 INSUFFLATION;  Screening  BMI: 20.7  Pharmacy: [facility]  [phone]  [facility]  Referring: Laurita  Patient get ill from [facility];  Surgeon: Duane, William Charles, MD;  Location:  OR     EYE SURGERY      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     HC ESOPHAGOSCOPY, DIAGNOSTIC       OPTICAL TRACKING SYSTEM BRONCHOSCOPY  6/19/2014    Procedure: OPTICAL TRACKING SYSTEM BRONCHOSCOPY;  Surgeon: Angel Kathleen  MD Alvaro;  Location:  GI     SOFT TISSUE SURGERY      remove senovial fluid from right wrist     SURGICAL HISTORY OF -   as a child    strabismus repair right eye     SURGICAL HISTORY OF -       right wrist debridement     TONSILLECTOMY      as a child     TONSILLECTOMY & ADENOIDECTOMY       TUBAL LIGATION         Family History   Problem Relation Age of Onset     Respiratory Mother         emphysema     Aneurysm Mother      Chronic Obstructive Pulmonary Disease Mother      Thyroid Disease Mother         ,     Osteoporosis Mother      Other Cancer Father      Cancer Father         lung cancer     Arthritis Maternal Grandmother         rheumatoid     Heart Disease Maternal Grandmother         unsure of details     Heart Disease Maternal Grandfather      Neurologic Disorder Paternal Grandmother         migraines     Alzheimer Disease Paternal Grandmother      Unknown/Adopted Paternal Grandfather      Cancer Sister         stage 4 anal cancer age 62 years     Colon Cancer Sister      Cancer - colorectal Brother      Colon Cancer Brother      Substance Abuse Sister      Anesthesia Reaction Daughter        Social History     Socioeconomic History     Marital status:      Spouse name: None     Number of children: None     Years of education: None     Highest education level: None   Occupational History     None   Social Needs     Financial resource strain: None     Food insecurity:     Worry: None     Inability: None     Transportation needs:     Medical: None     Non-medical: None   Tobacco Use     Smoking status: Former Smoker     Packs/day: 2.00     Years: 20.00     Pack years: 40.00     Types: Cigarettes     Start date: 1961     Last attempt to quit: 1983     Years since quittin.7     Smokeless tobacco: Never Used   Substance and Sexual Activity     Alcohol use: No     Alcohol/week: 0.0 oz     Drug use: No     Sexual activity: Never   Lifestyle     Physical activity:     Days per week:  None     Minutes per session: None     Stress: None   Relationships     Social connections:     Talks on phone: None     Gets together: None     Attends Cheondoism service: None     Active member of club or organization: None     Attends meetings of clubs or organizations: None     Relationship status: None     Intimate partner violence:     Fear of current or ex partner: None     Emotionally abused: None     Physically abused: None     Forced sexual activity: None   Other Topics Concern     Parent/sibling w/ CABG, MI or angioplasty before 65F 55M? No   Social History Narrative     None       Objective:   /78 (BP Location: Left arm, Patient Position: Sitting, Cuff Size: Adult Regular)   Pulse 76   Wt 55.4 kg (122 lb 1.6 oz)   SpO2 95%   BMI 21.29 kg/m     Constitutional: Pleasant no acute cardiopulmonary distress.   EYES: anicteric, normal extra-ocular movements, no lid lag or retraction.  HEENT: Mouth/Throat: Mucous membrane is moist.  Upper and lower dentures in place.  Cardiovascular: RRR, S1, S2 normal.   Pulmonary/Chest: CTAB. No wheezing or rales.   Abdominal: +BS. Non tender to palpation.    Neurological: Alert and oriented.  No tremor and reflexes are symmetrical bilaterally and within the normal limits. Muscle strength 5/5.   Extremities: No edema.  Back: Scoliosis no thoracic or lumbar spine tenderness.  Psychological: appropriate mood and affect       TSH   Date Value Ref Range Status   10/13/2014 1.00 0.40 - 4.00 mU/L Final     Comment:     Effective 7/30/2014, the reference range for this assay has changed to reflect   new instrumentation/methodology.         Creatinine   Date Value Ref Range Status   04/19/2019 0.67 0.52 - 1.04 mg/dL Final       Assessment/Treatment Plan:      Osteoporosis with current pathological fracture involving the spine: Lumbar spine x-rays pending.  History of Wegener's granulomatosis previously treated with oral prednisone between 3780-5365.  With high-dose steroid  for the first 6 months and then slow steroid taper.  She was treated with Fosamax 35 weekly in 2014 however treatment was discontinued after a few months due to leg weakness which improved after stopping Fosamax.  Recently started on Forteo; this was stopped after 30 days by the patient due to concern regarding side effects from the medication also problem with keeping up with a daily injection.    We had a long discussion regarding management of osteoporosis and discussed in detail with the risk and benefits of each medications including Reclast, denosumab, and Forteo.  Initially, patient wanted to treat her osteoporosis using natural methods and took some time to convince her that bone specific therapy is indicated at this point in time.  Discussed that the proceed with denosumab or Forteo; post medications need follow-up with either bone specific medications to preserve the gain achieved with each treatment plans; both patient and her daughter verbalized understanding.  At this point, patient would like to proceed with denusumab/Prolia and admit plan was placed and a prior authorization would be started.  To rule out other possible secondary causes of osteoporosis; have added a TSH to be done to the blood work which was done 3 days ago.  Parathyroid hormone checked previously and was within the normal limits.  Questions answered.    Patient Instructions     Weight bearing Exercises -     Fall prevention    Adequate Calcium and vitamin D intake: for maintenance calcium 1200 mg daily and vitamin D 1000 IU daily.      Cessation of tobacco use    Avoidance of excessive alcohol intake.     CALCIUM    Milk/Elaine                            8 oz            300 mg  Yogurt                          1 cup           400 mg  Hard cheese                     1.5 oz          300 mg  Cottage cheese                  2 cup           300 mg  Orange juice with Calcium       8 oz            300 mg  Low fat dairy sources are  recommended          I will contact the patient with the test results.  Return to clinic in 6 months.    Test and/or medications prescribed today:  Orders Placed This Encounter   Procedures     X-ray lumbar spine 2-3 views     XR Thoracic Spine 2 Views     Vitamin D Deficiency (D3 Only)     TSH with free T4 reflex         Russ Lebron MD  Staff Endocrinologist    [phone]  Division of Endocrinology and Diabetes            Again, thank you for allowing me to participate in the care of your patient.        Sincerely,        Russ Lebron MD

## 2019-04-23 LAB — DEPRECATED CALCIDIOL+CALCIFEROL SERPL-MC: 69 UG/L (ref 20–75)

## 2019-04-24 ENCOUNTER — MYC MEDICAL ADVICE (OUTPATIENT)
Dept: ENDOCRINOLOGY | Facility: CLINIC | Age: 74
End: 2019-04-24

## 2019-05-23 ENCOUNTER — TELEPHONE (OUTPATIENT)
Dept: FAMILY MEDICINE | Facility: CLINIC | Age: 74
End: 2019-05-23

## 2019-05-23 NOTE — TELEPHONE ENCOUNTER
Panel Management Review      Patient has the following on her problem list: None      Composite cancer screening  Chart review shows that this patient is due/due soon for the following None  Summary:    Patient is due/failing the following:   Fall risk, ADP, Medicare annual wellness    Action needed:   None, pt may also be receiving care from [facility]    Type of outreach:    none    Questions for provider review:    None                                                                                                                                    RENE Anderson       Chart routed to none .

## 2019-07-02 DIAGNOSIS — A60.09 HERPES GENITALIS IN WOMEN: Primary | ICD-10-CM

## 2019-07-02 NOTE — TELEPHONE ENCOUNTER
[facility] Call Center    Phone Message    May a detailed message be left on voicemail: yes    Reason for Call: Medication Refill Request    Has the patient contacted the pharmacy for the refill? Yes   Name of medication being requested: Valvacyclovir 500mg  Provider who prescribed the medication: Adelaida Sol  Pharmacy: [address] [place] [address] (P) [phone]  Date medication is needed: As soon as possible         Action Taken: Message routed to:  [facility] ([facility] INF DISEASE

## 2019-07-03 RX ORDER — VALACYCLOVIR HYDROCHLORIDE 500 MG/1
500 TABLET, FILM COATED ORAL 2 TIMES DAILY
Qty: 6 TABLET | Refills: 3 | Status: SHIPPED | OUTPATIENT
Start: 2019-07-03 | End: 2020-09-10

## 2019-07-03 NOTE — TELEPHONE ENCOUNTER
Adelaida Sol MD  You 1 minute ago (9:02 AM)      Sure, that's fine.     Thanks   Adelaida Bennett comment       You  Adelaida Sol MD 1 hour ago (7:17 AM)      Klaus Son, Do you want this refilled? Thanks!   Ese

## 2019-08-12 ENCOUNTER — MYC MEDICAL ADVICE (OUTPATIENT)
Dept: FAMILY MEDICINE | Facility: CLINIC | Age: 74
End: 2019-08-12

## 2019-08-29 DIAGNOSIS — G43.909 MIGRAINE WITHOUT STATUS MIGRAINOSUS, NOT INTRACTABLE, UNSPECIFIED MIGRAINE TYPE: ICD-10-CM

## 2019-08-29 RX ORDER — PROPRANOLOL HYDROCHLORIDE 20 MG/1
TABLET ORAL
Qty: 90 TABLET | Refills: 1 | Status: SHIPPED | OUTPATIENT
Start: 2019-08-29 | End: 2020-02-26

## 2019-10-07 ENCOUNTER — MYC MEDICAL ADVICE (OUTPATIENT)
Dept: RHEUMATOLOGY | Facility: CLINIC | Age: 74
End: 2019-10-07

## 2019-10-11 ENCOUNTER — OFFICE VISIT (OUTPATIENT)
Dept: OPHTHALMOLOGY | Facility: CLINIC | Age: 74
End: 2019-10-11
Payer: COMMERCIAL

## 2019-10-11 ENCOUNTER — OFFICE VISIT (OUTPATIENT)
Dept: RHEUMATOLOGY | Facility: CLINIC | Age: 74
End: 2019-10-11
Payer: COMMERCIAL

## 2019-10-11 VITALS
OXYGEN SATURATION: 97 % | WEIGHT: 120.9 LBS | SYSTOLIC BLOOD PRESSURE: 143 MMHG | DIASTOLIC BLOOD PRESSURE: 88 MMHG | BODY MASS INDEX: 20.14 KG/M2 | HEART RATE: 65 BPM | HEIGHT: 65 IN

## 2019-10-11 DIAGNOSIS — M31.30 GRANULOMATOSIS WITH POLYANGIITIS WITHOUT RENAL INVOLVEMENT (H): ICD-10-CM

## 2019-10-11 DIAGNOSIS — M15.0 PRIMARY OSTEOARTHRITIS INVOLVING MULTIPLE JOINTS: ICD-10-CM

## 2019-10-11 DIAGNOSIS — M31.30 GRANULOMATOSIS WITH POLYANGIITIS (H): ICD-10-CM

## 2019-10-11 DIAGNOSIS — H35.342 MACULAR HOLE OF LEFT EYE: ICD-10-CM

## 2019-10-11 DIAGNOSIS — H52.4 PRESBYOPIA: ICD-10-CM

## 2019-10-11 DIAGNOSIS — Z96.1 PSEUDOPHAKIA: ICD-10-CM

## 2019-10-11 DIAGNOSIS — Z79.60 LONG-TERM USE OF IMMUNOSUPPRESSANT MEDICATION: ICD-10-CM

## 2019-10-11 DIAGNOSIS — I26.99 OTHER PULMONARY EMBOLISM WITHOUT ACUTE COR PULMONALE, UNSPECIFIED CHRONICITY (H): ICD-10-CM

## 2019-10-11 DIAGNOSIS — H25.811 COMBINED FORMS OF AGE-RELATED CATARACT OF RIGHT EYE: Primary | ICD-10-CM

## 2019-10-11 DIAGNOSIS — Z98.890 HISTORY OF VITRECTOMY: ICD-10-CM

## 2019-10-11 DIAGNOSIS — M19.90 INFLAMMATORY ARTHRITIS: Primary | ICD-10-CM

## 2019-10-11 LAB
ALBUMIN SERPL-MCNC: 3.8 G/DL (ref 3.4–5)
ALBUMIN UR-MCNC: NEGATIVE MG/DL
ALP SERPL-CCNC: 85 U/L (ref 40–150)
ALT SERPL W P-5'-P-CCNC: 21 U/L (ref 0–50)
ANION GAP SERPL CALCULATED.3IONS-SCNC: 5 MMOL/L (ref 3–14)
APPEARANCE UR: CLEAR
AST SERPL W P-5'-P-CCNC: 19 U/L (ref 0–45)
BASOPHILS # BLD AUTO: 0.1 10E9/L (ref 0–0.2)
BASOPHILS NFR BLD AUTO: 1.2 %
BILIRUB SERPL-MCNC: 0.5 MG/DL (ref 0.2–1.3)
BILIRUB UR QL STRIP: NEGATIVE
BUN SERPL-MCNC: 10 MG/DL (ref 7–30)
CALCIUM SERPL-MCNC: 9.9 MG/DL (ref 8.5–10.1)
CHLORIDE SERPL-SCNC: 104 MMOL/L (ref 94–109)
CO2 SERPL-SCNC: 30 MMOL/L (ref 20–32)
COLOR UR AUTO: ABNORMAL
CREAT SERPL-MCNC: 0.65 MG/DL (ref 0.52–1.04)
CRP SERPL-MCNC: <2.9 MG/L (ref 0–8)
DIFFERENTIAL METHOD BLD: NORMAL
EOSINOPHIL # BLD AUTO: 0.1 10E9/L (ref 0–0.7)
EOSINOPHIL NFR BLD AUTO: 1.3 %
ERYTHROCYTE [DISTWIDTH] IN BLOOD BY AUTOMATED COUNT: 12.9 % (ref 10–15)
ERYTHROCYTE [SEDIMENTATION RATE] IN BLOOD BY WESTERGREN METHOD: 5 MM/H (ref 0–30)
GFR SERPL CREATININE-BSD FRML MDRD: 87 ML/MIN/{1.73_M2}
GLUCOSE SERPL-MCNC: 83 MG/DL (ref 70–99)
GLUCOSE UR STRIP-MCNC: NEGATIVE MG/DL
HCT VFR BLD AUTO: 45.9 % (ref 35–47)
HGB BLD-MCNC: 15.2 G/DL (ref 11.7–15.7)
HGB UR QL STRIP: NEGATIVE
IMM GRANULOCYTES # BLD: 0 10E9/L (ref 0–0.4)
IMM GRANULOCYTES NFR BLD: 0.3 %
KETONES UR STRIP-MCNC: NEGATIVE MG/DL
LEUKOCYTE ESTERASE UR QL STRIP: NEGATIVE
LYMPHOCYTES # BLD AUTO: 1.8 10E9/L (ref 0.8–5.3)
LYMPHOCYTES NFR BLD AUTO: 29.9 %
MCH RBC QN AUTO: 30.5 PG (ref 26.5–33)
MCHC RBC AUTO-ENTMCNC: 33.1 G/DL (ref 31.5–36.5)
MCV RBC AUTO: 92 FL (ref 78–100)
MONOCYTES # BLD AUTO: 0.5 10E9/L (ref 0–1.3)
MONOCYTES NFR BLD AUTO: 7.6 %
NEUTROPHILS # BLD AUTO: 3.6 10E9/L (ref 1.6–8.3)
NEUTROPHILS NFR BLD AUTO: 59.7 %
NITRATE UR QL: NEGATIVE
PH UR STRIP: 7.5 PH (ref 5–7)
PLATELET # BLD AUTO: 197 10E9/L (ref 150–450)
POTASSIUM SERPL-SCNC: 4.8 MMOL/L (ref 3.4–5.3)
PROT SERPL-MCNC: 6.7 G/DL (ref 6.8–8.8)
RBC # BLD AUTO: 4.99 10E12/L (ref 3.8–5.2)
RBC #/AREA URNS AUTO: ABNORMAL /HPF
SODIUM SERPL-SCNC: 139 MMOL/L (ref 133–144)
SOURCE: ABNORMAL
SP GR UR STRIP: 1 (ref 1–1.03)
UROBILINOGEN UR STRIP-MCNC: NORMAL MG/DL (ref 0–2)
WBC # BLD AUTO: 6.1 10E9/L (ref 4–11)
WBC #/AREA URNS AUTO: ABNORMAL /HPF

## 2019-10-11 PROCEDURE — 81001 URINALYSIS AUTO W/SCOPE: CPT | Performed by: INTERNAL MEDICINE

## 2019-10-11 PROCEDURE — 85025 COMPLETE CBC W/AUTO DIFF WBC: CPT | Performed by: INTERNAL MEDICINE

## 2019-10-11 PROCEDURE — 92015 DETERMINE REFRACTIVE STATE: CPT | Performed by: OPHTHALMOLOGY

## 2019-10-11 PROCEDURE — 85652 RBC SED RATE AUTOMATED: CPT | Performed by: INTERNAL MEDICINE

## 2019-10-11 PROCEDURE — 86140 C-REACTIVE PROTEIN: CPT | Performed by: INTERNAL MEDICINE

## 2019-10-11 PROCEDURE — 80053 COMPREHEN METABOLIC PANEL: CPT | Performed by: INTERNAL MEDICINE

## 2019-10-11 PROCEDURE — 86255 FLUORESCENT ANTIBODY SCREEN: CPT | Performed by: INTERNAL MEDICINE

## 2019-10-11 PROCEDURE — 36415 COLL VENOUS BLD VENIPUNCTURE: CPT | Performed by: INTERNAL MEDICINE

## 2019-10-11 PROCEDURE — 99214 OFFICE O/P EST MOD 30 MIN: CPT | Performed by: INTERNAL MEDICINE

## 2019-10-11 PROCEDURE — 92014 COMPRE OPH EXAM EST PT 1/>: CPT | Performed by: OPHTHALMOLOGY

## 2019-10-11 PROCEDURE — 92134 CPTRZ OPH DX IMG PST SGM RTA: CPT | Performed by: OPHTHALMOLOGY

## 2019-10-11 ASSESSMENT — REFRACTION_WEARINGRX
OS_VPRISM: 2
OD_CYLINDER: +0.25
OS_CYLINDER: +1.25
OD_VBASE: DOWN
OD_AXIS: 070
OD_HBASE: OUT
OS_HPRISM: 4 1/2
OS_AXIS: 034
OD_SPHERE: +2.00
OS_VBASE: UP
OD_ADD: +2.50
SPECS_TYPE: PAL
OS_HBASE: OUT
OD_VPRISM: 3 1/2
OD_HPRISM: 5 1/2
OS_ADD: +2.50
OS_SPHERE: -0.50

## 2019-10-11 ASSESSMENT — EXTERNAL EXAM - LEFT EYE: OS_EXAM: 1+ BROW PTOSIS, PROLAPSED FAT PADS: UPPER, LOWER

## 2019-10-11 ASSESSMENT — SLIT LAMP EXAM - LIDS
COMMENTS: 1+ DERMATOCHALASIS - UPPER LID, COLLARETTES
COMMENTS: 1+ DERMATOCHALASIS - UPPER LID, COLLARETTES

## 2019-10-11 ASSESSMENT — VISUAL ACUITY
OS_CC: 20/25
METHOD: SNELLEN - LINEAR
OD_CC: J1
OD_CC+: -1
OS_CC: J1
OS_CC+: +3
OD_CC: 20/30
CORRECTION_TYPE: GLASSES

## 2019-10-11 ASSESSMENT — REFRACTION_MANIFEST
OD_CYLINDER: +0.50
OS_CYLINDER: +1.25
OS_ADD: +3.00
OD_SPHERE: +0.75
OS_SPHERE: -0.25
OS_AXIS: 027
OD_AXIS: 163
OD_ADD: +3.00

## 2019-10-11 ASSESSMENT — CUP TO DISC RATIO
OD_RATIO: 0.3
OS_RATIO: 0.4

## 2019-10-11 ASSESSMENT — TONOMETRY
OS_IOP_MMHG: 10
IOP_METHOD: APPLANATION
OD_IOP_MMHG: 10

## 2019-10-11 ASSESSMENT — EXTERNAL EXAM - RIGHT EYE: OD_EXAM: 1+ BROW PTOSIS, PROLAPSED FAT PADS: UPPER, LOWER

## 2019-10-11 ASSESSMENT — MIFFLIN-ST. JEOR: SCORE: 1036.34

## 2019-10-11 ASSESSMENT — PAIN SCALES - GENERAL: PAINLEVEL: NO PAIN (0)

## 2019-10-11 ASSESSMENT — ROUTINE ASSESSMENT OF PATIENT INDEX DATA (RAPID3)
TOTAL RAPID3 SCORE: 0
RAPID3 INTERPRETATION: REMISSION

## 2019-10-11 NOTE — PATIENT INSTRUCTIONS
Continue same glasses  Use OTC readers for computer and reading.  Use artificial tears up to 4 times daily both eyes.  (Refresh Tears, Systane Ultra/Balance, or Theratears).  Possible posterior vitreous detachment (sudden onset large floater and/or flashing lights) right eye discussed.   Call in June 2020 for an appointment in October 2020 for Complete Exam    Dr. Mcleod [phone]

## 2019-10-11 NOTE — PROGRESS NOTES
" Current Eye Medications:  None     Subjective:  Complete eye exam    Since being seen here in July of 2016, patient has had what sounds like a retinal hole and surgical repair with \"air bubble\" and then had cataract surgery on the left eye also.  She noticed a decrease in vision over time in the left eye so \"they did a laser treatment\" to remedy that.  Patient now feels that her vision is not quite right, and she struggles with the progressive lenses ever since she first started wearing them after the cataract surgery.  Patient also had prism added to the glasses to rid her of horizontal diplopia.  She states that there is a remenant in the left eye that gets in the way, but \"there is nothing to be done for that\" Patient sees Dr. Shukla up in [place]. Patient also complains that her eyes feel a little goopy.  Patient has history of strabismus surgery as child for XT.  Sounds like R&R both eyes.    Not bothered by diplopia with current glasses.  Uses OTC readers for near work.     Objective:  See Ophthalmology Exam.       Assessment:  New baseline eye exam in patient with hx of ERM delamination/vitrectomy/PCL/Yag left eye.      ICD-10-CM    1. Combined forms of age-related cataract, mild, of right eye H25.811 EYE EXAM (SIMPLE-NONBILLABLE)   2. Pseudophakia, Yag Caps, os Z96.1    3. History of vitrectomy - macular hole, os (MVE) Z98.890    4. Hx of macular hole of left eye H35.342 HC COMPUTERIZED OPHTHALMIC IMAGING RETINA   5. Presbyopia H52.4 REFRACTIVE STATUS        Plan:  Continue same glasses  Use OTC readers for computer and reading.  Use artificial tears up to 4 times daily both eyes.  (Refresh Tears, Systane Ultra/Balance, or Theratears).  Possible posterior vitreous detachment (sudden onset large floater and/or flashing lights) right eye discussed.   Call in June 2020 for an appointment in October 2020 for Complete Exam    Dr. Mcleod [phone]       "

## 2019-10-11 NOTE — NURSING NOTE
"Nury Cárdenas's goals for this visit include: return  She requests these members of her care team be copied on today's visit information:     PCP: Phil Abbott    Referring Provider:  No referring provider defined for this encounter.    BP (!) 143/88   Pulse 65   Ht 1.638 m (5' 4.5\")   Wt 54.8 kg (120 lb 14.4 oz)   SpO2 97%   BMI 20.43 kg/m      Do you need any medication refills at today's visit? n  "

## 2019-10-11 NOTE — LETTER
"    10/11/2019         RE: Nury Cárdenas  [address]  [place] [address]        Dear Colleague,    Thank you for referring your patient, Nury Cárdenas, to the [facility]. Please see a copy of my visit note below.     Current Eye Medications:  None     Subjective:  Complete eye exam    Since being seen here in July of 2016, patient has had what sounds like a retinal hole and surgical repair with \"air bubble\" and then had cataract surgery on the left eye also.  She noticed a decrease in vision over time in the left eye so \"they did a laser treatment\" to remedy that.  Patient now feels that her vision is not quite right, and she struggles with the progressive lenses ever since she first started wearing them after the cataract surgery.  Patient also had prism added to the glasses to rid her of horizontal diplopia.  She states that there is a remenant in the left eye that gets in the way, but \"there is nothing to be done for that\" Patient sees Dr. Shukla up in [place]. Patient also complains that her eyes feel a little goopy.  Patient has history of strabismus surgery as child for XT.  Sounds like R&R both eyes.    Not bothered by diplopia with current glasses.  Uses OTC readers for near work.     Objective:  See Ophthalmology Exam.       Assessment:  New baseline eye exam in patient with hx of ERM delamination/vitrectomy/PCL/Yag left eye.      ICD-10-CM    1. Combined forms of age-related cataract, mild, of right eye H25.811 EYE EXAM (SIMPLE-NONBILLABLE)   2. Pseudophakia, Yag Caps, os Z96.1    3. History of vitrectomy - macular hole, os (MVE) Z98.890    4. Hx of macular hole of left eye H35.342  COMPUTERIZED OPHTHALMIC IMAGING RETINA   5. Presbyopia H52.4 REFRACTIVE STATUS        Plan:  Continue same glasses  Use OTC readers for computer and reading.  Use artificial tears up to 4 times daily both eyes.  (Refresh Tears, Systane Ultra/Balance, or Theratears).  Possible posterior vitreous " detachment (sudden onset large floater and/or flashing lights) right eye discussed.   Call in June 2020 for an appointment in October 2020 for Complete Exam    Dr. Mcleod [phone]         Again, thank you for allowing me to participate in the care of your patient.        Sincerely,        Wiley Mcleod MD

## 2019-10-11 NOTE — PROGRESS NOTES
Ms. 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 Course complicated by severe upper airway inflammation, Wegener's lung and destructive/erosive joint disease. Treated initially with rituximab 375 mg/m2 x4 and high dose corticsteroids, and most recently with methotrexate.  No history of relapse. Prednisone taper complete on April 1st, 2017. No recent methotrexate. Alendronate therapy complicated by LE pain.    She complains of some minor cough and slight chest heaviness. She has some modest dyspnea that she wonders if this relates to her head/neck posture. She denies any recent neck or thoracic pain, but notes some neck tightness.  No recent fracture. No hemoptysis.    She has a slow healing tender lump on her lower medial left leg after an injury.    PMI:  Medical-invasive pulmonary aspirgillosis, GPA, osteoarthritis, DVT with pulmonary embolism, osteoporosis with vertebral fracture (T = -2.3 ), GERD, hyperlipidemia, +Tb exposure, nephrolithiasis, retinal tear, cataract  Surgical-lung biopsy, appendectomy, eye surgery, tonsillectomy, cone biopsy, right wrist synovectomy  Injuries-left wrist fracture x2, left 5th toe fracture, vertebral fracture    SH:  Lives at home alone. Former smoker. No EtOH. Tuberculosis exposure as a child. Ambidextrous.    FH:  Son with mitochondrial myopathy  Daughter with palpitations  Sibs dead with colon and anal cancer  Father dead with lung cancer  Mother dead with emphysema and osteoporosis    PMSF history personally obtained and updated by me this visit in the clinic.    ROS:  +minor increase in dyspnea  +allergies to fosamax amoxicillin, augmentin, erythromycin, zithromax, nickel, codeine, adhesive tape  Remainder of the 14 point ROS obtained and found negative.    Physical Exam:  Constitutional: WD-WN-WG cooperative; +rare dry cough  Eyes: nl EOM, PERRLA, vision, conjunctiva, sclera  ENT: nl external ears, nose,  hearing, lips, teeth, gums, throat  Neck: no mass or thyroid enlargement  Resp: lungs clear to auscultation, nl to palpation, nl effort  CV: RRR, no murmurs, rubs or gallops, no edema  GI: no ABD mass or tenderness, no HSM  : not tested  Lymph: no cervical or epitrochlear nodes  MS: +Hand wasting; right wrist with no flexion or extension; left wrist extension to 45 degrees; 1st CMC joint squaring bilaterally with some thumb laxity; right neck rotation 45 degrees right and 45 degrees left with head forward position and minimal neck extension; All other TMJ, neck, shoulder, elbow, wrist, hand, spine, hip, knee, ankle, and foot joints were examined and otherwise found normal. Normal  strength. +severe kyphosis. Normal tuck and prayer.  Skin: no nail pitting, alopecia; +indurated raised tender nodule medial to the left tibia  Neuro: nl cranial nerves, strength, sensation, DTRs.   Psych: nl judgement, orientation, memory, affect.    Laboratory:    Component      Latest Ref Rng & Units 10/11/2019   WBC      4.0 - 11.0 10e9/L 6.1   RBC Count      3.8 - 5.2 10e12/L 4.99   Hemoglobin      11.7 - 15.7 g/dL 15.2   Hematocrit      35.0 - 47.0 % 45.9   MCV      78 - 100 fl 92   MCH      26.5 - 33.0 pg 30.5   MCHC      31.5 - 36.5 g/dL 33.1   RDW      10.0 - 15.0 % 12.9   Platelet Count      150 - 450 10e9/L 197   Diff Method       Automated Method   % Neutrophils      % 59.7   % Lymphocytes      % 29.9   % Monocytes      % 7.6   % Eosinophils      % 1.3   % Basophils      % 1.2   % Immature Granulocytes      % 0.3   Absolute Neutrophil      1.6 - 8.3 10e9/L 3.6   Absolute Lymphocytes      0.8 - 5.3 10e9/L 1.8   Absolute Monocytes      0.0 - 1.3 10e9/L 0.5   Absolute Eosinophils      0.0 - 0.7 10e9/L 0.1   Absolute Basophils      0.0 - 0.2 10e9/L 0.1   Abs Immature Granulocytes      0 - 0.4 10e9/L 0.0   Sodium      133 - 144 mmol/L 139   Potassium      3.4 - 5.3 mmol/L 4.8   Chloride      94 - 109 mmol/L 104   Carbon  Dioxide      20 - 32 mmol/L 30   Anion Gap      3 - 14 mmol/L 5   Glucose      70 - 99 mg/dL 83   Urea Nitrogen      7 - 30 mg/dL 10   Creatinine      0.52 - 1.04 mg/dL 0.65   GFR Estimate      >60 mL/min/1.73:m2 87   GFR Estimate If Black      >60 mL/min/1.73:m2 >90   Calcium      8.5 - 10.1 mg/dL 9.9   Bilirubin Total      0.2 - 1.3 mg/dL 0.5   Albumin      3.4 - 5.0 g/dL 3.8   Protein Total      6.8 - 8.8 g/dL 6.7 (L)   Alkaline Phosphatase      40 - 150 U/L 85   ALT      0 - 50 U/L 21   AST      0 - 45 U/L 19   Color Urine       Light Yellow   Appearance Urine       Clear   Glucose Urine      NEG:Negative mg/dL Negative   Bilirubin Urine      NEG:Negative Negative   Ketones Urine      NEG:Negative mg/dL Negative   Specific Gravity Urine      1.003 - 1.035 1.005   Blood Urine      NEG:Negative Negative   pH Urine      5.0 - 7.0 pH 7.5 (H)   Protein Albumin Urine      NEG:Negative mg/dL Negative   Urobilinogen mg/dL      0.0 - 2.0 mg/dL Normal   Nitrite Urine      NEG:Negative Negative   Leukocyte Esterase Urine      NEG:Negative Negative   Source       Midstream Urine   WBC Urine      OTO5:0 - 5 /HPF 0 - 5   RBC Urine      OTO2:O - 2 /HPF O - 2   Sed Rate      0 - 30 mm/h 5       Impression:    PR3-associated GPA-with history of lung involvement. Now with increased cough and some dyspenia. Most recent PFTs have been normal and I will examine repeat PFTs now. If they demonstrate any reduced lung capacity or reduced O2 exchange then I would plan to follow this with a CT scan of the chest. It is my impression that lung exam is normal and her GPA remains in remission.  I don't recommend any immunosuppression at this time. This may all relate to worsening kyphosis and some restrictive pulmonary physiology.    Osteoporosis-with back and neck pain associated with history of vertebral fracture and severe kyphosis. She defers active bone metabolism management at this time and is instead choosing nutritional approaches.  She wants to repeat the DEXA in 1 year.    Neck and shoulder deconditioning-get PT to strengthen the shoulder girdle and neck.    Plan RTC in 6 months.

## 2019-10-14 LAB
ANCA AB PATTERN SER IF-IMP: NORMAL
C-ANCA TITR SER IF: NORMAL {TITER}

## 2019-10-20 PROBLEM — Z98.890 HISTORY OF VITRECTOMY: Status: ACTIVE | Noted: 2019-10-20

## 2019-10-20 PROBLEM — H35.342 MACULAR HOLE OF LEFT EYE: Status: ACTIVE | Noted: 2019-10-20

## 2019-10-26 ENCOUNTER — TRANSFERRED RECORDS (OUTPATIENT)
Dept: HEALTH INFORMATION MANAGEMENT | Facility: CLINIC | Age: 74
End: 2019-10-26

## 2019-10-28 ENCOUNTER — OFFICE VISIT (OUTPATIENT)
Dept: NURSING | Facility: CLINIC | Age: 74
End: 2019-10-28
Payer: COMMERCIAL

## 2019-10-28 DIAGNOSIS — M31.30 GRANULOMATOSIS WITH POLYANGIITIS WITHOUT RENAL INVOLVEMENT (H): ICD-10-CM

## 2019-10-28 DIAGNOSIS — M19.90 INFLAMMATORY ARTHRITIS: ICD-10-CM

## 2019-10-28 DIAGNOSIS — M15.0 PRIMARY OSTEOARTHRITIS INVOLVING MULTIPLE JOINTS: ICD-10-CM

## 2019-10-28 PROCEDURE — 94729 DIFFUSING CAPACITY: CPT | Performed by: INTERNAL MEDICINE

## 2019-10-28 PROCEDURE — 94375 RESPIRATORY FLOW VOLUME LOOP: CPT | Performed by: INTERNAL MEDICINE

## 2019-10-28 NOTE — LETTER
October 29, 2019      Nury Cárdenas  [address] [address]        Dear ,    We are writing to inform you of your test results.    These results do not require a change.  Please discuss at your next visit.  It was a pleasure to see you at your recent visit. Please let me know if you have any questions or concerns.     Alvaro Maguire MD   Professor of Medicine   Director, Division of Rheumatic and Autoimmune Diseases     Resulted Orders   General PFT Lab (Please always keep checked)   Result Value Ref Range    FVC-Pred 2.67 L    FVC-Pre 2.88 L    FVC-%Pred-Pre 107 %    FEV1-Pre 1.95 L    FEV1-%Pred-Pre 95 %    FEV1FVC-Pred 78 %    FEV1FVC-Pre 68 %    FEFMax-Pred 5.21 L/sec    FEFMax-Pre 6.74 L/sec    FEFMax-%Pred-Pre 129 %    FEF2575-Pred 1.69 L/sec    FEF2575-Pre 1.02 L/sec    GXR0680-%Pred-Pre 59 %    ExpTime-Pre 7.03 sec    FIFMax-Pre 2.62 L/sec    VC-Pred 2.92 L    VC-Pre 2.75 L    VC-%Pred-Pre 94 %    IC-Pred 2.11 L    IC-Pre 2.06 L    IC-%Pred-Pre 97 %    ERV-Pred 0.82 L    ERV-Pre 0.70 L    ERV-%Pred-Pre 85 %    FEV1FEV6-Pred 78 %    FEV1FEV6-Pre 69 %    DLCOunc-Pred 18.96 ml/min/mmHg    DLCOunc-Pre 17.71 ml/min/mmHg    DLCOunc-%Pred-Pre 93 %    VA-Pre 4.63 L    VA-%Pred-Pre 100 %    FEV1SVC-Pred 70 %    FEV1SVC-Pre 71 %    Narrative    IMPRESSION:  Mild Airflow Obstruction   Normal diffusion capacity.  Compared with testing from 10/29/2019 there has been no significant change in the FEV1 or FVC.  Leelee Abdullahi MD  ____________________________________________LEELEE BERKOWITZ    This interpretation has been electronically signed:  LEELEE ABDULLAHI 10/29/2019  12:11:52 PM

## 2019-10-29 ENCOUNTER — MYC MEDICAL ADVICE (OUTPATIENT)
Dept: RHEUMATOLOGY | Facility: CLINIC | Age: 74
End: 2019-10-29

## 2019-10-29 DIAGNOSIS — M31.30 GRANULOMATOSIS WITH POLYANGIITIS, UNSPECIFIED WHETHER RENAL INVOLVEMENT (H): Primary | ICD-10-CM

## 2019-10-29 LAB
DLCOUNC-%PRED-PRE: 93 %
DLCOUNC-PRE: 17.71 ML/MIN/MMHG
DLCOUNC-PRED: 18.96 ML/MIN/MMHG
ERV-%PRED-PRE: 85 %
ERV-PRE: 0.7 L
ERV-PRED: 0.82 L
EXPTIME-PRE: 7.03 SEC
FEF2575-%PRED-PRE: 59 %
FEF2575-PRE: 1.02 L/SEC
FEF2575-PRED: 1.69 L/SEC
FEFMAX-%PRED-PRE: 129 %
FEFMAX-PRE: 6.74 L/SEC
FEFMAX-PRED: 5.21 L/SEC
FEV1-%PRED-PRE: 95 %
FEV1-PRE: 1.95 L
FEV1FEV6-PRE: 69 %
FEV1FEV6-PRED: 78 %
FEV1FVC-PRE: 68 %
FEV1FVC-PRED: 78 %
FEV1SVC-PRE: 71 %
FEV1SVC-PRED: 70 %
FIFMAX-PRE: 2.62 L/SEC
FVC-%PRED-PRE: 107 %
FVC-PRE: 2.88 L
FVC-PRED: 2.67 L
IC-%PRED-PRE: 97 %
IC-PRE: 2.06 L
IC-PRED: 2.11 L
VA-%PRED-PRE: 100 %
VA-PRE: 4.63 L
VC-%PRED-PRE: 94 %
VC-PRE: 2.75 L
VC-PRED: 2.92 L

## 2019-11-03 ENCOUNTER — HEALTH MAINTENANCE LETTER (OUTPATIENT)
Age: 74
End: 2019-11-03

## 2019-11-05 ENCOUNTER — ANCILLARY PROCEDURE (OUTPATIENT)
Dept: GENERAL RADIOLOGY | Facility: CLINIC | Age: 74
End: 2019-11-05
Attending: INTERNAL MEDICINE
Payer: COMMERCIAL

## 2019-11-05 DIAGNOSIS — M31.30 GRANULOMATOSIS WITH POLYANGIITIS, UNSPECIFIED WHETHER RENAL INVOLVEMENT (H): ICD-10-CM

## 2019-11-05 PROCEDURE — 71046 X-RAY EXAM CHEST 2 VIEWS: CPT

## 2019-11-12 ENCOUNTER — TRANSFERRED RECORDS (OUTPATIENT)
Dept: HEALTH INFORMATION MANAGEMENT | Facility: CLINIC | Age: 74
End: 2019-11-12

## 2019-11-12 ENCOUNTER — ALLIED HEALTH/NURSE VISIT (OUTPATIENT)
Dept: FAMILY MEDICINE | Facility: CLINIC | Age: 74
End: 2019-11-12
Payer: COMMERCIAL

## 2019-11-12 VITALS — DIASTOLIC BLOOD PRESSURE: 82 MMHG | SYSTOLIC BLOOD PRESSURE: 132 MMHG

## 2019-11-12 DIAGNOSIS — Z01.30 BP CHECK: Primary | ICD-10-CM

## 2019-11-12 PROCEDURE — 99207 ZZC NO CHARGE NURSE ONLY: CPT | Performed by: FAMILY MEDICINE

## 2019-11-12 NOTE — PROGRESS NOTES
Nury Cárdenas was evaluated at [facility] on November 12, 2019 at which time her blood pressure was:    BP Readings from Last 3 Encounters:   11/12/19 132/82   10/11/19 (!) 143/88   04/22/19 134/78     Pulse Readings from Last 3 Encounters:   10/11/19 65   04/22/19 76   04/19/19 71       Reviewed lifestyle modifications for blood pressure control and reduction: including making healthy food choices, managing weight, getting regular exercise, smoking cessation, reducing alcohol consumption, monitoring blood pressure regularly.     Symptoms: None    BP Goal:< 140/90 mmHg    BP Assessment:  BP at goal    Potential Reasons for BP too high: NA - Not applicable    BP Follow-Up Plan: Recheck BP in 6 months at pharmacy    Recommendation to Provider: Continue current plan.     Note completed by: Thank you,  Berenice Leavitt, PharmD  [facility]  [phone]

## 2019-11-19 ENCOUNTER — THERAPY VISIT (OUTPATIENT)
Dept: PHYSICAL THERAPY | Facility: CLINIC | Age: 74
End: 2019-11-19
Attending: INTERNAL MEDICINE
Payer: COMMERCIAL

## 2019-11-19 DIAGNOSIS — M54.2 NECK PAIN: ICD-10-CM

## 2019-11-19 DIAGNOSIS — M31.30 GRANULOMATOSIS WITH POLYANGIITIS WITHOUT RENAL INVOLVEMENT (H): ICD-10-CM

## 2019-11-19 DIAGNOSIS — M19.90 INFLAMMATORY ARTHRITIS: ICD-10-CM

## 2019-11-19 DIAGNOSIS — M15.0 PRIMARY OSTEOARTHRITIS INVOLVING MULTIPLE JOINTS: ICD-10-CM

## 2019-11-19 PROCEDURE — 97110 THERAPEUTIC EXERCISES: CPT | Mod: GP | Performed by: PHYSICAL THERAPIST

## 2019-11-19 PROCEDURE — 97161 PT EVAL LOW COMPLEX 20 MIN: CPT | Mod: GP | Performed by: PHYSICAL THERAPIST

## 2019-11-19 NOTE — PROGRESS NOTES
[facility] Initial Evaluation  Subjective:      General health as reported by patient is good. Pertinent medical history includes:  Migraines/headaches, numbness/tingling, osteoarthritis, osteoporosis and rheumatoid arthritis.    Surgeries include:  Orthopedic surgery.  Current medications:  High blood pressure medication.              Patient is Retired.                           Objective:  System    Physical Exam    General     ROS    Assessment/Plan:

## 2019-11-19 NOTE — PROGRESS NOTES
[facility] Initial Evaluation  Subjective:    Type of problem:  Cervical spine   Condition occurred with:  Insidious onset. This is a chronic condition   Problem details: Patient reports a long history of neck stiffness >2 years ago without a mechanism of injury or change in daily routine. Patient reports seeing her MD for a check up and he recommended an episode of physical therapy due to her complaints of neck stiffness.   Site of Pain: no pain reported. Radiates to:  None. Associated symptoms:  Loss of motion/stiffness. Symptoms are exacerbated by rotating head, looking up or down and change of position and relieved by rest.      and reported as 0/10 on pain scale. General health as reported by patient is good.                          Red flags:  None as reported by patient.                      Objective:  Standing Alignment:    Cervical/Thoracic:  Forward head and thoracic kyphosis increased  Shoulder/UE:  Rounded shoulders                                  Cervical/Thoracic Evaluation    AROM:  AROM Cervical:    Flexion:            35 deg  Extension:       25 deg  Rotation:         Left: 35 deg     Right: 40 deg  Side Bend:      Left: 25 deg     Right:  20 deg      Headaches: none  Cervical Myotomes:  Cervical myotomes: grossly 4+/5 bilaterally.                      Cervical Dermatomes:  normal                    Cervical Palpation:    Tenderness present at Left:    Upper Trap; Levator and Suboccipitals  Tenderness present at Right:    Upper Trap; Levator and Suboccipitals    Cervical Stability/Joint Clearing:  normal      Spinal Segmental Conclusions:    Level:  Hypo at C4, C5, C6, C7, C1, C2, C3, T1, T2, T3, T4, T5, T6, T8, T7 and T9                                                General     ROS    Assessment/Plan:    Patient is a 74 year old female with cervical complaints.    Patient has the following significant findings with corresponding treatment plan.                Diagnosis 1:   Neck stiffness  Pain -  hot/cold therapy, manual therapy, self management, education and home program  Decreased ROM/flexibility - manual therapy, therapeutic exercise, therapeutic activity and home program  Decreased joint mobility - manual therapy, therapeutic exercise, therapeutic activity and home program  Decreased strength - therapeutic exercise, therapeutic activities and home program  Decreased function - therapeutic activities and home program  Impaired posture - neuro re-education, therapeutic activities and home program    Therapy Evaluation Codes:   1) History comprised of:   Personal factors that impact the plan of care:      None.    Comorbidity factors that impact the plan of care are:      Rheumatoid arthritis.     Medications impacting care: None.  2) Examination of Body Systems comprised of:   Body structures and functions that impact the plan of care:      Cervical spine.   Activity limitations that impact the plan of care are:      Sitting, Standing, Walking and Working.  3) Clinical presentation characteristics are:   Stable/Uncomplicated.  4) Decision-Making    Low complexity using standardized patient assessment instrument and/or measureable assessment of functional outcome.  Cumulative Therapy Evaluation is: Low complexity.    Previous and current functional limitations:  (See Goal Flow Sheet for this information)    Short term and Long term goals: (See Goal Flow Sheet for this information)     Communication ability:  Patient appears to be able to clearly communicate and understand verbal and written communication and follow directions correctly.  Treatment Explanation - The following has been discussed with the patient:   RX ordered/plan of care  Anticipated outcomes  Possible risks and side effects  This patient would benefit from PT intervention to resume normal activities.   Rehab potential is good.    Frequency:  2 X a month, once daily  Duration:  for 2 months  Discharge Plan:  Achieve  all LTG.  Independent in home treatment program.  Reach maximal therapeutic benefit.    Please refer to the daily flowsheet for treatment today, total treatment time and time spent performing 1:1 timed codes.

## 2019-12-05 ENCOUNTER — TRANSFERRED RECORDS (OUTPATIENT)
Dept: HEALTH INFORMATION MANAGEMENT | Facility: CLINIC | Age: 74
End: 2019-12-05

## 2019-12-17 ENCOUNTER — THERAPY VISIT (OUTPATIENT)
Dept: PHYSICAL THERAPY | Facility: CLINIC | Age: 74
End: 2019-12-17
Payer: COMMERCIAL

## 2019-12-17 DIAGNOSIS — M54.2 NECK PAIN: ICD-10-CM

## 2019-12-17 PROCEDURE — 97140 MANUAL THERAPY 1/> REGIONS: CPT | Mod: GP | Performed by: PHYSICAL THERAPIST

## 2019-12-26 ENCOUNTER — OFFICE VISIT (OUTPATIENT)
Dept: URGENT CARE | Facility: URGENT CARE | Age: 74
End: 2019-12-26
Payer: COMMERCIAL

## 2019-12-26 ENCOUNTER — MYC MEDICAL ADVICE (OUTPATIENT)
Dept: FAMILY MEDICINE | Facility: CLINIC | Age: 74
End: 2019-12-26

## 2019-12-26 VITALS
OXYGEN SATURATION: 98 % | SYSTOLIC BLOOD PRESSURE: 142 MMHG | HEART RATE: 66 BPM | TEMPERATURE: 98.3 F | DIASTOLIC BLOOD PRESSURE: 75 MMHG

## 2019-12-26 DIAGNOSIS — J32.9 SINUSITIS, UNSPECIFIED CHRONICITY, UNSPECIFIED LOCATION: ICD-10-CM

## 2019-12-26 DIAGNOSIS — J06.9 UPPER RESPIRATORY TRACT INFECTION, UNSPECIFIED TYPE: Primary | ICD-10-CM

## 2019-12-26 LAB
BASOPHILS # BLD AUTO: 0 10E9/L (ref 0–0.2)
BASOPHILS NFR BLD AUTO: 0.3 %
DIFFERENTIAL METHOD BLD: NORMAL
EOSINOPHIL # BLD AUTO: 0.1 10E9/L (ref 0–0.7)
EOSINOPHIL NFR BLD AUTO: 1.1 %
ERYTHROCYTE [DISTWIDTH] IN BLOOD BY AUTOMATED COUNT: 13.4 % (ref 10–15)
FLUAV+FLUBV AG SPEC QL: NEGATIVE
FLUAV+FLUBV AG SPEC QL: NEGATIVE
HCT VFR BLD AUTO: 42.6 % (ref 35–47)
HGB BLD-MCNC: 14.3 G/DL (ref 11.7–15.7)
LYMPHOCYTES # BLD AUTO: 2 10E9/L (ref 0.8–5.3)
LYMPHOCYTES NFR BLD AUTO: 19.4 %
MCH RBC QN AUTO: 30.2 PG (ref 26.5–33)
MCHC RBC AUTO-ENTMCNC: 33.6 G/DL (ref 31.5–36.5)
MCV RBC AUTO: 90 FL (ref 78–100)
MONOCYTES # BLD AUTO: 0.9 10E9/L (ref 0–1.3)
MONOCYTES NFR BLD AUTO: 8.6 %
NEUTROPHILS # BLD AUTO: 7.4 10E9/L (ref 1.6–8.3)
NEUTROPHILS NFR BLD AUTO: 70.6 %
PLATELET # BLD AUTO: 175 10E9/L (ref 150–450)
RBC # BLD AUTO: 4.74 10E12/L (ref 3.8–5.2)
SPECIMEN SOURCE: NORMAL
WBC # BLD AUTO: 10.5 10E9/L (ref 4–11)

## 2019-12-26 PROCEDURE — 36415 COLL VENOUS BLD VENIPUNCTURE: CPT | Performed by: NURSE PRACTITIONER

## 2019-12-26 PROCEDURE — 99213 OFFICE O/P EST LOW 20 MIN: CPT | Performed by: NURSE PRACTITIONER

## 2019-12-26 PROCEDURE — 87804 INFLUENZA ASSAY W/OPTIC: CPT | Performed by: NURSE PRACTITIONER

## 2019-12-26 PROCEDURE — 85025 COMPLETE CBC W/AUTO DIFF WBC: CPT | Performed by: NURSE PRACTITIONER

## 2019-12-26 RX ORDER — CEFDINIR 300 MG/1
300 CAPSULE ORAL 2 TIMES DAILY
Qty: 14 CAPSULE | Refills: 0 | Status: SHIPPED | OUTPATIENT
Start: 2019-12-26 | End: 2020-01-02

## 2019-12-26 ASSESSMENT — ENCOUNTER SYMPTOMS
GASTROINTESTINAL NEGATIVE: 1
CARDIOVASCULAR NEGATIVE: 1
CHILLS: 1
WHEEZING: 0
SORE THROAT: 1
MUSCULOSKELETAL NEGATIVE: 1
SPUTUM PRODUCTION: 1
SHORTNESS OF BREATH: 0
NEUROLOGICAL NEGATIVE: 1
SINUS PAIN: 1
FEVER: 1
COUGH: 1

## 2019-12-26 NOTE — PROGRESS NOTES
HPI  Patient is a 74-year-old female with a 3-day history of URI symptoms including cough, congestion, headache, and myalgias.  She is not able to get a flu shot because of her immunosuppression and was told by her primary care provider to come and and discuss potential antibiotic therapy due to her comorbidities.  She has not taken any medications prior to arrival in the urgent care.    Review of Systems   Constitutional: Positive for chills, fever and malaise/fatigue.   HENT: Positive for congestion, sinus pain and sore throat. Negative for ear pain.    Respiratory: Positive for cough and sputum production. Negative for shortness of breath and wheezing.    Cardiovascular: Negative.    Gastrointestinal: Negative.    Musculoskeletal: Negative.    Skin: Negative.    Neurological: Negative.      Past Medical History:   Diagnosis Date     Atypical pneumonia 6/14/2014     Atypical pneumonia      Atypical pneumonia      Atypical pneumonia      Coronary artery disease 1982    heart beat hard made me tired     Dyslipidemia      Fibroids      GERD (gastroesophageal reflux disease)      Granulomatosis with polyangiitis (Wegener's)      Hearing loss      Inflammatory arthritis     thumb     Migraines      MVP (mitral valve prolapse)     had an echo that was normal in 2007 she is on Inderal     Osteopenia      PE (pulmonary embolism) 10/2014    ? GPA associated, on warfarin      Synovitis of wrist 2009    right     Patient Active Problem List   Diagnosis     GERD (gastroesophageal reflux disease)     Fibroids     Migraines     Hearing loss     Osteopenia     HYPERLIPIDEMIA LDL GOAL <160     Advanced directives, counseling/discussion     Inflammatory arthritis     Synovitis of wrist     Chronic constipation     Granulomatosis with polyangiitis (H)     Chronic steroid use     Dyspnea     Pulmonary embolism (H)     Cataract, mild, ou     Calculus of kidney, right     Chronic anticoagulation     Long-term (current) use of  anticoagulants [Z79.01]     Long-term use of immunosuppressant medication     Primary osteoarthritis involving multiple joints     Cellulitis     Cat scratch left lower extremity     Other pulmonary embolism without acute cor pulmonale, unspecified chronicity (H)     Age-related osteoporosis without current pathological fracture     Age-related osteoporosis with current pathological fracture, sequela     Adverse effects of medication     Combined forms of age-related cataract, mild, of right eye     Pseudophakia, Yag Caps, os     History of vitrectomy - macular hole, os (MVE)     Hx of macular hole of left eye     Neck pain      Past Surgical History:   Procedure Laterality Date     ABDOMEN SURGERY      appendix out     APPENDECTOMY       BIOPSY  1972    cone biopsy     CL AFF SURGICAL PATHOLOGY      cone biopsy 1975     COLONOSCOPY       COLONOSCOPY WITH CO2 INSUFFLATION N/A 12/20/2017    Procedure: COLONOSCOPY WITH CO2 INSUFFLATION;  Screening  BMI: 20.7  [facility]: [facility]  [phone]  [facility]  Referring: Laurita  Patient get ill from [facility];  Surgeon: Duane, William Charles, MD;  Location: [facility] OR     EYE SURGERY      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     HC ESOPHAGOSCOPY, DIAGNOSTIC       LASER YAG CAPSULOTOMY      left eye     OPTICAL TRACKING SYSTEM BRONCHOSCOPY  6/19/2014    Procedure: OPTICAL TRACKING SYSTEM BRONCHOSCOPY;  Surgeon: Angel Kathleen MD;  Location:  GI     PHACOEMULSIFICATION WITH STANDARD INTRAOCULAR LENS IMPLANT  01/2018    left eye (SR)     SOFT TISSUE SURGERY      remove senovial fluid from right wrist     SURGICAL HISTORY OF -   as a child    strabismus repair right eye     SURGICAL HISTORY OF -   8-2009    right wrist debridement     TONSILLECTOMY      as a child     TONSILLECTOMY & ADENOIDECTOMY       TUBAL LIGATION       VITRECTOMY PARSPLANA  01/2018    left eye - mac hole (MVE)       Allergies   Allergen Reactions     Vancomycin Other (See Comments)      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 syndrom     Adhesive Tape      Rash itches     Amoxicillin      vomiting     Augmentin      vomiting     Codeine      vomiting     Erythromycin      vomiting     Nickel      itches rash     Sulfa Drugs Itching     Tegaderm Alginate Ag      LW Other1: -ALL MEDICATIONS MAKE PATIENT VIOLENTLY ILL; ADHESIVE BANDAGES; NICKEL     Zithromax [Azithromycin Dihydrate]      Vomiting/ skin blisters     Current Outpatient Medications   Medication     acetaminophen (TYLENOL) 325 MG tablet     calcium-magnesium (CALMAG) 500-250 MG TABS per tablet     cefdinir (OMNICEF) 300 MG capsule     cholecalciferol 1000 UNITS TABS     propranolol (INDERAL) 20 MG tablet     valACYclovir (VALTREX) 500 MG tablet     Vitamin K 100 MCG TABS     No current facility-administered medications for this visit.      Facility-Administered Medications Ordered in Other Visits   Medication     sodium chloride (PF) 0.9% PF flush 60 mL         Physical Exam  Vitals signs and nursing note reviewed.   Constitutional:       General: She is not in acute distress.     Appearance: Normal appearance. She is not toxic-appearing.      Comments: BP (!) 142/75   Pulse 66   Temp 98.3  F (36.8  C) (Tympanic)   SpO2 98%      HENT:      Head: Normocephalic.      Right Ear: Tympanic membrane normal.      Left Ear: Tympanic membrane normal.      Nose: Rhinorrhea present.      Mouth/Throat:      Mouth: Mucous membranes are moist.   Eyes:      Conjunctiva/sclera: Conjunctivae normal.   Neck:      Musculoskeletal: Normal range of motion.   Cardiovascular:      Rate and Rhythm: Normal rate.      Heart sounds: Normal heart sounds.   Pulmonary:      Effort: Pulmonary effort is normal.      Breath sounds: Normal breath sounds.   Abdominal:      General: Bowel sounds are normal.      Tenderness: There is no abdominal tenderness.   Musculoskeletal: Normal range of motion.   Lymphadenopathy:      Cervical: Cervical adenopathy present.   Skin:     General: Skin is warm and  dry.      Capillary Refill: Capillary refill takes less than 2 seconds.   Neurological:      Mental Status: She is alert and oriented to person, place, and time.       Results for orders placed or performed in visit on 12/26/19   CBC with platelets differential     Status: None   Result Value Ref Range    WBC 10.5 4.0 - 11.0 10e9/L    RBC Count 4.74 3.8 - 5.2 10e12/L    Hemoglobin 14.3 11.7 - 15.7 g/dL    Hematocrit 42.6 35.0 - 47.0 %    MCV 90 78 - 100 fl    MCH 30.2 26.5 - 33.0 pg    MCHC 33.6 31.5 - 36.5 g/dL    RDW 13.4 10.0 - 15.0 %    Platelet Count 175 150 - 450 10e9/L    % Neutrophils 70.6 %    % Lymphocytes 19.4 %    % Monocytes 8.6 %    % Eosinophils 1.1 %    % Basophils 0.3 %    Absolute Neutrophil 7.4 1.6 - 8.3 10e9/L    Absolute Lymphocytes 2.0 0.8 - 5.3 10e9/L    Absolute Monocytes 0.9 0.0 - 1.3 10e9/L    Absolute Eosinophils 0.1 0.0 - 0.7 10e9/L    Absolute Basophils 0.0 0.0 - 0.2 10e9/L    Diff Method Automated Method    Influenza A/B antigen     Status: None   Result Value Ref Range    Influenza A/B Agn Specimen Nasal     Influenza A Negative NEG^Negative    Influenza B Negative NEG^Negative     Assessment:  1. Upper respiratory tract infection, unspecified type    2. Sinusitis, unspecified chronicity, unspecified location        Plan:  Orders Placed This Encounter     CBC with platelets differential     cefdinir (OMNICEF) 300 MG capsule   Tylenol 1-2 tabs po q4h prn pain/fever  Instructions regarding self-care of patient/child reviewed.   Written instructions provided in after visit summary and reviewed.  Patient instructed to see primary care provider for new or persistent symptoms.   Red flag symptoms reviewed and patient has been instructed to seek emergent care  Please contact pharmacy for medication questions.  Patient instructed to take medications as directed on package.      Juanita Torres, DNP, APRN, CNP

## 2019-12-27 NOTE — PATIENT INSTRUCTIONS
Patient Education     Preventing Common Respiratory Infections    Respiratory infections such as colds and influenza ( the flu ) are common in winter. These infections are often caused by viruses. They may share some symptoms, but not all respiratory infections are the same. Some make you more sick than others. You can take steps to prevent common respiratory infections. And if you get sick, you can take care of yourself to keep the infection from getting worse.  What is a cold?    Symptoms include runny nose, coughing and sneezing, and sore throat. Cold symptoms tend to be milder than flu symptoms.    Symptoms tend to come on slowly. They last for a few days to about a week.    With a cold, you can still do most of the things you usually do.  What is the flu?    Symptoms include fever, headache, fatigue, cough, sore throat, runny nose, and muscle aches. Children may have upset stomach and vomiting, but adults usually don t.    Symptoms tend to come on quickly. Some, such as fatigue and cough, can last a few weeks.    With the flu, you may feel worn out and not able to do normal activities.    It s most likely NOT the flu if an adult has vomiting or diarrhea for a day or two. This so-called  stomach flu  is probably a GI (gastrointestinal) infection.  When the infection gets worse  Without proper care, a respiratory infection can get worse. It can lead to serious complications and death. If you aren t getting better, call your healthcare provider. Complications can include:    Bronchitis (infection of the airways that leads to shortness of breath and coughing up thick yellow or green mucus)    Pneumonia (infection of the lungs in which fluid and mucus settle in the lungs, making breathing difficult)    Worsening of chronic conditions such as heart failure, chronic lung disease, asthma, or diabetes    Severe dehydration (loss of fluids)    Sinus problems    Ear infections   Get a flu vaccine  A flu vaccine protects  you from influenza (but not other colds or infections). Get a vaccine each fall, before flu season starts. This can be done at a clinic, healthcare provider s office, drugstore, [facility] center, or through your workplace.  Get pneumococcal vaccines  Pneumonia can be a complication of influenza. There are 2 pneumococcal pneumonia vaccines that protect against many types of pneumonia. Talk with your healthcare provider about these important vaccines.   Keep germs from spreading  No one likes getting sick. To protect yourself and others from cold and flu germs:    Wash your hands often. Use alcohol-based hand  when you don t have access to soap and water.    Don t touch your eyes, nose, and mouth. This may help you keep germs out of your body.    Try to avoid people with respiratory infections. You may want to stay out of crowds during flu season (winter).    Ask your healthcare provider if you should get a pneumonia vaccination.  How to wash your hands    Use warm water and plenty of soap. Work up a good lather.    Clean your whole hand, under your nails, between your fingers, and up your wrists. Wash for at least 15 to 20 seconds. Don t just wipe--rub well.    Rinse. Let the water run down your fingertips, not up your wrists.    In a public restroom, use a paper towel to turn off the faucet and open the door.   Date Last Reviewed: 12/1/2016 2000-2018 [employer]. [address] [place] [address], [place] [address]. All rights reserved. This information is not intended as a substitute for professional medical care. Always follow your healthcare professional's instructions.

## 2019-12-27 NOTE — TELEPHONE ENCOUNTER
Dr. Abbott,  Please see [employer] message below, thanks.    Katharine Rizzo RN  [facility]

## 2020-01-27 ENCOUNTER — ALLIED HEALTH/NURSE VISIT (OUTPATIENT)
Dept: FAMILY MEDICINE | Facility: CLINIC | Age: 75
End: 2020-01-27
Payer: COMMERCIAL

## 2020-01-27 VITALS — DIASTOLIC BLOOD PRESSURE: 72 MMHG | SYSTOLIC BLOOD PRESSURE: 117 MMHG

## 2020-01-27 DIAGNOSIS — Z01.30 BP CHECK: Primary | ICD-10-CM

## 2020-01-27 PROCEDURE — 99207 ZZC NO CHARGE NURSE ONLY: CPT | Performed by: FAMILY MEDICINE

## 2020-01-27 NOTE — PROGRESS NOTES
Nury Cárdenas was evaluated at [facility] on January 27, 2020 at which time her blood pressure was:    BP Readings from Last 3 Encounters:   01/27/20 117/72   12/26/19 (!) 142/75   11/12/19 132/82     Pulse Readings from Last 3 Encounters:   12/26/19 66   10/11/19 65   04/22/19 76       Reviewed lifestyle modifications for blood pressure control and reduction: including making healthy food choices, managing weight, getting regular exercise, smoking cessation, reducing alcohol consumption, monitoring blood pressure regularly.     Symptoms: None    BP Goal:< 140/90 mmHg    BP Assessment:  BP at goal    Potential Reasons for BP too high: NA - Not applicable    BP Follow-Up Plan: Recheck BP in 6 months at pharmacy    Recommendation to Provider: Continue current plan.     Note completed by: Thank you,  Berenice Leavitt, PharmD  [facility]  [phone]

## 2020-02-04 ENCOUNTER — OFFICE VISIT (OUTPATIENT)
Dept: NEUROLOGY | Facility: CLINIC | Age: 75
End: 2020-02-04
Payer: COMMERCIAL

## 2020-02-04 VITALS
SYSTOLIC BLOOD PRESSURE: 135 MMHG | OXYGEN SATURATION: 95 % | BODY MASS INDEX: 20.56 KG/M2 | HEIGHT: 65 IN | WEIGHT: 123.4 LBS | DIASTOLIC BLOOD PRESSURE: 81 MMHG | HEART RATE: 71 BPM

## 2020-02-04 DIAGNOSIS — G51.39 HEMIFACIAL SPASM: Primary | ICD-10-CM

## 2020-02-04 PROCEDURE — 99203 OFFICE O/P NEW LOW 30 MIN: CPT | Performed by: PSYCHIATRY & NEUROLOGY

## 2020-02-04 ASSESSMENT — PAIN SCALES - GENERAL: PAINLEVEL: NO PAIN (0)

## 2020-02-04 ASSESSMENT — MIFFLIN-ST. JEOR: SCORE: 1047.68

## 2020-02-04 NOTE — PROGRESS NOTES
"Visit Date:   02/04/2020      NEUROLOGY CONSULTATION       HISTORY OF PRESENT ILLNESS:  This patient is a 75-year-old right-handed woman seen for evaluation of involuntary muscle twitching in the face.  She is here with her daughter, Ina.  The patient reports her symptoms began over a year ago.  At first it was the left upper lip.  The twitching then migrated rostral and began to involve the lower eyelid.  Sometimes her cheek will pull up into a snarl.  Posture is sustained for a few seconds.  The symptoms fluctuate.  At times it is worse than at other times.  It was bad in the summer and it was bad in December.  She also gets a tingling all around the mouth.  It feels like there is \"a rope\" inside the left upper lip.  At times her left cheek will vibrate.  At times, her smile is asymmetric and turned down on the left side.  The symptoms were bad in December, but now not so much.  She has no issues with her vision.  She does have some hearing loss.  She has no problem with speech or swallowing.  The bottoms of her feet do tingle.  She has no problem with bowel or bladder control.  Her walking is good.  Her sleep is fairly good.  Her diet is balanced.  She walks 3 times a week at the mall.  She has no problems with her driving.      PAST MEDICAL HISTORY:  Significant for Wegener granulomatosis several years ago.  She had symptoms of arthritis and also lung symptoms, but no kidney problems.  It is currently in remission and not treated.  She takes propranolol for occasional rapid heartbeat.  She does not have high blood pressure, diabetes, thyroid or asthma.  She has not had pertinent surgery or trauma to the head or neck.  She does not drink.  She quit smoking many years ago.      SOCIAL HISTORY:  She is unmarried.  She has 2 children.  She has had various jobs and is now retired.      FAMILY HISTORY:  Positive for cancer.  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  Her son is quite affected by this. "      PHYSICAL EXAMINATION:   GENERAL:  The patient is cooperative and in no distress.   VITAL SIGNS:  Her blood pressure is 135/81.   NECK:  There are no carotid bruits.   HEART:  Auscultation of the heart shows S1 and S2.   NEUROLOGIC:  The patient is alert, oriented and lucid.  Cranial nerve testing shows full visual fields to confrontation.  Funduscopic exam shows sharp discs bilaterally.  Eye movements are complete and conjugate without nystagmus.  Pupils react minimally to light.  Facial sensation is normal.  Face moves symmetrically.  There are involuntary movements under the left eye, but not so much at the left corner of the mouth.  Some of these appear to be more like fasciculations, but others are more sustained.  Palate elevates in the midline.  Tongue protrudes in the midline.  Motor evaluation shows right hand pronation because of arthritis.  Finger tapping, finger-nose-finger, and heel-knee-shin are unremarkable.  Strength is preserved in the arms and legs.  Muscle stretch reflexes are low amplitude and symmetric.  Toes are downgoing.  Sensory exam shows preserved vibration and temperature.  Romberg sign is absent.  She can walk on her heels, toes and tandem.      She did have an MRI scan of the brain performed in 11/2018.  I reviewed these images with her.  The study was for the most part unremarkable.  There was some mild age-related change, but nothing affecting the intracranial course of the seventh or eighth cranial nerves.      ASSESSMENT:  Left hemifacial spasm.      DISCUSSION:  This patient is seen for evaluation of involuntary movements involving the left face, especially the lower eyelid and the corner of the mouth.  The presentation is most suggestive of a hemifacial spasm.  She has it to a mild degree.  I discussed what further workup and treatment would involve, including Botox.  She says the symptoms are quite mild and she would not want to pursue further evaluation or treatment right  now.  She is going to monitor her neurologic status and follow up on an as-needed basis.         BRIT ALCARAZ MD             D: 2020   T: 2020   MT: STEVIE      Name:     RICO GANN   MRN:      [id number]        Account:      [id number]   :      1945           Visit Date:   2020      Document: [id number]

## 2020-02-04 NOTE — NURSING NOTE
"Nury Cárdenas's goals for this visit include: consult  She requests these members of her care team be copied on today's visit information:     PCP: Phil Abbott    Referring Provider:  No referring provider defined for this encounter.    /81   Pulse 71   Ht 1.638 m (5' 4.5\")   Wt 56 kg (123 lb 6.4 oz)   SpO2 95%   BMI 20.85 kg/m      Do you need any medication refills at today's visit? n  "

## 2020-02-04 NOTE — LETTER
"    2/4/2020         RE: Nury Cárdenas  [address] [facility] [address] [address]        Dear Colleague,    Thank you for referring your patient, Nury Cárdenas, to the [facility]. Please see a copy of my visit note below.    Visit Date:   02/04/2020      NEUROLOGY CONSULTATION       HISTORY OF PRESENT ILLNESS:  This patient is a 75-year-old right-handed woman seen for evaluation of involuntary muscle twitching in the face.  She is here with her daughter, Ina.  The patient reports her symptoms began over a year ago.  At first it was the left upper lip.  The twitching then migrated rostral and began to involve the lower eyelid.  Sometimes her cheek will pull up into a snarl.  Posture is sustained for a few seconds.  The symptoms fluctuate.  At times it is worse than at other times.  It was bad in the summer and it was bad in December.  She also gets a tingling all around the mouth.  It feels like there is \"a rope\" inside the left upper lip.  At times her left cheek will vibrate.  At times, her smile is asymmetric and turned down on the left side.  The symptoms were bad in December, but now not so much.  She has no issues with her vision.  She does have some hearing loss.  She has no problem with speech or swallowing.  The bottoms of her feet do tingle.  She has no problem with bowel or bladder control.  Her walking is good.  Her sleep is fairly good.  Her diet is balanced.  She walks 3 times a week at the mall.  She has no problems with her driving.      PAST MEDICAL HISTORY:  Significant for Wegener granulomatosis several years ago.  She had symptoms of arthritis and also lung symptoms, but no kidney problems.  It is currently in remission and not treated.  She takes propranolol for occasional rapid heartbeat.  She does not have high blood pressure, diabetes, thyroid or asthma.  She has not had pertinent surgery or trauma to the head or neck.  She does not drink.  She quit smoking many years " ago.      SOCIAL HISTORY:  She is unmarried.  She has 2 children.  She has had various jobs and is now retired.      FAMILY HISTORY:  Positive for cancer.  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  Her son is quite affected by this.      PHYSICAL EXAMINATION:   GENERAL:  The patient is cooperative and in no distress.   VITAL SIGNS:  Her blood pressure is 135/81.   NECK:  There are no carotid bruits.   HEART:  Auscultation of the heart shows S1 and S2.   NEUROLOGIC:  The patient is alert, oriented and lucid.  Cranial nerve testing shows full visual fields to confrontation.  Funduscopic exam shows sharp discs bilaterally.  Eye movements are complete and conjugate without nystagmus.  Pupils react minimally to light.  Facial sensation is normal.  Face moves symmetrically.  There are involuntary movements under the left eye, but not so much at the left corner of the mouth.  Some of these appear to be more like fasciculations, but others are more sustained.  Palate elevates in the midline.  Tongue protrudes in the midline.  Motor evaluation shows right hand pronation because of arthritis.  Finger tapping, finger-nose-finger, and heel-knee-shin are unremarkable.  Strength is preserved in the arms and legs.  Muscle stretch reflexes are low amplitude and symmetric.  Toes are downgoing.  Sensory exam shows preserved vibration and temperature.  Romberg sign is absent.  She can walk on her heels, toes and tandem.      She did have an MRI scan of the brain performed in 11/2018.  I reviewed these images with her.  The study was for the most part unremarkable.  There was some mild age-related change, but nothing affecting the intracranial course of the seventh or eighth cranial nerves.      ASSESSMENT:  Left hemifacial spasm.      DISCUSSION:  This patient is seen for evaluation of involuntary movements involving the left face, especially the lower eyelid and the corner of the mouth.  The presentation is most  suggestive of a hemifacial spasm.  She has it to a mild degree.  I discussed what further workup and treatment would involve, including Botox.  She says the symptoms are quite mild and she would not want to pursue further evaluation or treatment right now.  She is going to monitor her neurologic status and follow up on an as-needed basis.         DERRICK DIAMOND MD             D: 2020   T: 2020   MT: WT      Name:     RICO GANN   MRN:      [id number]        Account:      [id number]   :      1945           Visit Date:   2020      Document: [id number]       Again, thank you for allowing me to participate in the care of your patient.        Sincerely,        Derrick Diamond MD

## 2020-02-10 ENCOUNTER — HEALTH MAINTENANCE LETTER (OUTPATIENT)
Age: 75
End: 2020-02-10

## 2020-02-11 ENCOUNTER — OFFICE VISIT (OUTPATIENT)
Dept: OPHTHALMOLOGY | Facility: CLINIC | Age: 75
End: 2020-02-11
Payer: COMMERCIAL

## 2020-02-11 DIAGNOSIS — H35.342 MACULAR HOLE OF LEFT EYE: ICD-10-CM

## 2020-02-11 DIAGNOSIS — Z96.1 PSEUDOPHAKIA: ICD-10-CM

## 2020-02-11 DIAGNOSIS — H25.811 COMBINED FORMS OF AGE-RELATED CATARACT OF RIGHT EYE: ICD-10-CM

## 2020-02-11 DIAGNOSIS — H43.392 FLOATER, VITREOUS, LEFT: Primary | ICD-10-CM

## 2020-02-11 PROCEDURE — 92012 INTRM OPH EXAM EST PATIENT: CPT | Performed by: OPHTHALMOLOGY

## 2020-02-11 ASSESSMENT — CONF VISUAL FIELD
METHOD: COUNTING FINGERS
OS_NORMAL: 1
OD_NORMAL: 1

## 2020-02-11 ASSESSMENT — REFRACTION_WEARINGRX
OS_VPRISM: 2
OD_SPHERE: +2.00
SPECS_TYPE: PAL
OD_CYLINDER: +0.25
OS_AXIS: 034
OD_HPRISM: 5 1/2
OS_SPHERE: -0.50
OS_ADD: +2.50
OS_CYLINDER: +1.25
OS_HPRISM: 4 1/2
OD_HBASE: OUT
OD_VPRISM: 3 1/2
OS_HBASE: OUT
OS_VBASE: UP
OD_AXIS: 070
OD_VBASE: DOWN
OD_ADD: +2.50

## 2020-02-11 ASSESSMENT — VISUAL ACUITY
OD_CC: 20/30
OS_CC: 20/20
METHOD: SNELLEN - LINEAR
OD_CC+: -1
CORRECTION_TYPE: GLASSES

## 2020-02-11 ASSESSMENT — EXTERNAL EXAM - LEFT EYE: OS_EXAM: 1+ BROW PTOSIS, PROLAPSED FAT PADS: UPPER, LOWER

## 2020-02-11 ASSESSMENT — TONOMETRY
OD_IOP_MMHG: 11
IOP_METHOD: APPLANATION
OS_IOP_MMHG: 10

## 2020-02-11 ASSESSMENT — CUP TO DISC RATIO: OS_RATIO: 0.4

## 2020-02-11 ASSESSMENT — EXTERNAL EXAM - RIGHT EYE: OD_EXAM: 1+ BROW PTOSIS, PROLAPSED FAT PADS: UPPER, LOWER

## 2020-02-11 NOTE — PATIENT INSTRUCTIONS
Signs and symptoms of retinal detachment (shower of black floaters, frequent flashing even during day, curtain over part of visual field) discussed.  Call in June 2020 for an appointment in October 2020 for Complete Exam as previously planned - or sooner if symptoms worsen or fail to improve.      Dr. Mcleod [phone]

## 2020-02-11 NOTE — LETTER
2/11/2020         RE: Nury Cárdenas  [address] [facility] [address]        Dear Colleague,    Thank you for referring your patient, Nury Cárdenas, to the [facility]. Please see a copy of my visit note below.     Current Eye Medications:  none     Subjective:  Black floater started today left eye. Eyes have been watering for last 2 weeks. Vision is OK both eyes distance and near. Not having any light flashes or curtain effect. No eye pain or discomfort in either eye.     Closed each eye and is sure floater is left eye.     Objective:  See Ophthalmology Exam.       Assessment:  No obvious change in dilated exam left eye in patient with floater and hx of vitrectomy.      Plan:  Signs and symptoms of retinal detachment (shower of black floaters, frequent flashing even during day, curtain over part of visual field) discussed.  Call in June 2020 for an appointment in October 2020 for Complete Exam as previously planned - or sooner if symptoms worsen or fail to improve.      Dr. Mcleod [phone]           Again, thank you for allowing me to participate in the care of your patient.        Sincerely,        Wiley Mcleod MD

## 2020-02-11 NOTE — PROGRESS NOTES
Current Eye Medications:  none     Subjective:  Black floater started today left eye. Eyes have been watering for last 2 weeks. Vision is OK both eyes distance and near. Not having any light flashes or curtain effect. No eye pain or discomfort in either eye.     Closed each eye and is sure floater is left eye.     Objective:  See Ophthalmology Exam.       Assessment:  No obvious change in dilated exam left eye in patient with floater and hx of vitrectomy.      Plan:  Signs and symptoms of retinal detachment (shower of black floaters, frequent flashing even during day, curtain over part of visual field) discussed.  Call in June 2020 for an appointment in October 2020 for Complete Exam as previously planned - or sooner if symptoms worsen or fail to improve.      Dr. Mcleod [phone]

## 2020-02-24 DIAGNOSIS — G43.909 MIGRAINE WITHOUT STATUS MIGRAINOSUS, NOT INTRACTABLE, UNSPECIFIED MIGRAINE TYPE: ICD-10-CM

## 2020-02-24 NOTE — LETTER
February 27, 2020          Nury Cárdenas,  [address] [facility] [address]          Dear Nury Cárdenas      Your provider has sent a 30 day rand refill of propranolol (INDERAL) 20 MG tablet. You are due for an appointment for further refills. Appointment options could include: an in person office visit, telephone visit or Evisit through [employer]. Please contact the clinic to schedule an appointment for further refills.       If you have received your health care elsewhere, please call the clinic so the information can be documented in your chart.    Please call [phone] or message us through your [employer] account to schedule an appointment or provide information for your chart.     Feel free to contact us if you have any questions or concerns!    I look forward to seeing you and working with you on your health care needs.     Sincerely,       Your [facility] Team/[facility]

## 2020-02-26 RX ORDER — PROPRANOLOL HYDROCHLORIDE 20 MG/1
TABLET ORAL
Qty: 90 TABLET | Refills: 0 | Status: SHIPPED | OUTPATIENT
Start: 2020-02-26 | End: 2020-05-25

## 2020-02-27 ENCOUNTER — MYC MEDICAL ADVICE (OUTPATIENT)
Dept: FAMILY MEDICINE | Facility: CLINIC | Age: 75
End: 2020-02-27

## 2020-02-27 NOTE — TELEPHONE ENCOUNTER
Medication is being filled for 1 time refill only due to:  Patient needs to be seen because it has been more than one year since last visit.   Please schedule appt.  Pt not reading my chart

## 2020-02-27 NOTE — TELEPHONE ENCOUNTER
Spoke to patient and got her schedule for march 2nd  Melanie Sánchez CMA on 2/27/2020 at 8:35 AM

## 2020-03-02 ENCOUNTER — OFFICE VISIT (OUTPATIENT)
Dept: FAMILY MEDICINE | Facility: CLINIC | Age: 75
End: 2020-03-02
Payer: COMMERCIAL

## 2020-03-02 VITALS
SYSTOLIC BLOOD PRESSURE: 110 MMHG | DIASTOLIC BLOOD PRESSURE: 78 MMHG | RESPIRATION RATE: 20 BRPM | WEIGHT: 121 LBS | TEMPERATURE: 96.9 F | BODY MASS INDEX: 20.66 KG/M2 | HEART RATE: 73 BPM | HEIGHT: 64 IN | OXYGEN SATURATION: 99 %

## 2020-03-02 DIAGNOSIS — G43.909 MIGRAINE WITHOUT STATUS MIGRAINOSUS, NOT INTRACTABLE, UNSPECIFIED MIGRAINE TYPE: Primary | ICD-10-CM

## 2020-03-02 DIAGNOSIS — Z86.711 HISTORY OF PULMONARY EMBOLISM: ICD-10-CM

## 2020-03-02 DIAGNOSIS — M31.30 GRANULOMATOSIS WITH POLYANGIITIS WITHOUT RENAL INVOLVEMENT (H): ICD-10-CM

## 2020-03-02 DIAGNOSIS — I26.99 OTHER PULMONARY EMBOLISM WITHOUT ACUTE COR PULMONALE, UNSPECIFIED CHRONICITY (H): ICD-10-CM

## 2020-03-02 DIAGNOSIS — D84.9 IMMUNOSUPPRESSION (H): ICD-10-CM

## 2020-03-02 PROCEDURE — 99213 OFFICE O/P EST LOW 20 MIN: CPT | Performed by: FAMILY MEDICINE

## 2020-03-02 RX ORDER — ACETAMINOPHEN 325 MG/1
650 TABLET ORAL EVERY 6 HOURS PRN
Qty: 100 TABLET | Refills: 0 | Status: CANCELLED | OUTPATIENT
Start: 2020-03-02

## 2020-03-02 ASSESSMENT — MIFFLIN-ST. JEOR: SCORE: 1015.91

## 2020-03-02 ASSESSMENT — PAIN SCALES - GENERAL: PAINLEVEL: NO PAIN (0)

## 2020-03-02 NOTE — PROGRESS NOTES
Subjective     Nury Cárdenas is a 75 year old female who presents to clinic today for the following health issues:    HPI     .  Chief Complaint   Patient presents with     Recheck Medication     feeling hot     started yesterday morning     back of neck tight     Dizziness     Migraines  Propranolol helping with his symptoms, needs refills  Medication Followup of Propranolol 20 mg    Taking Medication as prescribed: yes    Side Effects:  None    Medication Helping Symptoms:  yes     Nape hotness and tightness: PT given her some exercises.to do  Flu shot and Pneumococcus: Not recommended by rheumatology.    Wt Readings from Last 4 Encounters:   03/02/20 54.9 kg (121 lb)   02/04/20 56 kg (123 lb 6.4 oz)   10/11/19 54.8 kg (120 lb 14.4 oz)   04/22/19 55.4 kg (122 lb 1.6 oz)     Patient Active Problem List   Diagnosis     GERD (gastroesophageal reflux disease)     Fibroids     Migraines     Hearing loss     Osteopenia     HYPERLIPIDEMIA LDL GOAL <160     Advanced directives, counseling/discussion     Inflammatory arthritis     Synovitis of wrist     Chronic constipation     Granulomatosis with polyangiitis (H)     Chronic steroid use     Dyspnea     Pulmonary embolism (H)     Cataract, mild, ou     Calculus of kidney, right     Chronic anticoagulation     Long-term (current) use of anticoagulants [Z79.01]     Long-term use of immunosuppressant medication     Primary osteoarthritis involving multiple joints     Cellulitis     Cat scratch left lower extremity     Other pulmonary embolism without acute cor pulmonale, unspecified chronicity (H)     Age-related osteoporosis without current pathological fracture     Age-related osteoporosis with current pathological fracture, sequela     Adverse effects of medication     Combined forms of age-related cataract, mild, of right eye     Pseudophakia, Yag Caps, os     History of vitrectomy - macular hole, os (MVE)     Hx of macular hole of left eye     Neck pain      Hemifacial spasm     Immunosuppression (H)     Past Surgical History:   Procedure Laterality Date     ABDOMEN SURGERY      appendix out     APPENDECTOMY       BIOPSY  1972    cone biopsy     CL AFF SURGICAL PATHOLOGY      cone biopsy      COLONOSCOPY       COLONOSCOPY WITH CO2 INSUFFLATION N/A 2017    Procedure: COLONOSCOPY WITH CO2 INSUFFLATION;  Screening  BMI: 20.7  [facility]: [facility]  [phone]  [facility]  Referring: Milena  Patient get ill from [facility];  Surgeon: Duane, William Charles, MD;  Location: [facility] OR     EYE SURGERY      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     HC ESOPHAGOSCOPY, DIAGNOSTIC       LASER YAG CAPSULOTOMY      left eye     OPTICAL TRACKING SYSTEM BRONCHOSCOPY  2014    Procedure: OPTICAL TRACKING SYSTEM BRONCHOSCOPY;  Surgeon: Angel Kathleen MD;  Location:  GI     PHACOEMULSIFICATION WITH STANDARD INTRAOCULAR LENS IMPLANT  2018    left eye (SR)     SOFT TISSUE SURGERY      remove senovial fluid from right wrist     SURGICAL HISTORY OF -   as a child    strabismus repair right eye     SURGICAL HISTORY OF -       right wrist debridement     TONSILLECTOMY      as a child     TONSILLECTOMY & ADENOIDECTOMY       TUBAL LIGATION       VITRECTOMY PARSPLANA  2018    left eye - mac hole (MVE)       Social History     Tobacco Use     Smoking status: Former Smoker     Packs/day: 2.00     Years: 20.00     Pack years: 40.00     Types: Cigarettes     Start date: 1961     Last attempt to quit: 1983     Years since quittin.6     Smokeless tobacco: Never Used   Substance Use Topics     Alcohol use: No     Alcohol/week: 0.0 standard drinks     Family History   Problem Relation Age of Onset     Respiratory Mother         emphysema     Aneurysm Mother      Chronic Obstructive Pulmonary Disease Mother      Thyroid Disease Mother         ,     Osteoporosis Mother      Other Cancer Father      Cancer Father         lung cancer     Arthritis  "Maternal Grandmother         rheumatoid     Heart Disease Maternal Grandmother         unsure of details     Heart Disease Maternal Grandfather      Neurologic Disorder Paternal Grandmother         migraines     Alzheimer Disease Paternal Grandmother      Unknown/Adopted Paternal Grandfather      Cancer Sister         stage 4 anal cancer age 62 years     Colon Cancer Sister      Cancer - colorectal Brother      Colon Cancer Brother      Substance Abuse Sister      Anesthesia Reaction Daughter          Review of Systems   ROS COMP: Constitutional, HEENT, cardiovascular, pulmonary, gi and gu systems are negative, except as otherwise noted.      Objective    /78   Pulse 73   Temp 96.9  F (36.1  C) (Oral)   Resp 20   Ht 1.613 m (5' 3.5\")   Wt 54.9 kg (121 lb)   SpO2 99%   BMI 21.10 kg/m    Body mass index is 21.1 kg/m .  Physical Exam   GENERAL: healthy, alert and no distress  NECK: no adenopathy and thyroid normal to palpation  RESP: lungs clear to auscultation - no rales, rhonchi or wheezes  CV: regular rate and rhythm, no murmur, click or rub, no peripheral edema  MS: Kyphosis of the upper thoracic spine    Diagnostic Test Results:  Labs reviewed in Epic        Assessment & Plan     Nury was seen today for recheck medication, feeling hot, back of neck tight and dizziness.    Diagnoses and all orders for this visit:    Migraine without status migrainosus, not intractable, unspecified migraine type      -    Well-controlled with propranolol.  Refill given today    Granulomatosis with polyangiitis without renal involvement (H)     -In remission.  Follows up with rheumatology    Immunosuppression (H)     -Due to granulomatosis, advised not taking any immunosuppressive medications at this time.    Other pulmonary embolism without acute cor pulmonale, unspecified chronicity (H)        -   No longer on anticoagulation, this was after her discussion with the hematologist and they are in agreement to hold the " anticoagulants    Return in about 2 months (around 5/2/2020) for Wellness follow up.    Phil Abbott MD  [facility]

## 2020-03-10 ENCOUNTER — TRANSFERRED RECORDS (OUTPATIENT)
Dept: HEALTH INFORMATION MANAGEMENT | Facility: CLINIC | Age: 75
End: 2020-03-10

## 2020-03-20 PROBLEM — M54.2 NECK PAIN: Status: RESOLVED | Noted: 2019-11-19 | Resolved: 2020-03-20

## 2020-03-20 NOTE — PROGRESS NOTES
Discharge Note    Progress reporting period is from last progress note on   to Dec 17, 2019.    Nury failed to follow up and current status is unknown.  Please see information below for last relevant information on current status.  Patient seen for 2 visits.    SUBJECTIVE  Subjective changes noted by patient:  Patient reports less neck tightness. Patient reports having nearly full ROM  .  Current pain level is 0/10.     Previous pain level was  0/10.   Changes in function:  Yes (See Goal flowsheet attached for changes in current functional level)  Adverse reaction to treatment or activity: None    OBJECTIVE  Changes noted in objective findings: AROM extension 30 deg, flexion WNL, sidebend left 20 deg, SB right 40 deg,  rotation left 45 deg, rotation left 45 deg. Hypertonic left and right UT, levator and sub occipitals     ASSESSMENT/PLAN  Diagnosis: Neck stiffness   Updated problem list and treatment plan:   Pain - HEP  Decreased ROM/flexibility - HEP  Decreased function - HEP  STG/LTGs have been met or progress has been made towards goals:  Yes, please see goal flowsheet for most current information  Assessment of Progress: current status is unknown.    Last current status:     Self Management Plans:  HEP  I have re-evaluated this patient and find that the nature, scope, duration and intensity of the therapy is appropriate for the medical condition of the patient.  Nury continues to require the following intervention to meet STG and LTG's:  HEP.    Recommendations:  Discharge with current home program.  Patient to follow up with MD as needed.    Please refer to the daily flowsheet for treatment today, total treatment time and time spent performing 1:1 timed codes.

## 2020-04-17 ENCOUNTER — VIRTUAL VISIT (OUTPATIENT)
Dept: RHEUMATOLOGY | Facility: CLINIC | Age: 75
End: 2020-04-17
Payer: COMMERCIAL

## 2020-04-17 DIAGNOSIS — M19.90 INFLAMMATORY ARTHRITIS: Primary | ICD-10-CM

## 2020-04-17 DIAGNOSIS — M31.30 GRANULOMATOSIS WITH POLYANGIITIS WITHOUT RENAL INVOLVEMENT (H): ICD-10-CM

## 2020-04-17 DIAGNOSIS — M81.0 AGE-RELATED OSTEOPOROSIS WITHOUT CURRENT PATHOLOGICAL FRACTURE: ICD-10-CM

## 2020-04-17 PROCEDURE — 99214 OFFICE O/P EST MOD 30 MIN: CPT | Mod: TEL | Performed by: INTERNAL MEDICINE

## 2020-04-17 NOTE — PATIENT INSTRUCTIONS
Schedule DEXA scan for late October. Please contact the [facility] at [phone] in order to schedule the DEXA scan.  Plan repeat laboratory testing in October  Plan follow up with me in one year

## 2020-04-17 NOTE — PROGRESS NOTES
"Nury Cárdenas is a 75 year old female who is being evaluated via a billable telephone visit.      The patient has been notified of following:     \"This telephone visit will be conducted via a call between you and your physician/provider. We have found that certain health care needs can be provided without the need for a physical exam.  This service lets us provide the care you need with a short phone conversation.  If a prescription is necessary we can send it directly to your pharmacy.  If lab work is needed we can place an order for that and you can then stop by our lab to have the test done at a later time.    Telephone visits are billed at different rates depending on your insurance coverage. During this emergency period, for some insurers they may be billed the same as an in-person visit.  Please reach out to your insurance provider with any questions.    If during the course of the call the physician/provider feels a telephone visit is not appropriate, you will not be charged for this service.\"    Patient has given verbal consent for Telephone visit?  Yes    How would you like to obtain your AVS? Mail a copy    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 Course complicated by severe upper airway inflammation, Wegener's lung and destructive/erosive joint disease. Treated initially with rituximab 375 mg/m2 x4 and high dose corticsteroids, and then with methotrexate.  No history of relapse. Prednisone taper complete on April 1st, 2017. No recent methotrexate. Alendronate therapy complicated by LE pain.    She denies any relapse and she feels well. Her breathing is good, with occasional \"catch\" in her breath. No cough, wheeze, palpitation or chest pains. No sinus problems.     Her joints are generally good. Her right hand can be sore at times related to more joint activity. No joint deformities or dysfunction. Her energy is good. Sleep is good and no AM stiffness. "     She has only modest ankle edema that intermittently occurs.    No osteoporosis fracture recently.     PMI:  Medical-invasive pulmonary aspirgillosis, GPA, osteoarthritis, DVT with pulmonary embolism, osteoporosis with vertebral fracture (T = -2.3 ), GERD, hyperlipidemia, +Tb exposure, nephrolithiasis, retinal tear, cataract  Surgical-lung biopsy, appendectomy, eye surgery, tonsillectomy, cone biopsy, right wrist synovectomy  Injuries-left wrist fracture x2, left 5th toe fracture, vertebral fracture    SH:  Lives at home alone. Former smoker. No EtOH. Tuberculosis exposure as a child. Ambidextrous.    FH:  Son with mitochondrial myopathy  Daughter with palpitations  Sibs dead with colon and anal cancer  Father dead with lung cancer  Mother dead with emphysema and osteoporosis    PMSF history personally obtained and updated by me this visit in the clinic.    ROS:  +she notes some new drool and eye twitch  +nocturia  +calcinosis like lesions in the skin  +allergies to fosamax amoxicillin, augmentin, erythromycin, zithromax, nickel, codeine, adhesive tape  Remainder of the 14 point ROS obtained and found negative.    Laboratory:    Component      Latest Ref Rng & Units 10/11/2019   WBC      4.0 - 11.0 10e9/L 6.1   RBC Count      3.8 - 5.2 10e12/L 4.99   Hemoglobin      11.7 - 15.7 g/dL 15.2   Hematocrit      35.0 - 47.0 % 45.9   MCV      78 - 100 fl 92   MCH      26.5 - 33.0 pg 30.5   MCHC      31.5 - 36.5 g/dL 33.1   RDW      10.0 - 15.0 % 12.9   Platelet Count      150 - 450 10e9/L 197   Diff Method       Automated Method   % Neutrophils      % 59.7   % Lymphocytes      % 29.9   % Monocytes      % 7.6   % Eosinophils      % 1.3   % Basophils      % 1.2   % Immature Granulocytes      % 0.3   Absolute Neutrophil      1.6 - 8.3 10e9/L 3.6   Absolute Lymphocytes      0.8 - 5.3 10e9/L 1.8   Absolute Monocytes      0.0 - 1.3 10e9/L 0.5   Absolute Eosinophils      0.0 - 0.7 10e9/L 0.1   Absolute Basophils      0.0  - 0.2 10e9/L 0.1   Abs Immature Granulocytes      0 - 0.4 10e9/L 0.0   Sodium      133 - 144 mmol/L 139   Potassium      3.4 - 5.3 mmol/L 4.8   Chloride      94 - 109 mmol/L 104   Carbon Dioxide      20 - 32 mmol/L 30   Anion Gap      3 - 14 mmol/L 5   Glucose      70 - 99 mg/dL 83   Urea Nitrogen      7 - 30 mg/dL 10   Creatinine      0.52 - 1.04 mg/dL 0.65   GFR Estimate      >60 mL/min/1.73:m2 87   GFR Estimate If Black      >60 mL/min/1.73:m2 >90   Calcium      8.5 - 10.1 mg/dL 9.9   Bilirubin Total      0.2 - 1.3 mg/dL 0.5   Albumin      3.4 - 5.0 g/dL 3.8   Protein Total      6.8 - 8.8 g/dL 6.7 (L)   Alkaline Phosphatase      40 - 150 U/L 85   ALT      0 - 50 U/L 21   AST      0 - 45 U/L 19   Color Urine       Light Yellow   Appearance Urine       Clear   Glucose Urine      NEG:Negative mg/dL Negative   Bilirubin Urine      NEG:Negative Negative   Ketones Urine      NEG:Negative mg/dL Negative   Specific Gravity Urine      1.003 - 1.035 1.005   Blood Urine      NEG:Negative Negative   pH Urine      5.0 - 7.0 pH 7.5 (H)   Protein Albumin Urine      NEG:Negative mg/dL Negative   Urobilinogen mg/dL      0.0 - 2.0 mg/dL Normal   Nitrite Urine      NEG:Negative Negative   Leukocyte Esterase Urine      NEG:Negative Negative   Source       Midstream Urine   WBC Urine      OTO5:0 - 5 /HPF 0 - 5   RBC Urine      OTO2:O - 2 /HPF O - 2   Sed Rate      0 - 30 mm/h 5       Impression:    PR3-associated GPA-with history of lung involvement. Today, I appreciate no evidence of disease residual or relapse. She continues to do well off of all immunosuppression and we will plan continued watchful waiting.    Osteoporosis-with no recent fracture. She refuses active management at this time, but is willing to consider this after receiving repeat DEXA results next fall. Plan late October DEXA testing.    Plan RTC with me in 12 months.    Uriah Tomas LPN    Rheumatology / Pulmonology  [facility]      Phone call  duration: 22 minutes    Alvaro Maguire MD

## 2020-04-23 ENCOUNTER — TELEPHONE (OUTPATIENT)
Dept: FAMILY MEDICINE | Facility: CLINIC | Age: 75
End: 2020-04-23

## 2020-04-23 NOTE — TELEPHONE ENCOUNTER
Patient Quality Outreach      Summary:    Patient is due/failing the following:   Wellness exam    Type of outreach:    Phone, spoke to patient/parent. unable to complete video visit. appointment scheduled for July.     Questions for provider review:    None                                                                                   **Start Working phrase here:**       Patient has the following on her problem list/HM: None                                                Nidia Bonilla MA       Chart routed to none.

## 2020-05-24 DIAGNOSIS — G43.909 MIGRAINE WITHOUT STATUS MIGRAINOSUS, NOT INTRACTABLE, UNSPECIFIED MIGRAINE TYPE: ICD-10-CM

## 2020-05-25 RX ORDER — PROPRANOLOL HYDROCHLORIDE 20 MG/1
TABLET ORAL
Qty: 90 TABLET | Refills: 0 | Status: SHIPPED | OUTPATIENT
Start: 2020-05-25 | End: 2020-08-23

## 2020-05-25 NOTE — TELEPHONE ENCOUNTER
Medication is being filled for 1 time refill only due to:  Patient needs to be seen because it has been more than one year since last visit.   Pt has appt scheduled.  Anuradha Long RN

## 2020-06-16 ENCOUNTER — MYC MEDICAL ADVICE (OUTPATIENT)
Dept: OPHTHALMOLOGY | Facility: CLINIC | Age: 75
End: 2020-06-16

## 2020-06-16 NOTE — TELEPHONE ENCOUNTER
Klaus Fraser.  As far as I can tell there was one very limited study with saffron in addition to injections into the eye that may have shown some benefit for a very specific type of eye problem.  I don't think saffron is of any proven value for your eye conditions at this time.  I would just suggest a multiple vitamin daily or every other day if okay with your primary care doctor.  Hope this helps.  Let me know if you have other questions.  Dr. Mcleod

## 2020-07-03 ENCOUNTER — TELEPHONE (OUTPATIENT)
Dept: FAMILY MEDICINE | Facility: CLINIC | Age: 75
End: 2020-07-03

## 2020-07-03 ENCOUNTER — VIRTUAL VISIT (OUTPATIENT)
Dept: FAMILY MEDICINE | Facility: CLINIC | Age: 75
End: 2020-07-03
Payer: COMMERCIAL

## 2020-07-03 DIAGNOSIS — L98.9 SKIN LESION: ICD-10-CM

## 2020-07-03 DIAGNOSIS — R21 RASH: Primary | ICD-10-CM

## 2020-07-03 PROCEDURE — 99213 OFFICE O/P EST LOW 20 MIN: CPT | Mod: 95 | Performed by: FAMILY MEDICINE

## 2020-07-03 NOTE — TELEPHONE ENCOUNTER
Called patient. Telephone visit scheduled for today. Pt will keep appt that she has for 07/06 for now in case f/u is needed. Advised that if f/u not needed per PCP, then she can cancel, but wait till she has visit today.

## 2020-07-03 NOTE — PROGRESS NOTES
"Nury Cárdenas is a 75 year old female who is being evaluated via a billable telephone visit.      The patient has been notified of following:     \"This telephone visit will be conducted via a call between you and your physician/provider. We have found that certain health care needs can be provided without the need for a physical exam.  This service lets us provide the care you need with a short phone conversation.  If a prescription is necessary we can send it directly to your pharmacy.  If lab work is needed we can place an order for that and you can then stop by our lab to have the test done at a later time.    Telephone visits are billed at different rates depending on your insurance coverage. During this emergency period, for some insurers they may be billed the same as an in-person visit.  Please reach out to your insurance provider with any questions.    If during the course of the call the physician/provider feels a telephone visit is not appropriate, you will not be charged for this service.\"    Patient has given verbal consent for Telephone visit?  Yes    What phone number would you like to be contacted at? [phone]    How would you like to obtain your AVS? Mail a copy    Subjective     Nury Cárdenas is a 75 year old female who presents via phone visit today for the following health issues:    HPI  Insect bite      Duration: happened on Saturday or Monday    Description (location/character/radiation): 2 bites just below the right knee cap.One is oblong(seems to be healing) the other is round. Round bite is raised, itchy, Red line running sideways about one inch from the bite. Blister like.     Intensity:  mild    Accompanying signs and symptoms: No fevers.    History (similar episodes/previous evaluation): None    Therapies tried and outcome: baking soda paste helped with the itching antibiotic cream.     2 bites below Rt, were itchy initially, not painful, have a hard blister like " appearance  All red, itchy,   Was working in the garden; did work on Saturday  No clear center  Denies any fever, HA or muscle/joint  Pains    Assessment/Plan:    1. Rash     Possible bug bite or contact dermatitis. Unlikely to be deer tick bite.    2. Skin lesion: Rt knee     Been applying triple antibiotic. Since itchy initially with blister like appearance, will apply hydrocortisone recheck in 3 days or sooner with concerns.    Return in about 3 days (around 7/6/2020) for Follow up for symptoms recheck.    Phone call duration: 12 minutes    Phil Abbott MD

## 2020-07-03 NOTE — TELEPHONE ENCOUNTER
Reason for call:  Symptom   Symptom or request: bug bites     Duration (how long have symptoms been present): few days   Have you been treated for this before? No     Additional comments: Patient states she was bit, but doesn't know by what. States that they're hard blisters and there is a red line going through the reddest one now. Please call to advise.     Phone number to reach patient:  Home number on file [phone] (home)    Best Time:  Any     Can we leave a detailed message on this number?  YES    Travel screening: Not Applicable

## 2020-07-03 NOTE — TELEPHONE ENCOUNTER
Dr. Abbott- please advise if visit for 10:40 on your schedule would be appropriate to change to telephone visit.    Called patient. She is scheduled for video visit with Milena today. States that she does not have a camera on her computer, so can't do a video visit. States that she also would not be able to upload photos via [employer]. States that she could do telephone visit. Please advise if visit would be appropriate to change to telephone. Notified pt that if telephone visit no appropriate, may recommend urgent care.

## 2020-08-22 DIAGNOSIS — G43.909 MIGRAINE WITHOUT STATUS MIGRAINOSUS, NOT INTRACTABLE, UNSPECIFIED MIGRAINE TYPE: ICD-10-CM

## 2020-08-22 NOTE — LETTER
[address] [address]  [phone]          August 24, 2020    Nury Cárdenas                                                                                                                     [address] [facility] [address]            Dear Nury,  Your medication was filled for 1 time refill only due to: You need to be seen because you need your wellness exam/fasting labs. Please call the clinic at [phone] to make your appointment.      Sincerely,         Phil Abbott MD/huntert

## 2020-08-23 RX ORDER — PROPRANOLOL HYDROCHLORIDE 20 MG/1
TABLET ORAL
Qty: 90 TABLET | Refills: 0 | Status: SHIPPED | OUTPATIENT
Start: 2020-08-23 | End: 2020-11-19

## 2020-08-24 NOTE — TELEPHONE ENCOUNTER
Medication is being filled for 1 time refill only due to:  Patient needs to be seen because pt needs wellness exam/fasting labs.   Please schedule.  Anuradha Long RN

## 2020-09-10 ENCOUNTER — TELEPHONE (OUTPATIENT)
Dept: INFECTIOUS DISEASES | Facility: CLINIC | Age: 75
End: 2020-09-10

## 2020-09-10 DIAGNOSIS — A60.09 HERPES GENITALIS IN WOMEN: ICD-10-CM

## 2020-09-10 RX ORDER — VALACYCLOVIR HYDROCHLORIDE 500 MG/1
500 TABLET, FILM COATED ORAL 2 TIMES DAILY PRN
Qty: 6 TABLET | Refills: 11 | Status: SHIPPED | OUTPATIENT
Start: 2020-09-10 | End: 2021-05-18

## 2020-09-10 NOTE — TELEPHONE ENCOUNTER
----- Message from Adelaida Sol MD sent at 9/10/2020  1:27 PM CDT -----  Regarding: RE: Refill  Sure that sounds fine. I called [facility] and got her some, but perhaps a year would be nice.     Thanks  Adelaida  ----- Message -----  From: Ese Nunez RN  Sent: 9/10/2020  12:44 PM CDT  To: Adelaida Sol MD  Subject: Refill                                           Hi Adelaida, Do you want me to refill her Valtrex for another yr? Thanks.  Ese

## 2020-09-16 ENCOUNTER — ALLIED HEALTH/NURSE VISIT (OUTPATIENT)
Dept: FAMILY MEDICINE | Facility: CLINIC | Age: 75
End: 2020-09-16
Payer: COMMERCIAL

## 2020-09-16 VITALS — DIASTOLIC BLOOD PRESSURE: 76 MMHG | SYSTOLIC BLOOD PRESSURE: 122 MMHG

## 2020-09-16 DIAGNOSIS — Z01.30 BP CHECK: Primary | ICD-10-CM

## 2020-09-16 PROCEDURE — 99207 ZZC NO CHARGE NURSE ONLY: CPT | Performed by: FAMILY MEDICINE

## 2020-09-16 NOTE — PROGRESS NOTES
Nury Cárdenas was evaluated at [facility] on September 16, 2020 at which time her blood pressure was:    BP Readings from Last 3 Encounters:   09/16/20 122/76   03/02/20 110/78   02/04/20 135/81     Pulse Readings from Last 3 Encounters:   03/02/20 73   02/04/20 71   12/26/19 66       Reviewed lifestyle modifications for blood pressure control and reduction: including making healthy food choices, managing weight, getting regular exercise, smoking cessation, reducing alcohol consumption, monitoring blood pressure regularly.     Symptoms: None    BP Goal:< 140/90 mmHg    BP Assessment:  BP at goal    Potential Reasons for BP too high: NA - Not applicable    BP Follow-Up Plan: Recheck BP in 6 months at pharmacy    Recommendation to Provider: Continue current plan.     Note completed by: Thank you,  Berenice Leavitt, PharmD  [facility]  [phone]

## 2020-11-09 ENCOUNTER — NURSE TRIAGE (OUTPATIENT)
Dept: NURSING | Facility: CLINIC | Age: 75
End: 2020-11-09

## 2020-11-10 ENCOUNTER — TELEPHONE (OUTPATIENT)
Dept: OPHTHALMOLOGY | Facility: CLINIC | Age: 75
End: 2020-11-10

## 2020-11-10 NOTE — TELEPHONE ENCOUNTER
talked to pt , eiscribed her symtoms that sounded like a classic migraine aura, pt states her vision  Is fine now and has no other eye problem. I told pt unless she  develops any persistent change of vision , eye redness ,discharge or eye pain she can just make her  Annual  Eye  Exam appt.

## 2020-11-10 NOTE — TELEPHONE ENCOUNTER
"Pt reports \"L eye far corner to middle jaggedly flashing light made everything distorted, could see when eyes shut. Started about an hour ago, started at top of eye then went around to corner then to middle and then after a  while almost could see it in the R eye\". Then \"it all stopped\". Pt denies any other acute symptoms.    Writer spoke with Dr. Srivastava who advised it sounds like an ocular migraine and pt can follow up with Dr. Mcleod tomorrow. Writer informed pt of Dr. Srivastava advice.     Pt verbalizes understanding and no further concerns at this time.     Additional Information    Negative: Weakness of the face, arm or leg on one side of the body    Negative: Followed getting substance in the eye    Negative: Foreign body or object is or was lodged in the eye    Negative: Followed an eye injury    Negative: Followed sun lamp or sun exposure (UV keratitis)    Negative: Yellow or green discharge (pus) in the eye    Negative: Pregnant    Negative: Postpartum (from 0 to 6 weeks after delivery)    Negative: Complete loss of vision in 1 or both eyes    Negative: SEVERE eye pain    Negative: Severe headache    Negative: Double vision    Negative: [1] Blurred vision or visual changes AND [2] present now AND [3] sudden onset or new (e.g., minutes, hours, days)  (Exception: seeing floaters / black specks OR previously diagnosed migraine headaches with same symptoms)    Negative: Patient sounds very sick or weak to the triager    Flashes of light  (Exception: brief from pressing on the eyeball)    Protocols used: VISION LOSS OR CHANGE-A-AH      "

## 2020-11-16 ENCOUNTER — HEALTH MAINTENANCE LETTER (OUTPATIENT)
Age: 75
End: 2020-11-16

## 2020-11-19 DIAGNOSIS — G43.909 MIGRAINE WITHOUT STATUS MIGRAINOSUS, NOT INTRACTABLE, UNSPECIFIED MIGRAINE TYPE: ICD-10-CM

## 2020-11-19 RX ORDER — PROPRANOLOL HYDROCHLORIDE 20 MG/1
TABLET ORAL
Qty: 90 TABLET | Refills: 1 | Status: SHIPPED | OUTPATIENT
Start: 2020-11-19 | End: 2021-05-18

## 2021-01-04 ENCOUNTER — MYC MEDICAL ADVICE (OUTPATIENT)
Dept: FAMILY MEDICINE | Facility: CLINIC | Age: 76
End: 2021-01-04

## 2021-01-04 ENCOUNTER — VIRTUAL VISIT (OUTPATIENT)
Dept: FAMILY MEDICINE | Facility: CLINIC | Age: 76
End: 2021-01-04
Payer: COMMERCIAL

## 2021-01-04 DIAGNOSIS — R05.9 COUGH: ICD-10-CM

## 2021-01-04 DIAGNOSIS — D84.9 IMMUNOSUPPRESSION (H): ICD-10-CM

## 2021-01-04 DIAGNOSIS — R05.9 COUGH: Primary | ICD-10-CM

## 2021-01-04 DIAGNOSIS — J06.9 UPPER RESPIRATORY TRACT INFECTION, UNSPECIFIED TYPE: ICD-10-CM

## 2021-01-04 PROCEDURE — U0003 INFECTIOUS AGENT DETECTION BY NUCLEIC ACID (DNA OR RNA); SEVERE ACUTE RESPIRATORY SYNDROME CORONAVIRUS 2 (SARS-COV-2) (CORONAVIRUS DISEASE [COVID-19]), AMPLIFIED PROBE TECHNIQUE, MAKING USE OF HIGH THROUGHPUT TECHNOLOGIES AS DESCRIBED BY CMS-2020-01-R: HCPCS | Performed by: NURSE PRACTITIONER

## 2021-01-04 PROCEDURE — U0005 INFEC AGEN DETEC AMPLI PROBE: HCPCS | Performed by: NURSE PRACTITIONER

## 2021-01-04 PROCEDURE — 99213 OFFICE O/P EST LOW 20 MIN: CPT | Mod: 95 | Performed by: NURSE PRACTITIONER

## 2021-01-04 RX ORDER — CEFDINIR 300 MG/1
300 CAPSULE ORAL 2 TIMES DAILY
Qty: 20 CAPSULE | Refills: 0 | Status: SHIPPED | OUTPATIENT
Start: 2021-01-04 | End: 2021-09-28

## 2021-01-04 NOTE — PATIENT INSTRUCTIONS
Instructions for Patients  It is recommended that you have a test for coronavirus (COVID-19). This illness can cause fever, cough and trouble breathing. Many people get a mild case and get better on their own. Some people can get very sick.     Please follow these steps:    1. We will call to schedule your test.  2. A member of our care team will ask you some questions. Then, they will use a swab to collect samples from your nose and throat.     Our testing team will send you your test results.    How can I protect others?    Stay home and away from others (self-isolate) until:    You ve had no fever--and no medicine that reduces fever--for 1 full day (24 hours). And      Your other symptoms have resolved (gotten better). For example, your cough or breathing has improved. And     At least 10 days have passed since your symptoms started.    Stay at least 6 feet away from others. (If someone will drive you to your test, stay in the backseat, as far away from the  as you can.)     Don t go to work, school or anywhere else. When it s time for your test, go straight to the testing site. Don t make any stops on the way there or back.     Wash your hands and face often. Use soap and water.     Cover your mouth and nose with a mask, tissue or washcloth.     Don t touch anyone. No hugging, kissing or handshakes.    How can I take care of myself?    1. Get lots of rest. Drink extra fluids (unless a doctor has told you not to).     2. Take Tylenol (acetaminophen) for fever or pain. If you have liver or kidney problems, ask your family doctor if it's okay to take Tylenol.     Adults can take either:     650 mg (two 325 mg pills) every 4 to 6 hours, or     1,000 mg (two 500 mg pills) every 8 hours as needed.     Note: Don't take more than 3,000 mg in one day.   Acetaminophen is found in many medicines (both prescribed and over-the-counter medicines). Read all labels to be sure you don't take too much.   For children, check  the Tylenol bottle for the right dose. The dose is based on  the child's age or weight.    3. If you have other health problems (like cancer, heart failure, an organ transplant or severe kidney disease): Call your specialty clinic if you don't feel better in the next 2 days.    4. Know when to call 911: If your breathing is so bad that it keeps you from doing normal activities, call 911 or go to the emergency room. Tell them that you've been staying home and may have COVID-19.      Thank you for taking steps to prevent the spread of this virus.  o Limit your contact with others.  o Wear a simple mask to cover your cough.  o Wash your hands well and often.  o If you need medical care, go to [email or web address] or contact your health care provider.     For more about COVID-19 and caring for yourself at home, visit the CDC website at https://www.cdc.gov/coronavirus/2019-ncov/about/steps-when-sick.html.     To learn about care at [facility], please go to [email or web address].     [facility] clinical trials (COVID-19 research studies): [email or web address].    Below are the COVID-19 hotlines at the [facility] ([facility]). Interpreters are available.     For health questions: Call 536-035-5419 or 1-821.819.1539 (7 a.m. to 7 p.m.)    For questions about schools and childcare: Call 603-621-2077 or 1-202.714.2137 (7 a.m. to 7 p.m.)

## 2021-01-04 NOTE — PROGRESS NOTES
Nury Cárdenas is a 75 year old female who is being evaluated via a billable telephone visit.      What phone number would you like to be contacted at? [phone]  How would you like to obtain your AVS? MyChart  Assessment & Plan     Cough  Patient is immuno compromised. Will treat with antibiotics. Counseled patient that antibiotics will not help a viral infection so getting tested for COVID and going to ED if symptoms worsened is important.   - Symptomatic COVID-19 Virus (Coronavirus) by PCR; Future  - cefdinir (OMNICEF) 300 MG capsule; Take 1 capsule (300 mg) by mouth 2 times daily for 10 days    Immunosuppression (H)  - cefdinir (OMNICEF) 300 MG capsule; Take 1 capsule (300 mg) by mouth 2 times daily for 10 days    Upper respiratory tract infection, unspecified type  Counseled on self-care measures including: cough drops, OTC acetaminophen PRN, hydration, rest, self quantine, masking, handwashing, and red flags of when to return including difficulty breathing.  - cefdinir (OMNICEF) 300 MG capsule; Take 1 capsule (300 mg) by mouth 2 times daily for 10 days      Return in about 5 days (around 1/9/2021), or if symptoms worsen or fail to improve.    GURPREET Russell CNP  [facility] [place]    Subjective     Nury Cárdenas is a 75 year old female who presents to clinic today for the following health issues     HPI       Acute Illness  Acute illness concerns: Sinus  Onset/Duration: 5 days  Symptoms:  Fever: no  Chills/Sweats: no  Headache (location?): no  Sinus Pressure: YES  Conjunctivitis:  YES  Ear Pain: Left ear feels plugged  Rhinorrhea: YES  Congestion: YES  Sore Throat: YES  Cough: YES-productive of clear sputum, productive of yellow sputum  Wheeze: no  Decreased Appetite: no  Nausea: no  Vomiting: no  Diarrhea: no  Dysuria/Freq.: no  Dysuria or Hematuria: no  Fatigue/Achiness: YES  Sick/Strep Exposure: no ; Gets this yearly and last year was given 7 days of cefdinir (OMNICEF) 300  MG capsule  Therapies tried and outcome: Salt water gaggle, extra strength Excedrin     Review of Systems   Constitutional, HEENT, cardiovascular, pulmonary, gi and gu systems are negative, except as otherwise noted.      Objective           Vitals:  No vitals were obtained today due to virtual visit.    Physical Exam   healthy, alert and no distress  PSYCH: Alert and oriented times 3; coherent speech, normal   rate and volume, able to articulate logical thoughts, able   to abstract reason, no tangential thoughts, no hallucinations   or delusions  Her affect is normal  RESP: No cough, no audible wheezing, able to talk in full sentences  Remainder of exam unable to be completed due to telephone visits            Phone call duration: 9 minutes

## 2021-01-05 ENCOUNTER — MYC MEDICAL ADVICE (OUTPATIENT)
Dept: FAMILY MEDICINE | Facility: CLINIC | Age: 76
End: 2021-01-05

## 2021-01-05 ENCOUNTER — TELEPHONE (OUTPATIENT)
Dept: FAMILY MEDICINE | Facility: CLINIC | Age: 76
End: 2021-01-05

## 2021-01-05 LAB
SARS-COV-2 RNA SPEC QL NAA+PROBE: ABNORMAL
SPECIMEN SOURCE: ABNORMAL

## 2021-01-06 NOTE — TELEPHONE ENCOUNTER
Patient symptoms for COVID19:    Sinus pressure and pain  Shortness of breath  Head congestion    Symptom onset:  12/30/2020    Patient to quarantine for 20 days due to weakened immune system.

## 2021-01-06 NOTE — TELEPHONE ENCOUNTER
"Coronavirus (COVID-19) Notification    Caller Name (Patient, parent, daughter/son, grandparent, etc)  N[place], patient    Reason for call  Notify of Positive Coronavirus (COVID-19) lab results, assess symptoms,  review  [facility] recommendations    Lab Result    Lab test:  2019-nCoV rRt-PCR or SARS-CoV-2 PCR    Oropharyngeal AND/OR nasopharyngeal swabs is POSITIVE for 2019-nCoV RNA/SARS-COV-2 PCR (COVID-19 virus)    RN Recommendations/Instructions per [facility] Coronavirus COVID-19 recommendations    Brief introduction script  Introduce self then review script:  \"I am calling on behalf of [employer].  We were notified that your Coronavirus test (COVID-19) for was POSITIVE for the virus.  I have some information to relay to you but first I wanted to mention that the [place] Dept of Health will be contacting you shortly [it's possible MD already called Patient] to talk to you more about how you are feeling and other people you have had contact with who might now also have the virus.  Also,  [employer] is Partnering with the [facility] for Covid-19 research, you may be contacted directly by research staff.\"    Assessment (Inquire about Patient's current symptoms)   Assessment   Current Symptoms at time of phone call: (if no symptoms, document No symptoms]    Symptoms onset (if applicable)      If at time of call, Patients symptoms hare worsened, the Patient should contact 911 or have someone drive them to Emergency Dept promptly:      If Patient calling 911, inform 911 personal that you have tested positive for the Coronavirus (COVID-19).  Place mask on and await 911 to arrive.    If Emergency Dept, If possible, please have another adult drive you to the Emergency Dept but you need to wear mask when in contact with other people.      Monoclonal Antibody Administration    You may be eligible to receive a new treatment with a monoclonal antibody for preventing hospitalization in patients at " "high risk for complications from COVID-19.   This medication is still experimental and available on a limited basis; it is given through an IV and must be given at an infusion center. Please note that not all people who are eligible will receive the medication since it is in limited supply.     Are you interested in being considered for this medication?  No.   Does the patient fit the criteria: Patient declined, patient wants to talk to her rheumatologist first.    If patient qualifies based on above criteria:  \"We will contact you if you are selected to receive the medication in the next 1-2 days.   This is time sensitive and if you are not selected in the next 1-2 days, you will not receive the medication.  If you do not receive a call to schedule, you have not been selected.\"    Review information with Patient    Your result was positive. This means you have COVID-19 (coronavirus).  We have sent you a letter that reviews the information that I'll be reviewing with you now.    How can I protect others?    If you have symptoms: stay home and away from others (self-isolate) until:    You've had no fever--and no medicine that reduces fever--for 1 full day (24 hours). And       Your other symptoms have gotten better. For example, your cough or breathing has improved. And     At least 10 days have passed since your symptoms started. (If you've been told by a doctor that you have a weak immune system, wait 20 days.)     If you don't have symptoms: Stay home and away from others (self-isolate) until at least 10 days have passed since your first positive COVID-19 test. (Date test collected)    During this time:    Stay in your own room, including for meals. Use your own bathroom if you can.    Stay away from others in your home. No hugging, kissing or shaking hands. No visitors.     Don't go to work, school or anywhere else.     Clean  high touch  surfaces often (doorknobs, counters, handles, etc.). Use a household " cleaning spray or wipes. You'll find a full list on the EPA website at www.epa.gov/pesticide-registration/list-n-disinfectants-use-against-sars-cov-2.     Cover your mouth and nose with a mask, tissue or other face covering to avoid spreading germs.    Wash your hands and face often with soap and water.    Caregivers in these groups are at risk for severe illness due to COVID-19:  o People 65 years and older  o People who live in a nursing home or long-term care facility  o People with chronic disease (lung, heart, cancer, diabetes, kidney, liver, immunologic)  o People who have a weakened immune system, including those who:  - Are in cancer treatment  - Take medicine that weakens the immune system, such as corticosteroids  - Had a bone marrow or organ transplant  - Have an immune deficiency  - Have poorly controlled HIV or AIDS  - Are obese (body mass index of 40 or higher)  - Smoke regularly    Caregivers should wear gloves while washing dishes, handling laundry and cleaning bedrooms and bathrooms.    Wash and dry laundry with special caution. Don't shake dirty laundry, and use the warmest water setting you can.    If you have a weakened immune system, ask your doctor about other actions you should take.    For more tips, go to www.cdc.gov/coronavirus/2019-ncov/downloads/10Things.pdf.    You should not go back to work until you meet the guidelines above for ending your home isolation. You don't need to be retested for COVID-19 before going back to work--studies show that you won't spread the virus if it's been at least 10 days since your symptoms started (or 20 days, if you have a weak immune system).    Employers: This document serves as formal notice of your employee's medical guidelines for going back to work. They must meet the above guidelines before going back to work in person.    How can I take care of myself?    1. Get lots of rest. Drink extra fluids (unless a doctor has told you not to).    2. Take  Tylenol (acetaminophen) for fever or pain. If you have liver or kidney problems, ask your family doctor if it's okay to take Tylenol.     Take either:     650 mg (two 325 mg pills) every 4 to 6 hours, or     1,000 mg (two 500 mg pills) every 8 hours as needed.     Note: Don't take more than 3,000 mg in one day. Acetaminophen is found in many medicines (both prescribed and over-the-counter medicines). Read all labels to be sure you don't take too much.    For children, check the Tylenol bottle for the right dose (based on their age or weight).    3. If you have other health problems (like cancer, heart failure, an organ transplant or severe kidney disease): Call your specialty clinic if you don't feel better in the next 2 days.    4. Know when to call 911: Emergency warning signs include:    Trouble breathing or shortness of breath    Pain or pressure in the chest that doesn't go away    Feeling confused like you haven't felt before, or not being able to wake up    Bluish-colored lips or face    5. Sign up for [employer]. We know it's scary to hear that you have COVID-19. We want to track your symptoms to make sure you're okay over the next 2 weeks. Please look for an email from [employer]--this is a free, online program that we'll use to keep in touch. To sign up, follow the link in the email. Learn more at [email or web address].    Where can I get more information?    [facility]: [email or web address]    Coronavirus Basics: www.health.[facility].[place]/diseases/coronavirus/basics.html    What to Do If You're Sick: www.cdc.gov/coronavirus/2019-ncov/about/steps-when-sick.html    Ending Home Isolation: www.cdc.gov/coronavirus/2019-ncov/hcp/disposition-in-home-patients.html     Caring for Someone with COVID-19: www.cdc.gov/coronavirus/2019-ncov/if-you-are-sick/care-for-someone.html     [facility] clinical trials (COVID-19 research studies): clinicalaffairs.[facility]-clinical-trials      A Positive COVID-19 letter will be sent via [employer] or the mail. (Exception, no letters sent to Presurgerical/Preprocedure Patients)    Anuradha Solo LPN

## 2021-01-06 NOTE — TELEPHONE ENCOUNTER
Coronavirus (COVID-19) Notification    Reason for call  Notify of POSITIVE  COVID-19 lab result, assess symptoms,  review [facility] recommendations    Lab Result   Lab test for 2019-nCoV rRt-PCR or SARS-COV-2 PCR  Oropharyngeal AND/OR nasopharyngeal swabs were POSITIVE for 2019-nCoV RNA [OR] SARS-COV-2 RNA (COVID-19) RNA     We have been unable to reach Patient by phone at this time to notify of their Positive COVID-19 result.  Left voicemail message requesting a call back to [phone] [facility] for results.        POSITIVE COVID-19 Letter sent.    Tamiko Cordova LPN

## 2021-01-12 NOTE — TELEPHONE ENCOUNTER
Please see [facility] [employer] [facility] message with symptom/status update and advise. Pt was given Rx for cefdinir 300 mg BID for 10 days on 01/04.

## 2021-01-12 NOTE — TELEPHONE ENCOUNTER
Called and spoke to patient. Scheduled appointment with Jillian on Tuesday, Jan 19,2021 @ 10:40 AM telephone.  Annette Sánchez CMA

## 2021-01-12 NOTE — TELEPHONE ENCOUNTER
Called patient. She continues to have a cough and chest heaviness, sore throat, and congestion. Denies difficulty breathing or shortness of breath. Counseled on self-care measures including: hydration, rest, self quantine, and red flags of when to seek urgent medical attention including difficulty breathing and worsening symptoms. No further medications needed at this time. Offered referral for monoclonal antibodies and patient declined. Recommend follow up with phone visit in 1 week.    Jillian Fowler, APRN, CNP

## 2021-01-19 ENCOUNTER — VIRTUAL VISIT (OUTPATIENT)
Dept: FAMILY MEDICINE | Facility: CLINIC | Age: 76
End: 2021-01-19
Payer: COMMERCIAL

## 2021-01-19 DIAGNOSIS — U07.1 CLINICAL DIAGNOSIS OF COVID-19: Primary | ICD-10-CM

## 2021-01-19 PROCEDURE — 99212 OFFICE O/P EST SF 10 MIN: CPT | Mod: 95 | Performed by: NURSE PRACTITIONER

## 2021-01-19 NOTE — PROGRESS NOTES
Bria is a 75 year old who is being evaluated via a billable telephone visit.    What phone number would you like to be contacted at? [phone]  How would you like to obtain your AVS? MyChart  Assessment & Plan     Clinical diagnosis of COVID-19  Patient symptoms are greatly improved. Patient is 20 days from onset of symptoms and over 24 hours feeling better so able to now leave quarantine. Counseled to continue to wear a mask in public, social distance, and use good hand hygiene.     Return in about 4 weeks (around 2/16/2021), or if symptoms worsen or fail to improve.    Jillian Fowler, GURPREET CNP  [facility]    Subjective   Bria is a 75 year old who presents to clinic today for the following health issues   HPI     Follow up COVID 19. Positive COVID-19 PCR 1/4/2020. States that she started to feel better about 5-6 days ago. States that she has a little bit of tickle in her throat and little bit of nasal congestion. States that her oxygen stats have been 95% or more and cough is resolved.     Review of Systems   Constitutional, HEENT, cardiovascular, pulmonary, gi and gu systems are negative, except as otherwise noted.      Objective         Vitals:  No vitals were obtained today due to virtual visit.    Physical Exam   healthy, alert and no distress  PSYCH: Alert and oriented times 3; coherent speech, normal   rate and volume, able to articulate logical thoughts, able   to abstract reason, no tangential thoughts, no hallucinations   or delusions  Her affect is normal  RESP: No cough, no audible wheezing, able to talk in full sentences  Remainder of exam unable to be completed due to telephone visits    Orders Only on 01/04/2021   Component Date Value Ref Range Status     COVID-19 Virus PCR to [facility]* 01/04/2021 Nasopharyngeal   Final     COVID-19 Virus PCR to [facility] Re* 01/04/2021 Detected, Abnormal Result*  Final    Comment: Positive for 2019-nCoV.  Patient sample was heat  inactivated and amplified using the HDPCR SARS-CoV-2   assay ([employer] The HDPCRTM SARS-CoV-2 assay is a reverse   transcription real-time polymerase chain reaction (qRT-PCR) test intended for   the qualitative detection of nucleic acid  from SARS-CoV-2 in human nasopharyngeal swabs, oropharyngeal swabs, anterior   nasal swabs, mid-turbinate nasal swabs as well as nasal aspirate, nasal wash,   and bronchoalveolar lavage (BAL) specimens from individuals who are suspected   of COVID-19 by their healthcare provider.  Positive results should also be reported in accordance with local, state, and   federal regulations.  Nasopharyngeal specimen is the preferred choice for swab-based SARS CoV2   testing. When collection of a nasopharyngeal swab is not possible the   following are acceptable alternatives:  an oropharyngeal (OP) specimen collected by a healthcare professional, or a   nasal mid-turbinate (NMT) swab collected by a healthcar                           e professional or by   onsite self-collection (using a flocked tapered swab), or an anterior nares   specimen collected by a healthcare professional or by onsite self-collection   (using a round foam swab). (Centers for Disease Control)  Testing performed by [facility] ([facility]) [address] [place] [id number]  The test performance characteristics were determined by [facility]. It has not been   cleared or approved by the FDA.  The laboratory is regulated under the Clinical Laboratory Improvement   Amendments of 1988 (CLIA-88) as qualified to perform high-complexity testing.   This test is used for clinical purposes. It should not be regarded as   investigational or for research.             Phone call duration: 5 minutes

## 2021-01-23 ENCOUNTER — MYC MEDICAL ADVICE (OUTPATIENT)
Dept: FAMILY MEDICINE | Facility: CLINIC | Age: 76
End: 2021-01-23

## 2021-02-03 DIAGNOSIS — M31.30 GRANULOMATOSIS WITH POLYANGIITIS WITHOUT RENAL INVOLVEMENT (H): ICD-10-CM

## 2021-02-03 DIAGNOSIS — M19.90 INFLAMMATORY ARTHRITIS: ICD-10-CM

## 2021-02-03 DIAGNOSIS — M15.0 PRIMARY OSTEOARTHRITIS INVOLVING MULTIPLE JOINTS: ICD-10-CM

## 2021-02-03 LAB
ALBUMIN SERPL-MCNC: 3.8 G/DL (ref 3.4–5)
ALBUMIN UR-MCNC: NEGATIVE MG/DL
ALT SERPL W P-5'-P-CCNC: 23 U/L (ref 0–50)
APPEARANCE UR: CLEAR
AST SERPL W P-5'-P-CCNC: 15 U/L (ref 0–45)
BILIRUB UR QL STRIP: NEGATIVE
COLOR UR AUTO: ABNORMAL
CREAT SERPL-MCNC: 0.8 MG/DL (ref 0.52–1.04)
CRP SERPL-MCNC: <2.9 MG/L (ref 0–8)
ERYTHROCYTE [DISTWIDTH] IN BLOOD BY AUTOMATED COUNT: 13.3 % (ref 10–15)
ERYTHROCYTE [SEDIMENTATION RATE] IN BLOOD BY WESTERGREN METHOD: 8 MM/H (ref 0–30)
GFR SERPL CREATININE-BSD FRML MDRD: 71 ML/MIN/{1.73_M2}
GLUCOSE UR STRIP-MCNC: NEGATIVE MG/DL
HCT VFR BLD AUTO: 44.1 % (ref 35–47)
HGB BLD-MCNC: 14.6 G/DL (ref 11.7–15.7)
HGB UR QL STRIP: ABNORMAL
KETONES UR STRIP-MCNC: NEGATIVE MG/DL
LEUKOCYTE ESTERASE UR QL STRIP: ABNORMAL
MCH RBC QN AUTO: 30 PG (ref 26.5–33)
MCHC RBC AUTO-ENTMCNC: 33.1 G/DL (ref 31.5–36.5)
MCV RBC AUTO: 91 FL (ref 78–100)
MUCOUS THREADS #/AREA URNS LPF: PRESENT /LPF
NITRATE UR QL: NEGATIVE
NON-SQ EPI CELLS #/AREA URNS LPF: ABNORMAL /LPF
PH UR STRIP: 6 PH (ref 5–7)
PLATELET # BLD AUTO: 187 10E9/L (ref 150–450)
RBC # BLD AUTO: 4.87 10E12/L (ref 3.8–5.2)
RBC #/AREA URNS AUTO: ABNORMAL /HPF
SOURCE: ABNORMAL
SP GR UR STRIP: 1.01 (ref 1–1.03)
UROBILINOGEN UR STRIP-MCNC: NORMAL MG/DL (ref 0–2)
WBC # BLD AUTO: 5.3 10E9/L (ref 4–11)
WBC #/AREA URNS AUTO: ABNORMAL /HPF

## 2021-02-03 PROCEDURE — 86255 FLUORESCENT ANTIBODY SCREEN: CPT | Performed by: INTERNAL MEDICINE

## 2021-02-03 PROCEDURE — 83876 ASSAY MYELOPEROXIDASE: CPT | Performed by: INTERNAL MEDICINE

## 2021-02-03 PROCEDURE — 86256 FLUORESCENT ANTIBODY TITER: CPT | Performed by: INTERNAL MEDICINE

## 2021-02-03 PROCEDURE — 84460 ALANINE AMINO (ALT) (SGPT): CPT | Performed by: INTERNAL MEDICINE

## 2021-02-03 PROCEDURE — 36415 COLL VENOUS BLD VENIPUNCTURE: CPT | Performed by: INTERNAL MEDICINE

## 2021-02-03 PROCEDURE — 83516 IMMUNOASSAY NONANTIBODY: CPT | Performed by: INTERNAL MEDICINE

## 2021-02-03 PROCEDURE — 82565 ASSAY OF CREATININE: CPT | Performed by: INTERNAL MEDICINE

## 2021-02-03 PROCEDURE — 84450 TRANSFERASE (AST) (SGOT): CPT | Performed by: INTERNAL MEDICINE

## 2021-02-03 PROCEDURE — 82040 ASSAY OF SERUM ALBUMIN: CPT | Performed by: INTERNAL MEDICINE

## 2021-02-03 PROCEDURE — 85027 COMPLETE CBC AUTOMATED: CPT | Performed by: INTERNAL MEDICINE

## 2021-02-03 PROCEDURE — 81001 URINALYSIS AUTO W/SCOPE: CPT | Performed by: INTERNAL MEDICINE

## 2021-02-03 PROCEDURE — 86140 C-REACTIVE PROTEIN: CPT | Performed by: INTERNAL MEDICINE

## 2021-02-03 PROCEDURE — 85652 RBC SED RATE AUTOMATED: CPT | Performed by: INTERNAL MEDICINE

## 2021-02-04 LAB
MYELOPEROXIDASE AB SER-ACNC: <0.2 AI (ref 0–0.9)
PROTEINASE3 IGG SER-ACNC: >8 AI (ref 0–0.9)

## 2021-02-05 DIAGNOSIS — U07.1 CLINICAL DIAGNOSIS OF COVID-19: ICD-10-CM

## 2021-02-05 DIAGNOSIS — M31.30 GRANULOMATOSIS WITH POLYANGIITIS WITHOUT RENAL INVOLVEMENT (H): Primary | ICD-10-CM

## 2021-02-05 LAB
ANCA AB PATTERN SER IF-IMP: ABNORMAL
C-ANCA TITR SER IF: ABNORMAL {TITER}

## 2021-02-08 NOTE — RESULT ENCOUNTER NOTE
The presence of the ANCA positivity and the proteinase 3 antibody both indicate an increased risk for vasculitis relapse. I suspect COVID virus has reactivated some autoreactive B cells.    I will evaluate you further at our upcoming appointment and we will discuss options at that time.    Alvaro Maguire MD  Professor of Medicine  Director, Division of Rheumatic and Autoimmune Diseases

## 2021-02-09 DIAGNOSIS — M31.30 GRANULOMATOSIS WITH POLYANGIITIS WITHOUT RENAL INVOLVEMENT (H): ICD-10-CM

## 2021-02-09 DIAGNOSIS — U07.1 CLINICAL DIAGNOSIS OF COVID-19: ICD-10-CM

## 2021-02-09 PROCEDURE — 86769 SARS-COV-2 COVID-19 ANTIBODY: CPT | Performed by: INTERNAL MEDICINE

## 2021-02-09 PROCEDURE — 86769 SARS-COV-2 COVID-19 ANTIBODY: CPT | Mod: 59 | Performed by: INTERNAL MEDICINE

## 2021-02-09 PROCEDURE — 36415 COLL VENOUS BLD VENIPUNCTURE: CPT | Performed by: INTERNAL MEDICINE

## 2021-02-10 LAB
SARS-COV-2 AB PNL SERPL IA: POSITIVE
SARS-COV-2 IGG SERPL IA-ACNC: NORMAL

## 2021-02-12 ENCOUNTER — VIRTUAL VISIT (OUTPATIENT)
Dept: RHEUMATOLOGY | Facility: CLINIC | Age: 76
End: 2021-02-12
Payer: COMMERCIAL

## 2021-02-12 DIAGNOSIS — M19.90 INFLAMMATORY ARTHRITIS: Primary | ICD-10-CM

## 2021-02-12 DIAGNOSIS — M15.0 PRIMARY OSTEOARTHRITIS INVOLVING MULTIPLE JOINTS: ICD-10-CM

## 2021-02-12 DIAGNOSIS — U07.1 CLINICAL DIAGNOSIS OF COVID-19: ICD-10-CM

## 2021-02-12 DIAGNOSIS — M31.30 GRANULOMATOSIS WITH POLYANGIITIS WITHOUT RENAL INVOLVEMENT (H): ICD-10-CM

## 2021-02-12 PROCEDURE — 99214 OFFICE O/P EST MOD 30 MIN: CPT | Mod: 95 | Performed by: INTERNAL MEDICINE

## 2021-02-12 NOTE — PATIENT INSTRUCTIONS
Get a chest Xray. Contact imaging scheduling at [phone] to schedule  Repeat lab testing in 2 months  Follow up with me in 2 months  Follow up with your primary care doctor for the chest pains

## 2021-02-12 NOTE — PROGRESS NOTES
Bria is a 76 year old who is being evaluated via a billable telephone visit.      Telephone visit - patient declined video    What phone number would you like to be contacted at? [phone] (H)    How would you like to obtain your AVS? Chidi     Phone call duration: 30 minutes      Uriah Tomas LPN  Pulmonary Division:  [facility]  Phone: [phone] Fax: [phone]      Ms. 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 Course complicated by severe upper airway inflammation, Wegener's lung and destructive/erosive joint disease. Treated initially with rituximab 375 mg/m2 x4 and high dose corticsteroids, and most recently with methotrexate.  No history of relapse. Prednisone taper complete on April 1st, 2017. No recent methotrexate. Alendronate therapy complicated by LE pain.    Rarely she will have some irritation in her throat. No real problem with breathing today. She was infected with COVID-19 in December (with positive PCR test 1--4-2021), with dyspnea and ear plugging/sinus congestion. She was treated with ATBs for this without benefit.     She has improved since the end of December, but she still has nose and ear plugging. She has some reduced hearing. Her breathing has returned to normal. Rare cough. No fevers, chills or sweats.     She has neck stiffness all the time. She has neck exercises. She has also had some right shoulder pain on occasion. Her left knee can also occasionally be a problem. She denies joint swelling, redness, or warmth. No AM stiffness. Her energy is slowly improving.     PMI:  Medical-invasive pulmonary aspirgillosis, GPA, osteoarthritis, DVT with pulmonary embolism, osteoporosis with vertebral fracture (T = -2.3 ), GERD, hyperlipidemia, +Tb exposure, nephrolithiasis, retinal tear, cataract  Surgical-lung biopsy, appendectomy, eye surgery, tonsillectomy, cone biopsy, right wrist  synovectomy  Injuries-left wrist fracture x2, left 5th toe fracture, vertebral fracture    SH:  Lives at home alone. Former smoker. No EtOH. Tuberculosis exposure as a child. Ambidextrous.    FH:  Son with mitochondrial myopathy  Daughter with palpitations  Sibs dead with colon and anal cancer  Father dead with lung cancer  Mother dead with emphysema and osteoporosis    PMSF history personally obtained and updated by me this visit in the clinic.    ROS:  +chest twinges  +allergies to fosamax amoxicillin, augmentin, erythromycin, zithromax, nickel, codeine, adhesive tape  Remainder of the 14 point ROS obtained and found negative.    Laboratory:    Component      Latest Ref Rng & Units 2/3/2021   Color Urine       Light Yellow   Appearance Urine       Clear   Glucose Urine      NEG:Negative mg/dL Negative   Bilirubin Urine      NEG:Negative Negative   Ketones Urine      NEG:Negative mg/dL Negative   Specific Gravity Urine      1.003 - 1.035 1.014   Blood Urine      NEG:Negative Small (A)   pH Urine      5.0 - 7.0 pH 6.0   Protein Albumin Urine      NEG:Negative mg/dL Negative   Urobilinogen mg/dL      0.0 - 2.0 mg/dL Normal   Nitrite Urine      NEG:Negative Negative   Leukocyte Esterase Urine      NEG:Negative Trace (A)   Source       Midstream Urine   WBC Urine      OTO5:0 - 5 /HPF 0 - 5   RBC Urine      OTO2:O - 2 /HPF 2-5 (A)   Squamous Epithelial /LPF Urine      FEW:Few /LPF Few   Mucous Urine      NEG:Negative /LPF Present (A)   WBC      4.0 - 11.0 10e9/L 5.3   RBC Count      3.8 - 5.2 10e12/L 4.87   Hemoglobin      11.7 - 15.7 g/dL 14.6   Hematocrit      35.0 - 47.0 % 44.1   MCV      78 - 100 fl 91   MCH      26.5 - 33.0 pg 30.0   MCHC      31.5 - 36.5 g/dL 33.1   RDW      10.0 - 15.0 % 13.3   Platelet Count      150 - 450 10e9/L 187   Creatinine      0.52 - 1.04 mg/dL 0.80   GFR Estimate      >60 mL/min/1.73:m2 71   GFR Estimate If Black      >60 mL/min/1.73:m2 82   Myeloperoxidase Antibody IgG      0.0 - 0.9  AI <0.2   Proteinase 3 Antibody IgG      0.0 - 0.9 AI >8.0 (H)   Neutrophil Cytoplasmic Antibody      <1:10 titer 1:160 (A)   Neutrophil Cytoplasmic Antibody Pattern       Cytoplasmic ANCA (c-ANCA) staining pattern observed and confirmed on formalin-fixed . . .   COVID-19 Antibody Screen          COVID-19 Antibody, IgG Titer          Albumin      3.4 - 5.0 g/dL 3.8   ALT      0 - 50 U/L 23   AST      0 - 45 U/L 15   Sed Rate      0 - 30 mm/h 8   CRP Inflammation      0.0 - 8.0 mg/L <2.9     Component      Latest Ref Rng & Units 2/9/2021   Color Urine          Appearance Urine          Glucose Urine      NEG:Negative mg/dL    Bilirubin Urine      NEG:Negative    Ketones Urine      NEG:Negative mg/dL    Specific Gravity Urine      1.003 - 1.035    Blood Urine      NEG:Negative    pH Urine      5.0 - 7.0 pH    Protein Albumin Urine      NEG:Negative mg/dL    Urobilinogen mg/dL      0.0 - 2.0 mg/dL    Nitrite Urine      NEG:Negative    Leukocyte Esterase Urine      NEG:Negative    Source          WBC Urine      OTO5:0 - 5 /HPF    RBC Urine      OTO2:O - 2 /HPF    Squamous Epithelial /LPF Urine      FEW:Few /LPF    Mucous Urine      NEG:Negative /LPF    WBC      4.0 - 11.0 10e9/L    RBC Count      3.8 - 5.2 10e12/L    Hemoglobin      11.7 - 15.7 g/dL    Hematocrit      35.0 - 47.0 %    MCV      78 - 100 fl    MCH      26.5 - 33.0 pg    MCHC      31.5 - 36.5 g/dL    RDW      10.0 - 15.0 %    Platelet Count      150 - 450 10e9/L    Creatinine      0.52 - 1.04 mg/dL    GFR Estimate      >60 mL/min/1.73:m2    GFR Estimate If Black      >60 mL/min/1.73:m2    Myeloperoxidase Antibody IgG      0.0 - 0.9 AI    Proteinase 3 Antibody IgG      0.0 - 0.9 AI    Neutrophil Cytoplasmic Antibody      <1:10 titer    Neutrophil Cytoplasmic Antibody Pattern          COVID-19 Antibody Screen       Positive   COVID-19 Antibody, IgG Titer       1:12,800   Albumin      3.4 - 5.0 g/dL    ALT      0 - 50 U/L    AST      0 - 45 U/L    Sed Rate       0 - 30 mm/h    CRP Inflammation      0.0 - 8.0 mg/L      Impression:    PR3-associated GPA-with history of lung involvement. Recent COVID-19 infection without known pneumonia, but increased upper respiratory congestion that persists. She did have some dyspnea that has since improved. I currently doubt a flare of GPA despite her re-development of +ANCA and PR3. Her inflammatory markers are nil and she lacks symptoms of systemic inflammation. While I can't rule out sinus and ear involvement with GPA, she previously had normal ENT exams despite GPA. So at this time we agree to watchful waiting and deferral of repeat rituximab. We agree to repeat testing in 2 months. Get CXR now.     Osteoporosis-with history of back and neck pain associated with history of vertebral fracture and severe kyphosis.  Repeat the DEXA now and reassess bone management at that time.    Plan RTC in 2 months.

## 2021-02-17 ENCOUNTER — ANCILLARY PROCEDURE (OUTPATIENT)
Dept: GENERAL RADIOLOGY | Facility: CLINIC | Age: 76
End: 2021-02-17
Attending: INTERNAL MEDICINE
Payer: COMMERCIAL

## 2021-02-17 ENCOUNTER — ANCILLARY PROCEDURE (OUTPATIENT)
Dept: BONE DENSITY | Facility: CLINIC | Age: 76
End: 2021-02-17
Attending: INTERNAL MEDICINE
Payer: COMMERCIAL

## 2021-02-17 DIAGNOSIS — M81.0 AGE-RELATED OSTEOPOROSIS WITHOUT CURRENT PATHOLOGICAL FRACTURE: ICD-10-CM

## 2021-02-17 DIAGNOSIS — U07.1 CLINICAL DIAGNOSIS OF COVID-19: ICD-10-CM

## 2021-02-17 DIAGNOSIS — M31.30 GRANULOMATOSIS WITH POLYANGIITIS WITHOUT RENAL INVOLVEMENT (H): ICD-10-CM

## 2021-02-17 DIAGNOSIS — M19.90 INFLAMMATORY ARTHRITIS: ICD-10-CM

## 2021-02-17 DIAGNOSIS — M15.0 PRIMARY OSTEOARTHRITIS INVOLVING MULTIPLE JOINTS: ICD-10-CM

## 2021-02-17 PROCEDURE — 71046 X-RAY EXAM CHEST 2 VIEWS: CPT | Performed by: RADIOLOGY

## 2021-02-17 PROCEDURE — 77085 DXA BONE DENSITY AXL VRT FX: CPT | Performed by: INTERNAL MEDICINE

## 2021-02-18 ENCOUNTER — OFFICE VISIT (OUTPATIENT)
Dept: FAMILY MEDICINE | Facility: CLINIC | Age: 76
End: 2021-02-18
Payer: COMMERCIAL

## 2021-02-18 VITALS
HEART RATE: 80 BPM | HEIGHT: 64 IN | OXYGEN SATURATION: 99 % | SYSTOLIC BLOOD PRESSURE: 112 MMHG | BODY MASS INDEX: 20.66 KG/M2 | WEIGHT: 121 LBS | TEMPERATURE: 98.1 F | RESPIRATION RATE: 18 BRPM | DIASTOLIC BLOOD PRESSURE: 80 MMHG

## 2021-02-18 DIAGNOSIS — M31.30 GRANULOMATOSIS WITH POLYANGIITIS WITHOUT RENAL INVOLVEMENT (H): ICD-10-CM

## 2021-02-18 DIAGNOSIS — Z86.16 HISTORY OF COVID-19: Primary | ICD-10-CM

## 2021-02-18 DIAGNOSIS — H69.90 DYSFUNCTION OF EUSTACHIAN TUBE, UNSPECIFIED LATERALITY: ICD-10-CM

## 2021-02-18 PROCEDURE — 99213 OFFICE O/P EST LOW 20 MIN: CPT | Performed by: FAMILY MEDICINE

## 2021-02-18 ASSESSMENT — MIFFLIN-ST. JEOR: SCORE: 1010.91

## 2021-02-18 ASSESSMENT — PAIN SCALES - GENERAL: PAINLEVEL: NO PAIN (0)

## 2021-02-18 NOTE — PROGRESS NOTES
"    Assessment & Plan     History of COVID-19  Doing better    Dysfunction of Eustachian tube, unspecified laterality  Ears Look fine  She says symptoms are Improving    Granulomatosis with polyangiitis without renal involvement (H)  Sees Rheumatology  Notes reviewed  CXR done yesterday was normal     Return in about 2 months (around 4/18/2021) for Medicare wellness Exam.    Pastora Schroeder MD  [facility] SUDHIR Fraser is a 76 year old who presents for the following health issuesHPI         Acute Illness  Acute illness concerns: little cough -Pt had covid in December  Is getting better  Ears were Plugged but better  Cough is better  No fever or chills  No loss of bingham or smell  CXR  Done yesterday was normal   Review of Systems   CONSTITUTIONAL: NEGATIVE for fever, chills, change in weight  INTEGUMENTARY/SKIN: NEGATIVE for worrisome rashes, moles or lesions  ENT/MOUTH: as above  RESP: NEGATIVE for significant cough or SOB  CV: NEGATIVE for chest pain, palpitations or peripheral edema  GI: NEGATIVE for nausea, abdominal pain, heartburn, or change in bowel habits  PSYCHIATRIC: NEGATIVE for changes in mood or affect  ROS otherwise negative      Objective    /80   Pulse 80   Temp 98.1  F (36.7  C) (Oral)   Resp 18   Ht 1.613 m (5' 3.5\")   Wt 54.9 kg (121 lb)   SpO2 99%   BMI 21.10 kg/m    Body mass index is 21.1 kg/m .  Physical Exam   GENERAL: healthy, alert and no distress  HENT: ear canals and TM's normal, nose and mouth without ulcers or lesions  NECK: no adenopathy, no asymmetry, masses, or scars and thyroid normal to palpation  RESP: lungs clear to auscultation - no rales, rhonchi or wheezes  CV: regular rate and rhythm, normal S1 S2, no S3 or S4, no murmur, click or rub, no peripheral edema and peripheral pulses strong  ABDOMEN: soft, nontender, no hepatosplenomegaly, no masses and bowel sounds normal  MS: no gross musculoskeletal defects noted, no edema    Reviewed last Xray " and labs

## 2021-03-09 ENCOUNTER — TRANSFERRED RECORDS (OUTPATIENT)
Dept: HEALTH INFORMATION MANAGEMENT | Facility: CLINIC | Age: 76
End: 2021-03-09

## 2021-03-10 ENCOUNTER — DOCUMENTATION ONLY (OUTPATIENT)
Dept: OPHTHALMOLOGY | Facility: CLINIC | Age: 76
End: 2021-03-10

## 2021-03-28 ENCOUNTER — MYC MEDICAL ADVICE (OUTPATIENT)
Dept: OPHTHALMOLOGY | Facility: CLINIC | Age: 76
End: 2021-03-28

## 2021-03-29 ENCOUNTER — OFFICE VISIT (OUTPATIENT)
Dept: OPHTHALMOLOGY | Facility: CLINIC | Age: 76
End: 2021-03-29
Payer: COMMERCIAL

## 2021-03-29 DIAGNOSIS — H02.9 LESION OF RIGHT EYELID: ICD-10-CM

## 2021-03-29 DIAGNOSIS — Z96.1 PSEUDOPHAKIA: ICD-10-CM

## 2021-03-29 DIAGNOSIS — H35.342 MACULAR HOLE OF LEFT EYE: ICD-10-CM

## 2021-03-29 DIAGNOSIS — H52.4 PRESBYOPIA: ICD-10-CM

## 2021-03-29 DIAGNOSIS — H25.811 COMBINED FORMS OF AGE-RELATED CATARACT OF RIGHT EYE: Primary | ICD-10-CM

## 2021-03-29 DIAGNOSIS — Z01.01 ENCOUNTER FOR EXAMINATION OF EYES AND VISION WITH ABNORMAL FINDINGS: ICD-10-CM

## 2021-03-29 DIAGNOSIS — Z98.890 HISTORY OF VITRECTOMY: ICD-10-CM

## 2021-03-29 PROCEDURE — 92014 COMPRE OPH EXAM EST PT 1/>: CPT | Performed by: OPHTHALMOLOGY

## 2021-03-29 PROCEDURE — 92015 DETERMINE REFRACTIVE STATE: CPT | Performed by: OPHTHALMOLOGY

## 2021-03-29 ASSESSMENT — REFRACTION_WEARINGRX
OD_SPHERE: +1.75
OS_VBASE: UP
OS_HBASE: OUT
OD_ADD: +2.50
OD_HPRISM: 5
OS_SPHERE: -0.50
OD_VPRISM: 3
OD_VBASE: DOWN
OS_CYLINDER: +1.25
OS_VPRISM: 2
OD_AXIS: 124
OS_AXIS: 034
OS_HPRISM: 3
OS_ADD: +2.50
OD_HBASE: OUT
SPECS_TYPE: PAL
OD_CYLINDER: +0.25

## 2021-03-29 ASSESSMENT — CONF VISUAL FIELD
OS_INFERIOR_NASAL_RESTRICTION: 2
OS_SUPERIOR_TEMPORAL_RESTRICTION: 2
OS_SUPERIOR_NASAL_RESTRICTION: 2
OD_NORMAL: 1
OS_INFERIOR_TEMPORAL_RESTRICTION: 2

## 2021-03-29 ASSESSMENT — VISUAL ACUITY
OD_PH_CC+: -1
METHOD: SNELLEN - LINEAR
CORRECTION_TYPE: GLASSES
OD_CC: 20/40
OD_PH_CC: 20/30
OS_CC+: -1
OS_CC: 20/20
OD_CC+: +2

## 2021-03-29 ASSESSMENT — REFRACTION_MANIFEST
OD_SPHERE: +0.75
OD_CYLINDER: +0.25
OS_SPHERE: -0.50
OS_AXIS: 035
OD_AXIS: 175
OD_ADD: +2.75
OS_CYLINDER: +1.25
OS_ADD: +2.75

## 2021-03-29 ASSESSMENT — TONOMETRY
IOP_METHOD: APPLANATION
OD_IOP_MMHG: 10
OS_IOP_MMHG: 11

## 2021-03-29 NOTE — LETTER
3/29/2021         RE: Nury Cárdenas  [address]  [place] [address]        Dear Colleague,    Thank you for referring your patient, Nury Cárdenas, to the [facility]. Please see a copy of my visit note below.     Current Eye Medications: none     Subjective:  Here for complete today. Had cataract surgery left eye, feels it is getting more cloudy and sometimes things look wavy in the center. Also right eye is harder to see with. Has a bump RLL she would like looked at. Swells up at night, and around the orbit is purple color. Started smaller and getting larger now.     Objective:  See Ophthalmology Exam.       Assessment:  Distortion left eye from previous macular hole; mild worsening of cataract right eye - not yet visually significant in better seeing eye.  Probable inclusion cyst right lateral canthus.      ICD-10-CM    1. Combined forms of age-related cataract, mild-mod, of right eye  H25.811    2. Pseudophakia, Yag Caps, os  Z96.1    3. History of vitrectomy - macular hole, os (MVE)  Z98.890    4. Hx of macular hole of left eye  H35.342    5. Lesion of right eyelid  H02.9    6. Encounter for examination of eyes and vision with abnormal findings  Z01.01    7. Presbyopia  H52.4         Plan:  Glasses Rx given - optional  Will continue to monitor cataract right eye.  Continue care with Dr. Petersen.  Possible posterior vitreous detachment (sudden onset large floater and/or flashing lights) right eye discussed.   Keep area of lesion clean; can put Bacitracin on 2-3 times daily.  Return visit at convenience end of day for eyelid lesion removal (Lido, snip, submit,possible suture).  Wiley Mcleod M.D.  [phone]             Again, thank you for allowing me to participate in the care of your patient.        Sincerely,        Wiley Mcleod MD

## 2021-03-29 NOTE — PROGRESS NOTES
Chief Complaint - Hearing loss    History of Present Illness - Nury Cárdenas is a 76 year old female who presents to me today with hearing loss in both ears.  It has been present and noticeable for approximately since January. She developed COVID in January. Ears and nose plugged. Antibiotics helped a little. She has a h/o Wegener's. The past weekend she blew nose and ears popped and crackled. However, they have closed back up. There is no history of chronic ear disease or ear surgery. With regards to recreational, , and work-related noise exposure she has a lot with shooting. She sometimes has a little dizziness. Mild right ear pain under ear.     Past Medical History -   Patient Active Problem List   Diagnosis     GERD (gastroesophageal reflux disease)     Fibroids     Migraines     Hearing loss     Osteopenia     HYPERLIPIDEMIA LDL GOAL <160     Advanced directives, counseling/discussion     Inflammatory arthritis     Synovitis of wrist     Chronic constipation     Granulomatosis with polyangiitis without renal involvement (H)     Chronic steroid use     Dyspnea     Pulmonary embolism (H)     Cataract, mild, ou     Calculus of kidney, right     Chronic anticoagulation     Long-term (current) use of anticoagulants [Z79.01]     Long-term use of immunosuppressant medication     Primary osteoarthritis involving multiple joints     Cellulitis     Cat scratch left lower extremity     Other pulmonary embolism without acute cor pulmonale, unspecified chronicity (H)     Age-related osteoporosis without current pathological fracture     Age-related osteoporosis with current pathological fracture, sequela     Adverse effects of medication     Combined forms of age-related cataract, mild, of right eye     Pseudophakia, Yag Caps, os     History of vitrectomy - macular hole, os (MVE)     Hx of macular hole of left eye     Hemifacial spasm     Immunosuppression (H)     Clinical diagnosis of COVID-19     History of  COVID-19       Current Medications -   Current Outpatient Medications:      acetaminophen (TYLENOL) 325 MG tablet, Take 2 tablets (650 mg) by mouth every 6 hours as needed for mild pain, Disp: 100 tablet, Rfl: 0     calcium-magnesium (CALMAG) 500-250 MG TABS per tablet, Take 1 tablet by mouth 2 times daily (with meals), Disp: , Rfl:      cholecalciferol 1000 UNITS TABS, Take 1,000 Units by mouth daily, Disp: , Rfl:      propranolol (INDERAL) 20 MG tablet, TAKE 1 TABLET BY MOUTH DAILY, Disp: 90 tablet, Rfl: 1     valACYclovir (VALTREX) 500 MG tablet, Take 1 tablet (500 mg) by mouth 2 times daily as needed (outbreaks), Disp: 6 tablet, Rfl: 11     Vitamin K 100 MCG TABS, Take 1 tablet by mouth daily Taking Vitamin K2., Disp: , Rfl:   No current facility-administered medications for this visit.     Facility-Administered Medications Ordered in Other Visits:      sodium chloride (PF) 0.9% PF flush 60 mL, 60 mL, Intravenous, Once, Phil Abbott MD    Allergies -   Allergies   Allergen Reactions     Vancomycin Other (See Comments)     Red man's syndrom     Adhesive Tape      Rash itches     Amoxicillin      vomiting     Augmentin      vomiting     Codeine      vomiting     Erythromycin      vomiting     Nickel      itches rash     Sulfa Drugs Itching     Tegaderm Alginate Ag      LW Other1: -ALL MEDICATIONS MAKE PATIENT VIOLENTLY ILL; ADHESIVE BANDAGES; NICKEL     Zithromax [Azithromycin Dihydrate]      Vomiting/ skin blisters       Social History -   Social History     Socioeconomic History     Marital status:      Spouse name: Not on file     Number of children: Not on file     Years of education: Not on file     Highest education level: Not on file   Occupational History     Not on file   Social Needs     Financial resource strain: Not on file     Food insecurity     Worry: Not on file     Inability: Not on file     Transportation needs     Medical: Not on file     Non-medical: Not on file   Tobacco Use      Smoking status: Former Smoker     Packs/day: 2.00     Years: 20.00     Pack years: 40.00     Types: Cigarettes     Start date: 1961     Quit date: 1983     Years since quittin.6     Smokeless tobacco: Never Used   Substance and Sexual Activity     Alcohol use: No     Alcohol/week: 0.0 standard drinks     Drug use: No     Sexual activity: Never   Lifestyle     Physical activity     Days per week: Not on file     Minutes per session: Not on file     Stress: Not on file   Relationships     Social connections     Talks on phone: Not on file     Gets together: Not on file     Attends Scientologist service: Not on file     Active member of club or organization: Not on file     Attends meetings of clubs or organizations: Not on file     Relationship status: Not on file     Intimate partner violence     Fear of current or ex partner: Not on file     Emotionally abused: Not on file     Physically abused: Not on file     Forced sexual activity: Not on file   Other Topics Concern     Parent/sibling w/ CABG, MI or angioplasty before 65F 55M? No   Social History Narrative     Not on file       Family History -   Family History   Problem Relation Age of Onset     Respiratory Mother         emphysema     Aneurysm Mother      Chronic Obstructive Pulmonary Disease Mother      Thyroid Disease Mother         ,     Osteoporosis Mother      Other Cancer Father      Cancer Father         lung cancer     Arthritis Maternal Grandmother         rheumatoid     Heart Disease Maternal Grandmother         unsure of details     Heart Disease Maternal Grandfather      Neurologic Disorder Paternal Grandmother         migraines     Alzheimer Disease Paternal Grandmother      Unknown/Adopted Paternal Grandfather      Cancer Sister         stage 4 anal cancer age 62 years     Colon Cancer Sister      Cancer - colorectal Brother      Colon Cancer Brother      Substance Abuse Sister      Anesthesia Reaction Daughter        Review of  "Systems - As per HPI and PMHx, otherwise 7 system review is negative.    Physical Exam  /73   Pulse 80   Resp 16   Ht 1.651 m (5' 5\")   Wt 55.3 kg (122 lb)   SpO2 96%   BMI 20.30 kg/m    General - The patient is in no distress.  Alert and oriented to person and place, answers questions and cooperates with examination appropriately.   Voice and Breathing - The patient was breathing comfortably without the use of accessory muscles. There was no wheezing, stridor, or stertor.  The patients voice was clear and strong.  Ears - The auricles are normal. Right tympanic membrane with retraction and myringostapediopexy. no cholesteatoma. Some injection on the tympanic membrane. No effusion. Left tympanic membrane also with some retraction, but less than the right. No effusion. No perforations.  Eyes - Extraocular movements intact.  Sclera were not icteric or injected.  Mouth - Examination of the oral cavity showed pink, healthy mucosa. No lesions or ulcerations noted.  The tongue was mobile and midline.  Throat - The walls of the oropharynx were smooth, symmetric, and had no lesions or ulcerations. The uvula was midline on elevation.   Nose - open airway, septum midline. No congestion. No polyps.   Neck - Soft, non-tender. Palpation of the occipital, submental, submandibular, internal jugular chain, and supraclavicular nodes did not demonstrate any abnormal lymph nodes or masses. Parotid glands had no masses. Palpation of the thyroid was soft and smooth, with no nodules or goiter appreciated.  The trachea was mobile and midline.  Neurological - Cranial nerves 2 through 12 were grossly intact. House-Brackmann grade 1 out of 6 bilaterally.       Audiologic Studies - An audiogram and tympanogram were performed today as part of the evaluation and personally reviewed. The tympanogram shows type Ad curve left, type C right. The audiogram showed bilateral mixed hearing loss down-sloping in the high frequencies. Left ear " slightly worse.     Assessment and Plan - Nury RENETTA Cárdenas is a 76 year old female who presents to me today with mixed hearing loss. Has long-standing down-sloping sensorineural hearing loss, but like has developed ETD since COVID infection in January. Now has tympanic membrane retraction with right myringostapedopexy. No effusion or perforation. I recommend valsalva to equalize ear pressure. No medications as she has no nasal congestion or effusion or infection. I recommend hearing aids.     Marshall Car MD  Otolaryngology  [facility]

## 2021-03-29 NOTE — PROGRESS NOTES
Current Eye Medications: none     Subjective:  Here for complete today. Had cataract surgery left eye, feels it is getting more cloudy and sometimes things look wavy in the center. Also right eye is harder to see with. Has a bump RLL she would like looked at. Swells up at night, and around the orbit is purple color. Started smaller and getting larger now.     Objective:  See Ophthalmology Exam.       Assessment:  Distortion left eye from previous macular hole; mild worsening of cataract right eye - not yet visually significant in better seeing eye.  Probable inclusion cyst right lateral canthus.      ICD-10-CM    1. Combined forms of age-related cataract, mild-mod, of right eye  H25.811    2. Pseudophakia, Yag Caps, os  Z96.1    3. History of vitrectomy - macular hole, os (MVE)  Z98.890    4. Hx of macular hole of left eye  H35.342    5. Lesion of right eyelid  H02.9    6. Encounter for examination of eyes and vision with abnormal findings  Z01.01    7. Presbyopia  H52.4         Plan:  Glasses Rx given - optional  Will continue to monitor cataract right eye.  Continue care with Dr. Petersen.  Possible posterior vitreous detachment (sudden onset large floater and/or flashing lights) right eye discussed.   Keep area of lesion clean; can put Bacitracin on 2-3 times daily.  Return visit at convenience end of day for eyelid lesion removal (Lido, snip, submit,possible suture).  Wiley Mcleod M.D.  [phone]

## 2021-03-29 NOTE — PATIENT INSTRUCTIONS
Glasses Rx given - optional  Will continue to monitor cataract right eye.  Continue care with Dr. Petersen.  Possible posterior vitreous detachment (sudden onset large floater and/or flashing lights) right eye discussed.   Keep area of lesion clean; can put Bacitracin on 2-3 times daily.  Return visit at convenience end of day for eyelid lesion removal.  Wiley Mcleod M.D.  [phone]

## 2021-03-30 ENCOUNTER — OFFICE VISIT (OUTPATIENT)
Dept: AUDIOLOGY | Facility: CLINIC | Age: 76
End: 2021-03-30
Payer: COMMERCIAL

## 2021-03-30 ENCOUNTER — OFFICE VISIT (OUTPATIENT)
Dept: OTOLARYNGOLOGY | Facility: CLINIC | Age: 76
End: 2021-03-30
Payer: COMMERCIAL

## 2021-03-30 VITALS
BODY MASS INDEX: 20.33 KG/M2 | HEART RATE: 80 BPM | HEIGHT: 65 IN | OXYGEN SATURATION: 96 % | SYSTOLIC BLOOD PRESSURE: 111 MMHG | DIASTOLIC BLOOD PRESSURE: 73 MMHG | RESPIRATION RATE: 16 BRPM | WEIGHT: 122 LBS

## 2021-03-30 DIAGNOSIS — H90.6 MIXED HEARING LOSS, BILATERAL: Primary | ICD-10-CM

## 2021-03-30 DIAGNOSIS — H90.A21 SENSORINEURAL HEARING LOSS (SNHL) OF RIGHT EAR WITH RESTRICTED HEARING OF LEFT EAR: Primary | ICD-10-CM

## 2021-03-30 DIAGNOSIS — H69.93 DYSFUNCTION OF BOTH EUSTACHIAN TUBES: ICD-10-CM

## 2021-03-30 DIAGNOSIS — H90.A32 MIXED CONDUCTIVE AND SENSORINEURAL HEARING LOSS OF LEFT EAR WITH RESTRICTED HEARING OF RIGHT EAR: ICD-10-CM

## 2021-03-30 DIAGNOSIS — H69.93 DISORDER OF BOTH EUSTACHIAN TUBES: ICD-10-CM

## 2021-03-30 PROBLEM — H25.811 COMBINED FORMS OF AGE-RELATED CATARACT OF RIGHT EYE: Status: ACTIVE | Noted: 2019-10-20

## 2021-03-30 PROBLEM — H02.9 LESION OF RIGHT EYELID: Status: ACTIVE | Noted: 2021-03-30

## 2021-03-30 PROCEDURE — 92557 COMPREHENSIVE HEARING TEST: CPT | Performed by: AUDIOLOGIST

## 2021-03-30 PROCEDURE — 99207 PR NO CHARGE LOS: CPT | Performed by: AUDIOLOGIST

## 2021-03-30 PROCEDURE — 92567 TYMPANOMETRY: CPT | Performed by: AUDIOLOGIST

## 2021-03-30 PROCEDURE — 99203 OFFICE O/P NEW LOW 30 MIN: CPT | Performed by: OTOLARYNGOLOGY

## 2021-03-30 ASSESSMENT — MIFFLIN-ST. JEOR: SCORE: 1039.27

## 2021-03-30 ASSESSMENT — CUP TO DISC RATIO
OD_RATIO: 0.3
OS_RATIO: 0.4

## 2021-03-30 ASSESSMENT — EXTERNAL EXAM - LEFT EYE: OS_EXAM: 1+ BROW PTOSIS, PROLAPSED FAT PADS: UPPER, LOWER

## 2021-03-30 ASSESSMENT — SLIT LAMP EXAM - LIDS: COMMENTS: 1+ DERMATOCHALASIS - UPPER LID, COLLARETTES

## 2021-03-30 ASSESSMENT — EXTERNAL EXAM - RIGHT EYE: OD_EXAM: 1+ BROW PTOSIS, PROLAPSED FAT PADS: UPPER, LOWER

## 2021-03-30 NOTE — LETTER
3/30/2021         RE: Nury Cárdenas  [address] [facility] [address]        Dear Colleague,    Thank you for referring your patient, Nury Cárdenas, to the [facility]. Please see a copy of my visit note below.    Chief Complaint - Hearing loss    History of Present Illness - Nury Cárdenas is a 76 year old female who presents to me today with hearing loss in both ears.  It has been present and noticeable for approximately since January. She developed COVID in January. Ears and nose plugged. Antibiotics helped a little. She has a h/o Wegener's. The past weekend she blew nose and ears popped and crackled. However, they have closed back up. There is no history of chronic ear disease or ear surgery. With regards to recreational, , and work-related noise exposure she has a lot with shooting. She sometimes has a little dizziness. Mild right ear pain under ear.     Past Medical History -   Patient Active Problem List   Diagnosis     GERD (gastroesophageal reflux disease)     Fibroids     Migraines     Hearing loss     Osteopenia     HYPERLIPIDEMIA LDL GOAL <160     Advanced directives, counseling/discussion     Inflammatory arthritis     Synovitis of wrist     Chronic constipation     Granulomatosis with polyangiitis without renal involvement (H)     Chronic steroid use     Dyspnea     Pulmonary embolism (H)     Cataract, mild, ou     Calculus of kidney, right     Chronic anticoagulation     Long-term (current) use of anticoagulants [Z79.01]     Long-term use of immunosuppressant medication     Primary osteoarthritis involving multiple joints     Cellulitis     Cat scratch left lower extremity     Other pulmonary embolism without acute cor pulmonale, unspecified chronicity (H)     Age-related osteoporosis without current pathological fracture     Age-related osteoporosis with current pathological fracture, sequela     Adverse effects of medication     Combined forms of  age-related cataract, mild, of right eye     Pseudophakia, Yag Caps, os     History of vitrectomy - macular hole, os (MVE)     Hx of macular hole of left eye     Hemifacial spasm     Immunosuppression (H)     Clinical diagnosis of COVID-19     History of COVID-19       Current Medications -   Current Outpatient Medications:      acetaminophen (TYLENOL) 325 MG tablet, Take 2 tablets (650 mg) by mouth every 6 hours as needed for mild pain, Disp: 100 tablet, Rfl: 0     calcium-magnesium (CALMAG) 500-250 MG TABS per tablet, Take 1 tablet by mouth 2 times daily (with meals), Disp: , Rfl:      cholecalciferol 1000 UNITS TABS, Take 1,000 Units by mouth daily, Disp: , Rfl:      propranolol (INDERAL) 20 MG tablet, TAKE 1 TABLET BY MOUTH DAILY, Disp: 90 tablet, Rfl: 1     valACYclovir (VALTREX) 500 MG tablet, Take 1 tablet (500 mg) by mouth 2 times daily as needed (outbreaks), Disp: 6 tablet, Rfl: 11     Vitamin K 100 MCG TABS, Take 1 tablet by mouth daily Taking Vitamin K2., Disp: , Rfl:   No current facility-administered medications for this visit.     Facility-Administered Medications Ordered in Other Visits:      sodium chloride (PF) 0.9% PF flush 60 mL, 60 mL, Intravenous, Once, Phil Abbott MD    Allergies -   Allergies   Allergen Reactions     Vancomycin Other (See Comments)     Red man's syndrom     Adhesive Tape      Rash itches     Amoxicillin      vomiting     Augmentin      vomiting     Codeine      vomiting     Erythromycin      vomiting     Nickel      itches rash     Sulfa Drugs Itching     Tegaderm Alginate Ag      LW Other1: -ALL MEDICATIONS MAKE PATIENT VIOLENTLY ILL; ADHESIVE BANDAGES; NICKEL     Zithromax [Azithromycin Dihydrate]      Vomiting/ skin blisters       Social History -   Social History     Socioeconomic History     Marital status:      Spouse name: Not on file     Number of children: Not on file     Years of education: Not on file     Highest education level: Not on file    Occupational History     Not on file   Social Needs     Financial resource strain: Not on file     Food insecurity     Worry: Not on file     Inability: Not on file     Transportation needs     Medical: Not on file     Non-medical: Not on file   Tobacco Use     Smoking status: Former Smoker     Packs/day: 2.00     Years: 20.00     Pack years: 40.00     Types: Cigarettes     Start date: 1961     Quit date: 1983     Years since quittin.6     Smokeless tobacco: Never Used   Substance and Sexual Activity     Alcohol use: No     Alcohol/week: 0.0 standard drinks     Drug use: No     Sexual activity: Never   Lifestyle     Physical activity     Days per week: Not on file     Minutes per session: Not on file     Stress: Not on file   Relationships     Social connections     Talks on phone: Not on file     Gets together: Not on file     Attends Muslim service: Not on file     Active member of club or organization: Not on file     Attends meetings of clubs or organizations: Not on file     Relationship status: Not on file     Intimate partner violence     Fear of current or ex partner: Not on file     Emotionally abused: Not on file     Physically abused: Not on file     Forced sexual activity: Not on file   Other Topics Concern     Parent/sibling w/ CABG, MI or angioplasty before 65F 55M? No   Social History Narrative     Not on file       Family History -   Family History   Problem Relation Age of Onset     Respiratory Mother         emphysema     Aneurysm Mother      Chronic Obstructive Pulmonary Disease Mother      Thyroid Disease Mother         ,     Osteoporosis Mother      Other Cancer Father      Cancer Father         lung cancer     Arthritis Maternal Grandmother         rheumatoid     Heart Disease Maternal Grandmother         unsure of details     Heart Disease Maternal Grandfather      Neurologic Disorder Paternal Grandmother         migraines     Alzheimer Disease Paternal Grandmother       "Unknown/Adopted Paternal Grandfather      Cancer Sister         stage 4 anal cancer age 62 years     Colon Cancer Sister      Cancer - colorectal Brother      Colon Cancer Brother      Substance Abuse Sister      Anesthesia Reaction Daughter        Review of Systems - As per HPI and PMHx, otherwise 7 system review is negative.    Physical Exam  /73   Pulse 80   Resp 16   Ht 1.651 m (5' 5\")   Wt 55.3 kg (122 lb)   SpO2 96%   BMI 20.30 kg/m    General - The patient is in no distress.  Alert and oriented to person and place, answers questions and cooperates with examination appropriately.   Voice and Breathing - The patient was breathing comfortably without the use of accessory muscles. There was no wheezing, stridor, or stertor.  The patients voice was clear and strong.  Ears - The auricles are normal. Right tympanic membrane with retraction and myringostapediopexy. no cholesteatoma. Some injection on the tympanic membrane. No effusion. Left tympanic membrane also with some retraction, but less than the right. No effusion. No perforations.  Eyes - Extraocular movements intact.  Sclera were not icteric or injected.  Mouth - Examination of the oral cavity showed pink, healthy mucosa. No lesions or ulcerations noted.  The tongue was mobile and midline.  Throat - The walls of the oropharynx were smooth, symmetric, and had no lesions or ulcerations. The uvula was midline on elevation.   Nose - open airway, septum midline. No congestion. No polyps.   Neck - Soft, non-tender. Palpation of the occipital, submental, submandibular, internal jugular chain, and supraclavicular nodes did not demonstrate any abnormal lymph nodes or masses. Parotid glands had no masses. Palpation of the thyroid was soft and smooth, with no nodules or goiter appreciated.  The trachea was mobile and midline.  Neurological - Cranial nerves 2 through 12 were grossly intact. House-Brackmann grade 1 out of 6 bilaterally.       Audiologic " Studies - An audiogram and tympanogram were performed today as part of the evaluation and personally reviewed. The tympanogram shows type Ad curve left, type C right. The audiogram showed bilateral mixed hearing loss down-sloping in the high frequencies. Left ear slightly worse.     Assessment and Plan - Nury Cárdenas is a 76 year old female who presents to me today with mixed hearing loss. Has long-standing down-sloping sensorineural hearing loss, but like has developed ETD since COVID infection in January. Now has tympanic membrane retraction with right myringostapedopexy. No effusion or perforation. I recommend valsalva to equalize ear pressure. No medications as she has no nasal congestion or effusion or infection. I recommend hearing aids.     Marshall Car MD  Otolaryngology  [facility]          Again, thank you for allowing me to participate in the care of your patient.        Sincerely,        Marshall Car MD

## 2021-03-30 NOTE — PROGRESS NOTES
AUDIOLOGY REPORT:    Patient was referred from ENT by Dr. Car for audiology evaluation. The patient reports that she had COVID in January, which caused her ears and nose to be plugged. Her ears have continued to feel plugged, so she was prescribed antibiotics. She reports that she has recently had ear crackling. She notes that she sometimes feels woozy and nauseated. She reports gradually worsening hearing but no major changes recently. The patient reports a history of sinus problems and neck problems, and notes a history of ear pain with pressure changes, including one time when she had bleeding from her ear after flying. She was told at one point many years ago that she may have had a tympanic membrane perforation, but there was no ear trauma or infection at the time that was likely to have caused it. The patient reports bilateral tinnitus (described as a hissing sound) since 1982. She reports a history of noise exposure from trap shooting (right handed) without hearing protection. The patient reports that she does not have a family history of hearing loss and has not had ear surgery.    Testing:    Otoscopy:   Otoscopic exam indicates ears are clear of cerumen bilaterally     Tympanograms:    RIGHT: negative pressure      LEFT:   hypercompliant eardrum mobility    Reflexes (reported by stimulus ear):  Could not seal    Thresholds:   Pure Tone Thresholds assessed using conventional audiometry with good reliability from 250-8000 Hz bilaterally using insert earphones and circumaural headphones     RIGHT:  mild to severe mixed hearing loss    LEFT:    mild to severe essentially sensorineural hearing loss    Speech Reception Threshold:    RIGHT: 35 dB HL    LEFT:   35 dB HL  Results are in agreement with pure tone average.     Word Recognition Score:     RIGHT: 100% at 75 dB HL using NU-6 recorded word list.    LEFT:   96% at 75 dB HL using NU-6 recorded word list.    Discussed results with the patient.     Patient  was returned to ENT for follow up.     Cynthia Linn, CCC-A  Licensed Audiologist #[id number]  3/30/2021

## 2021-04-03 ENCOUNTER — HEALTH MAINTENANCE LETTER (OUTPATIENT)
Age: 76
End: 2021-04-03

## 2021-04-06 ENCOUNTER — MYC MEDICAL ADVICE (OUTPATIENT)
Dept: OPHTHALMOLOGY | Facility: CLINIC | Age: 76
End: 2021-04-06

## 2021-04-20 DIAGNOSIS — M19.90 INFLAMMATORY ARTHRITIS: ICD-10-CM

## 2021-04-20 DIAGNOSIS — U07.1 CLINICAL DIAGNOSIS OF COVID-19: ICD-10-CM

## 2021-04-20 DIAGNOSIS — M31.30 GRANULOMATOSIS WITH POLYANGIITIS WITHOUT RENAL INVOLVEMENT (H): ICD-10-CM

## 2021-04-20 DIAGNOSIS — M15.0 PRIMARY OSTEOARTHRITIS INVOLVING MULTIPLE JOINTS: ICD-10-CM

## 2021-04-20 LAB
ALBUMIN SERPL-MCNC: 3.5 G/DL (ref 3.4–5)
ALP SERPL-CCNC: 76 U/L (ref 40–150)
ALT SERPL W P-5'-P-CCNC: 24 U/L (ref 0–50)
ANION GAP SERPL CALCULATED.3IONS-SCNC: <1 MMOL/L (ref 3–14)
AST SERPL W P-5'-P-CCNC: 19 U/L (ref 0–45)
BASOPHILS # BLD AUTO: 0 10E9/L (ref 0–0.2)
BASOPHILS NFR BLD AUTO: 0.7 %
BILIRUB SERPL-MCNC: 0.4 MG/DL (ref 0.2–1.3)
BUN SERPL-MCNC: 14 MG/DL (ref 7–30)
CALCIUM SERPL-MCNC: 9.4 MG/DL (ref 8.5–10.1)
CHLORIDE SERPL-SCNC: 107 MMOL/L (ref 94–109)
CO2 SERPL-SCNC: 33 MMOL/L (ref 20–32)
CREAT SERPL-MCNC: 0.81 MG/DL (ref 0.52–1.04)
CRP SERPL-MCNC: <2.9 MG/L (ref 0–8)
DIFFERENTIAL METHOD BLD: NORMAL
EOSINOPHIL # BLD AUTO: 0.1 10E9/L (ref 0–0.7)
EOSINOPHIL NFR BLD AUTO: 1.2 %
ERYTHROCYTE [DISTWIDTH] IN BLOOD BY AUTOMATED COUNT: 14.1 % (ref 10–15)
ERYTHROCYTE [SEDIMENTATION RATE] IN BLOOD BY WESTERGREN METHOD: 6 MM/H (ref 0–30)
GFR SERPL CREATININE-BSD FRML MDRD: 70 ML/MIN/{1.73_M2}
GLUCOSE SERPL-MCNC: 80 MG/DL (ref 70–99)
HCT VFR BLD AUTO: 44.4 % (ref 35–47)
HGB BLD-MCNC: 14.9 G/DL (ref 11.7–15.7)
LYMPHOCYTES # BLD AUTO: 1.4 10E9/L (ref 0.8–5.3)
LYMPHOCYTES NFR BLD AUTO: 24.1 %
MCH RBC QN AUTO: 30.4 PG (ref 26.5–33)
MCHC RBC AUTO-ENTMCNC: 33.6 G/DL (ref 31.5–36.5)
MCV RBC AUTO: 91 FL (ref 78–100)
MONOCYTES # BLD AUTO: 0.6 10E9/L (ref 0–1.3)
MONOCYTES NFR BLD AUTO: 10.3 %
NEUTROPHILS # BLD AUTO: 3.7 10E9/L (ref 1.6–8.3)
NEUTROPHILS NFR BLD AUTO: 63.7 %
PLATELET # BLD AUTO: 165 10E9/L (ref 150–450)
POTASSIUM SERPL-SCNC: 4.1 MMOL/L (ref 3.4–5.3)
PROT SERPL-MCNC: 6.3 G/DL (ref 6.8–8.8)
RBC # BLD AUTO: 4.9 10E12/L (ref 3.8–5.2)
SODIUM SERPL-SCNC: 140 MMOL/L (ref 133–144)
WBC # BLD AUTO: 5.7 10E9/L (ref 4–11)

## 2021-04-20 PROCEDURE — 83516 IMMUNOASSAY NONANTIBODY: CPT | Performed by: INTERNAL MEDICINE

## 2021-04-20 PROCEDURE — 83876 ASSAY MYELOPEROXIDASE: CPT | Performed by: INTERNAL MEDICINE

## 2021-04-20 PROCEDURE — 85025 COMPLETE CBC W/AUTO DIFF WBC: CPT | Performed by: INTERNAL MEDICINE

## 2021-04-20 PROCEDURE — 36415 COLL VENOUS BLD VENIPUNCTURE: CPT | Performed by: INTERNAL MEDICINE

## 2021-04-20 PROCEDURE — 86255 FLUORESCENT ANTIBODY SCREEN: CPT | Performed by: INTERNAL MEDICINE

## 2021-04-20 PROCEDURE — 86256 FLUORESCENT ANTIBODY TITER: CPT | Performed by: INTERNAL MEDICINE

## 2021-04-20 PROCEDURE — 86140 C-REACTIVE PROTEIN: CPT | Performed by: INTERNAL MEDICINE

## 2021-04-20 PROCEDURE — 80053 COMPREHEN METABOLIC PANEL: CPT | Performed by: INTERNAL MEDICINE

## 2021-04-20 PROCEDURE — 85652 RBC SED RATE AUTOMATED: CPT | Performed by: INTERNAL MEDICINE

## 2021-04-21 LAB
ANCA AB PATTERN SER IF-IMP: ABNORMAL
C-ANCA TITR SER IF: ABNORMAL {TITER}
MYELOPEROXIDASE AB SER-ACNC: <0.2 AI (ref 0–0.9)
PROTEINASE3 IGG SER-ACNC: >8 AI (ref 0–0.9)

## 2021-04-23 ENCOUNTER — VIRTUAL VISIT (OUTPATIENT)
Dept: RHEUMATOLOGY | Facility: CLINIC | Age: 76
End: 2021-04-23
Payer: COMMERCIAL

## 2021-04-23 DIAGNOSIS — M15.0 PRIMARY OSTEOARTHRITIS INVOLVING MULTIPLE JOINTS: ICD-10-CM

## 2021-04-23 DIAGNOSIS — M31.30 GRANULOMATOSIS WITH POLYANGIITIS WITHOUT RENAL INVOLVEMENT (H): ICD-10-CM

## 2021-04-23 DIAGNOSIS — M81.0 AGE-RELATED OSTEOPOROSIS WITHOUT CURRENT PATHOLOGICAL FRACTURE: Primary | ICD-10-CM

## 2021-04-23 DIAGNOSIS — M31.30 GRANULOMATOSIS WITH POLYANGIITIS, UNSPECIFIED WHETHER RENAL INVOLVEMENT (H): ICD-10-CM

## 2021-04-23 DIAGNOSIS — M19.90 INFLAMMATORY ARTHRITIS: ICD-10-CM

## 2021-04-23 PROCEDURE — 99214 OFFICE O/P EST MOD 30 MIN: CPT | Mod: 95 | Performed by: INTERNAL MEDICINE

## 2021-04-23 NOTE — PATIENT INSTRUCTIONS
Repeat lab testing every 3 months  Follow up with me in 4 months  Get PFTs  Contact clinic at [phone] to schedule PFTs

## 2021-04-23 NOTE — PROGRESS NOTES
"Bria is a 76 year old who is being evaluated via a billable telephone visit.      What phone number would you like to be contacted at? [phone] (H)  How would you like to obtain your AVS? [employer]  Phone call duration: 24 minutes      Uriah Tomas LPN  Pulmonary Medicine:  [facility]  Phone: [phone] Fax: [phone]      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 Course complicated by severe upper airway inflammation, Wegener's lung and destructive/erosive joint disease. Treated initially with rituximab 375 mg/m2 x4 and high dose corticsteroids, and most recently with methotrexate.  No history of relapse. Prednisone taper complete on April 1st, 2017. No recent methotrexate. Alendronate therapy complicated by LE pain.    She is now mostly recovered from COVID-19. She still has some middle ear issues and ear plugging. This is affected by humidity. Her throat and nose are otherwise okay. She still has some nasal congestion.     She has right hand stiffness, but no pain, redness, warmth or swelling.     Her breathing issues have cleared up. She will only occasionally \"catch\" her breath. She thinks this improve with clearing up her sinus. No chest pains, palpitations, MCDANIEL of note. She can work for about an hour and then rest. Energy is good. No AM stiffness.     Recent COVID infection now resolved. Recent increase in the PR3 and ANCA serologies since COVID infection.    PMI:  Medical-invasive pulmonary aspirgillosis, GPA, osteoarthritis, DVT with pulmonary embolism, osteoporosis with vertebral fracture (T = -2.3 ), GERD, hyperlipidemia, +Tb exposure, nephrolithiasis, retinal tear, cataract  Surgical-lung biopsy, appendectomy, eye surgery, tonsillectomy, cone biopsy, right wrist synovectomy  Injuries-left wrist fracture x2, left 5th toe fracture, vertebral fracture    SH:  Lives at home alone. Former smoker. No EtOH. Tuberculosis exposure as a " child. Ambidextrous.    FH:  Son with mitochondrial myopathy  Daughter with palpitations  Sibs dead with colon and anal cancer  Father dead with lung cancer  Mother dead with emphysema and osteoporosis    PMSF history personally obtained and updated by me this visit in the clinic.    ROS:  +thin fragile skin  +muscular neck pains  +ear popping with nausea, improved with use of exedrin  +intermittent numb feet that is worsening  +allergies to fosamax amoxicillin, augmentin, erythromycin, zithromax, nickel, codeine, adhesive tape  Remainder of the 14 point ROS obtained and found negative.    Laboratory:    Study Result    BONE DENSITOMETRY  [address] [address]  2021      PATIENT: Nury Cárdenas  CHART: [id number]   :  1945  AGE:  76 year old  SEX:  female   REFERRING PROVIDER:  Alvaro Maguire MD     PROCEDURE:  Bone density scanning was performed using DXA technology of the lumbar spine and hip.  Scanning was performed on a Lunar Prodigy scanner.  Reporting is completed in the form of a T-score.  The T-score represents the standard deviation from peak bone mass based on a young healthy adult.     REFERENCE T-SCORES:       Normal                -1.0 and greater                                 Osteopenia         Between -1.0 and -2.5                                           Osteoporosis     -2.5 and less                                       RISK FACTORS:  Post-menopausal, Height loss of 3 inches, Parent history of osteoporosis, Long term corticosteroid therapy, follow up osteopenia     CURRENT TREATMENT:  Calcium, Vitamin D, previous Forteo, stopped after 30 days, prior to that Fosamax, stopped      FINDINGS:               Lumbar Spine (L1-L4)      T-score:  -2.0, degenerative changes present               Left Femoral Neck            T-score:  -1.8               Right Femoral Neck          T-score:  -1.3                           Lumbar (L1-L4)  "BMD: 0.936  Previous: 0.906                          Total Hip Mean BMD: 0.716  Previous: 0.708     Comparison is made to another DXA performed on the same Lunar Prodigy  machine on 10/24/2018.       LATERAL VERTEBRAL ASSESSMENT  Procedure:  Vertebral fracture assessment was performed in the lateral decubitus position using a Hearsay Social Prodigy  densitometer.  Indications for VFA: T-score of -1.0 or worse, age (female>69) and height loss > 1.5\"  Confounding factors for VFA: Arthritis/degenerative disc disease.  The LVA scan is interpretable from T8 to L5.     VFA Findings: Using the semi-quantitative analysis of Chris there was evidence of no spinal deformity  VFA Impression: Nury Cárdenas has no vertebral fractures identified on the VFA.         IMPRESSION  Osteopenia., Degenerative changes of the lumbar spine which may falsely elevate results.     There has been no significant change in bone density of the lumbar spine. There has been no significant change in bone density of the hip(s).      Recommendations include ensuring adequate Calcium and Vitamin D.     The current NOF Guidelines recommend treatment for patients with prior hip or vertebral fracture, T-score -2.5 or below, or 10 year risk of any major osteoporotic fracture >20% or 10 year risk of hip fracture >3%, as calculated using the FRAX calculator (www.shef.ac.uk/FRAX or you can google \"FRAX\").       This patient's risks based on available information, with the use of FRAX, are 18 % for major osteoporotic fracture and 5.4 % for hip fracture.   Based on these guidelines, treatment (in addition to calcium and vitamin D) is recommended for this patient, after ruling out other causes of osteoporosis.  This is meant as an aid to clinical decision making; one must still use clinical judgement.        Follow up can be considered in 2 years.   ___________________  Berenice Short M.D.  Electronically signed         Study Result    CHEST TWO VIEWS February 17, " 2021 11:04 AM      HISTORY: Inflammatory arthritis. Primary osteoarthritis involving  multiple joints. Granulomatosis with polyangiitis without renal  involvement (H). Clinical diagnosis of COVID-19.     COMPARISON: 11/5/2019.     FINDINGS: The lungs are mildly hyperexpanded but clear. No  pneumothorax or pleural effusion. Heart size normal.                                                                       IMPRESSION: No radiographic evidence of acute chest abnormality.      SUSAN GOMEZ MD         Component      Latest Ref Rng & Units 4/20/2021   Sodium      133 - 144 mmol/L 140   Potassium      3.4 - 5.3 mmol/L 4.1   Chloride      94 - 109 mmol/L 107   Carbon Dioxide      20 - 32 mmol/L 33 (H)   Anion Gap      3 - 14 mmol/L <1 (L)   Glucose      70 - 99 mg/dL 80   Urea Nitrogen      7 - 30 mg/dL 14   Creatinine      0.52 - 1.04 mg/dL 0.81   GFR Estimate      >60 mL/min/1.73:m2 70   GFR Estimate If Black      >60 mL/min/1.73:m2 81   Calcium      8.5 - 10.1 mg/dL 9.4   Bilirubin Total      0.2 - 1.3 mg/dL 0.4   Albumin      3.4 - 5.0 g/dL 3.5   Protein Total      6.8 - 8.8 g/dL 6.3 (L)   Alkaline Phosphatase      40 - 150 U/L 76   ALT      0 - 50 U/L 24   AST      0 - 45 U/L 19   Neutrophil Cytoplasmic Antibody      <1:10 titer 1:80 (A)   Neutrophil Cytoplasmic Antibody Pattern       Cytoplasmic ANCA (c-ANCA) staining pattern observed and confirmed on formalin-fixed . . .   Myeloperoxidase Antibody IgG      0.0 - 0.9 AI <0.2   Proteinase 3 Antibody IgG      0.0 - 0.9 AI >8.0 (H)     Component Proteinase 3 Antibody IgG   Latest Ref Rng & Units 0.0 - 0.9 AI   10/11/2019    2/3/2021 >8.0 (H)   4/20/2021 >8.0 (H)     Component Neutrophil Cytoplasmic Antibody   Latest Ref Rng & Units <1:10 titer   10/11/2019 <1:10   2/3/2021 1:160 (A)   4/20/2021 1:80 (A)     Impression:    PR3-associated GPA-with history of lung involvement. Recent COVID infection associated with rise in PR3 and ANCA titer. Nevertheless, she  continues to improve and it is my impression that GPA has not recurred. We will plan to evaluate with serial PR3/ANCA and inflammatory markers. Get PFTs.     COVID-19 infection-without known pneumonia.    Sinus congestion-I have recommended a trial of a more modern antihistamine such as Allegra, Zyrtec or Claritin.    Osteoporosis-with stable osteopenia, and we will not initiate any bisphosphonate at this time.    Plan RTC in 4 months.    A total of 37 minutes was spent in telephone patient interaction and chart review on the day of service.

## 2021-05-14 DIAGNOSIS — G43.909 MIGRAINE WITHOUT STATUS MIGRAINOSUS, NOT INTRACTABLE, UNSPECIFIED MIGRAINE TYPE: ICD-10-CM

## 2021-05-18 RX ORDER — PROPRANOLOL HYDROCHLORIDE 20 MG/1
TABLET ORAL
Qty: 90 TABLET | Refills: 0 | Status: SHIPPED | OUTPATIENT
Start: 2021-05-18 | End: 2021-08-16

## 2021-06-10 ENCOUNTER — NURSE TRIAGE (OUTPATIENT)
Dept: NURSING | Facility: CLINIC | Age: 76
End: 2021-06-10

## 2021-06-11 ENCOUNTER — OFFICE VISIT (OUTPATIENT)
Dept: FAMILY MEDICINE | Facility: CLINIC | Age: 76
End: 2021-06-11
Payer: COMMERCIAL

## 2021-06-11 VITALS
WEIGHT: 118 LBS | RESPIRATION RATE: 16 BRPM | SYSTOLIC BLOOD PRESSURE: 116 MMHG | DIASTOLIC BLOOD PRESSURE: 64 MMHG | HEART RATE: 71 BPM | TEMPERATURE: 98 F | BODY MASS INDEX: 20.14 KG/M2 | HEIGHT: 64 IN | OXYGEN SATURATION: 95 %

## 2021-06-11 DIAGNOSIS — L29.9 ITCHING: ICD-10-CM

## 2021-06-11 DIAGNOSIS — L03.115 CELLULITIS OF RIGHT LOWER EXTREMITY: Primary | ICD-10-CM

## 2021-06-11 PROCEDURE — 99213 OFFICE O/P EST LOW 20 MIN: CPT | Performed by: NURSE PRACTITIONER

## 2021-06-11 RX ORDER — TRIAMCINOLONE ACETONIDE 1 MG/G
CREAM TOPICAL
Qty: 60 G | Refills: 0 | Status: SHIPPED | OUTPATIENT
Start: 2021-06-11 | End: 2021-07-19

## 2021-06-11 RX ORDER — CEPHALEXIN 500 MG/1
500 CAPSULE ORAL 4 TIMES DAILY
Qty: 28 CAPSULE | Refills: 0 | Status: SHIPPED | OUTPATIENT
Start: 2021-06-11 | End: 2021-06-18

## 2021-06-11 ASSESSMENT — PAIN SCALES - GENERAL: PAINLEVEL: NO PAIN (1)

## 2021-06-11 ASSESSMENT — MIFFLIN-ST. JEOR: SCORE: 1001.75

## 2021-06-11 NOTE — TELEPHONE ENCOUNTER
"    Reason for Disposition    Patient sounds very sick or weak to the triager    Additional Information    Negative: Difficulty breathing at rest    Negative: Entire foot is cool or blue in comparison to other side    Negative: [1] Can't walk or can barely walk AND [2] new onset    Negative: [1] Cast on leg or ankle AND [2] now increased pain    Negative: [1] Swelling is painful to touch AND [2] fever    Negative: [1] Red area or streak AND [2] fever    Negative: [1] Difficulty breathing with exertion (e.g., walking) AND [2] new onset or worsening    Protocols used: LEG SWELLING AND EDEMA-A-AH    Patient and daughter Ina calling\"My mom had a red area , sort of rash looking area on her lower right calf earlier. But she just called me to come over. Saying\"It's spreading all around her leg, warm and painful to touch and her foot was 2-3 times larger/swollen than the other foot. No other sx.\"  Triaged and advised ED  Shanti Araujo RN [facility]    COVID 19 Nurse Triage Plan/Patient Instructions    Please be aware that novel coronavirus (COVID-19) may be circulating in the community. If you develop symptoms such as fever, cough, or SOB or if you have concerns about the presence of another infection including coronavirus (COVID-19), please contact your health care provider or visit [email or web address].     Disposition/Instructions    ED Visit recommended. Follow protocol based instructions.     Bring Your Own Device:  Please also bring your smart device(s) (smart phones, tablets, laptops) and their charging cables for your personal use and to communicate with your care team during your visit.    Thank you for taking steps to prevent the spread of this virus.  o Limit your contact with others.  o Wear a simple mask to cover your cough.  o Wash your hands well and often.    Resources    [facility]: About COVID-19: [email or web address].[email or web address]    CDC: What to Do If You're Sick: " www.cdc.gov/coronavirus/2019-ncov/about/steps-when-sick.html    CDC: Ending Home Isolation: www.cdc.gov/coronavirus/2019-ncov/hcp/disposition-in-home-patients.html     CDC: Caring for Someone: www.cdc.gov/coronavirus/2019-ncov/if-you-are-sick/care-for-someone.html     [facility]: Interim Guidance for Hospital Discharge to Home: www.[facility].mn./diseases/coronavirus/hcp/hospdischarge.pdf    [facility] clinical trials (COVID-19 research studies): [email or web address].[email or web address]-clinical-trials     Below are the COVID-19 hotlines at the [place] Department of [facility] ([facility]). Interpreters are available.   o For health questions: Call 536-837-7429 or 1-527.128.8695 (7 a.m. to 7 p.m.)  o For questions about schools and childcare: Call 292-203-5431 or 1-993.851.9905 (7 a.m. to 7 p.m.)

## 2021-06-11 NOTE — PATIENT INSTRUCTIONS
Patient Education     Cellulitis  Cellulitis is an infection of the deep layers of skin. A break in the skin, such as a cut or scratch, can let bacteria under the skin. If the bacteria get to deep layers of the skin, it can be serious. If not treated, cellulitis can get into the bloodstream and lymph nodes. The infection can then spread throughout the body. This causes serious illness.   Cellulitis causes the affected skin to become red, swollen, warm, and sore. The reddened areas have a visible border. An open sore may leak fluid (pus). You may have a fever, chills, and pain.   Cellulitis is treated with antibiotics taken for 7 to 10 days. An open sore may be cleaned and covered with cool wet gauze. Symptoms should get better 1 to 2 days after treatment is started. Make sure to take all the antibiotics for the full number of days until they are gone. Keep taking the medicine even if your symptoms go away.   Home care  Follow these tips:    Limit the use of the part of your body with cellulitis.     If the infection is on your leg, keep your leg raised while sitting. This helps reduce swelling.    Take all of the antibiotic medicine exactly as directed until it is gone. Don't miss any doses, especially during the first 7 days. Don t stop taking the medicine when your symptoms get better.    Keep the affected area clean and dry.    Wash your hands with soap and clean, running water before and after touching your skin. Anyone else who touches your skin should also wash his or her hands. Don't share towels.  Follow-up care  Follow up with your healthcare provider, or as advised. If your infection doesn't go away on the first antibiotic, your healthcare provider will prescribe a different one.   When to seek medical advice  Call your healthcare provider right away if any of these occur:    Red areas that spread    Swelling or pain that gets worse    Fluid leaking from the skin (pus)    Fever higher of 100.4  F (38.0   "C) or higher after 2 days on antibiotics  [employer] last reviewed this educational content on 8/1/2019 2000-2021 [employer]. All rights reserved. This information is not intended as a substitute for professional medical care. Always follow your healthcare professional's instructions.           Patient Education     Contact Dermatitis  Contact dermatitis is a skin rash caused by something that touches the skin and makes it irritated and inflamed. Your skin may be red, swollen, dry, and may be cracked. Blisters may form and ooze. The rash will itch.   Contact dermatitis often forms on the face and neck, backs of hands, forearms, genitals, and lower legs. But it can affect any area.   People can get contact dermatitis from lots of sources. These include:    Plants such as poison ivy, oak, or sumac    Chemicals in hair dyes and rinses, soaps, solvents, waxes, fingernail polish, and deodorants     Jewelry or watchbands made of nickel or cobalt  Contact dermatitis is not passed from person to person.  Talk with your healthcare provider about what may have caused the rash. A type of allergy testing called \"patch testing\" may be used to discover what you are allergic to. You will need to stay away from the source of the rash in the future to prevent it from coming back.   Treatment is done to ease itching and prevent the rash from coming back. The rash should go away in a few days to a few weeks.   Home care  Your healthcare provider may prescribe medicine to ease swelling and itching. Follow all instructions when using these medicines.   General care    Stay away from anything that heats up your skin, such as hot showers or baths, or direct sunlight. This can make itching worse.    Apply cold compresses to soothe your sores to help ease your symptoms. Do this for 30 minutes 3 to 4 times a day. You can make a cold compress by soaking a cloth in cold water. Squeeze out excess water. You can add colloidal " oatmeal to the water to help reduce itching. For severe itching in a small area, apply an ice pack wrapped in a thin towel. Do this for 20 minutes 3 to 4 times a day.    You can also try wet dressings. One way to do this is to wear a wet piece of clothing under a dry one. Wear a damp shirt under a dry shirt if your upper body is affected. This can relieve itching and prevent you from scratching the affected area.    You can also help ease large areas of itching by taking a lukewarm bath with colloidal oatmeal added to the water.    Use hydrocortisone cream for redness and irritation, unless another medicine was prescribed. Calamine lotion can also relieve mild symptoms.    Use oral diphenhydramine to help reduce itching. You can buy this antihistamine at [employer] and grocery stores. It can make you sleepy, so use lower doses during the daytime. Don't use diphenhydramine if you have glaucoma or have trouble urinating because of an enlarged prostate.    If a plant causes your rash, make sure to wash your skin and the clothes you were wearing when you came into contact with the plant. This is to wash away the plant oils that gave you the rash and prevent more or worse symptoms. If you have a pet that's been outdoors, its fur may also have oil from the plant. Bathe your pet with soap or shampoo.    Stay away from the substance or object that causes your symptoms. If you can t stay away from it, wear gloves or some other type of protection    Follow-up care  Follow up with your healthcare provider, or as advised.  When to seek medical advice  Call your healthcare provider or seek medical attention right away if any of these occur:     Spreading of the rash to other parts of your body    Severe swelling of your face, eyelids, mouth, throat or tongue    Trouble urinating due to swelling in the genital area    Fever of 100.4 F (38 C) or higher, or as advised by your provider    Redness or swelling that gets worse    Pain  that gets worse    Foul-smelling fluid leaking from the skin    Yellow-brown crusts on the open blisters  [employer] last reviewed this educational content on 8/1/2019 2000-2021 [employer]. All rights reserved. This information is not intended as a substitute for professional medical care. Always follow your healthcare professional's instructions.

## 2021-06-11 NOTE — PROGRESS NOTES
"    Assessment & Plan     (L03.115) Cellulitis of right lower extremity  (primary encounter diagnosis)  Comment: Likely cellulitis 2/2 allergic dermatitis. Will treat with antibiotics. Counseled on self-care measures including: elevating leg when resting; and warning signs of when to seek urgent medical care including: fever, chills, worsening symptoms, drainage, lightheadedness.  Plan: cephALEXin (KEFLEX) 500 MG capsule          (L29.9) Itching  Plan: triamcinolone (KENALOG) 0.1 % external cream          Return in about 1 week (around 6/18/2021), or if symptoms worsen or fail to improve.    Jillian Fowler, GURPREET CNP  [facility] SUDHIR Fraser is a 76 year old who presents for the following health issues     HPI     Per RN Triage TE 6/10/2021:  Patient and daughter Ina calling\"My mom had a red area , sort of rash looking area on her lower right calf earlier. But she just called me to come over. Saying\"It's spreading all around her leg, warm and painful to touch and her foot was 2-3 times larger/swollen than the other foot. No other sx.\"      Rash  Onset/Duration: 3 days now   Description  Location: right lower leg   Character: red, stings/burns   Itching: mild   Intensity:  Moderate; spreading; has gotten larger   Progression of Symptoms:  worsening  Accompanying signs and symptoms:   Fever: no  Body aches or joint pain: no  Sore throat symptoms: no  Recent cold symptoms: no  History:           Previous episodes of similar rash: None  New exposures:  None  Recent travel: no  Exposure to similar rash: no, but working out in the yard a lot   Precipitating or alleviating factors: N/A   Therapies tried and outcome: OTC antibiotic topicals and leg elevation; swelling has gone down a bit.     Review of Systems   Constitutional, HEENT, cardiovascular, pulmonary, gi and gu systems are negative, except as otherwise noted.      Objective    /64 (BP Location: Right arm, Patient Position: " "Sitting, Cuff Size: Adult Regular)   Pulse 71   Temp 98  F (36.7  C) (Oral)   Resp 16   Ht 1.62 m (5' 3.78\")   Wt 53.5 kg (118 lb)   SpO2 95%   BMI 20.39 kg/m    Body mass index is 20.39 kg/m .  Physical Exam   GENERAL: healthy, alert and no distress  EYES: Eyes grossly normal to inspection, PERRL and conjunctivae and sclerae normal  SKIN: erythema -anterior right medial lower leg 12x8cm, no drainage, outside border marked in black marker trace edema of right foot  PSYCH: mentation appears normal, affect normal/bright          "

## 2021-06-14 ENCOUNTER — OFFICE VISIT (OUTPATIENT)
Dept: NURSING | Facility: CLINIC | Age: 76
End: 2021-06-14
Attending: INTERNAL MEDICINE
Payer: COMMERCIAL

## 2021-06-14 VITALS — BODY MASS INDEX: 20.58 KG/M2 | WEIGHT: 119.1 LBS

## 2021-06-14 DIAGNOSIS — M19.90 INFLAMMATORY ARTHRITIS: ICD-10-CM

## 2021-06-14 DIAGNOSIS — M81.0 AGE-RELATED OSTEOPOROSIS WITHOUT CURRENT PATHOLOGICAL FRACTURE: ICD-10-CM

## 2021-06-14 DIAGNOSIS — M31.30 GRANULOMATOSIS WITH POLYANGIITIS, UNSPECIFIED WHETHER RENAL INVOLVEMENT (H): ICD-10-CM

## 2021-06-14 DIAGNOSIS — M31.30 GRANULOMATOSIS WITH POLYANGIITIS WITHOUT RENAL INVOLVEMENT (H): ICD-10-CM

## 2021-06-14 DIAGNOSIS — M15.0 PRIMARY OSTEOARTHRITIS INVOLVING MULTIPLE JOINTS: ICD-10-CM

## 2021-06-14 PROCEDURE — 94375 RESPIRATORY FLOW VOLUME LOOP: CPT | Performed by: INTERNAL MEDICINE

## 2021-06-14 PROCEDURE — 94729 DIFFUSING CAPACITY: CPT | Performed by: INTERNAL MEDICINE

## 2021-06-14 PROCEDURE — 94726 PLETHYSMOGRAPHY LUNG VOLUMES: CPT | Performed by: INTERNAL MEDICINE

## 2021-06-16 LAB
DLCOUNC-%PRED-PRE: 91 %
DLCOUNC-PRE: 17.26 ML/MIN/MMHG
DLCOUNC-PRED: 18.82 ML/MIN/MMHG
ERV-%PRED-PRE: 79 %
ERV-PRE: 0.66 L
ERV-PRED: 0.83 L
EXPTIME-PRE: 7.16 SEC
FEF2575-%PRED-PRE: 68 %
FEF2575-PRE: 1.12 L/SEC
FEF2575-PRED: 1.63 L/SEC
FEFMAX-%PRED-PRE: 134 %
FEFMAX-PRE: 6.76 L/SEC
FEFMAX-PRED: 5.03 L/SEC
FEV1-%PRED-PRE: 95 %
FEV1-PRE: 1.9 L
FEV1FEV6-PRE: 70 %
FEV1FEV6-PRED: 78 %
FEV1FVC-PRE: 69 %
FEV1FVC-PRED: 77 %
FEV1SVC-PRE: 72 %
FEV1SVC-PRED: 69 %
FIFMAX-PRE: 3.21 L/SEC
FRCPLETH-%PRED-PRE: 135 %
FRCPLETH-PRE: 3.66 L
FRCPLETH-PRED: 2.7 L
FVC-%PRED-PRE: 105 %
FVC-PRE: 2.74 L
FVC-PRED: 2.61 L
IC-%PRED-PRE: 97 %
IC-PRE: 2 L
IC-PRED: 2.05 L
RVPLETH-%PRED-PRE: 138 %
RVPLETH-PRE: 3 L
RVPLETH-PRED: 2.16 L
TLCPLETH-%PRED-PRE: 115 %
TLCPLETH-PRE: 5.66 L
TLCPLETH-PRED: 4.9 L
VA-%PRED-PRE: 97 %
VA-PRE: 4.5 L
VC-%PRED-PRE: 92 %
VC-PRE: 2.66 L
VC-PRED: 2.88 L

## 2021-07-19 NOTE — PROGRESS NOTES
Assessment & Plan     Cellulitis of second toe of right foot    Mild and resolving spontaneously on topical antibiotic. If flares up will call back    Chronic obstructive pulmonary disease, unspecified COPD type (H)    Stable    Other pulmonary embolism without acute cor pulmonale, unspecified chronicity (H)    Resolved    Return in about 3 months (around 10/20/2021) for Routine Visit.    Phil Abbott MD  [facility] SUDHIR Fraser is a 76 year old who presents for the following health issues:    HPI     Chief Complaint   Patient presents with     Toe Pain     2nd toe right x 4 days-swelling has gone way down     Toe cellulitis:  2nd right toe; was swollen and red a few days ago; much better able to walk on foot      COPD:    Stable except for some SOB after catching Covid 19 Jan.    Review of Systems   Constitutional, HEENT, cardiovascular, pulmonary, gi and gu systems are negative, except as otherwise noted.      Objective    /60   Pulse 82   Temp 98.2  F (36.8  C) (Oral)   Wt 53.5 kg (118 lb)   SpO2 98%   BMI 20.39 kg/m    Body mass index is 20.39 kg/m .  Physical Exam   GENERAL: healthy, alert and no distress  NECK: no adenopathy and thyroid normal to palpation  RESP: lungs clear to auscultation - no rales, rhonchi or wheezes  CV: regular rate and rhythm, no murmur, click or rub  MS: Rt Foot      2 nd toe with skin redness in middle metatarsal area, moving toe without pain.

## 2021-07-20 ENCOUNTER — OFFICE VISIT (OUTPATIENT)
Dept: FAMILY MEDICINE | Facility: CLINIC | Age: 76
End: 2021-07-20
Payer: COMMERCIAL

## 2021-07-20 VITALS
HEART RATE: 82 BPM | DIASTOLIC BLOOD PRESSURE: 60 MMHG | BODY MASS INDEX: 20.39 KG/M2 | WEIGHT: 118 LBS | TEMPERATURE: 98.2 F | SYSTOLIC BLOOD PRESSURE: 122 MMHG | OXYGEN SATURATION: 98 %

## 2021-07-20 DIAGNOSIS — J44.9 CHRONIC OBSTRUCTIVE PULMONARY DISEASE, UNSPECIFIED COPD TYPE (H): ICD-10-CM

## 2021-07-20 DIAGNOSIS — I26.99 OTHER PULMONARY EMBOLISM WITHOUT ACUTE COR PULMONALE, UNSPECIFIED CHRONICITY (H): ICD-10-CM

## 2021-07-20 DIAGNOSIS — L03.031 CELLULITIS OF SECOND TOE OF RIGHT FOOT: Primary | ICD-10-CM

## 2021-07-20 PROCEDURE — 99213 OFFICE O/P EST LOW 20 MIN: CPT | Performed by: FAMILY MEDICINE

## 2021-07-31 ENCOUNTER — APPOINTMENT (OUTPATIENT)
Dept: ULTRASOUND IMAGING | Facility: HOSPITAL | Age: 76
End: 2021-07-31
Attending: EMERGENCY MEDICINE
Payer: COMMERCIAL

## 2021-07-31 ENCOUNTER — HOSPITAL ENCOUNTER (EMERGENCY)
Facility: HOSPITAL | Age: 76
Discharge: HOME | End: 2021-07-31
Attending: EMERGENCY MEDICINE | Admitting: EMERGENCY MEDICINE
Payer: COMMERCIAL

## 2021-07-31 VITALS
HEART RATE: 73 BPM | RESPIRATION RATE: 18 BRPM | WEIGHT: 118 LBS | OXYGEN SATURATION: 96 % | SYSTOLIC BLOOD PRESSURE: 150 MMHG | TEMPERATURE: 98.7 F | DIASTOLIC BLOOD PRESSURE: 81 MMHG | BODY MASS INDEX: 20.39 KG/M2

## 2021-07-31 DIAGNOSIS — L03.116 CELLULITIS OF LEFT LOWER EXTREMITY: ICD-10-CM

## 2021-07-31 PROCEDURE — 99284 EMERGENCY DEPT VISIT MOD MDM: CPT | Mod: 25

## 2021-07-31 PROCEDURE — 93971 EXTREMITY STUDY: CPT | Mod: LT

## 2021-07-31 RX ORDER — CEPHALEXIN 500 MG/1
500 CAPSULE ORAL 4 TIMES DAILY
Qty: 28 CAPSULE | Refills: 0 | Status: SHIPPED | OUTPATIENT
Start: 2021-07-31 | End: 2021-08-07

## 2021-08-01 ENCOUNTER — MYC MEDICAL ADVICE (OUTPATIENT)
Dept: RHEUMATOLOGY | Facility: CLINIC | Age: 76
End: 2021-08-01

## 2021-08-01 DIAGNOSIS — M31.30 GRANULOMATOSIS WITH POLYANGIITIS WITHOUT RENAL INVOLVEMENT (H): Primary | ICD-10-CM

## 2021-08-01 DIAGNOSIS — Z79.60 LONG-TERM USE OF IMMUNOSUPPRESSANT MEDICATION: ICD-10-CM

## 2021-08-01 NOTE — ED TRIAGE NOTES
Pt noted onset of left lower extremity leg swelling and redness last evening. Pain, swelling and redness continued and worsened today. Pt is not on blood thinners, has hx of DVT and PE's. No sob.

## 2021-08-01 NOTE — ED PROVIDER NOTES
EMERGENCY DEPARTMENT ENCOUNTER      NAME: Nury Cárdenas  AGE: 76 year old female  YOB: 1945  MRN: [id number]  EVALUATION DATE & TIME: 7/31/2021  9:16 PM    PCP: Phil Abbott    ED PROVIDER: Hever Cruz M.D.      Chief Complaint   Patient presents with     Leg Pain         FINAL IMPRESSION:  1. Cellulitis of left lower extremity          ED COURSE & MEDICAL DECISION MAKING:    Pertinent Labs & Imaging studies reviewed. (See chart for details)  76 year old female presents to the Emergency Department for evaluation of leg pain. Patient appears non toxic with stable vitals signs, patient is afebrile with no tachycardia or hypoxia, no increased work of breathing.  Lungs are clear, abdomen is benign.  Patient denies any falls or trauma, shortness of breath, chest pain, pleurisy, no ripping or tearing chest discomfort the back or shoulders, no hypoxia or hemoptysis.  She was doing some more landscaping recently but denies any known injury to her left lower extremity, does have a history of DVT and PE in the past, currently not on any medications for anticoagulation.  Exam significant for small area of erythema and warmth to the left lower extremity without joint swelling, erythema or warmth, no gross deformities and she has good range of motion, she is neurovascular intact good distal pulses and sensation.  No unilateral leg swelling or calf tenderness.  Considered but overall low suspicion for DVT, history and exam much more consistent with cellulitis, no nodular lesions or bumps, nothing to suggest abscess or fungal infection.  Clinically, given history and exam noted, nothing to suggest septic joint, necrotizing fasciitis, fracture or dislocation, compartment syndrome, or other more malicious etiology of symptoms.  We will obtain ultrasound imaging, patient was offered but she deferred pain medications.    Reassessment: Imaging studies reported no acute concerning findings, did note  ultrasound report of a small elongated hypoechoic area in the superficial soft tissues around the left ankle, this is where the patient has some mild erythema and warmth but certainly no fluctuance, induration nothing clinically to suggest abscess or hematoma, clinically this appears to be simple cellulitis.  Discussed these findings with the patient and plan will be for discharge and course of oral antibiotics, we will discharge with a course of Keflex and recommend the patient follow-up primary care in the next 5 to 7 days for continued outpatient management and evaluation.  Discussed these findings recommendations with patient and family they both are very reassured and comfortable with discharge.  Return precautions provided.    9:23 PM I met with the patient for the initial interview and physical examination. Discussed plan for treatment and workup in the ED.    11:06 PM Repeat exam is benign. I discussed findings with the patient. I discussed the plan for discharge with the patient, and patient is agreeable. We discussed supportive cares at home and reasons for return to the ER including new or worsening symptoms - all questions and concerns addressed. Patient to be discharged by RN.    At the conclusion of the encounter I discussed the results of all of the tests and the disposition. The questions were answered and return precautions provided. The patient or family acknowledged understanding and was agreeable with the care plan.       PPE: Provider wore gloves, N95 mask, eye protection, surgical cap, and paper mask.   MEDICATIONS GIVEN IN THE EMERGENCY:  Medications - No data to display    NEW PRESCRIPTIONS STARTED AT TODAY'S ER VISIT  Discharge Medication List as of 7/31/2021 11:20 PM      START taking these medications    Details   cephALEXin (KEFLEX) 500 MG capsule Take 1 capsule (500 mg) by mouth 4 times daily for 7 days, Disp-28 capsule, R-0, Local Print               "    =================================================================    HPI    Patient information was obtained from: The patient    Use of Intrepreter: N/A         Nury Cárdenas is a 76 year old female with a pertient medical history of PE, and dyslipidemia, COPD,  who presents to the ED via car for evaluation of leg pain.    The patient reports last night (7/30) she had left foot cramping throughout the night that resolved itself in the morning. However, later today (7/31) she developed a red area on her anterior left lower leg with an associated intermittent \"sharp deep\" pain that is provoked with standing. She notes that last week she was evaluated for a redness to her right foot and was told it may be cellulitis however she is not currently on any antibiotics. Additionally, the patient has some nausea but says this may be from gardening outside in the poor air quality all day. The patient denies taking any pain medications. Denies recent falls or trauma to the area. Denies use of blood thinners. Denies vomiting, fever, coughing, chest pain, abdominal pain, loss of appetite, difficulty urinating, blood in urine, blood in stool, shortness of breath, and any other symptoms or complaints at this time.     ScHx: The patient denies use of tobacco, alcohol, or illegal drugs.    REVIEW OF SYSTEMS   Constitutional:  Denies fever, chills, loss of appetite  Respiratory:  Denies productive cough, shortness of breath, or increased work of breathing  Cardiovascular:  Denies chest pain, palpitations  GI:  Denies abdominal pain, vomiting, or change in bowel or bladder habits. Positive for nausea   Musculoskeletal:  Denies any new muscle/joint swelling  Skin: Positive for area of erythema to anterior left lower extremity with associated pain  Neurologic:  Denies focal weakness  All systems negative except as marked.     PAST MEDICAL HISTORY:  Past Medical History:   Diagnosis Date     Amblyopia      Atypical pneumonia " 6/14/2014     Atypical pneumonia      Atypical pneumonia      Atypical pneumonia      COPD (chronic obstructive pulmonary disease) (H) 7/20/2021     Coronary artery disease 1982    heart beat hard made me tired     Dyslipidemia      Fibroids      GERD (gastroesophageal reflux disease)      Granulomatosis with polyangiitis (Wegener's)      Hearing loss      Inflammatory arthritis     thumb     Migraines      MVP (mitral valve prolapse)     had an echo that was normal in 2007 she is on Inderal     Nonsenile cataract      Osteopenia      PE (pulmonary embolism) 10/2014    ? GPA associated, on warfarin      Strabismus      Synovitis of wrist 2009    right       PAST SURGICAL HISTORY:  Past Surgical History:   Procedure Laterality Date     ABDOMEN SURGERY      appendix out     APPENDECTOMY       BIOPSY  1972    cone biopsy     CATARACT IOL, RT/LT       CL AFF SURGICAL PATHOLOGY      cone biopsy 1975     COLONOSCOPY       COLONOSCOPY WITH CO2 INSUFFLATION N/A 12/20/2017    Procedure: COLONOSCOPY WITH CO2 INSUFFLATION;  Screening  BMI: 20.7  Pharm[facility]: [facility]  [phone]  [facility]  Referring: [facility]  Patient get ill from [facility];  Surgeon: Duane, William Charles, MD;  Location: [facility] OR     EYE SURGERY      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     HC ESOPHAGOSCOPY, DIAGNOSTIC       LASER YAG CAPSULOTOMY      left eye     OPTICAL TRACKING SYSTEM BRONCHOSCOPY  6/19/2014    Procedure: OPTICAL TRACKING SYSTEM BRONCHOSCOPY;  Surgeon: Angel Kathleen MD;  Location: [facility]     PHACOEMULSIFICATION WITH STANDARD INTRAOCULAR LENS IMPLANT  01/2018    left eye (SR)     SOFT TISSUE SURGERY      remove senovial fluid from right wrist     SURGICAL HISTORY OF -   as a child    strabismus repair right eye     SURGICAL HISTORY OF -   8-2009    right wrist debridement     TONSILLECTOMY      as a child     TONSILLECTOMY & ADENOIDECTOMY       TUBAL LIGATION       VITRECTOMY PARSPLANA  01/2018    left eye - mac hole  ([facility])         CURRENT MEDICATIONS:    Prior to Admission medications    Medication Sig Start Date End Date Taking? Authorizing Provider   acetaminophen (TYLENOL) 325 MG tablet Take 2 tablets (650 mg) by mouth every 6 hours as needed for mild pain 10/29/14   Ari Trinidad MD   calcium-magnesium (CALMAG) 500-250 MG TABS per tablet Take 1 tablet by mouth 2 times daily (with meals)    Unknown, Entered By History   cholecalciferol 1000 UNITS TABS Take 1,000 Units by mouth daily    Unknown, Entered By History   propranolol (INDERAL) 20 MG tablet TAKE 1 TABLET BY MOUTH DAILY 5/18/21   Elsie Pickett MD   Vitamin K 100 MCG TABS Take 1 tablet by mouth daily Taking Vitamin K2.    Reported, Patient        ALLERGIES:  Allergies   Allergen Reactions     Vancomycin Other (See Comments)     Red man's syndrom     Adhesive Tape      Rash itches     Amoxicillin      vomiting     Augmentin      vomiting     Codeine      vomiting     Erythromycin      vomiting     Nickel      itches rash     Sulfa Drugs Itching     Tegaderm Alginate Ag      LW Other1: -ALL MEDICATIONS MAKE PATIENT VIOLENTLY ILL; ADHESIVE BANDAGES; NICKEL     Zithromax [Azithromycin Dihydrate]      Vomiting/ skin blisters       FAMILY HISTORY:  Family History   Problem Relation Age of Onset     Respiratory Mother         emphysema     Aneurysm Mother      Chronic Obstructive Pulmonary Disease Mother      Thyroid Disease Mother         ,     Osteoporosis Mother      Other Cancer Father      Cancer Father         lung cancer     Arthritis Maternal Grandmother         rheumatoid     Heart Disease Maternal Grandmother         unsure of details     Heart Disease Maternal Grandfather      Neurologic Disorder Paternal Grandmother         migraines     Alzheimer Disease Paternal Grandmother      Unknown/Adopted Paternal Grandfather      Cancer Sister         stage 4 anal cancer age 62 years     Colon Cancer Sister      Cancer - colorectal Brother      Colon  Cancer Brother      Substance Abuse Sister      Anesthesia Reaction Daughter        SOCIAL HISTORY:   Social History     Socioeconomic History     Marital status:      Spouse name: Not on file     Number of children: Not on file     Years of education: Not on file     Highest education level: Not on file   Occupational History     Not on file   Tobacco Use     Smoking status: Former Smoker     Packs/day: 2.00     Years: 20.00     Pack years: 40.00     Types: Cigarettes     Start date: 1961     Quit date: 1983     Years since quittin.0     Smokeless tobacco: Never Used   Substance and Sexual Activity     Alcohol use: No     Alcohol/week: 0.0 standard drinks     Drug use: No     Sexual activity: Never   Other Topics Concern     Parent/sibling w/ CABG, MI or angioplasty before 65F 55M? No   Social History Narrative     Not on file     Social Determinants of Health     Financial Resource Strain:      Difficulty of Paying Living Expenses:    Food Insecurity:      Worried About Running Out of Food in the Last Year:      Ran Out of Food in the Last Year:    Transportation Needs:      Lack of Transportation (Medical):      Lack of Transportation (Non-Medical):    Physical Activity:      Days of Exercise per Week:      Minutes of Exercise per Session:    Stress:      Feeling of Stress :    Social Connections:      Frequency of Communication with Friends and Family:      Frequency of Social Gatherings with Friends and Family:      Attends Sabianist Services:      Active Member of Clubs or Organizations:      Attends Club or Organization Meetings:      Marital Status:    Intimate Partner Violence:      Fear of Current or Ex-Partner:      Emotionally Abused:      Physically Abused:      Sexually Abused:        VITALS:  Patient Vitals for the past 24 hrs:   BP Temp Temp src Pulse Resp SpO2 Weight   21 2315 (!) 150/81 -- -- 73 -- 96 % --   21 2245 (!) 148/84 -- -- 73 -- 96 % --   21 2230 (!)  150/73 -- -- 73 -- 95 % --   07/31/21 2025 (!) 190/98 98.7  F (37.1  C) Oral 88 18 97 % 53.5 kg (118 lb)        PHYSICAL EXAM    Constitutional:  Awake, alert, in no apparent distress  HENT:  Normocephalic, Atraumatic. Bilateral external ears normal. Oropharynx moist. Nose normal. Neck- Normal range of motion with no guarding, No midline cervical tenderness, Supple, No stridor.   Eyes:  PERRL, EOMI with no signs of entrapment, Conjunctiva normal, No discharge.   Respiratory:  Normal breath sounds, No respiratory distress, No wheezing.    Cardiovascular:  Normal heart rate, Normal rhythm, No appreciable rubs or gallops.   GI:  Soft, No tenderness, No distension, No palpable masses  Musculoskeletal:  Intact distal pulses, No edema. No unilateral leg swelling or calf tenderness. Good range of motion in all major joints. No major deformities noted.  Integument:  Warm, Dry. Small area of redness and warmth to distal anterior left leg that is tender to touch without joint swelling or joint redness. No blisters, petechiae, or vesicular lesions.   Neurologic:  Alert & oriented, Normal motor function, Normal sensory function, No focal deficits noted.   Psychiatric:  Affect normal, Judgment normal, Mood normal.     LAB:  All pertinent labs reviewed and interpreted.  Results for orders placed or performed during the hospital encounter of 07/31/21   US Lower Extremity Venous Duplex Left    Impression    IMPRESSION:  1.  No deep venous thrombosis in the bilateral lower extremities.    2.  Small elongated hypoechoic area in the superficial soft tissues of the left ankle in the area of clinical concern of uncertain significance. This could be seen with a small amount of fluid in this region. Follow-up as clinically warranted. Small   abscess or hematoma could have this appearance depending upon clinical scenario.       RADIOLOGY:  US Lower Extremity Venous Duplex Left   Final Result   IMPRESSION:   1.  No deep venous thrombosis in  the bilateral lower extremities.      2.  Small elongated hypoechoic area in the superficial soft tissues of the left ankle in the area of clinical concern of uncertain significance. This could be seen with a small amount of fluid in this region. Follow-up as clinically warranted. Small    abscess or hematoma could have this appearance depending upon clinical scenario.             EKG:    None  I have independently reviewed and interpreted the EKG(s) documented above.    PROCEDURES:   None       I, Myles Biggs, am serving as a scribe to document services personally performed by Hever Cruz MD, based on my observation and the provider's statements to me. I, Hever Cruz MD attest that Myles Biggs is acting in a scribe capacity, has observed my performance of the services and has documented them in accordance with my direction.    Hever Cruz M.D.  Emergency Medicine  [facility] EMERGENCY DEPARTMENT  [facility] [facility] [address]  [phone]  Dept: [phone]       Hever Cruz MD  08/01/21 0050

## 2021-08-02 ENCOUNTER — PATIENT OUTREACH (OUTPATIENT)
Dept: FAMILY MEDICINE | Facility: CLINIC | Age: 76
End: 2021-08-02

## 2021-08-02 NOTE — TELEPHONE ENCOUNTER
This patient was discharged from [facility] on 7/31/21    Discharge Diagnosis: Cellulitis of left lower extremity     Has a follow-up visit has been scheduled? Yes, 8/9/21    Please follow-up with patient    Katharine Rizzo RN  [facility]

## 2021-08-02 NOTE — TELEPHONE ENCOUNTER
Pt has f/u appt scheduled with PCP for 08/09.     Pt was started on cephALEXin (KEFLEX) 500 MG capsule by ER physician.   Pt states that she is doing pretty good. Last night her leg was really swollen but she had not had it up during the day. It was hurting pretty bad took, so she took her abx and then Excedrin. She is outside right now watering her garden and it is hurting a little bit. She is going to go inside and elevate her leg. States that after sleeping at night the swelling is significantly improved. Pain is improved. She states that this is the third episode of cellulitis that pt has had in the past month, so going forward, she is going to wear socks and shoes when out and wash her legs feet off right after getting inside to try to help with prevention.    Rosa Smyth RN

## 2021-08-09 ENCOUNTER — ANCILLARY PROCEDURE (OUTPATIENT)
Dept: GENERAL RADIOLOGY | Facility: CLINIC | Age: 76
End: 2021-08-09
Attending: FAMILY MEDICINE
Payer: COMMERCIAL

## 2021-08-09 ENCOUNTER — OFFICE VISIT (OUTPATIENT)
Dept: FAMILY MEDICINE | Facility: CLINIC | Age: 76
End: 2021-08-09
Payer: COMMERCIAL

## 2021-08-09 VITALS
RESPIRATION RATE: 19 BRPM | DIASTOLIC BLOOD PRESSURE: 75 MMHG | BODY MASS INDEX: 19.77 KG/M2 | SYSTOLIC BLOOD PRESSURE: 113 MMHG | OXYGEN SATURATION: 96 % | HEART RATE: 80 BPM | WEIGHT: 115.8 LBS | HEIGHT: 64 IN

## 2021-08-09 DIAGNOSIS — L03.119 CELLULITIS OF LOWER EXTREMITY, UNSPECIFIED LATERALITY: Primary | ICD-10-CM

## 2021-08-09 DIAGNOSIS — M79.674 PAIN OF TOE OF RIGHT FOOT: ICD-10-CM

## 2021-08-09 PROCEDURE — 99213 OFFICE O/P EST LOW 20 MIN: CPT | Performed by: FAMILY MEDICINE

## 2021-08-09 PROCEDURE — 73660 X-RAY EXAM OF TOE(S): CPT | Mod: RT | Performed by: RADIOLOGY

## 2021-08-09 ASSESSMENT — MIFFLIN-ST. JEOR: SCORE: 991.78

## 2021-08-09 ASSESSMENT — PAIN SCALES - GENERAL: PAINLEVEL: NO PAIN (0)

## 2021-08-09 NOTE — PROGRESS NOTES
"    Assessment & Plan     Cellulitis of lower extremity, unspecified laterality   Treated with keflex, resolved    Pain of toe of right foot   old (2 wks) toe injury, x ray no fracture but possible minor fracture; anticipate spontaneous resolution  - XR Toe Right G/E 2 Views; Future    Return in about 1 week (around 8/16/2021) for call with update.    Phil Abbott MD  [facility] SUDHIR Fraser is a 76 year old who presents for the following health issues   HPI   ED/UC Followup:  Facility:  [facility] ED  Date of visit: 7/31/2021  Reason for visit:    FINAL IMPRESSION:  1. Cellulitis of left lower extremity      Current Status: Toe is still \"goofed\"up but getting a little better     Toe injury from about 2 weeks ago; getting better but still looking a little swollen and is bruised. Taveras experience pain occasionally; just wondering why still bruised and a little swollen    Review of Systems   Constitutional, HEENT, cardiovascular, pulmonary, gi and gu systems are negative, except as otherwise noted.      Objective    /75 (BP Location: Right arm)   Pulse 80   Resp 19   Ht 1.62 m (5' 3.78\")   Wt 52.5 kg (115 lb 12.8 oz)   SpO2 96%   BMI 20.01 kg/m    Body mass index is 20.01 kg/m .  Physical Exam   GENERAL: healthy, alert and no distress  RESP: lungs clear to auscultation - no rales, rhonchi or wheezes  CV: regular rate and rhythm, no murmur, click or rub, no peripheral edema   MS: Left leg: no redness where had cellulitis, skin sensitive though.         Rt 2nd toe; mid phalanx bruising with some tenderness with manipulation.    "

## 2021-08-14 DIAGNOSIS — G43.909 MIGRAINE WITHOUT STATUS MIGRAINOSUS, NOT INTRACTABLE, UNSPECIFIED MIGRAINE TYPE: ICD-10-CM

## 2021-08-16 RX ORDER — PROPRANOLOL HYDROCHLORIDE 20 MG/1
TABLET ORAL
Qty: 90 TABLET | Refills: 0 | Status: SHIPPED | OUTPATIENT
Start: 2021-08-16 | End: 2021-11-15

## 2021-08-18 ENCOUNTER — LAB (OUTPATIENT)
Dept: LAB | Facility: CLINIC | Age: 76
End: 2021-08-18
Payer: COMMERCIAL

## 2021-08-18 DIAGNOSIS — Z79.60 LONG-TERM USE OF IMMUNOSUPPRESSANT MEDICATION: ICD-10-CM

## 2021-08-18 DIAGNOSIS — M31.30 GRANULOMATOSIS WITH POLYANGIITIS WITHOUT RENAL INVOLVEMENT (H): ICD-10-CM

## 2021-08-18 LAB
ALBUMIN SERPL-MCNC: 3.4 G/DL (ref 3.4–5)
ALBUMIN UR-MCNC: NEGATIVE MG/DL
ALP SERPL-CCNC: 84 U/L (ref 40–150)
ALT SERPL W P-5'-P-CCNC: 22 U/L (ref 0–50)
ANION GAP SERPL CALCULATED.3IONS-SCNC: 1 MMOL/L (ref 3–14)
APPEARANCE UR: CLEAR
AST SERPL W P-5'-P-CCNC: 15 U/L (ref 0–45)
BASOPHILS # BLD AUTO: 0 10E3/UL (ref 0–0.2)
BASOPHILS NFR BLD AUTO: 1 %
BILIRUB SERPL-MCNC: 0.4 MG/DL (ref 0.2–1.3)
BILIRUB UR QL STRIP: NEGATIVE
BUN SERPL-MCNC: 13 MG/DL (ref 7–30)
CALCIUM SERPL-MCNC: 9 MG/DL (ref 8.5–10.1)
CD19 CELLS # BLD: 181 CELLS/UL (ref 107–698)
CD19 CELLS NFR BLD: 17 % (ref 6–27)
CHLORIDE BLD-SCNC: 108 MMOL/L (ref 94–109)
CO2 SERPL-SCNC: 31 MMOL/L (ref 20–32)
COLOR UR AUTO: YELLOW
CREAT SERPL-MCNC: 0.76 MG/DL (ref 0.52–1.04)
CRP SERPL-MCNC: <2.9 MG/L (ref 0–8)
EOSINOPHIL # BLD AUTO: 0 10E3/UL (ref 0–0.7)
EOSINOPHIL NFR BLD AUTO: 1 %
ERYTHROCYTE [DISTWIDTH] IN BLOOD BY AUTOMATED COUNT: 13.6 % (ref 10–15)
ERYTHROCYTE [SEDIMENTATION RATE] IN BLOOD BY WESTERGREN METHOD: 8 MM/HR (ref 0–30)
GFR SERPL CREATININE-BSD FRML MDRD: 76 ML/MIN/1.73M2
GLUCOSE BLD-MCNC: 82 MG/DL (ref 70–99)
GLUCOSE UR STRIP-MCNC: NEGATIVE MG/DL
HCT VFR BLD AUTO: 43 % (ref 35–47)
HGB BLD-MCNC: 14.5 G/DL (ref 11.7–15.7)
HGB UR QL STRIP: ABNORMAL
KETONES UR STRIP-MCNC: NEGATIVE MG/DL
LEUKOCYTE ESTERASE UR QL STRIP: NEGATIVE
LYMPHOCYTES # BLD AUTO: 1 10E3/UL (ref 0.8–5.3)
LYMPHOCYTES NFR BLD AUTO: 20 %
MCH RBC QN AUTO: 29.6 PG (ref 26.5–33)
MCHC RBC AUTO-ENTMCNC: 33.7 G/DL (ref 31.5–36.5)
MCV RBC AUTO: 88 FL (ref 78–100)
MONOCYTES # BLD AUTO: 0.4 10E3/UL (ref 0–1.3)
MONOCYTES NFR BLD AUTO: 8 %
NEUTROPHILS # BLD AUTO: 3.6 10E3/UL (ref 1.6–8.3)
NEUTROPHILS NFR BLD AUTO: 71 %
NITRATE UR QL: NEGATIVE
PH UR STRIP: 6 [PH] (ref 5–7)
PLATELET # BLD AUTO: 205 10E3/UL (ref 150–450)
POTASSIUM BLD-SCNC: 4.6 MMOL/L (ref 3.4–5.3)
PROT SERPL-MCNC: 6.6 G/DL (ref 6.8–8.8)
RBC # BLD AUTO: 4.9 10E6/UL (ref 3.8–5.2)
RBC #/AREA URNS AUTO: NORMAL /HPF
SODIUM SERPL-SCNC: 140 MMOL/L (ref 133–144)
SP GR UR STRIP: 1.02 (ref 1–1.03)
UROBILINOGEN UR STRIP-ACNC: 0.2 E.U./DL
WBC # BLD AUTO: 5.1 10E3/UL (ref 4–11)
WBC #/AREA URNS AUTO: NORMAL /HPF

## 2021-08-18 PROCEDURE — 36415 COLL VENOUS BLD VENIPUNCTURE: CPT

## 2021-08-18 PROCEDURE — 83876 ASSAY MYELOPEROXIDASE: CPT

## 2021-08-18 PROCEDURE — 85652 RBC SED RATE AUTOMATED: CPT

## 2021-08-18 PROCEDURE — 81001 URINALYSIS AUTO W/SCOPE: CPT

## 2021-08-18 PROCEDURE — 86256 FLUORESCENT ANTIBODY TITER: CPT | Mod: 59

## 2021-08-18 PROCEDURE — 85025 COMPLETE CBC W/AUTO DIFF WBC: CPT

## 2021-08-18 PROCEDURE — 80053 COMPREHEN METABOLIC PANEL: CPT

## 2021-08-18 PROCEDURE — 86255 FLUORESCENT ANTIBODY SCREEN: CPT

## 2021-08-18 PROCEDURE — 82784 ASSAY IGA/IGD/IGG/IGM EACH: CPT

## 2021-08-18 PROCEDURE — 83516 IMMUNOASSAY NONANTIBODY: CPT

## 2021-08-18 PROCEDURE — 86140 C-REACTIVE PROTEIN: CPT

## 2021-08-18 PROCEDURE — 86355 B CELLS TOTAL COUNT: CPT

## 2021-08-19 LAB
ANCA AB PATTERN SER IF-IMP: ABNORMAL
C-ANCA TITR SER IF: ABNORMAL {TITER}
IGA SERPL-MCNC: 136 MG/DL (ref 84–499)
IGG SERPL-MCNC: 666 MG/DL (ref 610–1616)
IGM SERPL-MCNC: 90 MG/DL (ref 35–242)

## 2021-08-20 LAB
MYELOPEROXIDASE AB SER IA-ACNC: <0.3 U/ML
MYELOPEROXIDASE AB SER IA-ACNC: NEGATIVE
PROTEINASE3 AB SER IA-ACNC: 103 U/ML
PROTEINASE3 AB SER IA-ACNC: POSITIVE

## 2021-08-24 ENCOUNTER — VIRTUAL VISIT (OUTPATIENT)
Dept: RHEUMATOLOGY | Facility: CLINIC | Age: 76
End: 2021-08-24
Attending: INTERNAL MEDICINE
Payer: COMMERCIAL

## 2021-08-24 DIAGNOSIS — M81.0 AGE-RELATED OSTEOPOROSIS WITHOUT CURRENT PATHOLOGICAL FRACTURE: ICD-10-CM

## 2021-08-24 DIAGNOSIS — M31.30 GRANULOMATOSIS WITH POLYANGIITIS WITHOUT RENAL INVOLVEMENT (H): Primary | ICD-10-CM

## 2021-08-24 DIAGNOSIS — L03.119 CELLULITIS OF LOWER EXTREMITY, UNSPECIFIED LATERALITY: ICD-10-CM

## 2021-08-24 DIAGNOSIS — M15.0 PRIMARY OSTEOARTHRITIS INVOLVING MULTIPLE JOINTS: ICD-10-CM

## 2021-08-24 PROCEDURE — 99214 OFFICE O/P EST MOD 30 MIN: CPT | Mod: 95 | Performed by: INTERNAL MEDICINE

## 2021-08-24 NOTE — LETTER
8/24/2021       RE: Nury Cárdenas  [address] [facility] [address]     Dear Colleague,    Thank you for referring your patient, Nury Cárdenas, to the [facility] [place] at [facility]. Please see a copy of my visit note below.    Patient cannot do video visits        Patient cannot do video visits    Patient has had some issues with cellulitis over the past several weeks in toe, foot and lower leg. States she was treated with ab once.          Pat is a 76 year old who is being evaluated via a billable telephone visit.      What phone number would you like to be contacted at?   How would you like to obtain your AVS? Mail a copy  Phone call duration: 30 minutes      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 Course complicated by severe upper airway inflammation, Wegener's lung and destructive/erosive joint disease. Treated initially with rituximab 375 mg/m2 x4 and high dose corticsteroids, and most recently with methotrexate.  No history of relapse. Prednisone taper complete on April 1st, 2017. No recent methotrexate. Alendronate therapy complicated by LE pain.    She reports recurring cellulitis starting in the right great toe, but then progressing to the ankle. This also has led to an area of aching pain in the skin near the area of a previous cat scratch and infection. She is on her second course of ATBs. She reports the swelling is much improved. No history of fevers, chills or sweats. She finished her ATBs a couple of weeks ago.    Otherwise her breathing is pretty good. She has mild MCDANIEL. Her sinuses are pretty stable at present. She also complains of posterior neck pain in the mornings.     PMI:  Medical-invasive pulmonary aspirgillosis, GPA, osteoarthritis, DVT with pulmonary embolism, osteoporosis with vertebral fracture (T = -2.0 2-2021), GERD, hyperlipidemia, +Tb  exposure, nephrolithiasis, retinal tear, cataract, COVID-19, sensorineural hearing loss  Surgical-lung biopsy, appendectomy, eye surgery, tonsillectomy, cone biopsy, right wrist synovectomy  Injuries-left wrist fracture x2, left 5th toe fracture, vertebral fracture    SH:  Lives at home alone. Former smoker. No EtOH. Tuberculosis exposure as a child. Ambidextrous.    FH:  Son with mitochondrial myopathy  Daughter with palpitations  Sibs dead with colon and anal cancer  Father dead with lung cancer  Mother dead with emphysema and osteoporosis    PMSF history personally obtained and updated by me this visit in the clinic.    ROS:  +as above.  +allergies to fosamax amoxicillin, augmentin, erythromycin, zithromax, nickel, codeine, adhesive tape  Remainder of the 14 point ROS obtained and found negative.    Laboratory:    Component      Latest Ref Rng & Units 8/18/2021   WBC      4.0 - 11.0 10e3/uL 5.1   RBC Count      3.80 - 5.20 10e6/uL 4.90   Hemoglobin      11.7 - 15.7 g/dL 14.5   Hematocrit      35.0 - 47.0 % 43.0   MCV      78 - 100 fL 88   MCH      26.5 - 33.0 pg 29.6   MCHC      31.5 - 36.5 g/dL 33.7   RDW      10.0 - 15.0 % 13.6   Platelet Count      150 - 450 10e3/uL 205   % Neutrophils      % 71   % Lymphocytes      % 20   % Monocytes      % 8   % Eosinophils      % 1   % Basophils      % 1   Absolute Neutrophils      1.6 - 8.3 10e3/uL 3.6   Absolute Lymphocytes      0.8 - 5.3 10e3/uL 1.0   Absolute Monocytes      0.0 - 1.3 10e3/uL 0.4   Absolute Eosinophils      0.0 - 0.7 10e3/uL 0.0   Absolute Basophils      0.0 - 0.2 10e3/uL 0.0   Sodium      133 - 144 mmol/L 140   Potassium      3.4 - 5.3 mmol/L 4.6   Chloride      94 - 109 mmol/L 108   Carbon Dioxide      20 - 32 mmol/L 31   Anion Gap      3 - 14 mmol/L 1 (L)   Urea Nitrogen      7 - 30 mg/dL 13   Creatinine      0.52 - 1.04 mg/dL 0.76   Calcium      8.5 - 10.1 mg/dL 9.0   Glucose      70 - 99 mg/dL 82   Alkaline Phosphatase      40 - 150 U/L 84   AST       0 - 45 U/L 15   ALT      0 - 50 U/L 22   Protein Total      6.8 - 8.8 g/dL 6.6 (L)   Albumin      3.4 - 5.0 g/dL 3.4   Bilirubin Total      0.2 - 1.3 mg/dL 0.4   GFR Estimate      >60 mL/min/1.73m2 76   Color Urine      Colorless, Straw, Light Yellow, Yellow Yellow   Appearance Urine      Clear Clear   Glucose Urine      Negative mg/dL Negative   Bilirubin Urine      Negative Negative   Ketones Urine      Negative mg/dL Negative   Specific Gravity Urine      1.003 - 1.035 1.020   Blood Urine      Negative Trace (A)   pH Urine      5.0 - 7.0 6.0   Protein Albumin Urine      Negative mg/dL Negative   Urobilinogen Urine      0.2, 1.0 E.U./dL 0.2   Nitrite Urine      Negative Negative   Leukocyte Esterase Urine      Negative Negative   MPO Erika IgG Instrument Value      <3.5 U/mL <0.3   Myeloperoxidase Antibody IgG      Negative Negative   Proteinase 3 Erika IgG Instrument Value      <2.0 U/mL 103.0 (H)   Proteinase 3 Antibody IgG      Negative Positive (A)   IGG      610-1,616 mg/dL 666   IGA      84 - 499 mg/dL 136   IGM      35 - 242 mg/dL 90   Neutrophil Cytoplasmic Antibody      <1:10 1:40 (H)   Neutrophil Cytoplasmic Antibody Pattern       Cytoplasmic ANCA (c-ANCA) staining pattern observed and confirmed on formalin-fixed neutrophils.   CD19 B Cells      6 - 27 % 17   Absolute CD19      107 - 698 cells/uL 181   RBC Urine      0-2 /HPF /HPF 0-2   WBC Urine      0-5 /HPF /HPF 0-5   Sed Rate      0 - 30 mm/hr 8   CRP Inflammation      0.0 - 8.0 mg/L <2.9     Component Neutrophil Cytoplasmic Antibody   Latest Ref Rng & Units <1:10   10/12/2018 <1:10   4/19/2019 <1:10   10/11/2019 <1:10   2/3/2021 1:160 (A)   4/20/2021 1:80 (A)   8/18/2021 1:40 (H)     Component Proteinase 3 Erika IgG Instrument Value   Latest Ref Rng & Units <2.0 U/mL   2/3/2021    4/20/2021    8/18/2021 103.0 (H)     Component Proteinase 3 Antibody IgG   Latest Ref Rng & Units Negative   2/3/2021 >8.0 (H)   4/20/2021 >8.0 (H)   8/18/2021 Positive (A)      Narrative  Performed by: BREEZE PFT  The FVC, FEV1, and FEV1/FVC ratio are within normal limits.  Lung volumes are within normal limits.  The diffusing capacity is normal.  However, the diffusing capacity was not corrected for the patient's hemoglobin.   IMPRESSION:   Normal pulmonary function.   Compared with testing from  10/28/2019 there has been no significant change in the FEV1 or FVC.   Leelee Abdullahi MD        Impression:    PR3-associated GPA-with history of lung involvement. Recently she had a post COVID increase in her ANCA serologies, but no overt respiratory disease recurrence. Her titers are now consistently falling and her breathing is stable. Nevertheless, she has recurrent inflammatory skin disease in her lower extremities of uncertain etiology. I can't be certain that this is infection and will ask for a dermatology consultation to evaluate for cutaneous vasculitis. Plan repeat inflammatory markers and serologies.     Cellulitis-now treated with ATBs twice, but healing not yet occurring.    Osteoporosis-stable osteopenia at present on holiday from bisphosphonate.    Plan RTC in 6 months.    A total of 37 minutes was spent in telephone patient interaction and chart review on the day of service.      Again, thank you for allowing me to participate in the care of your patient.      Sincerely,    Alvaro aMguire MD

## 2021-08-24 NOTE — PROGRESS NOTES
Patient cannot do video visits    Patient has had some issues with cellulitis over the past several weeks in toe, foot and lower leg. States she was treated with ab once.          Pat is a 76 year old who is being evaluated via a billable telephone visit.      What phone number would you like to be contacted at?   How would you like to obtain your AVS? Mail a copy  Phone call duration: 30 minutes      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 Course complicated by severe upper airway inflammation, Wegener's lung and destructive/erosive joint disease. Treated initially with rituximab 375 mg/m2 x4 and high dose corticsteroids, and most recently with methotrexate.  No history of relapse. Prednisone taper complete on April 1st, 2017. No recent methotrexate. Alendronate therapy complicated by LE pain.    She reports recurring cellulitis starting in the right great toe, but then progressing to the ankle. This also has led to an area of aching pain in the skin near the area of a previous cat scratch and infection. She is on her second course of ATBs. She reports the swelling is much improved. No history of fevers, chills or sweats. She finished her ATBs a couple of weeks ago.    Otherwise her breathing is pretty good. She has mild MCDANIEL. Her sinuses are pretty stable at present. She also complains of posterior neck pain in the mornings.     PMI:  Medical-invasive pulmonary aspirgillosis, GPA, osteoarthritis, DVT with pulmonary embolism, osteoporosis with vertebral fracture (T = -2.0 2-2021), GERD, hyperlipidemia, +Tb exposure, nephrolithiasis, retinal tear, cataract, COVID-19, sensorineural hearing loss  Surgical-lung biopsy, appendectomy, eye surgery, tonsillectomy, cone biopsy, right wrist synovectomy  Injuries-left wrist fracture x2, left 5th toe fracture, vertebral fracture    SH:  Lives at home alone. Former smoker. No EtOH. Tuberculosis exposure as a child.  Ambidextrous.    FH:  Son with mitochondrial myopathy  Daughter with palpitations  Sibs dead with colon and anal cancer  Father dead with lung cancer  Mother dead with emphysema and osteoporosis    PMSF history personally obtained and updated by me this visit in the clinic.    ROS:  +as above.  +allergies to fosamax amoxicillin, augmentin, erythromycin, zithromax, nickel, codeine, adhesive tape  Remainder of the 14 point ROS obtained and found negative.    Laboratory:    Component      Latest Ref Rng & Units 8/18/2021   WBC      4.0 - 11.0 10e3/uL 5.1   RBC Count      3.80 - 5.20 10e6/uL 4.90   Hemoglobin      11.7 - 15.7 g/dL 14.5   Hematocrit      35.0 - 47.0 % 43.0   MCV      78 - 100 fL 88   MCH      26.5 - 33.0 pg 29.6   MCHC      31.5 - 36.5 g/dL 33.7   RDW      10.0 - 15.0 % 13.6   Platelet Count      150 - 450 10e3/uL 205   % Neutrophils      % 71   % Lymphocytes      % 20   % Monocytes      % 8   % Eosinophils      % 1   % Basophils      % 1   Absolute Neutrophils      1.6 - 8.3 10e3/uL 3.6   Absolute Lymphocytes      0.8 - 5.3 10e3/uL 1.0   Absolute Monocytes      0.0 - 1.3 10e3/uL 0.4   Absolute Eosinophils      0.0 - 0.7 10e3/uL 0.0   Absolute Basophils      0.0 - 0.2 10e3/uL 0.0   Sodium      133 - 144 mmol/L 140   Potassium      3.4 - 5.3 mmol/L 4.6   Chloride      94 - 109 mmol/L 108   Carbon Dioxide      20 - 32 mmol/L 31   Anion Gap      3 - 14 mmol/L 1 (L)   Urea Nitrogen      7 - 30 mg/dL 13   Creatinine      0.52 - 1.04 mg/dL 0.76   Calcium      8.5 - 10.1 mg/dL 9.0   Glucose      70 - 99 mg/dL 82   Alkaline Phosphatase      40 - 150 U/L 84   AST      0 - 45 U/L 15   ALT      0 - 50 U/L 22   Protein Total      6.8 - 8.8 g/dL 6.6 (L)   Albumin      3.4 - 5.0 g/dL 3.4   Bilirubin Total      0.2 - 1.3 mg/dL 0.4   GFR Estimate      >60 mL/min/1.73m2 76   Color Urine      Colorless, Straw, Light Yellow, Yellow Yellow   Appearance Urine      Clear Clear   Glucose Urine      Negative mg/dL Negative    Bilirubin Urine      Negative Negative   Ketones Urine      Negative mg/dL Negative   Specific Gravity Urine      1.003 - 1.035 1.020   Blood Urine      Negative Trace (A)   pH Urine      5.0 - 7.0 6.0   Protein Albumin Urine      Negative mg/dL Negative   Urobilinogen Urine      0.2, 1.0 E.U./dL 0.2   Nitrite Urine      Negative Negative   Leukocyte Esterase Urine      Negative Negative   MPO Erika IgG Instrument Value      <3.5 U/mL <0.3   Myeloperoxidase Antibody IgG      Negative Negative   Proteinase 3 Erika IgG Instrument Value      <2.0 U/mL 103.0 (H)   Proteinase 3 Antibody IgG      Negative Positive (A)   IGG      610-1,616 mg/dL 666   IGA      84 - 499 mg/dL 136   IGM      35 - 242 mg/dL 90   Neutrophil Cytoplasmic Antibody      <1:10 1:40 (H)   Neutrophil Cytoplasmic Antibody Pattern       Cytoplasmic ANCA (c-ANCA) staining pattern observed and confirmed on formalin-fixed neutrophils.   CD19 B Cells      6 - 27 % 17   Absolute CD19      107 - 698 cells/uL 181   RBC Urine      0-2 /HPF /HPF 0-2   WBC Urine      0-5 /HPF /HPF 0-5   Sed Rate      0 - 30 mm/hr 8   CRP Inflammation      0.0 - 8.0 mg/L <2.9     Component Neutrophil Cytoplasmic Antibody   Latest Ref Rng & Units <1:10   10/12/2018 <1:10   4/19/2019 <1:10   10/11/2019 <1:10   2/3/2021 1:160 (A)   4/20/2021 1:80 (A)   8/18/2021 1:40 (H)     Component Proteinase 3 Erika IgG Instrument Value   Latest Ref Rng & Units <2.0 U/mL   2/3/2021    4/20/2021    8/18/2021 103.0 (H)     Component Proteinase 3 Antibody IgG   Latest Ref Rng & Units Negative   2/3/2021 >8.0 (H)   4/20/2021 >8.0 (H)   8/18/2021 Positive (A)     Narrative  Performed by: BREEZE PFT  The FVC, FEV1, and FEV1/FVC ratio are within normal limits.  Lung volumes are within normal limits.  The diffusing capacity is normal.  However, the diffusing capacity was not corrected for the patient's hemoglobin.   IMPRESSION:   Normal pulmonary function.   Compared with testing from  10/28/2019 there has  been no significant change in the FEV1 or FVC.   Leelee Abdullahi MD        Impression:    PR3-associated GPA-with history of lung involvement. Recently she had a post COVID increase in her ANCA serologies, but no overt respiratory disease recurrence. Her titers are now consistently falling and her breathing is stable. Nevertheless, she has recurrent inflammatory skin disease in her lower extremities of uncertain etiology. I can't be certain that this is infection and will ask for a dermatology consultation to evaluate for cutaneous vasculitis. Plan repeat inflammatory markers and serologies.     Cellulitis-now treated with ATBs twice, but healing not yet occurring.    Osteoporosis-stable osteopenia at present on holiday from bisphosphonate.    Plan RTC in 6 months.    A total of 37 minutes was spent in telephone patient interaction and chart review on the day of service.

## 2021-08-24 NOTE — PATIENT INSTRUCTIONS
Repeat lab testing every 3 months  Follow up with me in 4 months  Get PFTs  Contact clinic at [phone] to schedule PFTs

## 2021-08-31 ENCOUNTER — TELEPHONE (OUTPATIENT)
Dept: CARDIOLOGY | Facility: CLINIC | Age: 76
End: 2021-08-31

## 2021-09-01 ENCOUNTER — OFFICE VISIT (OUTPATIENT)
Dept: CARDIOLOGY | Facility: CLINIC | Age: 76
End: 2021-09-01
Payer: COMMERCIAL

## 2021-09-01 VITALS
BODY MASS INDEX: 19.85 KG/M2 | OXYGEN SATURATION: 95 % | HEIGHT: 64 IN | WEIGHT: 116.3 LBS | HEART RATE: 68 BPM | SYSTOLIC BLOOD PRESSURE: 140 MMHG | DIASTOLIC BLOOD PRESSURE: 83 MMHG

## 2021-09-01 DIAGNOSIS — I34.1 MITRAL VALVE PROLAPSE: Primary | ICD-10-CM

## 2021-09-01 DIAGNOSIS — I26.99 ACUTE PULMONARY EMBOLISM, UNSPECIFIED PULMONARY EMBOLISM TYPE, UNSPECIFIED WHETHER ACUTE COR PULMONALE PRESENT (H): ICD-10-CM

## 2021-09-01 PROCEDURE — 99215 OFFICE O/P EST HI 40 MIN: CPT | Performed by: STUDENT IN AN ORGANIZED HEALTH CARE EDUCATION/TRAINING PROGRAM

## 2021-09-01 ASSESSMENT — MIFFLIN-ST. JEOR: SCORE: 990.91

## 2021-09-01 NOTE — PROGRESS NOTES
Chief Complaint: Follow-up of mitral valve prolapse    HPI: Nury Cárdenas is a 76 year old female with a past medical history of COPD, pulmonary embolism, and granulomatosis with angiitis who sought evaluation for mitral valve prolapse by self-referral.    Mrs. Cárdenas was reportedly diagnosed with mitral valve prolapse in the 1980s. She was started on propranolol at the time. Since then, she has been largely asymptomatic.  Mrs. Cárdenas reportedly has asked to come off of propranolol several times. However, it was continued because she also had severe migraines and has been used for migraine prophylaxis.  She last had an echocardiogram in 2014.  This showed that she had trace mitral regurgitation.    At the time of the consultation, she notes an absence of chest pain at rest, dyspnea at rest or with exertion, PND, orthopnea, peripheral edema, palpitations, lightheadedness or syncope. Mrs. Cárdenas states that she can easily walk a mile level ground, if she is like to walk her own pace.  She notes that she works out in the yard 4-5 times weekly or if she is not working on the yard, she goes out walking. Mrs. Cárdenas notes that she has very little red meat in her diet and focus on fresh fruit, vegetables, and lean meats.    A comprehensive ROS was done and the details are included above in the HPI.    Past Medical History:  - Mitral valve prolapse, diagnosed incidentally on ultrasound on echocardiogram   - No prior history of hypertension, T2DM, dyslipidemia, CAD, TIA/stroke, vascular aneurysms, PAD  Past Medical History:   Diagnosis Date     Amblyopia      Atypical pneumonia 6/14/2014     Atypical pneumonia      Atypical pneumonia      Atypical pneumonia      COPD (chronic obstructive pulmonary disease) (H) 7/20/2021     Coronary artery disease 1982    heart beat hard made me tired     Dyslipidemia      Fibroids      GERD (gastroesophageal reflux disease)      Granulomatosis with polyangiitis (Wegener's)       Hearing loss      Inflammatory arthritis     thumb     Migraines      MVP (mitral valve prolapse)     had an echo that was normal in 2007 she is on Inderal     Nonsenile cataract      Osteopenia      PE (pulmonary embolism) 10/2014    ? GPA associated, on warfarin      Strabismus      Synovitis of wrist 2009    right       Past Surgical History:    Past Surgical History:   Procedure Laterality Date     ABDOMEN SURGERY      appendix out     APPENDECTOMY       BIOPSY  1972    cone biopsy     CATARACT IOL, RT/LT       CL AFF SURGICAL PATHOLOGY      cone biopsy 1975     COLONOSCOPY       COLONOSCOPY WITH CO2 INSUFFLATION N/A 12/20/2017    Procedure: COLONOSCOPY WITH CO2 INSUFFLATION;  Screening  BMI: 20.7  Pharmacy: [facility]  [phone]  [facility]Medicare  [facility]  Patient get ill from [facility];  Surgeon: Duane, William Charles, MD;  Location: [facility] OR     EYE SURGERY      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     HC ESOPHAGOSCOPY, DIAGNOSTIC       LASER YAG CAPSULOTOMY      left eye     OPTICAL TRACKING SYSTEM BRONCHOSCOPY  6/19/2014    Procedure: OPTICAL TRACKING SYSTEM BRONCHOSCOPY;  Surgeon: Angel Kathleen MD;  Location:  GI     PHACOEMULSIFICATION WITH STANDARD INTRAOCULAR LENS IMPLANT  01/2018    left eye (SR)     SOFT TISSUE SURGERY      remove senovial fluid from right wrist     SURGICAL HISTORY OF -   as a child    strabismus repair right eye     SURGICAL HISTORY OF -   8-2009    right wrist debridement     TONSILLECTOMY      as a child     TONSILLECTOMY & ADENOIDECTOMY       TUBAL LIGATION       VITRECTOMY PARSPLANA  01/2018    left eye - mac hole (MVE)       Drug History:  Home cardiac meds: Propranolol 20 mg q24h  Current Outpatient Medications   Medication Sig Dispense Refill     aspirin-acetaminophen-caffeine (EXCEDRIN MIGRAINE) 250-250-65 MG tablet Take 1 tablet by mouth every 6 hours as needed for headaches       calcium-magnesium (CALMAG) 500-250 MG TABS per tablet Take 1  tablet by mouth 2 times daily (with meals)       cholecalciferol 1000 UNITS TABS Take 1,000 Units by mouth daily       propranolol (INDERAL) 20 MG tablet TAKE 1 TABLET BY MOUTH DAILY 90 tablet 0     Vitamin K 100 MCG TABS Take 1 tablet by mouth daily Taking Vitamin K2.       acetaminophen (TYLENOL) 325 MG tablet Take 2 tablets (650 mg) by mouth every 6 hours as needed for mild pain (Patient not taking: Reported on 2021) 100 tablet 0         Family History:  - No family history of sudden cardiac death, premature CAD, valvular disorders  Family History   Problem Relation Age of Onset     Respiratory Mother         emphysema     Aneurysm Mother      Chronic Obstructive Pulmonary Disease Mother      Thyroid Disease Mother         ,     Osteoporosis Mother      Other Cancer Father      Cancer Father         lung cancer     Arthritis Maternal Grandmother         rheumatoid     Heart Disease Maternal Grandmother         unsure of details     Heart Disease Maternal Grandfather      Neurologic Disorder Paternal Grandmother         migraines     Alzheimer Disease Paternal Grandmother      Unknown/Adopted Paternal Grandfather      Cancer Sister         stage 4 anal cancer age 62 years     Colon Cancer Sister      Cancer - colorectal Brother      Colon Cancer Brother      Substance Abuse Sister      Anesthesia Reaction Daughter        Social History:  Social History     Tobacco Use     Smoking status: Former Smoker     Packs/day: 2.00     Years: 20.00     Pack years: 40.00     Types: Cigarettes     Start date: 1961     Quit date: 1983     Years since quittin.1     Smokeless tobacco: Never Used   Substance Use Topics     Alcohol use: No     Alcohol/week: 0.0 standard drinks       Allergies   Allergen Reactions     Vancomycin Other (See Comments)     Red man's syndrom     Adhesive Tape      Rash itches     Amoxicillin      vomiting     Augmentin      vomiting     Codeine      vomiting     Erythromycin       "vomiting     Nickel      itches rash     Sulfa Drugs Itching     Tegaderm Alginate Ag      LW Other1: -ALL MEDICATIONS MAKE PATIENT VIOLENTLY ILL; ADHESIVE BANDAGES; NICKEL     Zithromax [Azithromycin Dihydrate]      Vomiting/ skin blisters         Physical Examination:  Vitals: BP (!) 140/83 (BP Location: Left arm, Patient Position: Sitting, Cuff Size: Adult Regular)   Pulse 68   Ht 1.615 m (5' 3.58\")   Wt 52.8 kg (116 lb 4.8 oz)   SpO2 95%   BMI 20.23 kg/m    BMI= Body mass index is 20.23 kg/m .    GENERAL: Healthy, alert and no distress  Eyes: No xanthelasmas.  NECK: No palpable neck masses/goitre and no evidence of retrosternal goitre.   ENT: Moist mucosal membranes.  RESPIRATORY: No signs of resp distress. Lungs CTAB.  CARDIOVASCULAR:  No JVD, regular, normal S1+S2 without any murmurs/sounds   ABDOMEN: ND, soft, non-tender, normal bowel sounds.  EXTREMITIES: Warm, well-perfused, no edema.   NEUROLOGY: GCS 15/15, no focal deficits.  VASC: No carotid bruits. Carotid, radial, brachial, popliteal, dorsalis pedis and posterior tibialis pulses are normal in volumes and symmetric bilaterally.   SKIN: No ecchymoses, no rashes.  PSYCH: Cooperative, pleasant affect.       Investigations:  I personally viewed and interpreted the following investigations:    Labs:    CBC RESULTS:  Lab Results   Component Value Date    WBC 5.1 08/18/2021    WBC 5.7 04/20/2021    RBC 4.90 08/18/2021    RBC 4.90 04/20/2021    HGB 14.5 08/18/2021    HGB 14.9 04/20/2021    HCT 43.0 08/18/2021    HCT 44.4 04/20/2021    MCV 88 08/18/2021    MCV 91 04/20/2021    MCH 29.6 08/18/2021    MCH 30.4 04/20/2021    MCHC 33.7 08/18/2021    MCHC 33.6 04/20/2021    RDW 13.6 08/18/2021    RDW 14.1 04/20/2021     08/18/2021     04/20/2021       CMP RESULTS:  Lab Results   Component Value Date     08/18/2021     04/20/2021    POTASSIUM 4.6 08/18/2021    POTASSIUM 4.1 04/20/2021    CHLORIDE 108 08/18/2021    CHLORIDE 107 04/20/2021 "    CO2 31 08/18/2021    CO2 33 (H) 04/20/2021    ANIONGAP 1 (L) 08/18/2021    ANIONGAP <1 (L) 04/20/2021    GLC 82 08/18/2021    GLC 80 04/20/2021    BUN 13 08/18/2021    BUN 14 04/20/2021    CR 0.76 08/18/2021    CR 0.81 04/20/2021    GFRESTIMATED 76 08/18/2021    GFRESTIMATED 70 04/20/2021    GFRESTBLACK 81 04/20/2021    CLINTON 9.0 08/18/2021    CLINTON 9.4 04/20/2021    BILITOTAL 0.4 08/18/2021    BILITOTAL 0.4 04/20/2021    ALBUMIN 3.4 08/18/2021    ALBUMIN 3.5 04/20/2021    ALKPHOS 84 08/18/2021    ALKPHOS 76 04/20/2021    ALT 22 08/18/2021    ALT 24 04/20/2021    AST 15 08/18/2021    AST 19 04/20/2021        INR RESULTS:  Lab Results   Component Value Date    INR 2.1 (A) 02/01/2019    INR 2.01 (H) 08/05/2017       BNP RESULTS:  Lab Results   Component Value Date    NTBNPI 671 06/14/2014       LIPIDS:  Lab Results   Component Value Date    CHOL 250 12/13/2017     Lab Results   Component Value Date     12/13/2017     Lab Results   Component Value Date     12/13/2017     Lab Results   Component Value Date    TRIG 101 12/13/2017     Lab Results   Component Value Date    CHOLHDLRATIO 3.4 10/15/2012       Recent Tests:    Echocardiogram (10/2014):  No evidence of MV prolapse with only trace mitral regurgitation. Normal biventricular function.     Assessment and Plan:   Nury Cárdenas is a 76 year old female with a past medical history of mitral prolapse, COPD, and granulomatosis with polyangiitis who presented to me for follow-up evaluation of her mitral valve prolapse.    Given Mrs. Cárdenas's excellent functional status and lack of audible mitral regurgitation murmur on examination, I am reassured that she does not have a severe valvular lesion.  I talked to her extensively about how surveillance and treatment patterns for mitral prolapse have changed in the last few decades.  Ultimately, after this discussion, she decided that she did not want to have any further investigations.  I feel that this is  reasonable given that she does not have any high risk clinical features, such as progressive dyspnea, symptoms consistent with arrhythmia (particularly, atrial fibrillation), or presyncope/syncope.  Similarly, beta-blockade is no longer routinely employed for mitral prolapse.  However, in view of her significant migraine history, I am hesitant to stop propranolol as it probably is working for migraine prophylaxis.    We also spent some time talking a general preventive strategies for improving cardiovascular health.  Ms. May has an excellent dietary and exercise regimen.  We also talked about statins as primary prevention therapy and she noted that she would prefer to avoid all tablet therapies at this stage.    Problems:  1. Mitral valve prolapse  2. Granulomatosis with polyangiitis  3. COPD  4. History of pulmonary emboli     Plan:  - No further investigation or management needed at this stage  - RTC PRN     Chart review time: 20  Visit time: 30  Total time spent: 50  >50% of this 50-minute visit were spent with the patient on in-person counseling and discussion regarding mitral valve prolapse.     Babak Enamorado [facility], [place]    Cardiovascular Division  Pager [phone]    CC  Patient Care Team:  Phil Abbott MD as PCP - General (Family Practice)  Iain Estes MD as MD (Rheumatology)  Natalie Gleason, GABBI as Specialty Care Coordinator (Rheumatology)  Alvaro Maguire MD as Assigned Rheumatology Provider  Jillian Fowler APRN CNP as Assigned PCP  Marshall Car MD as Assigned Surgical Provider

## 2021-09-01 NOTE — PROGRESS NOTES
"Nury Cárdenas's goals for this visit include: Check things out on the heart.     She requests these members of her care team be copied on today's visit information: PCP and Dr Maguire.     PCP: Phil Abbott    Referring Provider:  No referring provider defined for this encounter.    BP (!) 140/83 (BP Location: Left arm, Patient Position: Sitting, Cuff Size: Adult Regular)   Pulse 68   Ht 1.615 m (5' 3.58\")   Wt 52.8 kg (116 lb 4.8 oz)   SpO2 95%   BMI 20.23 kg/m      Do you need any medication refills at today's visit? No.     Blade Robins, EMT  Clinic Support  [facility]    [phone]    Employed by [facility] [employer]        "

## 2021-09-01 NOTE — PATIENT INSTRUCTIONS
You were seen at the [facility] clinic today.   You saw Dr. Babak Enamorado, [facility], [place].    The following is a summary of your office visit today:    1. No further investigation needed  2. Follow-up with me as needed     Nurse contact information:    Cardiology Care Coordinators: Herlinda Echevarria RN and Shaun Vergara RN      Phone: [phone]   Fax: [phone]      HOW TO CHECK YOUR BLOOD PRESSURE AT HOME:     Avoid eating, smoking, and exercising for at least 30 minutes before taking a reading.    Be sure you have taken your BP medication at least 2-3 hours before you check it.     Sit quietly for 10 minutes before a reading.     Sit in a chair with your feet flat on the floor. Rest your  arm on a table so that the arm cuff is at the same level as your heart.    Remain still during the reading.    Record your blood pressure and pulse in a log and bring to your next appointment.     If you have had any blood work, imaging or other testing completed we will be in touch within 1-2 weeks regarding the results. If you have any questions, concerns or need to schedule a follow up, please contact us at [phone]. If you are needing refills please contact your pharmacy. For urgent after hour care please call the [facility] at [phone] or the [facility] at [phone] and ask to speak to the on-call Cardiologist.    It was a pleasure meeting with you today. Please let us know if there is anything else we can do for you so that we can be sure you are leaving completely satisfied with your care experience.     Your Cardiology Team at [facility]  RN Care Coordinators: Alejandro  CMA: Nita

## 2021-09-03 ENCOUNTER — VIRTUAL VISIT (OUTPATIENT)
Dept: DERMATOLOGY | Facility: CLINIC | Age: 76
End: 2021-09-03
Payer: COMMERCIAL

## 2021-09-03 DIAGNOSIS — E83.19 HEMOSIDEROSIS: Primary | ICD-10-CM

## 2021-09-03 DIAGNOSIS — M31.30 GRANULOMATOSIS WITH POLYANGIITIS WITHOUT RENAL INVOLVEMENT (H): ICD-10-CM

## 2021-09-03 PROCEDURE — 99202 OFFICE O/P NEW SF 15 MIN: CPT | Mod: TEL | Performed by: DERMATOLOGY

## 2021-09-03 ASSESSMENT — PAIN SCALES - GENERAL: PAINLEVEL: NO PAIN (0)

## 2021-09-03 NOTE — PROGRESS NOTES
[facility] Dermatology Note  Encounter Date: Sep 3, 2021  [employer] and Telephone ([phone]). Location of teledermatologist:   [facility] [place].  Start time: 8:52. End time: 9:40.    Dermatology Problem List:  1. Chronic skin changes of the left lower extremity - hemosiderosis; in context of resolving cellulitis and history of DVT in setting of granulomatosis with polyangiitis (in remission)    ____________________________________________    Assessment & Plan:     1. Skin eruption on the lower legs and toe: at this time low suspicion for cutaneous granulomatosis with polyangiitis. The patient did have a recent episode of cellulitis and a history of DVT in the leg, which can damage the underlying vasculature and increase the risk of recurrence. The current appearance is consistent with hemosiderosis in this context. Given downtrending ANCAs and undetectable CRP, this is further reassuring against new onset cutaneous activity. Toe erythema unclear if represented cellulitis or arthritis, though now resolving.  - recommend compression stockings  - no cutaneous indication for uptitration of immunosuppressive regimen at this time  - given history, will recheck in 2-3 months to confirm disease has remained quiescent; patient will contact if cutaneous symptoms evolve    Procedures Performed:    None    Follow-up: 2-3 month(s) virtually (telephone with photos), or earlier for new or changing lesions    Staff and Resident:     Staff: Dr. Terence Block  Resident: Madison Hernandez MD, PGY2,     Staff Physician Comments:   I evaluated any available patient photographs with the resident and I edited the assessment and plan as documented in the note. I was present on the line and participated in the entire telephone call.    Terence Block MD   of Dermatology  Department of Dermatology  [facility]  "Medicine    ____________________________________________    CC: Derm Problem (Pat, is here for lower extremity inflammation, no other concerns )    HPI:  Ms. Nury Cárdenas is a(n) 76 year old female who presents today as a new patient for evaluation of lower extremity skin changes with concern for GPA.     Chart review notable for the following:    \"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 Course complicated by severe upper airway inflammation, Wegener's lung and destructive/erosive joint disease. Treated initially with rituximab 375 mg/m2 x4 and high dose corticsteroids, and most recently with methotrexate.  No history of relapse. Prednisone taper complete on April 1st, 2017. No recent methotrexate. Alendronate therapy complicated by LE pain.     She reports recurring cellulitis starting in the right great toe, but then progressing to the ankle. This also has led to an area of aching pain in the skin near the area of a previous cat scratch and infection. She is on her second course of ATBs. She reports the swelling is much improved. No history of fevers, chills or sweats. She finished her ATBs a couple of weeks ago.\"  ...   \"PMI:  Medical-invasive pulmonary aspirgillosis, GPA, osteoarthritis, DVT with pulmonary embolism, osteoporosis with vertebral fracture (T = -2.0 2-2021), GERD, hyperlipidemia, +Tb exposure, nephrolithiasis, retinal tear, cataract, COVID-19, sensorineural hearing loss  Surgical-lung biopsy, appendectomy, eye surgery, tonsillectomy, cone biopsy, right wrist synovectomy  Injuries-left wrist fracture x2, left 5th toe fracture, vertebral fracture.\"    Additional history obtained from the patient at her phone interview today.     Concerning right leg   -Pt recently went to urgent care for cellulitis of the right lower extremity.    -Precise, midway between the ankle and the knee on the inside of the leg.   -Additionally, big toe swelled up to the extent " "that \"it closed the space between my toes.\"   -Patient completed 7-day course of keflex, which patient reports \"helped immensly.\"   -And then my second toe swelled up really big and got hard to walk on     -Concerning the left leg   -Describes sharp, achy pain   -And then my left leg, detention between the ankle and the knee, right along the top   -Still persists a \"little poofy spot\" - does not hurt when she tries to touch it.    Concerning nature of the pain   -Occurs \"a lot of times at night when I first lie down, and those pains dont last very long.\"   -Describes the pain like \"a burning, deep, ache.\"    -Pain does not travel anywhere else in the body   -Now describes pain as once or twice a day    -Additionally, history of pulmonary embolism and DVT in the right leg with prior hospitalization.   -Denies fever, chills, or constitutional symptoms.     Patient is otherwise feeling well, without additional skin concerns.    Labs Reviewed:    Recent inflammatory markers (C-ANCA, Proteinase 3) reviewed.   Still positive however downtrending since 04/21    -XR RIGHT TOE: Negative exam    EXAM: US LOWER EXTREMITY VENOUS DUPLEX LEFT - LOCATION: [facility]  DATE/TIME: 7/31/2021 9:51 PM      IMPRESSION:  1.  No deep venous thrombosis in the bilateral lower extremities.     2.  Small elongated hypoechoic area in the superficial soft tissues of the left ankle in the area of clinical concern of uncertain significance. This could be seen with a small amount of fluid in this region. Follow-up as clinically warranted. Small   abscess or hematoma could have this appearance depending upon clinical scenario.    Physical Exam:  Vitals: There were no vitals taken for this visit.  SKIN: Teledermatology photos were reviewed; image quality and interpretability: acceptable. Image date:.  - Right second toe joint slightly edematous with mauve-colored overlying skin changes.   - Lay-colored patches of " hyperpigmentation at the lateral aspect of the left foot.   - No discrete macules or patches of erythema.   - No open ulcers. No vesicles of bullae.   - No other lesions of concern on areas examined.     Medications:  Current Outpatient Medications   Medication     acetaminophen (TYLENOL) 325 MG tablet     aspirin-acetaminophen-caffeine (EXCEDRIN MIGRAINE) 250-250-65 MG tablet     calcium-magnesium (CALMAG) 500-250 MG TABS per tablet     cholecalciferol 1000 UNITS TABS     propranolol (INDERAL) 20 MG tablet     Vitamin K 100 MCG TABS     No current facility-administered medications for this visit.     Facility-Administered Medications Ordered in Other Visits   Medication     sodium chloride (PF) 0.9% PF flush 60 mL      Past Medical/Surgical History:   Patient Active Problem List   Diagnosis     GERD (gastroesophageal reflux disease)     Fibroids     Migraines     Hearing loss     Osteopenia     HYPERLIPIDEMIA LDL GOAL <160     Advanced directives, counseling/discussion     Inflammatory arthritis     Synovitis of wrist     Chronic constipation     Granulomatosis with polyangiitis without renal involvement (H)     Chronic steroid use     Dyspnea     Pulmonary embolism (H)     Calculus of kidney, right     Chronic anticoagulation     Long-term (current) use of anticoagulants [Z79.01]     Long-term use of immunosuppressant medication     Primary osteoarthritis involving multiple joints     Cellulitis     Cat scratch left lower extremity     Other pulmonary embolism without acute cor pulmonale, unspecified chronicity (H)     Age-related osteoporosis without current pathological fracture     Age-related osteoporosis with current pathological fracture, sequela     Adverse effects of medication     Combined forms of age-related cataract, mild-mod, of right eye     Pseudophakia, Yag Caps, os     History of vitrectomy - macular hole, os (MVE)     Hx of macular hole of left eye     Hemifacial spasm     Immunosuppression (H)      Clinical diagnosis of COVID-19     History of COVID-19     Lesion of right eyelid     COPD (chronic obstructive pulmonary disease) (H)     Past Medical History:   Diagnosis Date     Amblyopia      Atypical pneumonia 6/14/2014     Atypical pneumonia      Atypical pneumonia      Atypical pneumonia      COPD (chronic obstructive pulmonary disease) (H) 7/20/2021     Coronary artery disease 1982    heart beat hard made me tired     Dyslipidemia      Fibroids      GERD (gastroesophageal reflux disease)      Granulomatosis with polyangiitis (Wegener's)      Hearing loss      Inflammatory arthritis     thumb     Migraines      MVP (mitral valve prolapse)     had an echo that was normal in 2007 she is on Inderal     Nonsenile cataract      Osteopenia      PE (pulmonary embolism) 10/2014    ? GPA associated, on warfarin      Strabismus      Synovitis of wrist 2009    right       CC Phil Abbott MD  [address] [facility] [place] [id number] on close of this encounter.

## 2021-09-03 NOTE — NURSING NOTE
Chief Complaint   Patient presents with     Derm Problem     Pat, is here for lower extremity inflammation, no other concerns     Moise Dietrich EMT

## 2021-09-03 NOTE — LETTER
9/3/2021       RE: Nury Cárdenas  [address] [facility] [address]     Dear Colleague,    Thank you for referring your patient, Nury Cárdenas, to the [facility] [place] at [facility]. Please see a copy of my visit note below.    [facility] Dermatology Note  Encounter Date: Sep 3, 2021  [employer] and Telephone ([phone]). Location of teledermatologist:   [facility] [place].  Start time: 8:52. End time: 9:40.    Dermatology Problem List:  1. Chronic skin changes of the left lower extremity - hemosiderosis; in context of resolving cellulitis and history of DVT in setting of granulomatosis with polyangiitis (in remission)    ____________________________________________    Assessment & Plan:     1. Skin eruption on the lower legs and toe: at this time low suspicion for cutaneous granulomatosis with polyangiitis. The patient did have a recent episode of cellulitis and a history of DVT in the leg, which can damage the underlying vasculature and increase the risk of recurrence. The current appearance is consistent with hemosiderosis in this context. Given downtrending ANCAs and undetectable CRP, this is further reassuring against new onset cutaneous activity. Toe erythema unclear if represented cellulitis or arthritis, though now resolving.  - recommend compression stockings  - no cutaneous indication for uptitration of immunosuppressive regimen at this time  - given history, will recheck in 2-3 months to confirm disease has remained quiescent; patient will contact if cutaneous symptoms evolve    Procedures Performed:    None    Follow-up: 2-3 month(s) virtually (telephone with photos), or earlier for new or changing lesions    Staff and Resident:     Staff: Dr. Terence Block  Resident: Madison Hernandez MD, PGY2,     Staff Physician Comments:   I evaluated any available  "patient photographs with the resident and I edited the assessment and plan as documented in the note. I was present on the line and participated in the entire telephone call.    Terence Block MD   of Dermatology  Department of Dermatology  [facility]    ____________________________________________    CC: Derm Problem (Pat, is here for lower extremity inflammation, no other concerns )    HPI:  Ms. Nury Cárdenas is a(n) 76 year old female who presents today as a new patient for evaluation of lower extremity skin changes with concern for GPA.     Chart review notable for the following:    \"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 Course complicated by severe upper airway inflammation, Wegener's lung and destructive/erosive joint disease. Treated initially with rituximab 375 mg/m2 x4 and high dose corticsteroids, and most recently with methotrexate.  No history of relapse. Prednisone taper complete on April 1st, 2017. No recent methotrexate. Alendronate therapy complicated by LE pain.     She reports recurring cellulitis starting in the right great toe, but then progressing to the ankle. This also has led to an area of aching pain in the skin near the area of a previous cat scratch and infection. She is on her second course of ATBs. She reports the swelling is much improved. No history of fevers, chills or sweats. She finished her ATBs a couple of weeks ago.\"  ...   \"PMI:  Medical-invasive pulmonary aspirgillosis, GPA, osteoarthritis, DVT with pulmonary embolism, osteoporosis with vertebral fracture (T = -2.0 2-2021), GERD, hyperlipidemia, +Tb exposure, nephrolithiasis, retinal tear, cataract, COVID-19, sensorineural hearing loss  Surgical-lung biopsy, appendectomy, eye surgery, tonsillectomy, cone biopsy, right wrist synovectomy  Injuries-left wrist fracture x2, left 5th toe fracture, vertebral " "fracture.\"    Additional history obtained from the patient at her phone interview today.     Concerning right leg   -Pt recently went to urgent care for cellulitis of the right lower extremity.    -Precise, midway between the ankle and the knee on the inside of the leg.   -Additionally, big toe swelled up to the extent that \"it closed the space between my toes.\"   -Patient completed 7-day course of keflex, which patient reports \"helped immensly.\"   -And then my second toe swelled up really big and got hard to walk on     -Concerning the left leg   -Describes sharp, achy pain   -And then my left leg, assisted between the ankle and the knee, right along the top   -Still persists a \"little poofy spot\" - does not hurt when she tries to touch it.    Concerning nature of the pain   -Occurs \"a lot of times at night when I first lie down, and those pains dont last very long.\"   -Describes the pain like \"a burning, deep, ache.\"    -Pain does not travel anywhere else in the body   -Now describes pain as once or twice a day    -Additionally, history of pulmonary embolism and DVT in the right leg with prior hospitalization.   -Denies fever, chills, or constitutional symptoms.     Patient is otherwise feeling well, without additional skin concerns.    Labs Reviewed:    Recent inflammatory markers (C-ANCA, Proteinase 3) reviewed.   Still positive however downtrending since 04/21    -XR RIGHT TOE: Negative exam    EXAM: US LOWER EXTREMITY VENOUS DUPLEX LEFT - LOCATION: [facility]  DATE/TIME: 7/31/2021 9:51 PM      IMPRESSION:  1.  No deep venous thrombosis in the bilateral lower extremities.     2.  Small elongated hypoechoic area in the superficial soft tissues of the left ankle in the area of clinical concern of uncertain significance. This could be seen with a small amount of fluid in this region. Follow-up as clinically warranted. Small   abscess or hematoma could have this appearance depending upon " clinical scenario.    Physical Exam:  Vitals: There were no vitals taken for this visit.  SKIN: Teledermatology photos were reviewed; image quality and interpretability: acceptable. Image date:.  - Right second toe joint slightly edematous with mauve-colored overlying skin changes.   - Lay-colored patches of hyperpigmentation at the lateral aspect of the left foot.   - No discrete macules or patches of erythema.   - No open ulcers. No vesicles of bullae.   - No other lesions of concern on areas examined.     Medications:  Current Outpatient Medications   Medication     acetaminophen (TYLENOL) 325 MG tablet     aspirin-acetaminophen-caffeine (EXCEDRIN MIGRAINE) 250-250-65 MG tablet     calcium-magnesium (CALMAG) 500-250 MG TABS per tablet     cholecalciferol 1000 UNITS TABS     propranolol (INDERAL) 20 MG tablet     Vitamin K 100 MCG TABS     No current facility-administered medications for this visit.     Facility-Administered Medications Ordered in Other Visits   Medication     sodium chloride (PF) 0.9% PF flush 60 mL      Past Medical/Surgical History:   Patient Active Problem List   Diagnosis     GERD (gastroesophageal reflux disease)     Fibroids     Migraines     Hearing loss     Osteopenia     HYPERLIPIDEMIA LDL GOAL <160     Advanced directives, counseling/discussion     Inflammatory arthritis     Synovitis of wrist     Chronic constipation     Granulomatosis with polyangiitis without renal involvement (H)     Chronic steroid use     Dyspnea     Pulmonary embolism (H)     Calculus of kidney, right     Chronic anticoagulation     Long-term (current) use of anticoagulants [Z79.01]     Long-term use of immunosuppressant medication     Primary osteoarthritis involving multiple joints     Cellulitis     Cat scratch left lower extremity     Other pulmonary embolism without acute cor pulmonale, unspecified chronicity (H)     Age-related osteoporosis without current pathological fracture     Age-related osteoporosis  with current pathological fracture, sequela     Adverse effects of medication     Combined forms of age-related cataract, mild-mod, of right eye     Pseudophakia, Yag Caps, os     History of vitrectomy - macular hole, os (MVE)     Hx of macular hole of left eye     Hemifacial spasm     Immunosuppression (H)     Clinical diagnosis of COVID-19     History of COVID-19     Lesion of right eyelid     COPD (chronic obstructive pulmonary disease) (H)     Past Medical History:   Diagnosis Date     Amblyopia      Atypical pneumonia 6/14/2014     Atypical pneumonia      Atypical pneumonia      Atypical pneumonia      COPD (chronic obstructive pulmonary disease) (H) 7/20/2021     Coronary artery disease 1982    heart beat hard made me tired     Dyslipidemia      Fibroids      GERD (gastroesophageal reflux disease)      Granulomatosis with polyangiitis (Wegener's)      Hearing loss      Inflammatory arthritis     thumb     Migraines      MVP (mitral valve prolapse)     had an echo that was normal in 2007 she is on Inderal     Nonsenile cataract      Osteopenia      PE (pulmonary embolism) 10/2014    ? GPA associated, on warfarin      Strabismus      Synovitis of wrist 2009    right       CC Phil Abbott MD  [address] [facility] [place] [id number] on close of this encounter.

## 2021-09-18 ENCOUNTER — HEALTH MAINTENANCE LETTER (OUTPATIENT)
Age: 76
End: 2021-09-18

## 2021-09-19 ENCOUNTER — MYC MEDICAL ADVICE (OUTPATIENT)
Dept: FAMILY MEDICINE | Facility: CLINIC | Age: 76
End: 2021-09-19

## 2021-09-28 ENCOUNTER — OFFICE VISIT (OUTPATIENT)
Dept: FAMILY MEDICINE | Facility: CLINIC | Age: 76
End: 2021-09-28
Payer: COMMERCIAL

## 2021-09-28 VITALS
RESPIRATION RATE: 19 BRPM | DIASTOLIC BLOOD PRESSURE: 79 MMHG | TEMPERATURE: 98 F | BODY MASS INDEX: 20.11 KG/M2 | HEIGHT: 64 IN | HEART RATE: 62 BPM | SYSTOLIC BLOOD PRESSURE: 127 MMHG | OXYGEN SATURATION: 97 % | WEIGHT: 117.8 LBS

## 2021-09-28 DIAGNOSIS — R59.0 ADENOPATHY, CERVICAL: ICD-10-CM

## 2021-09-28 DIAGNOSIS — R05.9 COUGH: ICD-10-CM

## 2021-09-28 DIAGNOSIS — D84.9 IMMUNOSUPPRESSION (H): ICD-10-CM

## 2021-09-28 DIAGNOSIS — J06.9 UPPER RESPIRATORY TRACT INFECTION, UNSPECIFIED TYPE: ICD-10-CM

## 2021-09-28 DIAGNOSIS — J01.90 ACUTE SINUSITIS WITH SYMPTOMS > 10 DAYS: Primary | ICD-10-CM

## 2021-09-28 DIAGNOSIS — J01.90 ACUTE SINUSITIS WITH SYMPTOMS > 10 DAYS: ICD-10-CM

## 2021-09-28 PROCEDURE — 99213 OFFICE O/P EST LOW 20 MIN: CPT | Performed by: FAMILY MEDICINE

## 2021-09-28 RX ORDER — FLUTICASONE PROPIONATE 50 MCG
2 SPRAY, SUSPENSION (ML) NASAL DAILY
Qty: 16 G | Refills: 0 | Status: SHIPPED | OUTPATIENT
Start: 2021-09-28 | End: 2021-09-30

## 2021-09-28 RX ORDER — CEFDINIR 300 MG/1
300 CAPSULE ORAL 2 TIMES DAILY
Qty: 20 CAPSULE | Refills: 0 | Status: SHIPPED | OUTPATIENT
Start: 2021-09-28 | End: 2022-01-10

## 2021-09-28 ASSESSMENT — MIFFLIN-ST. JEOR: SCORE: 1009.59

## 2021-09-28 ASSESSMENT — PAIN SCALES - GENERAL: PAINLEVEL: NO PAIN (0)

## 2021-09-28 NOTE — PROGRESS NOTES
Assessment & Plan     Acute sinusitis with symptoms > 10 days    Discussed the nature and pathophysiology of sinusitis and treatment including the role of antibiotics and the need to get over the underlying trigger, URI or allergies.    - fluticasone (FLONASE) 50 MCG/ACT nasal spray; Spray 2 sprays into both nostrils daily    Cough  - cefdinir (OMNICEF) 300 MG capsule; Take 1 capsule (300 mg) by mouth 2 times daily    Immunosuppression (H)     - cefdinir (OMNICEF) 300 MG capsule; Take 1 capsule (300 mg) by mouth 2 times daily    Upper respiratory tract infection, unspecified type  - cefdinir (OMNICEF) 300 MG capsule; Take 1 capsule (300 mg) by mouth 2 times daily    Adenopathy, cervical (Lt sub madiluar)    A small non tender adenopathy in the Lt submandibular; recheck in 4 weeks if not resolved    Return in about 1 week (around 10/5/2021) for Follow up for symptoms recheck.    Phil Abbott MD  [facility] SUDHIR Fraser is a 76 year old who presents for the following health issues:    HPI   Chief Complaint   Patient presents with     Sinus Issues     Cough, plugged ears and nose, hoarseness of voice     Health Maintenance     Flu Shot, ACP, Physical, Pneumococcal, Zoster, Covid 19 vaccine, COPD Action Plan        Drainage down throat, plugged ears, voice is hoarse, occasional cough with some clear phlegn, no facial pain, no fever, dizzy feeling    Acute Illness  Acute illness concerns: Patient states she had covid in January and has been having this drainage and stuff ever since   Symptoms:  Fever: no  Chills/Sweats: no  Headache (location?): no but does have dizziness  Sinus Pressure: no  Conjunctivitis:  YES  Ear Pain: YES: both comes and goes   Rhinorrhea: no  Congestion: YES worse in the morning   Sore Throat: no  Cough: YES lots of drainage   Wheeze: no  Decreased Appetite: no  Nausea: YES occasionally but not really bad  Vomiting: no  Diarrhea: no  Dysuria/Freq.:  "no  Dysuria or Hematuria: no  Fatigue/Achiness: no  Sick/Strep Exposure: no  Therapies tried and outcome: ludens cough drops, childrens sudafed, excedrin    Review of Systems   Constitutional, cardiovascular, pulmonary, gi and gu systems are negative, except as otherwise noted.      Objective    /79 (BP Location: Right arm)   Pulse 62   Temp 98  F (36.7  C) (Oral)   Resp 19   Ht 1.634 m (5' 4.33\")   Wt 53.4 kg (117 lb 12.8 oz)   SpO2 97%   BMI 20.01 kg/m    Body mass index is 20.01 kg/m .  Physical Exam   GENERAL: healthy, alert and no distress  NECK: small anode in lt submandibular, and thyroid normal to palpation  RESP: lungs clear to auscultation - no rales, rhonchi or wheezes  CV: regular rate and rhythm, no murmur, click or rub, no peripheral edema       "

## 2021-09-30 ENCOUNTER — MYC MEDICAL ADVICE (OUTPATIENT)
Dept: RHEUMATOLOGY | Facility: CLINIC | Age: 76
End: 2021-09-30

## 2021-09-30 RX ORDER — FLUTICASONE PROPIONATE 50 MCG
SPRAY, SUSPENSION (ML) NASAL
Qty: 48 G | Refills: 0 | Status: SHIPPED | OUTPATIENT
Start: 2021-09-30 | End: 2021-10-27

## 2021-09-30 NOTE — TELEPHONE ENCOUNTER
Reviewed and forwarded to Dr Maguire to review.    KOMAL HydeN, RN  Rheumatology Care Coordinator  [facility]

## 2021-09-30 NOTE — TELEPHONE ENCOUNTER
Prescription approved per [facility] Refill Protocol.  90 day supply sent as requested.   Leila Liao RN

## 2021-10-07 ENCOUNTER — OFFICE VISIT (OUTPATIENT)
Dept: PODIATRY | Facility: CLINIC | Age: 76
End: 2021-10-07
Payer: COMMERCIAL

## 2021-10-07 VITALS
WEIGHT: 117 LBS | HEART RATE: 100 BPM | SYSTOLIC BLOOD PRESSURE: 143 MMHG | BODY MASS INDEX: 19.88 KG/M2 | DIASTOLIC BLOOD PRESSURE: 78 MMHG

## 2021-10-07 DIAGNOSIS — G63 POLYNEUROPATHY ASSOCIATED WITH UNDERLYING DISEASE (H): Primary | ICD-10-CM

## 2021-10-07 DIAGNOSIS — L84 CALLUS: ICD-10-CM

## 2021-10-07 PROCEDURE — 99203 OFFICE O/P NEW LOW 30 MIN: CPT | Mod: 25 | Performed by: PODIATRIST

## 2021-10-07 PROCEDURE — 11055 PARING/CUTG B9 HYPRKER LES 1: CPT | Mod: Q8 | Performed by: PODIATRIST

## 2021-10-07 NOTE — PROGRESS NOTES
Subjective:      Patient is having pain in her left foot.  She points to the area under her first metatarsal head.  Describes as a burning pain.  Aggravated by activity and relieved by rest.  Slowly getting worse.  She has had this for a month.  She cannot remember stepping on any foreign bodies.  She denies erythema ecchymosis or drainage.  She does have numbness in her feet.  She points from the MTPJ's distally.  She has had this for 7 years.  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  Denies past history of ulcers at all.  She is retired.  Her primary care physician is Dr. Abbott last seen 9/28/21    ROS: See above         Allergies   Allergen Reactions     Vancomycin Other (See Comments)     Red man's syndrom     Adhesive Tape      Rash itches     Amoxicillin      vomiting     Augmentin      vomiting     Codeine      vomiting     Erythromycin      vomiting     Nickel      itches rash     Sulfa Drugs Itching     Tegaderm Alginate Ag      LW Other1: -ALL MEDICATIONS MAKE PATIENT VIOLENTLY ILL; ADHESIVE BANDAGES; NICKEL     Zithromax [Azithromycin Dihydrate]      Vomiting/ skin blisters       Current Outpatient Medications   Medication Sig Dispense Refill     acetaminophen (TYLENOL) 325 MG tablet Take 2 tablets (650 mg) by mouth every 6 hours as needed for mild pain 100 tablet 0     aspirin-acetaminophen-caffeine (EXCEDRIN MIGRAINE) 250-250-65 MG tablet Take 1 tablet by mouth every 6 hours as needed for headaches       calcium-magnesium (CALMAG) 500-250 MG TABS per tablet Take 1 tablet by mouth 2 times daily (with meals)       cefdinir (OMNICEF) 300 MG capsule Take 1 capsule (300 mg) by mouth 2 times daily 20 capsule 0     cholecalciferol 1000 UNITS TABS Take 1,000 Units by mouth daily       propranolol (INDERAL) 20 MG tablet TAKE 1 TABLET BY MOUTH DAILY 90 tablet 0     Vitamin K 100 MCG TABS Take 1 tablet by mouth daily Taking Vitamin K2.       fluticasone (FLONASE) 50 MCG/ACT nasal spray SHAKE  LIQUID AND USE 2 SPRAYS IN EACH NOSTRIL DAILY (Patient not taking: Reported on 10/7/2021) 48 g 0       Patient Active Problem List   Diagnosis     GERD (gastroesophageal reflux disease)     Fibroids     Migraines     Hearing loss     Osteopenia     HYPERLIPIDEMIA LDL GOAL <160     Advanced directives, counseling/discussion     Inflammatory arthritis     Synovitis of wrist     Chronic constipation     Granulomatosis with polyangiitis without renal involvement (H)     Chronic steroid use     Dyspnea     Pulmonary embolism (H)     Calculus of kidney, right     Chronic anticoagulation     Long-term (current) use of anticoagulants [Z79.01]     Long-term use of immunosuppressant medication     Primary osteoarthritis involving multiple joints     Cellulitis     Cat scratch left lower extremity     Other pulmonary embolism without acute cor pulmonale, unspecified chronicity (H)     Age-related osteoporosis without current pathological fracture     Age-related osteoporosis with current pathological fracture, sequela     Adverse effects of medication     Combined forms of age-related cataract, mild-mod, of right eye     Pseudophakia, Yag Caps, os     History of vitrectomy - macular hole, os (MVE)     Hx of macular hole of left eye     Hemifacial spasm     Immunosuppression (H)     Clinical diagnosis of COVID-19     History of COVID-19     Lesion of right eyelid     COPD (chronic obstructive pulmonary disease) (H)       Past Medical History:   Diagnosis Date     Amblyopia      Atypical pneumonia 6/14/2014     Atypical pneumonia      Atypical pneumonia      Atypical pneumonia      COPD (chronic obstructive pulmonary disease) (H) 7/20/2021     Coronary artery disease 1982    heart beat hard made me tired     Dyslipidemia      Fibroids      GERD (gastroesophageal reflux disease)      Granulomatosis with polyangiitis (Wegener's)      Hearing loss      Inflammatory arthritis     thumb     Migraines      MVP (mitral valve prolapse)      had an echo that was normal in 2007 she is on Inderal     Nonsenile cataract      Osteopenia      PE (pulmonary embolism) 10/2014    ? GPA associated, on warfarin      Strabismus      Synovitis of wrist 2009    right       Past Surgical History:   Procedure Laterality Date     ABDOMEN SURGERY      appendix out     APPENDECTOMY       BIOPSY  1972    cone biopsy     CATARACT IOL, RT/LT       CL AFF SURGICAL PATHOLOGY      cone biopsy 1975     COLONOSCOPY       COLONOSCOPY WITH CO2 INSUFFLATION N/A 12/20/2017    Procedure: COLONOSCOPY WITH CO2 INSUFFLATION;  Screening  BMI: 20.7  Pharmacy: [facility]  [phone]  [facility]/Medicare  Referring: [facility]  Patient get ill from [facility];  Surgeon: Duane, William Charles, MD;  Location: [facility] OR     EYE SURGERY      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     HC ESOPHAGOSCOPY, DIAGNOSTIC       LASER YAG CAPSULOTOMY      left eye     OPTICAL TRACKING SYSTEM BRONCHOSCOPY  6/19/2014    Procedure: OPTICAL TRACKING SYSTEM BRONCHOSCOPY;  Surgeon: Angel Kathleen MD;  Location:  GI     PHACOEMULSIFICATION WITH STANDARD INTRAOCULAR LENS IMPLANT  01/2018    left eye (SR)     SOFT TISSUE SURGERY      remove senovial fluid from right wrist     SURGICAL HISTORY OF -   as a child    strabismus repair right eye     SURGICAL HISTORY OF -   8-2009    right wrist debridement     TONSILLECTOMY      as a child     TONSILLECTOMY & ADENOIDECTOMY       TUBAL LIGATION       VITRECTOMY PARSPLANA  01/2018    left eye - mac hole (MVE)       Family History   Problem Relation Age of Onset     Respiratory Mother         emphysema     Aneurysm Mother      Chronic Obstructive Pulmonary Disease Mother      Thyroid Disease Mother         ,     Osteoporosis Mother      Other Cancer Father      Cancer Father         lung cancer     Arthritis Maternal Grandmother         rheumatoid     Heart Disease Maternal Grandmother         unsure of details     Heart Disease Maternal Grandfather       Neurologic Disorder Paternal Grandmother         migraines     Alzheimer Disease Paternal Grandmother      Unknown/Adopted Paternal Grandfather      Cancer Sister         stage 4 anal cancer age 62 years     Colon Cancer Sister      Cancer - colorectal Brother      Colon Cancer Brother      Substance Abuse Sister      Anesthesia Reaction Daughter        Social History     Tobacco Use     Smoking status: Former Smoker     Packs/day: 2.00     Years: 20.00     Pack years: 40.00     Types: Cigarettes     Start date: 1961     Quit date: 1983     Years since quittin.2     Smokeless tobacco: Never Used   Substance Use Topics     Alcohol use: No     Alcohol/week: 0.0 standard drinks         Exam:    Vitals: BP (!) 143/78   Pulse 100   Wt 53.1 kg (117 lb)   BMI 19.88 kg/m    BMI: Body mass index is 19.88 kg/m .  Height: Data Unavailable    Constitutional/ general:  Pt is in no apparent distress, appears well-nourished.  Cooperative with history and physical exam.     Psych:  The patient answered questions appropriately.  Normal affect.  Seems to have reasonable expectations, in terms of treatment.     Lungs:  Non labored breathing, non labored speech. No cough.  No audible wheezing. Even, quiet breathing.       Vascular:   DP 1/4 and PT 0/4.  CFT < 3 sec.  positive ankle edema and varicosities noted.  Negative pedal hair growth.    Neuro:  Alert and oriented x 3. Coordinated gait.  Light touch sensation is diminished on toes.  No evidence of neurological-based weakness, spasticity, or contracture in the lower extremities.  Monofilament intact on all digits    Derm:  Skin thin, shiny, atrophic with no hair growth noted.  No erythema, ecchymosis, or cyanosis.      Musculoskeletal:    Lower extremity muscle strength is normal.  Patient is ambulatory without an assistive device or brace.   Normal arch with weightbearing.  No forefoot or rear foot deformities noted.  MS 5/5 all compartments.   Patient has callus  left subfirst metatarsal head..  No masses or breakdown in the skin bilaterally.  No erythema or ecchymosis noted bilateral.     A/P  Peripheral neuropathy  Callus      Callus debrided with a fifteen blade.  Discussed with patient that peripheral neuropathy could be associated with underlying disease or could be idiopathic.  Explained she has protective sensation intact and she will just watch this.  Patient will keep calluses down with a pumice stone.  We also gave her a list of foot care nurses.  Return to clinic prn.          Chet Villanueva DPM, FACFAS

## 2021-10-07 NOTE — LETTER
10/7/2021         RE: Nury Cárdenas  [address] [facility] [address]        Dear Colleague,    Thank you for referring your patient, Nury Cárdenas, to the [facility]. Please see a copy of my visit note below.    Subjective:      Patient is having pain in her left foot.  She points to the area under her first metatarsal head.  Describes as a burning pain.  Aggravated by activity and relieved by rest.  Slowly getting worse.  She has had this for a month.  She cannot remember stepping on any foreign bodies.  She denies erythema ecchymosis or drainage.  She does have numbness in her feet.  She points from the MTPJ's distally.  She has had this for 7 years.  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  Denies past history of ulcers at all.  She is retired.  Her primary care physician is Dr. Abbott last seen 9/28/21    ROS: See above         Allergies   Allergen Reactions     Vancomycin Other (See Comments)     Red man's syndrom     Adhesive Tape      Rash itches     Amoxicillin      vomiting     Augmentin      vomiting     Codeine      vomiting     Erythromycin      vomiting     Nickel      itches rash     Sulfa Drugs Itching     Tegaderm Alginate Ag      LW Other1: -ALL MEDICATIONS MAKE PATIENT VIOLENTLY ILL; ADHESIVE BANDAGES; NICKEL     Zithromax [Azithromycin Dihydrate]      Vomiting/ skin blisters       Current Outpatient Medications   Medication Sig Dispense Refill     acetaminophen (TYLENOL) 325 MG tablet Take 2 tablets (650 mg) by mouth every 6 hours as needed for mild pain 100 tablet 0     aspirin-acetaminophen-caffeine (EXCEDRIN MIGRAINE) 250-250-65 MG tablet Take 1 tablet by mouth every 6 hours as needed for headaches       calcium-magnesium (CALMAG) 500-250 MG TABS per tablet Take 1 tablet by mouth 2 times daily (with meals)       cefdinir (OMNICEF) 300 MG capsule Take 1 capsule (300 mg) by mouth 2 times daily 20 capsule 0     cholecalciferol 1000 UNITS  TABS Take 1,000 Units by mouth daily       propranolol (INDERAL) 20 MG tablet TAKE 1 TABLET BY MOUTH DAILY 90 tablet 0     Vitamin K 100 MCG TABS Take 1 tablet by mouth daily Taking Vitamin K2.       fluticasone (FLONASE) 50 MCG/ACT nasal spray SHAKE LIQUID AND USE 2 SPRAYS IN EACH NOSTRIL DAILY (Patient not taking: Reported on 10/7/2021) 48 g 0       Patient Active Problem List   Diagnosis     GERD (gastroesophageal reflux disease)     Fibroids     Migraines     Hearing loss     Osteopenia     HYPERLIPIDEMIA LDL GOAL <160     Advanced directives, counseling/discussion     Inflammatory arthritis     Synovitis of wrist     Chronic constipation     Granulomatosis with polyangiitis without renal involvement (H)     Chronic steroid use     Dyspnea     Pulmonary embolism (H)     Calculus of kidney, right     Chronic anticoagulation     Long-term (current) use of anticoagulants [Z79.01]     Long-term use of immunosuppressant medication     Primary osteoarthritis involving multiple joints     Cellulitis     Cat scratch left lower extremity     Other pulmonary embolism without acute cor pulmonale, unspecified chronicity (H)     Age-related osteoporosis without current pathological fracture     Age-related osteoporosis with current pathological fracture, sequela     Adverse effects of medication     Combined forms of age-related cataract, mild-mod, of right eye     Pseudophakia, Yag Caps, os     History of vitrectomy - macular hole, os (MVE)     Hx of macular hole of left eye     Hemifacial spasm     Immunosuppression (H)     Clinical diagnosis of COVID-19     History of COVID-19     Lesion of right eyelid     COPD (chronic obstructive pulmonary disease) (H)       Past Medical History:   Diagnosis Date     Amblyopia      Atypical pneumonia 6/14/2014     Atypical pneumonia      Atypical pneumonia      Atypical pneumonia      COPD (chronic obstructive pulmonary disease) (H) 7/20/2021     Coronary artery disease 1982    heart  beat hard made me tired     Dyslipidemia      Fibroids      GERD (gastroesophageal reflux disease)      Granulomatosis with polyangiitis (Wegener's)      Hearing loss      Inflammatory arthritis     thumb     Migraines      MVP (mitral valve prolapse)     had an echo that was normal in 2007 she is on Inderal     Nonsenile cataract      Osteopenia      PE (pulmonary embolism) 10/2014    ? GPA associated, on warfarin      Strabismus      Synovitis of wrist 2009    right       Past Surgical History:   Procedure Laterality Date     ABDOMEN SURGERY      appendix out     APPENDECTOMY       BIOPSY  1972    cone biopsy     CATARACT IOL, RT/LT       CL AFF SURGICAL PATHOLOGY      cone biopsy 1975     COLONOSCOPY       COLONOSCOPY WITH CO2 INSUFFLATION N/A 12/20/2017    Procedure: COLONOSCOPY WITH CO2 INSUFFLATION;  Screening  BMI: 20.7  Pharmacy: [facility]  [phone]  [facility]  Referring: [facility]  Patient get ill from [facility];  Surgeon: Duane, William Charles, MD;  Location: [facility] OR     EYE SURGERY      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     HC ESOPHAGOSCOPY, DIAGNOSTIC       LASER YAG CAPSULOTOMY      left eye     OPTICAL TRACKING SYSTEM BRONCHOSCOPY  6/19/2014    Procedure: OPTICAL TRACKING SYSTEM BRONCHOSCOPY;  Surgeon: Angel Kathleen MD;  Location:  GI     PHACOEMULSIFICATION WITH STANDARD INTRAOCULAR LENS IMPLANT  01/2018    left eye (SR)     SOFT TISSUE SURGERY      remove senovial fluid from right wrist     SURGICAL HISTORY OF -   as a child    strabismus repair right eye     SURGICAL HISTORY OF -   8-2009    right wrist debridement     TONSILLECTOMY      as a child     TONSILLECTOMY & ADENOIDECTOMY       TUBAL LIGATION       VITRECTOMY PARSPLANA  01/2018    left eye - mac hole (MVE)       Family History   Problem Relation Age of Onset     Respiratory Mother         emphysema     Aneurysm Mother      Chronic Obstructive Pulmonary Disease Mother      Thyroid Disease Mother         ,      Osteoporosis Mother      Other Cancer Father      Cancer Father         lung cancer     Arthritis Maternal Grandmother         rheumatoid     Heart Disease Maternal Grandmother         unsure of details     Heart Disease Maternal Grandfather      Neurologic Disorder Paternal Grandmother         migraines     Alzheimer Disease Paternal Grandmother      Unknown/Adopted Paternal Grandfather      Cancer Sister         stage 4 anal cancer age 62 years     Colon Cancer Sister      Cancer - colorectal Brother      Colon Cancer Brother      Substance Abuse Sister      Anesthesia Reaction Daughter        Social History     Tobacco Use     Smoking status: Former Smoker     Packs/day: 2.00     Years: 20.00     Pack years: 40.00     Types: Cigarettes     Start date: 1961     Quit date: 1983     Years since quittin.2     Smokeless tobacco: Never Used   Substance Use Topics     Alcohol use: No     Alcohol/week: 0.0 standard drinks         Exam:    Vitals: BP (!) 143/78   Pulse 100   Wt 53.1 kg (117 lb)   BMI 19.88 kg/m    BMI: Body mass index is 19.88 kg/m .  Height: Data Unavailable    Constitutional/ general:  Pt is in no apparent distress, appears well-nourished.  Cooperative with history and physical exam.     Psych:  The patient answered questions appropriately.  Normal affect.  Seems to have reasonable expectations, in terms of treatment.     Lungs:  Non labored breathing, non labored speech. No cough.  No audible wheezing. Even, quiet breathing.       Vascular:   DP 1/4 and PT 0/4.  CFT < 3 sec.  positive ankle edema and varicosities noted.  Negative pedal hair growth.    Neuro:  Alert and oriented x 3. Coordinated gait.  Light touch sensation is diminished on toes.  No evidence of neurological-based weakness, spasticity, or contracture in the lower extremities.  Monofilament intact on all digits    Derm:  Skin thin, shiny, atrophic with no hair growth noted.  No erythema, ecchymosis, or cyanosis.       Musculoskeletal:    Lower extremity muscle strength is normal.  Patient is ambulatory without an assistive device or brace.   Normal arch with weightbearing.  No forefoot or rear foot deformities noted.  MS 5/5 all compartments.   Patient has callus left subfirst metatarsal head..  No masses or breakdown in the skin bilaterally.  No erythema or ecchymosis noted bilateral.     A/P  Peripheral neuropathy  Callus      Callus debrided with a fifteen blade.  Discussed with patient that peripheral neuropathy could be associated with underlying disease or could be idiopathic.  Explained she has protective sensation intact and she will just watch this.  Patient will keep calluses down with a pumice stone.  We also gave her a list of foot care nurses.  Return to clinic prn.          Chet Villanueva DPM, FACFAS                   Again, thank you for allowing me to participate in the care of your patient.        Sincerely,        Chet Villanueva DPM

## 2021-10-27 DIAGNOSIS — J01.90 ACUTE SINUSITIS WITH SYMPTOMS > 10 DAYS: ICD-10-CM

## 2021-10-27 RX ORDER — FLUTICASONE PROPIONATE 50 MCG
SPRAY, SUSPENSION (ML) NASAL
Qty: 48 G | Refills: 0 | Status: SHIPPED | OUTPATIENT
Start: 2021-10-27 | End: 2022-01-21

## 2021-11-01 ENCOUNTER — LAB (OUTPATIENT)
Dept: LAB | Facility: CLINIC | Age: 76
End: 2021-11-01
Payer: COMMERCIAL

## 2021-11-01 DIAGNOSIS — M19.90 INFLAMMATORY ARTHRITIS: ICD-10-CM

## 2021-11-01 DIAGNOSIS — M31.30 GRANULOMATOSIS WITH POLYANGIITIS WITHOUT RENAL INVOLVEMENT (H): ICD-10-CM

## 2021-11-01 DIAGNOSIS — M81.0 AGE-RELATED OSTEOPOROSIS WITHOUT CURRENT PATHOLOGICAL FRACTURE: ICD-10-CM

## 2021-11-01 DIAGNOSIS — M15.0 PRIMARY OSTEOARTHRITIS INVOLVING MULTIPLE JOINTS: ICD-10-CM

## 2021-11-01 DIAGNOSIS — M31.30 GRANULOMATOSIS WITH POLYANGIITIS, UNSPECIFIED WHETHER RENAL INVOLVEMENT (H): ICD-10-CM

## 2021-11-01 LAB
CRP SERPL-MCNC: <2.9 MG/L (ref 0–8)
ERYTHROCYTE [SEDIMENTATION RATE] IN BLOOD BY WESTERGREN METHOD: 6 MM/HR (ref 0–30)

## 2021-11-01 PROCEDURE — 85652 RBC SED RATE AUTOMATED: CPT

## 2021-11-01 PROCEDURE — 36415 COLL VENOUS BLD VENIPUNCTURE: CPT

## 2021-11-01 PROCEDURE — 86255 FLUORESCENT ANTIBODY SCREEN: CPT

## 2021-11-01 PROCEDURE — 83516 IMMUNOASSAY NONANTIBODY: CPT

## 2021-11-01 PROCEDURE — 86256 FLUORESCENT ANTIBODY TITER: CPT

## 2021-11-01 PROCEDURE — 86140 C-REACTIVE PROTEIN: CPT

## 2021-11-02 LAB
ANCA AB PATTERN SER IF-IMP: ABNORMAL
C-ANCA TITR SER IF: ABNORMAL {TITER}
PROTEINASE3 AB SER IA-ACNC: 78 U/ML
PROTEINASE3 AB SER IA-ACNC: POSITIVE

## 2022-01-07 ENCOUNTER — MYC MEDICAL ADVICE (OUTPATIENT)
Dept: FAMILY MEDICINE | Facility: CLINIC | Age: 77
End: 2022-01-07
Payer: COMMERCIAL

## 2022-01-10 ENCOUNTER — VIRTUAL VISIT (OUTPATIENT)
Dept: FAMILY MEDICINE | Facility: CLINIC | Age: 77
End: 2022-01-10
Payer: COMMERCIAL

## 2022-01-10 DIAGNOSIS — R05.3 COUGH, PERSISTENT: Primary | ICD-10-CM

## 2022-01-10 DIAGNOSIS — J20.9 ACUTE BRONCHITIS WITH SYMPTOMS > 10 DAYS: ICD-10-CM

## 2022-01-10 PROCEDURE — 99213 OFFICE O/P EST LOW 20 MIN: CPT | Mod: 95 | Performed by: FAMILY MEDICINE

## 2022-01-10 RX ORDER — CEFDINIR 300 MG/1
300 CAPSULE ORAL 2 TIMES DAILY
Qty: 14 CAPSULE | Refills: 0 | Status: SHIPPED | OUTPATIENT
Start: 2022-01-10 | End: 2022-01-17

## 2022-01-10 NOTE — PROGRESS NOTES
Bria is a 76 year old who is being evaluated via a billable telephone visit.    What phone number would you like to be contacted at? [phone]  How would you like to obtain your AVS? MyChart    Assessment & Plan     Cough, persistent   Discussed the nature and pathophysiology of bronchitis and treatment including the role of antibiotics and the need to get over the underlying trigger, URI or allergies.    - cefdinir (OMNICEF) 300 MG capsule; Take 1 capsule (300 mg) by mouth 2 times daily for 7 days    Acute bronchitis with symptoms > 10 days    - cefdinir (OMNICEF) 300 MG capsule; Take 1 capsule (300 mg) by mouth 2 times daily for 7 days    Return in about 1 week (around 1/17/2022) for call with update.    Phil Abbott MD  [facility]   Bria is a 76 year old who presents for the following health issues   HPI   Acute Illness  Acute illness concerns: Sinus infection possibly   Onset/Duration: Patient states about a year ago when she had covid she states symptoms have never really gone away lately she has been coughing up a lot of phlegm with yellowish oranges chunks, she is having a cough she tried taking robitussin  Symptoms:  Fever: no  Chills/Sweats: YES- chills   Headache (location?): no  Sinus Pressure: no, nose is plugged up and stuffy  Conjunctivitis:  no  Ear Pain: no  Rhinorrhea: no  Congestion: YES  Sore Throat: YES lots of fluid   Cough: YES  Wheeze: YES  Decreased Appetite: no  Nausea: YES- when taking robitussin twice in a day   Vomiting: no  Diarrhea: no  Dysuria/Freq.: no  Dysuria or Hematuria: no  Fatigue/Achiness: no  Sick/Strep Exposure: no  Therapies tried and outcome: robitussin been helping, but getting yellowish. Cough occasionally wheezy      Review of Systems         Objective         Vitals:  No vitals were obtained today due to virtual visit.    Physical Exam         Phone call duration: 11 minutes

## 2022-01-12 ENCOUNTER — LAB (OUTPATIENT)
Dept: LAB | Facility: CLINIC | Age: 77
End: 2022-01-12
Payer: COMMERCIAL

## 2022-01-12 DIAGNOSIS — M15.0 PRIMARY OSTEOARTHRITIS INVOLVING MULTIPLE JOINTS: ICD-10-CM

## 2022-01-12 DIAGNOSIS — M81.0 AGE-RELATED OSTEOPOROSIS WITHOUT CURRENT PATHOLOGICAL FRACTURE: ICD-10-CM

## 2022-01-12 DIAGNOSIS — M19.90 INFLAMMATORY ARTHRITIS: ICD-10-CM

## 2022-01-12 DIAGNOSIS — M31.30 GRANULOMATOSIS WITH POLYANGIITIS WITHOUT RENAL INVOLVEMENT (H): ICD-10-CM

## 2022-01-12 DIAGNOSIS — M31.30 GRANULOMATOSIS WITH POLYANGIITIS, UNSPECIFIED WHETHER RENAL INVOLVEMENT (H): ICD-10-CM

## 2022-01-12 PROCEDURE — 86256 FLUORESCENT ANTIBODY TITER: CPT

## 2022-01-12 PROCEDURE — 83516 IMMUNOASSAY NONANTIBODY: CPT

## 2022-01-12 PROCEDURE — 86140 C-REACTIVE PROTEIN: CPT

## 2022-01-12 PROCEDURE — 86036 ANCA SCREEN EACH ANTIBODY: CPT

## 2022-01-12 PROCEDURE — 85652 RBC SED RATE AUTOMATED: CPT

## 2022-01-12 PROCEDURE — 36415 COLL VENOUS BLD VENIPUNCTURE: CPT

## 2022-01-17 LAB
PROTEINASE3 AB SER IA-ACNC: 31 U/ML
PROTEINASE3 AB SER IA-ACNC: POSITIVE

## 2022-01-18 LAB
ANCA AB PATTERN SER IF-IMP: ABNORMAL
C-ANCA TITR SER IF: ABNORMAL {TITER}

## 2022-01-20 ENCOUNTER — MYC MEDICAL ADVICE (OUTPATIENT)
Dept: FAMILY MEDICINE | Facility: CLINIC | Age: 77
End: 2022-01-20
Payer: COMMERCIAL

## 2022-01-21 ENCOUNTER — OFFICE VISIT (OUTPATIENT)
Dept: FAMILY MEDICINE | Facility: CLINIC | Age: 77
End: 2022-01-21
Payer: COMMERCIAL

## 2022-01-21 VITALS
TEMPERATURE: 97 F | HEIGHT: 64 IN | HEART RATE: 100 BPM | BODY MASS INDEX: 20.2 KG/M2 | DIASTOLIC BLOOD PRESSURE: 61 MMHG | WEIGHT: 118.31 LBS | SYSTOLIC BLOOD PRESSURE: 120 MMHG | OXYGEN SATURATION: 99 %

## 2022-01-21 DIAGNOSIS — T78.40XA ALLERGIC REACTION, INITIAL ENCOUNTER: ICD-10-CM

## 2022-01-21 DIAGNOSIS — M31.30 GRANULOMATOSIS WITH POLYANGIITIS WITHOUT RENAL INVOLVEMENT (H): ICD-10-CM

## 2022-01-21 DIAGNOSIS — L50.9 HIVES: Primary | ICD-10-CM

## 2022-01-21 LAB
ALBUMIN SERPL-MCNC: 3.5 G/DL (ref 3.4–5)
ALP SERPL-CCNC: 78 U/L (ref 40–150)
ALT SERPL W P-5'-P-CCNC: 20 U/L (ref 0–50)
ANION GAP SERPL CALCULATED.3IONS-SCNC: 6 MMOL/L (ref 3–14)
AST SERPL W P-5'-P-CCNC: 18 U/L (ref 0–45)
BASOPHILS # BLD AUTO: 0 10E3/UL (ref 0–0.2)
BASOPHILS NFR BLD AUTO: 0 %
BILIRUB SERPL-MCNC: 0.8 MG/DL (ref 0.2–1.3)
BUN SERPL-MCNC: 14 MG/DL (ref 7–30)
CALCIUM SERPL-MCNC: 9.3 MG/DL (ref 8.5–10.1)
CHLORIDE BLD-SCNC: 106 MMOL/L (ref 94–109)
CO2 SERPL-SCNC: 27 MMOL/L (ref 20–32)
CREAT SERPL-MCNC: 0.76 MG/DL (ref 0.52–1.04)
CRP SERPL-MCNC: 7.1 MG/L (ref 0–8)
EOSINOPHIL # BLD AUTO: 0 10E3/UL (ref 0–0.7)
EOSINOPHIL NFR BLD AUTO: 0 %
ERYTHROCYTE [DISTWIDTH] IN BLOOD BY AUTOMATED COUNT: 13.2 % (ref 10–15)
ERYTHROCYTE [SEDIMENTATION RATE] IN BLOOD BY WESTERGREN METHOD: 4 MM/HR (ref 0–30)
GFR SERPL CREATININE-BSD FRML MDRD: 80 ML/MIN/1.73M2
GLUCOSE BLD-MCNC: 165 MG/DL (ref 70–99)
HCT VFR BLD AUTO: 46.4 % (ref 35–47)
HGB BLD-MCNC: 16 G/DL (ref 11.7–15.7)
IMM GRANULOCYTES # BLD: 0.1 10E3/UL
IMM GRANULOCYTES NFR BLD: 1 %
LYMPHOCYTES # BLD AUTO: 0.6 10E3/UL (ref 0.8–5.3)
LYMPHOCYTES NFR BLD AUTO: 5 %
MCH RBC QN AUTO: 30.5 PG (ref 26.5–33)
MCHC RBC AUTO-ENTMCNC: 34.5 G/DL (ref 31.5–36.5)
MCV RBC AUTO: 89 FL (ref 78–100)
MONOCYTES # BLD AUTO: 0.4 10E3/UL (ref 0–1.3)
MONOCYTES NFR BLD AUTO: 3 %
NEUTROPHILS # BLD AUTO: 11.9 10E3/UL (ref 1.6–8.3)
NEUTROPHILS NFR BLD AUTO: 91 %
NRBC # BLD AUTO: 0 10E3/UL
NRBC BLD AUTO-RTO: 0 /100
PLATELET # BLD AUTO: 218 10E3/UL (ref 150–450)
POTASSIUM BLD-SCNC: 3.9 MMOL/L (ref 3.4–5.3)
PROT SERPL-MCNC: 6.3 G/DL (ref 6.8–8.8)
RBC # BLD AUTO: 5.24 10E6/UL (ref 3.8–5.2)
SODIUM SERPL-SCNC: 139 MMOL/L (ref 133–144)
WBC # BLD AUTO: 13 10E3/UL (ref 4–11)

## 2022-01-21 PROCEDURE — 86140 C-REACTIVE PROTEIN: CPT | Performed by: FAMILY MEDICINE

## 2022-01-21 PROCEDURE — 85025 COMPLETE CBC W/AUTO DIFF WBC: CPT | Performed by: FAMILY MEDICINE

## 2022-01-21 PROCEDURE — 80053 COMPREHEN METABOLIC PANEL: CPT | Performed by: FAMILY MEDICINE

## 2022-01-21 PROCEDURE — 99213 OFFICE O/P EST LOW 20 MIN: CPT | Performed by: FAMILY MEDICINE

## 2022-01-21 PROCEDURE — 36415 COLL VENOUS BLD VENIPUNCTURE: CPT | Performed by: FAMILY MEDICINE

## 2022-01-21 PROCEDURE — 85652 RBC SED RATE AUTOMATED: CPT | Performed by: FAMILY MEDICINE

## 2022-01-21 RX ORDER — LORATADINE 10 MG/1
10 TABLET ORAL DAILY
Qty: 30 TABLET | Refills: 0 | Status: SHIPPED | OUTPATIENT
Start: 2022-01-21 | End: 2022-03-29

## 2022-01-21 RX ORDER — PREDNISONE 10 MG/1
TABLET ORAL
Qty: 21 TABLET | Refills: 0 | Status: SHIPPED | OUTPATIENT
Start: 2022-01-21 | End: 2022-03-30

## 2022-01-21 ASSESSMENT — MIFFLIN-ST. JEOR: SCORE: 1011.91

## 2022-01-21 NOTE — TELEPHONE ENCOUNTER
Patient had a virtual visit with Phil Grady for cough on 1/10/2022  Will route to provider to advise further.  Please see MD2U message below    Bruce Law RN  [facility]

## 2022-01-21 NOTE — PROGRESS NOTES
"       Subjective   Pat is a 76 year old who presents for the following health issues    HPI     All over itching and burning. Her left side of her lip/face is swollen and her right groin area is red and swollen   Did just finish Cefdnir for the past 7 days       Review of Systems   Buttock symptoms for a while  Worse the last couple days  Groin    Itching  All over     Arms, legs. Ears, torso    When Wegners started back in 2014 had similar symptoms   But numbers looked good recently, sees specialist      Had shrimp at [facility]  Had some leftover shrimp last night    Took a valacyclovir last night    No meds for wegners  On meds for a couple years only , off since 2015 or 2016    Takes the valacyclovir for an area on the left buttock area    Never had hives all over like this     Besides shrimp, no other new foods/ drink    No chemical exposures    No change in soap/ shampoo/ detergent etc    No new animal/ plant exposure      Some allergy symptoms           Objective    /61 (BP Location: Left arm, Patient Position: Chair, Cuff Size: Adult Regular)   Pulse 100   Temp 97  F (36.1  C) (Temporal)   Ht 1.634 m (5' 4.33\")   Wt 53.7 kg (118 lb 5 oz)   SpO2 99%   Breastfeeding No   BMI 20.10 kg/m    Body mass index is 20.1 kg/m .  Physical Exam  Constitutional:       Appearance: She is well-developed.   HENT:      Head: Normocephalic and atraumatic.   Eyes:      Conjunctiva/sclera: Conjunctivae normal.   Neck:      Vascular: No carotid bruit.   Cardiovascular:      Rate and Rhythm: Normal rate and regular rhythm.      Heart sounds: Normal heart sounds.   Pulmonary:      Effort: Pulmonary effort is normal. No respiratory distress.      Breath sounds: Normal breath sounds.   Neurological:      Mental Status: She is alert and oriented to person, place, and time.         patient has scattered hives like lesions     Center of back, hands/ wrists / distal forears    Some on groin and buttock areas    Small " scattered areas on abd / legs    On face patient has some redness and swelling especially left side around jaw and chin     Patient states the left side of the mouth / lips were quite swollen this am, better now  On exam just mild swelling of the left lips    nontender    Cranial nerves fine    No facial weakness    No numbness    Strength okay in all 4 extremities    ASSESSMENT / PLAN:  (L50.9) Hives  (primary encounter diagnosis)  Comment: use prednisone taper for 9 days.  Daily loratadine for several weeks.   Discussed in detail.   Plan: loratadine (CLARITIN) 10 MG tablet, predniSONE         (DELTASONE) 10 MG tablet              (T78.40XA) Allergic reaction, initial encounter  Comment: as above   Plan: loratadine (CLARITIN) 10 MG tablet, predniSONE         (DELTASONE) 10 MG tablet, ESR: Erythrocyte         sedimentation rate, CRP, inflammation, CBC with        Platelets & Differential, Comprehensive         metabolic panel           Follow up as needed based on symptoms     Be seen promptly if symptoms acutely worsen     (M31.30) Granulomatosis with polyangiitis without renal involvement (H)  Comment: this Wegeners has been quiet for years.  Recent labs okay.  This hives episode likely unrelated but if symptoms not resolving consider follow up with specialist.   Plan: as above     Check labs     Be seen promptly if symptoms acutely worsen     I reviewed the patient's medications, allergies, medical history, family history, and social history.    Riki Browning MD

## 2022-01-21 NOTE — PATIENT INSTRUCTIONS
We will notify you of lab results    Take 9 day tapering dose of prednisone    One daily loratadine ( claritin ) for 3-4 weeks    Follow up as needed based on symptoms     Be seen promptly if symptoms acutely worsen

## 2022-01-22 NOTE — RESULT ENCOUNTER NOTE
The inflammation tests ( c reactive protein and sedimentation rate ) are normal    White blood count is moderately elevated.    Glucose high.    Other labs are okay.    Riki Browning MD

## 2022-01-23 ENCOUNTER — APPOINTMENT (OUTPATIENT)
Dept: CT IMAGING | Facility: CLINIC | Age: 77
End: 2022-01-23
Attending: EMERGENCY MEDICINE
Payer: COMMERCIAL

## 2022-01-23 ENCOUNTER — TELEPHONE (OUTPATIENT)
Dept: FAMILY MEDICINE | Facility: CLINIC | Age: 77
End: 2022-01-23

## 2022-01-23 ENCOUNTER — HOSPITAL ENCOUNTER (EMERGENCY)
Facility: CLINIC | Age: 77
Discharge: HOME | End: 2022-01-23
Attending: EMERGENCY MEDICINE | Admitting: EMERGENCY MEDICINE
Payer: COMMERCIAL

## 2022-01-23 VITALS
DIASTOLIC BLOOD PRESSURE: 72 MMHG | HEART RATE: 91 BPM | RESPIRATION RATE: 16 BRPM | OXYGEN SATURATION: 95 % | SYSTOLIC BLOOD PRESSURE: 118 MMHG | WEIGHT: 118 LBS | HEIGHT: 64 IN | BODY MASS INDEX: 20.14 KG/M2 | TEMPERATURE: 98.4 F

## 2022-01-23 DIAGNOSIS — R21 RASH: ICD-10-CM

## 2022-01-23 DIAGNOSIS — Z87.39 HISTORY OF GRANULOMATOSIS WITH POLYANGIITIS: ICD-10-CM

## 2022-01-23 DIAGNOSIS — R73.9 HYPERGLYCEMIA: ICD-10-CM

## 2022-01-23 LAB
ALBUMIN SERPL-MCNC: 2.7 G/DL (ref 3.4–5)
ALP SERPL-CCNC: 60 U/L (ref 40–150)
ALT SERPL W P-5'-P-CCNC: 18 U/L (ref 0–50)
ANION GAP SERPL CALCULATED.3IONS-SCNC: 6 MMOL/L (ref 3–14)
AST SERPL W P-5'-P-CCNC: 32 U/L (ref 0–45)
BASOPHILS # BLD AUTO: 0 10E3/UL (ref 0–0.2)
BASOPHILS NFR BLD AUTO: 0 %
BILIRUB SERPL-MCNC: 0.8 MG/DL (ref 0.2–1.3)
BUN SERPL-MCNC: 15 MG/DL (ref 7–30)
CALCIUM SERPL-MCNC: 8.8 MG/DL (ref 8.5–10.1)
CHLORIDE BLD-SCNC: 106 MMOL/L (ref 94–109)
CO2 SERPL-SCNC: 22 MMOL/L (ref 20–32)
CREAT SERPL-MCNC: 0.64 MG/DL (ref 0.52–1.04)
CRP SERPL-MCNC: 23 MG/L (ref 0–8)
EOSINOPHIL # BLD AUTO: 0.1 10E3/UL (ref 0–0.7)
EOSINOPHIL NFR BLD AUTO: 0 %
ERYTHROCYTE [DISTWIDTH] IN BLOOD BY AUTOMATED COUNT: 13.7 % (ref 10–15)
GFR SERPL CREATININE-BSD FRML MDRD: >90 ML/MIN/1.73M2
GLUCOSE BLD-MCNC: 154 MG/DL (ref 70–99)
HCT VFR BLD AUTO: 44.8 % (ref 35–47)
HGB BLD-MCNC: 15.2 G/DL (ref 11.7–15.7)
HOLD SPECIMEN: NORMAL
IMM GRANULOCYTES # BLD: 0.1 10E3/UL
IMM GRANULOCYTES NFR BLD: 1 %
LYMPHOCYTES # BLD AUTO: 0.6 10E3/UL (ref 0.8–5.3)
LYMPHOCYTES NFR BLD AUTO: 4 %
MCH RBC QN AUTO: 30.4 PG (ref 26.5–33)
MCHC RBC AUTO-ENTMCNC: 33.9 G/DL (ref 31.5–36.5)
MCV RBC AUTO: 90 FL (ref 78–100)
MONOCYTES # BLD AUTO: 0.4 10E3/UL (ref 0–1.3)
MONOCYTES NFR BLD AUTO: 2 %
NEUTROPHILS # BLD AUTO: 16.4 10E3/UL (ref 1.6–8.3)
NEUTROPHILS NFR BLD AUTO: 93 %
NRBC # BLD AUTO: 0 10E3/UL
NRBC BLD AUTO-RTO: 0 /100
PLATELET # BLD AUTO: 199 10E3/UL (ref 150–450)
POTASSIUM BLD-SCNC: 5.2 MMOL/L (ref 3.4–5.3)
PROT SERPL-MCNC: 5.8 G/DL (ref 6.8–8.8)
RBC # BLD AUTO: 5 10E6/UL (ref 3.8–5.2)
SARS-COV-2 RNA RESP QL NAA+PROBE: NEGATIVE
SODIUM SERPL-SCNC: 134 MMOL/L (ref 133–144)
WBC # BLD AUTO: 17.6 10E3/UL (ref 4–11)

## 2022-01-23 PROCEDURE — 71250 CT THORAX DX C-: CPT | Mod: 26 | Performed by: RADIOLOGY

## 2022-01-23 PROCEDURE — 86036 ANCA SCREEN EACH ANTIBODY: CPT

## 2022-01-23 PROCEDURE — 250N000012 HC RX MED GY IP 250 OP 636 PS 637: Performed by: EMERGENCY MEDICINE

## 2022-01-23 PROCEDURE — 70486 CT MAXILLOFACIAL W/O DYE: CPT

## 2022-01-23 PROCEDURE — 70486 CT MAXILLOFACIAL W/O DYE: CPT | Mod: 26 | Performed by: STUDENT IN AN ORGANIZED HEALTH CARE EDUCATION/TRAINING PROGRAM

## 2022-01-23 PROCEDURE — 250N000013 HC RX MED GY IP 250 OP 250 PS 637: Performed by: EMERGENCY MEDICINE

## 2022-01-23 PROCEDURE — 86160 COMPLEMENT ANTIGEN: CPT

## 2022-01-23 PROCEDURE — C9803 HOPD COVID-19 SPEC COLLECT: HCPCS | Performed by: EMERGENCY MEDICINE

## 2022-01-23 PROCEDURE — 83516 IMMUNOASSAY NONANTIBODY: CPT

## 2022-01-23 PROCEDURE — U0005 INFEC AGEN DETEC AMPLI PROBE: HCPCS | Performed by: EMERGENCY MEDICINE

## 2022-01-23 PROCEDURE — 36415 COLL VENOUS BLD VENIPUNCTURE: CPT | Performed by: EMERGENCY MEDICINE

## 2022-01-23 PROCEDURE — 83036 HEMOGLOBIN GLYCOSYLATED A1C: CPT

## 2022-01-23 PROCEDURE — 85025 COMPLETE CBC W/AUTO DIFF WBC: CPT | Performed by: STUDENT IN AN ORGANIZED HEALTH CARE EDUCATION/TRAINING PROGRAM

## 2022-01-23 PROCEDURE — 71250 CT THORAX DX C-: CPT

## 2022-01-23 PROCEDURE — 86140 C-REACTIVE PROTEIN: CPT | Performed by: EMERGENCY MEDICINE

## 2022-01-23 PROCEDURE — 99284 EMERGENCY DEPT VISIT MOD MDM: CPT | Performed by: EMERGENCY MEDICINE

## 2022-01-23 PROCEDURE — 99223 1ST HOSP IP/OBS HIGH 75: CPT | Mod: GC | Performed by: INTERNAL MEDICINE

## 2022-01-23 PROCEDURE — 99285 EMERGENCY DEPT VISIT HI MDM: CPT | Mod: 25 | Performed by: EMERGENCY MEDICINE

## 2022-01-23 PROCEDURE — 82040 ASSAY OF SERUM ALBUMIN: CPT | Performed by: STUDENT IN AN ORGANIZED HEALTH CARE EDUCATION/TRAINING PROGRAM

## 2022-01-23 PROCEDURE — 80053 COMPREHEN METABOLIC PANEL: CPT | Performed by: STUDENT IN AN ORGANIZED HEALTH CARE EDUCATION/TRAINING PROGRAM

## 2022-01-23 RX ORDER — DIPHENHYDRAMINE HCL 25 MG
25 CAPSULE ORAL ONCE
Status: COMPLETED | OUTPATIENT
Start: 2022-01-23 | End: 2022-01-23

## 2022-01-23 RX ORDER — PREDNISONE 20 MG/1
60 TABLET ORAL ONCE
Status: COMPLETED | OUTPATIENT
Start: 2022-01-23 | End: 2022-01-23

## 2022-01-23 RX ORDER — FAMOTIDINE 10 MG
10 TABLET ORAL 2 TIMES DAILY
Status: DISCONTINUED | OUTPATIENT
Start: 2022-01-23 | End: 2022-01-23 | Stop reason: HOSPADM

## 2022-01-23 RX ORDER — CETIRIZINE HYDROCHLORIDE 10 MG/1
10 TABLET ORAL DAILY
Status: DISCONTINUED | OUTPATIENT
Start: 2022-01-23 | End: 2022-01-23 | Stop reason: HOSPADM

## 2022-01-23 RX ADMIN — DIPHENHYDRAMINE HYDROCHLORIDE 25 MG: 25 CAPSULE ORAL at 10:00

## 2022-01-23 RX ADMIN — FAMOTIDINE 10 MG: 10 TABLET, FILM COATED ORAL at 11:40

## 2022-01-23 RX ADMIN — CETIRIZINE HYDROCHLORIDE 10 MG: 10 TABLET, FILM COATED ORAL at 11:40

## 2022-01-23 RX ADMIN — PREDNISONE 60 MG: 20 TABLET ORAL at 10:00

## 2022-01-23 ASSESSMENT — ENCOUNTER SYMPTOMS
NAUSEA: 0
COUGH: 0
EYE REDNESS: 1
VOICE CHANGE: 1
SORE THROAT: 1
FEVER: 0
APPETITE CHANGE: 0
TROUBLE SWALLOWING: 1
SHORTNESS OF BREATH: 0
CHILLS: 0
ABDOMINAL PAIN: 0
ACTIVITY CHANGE: 0
SINUS PAIN: 1
FACIAL SWELLING: 1
VOMITING: 0

## 2022-01-23 ASSESSMENT — MIFFLIN-ST. JEOR: SCORE: 1005.24

## 2022-01-23 NOTE — ED TRIAGE NOTES
"Pt arrives ambulatory to triage w/ c/o worsening swelling, pruritis, erythema, chills and burning. Pt states onset 1/20, worsening last night. Pt states rash \"all over\" but notably face, neck, shoulder, hands, elbow. Pt states taking prednisone and claritin per her PCP w/ relief on Friday, however states as ineffective today. States no new changes to medication before this. No trouble breathing.  "

## 2022-01-23 NOTE — CONSULTS
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    The patients painful, pruritic rash, and facial edema were not present with her previous GPA episode, and appear less consistent with a GPA flare. Unlike her previous episode, her current hand pain appears secondary to her rash, and she is without evidence of synovitis on exam. In addition, her ESR and CRP were normal when last checked on the 21st, and her ANCA titer and PR3 levels appeared reduced from previous when evaluated on 1/12/22. However the patient does have sinus symptoms and cough, so would recommend further evaluation with repeat inflammatory markers, urinalysis, PR3, ANCA, CBC and CMP. As that patient has not had a recent lung CT, recommend obtaining a HRCT of the lungs, as well as a CT sinus.     Her rash and facial swelling appear suggestive of an allergic response. It is not possible to say yet whether her rash is consistent with urticaria, as it has been less than 24 hours since current lesions developed. However her symptoms have recurred without obvious identification of a trigger. Differential for her rash also includes urticarial vasculitis. Recommend obtaining C3, C4 and C1q levels and well as hepatitis and HIV screening. In addition, discussed recommendation with ED provider to obtain a dermatology consult for further evaluation of the patients rash and possible biopsy.     Recommendations:  --  Obtain ESR, CRP, urinalysis, PR3, ANCA, CBC, CMP, C3, C4 and C1q levels  -- Obtain HRCT of the lungs  -- Obtain CT sinus  -- Obtain dermatology consult    The patient was seen and staffed with Dr. Abbasi.     Edilma Wall MD  Rheumatology fellow    Staff addendum  I performed the history and physical examination of the patient and discussed the management with the fellow. I reviewed the available lab and imaging studies. I reviewed the fellow's note and agree with the documented findings and plan of care.    Santy Abbasi MD  Rheumatology           Chief Complaint:   Rash and facial swelling         History of Present Illness:   Ms. Cárdenas is a 75 yo female with history of MVP, GERD, COPD, bilateral PE and DVT, GPA with upper airway inflammation, lung disease and erosive joint disease who presents to the ED for evaluation of rash and swelling.    The patient reports three episodes of sinus infection symptoms in the last year requiring antibiotics. Most recently, she reports sinus congestion, wheezing and cough productive of yellow-orange sputum for which she was seen for a virtual PCP visit on 1/10/22 and started on a a 7 day course of cefdinir. She notes that her symptoms improved with the antibiotics, but that her sinus congestion and productive cough have since returned. On Friday, she reports she developed throat soreness, swelling of her lips, hands and left cheek, as well as burning, red spots on her buttocks. She was started on prednisone 20 mg BID by her PCP, and her symptoms improved. However, last night she again began having burning pain in her hands, and this morning notes a spots of burning, itchy red rash over her back, upper and lower extremities, as well as right cheek and forehead swelling, and hand pain and swelling. This occurred despite continuing to take the prednisone as well as Claritin. She has ongoing productive cough, hoarse voice, and this morning reports watery, red eyes  without eye pain or vision changes. She has some shortness of breath with exertion and chills. She denies fever, abdominal pain, SOB at rest, dysphagia, diarrhea, urinary symptoms or blood in her urine. The patient reports she ate shrimp on Tuesday and on Friday, but denies any symptoms after eating them on Tuesday or ever having issues with shrimp in the past. She denies any new foods, OTC medications or supplements, skin products or detergents. Ms. Cárdenas reports some improvement in her rash and swelling since receiving prednisone 60 mg in the ED this morning.     The patient was diagnosed with PR3+ GPA with lung involvement, upper airways inflammation, and erosive arthritis in 2014. She was treated with high dose steroids and rituximab 375 mg/m2 x4, and subsequently received methotrexate. She has not been on any medications for GPA recently. The patient notes that she did not have rash or facial swelling with her prior episode of GPA.          Past Medical History:     Past Medical History:   Diagnosis Date    Amblyopia     Atypical pneumonia 6/14/2014    Atypical pneumonia     Atypical pneumonia     Atypical pneumonia     COPD (chronic obstructive pulmonary disease) (H) 7/20/2021    Coronary artery disease 1982    heart beat hard made me tired    Dyslipidemia     Fibroids     GERD (gastroesophageal reflux disease)     Granulomatosis with polyangiitis (Wegener's)     Hearing loss     Inflammatory arthritis     thumb    Migraines     MVP (mitral valve prolapse)     had an echo that was normal in 2007 she is on Inderal    Nonsenile cataract     Osteopenia     PE (pulmonary embolism) 10/2014    ? GPA associated, on warfarin     Strabismus     Synovitis of wrist 2009    right             Past Surgical History:     Past Surgical History:   Procedure Laterality Date    ABDOMEN SURGERY      appendix out    APPENDECTOMY      BIOPSY  1972    cone biopsy    CATARACT IOL, RT/LT      CL AFF SURGICAL PATHOLOGY      cone  biopsy     COLONOSCOPY      COLONOSCOPY WITH CO2 INSUFFLATION N/A 2017    Procedure: COLONOSCOPY WITH CO2 INSUFFLATION;  Screening  BMI: 20.7  [facility]: [facility]  [phone]  [facility]  Referring: Milena  Patient get ill from [facility];  Surgeon: Duane, William Charles, MD;  Location: [facility] OR    EYE SURGERY      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    HC ESOPHAGOSCOPY, DIAGNOSTIC      LASER YAG CAPSULOTOMY      left eye    OPTICAL TRACKING SYSTEM BRONCHOSCOPY  2014    Procedure: OPTICAL TRACKING SYSTEM BRONCHOSCOPY;  Surgeon: Angel Kathleen MD;  Location:  GI    PHACOEMULSIFICATION WITH STANDARD INTRAOCULAR LENS IMPLANT  2018    left eye (SR)    SOFT TISSUE SURGERY      remove senovial fluid from right wrist    SURGICAL HISTORY OF -   as a child    strabismus repair right eye    SURGICAL HISTORY OF -       right wrist debridement    TONSILLECTOMY      as a child    TONSILLECTOMY & ADENOIDECTOMY      TUBAL LIGATION      VITRECTOMY PARSPLANA  2018    left eye - mac hole ([facility])            Social History:     Social History     Socioeconomic History    Marital status:      Spouse name: Not on file    Number of children: Not on file    Years of education: Not on file    Highest education level: Not on file   Occupational History    Not on file   Tobacco Use    Smoking status: Former Smoker     Packs/day: 2.00     Years: 20.00     Pack years: 40.00     Types: Cigarettes     Start date: 1961     Quit date: 1983     Years since quittin.5    Smokeless tobacco: Never Used   Substance and Sexual Activity    Alcohol use: No     Alcohol/week: 0.0 standard drinks    Drug use: No    Sexual activity: Never   Other Topics Concern    Parent/sibling w/ CABG, MI or angioplasty before 65F 55M? No   Social History Narrative    Not on file     Social Determinants of Health     Financial Resource Strain: Not on file   Food Insecurity: Not on file   Transportation  Needs: Not on file   Physical Activity: Not on file   Stress: Not on file   Social Connections: Not on file   Intimate Partner Violence: Not on file   Housing Stability: Not on file             Family History:     Family History   Problem Relation Age of Onset    Respiratory Mother         emphysema    Aneurysm Mother     Chronic Obstructive Pulmonary Disease Mother     Thyroid Disease Mother         ,    Osteoporosis Mother     Other Cancer Father     Cancer Father         lung cancer    Arthritis Maternal Grandmother         rheumatoid    Heart Disease Maternal Grandmother         unsure of details    Heart Disease Maternal Grandfather     Neurologic Disorder Paternal Grandmother         migraines    Alzheimer Disease Paternal Grandmother     Unknown/Adopted Paternal Grandfather     Cancer Sister         stage 4 anal cancer age 62 years    Colon Cancer Sister     Cancer - colorectal Brother     Colon Cancer Brother     Substance Abuse Sister     Anesthesia Reaction Daughter              Immunizations:     Most Recent Immunizations   Administered Date(s) Administered    DT (PEDS <7y) 07/30/1996    TDAP Vaccine (Adacel) 07/28/2017             Allergies:     Allergies   Allergen Reactions    Vancomycin Other (See Comments)     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's syndrom    Adhesive Tape      Rash itches    Amoxicillin      vomiting    Augmentin      vomiting    Codeine      vomiting    Erythromycin      vomiting    Nickel      itches rash    Sulfa Drugs Itching    Tegaderm Alginate Ag      LW Other1: -ALL MEDICATIONS MAKE PATIENT VIOLENTLY ILL; ADHESIVE BANDAGES; NICKEL    Zithromax [Azithromycin Dihydrate]      Vomiting/ skin blisters             Medications:   (Not in a hospital admission)      Current Facility-Administered Medications   Medication    cetirizine (zyrTEC) tablet 10 mg    famotidine (PEPCID) tablet 10 mg     Current Outpatient Medications   Medication    aspirin-acetaminophen-caffeine (EXCEDRIN MIGRAINE) 250-250-65 MG tablet  "   calcium-magnesium (CALMAG) 500-250 MG TABS per tablet    cholecalciferol 1000 UNITS TABS    loratadine (CLARITIN) 10 MG tablet    predniSONE (DELTASONE) 10 MG tablet    propranolol (INDERAL) 20 MG tablet    Vitamin K 100 MCG TABS     Facility-Administered Medications Ordered in Other Encounters   Medication    sodium chloride (PF) 0.9% PF flush 60 mL            Review of Systems:   Complete 14 point ROS completed and negative unless mentioned in HPI.          Physical Exam:   BP (!) 143/79   Pulse 91   Temp 98.4  F (36.9  C) (Oral)   Resp 16   Ht 1.626 m (5' 4\")   Wt 53.5 kg (118 lb)   SpO2 94%   BMI 20.25 kg/m      General: appears well, lying in bed, NAD  HEENT: Sclera non-injected. EOMI. Tongue with area of erythema. Normal appearing tongue size. No other oral lesions noted. No tenderness to percussion of sinuses.   CV: Regular rate and rhythm  Lung: Breath sounds present bilaterally, no wheezes rales or rubs.  Abdomen: Soft, non-distended  Neuro: Awake, alert, CN grossly intact. Strength 5/5  strength, elbow flexion.   Skin/Hair: erythematous patches and plaques without scale on back, upper and lower extremities that are tender to palpation.  Msk:   Shoulders: No tenderness to palpation along glenohumeral joint . No effusion, warmth. Forward flexion and abduction to 180 degrees  Elbows: with full extension and extension, without effusion, erythema or increased warmth  Wrists: Unable to flex or extend right wrist which is tender to palpation without increased warmth or effusion noted. Left wrist with normal ROM, without effusion, erythema or increased warmth  Hands: left first MCP with overlying erythematous patch, tender to palpation without effusion. MCPs, PIPs, DIPs without effusion, erythema or increased warmth  Hips: no pain with flexion, internal or external rotation  L knee: No effusion, increased warmth or erythema. Flexion >120 degrees, full extension  R knee: No effusion, increased warmth " or erythema. Flexion >120 degrees, full extension  Bilateral ankles: inversion/eversion, plantar and dorsiflexion intact. No joint effusion.  Feet: No effusion of MTPs, PIPs, DIPs noted            Data:   Labs and imaging reviewed.     Edilma Wall MD  Rheumatology fellow

## 2022-01-23 NOTE — ED NOTES
Pt signed out to me by Dr. Mesa at 5:05pm      Situation: Pt with known hx of Wegners Disease who presents to the ER for worse rash and sinus congestion.   Pt received 60mg of prednisone in the ED.  She is currently on oral steroids at home.   CT sinus and CT chest were done; report in the computer.     Results for orders placed or performed during the hospital encounter of 01/23/22   CT Chest Hi-Resolution wo Contrast     Status: None (Preliminary result)    Impression    RESIDENT PRELIMINARY INTERPRETATION  IMPRESSION:   1. No acute airspace disease.  2. Mild traction bronchiectasis and areas of fibroatelectasis,  slightly increased from 4/15/2016. No evidence of significant air  trapping.  3. 1.0 cm solid pulmonary nodule in the left lower lobe. Consider  follow-up CT in 3 months.   CT Sinus w/o Contrast     Status: None    Narrative    CT SINUS W/O CONTRAST 1/23/2022 4:14 PM    Provided History: wegeners     Comparison: MRI brain 11/12/2018     Technique:  Using thin collimation multidetector helical acquisition  technique, axial, coronal, and sagittal thin section CT images were  reconstructed through the paranasal sinuses. Images were reviewed in  bone and soft tissue windows.    Findings:   Maxillary sinuses: clear.  Sphenoid sinus: clear.  Frontal sinus: clear.  Ethmoid air cells: clear.    The ostiomeatal units appear patent bilaterally. The bony walls of the  paranasal sinuses are intact. Rightward deviation nasal septum.    Normal retromaxillary and pterygopalatine fat.    The adenoid tonsils in the nasopharynx are unremarkable. Edentulous.      Impression    Impression: No evidence of sinusitis.    I have personally reviewed the examination and initial interpretation  and I agree with the findings.    YOLANDA HENRIQUEZ MD         SYSTEM ID:  [id number]   Asymptomatic COVID-19 Virus (Coronavirus) by PCR Nasopharyngeal     Status: Normal    Specimen: Nasopharyngeal; Swab   Result Value Ref Range    SARS CoV2  PCR Negative Negative, Testing sent to reference lab. Results will be returned via unsolicited result    Narrative    Testing was performed using the Xpert Xpress SARS-CoV-2 Assay on the  Cepheid Gene-Xpert Instrument Systems. Additional information about  this Emergency Use Authorization (EUA) assay can be found via the Lab  Guide. This test should be ordered for the detection of SARS-CoV-2 in  individuals who meet SARS-CoV-2 clinical and/or epidemiological  criteria. Test performance is unknown in asymptomatic patients. This  test is for in vitro diagnostic use under the FDA EUA for  laboratories certified under CLIA to perform high complexity testing.  This test has not been FDA cleared or approved. A negative result  does not rule out the presence of PCR inhibitors in the specimen or  target RNA in concentration below the limit of detection for the  assay. The possibility of a false negative should be considered if  the patient's recent exposure or clinical presentation suggests  COVID-19. This test was validated by the [facility]. This laboratory is certified under  the Clinical Laboratory Improvement Amendments of 1988 (CLIA-88) as  qualified to perform high complexity laboratory testing.     CRP inflammation     Status: Abnormal   Result Value Ref Range    CRP Inflammation 23.0 (H) 0.0 - 8.0 mg/L   Extra Red Top Tube     Status: None   Result Value Ref Range    Hold Specimen JIC    Extra Green Top (Lithium Heparin) Tube     Status: None   Result Value Ref Range    Hold Specimen JIC    Extra Purple Top Tube     Status: None   Result Value Ref Range    Hold Specimen JIC    Comprehensive metabolic panel     Status: Abnormal   Result Value Ref Range    Sodium 134 133 - 144 mmol/L    Potassium 5.2 3.4 - 5.3 mmol/L    Chloride 106 94 - 109 mmol/L    Carbon Dioxide (CO2) 22 20 - 32 mmol/L    Anion Gap 6 3 - 14 mmol/L    Urea Nitrogen 15 7 - 30 mg/dL    Creatinine  0.64 0.52 - 1.04 mg/dL    Calcium 8.8 8.5 - 10.1 mg/dL    Glucose 154 (H) 70 - 99 mg/dL    Alkaline Phosphatase 60 40 - 150 U/L    AST 32 0 - 45 U/L    ALT 18 0 - 50 U/L    Protein Total 5.8 (L) 6.8 - 8.8 g/dL    Albumin 2.7 (L) 3.4 - 5.0 g/dL    Bilirubin Total 0.8 0.2 - 1.3 mg/dL    GFR Estimate >90 >60 mL/min/1.73m2   CBC with platelets and differential     Status: Abnormal   Result Value Ref Range    WBC Count 17.6 (H) 4.0 - 11.0 10e3/uL    RBC Count 5.00 3.80 - 5.20 10e6/uL    Hemoglobin 15.2 11.7 - 15.7 g/dL    Hematocrit 44.8 35.0 - 47.0 %    MCV 90 78 - 100 fL    MCH 30.4 26.5 - 33.0 pg    MCHC 33.9 31.5 - 36.5 g/dL    RDW 13.7 10.0 - 15.0 %    Platelet Count 199 150 - 450 10e3/uL    % Neutrophils 93 %    % Lymphocytes 4 %    % Monocytes 2 %    % Eosinophils 0 %    % Basophils 0 %    % Immature Granulocytes 1 %    NRBCs per 100 WBC 0 <1 /100    Absolute Neutrophils 16.4 (H) 1.6 - 8.3 10e3/uL    Absolute Lymphocytes 0.6 (L) 0.8 - 5.3 10e3/uL    Absolute Monocytes 0.4 0.0 - 1.3 10e3/uL    Absolute Eosinophils 0.1 0.0 - 0.7 10e3/uL    Absolute Basophils 0.0 0.0 - 0.2 10e3/uL    Absolute Immature Granulocytes 0.1 <=0.4 10e3/uL    Absolute NRBCs 0.0 10e3/uL   Rheumatology IP Consult: Patient to be seen: ASAP - within 4 hours; Call back #: [id number]; wegener's sxs; Consultant may enter orders: Yes; Requesting provider? 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    The patients painful, pruritic rash, and facial edema were not   present with her previous GPA episode, and appear less consistent   with a GPA flare. Unlike her previous episode, her current hand   pain appears secondary to her rash, and she is without evidence   of synovitis on exam. In addition, her ESR and CRP were normal   when last checked on the 21st, and her ANCA titer and PR3 levels   appeared reduced from previous when evaluated on 1/12/22. However   the patient does have sinus symptoms and cough, so would   recommend further evaluation with repeat inflammatory markers,   urinalysis, PR3, ANCA, CBC and CMP. As that patient has not had a   recent lung CT, recommend obtaining a HSCT of the lungs, as well   as a CT sinus.     Her rash and facial swelling appear suggestive of an allergic   response. It is not possible to say yet whether her rash is   consistent with urticaria, as it has been less than 24 hours   since current lesions developed. However her symptoms have   recurred without obvious identification of a trigger.   Differential for her rash also includes urticarial vasculitis.   Recommend obtaining C3, C4 and C1q levels and well as hepatitis   and HIV screening. In addition, discussed recommendation with ED   provider to obtain a dermatology consult for further evaluation   of the patients rash and possible biopsy.     Recommendations:  -- Obtain ESR, CRP, urinalysis, PR3, ANCA, CBC, CMP, C3, C4 and   C1q levels  -- Obtain HRCT of the lungs  -- Obtain CT sinus  -- Obtain dermatology consult    The patient was seen and staffed with Dr. Abbasi.     Edilma Wall MD  Rheumatology fellow         Chief Complaint:   Rash and facial swelling         History of Present Illness:   Ms. Cárdenas is a 75 yo female with history of MVP, GERD, COPD,   bilateral PE and DVT, GPA with upper airway  inflammation, lung   disease and erosive joint disease who presents to the ED for   evaluation of rash and swelling.    The patient reports three episodes of sinus infection symptoms in   the last year requiring antibiotics. Most recently, she reports   sinus congestion, wheezing and cough productive of yellow-orange   sputum for which she was seen for a virtual PCP visit on 1/10/22   and started on a a 7 day course of cefdinir. She notes that her   symptoms improved with the antibiotics, but that her sinus   congestion and productive cough have since returned. On Friday,   she reports she developed throat soreness, swelling of her lips,   hands and left cheek, as well as burning, red spots on her   buttocks. She was started on prednisone 20 mg BID by her PCP, and   her symptoms improved. However, last night she again began having   burning pain in her hands, and this morning notes a spots of   burning, itchy red rash over her back, upper and lower   extremities, as well as right cheek and forehead swelling, and   hand pain and swelling. This occurred despite continuing to take   the prednisone as well as Claritin. She has ongoing productive   cough, hoarse voice, and this morning reports watery, red eyes   without eye pain or vision changes. She has some shortness of   breath with exertion and chills. She denies fever, abdominal   pain, SOB at rest, dysphagia, diarrhea, urinary symptoms or blood   in her urine. The patient reports she ate shrimp on Tuesday and   on Friday, but denies any symptoms after eating them on Tuesday   or ever having issues with shrimp in the past. She denies any new   foods, OTC medications or supplements, skin products or   detergents.     The patient was diagnosed with PR3+ GPA with lung involvement,   upper airways inflammation, and erosive arthritis in 2014. She   was treated with high dose steroids and rituximab 375 mg/m2 x4,   and subsequently received methotrexate. She has not been  on any   medications for GPA recently. The patient notes that she did not   have rash or facial swelling with her prior episode of GPA.          Past Medical History:     Past Medical History:   Diagnosis Date     Amblyopia      Atypical pneumonia 6/14/2014     Atypical pneumonia      Atypical pneumonia      Atypical pneumonia      COPD (chronic obstructive pulmonary disease) (H) 7/20/2021     Coronary artery disease 1982    heart beat hard made me tired     Dyslipidemia      Fibroids      GERD (gastroesophageal reflux disease)      Granulomatosis with polyangiitis (Wegener's)      Hearing loss      Inflammatory arthritis     thumb     Migraines      MVP (mitral valve prolapse)     had an echo that was normal in 2007 she is on Inderal     Nonsenile cataract      Osteopenia      PE (pulmonary embolism) 10/2014    ? GPA associated, on warfarin      Strabismus      Synovitis of wrist 2009    right             Past Surgical History:     Past Surgical History:   Procedure Laterality Date     ABDOMEN SURGERY      appendix out     APPENDECTOMY       BIOPSY  1972    cone biopsy     CATARACT IOL, RT/LT       CL AFF SURGICAL PATHOLOGY      cone biopsy 1975     COLONOSCOPY       COLONOSCOPY WITH CO2 INSUFFLATION N/A 12/20/2017    Procedure: COLONOSCOPY WITH CO2 INSUFFLATION;  Screening  BMI: 20.7  Pharmacy: [facility]  [phone]  [facility]  Referring: lenard  Patient get ill from [facility];  Surgeon: Duane, William Charles, MD;  Location: [facility] OR     EYE SURGERY      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     HC ESOPHAGOSCOPY, DIAGNOSTIC       LASER YAG CAPSULOTOMY      left eye     OPTICAL TRACKING SYSTEM BRONCHOSCOPY  6/19/2014    Procedure: OPTICAL TRACKING SYSTEM BRONCHOSCOPY;  Surgeon:   Angel Kathleen MD;  Location:  GI     PHACOEMULSIFICATION WITH STANDARD INTRAOCULAR LENS IMPLANT    01/2018    left eye (SR)     SOFT TISSUE SURGERY      remove senovial fluid from right wrist     SURGICAL HISTORY  OF -   as a child    strabismus repair right eye     SURGICAL HISTORY OF -       right wrist debridement     TONSILLECTOMY      as a child     TONSILLECTOMY & ADENOIDECTOMY       TUBAL LIGATION       VITRECTOMY PARSPLANA  2018    left eye - mac hole ([facility])            Social History:     Social History     Socioeconomic History     Marital status:      Spouse name: Not on file     Number of children: Not on file     Years of education: Not on file     Highest education level: Not on file   Occupational History     Not on file   Tobacco Use     Smoking status: Former Smoker     Packs/day: 2.00     Years: 20.00     Pack years: 40.00     Types: Cigarettes     Start date: 1961     Quit date: 1983     Years since quittin.5     Smokeless tobacco: Never Used   Substance and Sexual Activity     Alcohol use: No     Alcohol/week: 0.0 standard drinks     Drug use: No     Sexual activity: Never   Other Topics Concern     Parent/sibling w/ CABG, MI or angioplasty before 65F 55M? No   Social History Narrative     Not on file     Social Determinants of Health     Financial Resource Strain: Not on file   Food Insecurity: Not on file   Transportation Needs: Not on file   Physical Activity: Not on file   Stress: Not on file   Social Connections: Not on file   Intimate Partner Violence: Not on file   Housing Stability: Not on file             Family History:     Family History   Problem Relation Age of Onset     Respiratory Mother         emphysema     Aneurysm Mother      Chronic Obstructive Pulmonary Disease Mother      Thyroid Disease Mother         ,     Osteoporosis Mother      Other Cancer Father      Cancer Father         lung cancer     Arthritis Maternal Grandmother         rheumatoid     Heart Disease Maternal Grandmother         unsure of details     Heart Disease Maternal Grandfather      Neurologic Disorder Paternal Grandmother         migraines     Alzheimer Disease Paternal Grandmother       "Unknown/Adopted Paternal Grandfather      Cancer Sister         stage 4 anal cancer age 62 years     Colon Cancer Sister      Cancer - colorectal Brother      Colon Cancer Brother      Substance Abuse Sister      Anesthesia Reaction Daughter              Immunizations:     Most Recent Immunizations   Administered Date(s) Administered     DT (PEDS <7y) 07/30/1996     TDAP Vaccine (Adacel) 07/28/2017             Allergies:     Allergies   Allergen Reactions     Vancomycin Other (See Comments)     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's syndrom     Adhesive Tape      Rash itches     Amoxicillin      vomiting     Augmentin      vomiting     Codeine      vomiting     Erythromycin      vomiting     Nickel      itches rash     Sulfa Drugs Itching     Tegaderm Alginate Ag      LW Other1: -ALL MEDICATIONS MAKE PATIENT VIOLENTLY ILL;   ADHESIVE BANDAGES; NICKEL     Zithromax [Azithromycin Dihydrate]      Vomiting/ skin blisters             Medications:   (Not in a hospital admission)      Current Facility-Administered Medications   Medication     cetirizine (zyrTEC) tablet 10 mg     famotidine (PEPCID) tablet 10 mg     Current Outpatient Medications   Medication     aspirin-acetaminophen-caffeine (EXCEDRIN MIGRAINE) 250-250-65   MG tablet     calcium-magnesium (CALMAG) 500-250 MG TABS per tablet     cholecalciferol 1000 UNITS TABS     loratadine (CLARITIN) 10 MG tablet     predniSONE (DELTASONE) 10 MG tablet     propranolol (INDERAL) 20 MG tablet     Vitamin K 100 MCG TABS     Facility-Administered Medications Ordered in Other Encounters   Medication     sodium chloride (PF) 0.9% PF flush 60 mL            Review of Systems:   Complete 12 point ROS completed and negative unless mentioned in   HPI.          Physical Exam:   BP (!) 143/79   Pulse 91   Temp 98.4  F (36.9  C) (Oral)     Resp 16   Ht 1.626 m (5' 4\")   Wt 53.5 kg (118 lb)   SpO2 94%     BMI 20.25 kg/m      General: appears well, lying in bed, NAD  HEENT: Sclera non-injected. EOMI. Tongue " with area of erythema.   Normal appearing tongue size. No other oral lesions noted. No   tenderness to percussion of sinuses.   CV: Regular rate and rhythm  Lung: Breath sounds present bilaterally, no wheezes rales or   rubs.  Abdomen: Soft, non-distended  Neuro: Awake, alert, CN grossly intact. Strength 5/5    strength, elbow flexion.   Skin/Hair: erythematous patches and plaques without scale on   back, upper and lower extremities that are tender to palpation.  Msk:   Shoulders: No tenderness to palpation along glenohumeral joint .   No effusion, warmth. Forward flexion and abduction to 180 degrees  Elbows: with full extension and extension, without effusion,   erythema or increased warmth  Wrists: Unable to flex or extend right wrist which is tender to   palpation without increased warmth or effusion noted. Left wrist   with normal ROM, without effusion, erythema or increased warmth  Hands: left first MCP with overlying erythematous patch, tender   to palpation without effusion. MCPs, PIPs, DIPs without effusion,   erythema or increased warmth  Hips: no pain with flexion, internal or external rotation  L knee: No effusion, increased warmth or erythema. Flexion >120   degrees, full extension  R knee: No effusion, increased warmth or erythema. Flexion >120   degrees, full extension  Bilateral ankles: inversion/eversion, plantar and dorsiflexion   intact. No joint effusion.  Feet: No effusion of MTPs, PIPs, DIPs noted            Data:   Labs and imaging reviewed.     Edilma Wall MD  Rheumatology fellow            [place] Draw     Status: None    Narrative    The following orders were created for panel order [place] Draw.  Procedure                               Abnormality         Status                     ---------                               -----------         ------                     Extra Blue Top Tube[[id number]]                                                         Extra Red Top  Tube[[id number]]                               Final result               Extra Green Top (Lithium...[[id number]]                      Final result               Extra Purple Top Tube[[id number]]                            Final result                 Please view results for these tests on the individual orders.   CBC with Platelets & Differential     Status: Abnormal    Narrative    The following orders were created for panel order CBC with Platelets & Differential.  Procedure                               Abnormality         Status                     ---------                               -----------         ------                     CBC with platelets and d...[[id number]]  Abnormal            Final result                 Please view results for these tests on the individual orders.       Plan: Discuss with rheumatology for final recs and disposition.     Shift Report:  I received a call from rheumatology at 5:15 pm that they reviewed the CT Chest and CT sinus and they do not feel that pt has any acute rheumatologic process. They had no further recommendations from a rheumatology perspective.     I went and told the pt and the daughter regarding the recommendations from rheumatology.  Patient had questions regarding her current steroid dose that I tried to answer for her.  My ultimate recommendation is that she follow-up with her PCP who prescribed steroids and her primary rheumatologist for further recommendations.  I did review reasons to return to the ER with the patient and the daughter.  Patient appears stable with no other acute process.  I reviewed the rheumatology note and saw that a dermatology consult was recommended.  Therefore ordered an outpatient dermatology consult and gave the patient the number for them to follow-up.  Patient with no other acute issues.  Patient stable for discharge.    Signed:  Sarah Crow MD  January 23, 2022 at 10:54 PM         Sarah Crow MD  01/23/22  [id number]

## 2022-01-23 NOTE — DISCHARGE INSTRUCTIONS
Your CT chest and CT sinus both are stable.     Rheumatology has reviewed your imaging and feel you are stable for discharge.     Please follow up in clinic as scheduled.     Please return to the ER if any other problems/concerns.

## 2022-01-23 NOTE — ED PROVIDER NOTES
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 Reports that this started on 1/20 with swelling in her hands Friday morning (2 days ago) along with swelling of her lips and her left cheek also starting to droop. She went to go visit her PCP that day and by then she had a hard time closing her mouth shut due to swelling throughout her mouth. They prescribed prednisone taper (20 mg BID Friday and yesterday) and Claritin, which she states started to provide some relief that night and into Friday with the swelling receding. However yesterday she had forgotten to take the Claritin in the afternoon and started noticing her hand swelling returning and today woke up to a rash throughout her extremities including hands, elbows, legs with associated pruritis. States that this morning she wasn't sure what she was supposed to take for the prednisone and so did not take any this morning (did take 40 mg total the two prior days).    In terms of possible allergic exposures, she had reported having shrimp on Tuesday and then eating leftovers Thursday night but no subsequent re-exposures. No other new medications, or new food exposures reported. She states that she had finished a course of Cefdinir last week for sinus issues. Related to that point she states that today she had some difficulty swallowing and feels congested in her throat with some pain on the right side. She does endorse a voice change stating it is \"froggy\" and feels like congestion from chronic sinus issues. Daughter who is with patient states that her eyes are more watery and red than usual also. She denies nausea, vomiting or diarrhea. Has not been on " Benadryl.    She states that she has a history of Wegener's and prednisone was effective in treating this. However, she denies having had lip swelling, itching or redness with her prior episodes of Wegener's.    Past Medical History  Past Medical History:   Diagnosis Date     Amblyopia      Atypical pneumonia 6/14/2014     Atypical pneumonia      Atypical pneumonia      Atypical pneumonia      COPD (chronic obstructive pulmonary disease) (H) 7/20/2021     Coronary artery disease 1982    heart beat hard made me tired     Dyslipidemia      Fibroids      GERD (gastroesophageal reflux disease)      Granulomatosis with polyangiitis (Wegener's)      Hearing loss      Inflammatory arthritis     thumb     Migraines      MVP (mitral valve prolapse)     had an echo that was normal in 2007 she is on Inderal     Nonsenile cataract      Osteopenia      PE (pulmonary embolism) 10/2014    ? GPA associated, on warfarin      Strabismus      Synovitis of wrist 2009    right     Past Surgical History:   Procedure Laterality Date     ABDOMEN SURGERY      appendix out     APPENDECTOMY       BIOPSY  1972    cone biopsy     CATARACT IOL, RT/LT       CL AFF SURGICAL PATHOLOGY      cone biopsy 1975     COLONOSCOPY       COLONOSCOPY WITH CO2 INSUFFLATION N/A 12/20/2017    Procedure: COLONOSCOPY WITH CO2 INSUFFLATION;  Screening  BMI: 20.7  Pharmacy: [facility]  [phone]  [facility]  Referring: Milena  Patient get ill from [facility];  Surgeon: Duane, William Charles, MD;  Location:  OR     EYE SURGERY      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     HC ESOPHAGOSCOPY, DIAGNOSTIC       LASER YAG CAPSULOTOMY      left eye     OPTICAL TRACKING SYSTEM BRONCHOSCOPY  6/19/2014    Procedure: OPTICAL TRACKING SYSTEM BRONCHOSCOPY;  Surgeon: Angel Kathleen MD;  Location: [facility]     PHACOEMULSIFICATION WITH STANDARD INTRAOCULAR LENS IMPLANT  01/2018    left eye (SR)     SOFT TISSUE SURGERY      remove senovial fluid from right  wrist     SURGICAL HISTORY OF -   as a child    strabismus repair right eye     SURGICAL HISTORY OF -   8-2009    right wrist debridement     TONSILLECTOMY      as a child     TONSILLECTOMY & ADENOIDECTOMY       TUBAL LIGATION       VITRECTOMY PARSPLANA  01/2018    left eye - mac hole (MVE)     aspirin-acetaminophen-caffeine (EXCEDRIN MIGRAINE) 250-250-65 MG tablet  calcium-magnesium (CALMAG) 500-250 MG TABS per tablet  cholecalciferol 1000 UNITS TABS  loratadine (CLARITIN) 10 MG tablet  predniSONE (DELTASONE) 10 MG tablet  propranolol (INDERAL) 20 MG tablet  Vitamin K 100 MCG TABS      Allergies   Allergen Reactions     Vancomycin Other (See Comments)     Red man's syndrom     Adhesive Tape      Rash itches     Amoxicillin      vomiting     Augmentin      vomiting     Codeine      vomiting     Erythromycin      vomiting     Nickel      itches rash     Sulfa Drugs Itching     Tegaderm Alginate Ag      LW Other1: -ALL MEDICATIONS MAKE PATIENT VIOLENTLY ILL; ADHESIVE BANDAGES; NICKEL     Zithromax [Azithromycin Dihydrate]      Vomiting/ skin blisters     Family History  Family History   Problem Relation Age of Onset     Respiratory Mother         emphysema     Aneurysm Mother      Chronic Obstructive Pulmonary Disease Mother      Thyroid Disease Mother         ,     Osteoporosis Mother      Other Cancer Father      Cancer Father         lung cancer     Arthritis Maternal Grandmother         rheumatoid     Heart Disease Maternal Grandmother         unsure of details     Heart Disease Maternal Grandfather      Neurologic Disorder Paternal Grandmother         migraines     Alzheimer Disease Paternal Grandmother      Unknown/Adopted Paternal Grandfather      Cancer Sister         stage 4 anal cancer age 62 years     Colon Cancer Sister      Cancer - colorectal Brother      Colon Cancer Brother      Substance Abuse Sister      Anesthesia Reaction Daughter      Social History   Social History     Tobacco Use     Smoking  "status: Former Smoker     Packs/day: 2.00     Years: 20.00     Pack years: 40.00     Types: Cigarettes     Start date: 1961     Quit date: 1983     Years since quittin.5     Smokeless tobacco: Never Used   Substance Use Topics     Alcohol use: No     Alcohol/week: 0.0 standard drinks     Drug use: No      Past medical history, past surgical history, medications, allergies, family history, and social history were reviewed with the patient. No additional pertinent items.       Review of Systems   Constitutional: Negative for activity change, appetite change, chills and fever.   HENT: Positive for congestion, facial swelling, postnasal drip, sinus pain, sore throat, trouble swallowing and voice change.    Eyes: Positive for redness.   Respiratory: Negative for cough and shortness of breath.    Cardiovascular: Negative for chest pain.   Gastrointestinal: Negative for abdominal pain, nausea and vomiting.   Skin: Positive for rash.   Allergic/Immunologic: Negative for food allergies.   All other systems reviewed and are negative.    A complete review of systems was performed with pertinent positives and negatives noted in the HPI, and all other systems negative.    Physical Exam   BP: (!) 143/79  Pulse: 91  Temp: 98.4  F (36.9  C)  Resp: 16  Height: 162.6 cm (5' 4\")  Weight: 53.5 kg (118 lb)  SpO2: 96 %  Physical Exam  Vitals and nursing note reviewed.   Constitutional:       General: She is not in acute distress.     Appearance: She is well-developed. She is not ill-appearing or diaphoretic.   HENT:      Head: Normocephalic and atraumatic.      Jaw: No trismus.        Comments: Mild swelling over left face/neck. No mucosal angioedema.     Nose: Nose normal.      Mouth/Throat:      Mouth: Mucous membranes are moist. No angioedema.      Comments: Patient sounds congested with a raspy voice that clears with cough. Does not sound muffled. No stridor.   Eyes:      General: No scleral icterus.     Extraocular " Movements: Extraocular movements intact.      Conjunctiva/sclera: Conjunctivae normal.      Right eye: No chemosis or exudate.     Left eye: No chemosis or exudate.  Cardiovascular:      Rate and Rhythm: Normal rate.   Pulmonary:      Effort: Pulmonary effort is normal. No respiratory distress.      Breath sounds: No stridor.   Abdominal:      General: There is no distension.   Musculoskeletal:         General: No deformity or signs of injury. Normal range of motion.      Cervical back: Normal range of motion and neck supple. No rigidity.   Skin:     General: Skin is warm and dry.      Coloration: Skin is not jaundiced or pale.      Findings: Erythema and rash present. Rash is macular, nodular and urticarial. Rash is not crusting, papular, purpuric, pustular or vesicular.      Comments: Patient has large areas of raised, erythematous, pruritic, nodular rash.  Most notable over the bilateral hands.  No significant joint swelling or effusions.   Neurological:      General: No focal deficit present.      Mental Status: She is alert and oriented to person, place, and time.   Psychiatric:         Mood and Affect: Mood normal.         Behavior: Behavior normal.       ED Course     9:15 AM  The patient was seen and examined by Cherelle Mesa MD in Room ED14.    Procedures                   Results for orders placed or performed during the hospital encounter of 01/23/22   CT Chest Hi-Resolution wo Contrast     Status: None (Preliminary result)    Impression    RESIDENT PRELIMINARY INTERPRETATION  IMPRESSION:   1. No acute airspace disease.  2. Mild traction bronchiectasis and areas of fibroatelectasis,  slightly increased from 4/15/2016. No evidence of significant air  trapping.  3. 1.0 cm solid pulmonary nodule in the left lower lobe. Consider  follow-up CT in 3 months.   CT Sinus w/o Contrast     Status: None    Narrative    CT SINUS W/O CONTRAST 1/23/2022 4:14 PM    Provided History: wegeners     Comparison: MRI brain  11/12/2018     Technique:  Using thin collimation multidetector helical acquisition  technique, axial, coronal, and sagittal thin section CT images were  reconstructed through the paranasal sinuses. Images were reviewed in  bone and soft tissue windows.    Findings:   Maxillary sinuses: clear.  Sphenoid sinus: clear.  Frontal sinus: clear.  Ethmoid air cells: clear.    The ostiomeatal units appear patent bilaterally. The bony walls of the  paranasal sinuses are intact. Rightward deviation nasal septum.    Normal retromaxillary and pterygopalatine fat.    The adenoid tonsils in the nasopharynx are unremarkable. Edentulous.      Impression    Impression: No evidence of sinusitis.    I have personally reviewed the examination and initial interpretation  and I agree with the findings.    YOLANDA HENRIQUEZ MD         SYSTEM ID:  [id number]   Asymptomatic COVID-19 Virus (Coronavirus) by PCR Nasopharyngeal     Status: Normal    Specimen: Nasopharyngeal; Swab   Result Value Ref Range    SARS CoV2 PCR Negative Negative, Testing sent to reference lab. Results will be returned via unsolicited result    Narrative    Testing was performed using the Xpert Xpress SARS-CoV-2 Assay on the  [employer]. Additional information about  this Emergency Use Authorization (EUA) assay can be found via the Lab  Guide. This test should be ordered for the detection of SARS-CoV-2 in  individuals who meet SARS-CoV-2 clinical and/or epidemiological  criteria. Test performance is unknown in asymptomatic patients. This  test is for in vitro diagnostic use under the FDA EUA for  laboratories certified under CLIA to perform high complexity testing.  This test has not been FDA cleared or approved. A negative result  does not rule out the presence of PCR inhibitors in the specimen or  target RNA in concentration below the limit of detection for the  assay. The possibility of a false negative should be considered if  the patient's  recent exposure or clinical presentation suggests  COVID-19. This test was validated by the [facility]. This laboratory is certified under  the Clinical Laboratory Improvement Amendments of 1988 (CLIA-88) as  qualified to perform high complexity laboratory testing.     CRP inflammation     Status: Abnormal   Result Value Ref Range    CRP Inflammation 23.0 (H) 0.0 - 8.0 mg/L   Extra Red Top Tube     Status: None   Result Value Ref Range    Hold Specimen JIC    Extra Green Top (Lithium Heparin) Tube     Status: None   Result Value Ref Range    Hold Specimen JIC    Extra Purple Top Tube     Status: None   Result Value Ref Range    Hold Specimen JIC    Comprehensive metabolic panel     Status: Abnormal   Result Value Ref Range    Sodium 134 133 - 144 mmol/L    Potassium 5.2 3.4 - 5.3 mmol/L    Chloride 106 94 - 109 mmol/L    Carbon Dioxide (CO2) 22 20 - 32 mmol/L    Anion Gap 6 3 - 14 mmol/L    Urea Nitrogen 15 7 - 30 mg/dL    Creatinine 0.64 0.52 - 1.04 mg/dL    Calcium 8.8 8.5 - 10.1 mg/dL    Glucose 154 (H) 70 - 99 mg/dL    Alkaline Phosphatase 60 40 - 150 U/L    AST 32 0 - 45 U/L    ALT 18 0 - 50 U/L    Protein Total 5.8 (L) 6.8 - 8.8 g/dL    Albumin 2.7 (L) 3.4 - 5.0 g/dL    Bilirubin Total 0.8 0.2 - 1.3 mg/dL    GFR Estimate >90 >60 mL/min/1.73m2   CBC with platelets and differential     Status: Abnormal   Result Value Ref Range    WBC Count 17.6 (H) 4.0 - 11.0 10e3/uL    RBC Count 5.00 3.80 - 5.20 10e6/uL    Hemoglobin 15.2 11.7 - 15.7 g/dL    Hematocrit 44.8 35.0 - 47.0 %    MCV 90 78 - 100 fL    MCH 30.4 26.5 - 33.0 pg    MCHC 33.9 31.5 - 36.5 g/dL    RDW 13.7 10.0 - 15.0 %    Platelet Count 199 150 - 450 10e3/uL    % Neutrophils 93 %    % Lymphocytes 4 %    % Monocytes 2 %    % Eosinophils 0 %    % Basophils 0 %    % Immature Granulocytes 1 %    NRBCs per 100 WBC 0 <1 /100    Absolute Neutrophils 16.4 (H) 1.6 - 8.3 10e3/uL    Absolute Lymphocytes 0.6 (L) 0.8 -  5.3 10e3/uL    Absolute Monocytes 0.4 0.0 - 1.3 10e3/uL    Absolute Eosinophils 0.1 0.0 - 0.7 10e3/uL    Absolute Basophils 0.0 0.0 - 0.2 10e3/uL    Absolute Immature Granulocytes 0.1 <=0.4 10e3/uL    Absolute NRBCs 0.0 10e3/uL   Rheumatology IP Consult: Patient to be seen: ASAP - within 4 hours; Call back #: [id number]; wegener's sxs; Consultant may enter orders: Yes; Requesting provider? Attending physician     Status: None ()    Edilma Moody MD     1/23/2022  4:24 PM  RHEUMATOLOGY CONSULT NOTE - FELLOW    Nury Cárdenas MRN# [id number]   Age: 76 year old YOB: 1945     Date of Admission:  1/23/2022  Reason for consult: Concern for GPA flare    Assessment and Plan:     Rash  Productive cough  Sinus congestion  Facial swelling  Hx of granulomatosis with polyangiitis with upper airway   inflammation, lung disease and erosive joint disease    The patient is a 77 yo female with history of MVP, GERD, COPD,   bilateral PE and DVT, GPA with upper airway inflammation, lung   disease and erosive joint disease evaluated in the ED due to   concern for a GPA flare.    The patients painful, pruritic rash, and facial edema were not   present with her previous GPA episode, and appear less consistent   with a GPA flare. Unlike her previous episode, her current hand   pain appears secondary to her rash, and she is without evidence   of synovitis on exam. In addition, her ESR and CRP were normal   when last checked on the 21st, and her ANCA titer and PR3 levels   appeared reduced from previous when evaluated on 1/12/22. However   the patient does have sinus symptoms and cough, so would   recommend further evaluation with repeat inflammatory markers,   urinalysis, PR3, ANCA, CBC and CMP. As that patient has not had a   recent lung CT, recommend obtaining a HSCT of the lungs, as well   as a CT sinus.     Her rash and facial swelling appear suggestive of an allergic   response. It is not possible to say yet  whether her rash is   consistent with urticaria, as it has been less than 24 hours   since current lesions developed. However her symptoms have   recurred without obvious identification of a trigger.   Differential for her rash also includes urticarial vasculitis.   Recommend obtaining C3, C4 and C1q levels and well as hepatitis   and HIV screening. In addition, discussed recommendation with ED   provider to obtain a dermatology consult for further evaluation   of the patients rash and possible biopsy.     Recommendations:  -- Obtain ESR, CRP, urinalysis, PR3, ANCA, CBC, CMP, C3, C4 and   C1q levels  -- Obtain HRCT of the lungs  -- Obtain CT sinus  -- Obtain dermatology consult    The patient was seen and staffed with Dr. Abbasi.     Edilma Wall MD  Rheumatology fellow         Chief Complaint:   Rash and facial swelling         History of Present Illness:   Ms. Cárdenas is a 75 yo female with history of MVP, GERD, COPD,   bilateral PE and DVT, GPA with upper airway inflammation, lung   disease and erosive joint disease who presents to the ED for   evaluation of rash and swelling.    The patient reports three episodes of sinus infection symptoms in   the last year requiring antibiotics. Most recently, she reports   sinus congestion, wheezing and cough productive of yellow-orange   sputum for which she was seen for a virtual PCP visit on 1/10/22   and started on a a 7 day course of cefdinir. She notes that her   symptoms improved with the antibiotics, but that her sinus   congestion and productive cough have since returned. On Friday,   she reports she developed throat soreness, swelling of her lips,   hands and left cheek, as well as burning, red spots on her   buttocks. She was started on prednisone 20 mg BID by her PCP, and   her symptoms improved. However, last night she again began having   burning pain in her hands, and this morning notes a spots of   burning, itchy red rash over her back, upper and  lower   extremities, as well as right cheek and forehead swelling, and   hand pain and swelling. This occurred despite continuing to take   the prednisone as well as Claritin. She has ongoing productive   cough, hoarse voice, and this morning reports watery, red eyes   without eye pain or vision changes. She has some shortness of   breath with exertion and chills. She denies fever, abdominal   pain, SOB at rest, dysphagia, diarrhea, urinary symptoms or blood   in her urine. The patient reports she ate shrimp on Tuesday and   on Friday, but denies any symptoms after eating them on Tuesday   or ever having issues with shrimp in the past. She denies any new   foods, OTC medications or supplements, skin products or   detergents.     The patient was diagnosed with PR3+ GPA with lung involvement,   upper airways inflammation, and erosive arthritis in 2014. She   was treated with high dose steroids and rituximab 375 mg/m2 x4,   and subsequently received methotrexate. She has not been on any   medications for GPA recently. The patient notes that she did not   have rash or facial swelling with her prior episode of GPA.          Past Medical History:     Past Medical History:   Diagnosis Date     Amblyopia      Atypical pneumonia 6/14/2014     Atypical pneumonia      Atypical pneumonia      Atypical pneumonia      COPD (chronic obstructive pulmonary disease) (H) 7/20/2021     Coronary artery disease 1982    heart beat hard made me tired     Dyslipidemia      Fibroids      GERD (gastroesophageal reflux disease)      Granulomatosis with polyangiitis (Wegener's)      Hearing loss      Inflammatory arthritis     thumb     Migraines      MVP (mitral valve prolapse)     had an echo that was normal in 2007 she is on Inderal     Nonsenile cataract      Osteopenia      PE (pulmonary embolism) 10/2014    ? GPA associated, on warfarin      Strabismus      Synovitis of wrist 2009    right             Past Surgical History:     Past  Surgical History:   Procedure Laterality Date     ABDOMEN SURGERY      appendix out     APPENDECTOMY       BIOPSY      cone biopsy     CATARACT IOL, RT/LT       CL AFF SURGICAL PATHOLOGY      cone biopsy      COLONOSCOPY       COLONOSCOPY WITH CO2 INSUFFLATION N/A 2017    Procedure: COLONOSCOPY WITH CO2 INSUFFLATION;  Screening  BMI: 20.7  Pharmacy: [facility]  [phone]  [facility]  Referring: Milena  Patient get ill from [facility];  Surgeon: Duane, William Charles, MD;  Location: [facility]     EYE SURGERY      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     HC ESOPHAGOSCOPY, DIAGNOSTIC       LASER YAG CAPSULOTOMY      left eye     OPTICAL TRACKING SYSTEM BRONCHOSCOPY  2014    Procedure: OPTICAL TRACKING SYSTEM BRONCHOSCOPY;  Surgeon:   Angel Kathleen MD;  Location:  GI     PHACOEMULSIFICATION WITH STANDARD INTRAOCULAR LENS IMPLANT    2018    left eye (SR)     SOFT TISSUE SURGERY      remove senovial fluid from right wrist     SURGICAL HISTORY OF -   as a child    strabismus repair right eye     SURGICAL HISTORY OF -       right wrist debridement     TONSILLECTOMY      as a child     TONSILLECTOMY & ADENOIDECTOMY       TUBAL LIGATION       VITRECTOMY PARSPLANA  2018    left eye - mac hole (MVE)            Social History:     Social History     Socioeconomic History     Marital status:      Spouse name: Not on file     Number of children: Not on file     Years of education: Not on file     Highest education level: Not on file   Occupational History     Not on file   Tobacco Use     Smoking status: Former Smoker     Packs/day: 2.00     Years: 20.00     Pack years: 40.00     Types: Cigarettes     Start date: 1961     Quit date: 1983     Years since quittin.5     Smokeless tobacco: Never Used   Substance and Sexual Activity     Alcohol use: No     Alcohol/week: 0.0 standard drinks     Drug use: No     Sexual activity: Never   Other Topics Concern      Parent/sibling w/ CABG, MI or angioplasty before 65F 55M? No   Social History Narrative     Not on file     Social Determinants of Health     Financial Resource Strain: Not on file   Food Insecurity: Not on file   Transportation Needs: Not on file   Physical Activity: Not on file   Stress: Not on file   Social Connections: Not on file   Intimate Partner Violence: Not on file   Housing Stability: Not on file             Family History:     Family History   Problem Relation Age of Onset     Respiratory Mother         emphysema     Aneurysm Mother      Chronic Obstructive Pulmonary Disease Mother      Thyroid Disease Mother         ,     Osteoporosis Mother      Other Cancer Father      Cancer Father         lung cancer     Arthritis Maternal Grandmother         rheumatoid     Heart Disease Maternal Grandmother         unsure of details     Heart Disease Maternal Grandfather      Neurologic Disorder Paternal Grandmother         migraines     Alzheimer Disease Paternal Grandmother      Unknown/Adopted Paternal Grandfather      Cancer Sister         stage 4 anal cancer age 62 years     Colon Cancer Sister      Cancer - colorectal Brother      Colon Cancer Brother      Substance Abuse Sister      Anesthesia Reaction Daughter              Immunizations:     Most Recent Immunizations   Administered Date(s) Administered     DT (PEDS <7y) 07/30/1996     TDAP Vaccine (Adacel) 07/28/2017             Allergies:     Allergies   Allergen Reactions     Vancomycin Other (See Comments)     Red man's syndrom     Adhesive Tape      Rash itches     Amoxicillin      vomiting     Augmentin      vomiting     Codeine      vomiting     Erythromycin      vomiting     Nickel      itches rash     Sulfa Drugs Itching     Tegaderm Alginate Ag      LW Other1: -ALL MEDICATIONS MAKE PATIENT VIOLENTLY ILL;   ADHESIVE BANDAGES; NICKEL     Zithromax [Azithromycin Dihydrate]      Vomiting/ skin blisters             Medications:   (Not in a hospital  "admission)      Current Facility-Administered Medications   Medication     cetirizine (zyrTEC) tablet 10 mg     famotidine (PEPCID) tablet 10 mg     Current Outpatient Medications   Medication     aspirin-acetaminophen-caffeine (EXCEDRIN MIGRAINE) 250-250-65   MG tablet     calcium-magnesium (CALMAG) 500-250 MG TABS per tablet     cholecalciferol 1000 UNITS TABS     loratadine (CLARITIN) 10 MG tablet     predniSONE (DELTASONE) 10 MG tablet     propranolol (INDERAL) 20 MG tablet     Vitamin K 100 MCG TABS     Facility-Administered Medications Ordered in Other Encounters   Medication     sodium chloride (PF) 0.9% PF flush 60 mL            Review of Systems:   Complete 12 point ROS completed and negative unless mentioned in   HPI.          Physical Exam:   BP (!) 143/79   Pulse 91   Temp 98.4  F (36.9  C) (Oral)     Resp 16   Ht 1.626 m (5' 4\")   Wt 53.5 kg (118 lb)   SpO2 94%     BMI 20.25 kg/m      General: appears well, lying in bed, NAD  HEENT: Sclera non-injected. EOMI. Tongue with area of erythema.   Normal appearing tongue size. No other oral lesions noted. No   tenderness to percussion of sinuses.   CV: Regular rate and rhythm  Lung: Breath sounds present bilaterally, no wheezes rales or   rubs.  Abdomen: Soft, non-distended  Neuro: Awake, alert, CN grossly intact. Strength 5/5    strength, elbow flexion.   Skin/Hair: erythematous patches and plaques without scale on   back, upper and lower extremities that are tender to palpation.  Msk:   Shoulders: No tenderness to palpation along glenohumeral joint .   No effusion, warmth. Forward flexion and abduction to 180 degrees  Elbows: with full extension and extension, without effusion,   erythema or increased warmth  Wrists: Unable to flex or extend right wrist which is tender to   palpation without increased warmth or effusion noted. Left wrist   with normal ROM, without effusion, erythema or increased warmth  Hands: left first MCP with overlying " erythematous patch, tender   to palpation without effusion. MCPs, PIPs, DIPs without effusion,   erythema or increased warmth  Hips: no pain with flexion, internal or external rotation  L knee: No effusion, increased warmth or erythema. Flexion >120   degrees, full extension  R knee: No effusion, increased warmth or erythema. Flexion >120   degrees, full extension  Bilateral ankles: inversion/eversion, plantar and dorsiflexion   intact. No joint effusion.  Feet: No effusion of MTPs, PIPs, DIPs noted            Data:   Labs and imaging reviewed.     Edilma Wall MD  Rheumatology fellow            [place] [facility]     Status: None    Narrative    The following orders were created for panel order [place] [facility].  Procedure                               Abnormality         Status                     ---------                               -----------         ------                     Extra Blue Top Tube[[id number]]                                                         Extra Red Top Tube[[id number]]                               Final result               Extra Green Top (Lithium...[[id number]]                      Final result               Extra Purple Top Tube[[id number]]                            Final result                 Please view results for these tests on the individual orders.   CBC with Platelets & Differential     Status: Abnormal    Narrative    The following orders were created for panel order CBC with Platelets & Differential.  Procedure                               Abnormality         Status                     ---------                               -----------         ------                     CBC with platelets and d...[[id number]]  Abnormal            Final result                 Please view results for these tests on the individual orders.     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     Ddx: allergic rxn, anaphylactoid reaction, urticaria, medication noncompliance, Min's flare    Sxs most consistent with urticaria. Did not take steroids this AM and has not been on an H1 blocker. No angioedema.  Given 60 mg prednisone, Benadryl, famotidine, Zyrtec.  Patient requested that we speak with her rheumatologist.  They reportedly wanted patient evaluated to see if this could represent a Wegener's granulomatosis flare.  Patient denies respiratory symptoms currently.  She has not been febrile.  She is not vaccinated against COVID-19 but states when she previously had this infection it did trigger recurrent Min symptoms.  We will do an asymptomatic PCR test.  Reviewed CRP from 2 days ago.  This is 7.1.  Patient's baseline is less than 2.9.  I did speak with the rheumatology fellow on-call who will evaluate the patient.    On reassessment, patient denies significant improvement after the medications above.     Rheumatology evaluated the patient and ordered additional labs and requested a CT high-resolution of the chest as well as a CT of the sinuses.  Labs notable for elevated CRP.  Negative COVID.  Repeat CRP 23.  White count 17.6.  Labs otherwise unremarkable.  CT sinuses active.  CT chest high-resolution in process.  Disposition pending final recommendations of rheumatology after review of patient's CT scan and labs.  Patient signed out to Dr. Crow with disposition pending rheumatology recommendations.        I have reviewed the nursing notes. I have reviewed the findings,  diagnosis, plan and need for follow up with the patient.    New Prescriptions    No medications on file       Final diagnoses:   Rash     ORA, Valente Loredo, am serving as a trained medical scribe to document services personally performed by Cherelle Mesa MD, based on the provider's statements to me.   Cherelle PAYAN MD, was physically present and have reviewed and verified the accuracy of this note documented by Valente Loredo.     --    [facility] EMERGENCY DEPARTMENT  1/23/2022     Cherelle Mesa MD  01/23/22 0602

## 2022-01-23 NOTE — TELEPHONE ENCOUNTER
I tried to call patient regarding lab results.  Tried both numbers, patient not available.    Riki Browning MD

## 2022-01-24 ENCOUNTER — OFFICE VISIT (OUTPATIENT)
Dept: FAMILY MEDICINE | Facility: CLINIC | Age: 77
End: 2022-01-24
Payer: COMMERCIAL

## 2022-01-24 ENCOUNTER — MYC MEDICAL ADVICE (OUTPATIENT)
Dept: FAMILY MEDICINE | Facility: CLINIC | Age: 77
End: 2022-01-24

## 2022-01-24 VITALS
HEART RATE: 74 BPM | DIASTOLIC BLOOD PRESSURE: 72 MMHG | TEMPERATURE: 97.6 F | SYSTOLIC BLOOD PRESSURE: 128 MMHG | BODY MASS INDEX: 21.11 KG/M2 | WEIGHT: 123 LBS | OXYGEN SATURATION: 99 %

## 2022-01-24 DIAGNOSIS — R91.1 LUNG NODULE: ICD-10-CM

## 2022-01-24 DIAGNOSIS — L85.3 XEROSIS CUTIS: ICD-10-CM

## 2022-01-24 DIAGNOSIS — M25.40 JOINT SWELLING: ICD-10-CM

## 2022-01-24 DIAGNOSIS — R73.9 HYPERGLYCEMIA: Primary | ICD-10-CM

## 2022-01-24 DIAGNOSIS — Z87.39 HISTORY OF GRANULOMATOSIS WITH POLYANGIITIS: ICD-10-CM

## 2022-01-24 LAB
ALBUMIN UR-MCNC: NEGATIVE MG/DL
APPEARANCE UR: CLEAR
BILIRUB UR QL STRIP: NEGATIVE
COLOR UR AUTO: YELLOW
GLUCOSE UR STRIP-MCNC: NEGATIVE MG/DL
HBA1C MFR BLD: 5.5 % (ref 0–5.6)
HGB UR QL STRIP: NEGATIVE
KETONES UR STRIP-MCNC: NEGATIVE MG/DL
LEUKOCYTE ESTERASE UR QL STRIP: ABNORMAL
NITRATE UR QL: NEGATIVE
PH UR STRIP: 5.5 [PH] (ref 5–7)
RBC #/AREA URNS AUTO: ABNORMAL /HPF
SP GR UR STRIP: 1.01 (ref 1–1.03)
SQUAMOUS #/AREA URNS AUTO: ABNORMAL /LPF
UROBILINOGEN UR STRIP-ACNC: 0.2 E.U./DL
WBC #/AREA URNS AUTO: ABNORMAL /HPF

## 2022-01-24 PROCEDURE — 99213 OFFICE O/P EST LOW 20 MIN: CPT | Performed by: FAMILY MEDICINE

## 2022-01-24 PROCEDURE — 81001 URINALYSIS AUTO W/SCOPE: CPT | Performed by: FAMILY MEDICINE

## 2022-01-24 RX ORDER — PREDNISONE 10 MG/1
TABLET ORAL
Qty: 30 TABLET | Refills: 1 | Status: SHIPPED | OUTPATIENT
Start: 2022-01-24 | End: 2022-03-30

## 2022-01-24 ASSESSMENT — PAIN SCALES - GENERAL: PAINLEVEL: NO PAIN (0)

## 2022-01-24 NOTE — PATIENT INSTRUCTIONS
We will send you lab results    Get prednisone pills, use as needed in addition to taper    See rheumatology as planned    Schedule CT in 3 months    Use over the counter famotidine 2x daily ( pepcid )    Continue loratadine

## 2022-01-24 NOTE — PROGRESS NOTES
ua         Subjective   Pat is a 76 year old who presents for the following health issues   HPI     ED/UC Followup:    Facility:    Date of visit: 01/23/2022  Reason for visit: Rash  Current Status: The rash is spreading again          Review of Systems   Seen here a few days ago    Friday and Sat okay but worse yest Sunday  Had to go to emergency room     Had voice /  Neck issues    To see rheumatology in  1 1/2 weeks          Objective    BP (!) 159/81 (BP Location: Left arm, Patient Position: Chair, Cuff Size: Adult Regular)   Pulse 74   Temp 97.6  F (36.4  C) (Temporal)   Wt 55.8 kg (123 lb)   SpO2 99%   Breastfeeding No   BMI 21.11 kg/m    Body mass index is 21.11 kg/m .  Physical Exam   Full physical not done     Mentation and affect are fine    No tremor of speech or extremity    No swelling of face or hands now    Per patient she feels much better    Throat normal    Voice normal    No tremor    Very few hives like lesions now    Skin quite dry especially on legs             ASSESSMENT / PLAN:  (R73.9) Hyperglycemia  (primary encounter diagnosis)  Comment: check  Plan: Hemoglobin A1c        Added to blood from yest     (Z86.79) History of granulomatosis with polyangiitis  Comment: I put in orders that the rheumatology fellow had suggested in their emergency room note.  Patient was gone from emergency room before these could be done.  Keep appointment with rheumatology for a week from Friday.  They will have the results to look at   Plan: Complement C3, Complement C4, ANCA IgG by IFA         with Reflex to Titer, Proteinase 3 Antibody         IgG, Anti-C1Q Erika IgG, UA with Microscopic         reflex to Culture - lab collect, Urine         Microscopic, CANCELED: UA macro with reflex to         Microscopic and Culture - Clinc Collect             (M25.40) Joint swelling  Comment: of note patient worried that as she goes down on her tapering dose of prednisone the symptoms will worsen.  Plan: predniSONE  (DELTASONE) 10 MG tablet        Patient will try to do the taper but I gave her some extra 10 mg prednisone pills to have on hand, use as needed.     (R91.1) Lung nodule  Comment: plan recheck lung ct in 3 months.  Patient to schedule.  Likely just a scar ( calcified granuloma on scan from emergency room )  Plan: CT Chest w/o Contrast             (L85.3) Xerosis cutis  Comment: increase lotion usage   Plan: as above       I reviewed the patient's medications, allergies, medical history, family history, and social history.    Riki Browning MD

## 2022-01-25 ENCOUNTER — OFFICE VISIT (OUTPATIENT)
Dept: DERMATOLOGY | Facility: CLINIC | Age: 77
End: 2022-01-25
Payer: COMMERCIAL

## 2022-01-25 DIAGNOSIS — R21 RASH AND NONSPECIFIC SKIN ERUPTION: Primary | ICD-10-CM

## 2022-01-25 DIAGNOSIS — D48.5 NEOPLASM OF UNCERTAIN BEHAVIOR OF SKIN: ICD-10-CM

## 2022-01-25 LAB
C3 SERPL-MCNC: 112 MG/DL (ref 81–157)
C4 SERPL-MCNC: 19 MG/DL (ref 13–39)
PROTEINASE3 AB SER IA-ACNC: 27 U/ML
PROTEINASE3 AB SER IA-ACNC: POSITIVE

## 2022-01-25 PROCEDURE — 11104 PUNCH BX SKIN SINGLE LESION: CPT | Performed by: DERMATOLOGY

## 2022-01-25 PROCEDURE — 88305 TISSUE EXAM BY PATHOLOGIST: CPT | Performed by: DERMATOLOGY

## 2022-01-25 NOTE — LETTER
1/25/2022         RE: Nury Cárdenas  [address] [address]        Dear Colleague,    Thank you for referring your patient, Nury Cárdenas, to the [facility]. Please see a copy of my visit note below.    [facility] Dermatology Note  Encounter Date: Jan 25, 2022  Office Visit     Dermatology Problem List:  0. Rash/eruption - left medial thigh - bx 1/25/22  1. Chronic skin changes of the left lower extremity - hemosiderosis; in context of resolving cellulitis and history of DVT in setting of granulomatosis with polyangiitis (in remission)  2. Relevant medical history: GPA (diagnoses in 2014, initially treated with rituximab and steroids, then methotrexate, then prednisone alone)      ____________________________________________    Assessment & Plan:     # Scattered pink macules on the thighs and retiform purpura appearing lesions at the medial knees. Patient with known history of GPA and presenting with new indurated skin lesion. Will biopsy active lesion on the left medial thigh. DDX ?GPA vs urticaria.   - See punch procedure note   - Follow up with rheumatology, as scheduled.       Procedures Performed:   - Punch biopsy procedure note, location(s): left medial thigh. After discussion of benefits and risks including but not limited to bleeding, infection, scar, incomplete removal, recurrence, and non-diagnostic biopsy, written consent and photographs were obtained. The area was cleaned with isopropyl alcohol. 0.5mL of 1% lidocaine with epinephrine was injected to obtain adequate anesthesia and a 4 mm punch biopsy was performed at site(s). 4-0 Prolene sutures were utilized to approximate the epidermal edges. White petrolatum ointment and a bandage was applied to the wound. Explicit verbal and written wound care instructions were provided. The patient left the dermatology clinic in good condition.     Follow-up: pending path results, follow up with  rheumatology    Staff and Scribe:     Scribe Disclosure:   I, Salvador Pat, am serving as a scribe to document services personally performed by this physician, Dr. Jocelyne Cole, based on data collection and the provider's statements to me.     Provider Disclosure:   The documentation recorded by the scribe accurately reflects the services I personally performed and the decisions made by me.    Jocelyne Cole MD    Department of Dermatology  [facility]: Phone: [phone], Fax:[phone]  [facility]: Phone: [phone], Fax: [phone]    ____________________________________________    CC: Rash (Rash x 4-5 days. )    HPI:  Ms. Nury Cárdenas is a(n) 76 year old female who presents today as a new patient for a rash.    Seen by PCP on 1/21/22 for itching and burning. Noteably, patient had just finished course of cefdinir for sinus issues. She also had had shrimp on Tues and Thursday. Given rx for prednisone 20mg BID (planned 9 day taper) and claritin.    Went to ED 1/23/22 for worsening facial swelling and rash. Thought symptoms were due to not taking prednisone morning of 1/23/22 and most consistent with urticaria. Rheum did evaluate in the ED and noted symptoms were not consistent with a previous GPA flare. Rheum also though allergic reaction was cause of rash. It is not clear from ED notes what plan was for prednisone.    Today, Pat notes she has had a rash for 4-5 days. She is currently taking prednisone 10 mg daily. Her GPA has been stable in the past, but she began getting itching and burning in the hands recently. Her hands were swollen last week.    Patient is otherwise feeling well, without additional skin concerns.     Labs Reviewed:  Rheum labs from 1/23/22 still pending.  CBC 1/23/22 shows elevated WBC count with neurophils on diff (consistent with recent increase  in prednisone).  CRP 23 1/23/22, normal at 7.1 on 1/21/22    Physical Exam:  Vitals: There were no vitals taken for this visit.  SKIN: Focused examination of the hands, face, back, chest, abdomen was performed.  - There are scattered ecchymosis on the upper legs  - Scattered pink macules on the thighs  - Retiform purpura appearing lesions at the medial knees  - Left medial thigh: 4 mm punch,   - Some macular pink erythema located on the hypothenar eminence, thenar eminence and the base of the fingers bilaterally  - Face is clear  - Back, chest and abdomen are clear  - No other lesions of concern on areas examined.       Medications:  Current Outpatient Medications   Medication     aspirin-acetaminophen-caffeine (EXCEDRIN MIGRAINE) 250-250-65 MG tablet     calcium-magnesium (CALMAG) 500-250 MG TABS per tablet     cholecalciferol 1000 UNITS TABS     loratadine (CLARITIN) 10 MG tablet     predniSONE (DELTASONE) 10 MG tablet     predniSONE (DELTASONE) 10 MG tablet     propranolol (INDERAL) 20 MG tablet     Vitamin K 100 MCG TABS     No current facility-administered medications for this visit.     Facility-Administered Medications Ordered in Other Visits   Medication     sodium chloride (PF) 0.9% PF flush 60 mL      Past Medical History:   Patient Active Problem List   Diagnosis     GERD (gastroesophageal reflux disease)     Fibroids     Migraines     Hearing loss     Osteopenia     HYPERLIPIDEMIA LDL GOAL <160     Advanced directives, counseling/discussion     Inflammatory arthritis     Synovitis of wrist     Chronic constipation     Granulomatosis with polyangiitis without renal involvement (H)     Chronic steroid use     Dyspnea     Pulmonary embolism (H)     Calculus of kidney, right     Chronic anticoagulation     Long-term (current) use of anticoagulants [Z79.01]     Long-term use of immunosuppressant medication     Primary osteoarthritis involving multiple joints     Cellulitis     Cat scratch left lower extremity      Other pulmonary embolism without acute cor pulmonale, unspecified chronicity (H)     Age-related osteoporosis without current pathological fracture     Age-related osteoporosis with current pathological fracture, sequela     Adverse effects of medication     Combined forms of age-related cataract, mild-mod, of right eye     Pseudophakia, Yag Caps, os     History of vitrectomy - macular hole, os (MVE)     Hx of macular hole of left eye     Hemifacial spasm     Immunosuppression (H)     Clinical diagnosis of COVID-19     History of COVID-19     Lesion of right eyelid     COPD (chronic obstructive pulmonary disease) (H)     Past Medical History:   Diagnosis Date     Amblyopia      Atypical pneumonia 6/14/2014     Atypical pneumonia      Atypical pneumonia      Atypical pneumonia      COPD (chronic obstructive pulmonary disease) (H) 7/20/2021     Coronary artery disease 1982    heart beat hard made me tired     Dyslipidemia      Fibroids      GERD (gastroesophageal reflux disease)      Granulomatosis with polyangiitis (Wegener's)      Hearing loss      Inflammatory arthritis     thumb     Migraines      MVP (mitral valve prolapse)     had an echo that was normal in 2007 she is on Inderal     Nonsenile cataract      Osteopenia      PE (pulmonary embolism) 10/2014    ? GPA associated, on warfarin      Strabismus      Synovitis of wrist 2009    right       CC Referred Self, MD  No address on file on close of this encounter.        Again, thank you for allowing me to participate in the care of your patient.        Sincerely,        Jocelyne Cole MD

## 2022-01-25 NOTE — PROGRESS NOTES
[facility] Dermatology Note  Encounter Date: Jan 25, 2022  Office Visit     Dermatology Problem List:  0. Rash/eruption - left medial thigh - bx 1/25/22  1. Chronic skin changes of the left lower extremity - hemosiderosis; in context of resolving cellulitis and history of DVT in setting of granulomatosis with polyangiitis (in remission)  2. Relevant medical history: GPA (diagnoses in 2014, initially treated with rituximab and steroids, then methotrexate, then prednisone alone)      ____________________________________________    Assessment & Plan:     # Scattered pink macules on the thighs and retiform purpura appearing lesions at the medial knees. Patient with known history of GPA and presenting with new indurated skin lesion. Will biopsy active lesion on the left medial thigh. DDX ?GPA vs urticaria.   - See punch procedure note   - Follow up with rheumatology, as scheduled.       Procedures Performed:   - Punch biopsy procedure note, location(s): left medial thigh. After discussion of benefits and risks including but not limited to bleeding, infection, scar, incomplete removal, recurrence, and non-diagnostic biopsy, written consent and photographs were obtained. The area was cleaned with isopropyl alcohol. 0.5mL of 1% lidocaine with epinephrine was injected to obtain adequate anesthesia and a 4 mm punch biopsy was performed at site(s). 4-0 Prolene sutures were utilized to approximate the epidermal edges. White petrolatum ointment and a bandage was applied to the wound. Explicit verbal and written wound care instructions were provided. The patient left the dermatology clinic in good condition.     Follow-up: pending path results, follow up with rheumatology    Staff and Scribe:     Scribe Disclosure:   I, Salvador Pat, am serving as a scribe to document services personally performed by this physician, Dr. Jocelyne Cole, based on data collection and the provider's statements to me.      Provider Disclosure:   The documentation recorded by the scribe accurately reflects the services I personally performed and the decisions made by me.    Jocelyne Cole MD    Department of Dermatology  [facility]: Phone: [phone], Fax:[phone]  [facility]: Phone: [phone], Fax: [phone]    ____________________________________________    CC: Rash (Rash x 4-5 days. )    HPI:  Ms. Nury Cárdenas is a(n) 76 year old female who presents today as a new patient for a rash.    Seen by PCP on 1/21/22 for itching and burning. Noteably, patient had just finished course of cefdinir for sinus issues. She also had had shrimp on Tues and Thursday. Given rx for prednisone 20mg BID (planned 9 day taper) and claritin.    Went to ED 1/23/22 for worsening facial swelling and rash. Thought symptoms were due to not taking prednisone morning of 1/23/22 and most consistent with urticaria. Rheum did evaluate in the ED and noted symptoms were not consistent with a previous GPA flare. Rheum also though allergic reaction was cause of rash. It is not clear from ED notes what plan was for prednisone.    Today, Pat notes she has had a rash for 4-5 days. She is currently taking prednisone 10 mg daily. Her GPA has been stable in the past, but she began getting itching and burning in the hands recently. Her hands were swollen last week.    Patient is otherwise feeling well, without additional skin concerns.     Labs Reviewed:  Rheum labs from 1/23/22 still pending.  CBC 1/23/22 shows elevated WBC count with neurophils on diff (consistent with recent increase in prednisone).  CRP 23 1/23/22, normal at 7.1 on 1/21/22    Physical Exam:  Vitals: There were no vitals taken for this visit.  SKIN: Focused examination of the hands, face, back, chest, abdomen was performed.  - There are scattered ecchymosis on  the upper legs  - Scattered pink macules on the thighs  - Retiform purpura appearing lesions at the medial knees  - Left medial thigh: 4 mm punch,   - Some macular pink erythema located on the hypothenar eminence, thenar eminence and the base of the fingers bilaterally  - Face is clear  - Back, chest and abdomen are clear  - No other lesions of concern on areas examined.       Medications:  Current Outpatient Medications   Medication     aspirin-acetaminophen-caffeine (EXCEDRIN MIGRAINE) 250-250-65 MG tablet     calcium-magnesium (CALMAG) 500-250 MG TABS per tablet     cholecalciferol 1000 UNITS TABS     loratadine (CLARITIN) 10 MG tablet     predniSONE (DELTASONE) 10 MG tablet     predniSONE (DELTASONE) 10 MG tablet     propranolol (INDERAL) 20 MG tablet     Vitamin K 100 MCG TABS     No current facility-administered medications for this visit.     Facility-Administered Medications Ordered in Other Visits   Medication     sodium chloride (PF) 0.9% PF flush 60 mL      Past Medical History:   Patient Active Problem List   Diagnosis     GERD (gastroesophageal reflux disease)     Fibroids     Migraines     Hearing loss     Osteopenia     HYPERLIPIDEMIA LDL GOAL <160     Advanced directives, counseling/discussion     Inflammatory arthritis     Synovitis of wrist     Chronic constipation     Granulomatosis with polyangiitis without renal involvement (H)     Chronic steroid use     Dyspnea     Pulmonary embolism (H)     Calculus of kidney, right     Chronic anticoagulation     Long-term (current) use of anticoagulants [Z79.01]     Long-term use of immunosuppressant medication     Primary osteoarthritis involving multiple joints     Cellulitis     Cat scratch left lower extremity     Other pulmonary embolism without acute cor pulmonale, unspecified chronicity (H)     Age-related osteoporosis without current pathological fracture     Age-related osteoporosis with current pathological fracture, sequela     Adverse effects of  medication     Combined forms of age-related cataract, mild-mod, of right eye     Pseudophakia, Yag Caps, os     History of vitrectomy - macular hole, os (MVE)     Hx of macular hole of left eye     Hemifacial spasm     Immunosuppression (H)     Clinical diagnosis of COVID-19     History of COVID-19     Lesion of right eyelid     COPD (chronic obstructive pulmonary disease) (H)     Past Medical History:   Diagnosis Date     Amblyopia      Atypical pneumonia 6/14/2014     Atypical pneumonia      Atypical pneumonia      Atypical pneumonia      COPD (chronic obstructive pulmonary disease) (H) 7/20/2021     Coronary artery disease 1982    heart beat hard made me tired     Dyslipidemia      Fibroids      GERD (gastroesophageal reflux disease)      Granulomatosis with polyangiitis (Wegener's)      Hearing loss      Inflammatory arthritis     thumb     Migraines      MVP (mitral valve prolapse)     had an echo that was normal in 2007 she is on Inderal     Nonsenile cataract      Osteopenia      PE (pulmonary embolism) 10/2014    ? GPA associated, on warfarin      Strabismus      Synovitis of wrist 2009    right       CC Referred Self, MD  No address on file on close of this encounter.

## 2022-01-25 NOTE — RESULT ENCOUNTER NOTE
Here are some of the rheumatology labs.  Some still pending.    Discuss with rheumatology next week.    Normal hemoglobin a1c means no diabetes or prediabetes.    Riki Browning MD

## 2022-01-25 NOTE — NURSING NOTE
The following medication was given:     MEDICATION:  Lidocaine with epinephrine 1% 1:[id number]  ROUTE: SQ  SITE: see procedure note  DOSE: 0.5cc  LOT #: [id number]  : Joanne  EXPIRATION DATE: 6/1/22  NDC#: 2058-4644-37  Was there drug waste? 1cc  Multi-dose vial: Yes        Laquita Kunz on 1/25/2022 at 1:47 PM

## 2022-01-26 LAB
ANCA AB PATTERN SER IF-IMP: ABNORMAL
C-ANCA TITR SER IF: ABNORMAL {TITER}

## 2022-01-31 ENCOUNTER — LAB (OUTPATIENT)
Dept: LAB | Facility: CLINIC | Age: 77
End: 2022-01-31
Payer: COMMERCIAL

## 2022-01-31 DIAGNOSIS — M31.30 GRANULOMATOSIS WITH POLYANGIITIS WITHOUT RENAL INVOLVEMENT (H): Primary | ICD-10-CM

## 2022-01-31 DIAGNOSIS — M31.30 GRANULOMATOSIS WITH POLYANGIITIS WITHOUT RENAL INVOLVEMENT (H): ICD-10-CM

## 2022-01-31 LAB
BASOPHILS # BLD AUTO: 0 10E3/UL (ref 0–0.2)
BASOPHILS NFR BLD AUTO: 0 %
CRP SERPL-MCNC: <2.9 MG/L (ref 0–8)
EOSINOPHIL # BLD AUTO: 0.1 10E3/UL (ref 0–0.7)
EOSINOPHIL NFR BLD AUTO: 1 %
ERYTHROCYTE [DISTWIDTH] IN BLOOD BY AUTOMATED COUNT: 14.9 % (ref 10–15)
ERYTHROCYTE [SEDIMENTATION RATE] IN BLOOD BY WESTERGREN METHOD: 4 MM/HR (ref 0–30)
HCT VFR BLD AUTO: 44.9 % (ref 35–47)
HGB BLD-MCNC: 14.8 G/DL (ref 11.7–15.7)
LYMPHOCYTES # BLD AUTO: 2.6 10E3/UL (ref 0.8–5.3)
LYMPHOCYTES NFR BLD AUTO: 26 %
MCH RBC QN AUTO: 30.1 PG (ref 26.5–33)
MCHC RBC AUTO-ENTMCNC: 33 G/DL (ref 31.5–36.5)
MCV RBC AUTO: 91 FL (ref 78–100)
MONOCYTES # BLD AUTO: 0.8 10E3/UL (ref 0–1.3)
MONOCYTES NFR BLD AUTO: 8 %
NEUTROPHILS # BLD AUTO: 6.5 10E3/UL (ref 1.6–8.3)
NEUTROPHILS NFR BLD AUTO: 65 %
PLATELET # BLD AUTO: 243 10E3/UL (ref 150–450)
RBC # BLD AUTO: 4.91 10E6/UL (ref 3.8–5.2)
WBC # BLD AUTO: 10.1 10E3/UL (ref 4–11)

## 2022-01-31 PROCEDURE — 85025 COMPLETE CBC W/AUTO DIFF WBC: CPT

## 2022-01-31 PROCEDURE — 85652 RBC SED RATE AUTOMATED: CPT

## 2022-01-31 PROCEDURE — 36415 COLL VENOUS BLD VENIPUNCTURE: CPT

## 2022-01-31 PROCEDURE — 86140 C-REACTIVE PROTEIN: CPT

## 2022-02-02 LAB
PATH REPORT.COMMENTS IMP SPEC: NORMAL
PATH REPORT.FINAL DX SPEC: NORMAL
PATH REPORT.GROSS SPEC: NORMAL
PATH REPORT.MICROSCOPIC SPEC OTHER STN: NORMAL
PATH REPORT.RELEVANT HX SPEC: NORMAL

## 2022-02-04 ENCOUNTER — OFFICE VISIT (OUTPATIENT)
Dept: RHEUMATOLOGY | Facility: CLINIC | Age: 77
End: 2022-02-04
Payer: COMMERCIAL

## 2022-02-04 ENCOUNTER — ALLIED HEALTH/NURSE VISIT (OUTPATIENT)
Dept: DERMATOLOGY | Facility: CLINIC | Age: 77
End: 2022-02-04
Payer: COMMERCIAL

## 2022-02-04 VITALS
SYSTOLIC BLOOD PRESSURE: 126 MMHG | BODY MASS INDEX: 19.91 KG/M2 | HEART RATE: 69 BPM | DIASTOLIC BLOOD PRESSURE: 69 MMHG | TEMPERATURE: 97.3 F | WEIGHT: 116 LBS | OXYGEN SATURATION: 98 %

## 2022-02-04 DIAGNOSIS — M31.30 GRANULOMATOSIS WITH POLYANGIITIS WITHOUT RENAL INVOLVEMENT (H): ICD-10-CM

## 2022-02-04 DIAGNOSIS — M85.89 OSTEOPENIA OF MULTIPLE SITES: Primary | ICD-10-CM

## 2022-02-04 DIAGNOSIS — Z48.02 VISIT FOR SUTURE REMOVAL: Primary | ICD-10-CM

## 2022-02-04 PROCEDURE — 99213 OFFICE O/P EST LOW 20 MIN: CPT | Performed by: INTERNAL MEDICINE

## 2022-02-04 PROCEDURE — 99207 PR NO CHARGE NURSE ONLY: CPT | Performed by: DERMATOLOGY

## 2022-02-04 ASSESSMENT — PAIN SCALES - GENERAL: PAINLEVEL: NO PAIN (0)

## 2022-02-04 NOTE — NURSING NOTE
Nury Cárdenas's goals for this visit include:   Chief Complaint   Patient presents with     Suture Removal     on left leg       She requests these members of her care team be copied on today's visit information: =    PCP: Phil Abbott    Referring Provider:  No referring provider defined for this encounter.    There were no vitals taken for this visit.    Do you need any medication refills at today's visit? No    Virginia Del Rio LPN

## 2022-02-04 NOTE — PROGRESS NOTES
Nury Cárdenas comes into clinic today at the request of Dr. Cole Ordering Provider for Suture removal.    Pat came in today for suture removal x1 on her left thigh. Suture was clean, dry, and intact. One suture was clipped out. No bleeding noted. Patient denied pain or tenderness. Site was dressed with bandage and patient left the clinic in good condition.     This service provided today was under the supervising provider of the day Dr. May, who was available if needed.    Virginia Del Rio LPN

## 2022-02-04 NOTE — PROGRESS NOTES
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 Course complicated by severe upper airway inflammation, Wegener's lung and destructive/erosive joint disease. Treated initially with rituximab 375 mg/m2 x4 and high dose corticsteroids, and most recently with methotrexate.  No history of relapse. Prednisone taper complete on April 1st, 2017. No recent methotrexate. Alendronate therapy complicated by LE pain.    She reports new onset of hives all over her body, including face, torso and extremities. Treated with prednisone and she she is now weaned off.    Her joints are good today. She has stable MCDANIEL. She can have occasional night time palpitations.  She has frequent sinus infections and sinus drainage. No history of nasal irrigation or corticosteroid, and no recent antihistamines.  Energy is good today.    PMI:  Medical-invasive pulmonary aspirgillosis, GPA, osteoarthritis, DVT with pulmonary embolism, osteoporosis with vertebral fracture (T = -2.0 2-2021), GERD, hyperlipidemia, +Tb exposure, nephrolithiasis, retinal tear, cataract, COVID-19, sensorineural hearing loss  Surgical-lung biopsy, appendectomy, eye surgery, tonsillectomy, cone biopsy, right wrist synovectomy  Injuries-left wrist fracture x2, left 5th toe fracture, vertebral fracture    SH:  Lives at home alone. Former smoker. No EtOH. Tuberculosis exposure as a child. Ambidextrous.    FH:  Son with mitochondrial myopathy  Daughter with palpitations  Sibs dead with colon and anal cancer  Father dead with lung cancer  Mother dead with emphysema and osteoporosis    PMSF history personally obtained and updated by me this visit in the clinic.    ROS:  +as above.  +allergies to fosamax amoxicillin, augmentin, erythromycin, zithromax, nickel, codeine, adhesive tape  Remainder of the 14 point ROS obtained and found negative.    Physical Exam:  Constitutional: WD-WN-WG cooperative  Eyes: nl EOM, PERRLA, vision, conjunctiva,  sclera  ENT: nl external ears, nose, hearing, lips, teeth, gums, throat  Neck: no mass or thyroid enlargement  Resp: lungs clear to auscultation, nl to palpation, nl effort  CV: RRR, no murmurs, rubs or gallops, no edema  GI: no ABD mass or tenderness, no HSM  : not tested  Lymph: no cervical or epitrochlear nodes  MS: +Hand wasting; right wrist with no flexion or extension; left wrist extension to 45 degrees; 1st CMC joint squaring bilaterally with some thumb laxity; right neck rotation 45 degrees right and 45 degrees left with head forward position and minimal neck extension; All other TMJ, neck, shoulder, elbow, wrist, hand, spine, hip, knee, ankle, and foot joints were examined and otherwise found normal. Normal  strength. +severe kyphosis. Normal tuck and prayer.  Skin: no nail pitting, alopecia; +indurated raised tender nodule medial to the left tibia  Neuro: nl cranial nerves, strength, sensation, DTRs.   Psych: nl judgement, orientation, memory, affect.    Laboratory:    Component      Latest Ref Rng & Units 1/31/2022   WBC      4.0 - 11.0 10e3/uL 10.1   RBC Count      3.80 - 5.20 10e6/uL 4.91   Hemoglobin      11.7 - 15.7 g/dL 14.8   Hematocrit      35.0 - 47.0 % 44.9   MCV      78 - 100 fL 91   MCH      26.5 - 33.0 pg 30.1   MCHC      31.5 - 36.5 g/dL 33.0   RDW      10.0 - 15.0 % 14.9   Platelet Count      150 - 450 10e3/uL 243   % Neutrophils      % 65   % Lymphocytes      % 26   % Monocytes      % 8   % Eosinophils      % 1   % Basophils      % 0   Absolute Neutrophils      1.6 - 8.3 10e3/uL 6.5   Absolute Lymphocytes      0.8 - 5.3 10e3/uL 2.6   Absolute Monocytes      0.0 - 1.3 10e3/uL 0.8   Absolute Eosinophils      0.0 - 0.7 10e3/uL 0.1   Absolute Basophils      0.0 - 0.2 10e3/uL 0.0   Sed Rate      0 - 30 mm/hr 4   CRP Inflammation      0.0 - 8.0 mg/L <2.9     Component      Latest Ref Rng & Units 1/23/2022   WBC      4.0 - 11.0 10e3/uL 17.6 (H)   RBC Count      3.80 - 5.20 10e6/uL 5.00    Hemoglobin      11.7 - 15.7 g/dL 15.2   Hematocrit      35.0 - 47.0 % 44.8   MCV      78 - 100 fL 90   MCH      26.5 - 33.0 pg 30.4   MCHC      31.5 - 36.5 g/dL 33.9   RDW      10.0 - 15.0 % 13.7   Platelet Count      150 - 450 10e3/uL 199   % Neutrophils      % 93   % Lymphocytes      % 4   % Monocytes      % 2   % Eosinophils      % 0   % Basophils      % 0   % Immature Granulocytes      % 1   NRBCs per 100 WBC      <1 /100 0   Absolute Neutrophils      1.6 - 8.3 10e3/uL 16.4 (H)   Absolute Lymphocytes      0.8 - 5.3 10e3/uL 0.6 (L)   Absolute Monocytes      0.0 - 1.3 10e3/uL 0.4   Absolute Eosinophils      0.0 - 0.7 10e3/uL 0.1   Absolute Basophils      0.0 - 0.2 10e3/uL 0.0   Absolute Immature Granulocytes      <=0.4 10e3/uL 0.1   Absolute NRBCs      10e3/uL 0.0   Sodium      133 - 144 mmol/L 134   Potassium      3.4 - 5.3 mmol/L 5.2   Chloride      94 - 109 mmol/L 106   Carbon Dioxide      20 - 32 mmol/L 22   Anion Gap      3 - 14 mmol/L 6   Urea Nitrogen      7 - 30 mg/dL 15   Creatinine      0.52 - 1.04 mg/dL 0.64   Calcium      8.5 - 10.1 mg/dL 8.8   Glucose      70 - 99 mg/dL 154 (H)   Alkaline Phosphatase      40 - 150 U/L 60   AST      0 - 45 U/L 32   ALT      0 - 50 U/L 18   Protein Total      6.8 - 8.8 g/dL 5.8 (L)   Albumin      3.4 - 5.0 g/dL 2.7 (L)   Bilirubin Total      0.2 - 1.3 mg/dL 0.8   GFR Estimate      >60 mL/min/1.73m2 >90   Color Urine      Colorless, Straw, Light Yellow, Yellow    Appearance Urine      Clear    Glucose Urine      Negative mg/dL    Bilirubin Urine      Negative    Ketones Urine      Negative mg/dL    Specific Gravity Urine      1.003 - 1.035    Blood Urine      Negative    pH Urine      5.0 - 7.0    Protein Albumin Urine      Negative mg/dL    Urobilinogen Urine      0.2, 1.0 E.U./dL    Nitrite Urine      Negative    Leukocyte Esterase Urine      Negative    RBC Urine      0-2 /HPF /HPF    WBC Urine      0-5 /HPF /HPF    Squamous Epithelial /LPF Urine      None Seen /LPF     Neutrophil Cytoplasmic Antibody      <1:10 1:10 (H)   Neutrophil Cytoplasmic Antibody Pattern       Cytoplasmic ANCA (c-ANCA) staining pattern observed and confirmed on formalin-fixed neutrophils.   Proteinase 3 Erika IgG Instrument Value      <2.0 U/mL 27.0 (H)   Proteinase 3 Antibody IgG      Negative Positive (A)   SARS CoV2 PCR      Negative, Testing sent to reference lab. Results will be returned via unsolicited result Negative   CRP Inflammation      0.0 - 8.0 mg/L 23.0 (H)   Complement C4      13 - 39 mg/dL 19   Hemoglobin A1C      0.0 - 5.6 % 5.5   Complement C3      81 - 157 mg/dL 112        3 Result Notes    Component  Resulting Agency   Case Report   Surgical Pathology Report                         Case: [id number]                                   Authorizing Provider:  Jocelyne Cole MD       Collected:           01/25/2022 01:35 PM           Ordering Location:     [facility]   Received:            01/25/2022 04:50 PM                                  [place]                                                                   Pathologist:           Isidro Ortiz MD                                                        Specimen:    Skin, left medial thigh                                                                    Final Diagnosis   A(1). Left medial thigh:  - Urticarial dermatitis - (see comment and description)   Electronically signed by Isidro Ortiz MD on 2/2/2022 at  4:12 PM   Comment  [facility]   In this specimen, features of urticaria predominate, given the superficial dermal edema and perivascular neutrophils and eosinophils. There is no evidence of vasculitis, despite the clinical appearance of retiform purpura. If there is persistent concern for granulomatosis with polyangiitis, additional sampling may be of benefit.   Clinical Information  [facility]   The patient is a 76 year old female.   Gross Description  [facility]   A(1). Skin, left medial thigh:  The  "specimen is received in formalin with proper patient identification, labeled \"L medial thigh\" and the specimen consists of a single, unoriented skin punch biopsy measuring 0.4 cm in diameter and is excised to a depth of 0.3 cm.  The skin surface displays no gross lesions.  It is bisected and entirely submitted in cassette A1.      Microscopic Description  [facility]   The specimen exhibits a superficial perivascular dermatitis with lymphocytes and lesser numbers of eosinophils with mild associated dermal edema. Neutrophils are noted within vascular lumens.    Performing Labs  [facility]   The technical component of this testing was completed at [facility]              Specimen Collected: 01/25/22  1:35 PM Last Resulted: 02/02/22  4:12 PM       Order Details     View Encounter     Lab and Collection Details     Routing               Impression:    PR3-associated GPA-with history of lung involvement. Recent post COVID increase in her ANCA serologies, now resolving. Today I find no signs of GPA relapse and no indication for immunosuppression.    Urticaria-appears unrelated to the GPA and now resolved.    Osteoporosis-stable osteopenia by DEXA at present on holiday from bisphosphonate.    Plan RTC in 6-12 months.    A total of 24 minutes was spent in face to face patient interaction and chart review on the day of service.      "

## 2022-02-04 NOTE — NURSING NOTE
"Nury Cárdenas's goals for this visit include:   Chief Complaint   Patient presents with     RECHECK     6 month follow-up, labs completed 1/28/22       PCP: Phil Abbott    Referring Provider:  No referring provider defined for this encounter.      Initial /69 (BP Location: Left arm, Patient Position: Sitting)   Pulse 69   Temp 97.3  F (36.3  C) (Oral)   Wt 52.6 kg (116 lb)   SpO2 98%   Breastfeeding No   BMI 19.91 kg/m   Estimated body mass index is 19.91 kg/m  as calculated from the following:    Height as of 1/23/22: 1.626 m (5' 4\").    Weight as of this encounter: 52.6 kg (116 lb).    Medication Reconciliation: complete    Do you need any medication refills at today's visit? none      SUKHJINDER Xiong [facility]   "

## 2022-02-07 LAB — C1Q AB SER-ACNC: 4 UNITS

## 2022-03-14 ENCOUNTER — DOCUMENTATION ONLY (OUTPATIENT)
Dept: OPHTHALMOLOGY | Facility: CLINIC | Age: 77
End: 2022-03-14
Payer: COMMERCIAL

## 2022-03-29 ENCOUNTER — OFFICE VISIT (OUTPATIENT)
Dept: FAMILY MEDICINE | Facility: CLINIC | Age: 77
End: 2022-03-29
Payer: COMMERCIAL

## 2022-03-29 VITALS
HEIGHT: 64 IN | BODY MASS INDEX: 20.38 KG/M2 | TEMPERATURE: 97.8 F | RESPIRATION RATE: 17 BRPM | HEART RATE: 72 BPM | SYSTOLIC BLOOD PRESSURE: 130 MMHG | OXYGEN SATURATION: 97 % | WEIGHT: 119.4 LBS | DIASTOLIC BLOOD PRESSURE: 80 MMHG

## 2022-03-29 DIAGNOSIS — J44.9 CHRONIC OBSTRUCTIVE PULMONARY DISEASE, UNSPECIFIED COPD TYPE (H): ICD-10-CM

## 2022-03-29 DIAGNOSIS — T78.40XA ALLERGIC REACTION, INITIAL ENCOUNTER: ICD-10-CM

## 2022-03-29 DIAGNOSIS — I26.99 ACUTE PULMONARY EMBOLISM, UNSPECIFIED PULMONARY EMBOLISM TYPE, UNSPECIFIED WHETHER ACUTE COR PULMONALE PRESENT (H): ICD-10-CM

## 2022-03-29 DIAGNOSIS — R09.89 CHEST CONGESTION: Primary | ICD-10-CM

## 2022-03-29 DIAGNOSIS — D84.9 IMMUNOSUPPRESSION (H): ICD-10-CM

## 2022-03-29 DIAGNOSIS — R09.81 SINUS CONGESTION: ICD-10-CM

## 2022-03-29 DIAGNOSIS — G63 POLYNEUROPATHY ASSOCIATED WITH UNDERLYING DISEASE (H): ICD-10-CM

## 2022-03-29 PROCEDURE — 99213 OFFICE O/P EST LOW 20 MIN: CPT | Performed by: FAMILY MEDICINE

## 2022-03-29 RX ORDER — LORATADINE 10 MG/1
10 TABLET ORAL DAILY
Qty: 90 TABLET | Refills: 0 | Status: SHIPPED | OUTPATIENT
Start: 2022-03-29 | End: 2022-08-25

## 2022-03-29 ASSESSMENT — PAIN SCALES - GENERAL: PAINLEVEL: NO PAIN (0)

## 2022-03-29 NOTE — PROGRESS NOTES
Assessment & Plan     Chest congestion/Sinus congestion  Likely from sinusitis, she is already on antibiotic and improving a little.  Had Covid in the past most likely having residual symptoms.  We will continue the current plan    Allergic reaction, initial encounter  Discussed doing an oral allergy medication  - loratadine (CLARITIN) 10 MG tablet; Take 1 tablet (10 mg) by mouth daily    Polyneuropathy associated with underlying disease (H)   -    due to granulomatosis.    Immunosuppression (H)    -Due to Wegener's granulomatosis as well as being on steroids    Acute pulmonary embolism, unspecified pulmonary embolism type, unspecified whether acute cor pulmonale present (H)    -resolved at this time    Chronic obstructive pulmonary disease, unspecified COPD type (H)    -stable.    Lump in Neck:  Consistent with mild adenopathy, most likely reactive    Return in about 1 week (around 4/5/2022) for call with update.    Phil Abbott MD  [facility] [place]     Chest congestion; SOB joseph when going up and down stairs. Been going on since had Covid,  No wheezing. Has occasional leg swelling of legs. No orthopnea    Subjective   Bria is a 77 year old who presents for the following health issues  accompanied by her friend.    History of Present Illness       Reason for visit:  To check the lump on the left side of my neck.  Also I think I have a cold or sinus infection. Runny nose headache jaws hurt slight cough  Symptom onset:  3-7 days ago  Symptoms include:  Lump is still on the left side of my neck.  Also have runny nose, headache, jaws hurt slight cough.  Symptom intensity:  Moderate  Had these symptoms before:  Yes  Has tried/received treatment for these symptoms:  Yes  Previous treatment was successful:  Yes  Prior treatment description:  Antibiotic  What makes it worse:  Not that I know of  What makes it better:  Caden Barkley DM    She eats 0-1 servings of fruits and vegetables  "daily.She consumes 1 sweetened beverage(s) daily.She exercises with enough effort to increase her heart rate 9 or less minutes per day.  She exercises with enough effort to increase her heart rate 3 or less days per week.   She is taking medications regularly.     Review of Systems   Constitutional, cardiovascular, gi and gu systems are negative, except as otherwise noted.      Objective    /80   Pulse 72   Temp 97.8  F (36.6  C) (Oral)   Resp 17   Ht 1.62 m (5' 3.78\")   Wt 54.2 kg (119 lb 6.4 oz)   SpO2 97%   BMI 20.64 kg/m    Body mass index is 20.64 kg/m .  Physical Exam   GENERAL: Frail looking elderly lady, alert and no distress  NECK: Mild nontender left submandibular adenopathy and thyroid normal to palpation  RESP: lungs clear to auscultation - no rales, rhonchi or wheezes  CV: regular rate and rhythm, no murmur, click or rub, no peripheral edema          "

## 2022-03-30 ENCOUNTER — OFFICE VISIT (OUTPATIENT)
Dept: OPHTHALMOLOGY | Facility: CLINIC | Age: 77
End: 2022-03-30
Payer: COMMERCIAL

## 2022-03-30 DIAGNOSIS — Z98.890 HISTORY OF STRABISMUS SURGERY: ICD-10-CM

## 2022-03-30 DIAGNOSIS — Z98.890 HISTORY OF VITRECTOMY: ICD-10-CM

## 2022-03-30 DIAGNOSIS — H52.4 PRESBYOPIA: ICD-10-CM

## 2022-03-30 DIAGNOSIS — Z96.1 PSEUDOPHAKIA: ICD-10-CM

## 2022-03-30 DIAGNOSIS — Z01.01 ENCOUNTER FOR EXAMINATION OF EYES AND VISION WITH ABNORMAL FINDINGS: ICD-10-CM

## 2022-03-30 DIAGNOSIS — H02.9 LESION OF RIGHT EYELID: ICD-10-CM

## 2022-03-30 DIAGNOSIS — H35.342 MACULAR HOLE OF LEFT EYE: ICD-10-CM

## 2022-03-30 DIAGNOSIS — H50.00 ESODEVIATION: ICD-10-CM

## 2022-03-30 DIAGNOSIS — H25.811 COMBINED FORMS OF AGE-RELATED CATARACT OF RIGHT EYE: Primary | ICD-10-CM

## 2022-03-30 PROCEDURE — 92014 COMPRE OPH EXAM EST PT 1/>: CPT | Performed by: OPHTHALMOLOGY

## 2022-03-30 PROCEDURE — 92015 DETERMINE REFRACTIVE STATE: CPT | Performed by: OPHTHALMOLOGY

## 2022-03-30 ASSESSMENT — VISUAL ACUITY
OS_CC: J1+
OD_CC: 20/40
OD_CC: J1
OS_CC+: -1
OS_CC: 20/30
METHOD: SNELLEN - LINEAR
CORRECTION_TYPE: GLASSES

## 2022-03-30 ASSESSMENT — REFRACTION_WEARINGRX
OS_SPHERE: -0.75
OD_CYLINDER: +1.00
OS_HPRISM: 4.0
OD_ADD: +2.75
OD_HPRISM: 4.0
OS_ADD: +2.75
OD_SPHERE: +0.75
OD_HBASE: OUT
OS_HBASE: OUT
OD_AXIS: 151
OS_CYLINDER: +2.25
OS_AXIS: 030

## 2022-03-30 ASSESSMENT — REFRACTION_MANIFEST
OS_CYLINDER: +0.75
OD_CYLINDER: +0.50
OS_AXIS: 031
OD_AXIS: 124
OS_SPHERE: -0.50
OD_SPHERE: +0.25
OS_ADD: +3.00
OD_ADD: +3.00

## 2022-03-30 ASSESSMENT — REFRACTION_FINALRX
OS_HBASE: OUT
OD_HPRISM: 4.0
OD_HBASE: OUT
OS_HPRISM: 4.0

## 2022-03-30 ASSESSMENT — CUP TO DISC RATIO
OS_RATIO: 0.4
OD_RATIO: 0.3

## 2022-03-30 ASSESSMENT — CONF VISUAL FIELD
OD_NORMAL: 1
OS_NORMAL: 1

## 2022-03-30 ASSESSMENT — SLIT LAMP EXAM - LIDS: COMMENTS: 1+ DERMATOCHALASIS - UPPER LID, COLLARETTES

## 2022-03-30 ASSESSMENT — EXTERNAL EXAM - RIGHT EYE: OD_EXAM: 1+ BROW PTOSIS, PROLAPSED FAT PADS: UPPER, LOWER

## 2022-03-30 ASSESSMENT — TONOMETRY
IOP_METHOD: APPLANATION
OD_IOP_MMHG: 12
OS_IOP_MMHG: 12

## 2022-03-30 ASSESSMENT — EXTERNAL EXAM - LEFT EYE: OS_EXAM: 1+ BROW PTOSIS, PROLAPSED FAT PADS: UPPER, LOWER

## 2022-03-30 NOTE — PATIENT INSTRUCTIONS
"Glasses prescription given - optional  Use artificial tears up to four times a day (like Refresh Optive, Systane Balance, TheraTears, or generic artificial tears are ok. Avoid \"get the red out\" drops).   Possible posterior vitreous detachment (sudden onset large floater and/or flashing lights) right eye discussed.  Could schedule procedure at the end of the day anytime for cyst removal.   Call in November 2022 for an appointment in March 2023 for Complete Exam    Dr. Mcleod [phone]   "

## 2022-03-30 NOTE — PROGRESS NOTES
" Current Eye Medications:  none     Subjective:  Complete exam: patient glasses are a year old but she states her vision is blurry distance visual acuity >near visual acuity. Was seen by Dr. Petersen 3-8-22 and will return in one year. She claims she was too frightened to come in and have the lesion by her right eye removed last year. Now she says the area is a little tender.    No diplopia with glasses; has without.     Objective:  See Ophthalmology Exam.       Assessment:  Stable eye exam.  Stable appearance of lateral canthal cyst right eye.      ICD-10-CM    1. Combined forms of age-related cataract, mild-mod, of right eye  H25.811    2. Pseudophakia, Yag Caps, os  Z96.1    3. Hx of macular hole of left eye  H35.342    4. Lesion of right eyelid  H02.9    5. History of strabismus surgery  Z98.890    6. History of vitrectomy - macular hole, os (MVE)  Z98.890    7. Esodeviation  H50.00    8. Encounter for examination of eyes and vision with abnormal findings  Z01.01    9. Presbyopia  H52.4         Plan:  Glasses prescription given - optional  Use artificial tears up to four times a day (like Refresh Optive, Systane Balance, TheraTears, or generic artificial tears are ok. Avoid \"get the red out\" drops).   Possible posterior vitreous detachment (sudden onset large floater and/or flashing lights) right eye discussed.  Could schedule procedure at the end of the day anytime for cyst removal (Lido, snip, caut, sut, submit).   Call in November 2022 for an appointment in March 2023 for Complete Exam    Dr. Mcleod [phone]        "

## 2022-03-30 NOTE — LETTER
"    3/30/2022         RE: Nury Cárdenas  [address] [address]        Dear Colleague,    Thank you for referring your patient, Nury Cárdenas, to the [facility]. Please see a copy of my visit note below.     Current Eye Medications:  none     Subjective:  Complete exam: patient glasses are a year old but she states her vision is blurry distance visual acuity >near visual acuity. Was seen by Dr. Petersen 3-8-22 and will return in one year. She claims she was too frightened to come in and have the lesion by her right eye removed last year. Now she says the area is a little tender.    No diplopia with glasses; has without.     Objective:  See Ophthalmology Exam.       Assessment:  Stable eye exam.  Stable appearance of lateral canthal cyst right eye.      ICD-10-CM    1. Combined forms of age-related cataract, mild-mod, of right eye  H25.811    2. Pseudophakia, Yag Caps, os  Z96.1    3. Hx of macular hole of left eye  H35.342    4. Lesion of right eyelid  H02.9    5. History of strabismus surgery  Z98.890    6. History of vitrectomy - macular hole, os (MVE)  Z98.890    7. Esodeviation  H50.00    8. Encounter for examination of eyes and vision with abnormal findings  Z01.01    9. Presbyopia  H52.4         Plan:  Glasses prescription given - optional  Use artificial tears up to four times a day (like Refresh Optive, Systane Balance, TheraTears, or generic artificial tears are ok. Avoid \"get the red out\" drops).   Possible posterior vitreous detachment (sudden onset large floater and/or flashing lights) right eye discussed.  Could schedule procedure at the end of the day anytime for cyst removal (Lido, snip, caut, sut, submit).   Call in November 2022 for an appointment in March 2023 for Complete Exam    Dr. Mcleod [phone]            Again, thank you for allowing me to participate in the care of your patient.        Sincerely,        Wiley Mcleod MD    "

## 2022-04-03 PROBLEM — H50.00 ESODEVIATION: Status: ACTIVE | Noted: 2022-04-03

## 2022-04-03 PROBLEM — Z98.890 HISTORY OF STRABISMUS SURGERY: Status: ACTIVE | Noted: 2022-04-03

## 2022-04-04 ENCOUNTER — NURSE TRIAGE (OUTPATIENT)
Dept: NURSING | Facility: CLINIC | Age: 77
End: 2022-04-04
Payer: COMMERCIAL

## 2022-04-04 ENCOUNTER — HOSPITAL ENCOUNTER (EMERGENCY)
Facility: HOSPITAL | Age: 77
Discharge: HOME | End: 2022-04-05
Attending: EMERGENCY MEDICINE | Admitting: EMERGENCY MEDICINE
Payer: COMMERCIAL

## 2022-04-04 ENCOUNTER — MYC MEDICAL ADVICE (OUTPATIENT)
Dept: FAMILY MEDICINE | Facility: CLINIC | Age: 77
End: 2022-04-04
Payer: COMMERCIAL

## 2022-04-04 ENCOUNTER — APPOINTMENT (OUTPATIENT)
Dept: CT IMAGING | Facility: HOSPITAL | Age: 77
End: 2022-04-04
Attending: EMERGENCY MEDICINE
Payer: COMMERCIAL

## 2022-04-04 ENCOUNTER — APPOINTMENT (OUTPATIENT)
Dept: RADIOLOGY | Facility: HOSPITAL | Age: 77
End: 2022-04-04
Attending: EMERGENCY MEDICINE
Payer: COMMERCIAL

## 2022-04-04 VITALS
RESPIRATION RATE: 18 BRPM | HEART RATE: 77 BPM | WEIGHT: 125 LBS | DIASTOLIC BLOOD PRESSURE: 78 MMHG | OXYGEN SATURATION: 95 % | TEMPERATURE: 97.3 F | SYSTOLIC BLOOD PRESSURE: 146 MMHG | BODY MASS INDEX: 21.6 KG/M2

## 2022-04-04 DIAGNOSIS — R07.89 CHEST WALL PAIN: ICD-10-CM

## 2022-04-04 LAB
ALBUMIN SERPL-MCNC: 3.6 G/DL (ref 3.5–5)
ALP SERPL-CCNC: 90 U/L (ref 45–120)
ALT SERPL W P-5'-P-CCNC: 16 U/L (ref 0–45)
ANION GAP SERPL CALCULATED.3IONS-SCNC: 10 MMOL/L (ref 5–18)
APTT PPP: 27 SECONDS (ref 22–38)
AST SERPL W P-5'-P-CCNC: 17 U/L (ref 0–40)
BASOPHILS # BLD AUTO: 0.1 10E3/UL (ref 0–0.2)
BASOPHILS NFR BLD AUTO: 2 %
BILIRUB SERPL-MCNC: 0.3 MG/DL (ref 0–1)
BNP SERPL-MCNC: 54 PG/ML (ref 0–147)
BUN SERPL-MCNC: 14 MG/DL (ref 8–28)
CALCIUM SERPL-MCNC: 9.4 MG/DL (ref 8.5–10.5)
CHLORIDE BLD-SCNC: 107 MMOL/L (ref 98–107)
CO2 SERPL-SCNC: 25 MMOL/L (ref 22–31)
CREAT SERPL-MCNC: 0.77 MG/DL (ref 0.6–1.1)
D DIMER PPP FEU-MCNC: 0.67 UG/ML FEU (ref 0–0.5)
EOSINOPHIL # BLD AUTO: 0.1 10E3/UL (ref 0–0.7)
EOSINOPHIL NFR BLD AUTO: 2 %
ERYTHROCYTE [DISTWIDTH] IN BLOOD BY AUTOMATED COUNT: 13 % (ref 10–15)
GFR SERPL CREATININE-BSD FRML MDRD: 79 ML/MIN/1.73M2
GLUCOSE BLD-MCNC: 91 MG/DL (ref 70–125)
HCT VFR BLD AUTO: 43 % (ref 35–47)
HGB BLD-MCNC: 14.9 G/DL (ref 11.7–15.7)
IMM GRANULOCYTES # BLD: 0 10E3/UL
IMM GRANULOCYTES NFR BLD: 0 %
INR PPP: 0.94 (ref 0.9–1.15)
LYMPHOCYTES # BLD AUTO: 1.8 10E3/UL (ref 0.8–5.3)
LYMPHOCYTES NFR BLD AUTO: 30 %
MAGNESIUM SERPL-MCNC: 2.4 MG/DL (ref 1.8–2.6)
MCH RBC QN AUTO: 30.5 PG (ref 26.5–33)
MCHC RBC AUTO-ENTMCNC: 34.7 G/DL (ref 31.5–36.5)
MCV RBC AUTO: 88 FL (ref 78–100)
MONOCYTES # BLD AUTO: 0.6 10E3/UL (ref 0–1.3)
MONOCYTES NFR BLD AUTO: 9 %
NEUTROPHILS # BLD AUTO: 3.4 10E3/UL (ref 1.6–8.3)
NEUTROPHILS NFR BLD AUTO: 57 %
NRBC # BLD AUTO: 0 10E3/UL
NRBC BLD AUTO-RTO: 0 /100
PLATELET # BLD AUTO: 251 10E3/UL (ref 150–450)
POTASSIUM BLD-SCNC: 4.2 MMOL/L (ref 3.5–5)
PROT SERPL-MCNC: 6.5 G/DL (ref 6–8)
RBC # BLD AUTO: 4.88 10E6/UL (ref 3.8–5.2)
SODIUM SERPL-SCNC: 142 MMOL/L (ref 136–145)
TROPONIN I SERPL-MCNC: <0.01 NG/ML (ref 0–0.29)
WBC # BLD AUTO: 6 10E3/UL (ref 4–11)

## 2022-04-04 PROCEDURE — 84484 ASSAY OF TROPONIN QUANT: CPT | Performed by: EMERGENCY MEDICINE

## 2022-04-04 PROCEDURE — 85610 PROTHROMBIN TIME: CPT | Performed by: EMERGENCY MEDICINE

## 2022-04-04 PROCEDURE — 99285 EMERGENCY DEPT VISIT HI MDM: CPT | Mod: 25

## 2022-04-04 PROCEDURE — 71275 CT ANGIOGRAPHY CHEST: CPT

## 2022-04-04 PROCEDURE — 85004 AUTOMATED DIFF WBC COUNT: CPT | Performed by: EMERGENCY MEDICINE

## 2022-04-04 PROCEDURE — 250N000011 HC RX IP 250 OP 636: Performed by: EMERGENCY MEDICINE

## 2022-04-04 PROCEDURE — 93005 ELECTROCARDIOGRAM TRACING: CPT | Performed by: EMERGENCY MEDICINE

## 2022-04-04 PROCEDURE — 36415 COLL VENOUS BLD VENIPUNCTURE: CPT | Performed by: EMERGENCY MEDICINE

## 2022-04-04 PROCEDURE — 83880 ASSAY OF NATRIURETIC PEPTIDE: CPT | Mod: GZ | Performed by: EMERGENCY MEDICINE

## 2022-04-04 PROCEDURE — 85379 FIBRIN DEGRADATION QUANT: CPT | Performed by: EMERGENCY MEDICINE

## 2022-04-04 PROCEDURE — 85730 THROMBOPLASTIN TIME PARTIAL: CPT | Performed by: EMERGENCY MEDICINE

## 2022-04-04 PROCEDURE — 71045 X-RAY EXAM CHEST 1 VIEW: CPT

## 2022-04-04 PROCEDURE — 83735 ASSAY OF MAGNESIUM: CPT | Performed by: EMERGENCY MEDICINE

## 2022-04-04 PROCEDURE — 80053 COMPREHEN METABOLIC PANEL: CPT | Performed by: EMERGENCY MEDICINE

## 2022-04-04 RX ORDER — IOPAMIDOL 755 MG/ML
100 INJECTION, SOLUTION INTRAVASCULAR ONCE
Status: COMPLETED | OUTPATIENT
Start: 2022-04-04 | End: 2022-04-04

## 2022-04-04 RX ADMIN — IOPAMIDOL 100 ML: 755 INJECTION, SOLUTION INTRAVENOUS at 23:18

## 2022-04-04 NOTE — TELEPHONE ENCOUNTER
Left voicemail for patient to call back - will need triage when returns call for chest pain.    Vennli message also sent to patient.    KOMAL ZieglerN RN  [facility], [place]

## 2022-04-04 NOTE — TELEPHONE ENCOUNTER
"Triage Call:    Caller: Patient    Completing triage per directions after patient sent    Having chest pains on left side by bra line, is getting a shooting pain and sometimes in the left middle of chest.  They are \"real quick and have been going on for awhile, like a twinge\".  It can happen a few times in a row and then may not come again for days or weeks.    Today it has been happening more than usual.  Has a history of blood clots    Advised patient that she needs to be seen in the ED.     Hieu verbalized understanding about recommendations and disposition.  Encouraged to call back with any further questions.      Katharine Joe RN on 4/4/2022 at 6:10 PM      COVID 19 Nurse Triage Plan/Patient Instructions    Please be aware that novel coronavirus (COVID-19) may be circulating in the community. If you develop symptoms such as fever, cough, or SOB or if you have concerns about the presence of another infection including coronavirus (COVID-19), please contact your health care provider or visit [email or web address].     Disposition/Instructions    ED Visit recommended. Follow protocol based instructions.     Bring Your Own Device:  Please also bring your smart device(s) (smart phones, tablets, laptops) and their charging cables for your personal use and to communicate with your care team during your visit.    Thank you for taking steps to prevent the spread of this virus.  o Limit your contact with others.  o Wear a simple mask to cover your cough.  o Wash your hands well and often.    Resources    [facility]: About COVID-19: [email or web address]    CDC: What to Do If You're Sick: www.cdc.gov/coronavirus/2019-ncov/about/steps-when-sick.html    CDC: Ending Home Isolation: www.cdc.gov/coronavirus/2019-ncov/hcp/disposition-in-home-patients.html     CDC: Caring for Someone: www.cdc.gov/coronavirus/2019-ncov/if-you-are-sick/care-for-someone.html     NICKIE: Interim Guidance for Hospital Discharge to " "Home: www.health.[facility].[place]./diseases/coronavirus/hcp/hospdischarge.pdf    [facility] clinical trials (COVID-19 research studies): [email or web address]/umn-clinical-trials     Below are the COVID-19 hotlines at the [place] Department of Health ([facility]). Interpreters are available.   o For health questions: Call 420-827-5317 or 1-346.720.9585 (7 a.m. to 7 p.m.)  o For questions about schools and childcare: Call 865-945-5542 or 1-205.902.2283 (7 a.m. to 7 p.m.)                   Reason for Disposition    History of prior \"blood clot\" in leg or lungs (i.e., deep vein thrombosis, pulmonary embolism)    Additional Information    Negative: SEVERE difficulty breathing (e.g., struggling for each breath, speaks in single words)    Negative: Difficult to awaken or acting confused (e.g., disoriented, slurred speech)    Negative: Shock suspected (e.g., cold/pale/clammy skin, too weak to stand, low BP, rapid pulse)    Negative: Passed out (i.e., fainted, collapsed and was not responding)    Negative: [1] Chest pain lasts > 5 minutes AND [2] age > 44    Negative: [1] Chest pain lasts > 5 minutes AND [2] age > 30 AND [3] one or more cardiac risk factors (e.g., diabetes, high blood pressure, high cholesterol, smoker, or strong family history of heart disease)    Negative: [1] Chest pain lasts > 5 minutes AND [2] history of heart disease (i.e., angina, heart attack, heart failure, bypass surgery, takes nitroglycerin)    Negative: [1] Chest pain lasts > 5 minutes AND [2] described as crushing, pressure-like, or heavy    Negative: Heart beating < 50 beats per minute OR > 140 beats per minute    Negative: Visible sweat on face or sweat dripping down face    Negative: Sounds like a life-threatening emergency to the triager    Negative: Followed a chest injury    Negative: SEVERE chest pain    Negative: [1] Chest pain (or \"angina\") comes and goes AND [2] is happening more often (increasing in frequency) or getting worse " (increasing in severity) (Exception: chest pains that last only a few seconds)    Negative: Pain also in shoulder(s) or arm(s) or jaw (Exception: pain is clearly made worse by movement)    Negative: Difficulty breathing    Negative: Dizziness or lightheadedness    Negative: Coughing up blood    Negative: Cocaine use within last 3 days    Negative: Major surgery in past month    Negative: Hip or leg fracture (broken bone) in past month (or had cast on leg or ankle in past month)    Negative: Illness requiring prolonged bedrest in past month (e.g., immobilization, long hospital stay)    Negative: Long-distance travel in past month (e.g., car, bus, train, plane; with trip lasting 6 or more hours)    Protocols used: CHEST PAIN-A-AH

## 2022-04-04 NOTE — TELEPHONE ENCOUNTER
Patient calling in and completed in triage encounter.    Katharine Joe RN on 4/4/2022 at 6:08 PM

## 2022-04-05 NOTE — ED NOTES
AAOx4, 96% oxygen saturation on RA, no increased work of breathing, only complaint is intermittent pain in right chest. No c/o pain at this time.

## 2022-04-05 NOTE — DISCHARGE INSTRUCTIONS
Ice or heat off-and-on to sore areas can help with pain.  Over-the-counter Tylenol or ibuprofen as tolerated can help with pain.  Follow-up with your doctor or clinic in the next week as needed.  See them sooner if worse or problems.

## 2022-04-05 NOTE — ED TRIAGE NOTES
Pt arrives as referral from pcp with concerns of possible PE. Pt ahs hx of bilateral PE and states they have had on/off respiratory/ congestion issues since January. Pt has developed pleuritic pain and was told to come to ed for work up. Pt was scheduled to have ct of chest this week.

## 2022-04-05 NOTE — TELEPHONE ENCOUNTER
Patient was triaged and seen in ED yesterday 4/4/2022 to rule out PE.  PE workup was negative    Bruce Law RN  [facility]

## 2022-04-05 NOTE — ED PROVIDER NOTES
EMERGENCY DEPARTMENT ENCOUNTER      NAME: Nury Cárdenas  AGE: 77 year old female  YOB: 1945  MRN: [id number]  EVALUATION DATE & TIME: 4/4/2022 10:33 PM    PCP: Phil Abbott    ED PROVIDER: Alvaro Ritchie M.D.      Chief Complaint   Patient presents with     Pleuritic Pain         FINAL IMPRESSION:  1.  Acute intermittent left rib pain.      ED COURSE & MEDICAL DECISION MAKING:    10:37 PM I met with the patient to gather history and to perform my initial exam. We discussed plans for the ED course, including diagnostic testing and treatment. PPE worn: cloth mask.  Patient notes onset of lateral left intermittent rib pain which she describes as a short sharp twinges.  History of pulmonary emboli 6 to 7 years ago bilaterally along with DVT.  No current anticoagulation.  Patient sent in for evaluation of possible PE.  Triage studies ordered showed negative chest x-ray, negative BNP troponin and magnesium.  Negative comprehensive metabolic profile.  CBC negative.  D-dimer elevated 0.67.  Coagulation functions ordered at this time as well as a chest CTA PE run.  Patient aware the plan and in agreement.  11:49 PM.  Studies all negative other than marginally elevated D-dimer.  Coagulation functions were normal.  Chest CTA negative study and no evidence for PE.  Patient is discharged to home.  It is suspected that her intermittent twinges are chest wall pain in ribs or muscles.  Patient in agreement with the plan.    Pertinent Labs & Imaging studies reviewed. (See chart for details)  77 year old female presents to the Emergency Department for evaluation of intermittent left rib pain.    At the conclusion of the encounter I discussed the results of all of the tests and the disposition. The questions were answered. The patient or family acknowledged understanding and was agreeable with the care plan.         MEDICATIONS GIVEN IN THE EMERGENCY:  Medications - No data to display    NEW PRESCRIPTIONS  STARTED AT TODAY'S ER VISIT  New Prescriptions    No medications on file          =================================================================    HPI    Patient information was obtained from: Patient     Use of : N/A        Nury Cárdenas is a 77 year old female with a pertinent history of COPD, CAD, PE, MVP, dyslipidemia, atypical pneumonia, strabismus, and osteopenia who presents to this ED by walk in for evaluation of pleuritic pain.  History of pulmonary emboli bilaterally 6 to 7 years ago as well as DVT.  No longer on anticoagulation.  Patient notes onset of left lateral pleuritic pain with intermittent sharp twinges.  Sent in by primary physician for evaluation of possible PE.    She does not identify any waxing or waning symptoms otherwise, exacerbating or alleviating features,associated symptoms except as mentioned. She denies any pain related complaints.    REVIEW OF SYSTEMS   Patient notes intermittent left rib pleuritic pain.  Denies any anterior chest pain, shortness of breath, back pain, hemoptysis, calf or leg pain.    PAST MEDICAL HISTORY:  Past Medical History:   Diagnosis Date     Amblyopia      Atypical pneumonia 6/14/2014     Atypical pneumonia      Atypical pneumonia      Atypical pneumonia      COPD (chronic obstructive pulmonary disease) (H) 7/20/2021     Coronary artery disease 1982    heart beat hard made me tired     Dyslipidemia      Fibroids      GERD (gastroesophageal reflux disease)      Granulomatosis with polyangiitis (Wegener's)      Hearing loss      Inflammatory arthritis     thumb     Migraines      MVP (mitral valve prolapse)     had an echo that was normal in 2007 she is on Inderal     Nonsenile cataract      Osteopenia      PE (pulmonary embolism) 10/2014    ? GPA associated, on warfarin      Strabismus      Synovitis of wrist 2009    right       PAST SURGICAL HISTORY:  Past Surgical History:   Procedure Laterality Date     ABDOMEN SURGERY      appendix out      APPENDECTOMY       BIOPSY  1972    cone biopsy     CATARACT IOL, RT/LT       CL AFF SURGICAL PATHOLOGY      cone biopsy 1975     COLONOSCOPY       COLONOSCOPY WITH CO2 INSUFFLATION N/A 12/20/2017    Procedure: COLONOSCOPY WITH CO2 INSUFFLATION;  Screening  BMI: 20.7  [facility]: [facility]  [phone]  [facility]  Referring: Milena  Patient get ill from [facility];  Surgeon: Duane, William Charles, MD;  Location: [facility] OR     EYE SURGERY      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     HC ESOPHAGOSCOPY, DIAGNOSTIC       LASER YAG CAPSULOTOMY      left eye     OPTICAL TRACKING SYSTEM BRONCHOSCOPY  6/19/2014    Procedure: OPTICAL TRACKING SYSTEM BRONCHOSCOPY;  Surgeon: Angel Kathleen MD;  Location:  GI     PHACOEMULSIFICATION WITH STANDARD INTRAOCULAR LENS IMPLANT  01/2018    left eye (SR)     SOFT TISSUE SURGERY      remove senovial fluid from right wrist     STRABISMUS SURGERY       SURGICAL HISTORY OF -   as a child    strabismus repair right eye     SURGICAL HISTORY OF -   8-2009    right wrist debridement     TONSILLECTOMY      as a child     TONSILLECTOMY & ADENOIDECTOMY       TUBAL LIGATION       VITRECTOMY PARSPLANA  01/2018    left eye - mac hole (MVE)           CURRENT MEDICATIONS:    aspirin-acetaminophen-caffeine (EXCEDRIN MIGRAINE) 250-250-65 MG tablet  calcium-magnesium (CALMAG) 500-250 MG TABS per tablet  cholecalciferol 1000 UNITS TABS  loratadine (CLARITIN) 10 MG tablet  propranolol (INDERAL) 20 MG tablet  Zinc Sulfate (ZINC 15 PO)        ALLERGIES:  Allergies   Allergen Reactions     Vancomycin Other (See Comments)     Red man's syndrom     Adhesive Tape      Rash itches     Amoxicillin      vomiting     Augmentin      vomiting     Codeine      vomiting     Erythromycin      vomiting     Nickel      itches rash     Sulfa Drugs Itching     Tegaderm Alginate Ag      LW Other1: -ALL MEDICATIONS MAKE PATIENT VIOLENTLY ILL; ADHESIVE BANDAGES; NICKEL     Zithromax [Azithromycin  Dihydrate]      Vomiting/ skin blisters       FAMILY HISTORY:  Family History   Problem Relation Age of Onset     Respiratory Mother         emphysema     Aneurysm Mother      Chronic Obstructive Pulmonary Disease Mother      Thyroid Disease Mother         ,     Osteoporosis Mother      Other Cancer Father      Cancer Father         lung cancer     Arthritis Maternal Grandmother         rheumatoid     Heart Disease Maternal Grandmother         unsure of details     Heart Disease Maternal Grandfather      Neurologic Disorder Paternal Grandmother         migraines     Alzheimer Disease Paternal Grandmother      Unknown/Adopted Paternal Grandfather      Cancer Sister         stage 4 anal cancer age 62 years     Colon Cancer Sister      Cancer - colorectal Brother      Colon Cancer Brother      Substance Abuse Sister      Anesthesia Reaction Daughter        SOCIAL HISTORY:   Social History     Socioeconomic History     Marital status:      Spouse name: None     Number of children: None     Years of education: None     Highest education level: None   Occupational History     None   Tobacco Use     Smoking status: Former Smoker     Packs/day: 2.00     Years: 20.00     Pack years: 40.00     Types: Cigarettes     Start date: 1961     Quit date: 1983     Years since quittin.7     Smokeless tobacco: Never Used   Substance and Sexual Activity     Alcohol use: No     Alcohol/week: 0.0 standard drinks     Drug use: No     Sexual activity: Never   Other Topics Concern     Parent/sibling w/ CABG, MI or angioplasty before 65F 55M? No   Social History Narrative     None     Social Determinants of Health     Financial Resource Strain: Not on file   Food Insecurity: Not on file   Transportation Needs: Not on file   Physical Activity: Not on file   Stress: Not on file   Social Connections: Not on file   Intimate Partner Violence: Not on file   Housing Stability: Not on file   No current drugs, alcohol,  tobacco.    VITALS:  BP (!) 184/94   Pulse 86   Temp 97.3  F (36.3  C) (Oral)   Resp 16   Wt 56.7 kg (125 lb)   SpO2 95%   BMI 21.60 kg/m      PHYSICAL EXAM    Vital Signs:  BP (!) 184/94   Pulse 86   Temp 97.3  F (36.3  C) (Oral)   Resp 16   Wt 56.7 kg (125 lb)   SpO2 95%   BMI 21.60 kg/m    General:  On entering the room she is in no apparent distress.    Neck:  Neck supple with full range of motion and nontender.    Back:  Back and spine are nontender.  No costovertebral angle tenderness.    HEENT:  Oropharynx clear with moist mucous membranes.  HEENT unremarkable.    Pulmonary:  Chest clear to auscultation without rhonchi rales or wheezing.  No pain with palpation, inspiration, or movement.  No pain at present.  Cardiovascular:  Cardiac regular rate and rhythm without murmurs rubs or gallops.    Abdomen:  Abdomen soft nontender.  There is no rebound or guarding.    Muskuloskeletal:  she moves all 4 without any difficulty and has normal neurovascular exams.  Extremities without clubbing, cyanosis, or edema.  Legs and calves are nontender.   Neuro:  she is alert and oriented ×3 and moves all extremities symmetrically.    Psych:  Normal affect.    Skin:  Unremarkable and warm and dry.       LAB:  All pertinent labs reviewed and interpreted.  Labs Ordered and Resulted from Time of ED Arrival to Time of ED Departure   D DIMER QUANTITATIVE - Abnormal       Result Value    D-Dimer Quantitative 0.67 (*)    TROPONIN I - Normal    Troponin I <0.01     MAGNESIUM - Normal    Magnesium 2.4     COMPREHENSIVE METABOLIC PANEL - Normal    Sodium 142      Potassium 4.2      Chloride 107      Carbon Dioxide (CO2) 25      Anion Gap 10      Urea Nitrogen 14      Creatinine 0.77      Calcium 9.4      Glucose 91      Alkaline Phosphatase 90      AST 17      ALT 16      Protein Total 6.5      Albumin 3.6      Bilirubin Total 0.3      GFR Estimate 79     B-TYPE NATRIURETIC PEPTIDE ( EAST ONLY) - Normal    BNP 54     INR -  Normal    INR 0.94     PARTIAL THROMBOPLASTIN TIME - Normal    aPTT 27     CBC WITH PLATELETS AND DIFFERENTIAL    WBC Count 6.0      RBC Count 4.88      Hemoglobin 14.9      Hematocrit 43.0      MCV 88      MCH 30.5      MCHC 34.7      RDW 13.0      Platelet Count 251      % Neutrophils 57      % Lymphocytes 30      % Monocytes 9      % Eosinophils 2      % Basophils 2      % Immature Granulocytes 0      NRBCs per 100 WBC 0      Absolute Neutrophils 3.4      Absolute Lymphocytes 1.8      Absolute Monocytes 0.6      Absolute Eosinophils 0.1      Absolute Basophils 0.1      Absolute Immature Granulocytes 0.0      Absolute NRBCs 0.0         RADIOLOGY:  Reviewed all pertinent imaging. Please see official radiology report.  CT Chest Pulmonary Embolism w Contrast   Preliminary Result   IMPRESSION:    1. No visualized pulmonary embolus.   2. No evidence of acute pulmonary disease.       XR Chest Port 1 View   Final Result   IMPRESSION: Lungs are slightly hyperexpanded but clear. Heart and pulmonary vascularity are normal. No signs of acute disease.                 EKG:          PROCEDURES:       Alvaro Ritchie M.D.  Emergency Medicine  [facility] EMERGENCY DEPARTMENT  [facility] [facility] [id number] [phone]  Dept: [phone]     Alvaro Ritchie MD  04/04/22 [id number]

## 2022-04-06 ENCOUNTER — PATIENT OUTREACH (OUTPATIENT)
Dept: CARE COORDINATION | Facility: CLINIC | Age: 77
End: 2022-04-06
Payer: COMMERCIAL

## 2022-04-06 DIAGNOSIS — Z71.89 OTHER SPECIFIED COUNSELING: ICD-10-CM

## 2022-04-06 NOTE — PROGRESS NOTES
"Clinic Care Coordination Contact  [facility]: Post-Discharge Note  SITUATION                                                      Admission:    Admission Date: 04/04/22   Reason for Admission: Chest Wall Pain  Discharge:   Discharge Date: 04/05/22  Discharge Diagnosis: Pleuritic Pain    BACKGROUND                                                      Per hospital discharge summary and inpatient provider notes:    Nury Cárdenas is a 77 year old female with a pertinent history of COPD, CAD, PE, MVP, dyslipidemia, atypical pneumonia, strabismus, and osteopenia who presents to this ED by walk in for evaluation of pleuritic pain.  History of pulmonary emboli bilaterally 6 to 7 years ago as well as DVT.  No longer on anticoagulation.  Patient notes onset of left lateral pleuritic pain with intermittent sharp twinges.  Sent in by primary physician for evaluation of possible PE.     She does not identify any waxing or waning symptoms otherwise, exacerbating or alleviating features,associated symptoms except as mentioned. She denies any pain related complaints.    ASSESSMENT      Enrollment  Primary Care Care Coordination Status: Declined    Discharge Assessment  How are you doing now that you are home?: \"I still have a cold kind of sinus, but not more little catches in my side.\"  How are your symptoms? (Red Flag symptoms escalate to triage hotline per guidelines): Improved  Do you feel your condition is stable enough to be safe at home until your provider visit?: Yes  Does the patient have their discharge instructions? : Yes  Does the patient have questions regarding their discharge instructions? : No  Were you started on any new medications or were there changes to any of your previous medications? : No  Does the patient have all of their medications?: Yes  Do you have questions regarding any of your medications? : No  Do you have all of your needed medical supplies or equipment (DME)?  (i.e. oxygen tank, CPAP, " "cane, etc.): Yes  Discharge follow-up appointment scheduled within 14 calendar days? : No (Patient would like to \"wait it out\" before scheduling a f/u appt.)  Is patient agreeable to assistance with scheduling? : Yes    Post-op (EFRENW CTA Only)  If the patient had a surgery or procedure, do they have any questions for a nurse?: No      PLAN                                                      Outpatient Plan:      Ice or heat off-and-on to sore areas can help with pain. Over-the-counter Tylenol or ibuprofen as tolerated can help with pain.     Follow-up with your doctor or clinic in the next week as needed. See them sooner if worse or problems.    Future Appointments   Date Time Provider Department Center   5/4/2022 10:15 AM [facility]   8/1/2022 10:15 AM FZ LAB FZLABR [facility] CL[facility]   8/5/2022 10:45 AM Alvaro Maguire MD [facility]         For any urgent concerns, please contact our 24 hour nurse triage line: [phone] ([phone])       YULIA Cornelius  [phone]  Connected Care [facility]              "

## 2022-04-11 ENCOUNTER — MYC MEDICAL ADVICE (OUTPATIENT)
Dept: FAMILY MEDICINE | Facility: CLINIC | Age: 77
End: 2022-04-11
Payer: COMMERCIAL

## 2022-04-30 ENCOUNTER — HEALTH MAINTENANCE LETTER (OUTPATIENT)
Age: 77
End: 2022-04-30

## 2022-05-04 ENCOUNTER — LAB (OUTPATIENT)
Dept: LAB | Facility: CLINIC | Age: 77
End: 2022-05-04
Payer: COMMERCIAL

## 2022-05-04 DIAGNOSIS — M35.3 POLYMYALGIA (H): ICD-10-CM

## 2022-05-04 DIAGNOSIS — E55.9 VITAMIN D DEFICIENCY: ICD-10-CM

## 2022-05-04 DIAGNOSIS — M85.89 OSTEOPENIA OF MULTIPLE SITES: ICD-10-CM

## 2022-05-04 DIAGNOSIS — M31.30 GRANULOMATOSIS WITH POLYANGIITIS WITHOUT RENAL INVOLVEMENT (H): ICD-10-CM

## 2022-05-04 DIAGNOSIS — R68.89 COLD INTOLERANCE: ICD-10-CM

## 2022-05-04 LAB
ALBUMIN SERPL-MCNC: 3.3 G/DL (ref 3.4–5)
ALBUMIN UR-MCNC: NEGATIVE MG/DL
ALT SERPL W P-5'-P-CCNC: 20 U/L (ref 0–50)
APPEARANCE UR: CLEAR
AST SERPL W P-5'-P-CCNC: 19 U/L (ref 0–45)
BILIRUB UR QL STRIP: NEGATIVE
COLOR UR AUTO: YELLOW
CREAT SERPL-MCNC: 0.69 MG/DL (ref 0.52–1.04)
CRP SERPL-MCNC: 29.4 MG/L (ref 0–8)
ERYTHROCYTE [DISTWIDTH] IN BLOOD BY AUTOMATED COUNT: 13.3 % (ref 10–15)
ERYTHROCYTE [SEDIMENTATION RATE] IN BLOOD BY WESTERGREN METHOD: 11 MM/HR (ref 0–30)
GFR SERPL CREATININE-BSD FRML MDRD: 88 ML/MIN/1.73M2
GLUCOSE UR STRIP-MCNC: NEGATIVE MG/DL
HCT VFR BLD AUTO: 43.6 % (ref 35–47)
HGB BLD-MCNC: 14.5 G/DL (ref 11.7–15.7)
HGB UR QL STRIP: NEGATIVE
KETONES UR STRIP-MCNC: NEGATIVE MG/DL
LEUKOCYTE ESTERASE UR QL STRIP: ABNORMAL
MCH RBC QN AUTO: 30.3 PG (ref 26.5–33)
MCHC RBC AUTO-ENTMCNC: 33.3 G/DL (ref 31.5–36.5)
MCV RBC AUTO: 91 FL (ref 78–100)
NITRATE UR QL: NEGATIVE
PH UR STRIP: 7 [PH] (ref 5–7)
PLATELET # BLD AUTO: 250 10E3/UL (ref 150–450)
RBC # BLD AUTO: 4.78 10E6/UL (ref 3.8–5.2)
RBC #/AREA URNS AUTO: NORMAL /HPF
SP GR UR STRIP: 1.01 (ref 1–1.03)
UROBILINOGEN UR STRIP-ACNC: 0.2 E.U./DL
WBC # BLD AUTO: 7 10E3/UL (ref 4–11)
WBC #/AREA URNS AUTO: NORMAL /HPF

## 2022-05-04 PROCEDURE — 82565 ASSAY OF CREATININE: CPT

## 2022-05-04 PROCEDURE — 82306 VITAMIN D 25 HYDROXY: CPT

## 2022-05-04 PROCEDURE — 86140 C-REACTIVE PROTEIN: CPT

## 2022-05-04 PROCEDURE — 85027 COMPLETE CBC AUTOMATED: CPT

## 2022-05-04 PROCEDURE — 86036 ANCA SCREEN EACH ANTIBODY: CPT

## 2022-05-04 PROCEDURE — 82040 ASSAY OF SERUM ALBUMIN: CPT

## 2022-05-04 PROCEDURE — 81001 URINALYSIS AUTO W/SCOPE: CPT

## 2022-05-04 PROCEDURE — 84460 ALANINE AMINO (ALT) (SGPT): CPT

## 2022-05-04 PROCEDURE — 36415 COLL VENOUS BLD VENIPUNCTURE: CPT

## 2022-05-04 PROCEDURE — 85652 RBC SED RATE AUTOMATED: CPT

## 2022-05-04 PROCEDURE — 86256 FLUORESCENT ANTIBODY TITER: CPT

## 2022-05-04 PROCEDURE — 84450 TRANSFERASE (AST) (SGOT): CPT

## 2022-05-04 PROCEDURE — 83516 IMMUNOASSAY NONANTIBODY: CPT

## 2022-05-04 PROCEDURE — 86618 LYME DISEASE ANTIBODY: CPT

## 2022-05-04 PROCEDURE — 84443 ASSAY THYROID STIM HORMONE: CPT

## 2022-05-05 LAB
ANCA AB PATTERN SER IF-IMP: ABNORMAL
C-ANCA TITR SER IF: ABNORMAL {TITER}

## 2022-05-06 LAB
PROTEINASE3 AB SER IA-ACNC: 41 U/ML
PROTEINASE3 AB SER IA-ACNC: POSITIVE

## 2022-05-09 ENCOUNTER — OFFICE VISIT (OUTPATIENT)
Dept: FAMILY MEDICINE | Facility: CLINIC | Age: 77
End: 2022-05-09
Payer: COMMERCIAL

## 2022-05-09 ENCOUNTER — MYC MEDICAL ADVICE (OUTPATIENT)
Dept: FAMILY MEDICINE | Facility: CLINIC | Age: 77
End: 2022-05-09

## 2022-05-09 VITALS
HEART RATE: 87 BPM | BODY MASS INDEX: 20.91 KG/M2 | DIASTOLIC BLOOD PRESSURE: 77 MMHG | OXYGEN SATURATION: 92 % | SYSTOLIC BLOOD PRESSURE: 148 MMHG | WEIGHT: 121 LBS

## 2022-05-09 DIAGNOSIS — M35.3 POLYMYALGIA (H): ICD-10-CM

## 2022-05-09 DIAGNOSIS — M19.90 INFLAMMATORY ARTHRITIS: ICD-10-CM

## 2022-05-09 DIAGNOSIS — E55.9 VITAMIN D DEFICIENCY: ICD-10-CM

## 2022-05-09 DIAGNOSIS — R68.89 COLD INTOLERANCE: Primary | ICD-10-CM

## 2022-05-09 LAB
ALBUMIN UR-MCNC: NEGATIVE MG/DL
APPEARANCE UR: CLEAR
BILIRUB UR QL STRIP: NEGATIVE
COLOR UR AUTO: YELLOW
GLUCOSE UR STRIP-MCNC: NEGATIVE MG/DL
HGB UR QL STRIP: NEGATIVE
KETONES UR STRIP-MCNC: NEGATIVE MG/DL
LEUKOCYTE ESTERASE UR QL STRIP: NEGATIVE
NITRATE UR QL: NEGATIVE
PH UR STRIP: 5.5 [PH] (ref 5–7)
SP GR UR STRIP: 1.01 (ref 1–1.03)
TSH SERPL DL<=0.005 MIU/L-ACNC: 1.22 MU/L (ref 0.4–4)
UROBILINOGEN UR STRIP-ACNC: 0.2 E.U./DL

## 2022-05-09 PROCEDURE — 81003 URINALYSIS AUTO W/O SCOPE: CPT | Performed by: INTERNAL MEDICINE

## 2022-05-09 PROCEDURE — 99214 OFFICE O/P EST MOD 30 MIN: CPT | Performed by: INTERNAL MEDICINE

## 2022-05-09 RX ORDER — PREDNISONE 20 MG/1
TABLET ORAL
Qty: 20 TABLET | Refills: 0 | Status: SHIPPED | OUTPATIENT
Start: 2022-05-09 | End: 2022-05-25

## 2022-05-09 NOTE — PROGRESS NOTES
Assessment & Plan   Problem List Items Addressed This Visit     Inflammatory arthritis    Relevant Medications    predniSONE (DELTASONE) 20 MG tablet    Other Relevant Orders    UA Macro with Reflex to Micro and Culture - lab collect (Completed)      Other Visit Diagnoses     Cold intolerance    -  Primary    Relevant Medications    predniSONE (DELTASONE) 20 MG tablet    Other Relevant Orders    TSH with free T4 reflex (Completed)    UA Macro with Reflex to Micro and Culture - lab collect (Completed)    Polymyalgia (H)        Relevant Medications    predniSONE (DELTASONE) 20 MG tablet    Other Relevant Orders    Lyme Disease Total Abs Bld with Reflex to Confirm CLIA (Completed)    Vitamin D deficiency        Relevant Orders    Vitamin D Deficiency (Completed)         This almost looks more like PMR than other wgner's etc   But appears vasculitic   Discussed risk and benefits of steroids in this setting            Work on weight loss  Regular exercise    No follow-ups on file.    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  Today that is better but they ache and I am SO COLD. My hands are stiff too.  Last week  was more  Symptom onset:  1-3 days ago  Symptoms include:  Shoulders hurt and hard to lift my arms, they hurt down to my fingers.  Not as stiff as they were last night. I had to sleep in the recliner. My head felt funny light headed.  Symptom intensity:  Moderate  Symptom progression:  Staying the same  Had these symptoms before:  Yes  Has tried/received treatment for these symptoms:  Yes  Previous treatment was successful:  Yes  Prior treatment description:  When I had Wegeners they started  me on Prednisone and after one dose swelling Started to go down.  What makes it better:  When I take an Excedrin it seems to calm it down.    She eats 0-1 servings of fruits and vegetables daily.She consumes 1 sweetened beverage(s) daily.She exercises with enough effort to increase her heart rate 9 or less minutes per day.  She exercises with enough effort to increase her heart rate 3 or less days per week.   She is taking medications regularly.       Feeling chilled   Wegners- 3 months getting worse   Pain and aching   Sinus symptoms   Watching since 2014   Had allergic reaction and high doses of prednisone     2014 first diagnosed   prednsione   rituximab 9     Once had covid and festering ----January 2021  -  15 month and 16 months   Chronic sinus and had antibiotics   5/4/2022 4/4/2022 high dose tapering prednisone           Fungal infection and then on Methotrexate   exerdrin claming it down   H[place] the [facility]   More tired than usual      Review of Systems   Constitutional, HEENT, cardiovascular, pulmonary, gi and gu systems are negative, except as otherwise noted.      Objective    BP (!) 148/77 (BP Location: Right arm, Patient Position: Chair, Cuff Size: Adult Regular)   Pulse 87   Wt 54.9 kg (121 lb)   SpO2 92%   BMI 20.91 kg/m    Body mass index is 20.91 kg/m .  Physical Exam   GENERAL: healthy, alert and no distress  EYES: Eyes grossly normal to inspection, PERRL and conjunctivae and sclerae normal  HENT: ear canals and TM's normal, nose and mouth without ulcers or lesions  NECK: no adenopathy, no asymmetry, masses, or scars and thyroid normal to palpation  RESP: lungs clear to auscultation - no rales, rhonchi or wheezes  CV: regular rate and rhythm, normal S1 S2, no S3 or S4, no murmur, click or rub, no peripheral edema and peripheral pulses strong  ABDOMEN: soft, nontender, no hepatosplenomegaly, no masses and bowel sounds normal  MS: no gross musculoskeletal defects noted, no edema  SKIN:  no suspicious lesions or rashes  NEURO: Normal strength and tone, mentation intact and speech normal  BACK: no CVA tenderness, no paralumbar tenderness  PSYCH: mentation appears normal, affect normal/bright  LYMPH: no cervical, supraclavicular, axillary, or inguinal adenopathy    Results for orders placed or performed in visit on 05/09/22   UA Macro with Reflex to Micro and Culture - lab collect     Status: Normal    Specimen: Urine, Clean Catch   Result Value Ref Range    Color Urine Yellow Colorless, Straw, Light Yellow, Yellow    Appearance Urine Clear Clear    Glucose Urine Negative Negative mg/dL    Bilirubin Urine Negative Negative    Ketones Urine Negative Negative mg/dL    Specific Gravity Urine 1.010 1.003 - 1.035    Blood Urine Negative Negative    pH Urine 5.5 5.0 - 7.0    Protein Albumin Urine Negative Negative mg/dL    Urobilinogen Urine 0.2 0.2, 1.0 E.U./dL    Nitrite Urine Negative Negative    Leukocyte Esterase Urine Negative Negative    Narrative    Microscopic not indicated

## 2022-05-10 LAB
B BURGDOR IGG+IGM SER QL: 0.09
DEPRECATED CALCIDIOL+CALCIFEROL SERPL-MC: 48 UG/L (ref 20–75)

## 2022-05-12 DIAGNOSIS — M31.30 GRANULOMATOSIS WITH POLYANGIITIS WITHOUT RENAL INVOLVEMENT (H): Primary | ICD-10-CM

## 2022-05-19 ENCOUNTER — MYC MEDICAL ADVICE (OUTPATIENT)
Dept: FAMILY MEDICINE | Facility: CLINIC | Age: 77
End: 2022-05-19
Payer: COMMERCIAL

## 2022-05-20 ENCOUNTER — MYC MEDICAL ADVICE (OUTPATIENT)
Dept: FAMILY MEDICINE | Facility: CLINIC | Age: 77
End: 2022-05-20

## 2022-05-20 ENCOUNTER — TELEPHONE (OUTPATIENT)
Dept: NEUROLOGY | Facility: CLINIC | Age: 77
End: 2022-05-20

## 2022-05-20 ENCOUNTER — LAB (OUTPATIENT)
Dept: LAB | Facility: CLINIC | Age: 77
End: 2022-05-20

## 2022-05-20 ENCOUNTER — VIRTUAL VISIT (OUTPATIENT)
Dept: RHEUMATOLOGY | Facility: CLINIC | Age: 77
End: 2022-05-20
Payer: COMMERCIAL

## 2022-05-20 DIAGNOSIS — M15.0 PRIMARY OSTEOARTHRITIS INVOLVING MULTIPLE JOINTS: ICD-10-CM

## 2022-05-20 DIAGNOSIS — M15.0 PRIMARY OSTEOARTHRITIS INVOLVING MULTIPLE JOINTS: Primary | ICD-10-CM

## 2022-05-20 DIAGNOSIS — M31.30 GRANULOMATOSIS WITH POLYANGIITIS WITHOUT RENAL INVOLVEMENT (H): ICD-10-CM

## 2022-05-20 DIAGNOSIS — Z79.60 LONG-TERM USE OF IMMUNOSUPPRESSANT MEDICATION: ICD-10-CM

## 2022-05-20 DIAGNOSIS — K21.9 GASTROESOPHAGEAL REFLUX DISEASE WITHOUT ESOPHAGITIS: ICD-10-CM

## 2022-05-20 LAB
ALBUMIN SERPL-MCNC: 3.4 G/DL (ref 3.4–5)
ALBUMIN UR-MCNC: NEGATIVE MG/DL
ALT SERPL W P-5'-P-CCNC: 28 U/L (ref 0–50)
APPEARANCE UR: CLEAR
AST SERPL W P-5'-P-CCNC: 10 U/L (ref 0–45)
BASOPHILS # BLD AUTO: 0 10E3/UL (ref 0–0.2)
BASOPHILS NFR BLD AUTO: 0 %
BILIRUB UR QL STRIP: NEGATIVE
COLOR UR AUTO: YELLOW
CREAT SERPL-MCNC: 0.77 MG/DL (ref 0.52–1.04)
CRP SERPL-MCNC: 41.7 MG/L (ref 0–8)
EOSINOPHIL # BLD AUTO: 0 10E3/UL (ref 0–0.7)
EOSINOPHIL NFR BLD AUTO: 0 %
ERYTHROCYTE [DISTWIDTH] IN BLOOD BY AUTOMATED COUNT: 13.2 % (ref 10–15)
ERYTHROCYTE [SEDIMENTATION RATE] IN BLOOD BY WESTERGREN METHOD: 7 MM/HR (ref 0–30)
GFR SERPL CREATININE-BSD FRML MDRD: 79 ML/MIN/1.73M2
GLUCOSE UR STRIP-MCNC: NEGATIVE MG/DL
HCT VFR BLD AUTO: 47.2 % (ref 35–47)
HGB BLD-MCNC: 15.5 G/DL (ref 11.7–15.7)
HGB UR QL STRIP: NEGATIVE
IMM GRANULOCYTES # BLD: 0.1 10E3/UL
IMM GRANULOCYTES NFR BLD: 1 %
KETONES UR STRIP-MCNC: NEGATIVE MG/DL
LEUKOCYTE ESTERASE UR QL STRIP: NEGATIVE
LYMPHOCYTES # BLD AUTO: 0.9 10E3/UL (ref 0.8–5.3)
LYMPHOCYTES NFR BLD AUTO: 6 %
MCH RBC QN AUTO: 29.7 PG (ref 26.5–33)
MCHC RBC AUTO-ENTMCNC: 32.8 G/DL (ref 31.5–36.5)
MCV RBC AUTO: 90 FL (ref 78–100)
MONOCYTES # BLD AUTO: 0.4 10E3/UL (ref 0–1.3)
MONOCYTES NFR BLD AUTO: 3 %
NEUTROPHILS # BLD AUTO: 14.2 10E3/UL (ref 1.6–8.3)
NEUTROPHILS NFR BLD AUTO: 90 %
NITRATE UR QL: NEGATIVE
NRBC # BLD AUTO: 0 10E3/UL
NRBC BLD AUTO-RTO: 0 /100
PH UR STRIP: 6 [PH] (ref 5–7)
PLATELET # BLD AUTO: 263 10E3/UL (ref 150–450)
RBC # BLD AUTO: 5.22 10E6/UL (ref 3.8–5.2)
RBC #/AREA URNS AUTO: NORMAL /HPF
SP GR UR STRIP: 1.02 (ref 1–1.03)
UROBILINOGEN UR STRIP-ACNC: 0.2 E.U./DL
WBC # BLD AUTO: 15.6 10E3/UL (ref 4–11)
WBC #/AREA URNS AUTO: NORMAL /HPF

## 2022-05-20 PROCEDURE — 86256 FLUORESCENT ANTIBODY TITER: CPT

## 2022-05-20 PROCEDURE — 81001 URINALYSIS AUTO W/SCOPE: CPT

## 2022-05-20 PROCEDURE — 82040 ASSAY OF SERUM ALBUMIN: CPT

## 2022-05-20 PROCEDURE — 84460 ALANINE AMINO (ALT) (SGPT): CPT

## 2022-05-20 PROCEDURE — 84450 TRANSFERASE (AST) (SGOT): CPT

## 2022-05-20 PROCEDURE — 36415 COLL VENOUS BLD VENIPUNCTURE: CPT

## 2022-05-20 PROCEDURE — 86036 ANCA SCREEN EACH ANTIBODY: CPT

## 2022-05-20 PROCEDURE — 86140 C-REACTIVE PROTEIN: CPT

## 2022-05-20 PROCEDURE — 85025 COMPLETE CBC W/AUTO DIFF WBC: CPT

## 2022-05-20 PROCEDURE — 83516 IMMUNOASSAY NONANTIBODY: CPT

## 2022-05-20 PROCEDURE — 82565 ASSAY OF CREATININE: CPT

## 2022-05-20 PROCEDURE — 99215 OFFICE O/P EST HI 40 MIN: CPT | Mod: 95 | Performed by: INTERNAL MEDICINE

## 2022-05-20 PROCEDURE — 85652 RBC SED RATE AUTOMATED: CPT

## 2022-05-20 PROCEDURE — 83876 ASSAY MYELOPEROXIDASE: CPT

## 2022-05-20 RX ORDER — PREDNISONE 10 MG/1
TABLET ORAL
Qty: 60 TABLET | Refills: 2 | Status: SHIPPED | OUTPATIENT
Start: 2022-05-20 | End: 2022-05-25

## 2022-05-20 RX ORDER — FOLIC ACID 1 MG/1
1 TABLET ORAL DAILY
Qty: 90 TABLET | Refills: 3 | Status: SHIPPED | OUTPATIENT
Start: 2022-05-20 | End: 2022-12-16

## 2022-05-20 NOTE — PROGRESS NOTES
"Bria is a 77 year old who is being evaluated via a billable telephone visit.      What phone number would you like to be contacted at?   How would you like to obtain your AVS? [employer]  Phone call duration: 32 minutes      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 Course complicated by severe upper airway inflammation, Wegener's lung and destructive/erosive joint disease. Treated initially with rituximab 375 mg/m2 x4 and high dose corticsteroids, and most recently with methotrexate.  No history of relapse. Prednisone taper complete on April 1st, 2017. No recent methotrexate. Alendronate therapy complicated by LE pain.    She complains that she is hurting from shoulders to fingers with \"burning ache\". This occurs even at rest and this does not require any extremity motion. Similar pain in her LE. She is always \"cold\" and needs to wear bundles of cold. Her arms are still mobile. No swelling, redness, or warmth of joints, skin, or muscles. Her legs are weak and she needs push herself up from a chair. Her right arm can tingle at times. Her right hand fingers are a bit numb at times. Her shoulder can be stiff during periods of pain but no true AM stiffness. Her energy is lower than usual. She denies insomnia.    She was prescribed prednisone for arm pain and shoulder stiffness. Prednisone 60 mg daily for 3 days, then 40 mg daily for 3 days, then 10 mg daily. This initially helped at the high dose, but then by 40 mg daily she again had some symptoms. She is back to her previous symptoms at the current 10 mg dose.     She is not COVID vaccinated and has not tested for this.     PMI:  Medical-invasive pulmonary aspirgillosis, GPA, osteoarthritis, DVT with pulmonary embolism, osteoporosis with vertebral fracture (T = -2.0 2-2021), GERD, hyperlipidemia, +Tb exposure, nephrolithiasis, retinal tear, cataract, COVID-19, sensorineural hearing loss  Surgical-lung biopsy, appendectomy, eye " surgery, tonsillectomy, cone biopsy, right wrist synovectomy  Injuries-left wrist fracture x2, left 5th toe fracture, vertebral fracture    SH:  Lives at home alone. Former smoker. No EtOH. Tuberculosis exposure as a child. Ambidextrous.    FH:  Son with mitochondrial myopathy  Daughter with palpitations  Sibs dead with colon and anal cancer  Father dead with lung cancer  Mother dead with emphysema and osteoporosis    PMSF history personally obtained and updated by me this visit in the clinic.    ROS:  +irritable sinuses with runny nose  +heavy tighter breathing  +allergies to fosamax amoxicillin, augmentin, erythromycin, zithromax, nickel, codeine, adhesive tape  Remainder of the 14 point ROS obtained and found negative.    Laboratory:    Component      Latest Ref Rng & Units 5/4/2022   Color Urine      Colorless, Straw, Light Yellow, Yellow Yellow   Appearance Urine      Clear Clear   Glucose Urine      Negative mg/dL Negative   Bilirubin Urine      Negative Negative   Ketones Urine      Negative mg/dL Negative   Specific Gravity Urine      1.003 - 1.035 1.010   Blood Urine      Negative Negative   pH Urine      5.0 - 7.0 7.0   Protein Albumin Urine      Negative mg/dL Negative   Urobilinogen Urine      0.2, 1.0 E.U./dL 0.2   Nitrite Urine      Negative Negative   Leukocyte Esterase Urine      Negative Small (A)   WBC      4.0 - 11.0 10e3/uL 7.0   RBC Count      3.80 - 5.20 10e6/uL 4.78   Hemoglobin      11.7 - 15.7 g/dL 14.5   Hematocrit      35.0 - 47.0 % 43.6   MCV      78 - 100 fL 91   MCH      26.5 - 33.0 pg 30.3   MCHC      31.5 - 36.5 g/dL 33.3   RDW      10.0 - 15.0 % 13.3   Platelet Count      150 - 450 10e3/uL 250   Creatinine      0.52 - 1.04 mg/dL 0.69   GFR Estimate      >60 mL/min/1.73m2 88   Proteinase 3 Erika IgG Instrument Value      <2.0 U/mL 41.0 (H)   Proteinase 3 Antibody IgG      Negative Positive (A)   Neutrophil Cytoplasmic Antibody      <1:10 1:40 (H)   Neutrophil Cytoplasmic Antibody  "Pattern       Cytoplasmic ANCA (c-ANCA) staining pattern observed and confirmed on formalin-fixed neutrophils.   RBC Urine      0-2 /HPF /HPF 0-2   WBC Urine      0-5 /HPF /HPF 0-5   AST      0 - 45 U/L 19   ALT      0 - 50 U/L 20   Albumin      3.4 - 5.0 g/dL 3.3 (L)   Sed Rate      0 - 30 mm/hr 11   CRP Inflammation      0.0 - 8.0 mg/L 29.4 (H)   TSH      0.40 - 4.00 mU/L 1.22   Vitamin D Deficiency screening      20 - 75 ug/L 48   Lyme Disease Antibodies Serum      <0.90 0.09       Impression:    PR3-associated GPA-with history of lung involvement. Previously this has not relapsed, although she has re-demonstrated a rise in her ANCA serologies together with increased CRP. Therefore, her current complaints of extremity burning pain and stiffness may relate to a PMR-like picture associated with recrudescence of vasculitis. Nonetheless, this diagnosis is not assured. The patient does report resolution of pain on high dose prednisone, and this could suggest underlying systemic inflammatory disease; however, she fails to maintain control of her symptoms at prednisone 10 mg daily, and this seems unusual for PMR. Furthermore, it is not clear that she has end-organ threat at this point from vasculitis. Her \"neuropathic pain\" picture is not consistent with mononeuritis multiplex and more reminiscent of peripheral polyneuropathy. Likely there is also a degree of pain amplification occurring. Nevertheless, its improvement with corticosteroids would not be expected in the absence of an inflammatory etiology. In summary, the patient has a history of severe systemic vasculitis now associated with significant pain that temporally correlates with modest increases in ANCA serologies and CRP. 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 We have discussed the risks and benefits and the patient agrees to this. Plan to have the patient evaluated in Neurology to investigate alternative etiologies for burning neuropathic pain, including a chronic inflammatory demyelinating polyneuropathy. Get repeat serologies and inflammatory markers.    Osteoporosis-stable osteopenia by DEXA at present on holiday from bisphosphonate.    Plan RTC in 3 months.    A total of 66 minutes was spent in telephone patient interaction and chart review on the day of service.

## 2022-05-20 NOTE — TELEPHONE ENCOUNTER
5/20 called patient and left a message to call [phone] to make an appointment to see either Dr. Crump or  for Neuropathy.      Bambi Lazar Procedure   Neurology & Neurosurgery Specialties  [facility]   [phone]

## 2022-05-20 NOTE — PATIENT INSTRUCTIONS
Start prednisone 20 mg daily for 1 month, then 10 mg daily thereafter  Start methotrexate 10 mg once weekly dose  Start folic acid 1 mg daily  Get an appointment for a neurology evaluation  Get lab testing now, and again in 1 month, then every 2 months thereafter  Keep your August appointment

## 2022-05-23 ENCOUNTER — MYC MEDICAL ADVICE (OUTPATIENT)
Dept: RHEUMATOLOGY | Facility: CLINIC | Age: 77
End: 2022-05-23
Payer: COMMERCIAL

## 2022-05-23 NOTE — TELEPHONE ENCOUNTER
See response in alternate mychart message.     Natalie Gleason, MSN, RN   Rheumatology RN Care Coordinator   [facility]   [phone]

## 2022-05-24 LAB
ANCA AB PATTERN SER IF-IMP: ABNORMAL
C-ANCA TITR SER IF: ABNORMAL {TITER}

## 2022-05-25 DIAGNOSIS — M15.0 PRIMARY OSTEOARTHRITIS INVOLVING MULTIPLE JOINTS: ICD-10-CM

## 2022-05-25 DIAGNOSIS — R68.89 COLD INTOLERANCE: ICD-10-CM

## 2022-05-25 DIAGNOSIS — K21.9 GASTROESOPHAGEAL REFLUX DISEASE WITHOUT ESOPHAGITIS: ICD-10-CM

## 2022-05-25 DIAGNOSIS — M35.3 POLYMYALGIA (H): ICD-10-CM

## 2022-05-25 DIAGNOSIS — M31.30 GRANULOMATOSIS WITH POLYANGIITIS WITHOUT RENAL INVOLVEMENT (H): ICD-10-CM

## 2022-05-25 DIAGNOSIS — Z79.60 LONG-TERM USE OF IMMUNOSUPPRESSANT MEDICATION: ICD-10-CM

## 2022-05-25 DIAGNOSIS — M19.90 INFLAMMATORY ARTHRITIS: ICD-10-CM

## 2022-05-25 LAB
MYELOPEROXIDASE AB SER IA-ACNC: 0.3 U/ML
MYELOPEROXIDASE AB SER IA-ACNC: NEGATIVE
PROTEINASE3 AB SER IA-ACNC: 66 U/ML
PROTEINASE3 AB SER IA-ACNC: POSITIVE

## 2022-05-25 RX ORDER — PREDNISONE 10 MG/1
40 TABLET ORAL DAILY
Qty: 120 TABLET | Refills: 1 | Status: SHIPPED | OUTPATIENT
Start: 2022-05-25 | End: 2022-07-27

## 2022-05-26 ENCOUNTER — TELEPHONE (OUTPATIENT)
Dept: RHEUMATOLOGY | Facility: CLINIC | Age: 77
End: 2022-05-26
Payer: COMMERCIAL

## 2022-05-26 NOTE — TELEPHONE ENCOUNTER
[facility] Call Center    Phone Message    May a detailed message be left on voicemail: yes     Reason for Call: Other: Pt wants to schedule a phone visit with Dr Maguire. Pt was offered the next available in July pt declined at this time and said she needs to talk to him sooner about her results. Pt wanted writer to send a message to see if Dr Maguire can do a phone visit with the pt within the next 2 weeks.     Action Taken: Message routed to:  Adult Clinics: Rheumatology p [id number]    Travel Screening: Not Applicable

## 2022-05-27 ENCOUNTER — VIRTUAL VISIT (OUTPATIENT)
Dept: RHEUMATOLOGY | Facility: CLINIC | Age: 77
End: 2022-05-27
Payer: COMMERCIAL

## 2022-05-27 DIAGNOSIS — T50.905S ADVERSE EFFECT OF DRUG, SEQUELA: ICD-10-CM

## 2022-05-27 DIAGNOSIS — Z79.60 LONG-TERM USE OF IMMUNOSUPPRESSANT MEDICATION: ICD-10-CM

## 2022-05-27 DIAGNOSIS — M81.0 AGE-RELATED OSTEOPOROSIS WITHOUT CURRENT PATHOLOGICAL FRACTURE: ICD-10-CM

## 2022-05-27 DIAGNOSIS — K21.9 GASTROESOPHAGEAL REFLUX DISEASE WITHOUT ESOPHAGITIS: Primary | ICD-10-CM
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
## 2022-05-27 DIAGNOSIS — M31.30 GRANULOMATOSIS WITH POLYANGIITIS WITHOUT RENAL INVOLVEMENT (H): ICD-10-CM

## 2022-05-27 DIAGNOSIS — M31.30 GRANULOMATOSIS WITH POLYANGIITIS, UNSPECIFIED WHETHER RENAL INVOLVEMENT (H): ICD-10-CM

## 2022-05-27 DIAGNOSIS — M80.00XS AGE-RELATED OSTEOPOROSIS WITH CURRENT PATHOLOGICAL FRACTURE, SEQUELA: ICD-10-CM

## 2022-05-27 DIAGNOSIS — Z79.52 CURRENT CHRONIC USE OF SYSTEMIC STEROIDS: Chronic | ICD-10-CM

## 2022-05-27 PROCEDURE — 99417 PROLNG OP E/M EACH 15 MIN: CPT | Performed by: INTERNAL MEDICINE

## 2022-05-27 PROCEDURE — 99215 OFFICE O/P EST HI 40 MIN: CPT | Mod: 95 | Performed by: INTERNAL MEDICINE

## 2022-05-27 RX ORDER — ALBUTEROL SULFATE 90 UG/1
1-2 AEROSOL, METERED RESPIRATORY (INHALATION)
Status: CANCELLED
Start: 2022-05-27

## 2022-05-27 RX ORDER — ALBUTEROL SULFATE 0.83 MG/ML
2.5 SOLUTION RESPIRATORY (INHALATION) ONCE
Status: CANCELLED
Start: 2022-05-27 | End: 2022-05-27

## 2022-05-27 RX ORDER — DIPHENHYDRAMINE HCL 25 MG
50 CAPSULE ORAL ONCE
Status: CANCELLED
Start: 2022-05-27

## 2022-05-27 RX ORDER — PENTAMIDINE ISETHIONATE 300 MG/300MG
300 INHALANT RESPIRATORY (INHALATION)
Status: CANCELLED
Start: 2022-05-27

## 2022-05-27 RX ORDER — EPINEPHRINE 1 MG/ML
0.3 INJECTION, SOLUTION INTRAMUSCULAR; SUBCUTANEOUS EVERY 5 MIN PRN
Status: CANCELLED | OUTPATIENT
Start: 2022-05-27

## 2022-05-27 RX ORDER — ALBUTEROL SULFATE 0.83 MG/ML
2.5 SOLUTION RESPIRATORY (INHALATION)
Status: CANCELLED | OUTPATIENT
Start: 2022-05-27

## 2022-05-27 RX ORDER — DIPHENHYDRAMINE HYDROCHLORIDE 50 MG/ML
50 INJECTION INTRAMUSCULAR; INTRAVENOUS
Status: CANCELLED
Start: 2022-05-27

## 2022-05-27 RX ORDER — METHYLPREDNISOLONE SODIUM SUCCINATE 125 MG/2ML
100 INJECTION, POWDER, LYOPHILIZED, FOR SOLUTION INTRAMUSCULAR; INTRAVENOUS ONCE
Status: CANCELLED | OUTPATIENT
Start: 2022-05-27

## 2022-05-27 RX ORDER — METHYLPREDNISOLONE SODIUM SUCCINATE 125 MG/2ML
125 INJECTION, POWDER, LYOPHILIZED, FOR SOLUTION INTRAMUSCULAR; INTRAVENOUS
Status: CANCELLED
Start: 2022-05-27

## 2022-05-27 RX ORDER — MEPERIDINE HYDROCHLORIDE 25 MG/ML
25 INJECTION INTRAMUSCULAR; INTRAVENOUS; SUBCUTANEOUS EVERY 30 MIN PRN
Status: CANCELLED | OUTPATIENT
Start: 2022-05-27

## 2022-05-27 RX ORDER — ACETAMINOPHEN 325 MG/1
650 TABLET ORAL ONCE
Status: CANCELLED
Start: 2022-05-27

## 2022-05-27 RX ORDER — NALOXONE HYDROCHLORIDE 0.4 MG/ML
0.2 INJECTION, SOLUTION INTRAMUSCULAR; INTRAVENOUS; SUBCUTANEOUS
Status: CANCELLED | OUTPATIENT
Start: 2022-05-27

## 2022-05-27 RX ORDER — HEPARIN SODIUM (PORCINE) LOCK FLUSH IV SOLN 100 UNIT/ML 100 UNIT/ML
5 SOLUTION INTRAVENOUS
Status: CANCELLED | OUTPATIENT
Start: 2022-05-27

## 2022-05-27 RX ORDER — HEPARIN SODIUM,PORCINE 10 UNIT/ML
5 VIAL (ML) INTRAVENOUS
Status: CANCELLED | OUTPATIENT
Start: 2022-05-27

## 2022-05-27 NOTE — Clinical Note
Please help the patient to schedule pentamidine inhalation monthly, rituximab infusion weekly, and evusheld injections (I will cosign the order when this can be arranged).

## 2022-05-27 NOTE — PROGRESS NOTES
Bria is a 77 year old who is being evaluated via a billable telephone visit.      What phone number would you like to be contacted at?   How would you like to obtain your AVS? [employer]  Phone call duration: 33 minutes    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 Course complicated by severe upper airway inflammation, Wegener's lung and destructive/erosive joint disease. Treated initially with rituximab 375 mg/m2 x4 and high dose corticsteroids, and most recently with methotrexate.  No history of relapse. No recent methotrexate. Alendronate therapy complicated by LE pain.    She reports fatigue and muscular aches and pain. She colds easily. Her activity is now much reduced. She has some hand swelling with excessive use. Her nose feels swollen below the bridge of her nose and darker red. Her sinuses are fine. No dysphagia or change in her voice. Her breathing is stable.  She has paresthesias in her hands and feet. Her legs are weak. At reduced dose prednisone, she had recrudescence of symptoms. Today and yesterday she took prednisone 40 mg daily after her lab results demonstrated rising inflammatory markers. She has recently initiated treatment with methotrexate 10 mg once monthly and she tolerates this well with use of folic acid. She currently uses pepcid for GERD. No recent osteoporosis treatment. She is not vaccinated against COVID.    PMI:  Medical-invasive pulmonary aspirgillosis, GPA, osteoarthritis, DVT with pulmonary embolism, osteoporosis with vertebral fracture (T = -2.0 2-2021), GERD, hyperlipidemia, +Tb exposure, nephrolithiasis, retinal tear, cataract, COVID-19, sensorineural hearing loss  Surgical-lung biopsy, appendectomy, eye surgery, tonsillectomy, cone biopsy, right wrist synovectomy  Injuries-left wrist fracture x2, left 5th toe fracture, vertebral fracture    SH:  Lives at home alone. Former smoker. No EtOH. Tuberculosis exposure as a child. Ambidextrous.  Rare EtOH    FH:  Son with mitochondrial myopathy  Daughter with palpitations  Sibs dead with colon and anal cancer  Father dead with lung cancer  Mother dead with emphysema and osteoporosis    PMSF history personally obtained and updated by me this visit in the clinic.    ROS:  +as above  +allergies to fosamax amoxicillin, augmentin, erythromycin, zithromax, nickel, codeine, adhesive tape  Remainder of the 14 point ROS obtained and found negative.    Laboratory:    Component      Latest Ref Rng & Units 5/20/2022   WBC      4.0 - 11.0 10e3/uL 15.6 (H)   RBC Count      3.80 - 5.20 10e6/uL 5.22 (H)   Hemoglobin      11.7 - 15.7 g/dL 15.5   Hematocrit      35.0 - 47.0 % 47.2 (H)   MCV      78 - 100 fL 90   MCH      26.5 - 33.0 pg 29.7   MCHC      31.5 - 36.5 g/dL 32.8   RDW      10.0 - 15.0 % 13.2   Platelet Count      150 - 450 10e3/uL 263   % Neutrophils      % 90   % Lymphocytes      % 6   % Monocytes      % 3   % Eosinophils      % 0   % Basophils      % 0   % Immature Granulocytes      % 1   NRBCs per 100 WBC      <1 /100 0   Absolute Neutrophils      1.6 - 8.3 10e3/uL 14.2 (H)   Absolute Lymphocytes      0.8 - 5.3 10e3/uL 0.9   Absolute Monocytes      0.0 - 1.3 10e3/uL 0.4   Absolute Eosinophils      0.0 - 0.7 10e3/uL 0.0   Absolute Basophils      0.0 - 0.2 10e3/uL 0.0   Absolute Immature Granulocytes      <=0.4 10e3/uL 0.1   Absolute NRBCs      10e3/uL 0.0   Color Urine      Colorless, Straw, Light Yellow, Yellow Yellow   Appearance Urine      Clear Clear   Glucose Urine      Negative mg/dL Negative   Bilirubin Urine      Negative Negative   Ketones Urine      Negative mg/dL Negative   Specific Gravity Urine      1.003 - 1.035 1.020   Blood Urine      Negative Negative   pH Urine      5.0 - 7.0 6.0   Protein Albumin Urine      Negative mg/dL Negative   Urobilinogen Urine      0.2, 1.0 E.U./dL 0.2   Nitrite Urine      Negative Negative   Leukocyte Esterase Urine      Negative Negative   MPO Erika IgG Instrument  Value      <3.5 U/mL 0.3   Myeloperoxidase Antibody IgG      Negative Negative   Proteinase 3 Erika IgG Instrument Value      <2.0 U/mL 66.0 (H)   Proteinase 3 Antibody IgG      Negative Positive (A)   Creatinine      0.52 - 1.04 mg/dL 0.77   GFR Estimate      >60 mL/min/1.73m2 79   Neutrophil Cytoplasmic Antibody      <1:10 1:160 (H)   Neutrophil Cytoplasmic Antibody Pattern       Cytoplasmic ANCA (c-ANCA) staining pattern observed and confirmed on formalin-fixed neutrophils.   RBC Urine      0-2 /HPF /HPF 0-2   WBC Urine      0-5 /HPF /HPF 0-5   Albumin      3.4 - 5.0 g/dL 3.4   ALT      0 - 50 U/L 28   AST      0 - 45 U/L 10   CRP Inflammation      0.0 - 8.0 mg/L 41.7 (H)   Sed Rate      0 - 30 mm/hr 7       Impression:    PR3-associated GPA-with history of lung involvement. Since last visit, Pat's inflammatory markers have continued to rise on reduced dose prednisone. The inflammation markers and ANCA autoantibodies seem higher than previously. Given these results, I have considered this now strong evidence of a vasculitis relapse. Based on this I have increased the prednisone to 40 mg daily. Today's discussion is in regard to initiation with once weekly rituximab 375 mg/m2 infusions a total of four times. I will also prescribe a monthly pentamidine inhaler regimen to protect from pneumocystis reactivation. Treatment with Evusheld anti-COVID antibodies is also indicated at this time. She has a history of GERD and is at increased risk for PUD on higher dose prednisone so I will initiate omeprazole 20 mg daily. Finally, institution of prolia is indicated to protect her bones from mineral loss on higher dose prednisone, but she defers this treatment.    Long term management of immunosuppression-with increased risk of toxcity and infection as indicated above. We have discussed these risks and benefits and she agrees to treatment with rituximab. Plan to repeat lab testing monthly. We also agree that the benefits of  continued immunosuppression outweigh the risks of COVID-19 infection. She is resistant to COVID vaccination.    Plan follow up in 3 months.    A total of 90 minutes was spent in telephone patient interaction and chart review on the day of service.      Evusheld Consent   Confirmed patient received the Emergency Use Authorization Fact Sheet for Patients, Parents and Caregivers prior to receiving medication. We discussed the following risks and benefits of receiving EVUSHELD, as well as alternative treatments and the patient wished to proceed with EVUSHELD.     Providers believe the benefits of Evusheld outweigh the risks for all moderately to severely immunocompromised patients.      Benefits:   Evusheld is a synthetic antibody that provides protection against COVID-19 for 6 months and is recommended for patients that have a weakened immune system that may not be able to make antibodies themselves.     Risks:    There is a very small risk of allergic reactions and you should notify us if you have any symptoms of an allergic reaction after the injection. There were also some extremely rare cardiac events reported in the initial trials in people that already had heart disease, but experts do not think these were caused by the medication. We will observe you for any chest pain or trouble breathing after the injections and you can reach out to your provider if any of these symptoms develop.     Alternatives:   Vaccines to prevent COVID-19 are approved or available under Emergency Use Authorization. Use of EVUSHELD does not replace vaccination against COVID-19. You can continue to mask and isolate to avoid infections. It is your choice to receive or not receive EVUSHELD. Should you decide not to receive EVUSHELD, it will not change your standard medical care.     Patient does consent to the injection.

## 2022-05-27 NOTE — PATIENT INSTRUCTIONS
Start rituximab infusions weekly x4  Continue prednisone 40 mg daily for 1 month, then reduce to 20 mg daily  Continue the methotrexate and folic acid  Start omeprazole 20 mg daily and hold the pepcid  Start month pentamidine inhalations  Get monthly lab testing  Get Evusheld injections  Follow with me in 3 months

## 2022-05-30 ENCOUNTER — MYC MEDICAL ADVICE (OUTPATIENT)
Dept: RHEUMATOLOGY | Facility: CLINIC | Age: 77
End: 2022-05-30
Payer: COMMERCIAL

## 2022-05-31 NOTE — TELEPHONE ENCOUNTER
Multiple duplicate messages.      SUKHJINDER Xiong  Rheumatology/Infectious disease  [facility]   [phone]

## 2022-06-01 ENCOUNTER — LAB (OUTPATIENT)
Dept: LAB | Facility: CLINIC | Age: 77
End: 2022-06-01
Payer: COMMERCIAL

## 2022-06-01 DIAGNOSIS — K21.9 GASTROESOPHAGEAL REFLUX DISEASE WITHOUT ESOPHAGITIS: ICD-10-CM

## 2022-06-01 DIAGNOSIS — M81.0 AGE-RELATED OSTEOPOROSIS WITHOUT CURRENT PATHOLOGICAL FRACTURE: ICD-10-CM

## 2022-06-01 DIAGNOSIS — M31.30 GRANULOMATOSIS WITH POLYANGIITIS WITHOUT RENAL INVOLVEMENT (H): ICD-10-CM

## 2022-06-01 DIAGNOSIS — Z79.60 LONG-TERM USE OF IMMUNOSUPPRESSANT MEDICATION: ICD-10-CM

## 2022-06-01 DIAGNOSIS — M31.30 GRANULOMATOSIS WITH POLYANGIITIS, UNSPECIFIED WHETHER RENAL INVOLVEMENT (H): ICD-10-CM

## 2022-06-01 PROCEDURE — 99000 SPECIMEN HANDLING OFFICE-LAB: CPT

## 2022-06-01 PROCEDURE — 86481 TB AG RESPONSE T-CELL SUSP: CPT

## 2022-06-01 PROCEDURE — 36415 COLL VENOUS BLD VENIPUNCTURE: CPT

## 2022-06-01 PROCEDURE — 86769 SARS-COV-2 COVID-19 ANTIBODY: CPT | Mod: 90

## 2022-06-02 LAB
SARS-COV-2 AB SERPL IA-ACNC: 222 U/ML
SARS-COV-2 AB SERPL QL IA: POSITIVE

## 2022-06-03 LAB
QUANTIFERON MITOGEN: 0.8 IU/ML
QUANTIFERON NIL TUBE: 0.01 IU/ML
QUANTIFERON TB1 TUBE: 0.02 IU/ML
QUANTIFERON TB2 TUBE: 0.01

## 2022-06-04 LAB
GAMMA INTERFERON BACKGROUND BLD IA-ACNC: 0.01 IU/ML
M TB IFN-G BLD-IMP: NEGATIVE
M TB IFN-G CD4+ BCKGRND COR BLD-ACNC: 0.79 IU/ML
MITOGEN IGNF BCKGRD COR BLD-ACNC: 0 IU/ML
MITOGEN IGNF BCKGRD COR BLD-ACNC: 0.01 IU/ML

## 2022-06-28 ENCOUNTER — LAB (OUTPATIENT)
Dept: LAB | Facility: CLINIC | Age: 77
End: 2022-06-28

## 2022-06-28 ENCOUNTER — MYC MEDICAL ADVICE (OUTPATIENT)
Dept: RHEUMATOLOGY | Facility: CLINIC | Age: 77
End: 2022-06-28

## 2022-06-28 ENCOUNTER — INFUSION THERAPY VISIT (OUTPATIENT)
Dept: INFUSION THERAPY | Facility: CLINIC | Age: 77
End: 2022-06-28
Attending: INTERNAL MEDICINE
Payer: COMMERCIAL

## 2022-06-28 VITALS
SYSTOLIC BLOOD PRESSURE: 153 MMHG | HEART RATE: 91 BPM | OXYGEN SATURATION: 96 % | BODY MASS INDEX: 20.79 KG/M2 | DIASTOLIC BLOOD PRESSURE: 99 MMHG | TEMPERATURE: 99.4 F | RESPIRATION RATE: 16 BRPM | WEIGHT: 117.3 LBS | HEIGHT: 63 IN
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
## 2022-06-28 DIAGNOSIS — T50.905S ADVERSE EFFECT OF DRUG, SEQUELA: ICD-10-CM

## 2022-06-28 DIAGNOSIS — Z79.60 LONG-TERM USE OF IMMUNOSUPPRESSANT MEDICATION: ICD-10-CM

## 2022-06-28 DIAGNOSIS — M31.30 GRANULOMATOSIS WITH POLYANGIITIS WITHOUT RENAL INVOLVEMENT (H): ICD-10-CM

## 2022-06-28 DIAGNOSIS — K21.9 GASTROESOPHAGEAL REFLUX DISEASE WITHOUT ESOPHAGITIS: Primary | ICD-10-CM

## 2022-06-28 DIAGNOSIS — M80.00XS AGE-RELATED OSTEOPOROSIS WITH CURRENT PATHOLOGICAL FRACTURE, SEQUELA: ICD-10-CM
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
## 2022-06-28 DIAGNOSIS — K21.9 GASTROESOPHAGEAL REFLUX DISEASE WITHOUT ESOPHAGITIS: ICD-10-CM

## 2022-06-28 DIAGNOSIS — M15.0 PRIMARY OSTEOARTHRITIS INVOLVING MULTIPLE JOINTS: ICD-10-CM

## 2022-06-28 LAB
ALBUMIN SERPL-MCNC: 3.4 G/DL (ref 3.4–5)
ALBUMIN UR-MCNC: NEGATIVE MG/DL
ALT SERPL W P-5'-P-CCNC: 34 U/L (ref 0–50)
APPEARANCE UR: CLEAR
AST SERPL W P-5'-P-CCNC: 21 U/L (ref 0–45)
BASOPHILS # BLD AUTO: 0 10E3/UL (ref 0–0.2)
BASOPHILS NFR BLD AUTO: 0 %
BILIRUB UR QL STRIP: NEGATIVE
CD19 CELLS # BLD: 352 CELLS/UL (ref 107–698)
CD19 CELLS NFR BLD: 34 % (ref 6–27)
COLOR UR AUTO: COLORLESS
CREAT SERPL-MCNC: 0.81 MG/DL (ref 0.52–1.04)
CRP SERPL-MCNC: 3.5 MG/L (ref 0–8)
EOSINOPHIL # BLD AUTO: 0.1 10E3/UL (ref 0–0.7)
EOSINOPHIL NFR BLD AUTO: 1 %
ERYTHROCYTE [DISTWIDTH] IN BLOOD BY AUTOMATED COUNT: 16.5 % (ref 10–15)
ERYTHROCYTE [SEDIMENTATION RATE] IN BLOOD BY WESTERGREN METHOD: 3 MM/HR (ref 0–30)
GFR SERPL CREATININE-BSD FRML MDRD: 74 ML/MIN/1.73M2
GLUCOSE UR STRIP-MCNC: NEGATIVE MG/DL
HCT VFR BLD AUTO: 46.9 % (ref 35–47)
HGB BLD-MCNC: 15.8 G/DL (ref 11.7–15.7)
HGB UR QL STRIP: NEGATIVE
IMM GRANULOCYTES # BLD: 0.1 10E3/UL
IMM GRANULOCYTES NFR BLD: 1 %
KETONES UR STRIP-MCNC: NEGATIVE MG/DL
LEUKOCYTE ESTERASE UR QL STRIP: NEGATIVE
LYMPHOCYTES # BLD AUTO: 0.9 10E3/UL (ref 0.8–5.3)
LYMPHOCYTES NFR BLD AUTO: 8 %
MCH RBC QN AUTO: 30.6 PG (ref 26.5–33)
MCHC RBC AUTO-ENTMCNC: 33.7 G/DL (ref 31.5–36.5)
MCV RBC AUTO: 91 FL (ref 78–100)
MONOCYTES # BLD AUTO: 0.5 10E3/UL (ref 0–1.3)
MONOCYTES NFR BLD AUTO: 4 %
NEUTROPHILS # BLD AUTO: 9.7 10E3/UL (ref 1.6–8.3)
NEUTROPHILS NFR BLD AUTO: 86 %
NITRATE UR QL: NEGATIVE
NRBC # BLD AUTO: 0 10E3/UL
NRBC BLD AUTO-RTO: 0 /100
PH UR STRIP: 6.5 [PH] (ref 5–7)
PLATELET # BLD AUTO: 167 10E3/UL (ref 150–450)
RBC # BLD AUTO: 5.17 10E6/UL (ref 3.8–5.2)
RBC #/AREA URNS AUTO: NORMAL /HPF
SKIP: NORMAL
SP GR UR STRIP: 1 (ref 1–1.03)
UROBILINOGEN UR STRIP-MCNC: NORMAL MG/DL
WBC # BLD AUTO: 11.3 10E3/UL (ref 4–11)
WBC #/AREA URNS AUTO: NORMAL /HPF

## 2022-06-28 PROCEDURE — 81001 URINALYSIS AUTO W/SCOPE: CPT

## 2022-06-28 PROCEDURE — 86256 FLUORESCENT ANTIBODY TITER: CPT

## 2022-06-28 PROCEDURE — 36415 COLL VENOUS BLD VENIPUNCTURE: CPT

## 2022-06-28 PROCEDURE — 99207 PR NO CHARGE LOS: CPT

## 2022-06-28 PROCEDURE — 96375 TX/PRO/DX INJ NEW DRUG ADDON: CPT | Performed by: NURSE PRACTITIONER

## 2022-06-28 PROCEDURE — 96413 CHEMO IV INFUSION 1 HR: CPT | Performed by: NURSE PRACTITIONER

## 2022-06-28 PROCEDURE — 82040 ASSAY OF SERUM ALBUMIN: CPT

## 2022-06-28 PROCEDURE — 85025 COMPLETE CBC W/AUTO DIFF WBC: CPT

## 2022-06-28 PROCEDURE — 83516 IMMUNOASSAY NONANTIBODY: CPT

## 2022-06-28 PROCEDURE — 82784 ASSAY IGA/IGD/IGG/IGM EACH: CPT | Performed by: INTERNAL MEDICINE

## 2022-06-28 PROCEDURE — 83876 ASSAY MYELOPEROXIDASE: CPT

## 2022-06-28 PROCEDURE — 86036 ANCA SCREEN EACH ANTIBODY: CPT

## 2022-06-28 PROCEDURE — 84460 ALANINE AMINO (ALT) (SGPT): CPT

## 2022-06-28 PROCEDURE — 85652 RBC SED RATE AUTOMATED: CPT

## 2022-06-28 PROCEDURE — 86140 C-REACTIVE PROTEIN: CPT

## 2022-06-28 PROCEDURE — 96415 CHEMO IV INFUSION ADDL HR: CPT | Performed by: NURSE PRACTITIONER

## 2022-06-28 PROCEDURE — 82565 ASSAY OF CREATININE: CPT

## 2022-06-28 PROCEDURE — 84450 TRANSFERASE (AST) (SGOT): CPT

## 2022-06-28 PROCEDURE — 86355 B CELLS TOTAL COUNT: CPT | Performed by: INTERNAL MEDICINE

## 2022-06-28 RX ORDER — METHYLPREDNISOLONE SODIUM SUCCINATE 125 MG/2ML
125 INJECTION, POWDER, LYOPHILIZED, FOR SOLUTION INTRAMUSCULAR; INTRAVENOUS
Status: CANCELLED
Start: 2022-07-05

## 2022-06-28 RX ORDER — NALOXONE HYDROCHLORIDE 0.4 MG/ML
0.2 INJECTION, SOLUTION INTRAMUSCULAR; INTRAVENOUS; SUBCUTANEOUS
Status: CANCELLED | OUTPATIENT
Start: 2022-07-05

## 2022-06-28 RX ORDER — HEPARIN SODIUM (PORCINE) LOCK FLUSH IV SOLN 100 UNIT/ML 100 UNIT/ML
5 SOLUTION INTRAVENOUS
Status: CANCELLED | OUTPATIENT
Start: 2022-07-05

## 2022-06-28 RX ORDER — DIPHENHYDRAMINE HCL 25 MG
50 CAPSULE ORAL ONCE
Status: COMPLETED | OUTPATIENT
Start: 2022-06-28 | End: 2022-06-28

## 2022-06-28 RX ORDER — EPINEPHRINE 1 MG/ML
0.3 INJECTION, SOLUTION INTRAMUSCULAR; SUBCUTANEOUS EVERY 5 MIN PRN
Status: CANCELLED | OUTPATIENT
Start: 2022-07-05

## 2022-06-28 RX ORDER — DIPHENHYDRAMINE HCL 25 MG
50 CAPSULE ORAL ONCE
Status: CANCELLED
Start: 2022-07-05

## 2022-06-28 RX ORDER — ALBUTEROL SULFATE 90 UG/1
1-2 AEROSOL, METERED RESPIRATORY (INHALATION)
Status: CANCELLED
Start: 2022-07-05

## 2022-06-28 RX ORDER — DIPHENHYDRAMINE HYDROCHLORIDE 50 MG/ML
50 INJECTION INTRAMUSCULAR; INTRAVENOUS
Status: CANCELLED
Start: 2022-07-05

## 2022-06-28 RX ORDER — ALBUTEROL SULFATE 0.83 MG/ML
2.5 SOLUTION RESPIRATORY (INHALATION)
Status: CANCELLED | OUTPATIENT
Start: 2022-07-05

## 2022-06-28 RX ORDER — MEPERIDINE HYDROCHLORIDE 25 MG/ML
25 INJECTION INTRAMUSCULAR; INTRAVENOUS; SUBCUTANEOUS EVERY 30 MIN PRN
Status: CANCELLED | OUTPATIENT
Start: 2022-07-05

## 2022-06-28 RX ORDER — ACETAMINOPHEN 325 MG/1
650 TABLET ORAL ONCE
Status: CANCELLED
Start: 2022-07-05

## 2022-06-28 RX ORDER — ACETAMINOPHEN 325 MG/1
650 TABLET ORAL ONCE
Status: COMPLETED | OUTPATIENT
Start: 2022-06-28 | End: 2022-06-28

## 2022-06-28 RX ORDER — EPINEPHRINE 1 MG/ML
0.3 INJECTION, SOLUTION, CONCENTRATE INTRAVENOUS EVERY 5 MIN PRN
Status: CANCELLED | OUTPATIENT
Start: 2022-07-05

## 2022-06-28 RX ORDER — METHYLPREDNISOLONE SODIUM SUCCINATE 125 MG/2ML
100 INJECTION, POWDER, LYOPHILIZED, FOR SOLUTION INTRAMUSCULAR; INTRAVENOUS ONCE
Status: COMPLETED | OUTPATIENT
Start: 2022-06-28 | End: 2022-06-28

## 2022-06-28 RX ORDER — HEPARIN SODIUM,PORCINE 10 UNIT/ML
5 VIAL (ML) INTRAVENOUS
Status: CANCELLED | OUTPATIENT
Start: 2022-07-05

## 2022-06-28 RX ORDER — METHYLPREDNISOLONE SODIUM SUCCINATE 125 MG/2ML
100 INJECTION, POWDER, LYOPHILIZED, FOR SOLUTION INTRAMUSCULAR; INTRAVENOUS ONCE
Status: CANCELLED | OUTPATIENT
Start: 2022-07-05

## 2022-06-28 RX ADMIN — METHYLPREDNISOLONE SODIUM SUCCINATE 100 MG: 125 INJECTION INTRAMUSCULAR; INTRAVENOUS at 10:01

## 2022-06-28 RX ADMIN — Medication 50 MG: at 09:48

## 2022-06-28 RX ADMIN — Medication 250 ML: at 10:00

## 2022-06-28 RX ADMIN — ACETAMINOPHEN 650 MG: 325 TABLET ORAL at 09:48

## 2022-06-28 ASSESSMENT — PAIN SCALES - GENERAL: PAINLEVEL: NO PAIN (0)

## 2022-06-28 NOTE — PROGRESS NOTES
Infusion Nursing Note:  Nury Cárdenas presents today for Truxima 1/4.    Patient seen by provider today: No   present during visit today: Not Applicable.    Note: Patient oriented to [facility], including call light and bathrooms.  Patient received rituximab in the past, last in 2014.  She tolerated those infusions well.      Discussed rapid protocol with patient, she is amenable to trying it this way.  Informed pharmacy.      Intravenous Access:  Peripheral IV placed.    Treatment Conditions:  Biological Infusion Checklist:  ~~~ NOTE: If the patient answers yes to any of the questions below, hold the infusion and contact ordering provider or on-call provider.    1. Have you recently had an elevated temperature, fever, chills, productive cough, coughing for 3 weeks or longer or hemoptysis, abnormal vital signs, night sweats,  chest pain or have you noticed a decrease in your appetite, unexplained weight loss or fatigue? No  2. Do you have any open wounds or new incisions? No  3. Do you have any recent or upcoming hospitalizations, surgeries or dental procedures? No  4. Do you currently have or recently have had any signs of illness or infection or are you on any antibiotics? No  5. Have you had any new, sudden or worsening abdominal pain? No  6. Have you or anyone in your household received a live vaccination in the past 4 weeks? Please note:  No live vaccines while on biologic/chemotherapy until 6 months after the last treatment.  Patient can receive the flu vaccine (shot only) and the pneumovax.  It is optimal for the patient to get these vaccines mid cycle, but they can be given at any time as long as it is not on the day of the infusion. No  7. Have you recently been diagnosed with any new nervous system diseases (ie. Multiple sclerosis, Guillain [place], seizures, neurological changes) or cancer diagnosis? No  8. Are you on any form of radiation or chemotherapy? No  9. Are you  pregnant or breast feeding or do you have plans of pregnancy in the future? No  10. Have you been having any signs of worsening depression or suicidal ideations?  (benlysta only) No  11. Have there been any other new onset medical symptoms? No      Post Infusion Assessment:  Patient tolerated infusion without incident.  Blood return noted pre and post infusion.  Site patent and intact, free from redness, edema or discomfort.  No evidence of extravasations.  Access discontinued per protocol.     Discharge Plan:   Patient will return 7/6/2022 for next appointment.   Patient discharged in stable condition accompanied by: daughter.  Departure Mode: Ambulatory.    Roxann Alonso, RN-BSN, PHN, OCN  [facility]

## 2022-06-29 ENCOUNTER — ALLIED HEALTH/NURSE VISIT (OUTPATIENT)
Dept: FAMILY MEDICINE | Facility: CLINIC | Age: 77
End: 2022-06-29
Payer: COMMERCIAL

## 2022-06-29 VITALS — DIASTOLIC BLOOD PRESSURE: 70 MMHG | SYSTOLIC BLOOD PRESSURE: 138 MMHG

## 2022-06-29 DIAGNOSIS — I10 HTN (HYPERTENSION): Primary | ICD-10-CM

## 2022-06-29 LAB
ANCA AB PATTERN SER IF-IMP: ABNORMAL
C-ANCA TITR SER IF: ABNORMAL {TITER}
IGA SERPL-MCNC: 107 MG/DL (ref 84–499)
IGG SERPL-MCNC: 491 MG/DL (ref 610–1616)
IGM SERPL-MCNC: 87 MG/DL (ref 35–242)

## 2022-06-29 PROCEDURE — 99207 PR NO CHARGE NURSE ONLY: CPT | Performed by: FAMILY MEDICINE

## 2022-07-01 LAB
MYELOPEROXIDASE AB SER IA-ACNC: <0.3 U/ML
MYELOPEROXIDASE AB SER IA-ACNC: NEGATIVE
PROTEINASE3 AB SER IA-ACNC: 21 U/ML
PROTEINASE3 AB SER IA-ACNC: POSITIVE

## 2022-07-04 ENCOUNTER — MYC MEDICAL ADVICE (OUTPATIENT)
Dept: FAMILY MEDICINE | Facility: CLINIC | Age: 77
End: 2022-07-04

## 2022-07-04 DIAGNOSIS — B02.9 HERPES ZOSTER WITHOUT COMPLICATION: Primary | ICD-10-CM

## 2022-07-06 ENCOUNTER — INFUSION THERAPY VISIT (OUTPATIENT)
Dept: INFUSION THERAPY | Facility: CLINIC | Age: 77
End: 2022-07-06
Attending: INTERNAL MEDICINE
Payer: COMMERCIAL

## 2022-07-06 VITALS
HEART RATE: 95 BPM | WEIGHT: 119.5 LBS | OXYGEN SATURATION: 96 % | RESPIRATION RATE: 16 BRPM | DIASTOLIC BLOOD PRESSURE: 88 MMHG | SYSTOLIC BLOOD PRESSURE: 151 MMHG | TEMPERATURE: 98.2 F | BODY MASS INDEX: 21.17 KG/M2
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
## 2022-07-06 DIAGNOSIS — M80.00XS AGE-RELATED OSTEOPOROSIS WITH CURRENT PATHOLOGICAL FRACTURE, SEQUELA: ICD-10-CM
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
## 2022-07-06 DIAGNOSIS — T50.905S ADVERSE EFFECT OF DRUG, SEQUELA: ICD-10-CM

## 2022-07-06 DIAGNOSIS — K21.9 GASTROESOPHAGEAL REFLUX DISEASE WITHOUT ESOPHAGITIS: ICD-10-CM

## 2022-07-06 DIAGNOSIS — M31.30 GRANULOMATOSIS WITH POLYANGIITIS WITHOUT RENAL INVOLVEMENT (H): ICD-10-CM

## 2022-07-06 PROCEDURE — 96415 CHEMO IV INFUSION ADDL HR: CPT | Performed by: INTERNAL MEDICINE

## 2022-07-06 PROCEDURE — 96413 CHEMO IV INFUSION 1 HR: CPT | Performed by: INTERNAL MEDICINE

## 2022-07-06 PROCEDURE — 99207 PR NO CHARGE LOS: CPT

## 2022-07-06 PROCEDURE — 96375 TX/PRO/DX INJ NEW DRUG ADDON: CPT | Performed by: INTERNAL MEDICINE

## 2022-07-06 RX ORDER — ACYCLOVIR 800 MG/1
800 TABLET ORAL
Qty: 35 TABLET | Refills: 0 | Status: SHIPPED | OUTPATIENT
Start: 2022-07-06 | End: 2022-10-25

## 2022-07-06 RX ORDER — ACETAMINOPHEN 325 MG/1
650 TABLET ORAL ONCE
Status: CANCELLED
Start: 2022-07-12

## 2022-07-06 RX ORDER — RITUXIMAB 10 MG/ML
INJECTION, SOLUTION INTRAVENOUS
COMMUNITY
Start: 2022-07-06 | End: 2022-10-25

## 2022-07-06 RX ORDER — HEPARIN SODIUM (PORCINE) LOCK FLUSH IV SOLN 100 UNIT/ML 100 UNIT/ML
5 SOLUTION INTRAVENOUS
Status: CANCELLED | OUTPATIENT
Start: 2022-07-12

## 2022-07-06 RX ORDER — DIPHENHYDRAMINE HYDROCHLORIDE 50 MG/ML
50 INJECTION INTRAMUSCULAR; INTRAVENOUS
Status: CANCELLED
Start: 2022-07-12

## 2022-07-06 RX ORDER — METHYLPREDNISOLONE SODIUM SUCCINATE 125 MG/2ML
125 INJECTION, POWDER, LYOPHILIZED, FOR SOLUTION INTRAMUSCULAR; INTRAVENOUS
Status: CANCELLED
Start: 2022-07-12

## 2022-07-06 RX ORDER — NALOXONE HYDROCHLORIDE 0.4 MG/ML
0.2 INJECTION, SOLUTION INTRAMUSCULAR; INTRAVENOUS; SUBCUTANEOUS
Status: CANCELLED | OUTPATIENT
Start: 2022-07-12

## 2022-07-06 RX ORDER — DIPHENHYDRAMINE HCL 25 MG
50 CAPSULE ORAL ONCE
Status: CANCELLED
Start: 2022-07-12

## 2022-07-06 RX ORDER — METHYLPREDNISOLONE SODIUM SUCCINATE 125 MG/2ML
100 INJECTION, POWDER, LYOPHILIZED, FOR SOLUTION INTRAMUSCULAR; INTRAVENOUS ONCE
Status: COMPLETED | OUTPATIENT
Start: 2022-07-06 | End: 2022-07-06

## 2022-07-06 RX ORDER — DIPHENHYDRAMINE HCL 25 MG
50 CAPSULE ORAL ONCE
Status: COMPLETED | OUTPATIENT
Start: 2022-07-06 | End: 2022-07-06

## 2022-07-06 RX ORDER — ALBUTEROL SULFATE 90 UG/1
1-2 AEROSOL, METERED RESPIRATORY (INHALATION)
Status: CANCELLED
Start: 2022-07-12

## 2022-07-06 RX ORDER — HEPARIN SODIUM,PORCINE 10 UNIT/ML
5 VIAL (ML) INTRAVENOUS
Status: CANCELLED | OUTPATIENT
Start: 2022-07-12

## 2022-07-06 RX ORDER — ACETAMINOPHEN 325 MG/1
650 TABLET ORAL ONCE
Status: COMPLETED | OUTPATIENT
Start: 2022-07-06 | End: 2022-07-06

## 2022-07-06 RX ORDER — MEPERIDINE HYDROCHLORIDE 25 MG/ML
25 INJECTION INTRAMUSCULAR; INTRAVENOUS; SUBCUTANEOUS EVERY 30 MIN PRN
Status: CANCELLED | OUTPATIENT
Start: 2022-07-12

## 2022-07-06 RX ORDER — METHYLPREDNISOLONE SODIUM SUCCINATE 125 MG/2ML
100 INJECTION, POWDER, LYOPHILIZED, FOR SOLUTION INTRAMUSCULAR; INTRAVENOUS ONCE
Status: CANCELLED | OUTPATIENT
Start: 2022-07-12

## 2022-07-06 RX ORDER — ALBUTEROL SULFATE 0.83 MG/ML
2.5 SOLUTION RESPIRATORY (INHALATION)
Status: CANCELLED | OUTPATIENT
Start: 2022-07-12

## 2022-07-06 RX ORDER — EPINEPHRINE 1 MG/ML
0.3 INJECTION, SOLUTION INTRAMUSCULAR; SUBCUTANEOUS EVERY 5 MIN PRN
Status: CANCELLED | OUTPATIENT
Start: 2022-07-12

## 2022-07-06 RX ADMIN — Medication 250 ML: at 09:29

## 2022-07-06 RX ADMIN — METHYLPREDNISOLONE SODIUM SUCCINATE 100 MG: 125 INJECTION INTRAMUSCULAR; INTRAVENOUS at 09:35

## 2022-07-06 RX ADMIN — Medication 50 MG: at 09:34

## 2022-07-06 RX ADMIN — ACETAMINOPHEN 650 MG: 325 TABLET ORAL at 09:34

## 2022-07-06 ASSESSMENT — PAIN SCALES - GENERAL: PAINLEVEL: MILD PAIN (2)

## 2022-07-06 NOTE — PROGRESS NOTES
Infusion Nursing Note:  Nury Cárdenas presents today for Rapid Truxima.    Patient seen by provider today: No   present during visit today: Not Applicable.    Note: The pt reports tolerating her first Rapid Truxima infusion well. She denies any medical concerns at this time.    Intravenous Access:  Peripheral IV placed.    Treatment Conditions:  Biological Infusion Checklist:  ~~~ NOTE: If the patient answers yes to any of the questions below, hold the infusion and contact ordering provider or on-call provider.    1. Have you recently had an elevated temperature, fever, chills, productive cough, coughing for 3 weeks or longer or hemoptysis, abnormal vital signs, night sweats,  chest pain or have you noticed a decrease in your appetite, unexplained weight loss or fatigue? No  2. Do you have any open wounds or new incisions? No  3. Do you have any recent or upcoming hospitalizations, surgeries or dental procedures? No  4. Do you currently have or recently have had any signs of illness or infection or are you on any antibiotics? No  5. Have you had any new, sudden or worsening abdominal pain? No  6. Have you or anyone in your household received a live vaccination in the past 4 weeks? Please note:  No live vaccines while on biologic/chemotherapy until 6 months after the last treatment.  Patient can receive the flu vaccine (shot only) and the pneumovax.  It is optimal for the patient to get these vaccines mid cycle, but they can be given at any time as long as it is not on the day of the infusion. No  7. Have you recently been diagnosed with any new nervous system diseases (ie. Multiple sclerosis, Guillain [place], seizures, neurological changes) or cancer diagnosis? No  8. Are you on any form of radiation or chemotherapy? No  9. Are you pregnant or breast feeding or do you have plans of pregnancy in the future? No  10. Have you been having any signs of worsening depression or suicidal ideations?  (benlysta  only) No  11. Have there been any other new onset medical symptoms? No      Post Infusion Assessment:  Patient tolerated infusion without incident.  Site patent and intact, free from redness, edema or discomfort.  No evidence of extravasations.  Access discontinued per protocol.  Biologic Infusion Post Education: Call the triage nurse at your clinic or seek medical attention if you have chills and/or temperature greater than or equal to 100.5, uncontrolled nausea/vomiting, diarrhea, constipation, dizziness, shortness of breath, chest pain, heart palpitations, weakness or any other new or concerning symptoms, questions or concerns.  You cannot have any live virus vaccines prior to or during treatment or up to 6 months post infusion.  If you have an upcoming surgery, medical procedure or dental procedure during treatment, this should be discussed with your ordering physician and your surgeon/dentist.  If you are having any concerning symptom, if you are unsure if you should get your next infusion or wish to speak to a provider before your next infusion, please call your care coordinator or triage nurse at your clinic to notify them so we can adequately serve you.     Discharge Plan:   AVS to patient via [employer].  Patient will return 7/12/22 for next appointment.   Patient discharged in stable condition accompanied by: self.  Departure Mode: Ambulatory.      Jillina Blackman RN

## 2022-07-07 ENCOUNTER — MYC MEDICAL ADVICE (OUTPATIENT)
Dept: OPHTHALMOLOGY | Facility: CLINIC | Age: 77
End: 2022-07-07

## 2022-07-07 ENCOUNTER — OFFICE VISIT (OUTPATIENT)
Dept: OPHTHALMOLOGY | Facility: CLINIC | Age: 77
End: 2022-07-07
Payer: COMMERCIAL

## 2022-07-07 DIAGNOSIS — B02.30 HERPES ZOSTER OPHTHALMICUS: Primary | ICD-10-CM

## 2022-07-07 PROCEDURE — 92012 INTRM OPH EXAM EST PATIENT: CPT | Performed by: OPHTHALMOLOGY

## 2022-07-07 ASSESSMENT — VISUAL ACUITY
OD_CC+: -1
OS_CC: 20/25
OS_CC+: -2
CORRECTION_TYPE: GLASSES
OD_CC: 20/30
METHOD: SNELLEN - LINEAR

## 2022-07-07 ASSESSMENT — EXTERNAL EXAM - LEFT EYE: OS_EXAM: 1+ BROW PTOSIS, PROLAPSED FAT PADS: UPPER, LOWER

## 2022-07-07 NOTE — PATIENT INSTRUCTIONS
Consult Dr. Jackson regarding acyclovir.  Call if your eye become sore, achy, red, or blurry.  Call in November 2022 for an appointment in March 2023 for Complete Exam    Dr. Mcleod [phone]

## 2022-07-07 NOTE — PROGRESS NOTES
Current Eye Medications:  None.       Subjective:  Dr. Browning recommended an urgent evaluation of her right eye, secondary to potential Shingles on the right side of her face.  She was given a prescription for Acyclovir yesterday, but hasn't started taking it.  Rash started on the right side of her upper forehead, near her hairline, a couple months ago.  She also had one lesion near her brow area, and some on the back of her leg.  No specific eye pain or redness, but feels her vision in each eye is slightly blurry.    Has not had either shingles vaccine.       Objective:  See Ophthalmology Exam.       Assessment:  History of right facial rash (likely V1 zoster), but now appears mostly healed.  No ocular findings of concern.      Plan:  Consult Dr. Jackson regarding acyclovir.  Call if your eye become sore, achy, red, or blurry.  Call in November 2022 for an appointment in March 2023 for Complete Exam    Dr. Mcleod [phone]

## 2022-07-08 ENCOUNTER — MYC MEDICAL ADVICE (OUTPATIENT)
Dept: RHEUMATOLOGY | Facility: CLINIC | Age: 77
End: 2022-07-08

## 2022-07-08 PROBLEM — B02.30 HERPES ZOSTER OPHTHALMICUS: Status: ACTIVE | Noted: 2022-07-08

## 2022-07-11 ENCOUNTER — OFFICE VISIT (OUTPATIENT)
Dept: NURSING | Facility: CLINIC | Age: 77
End: 2022-07-11
Payer: COMMERCIAL

## 2022-07-11 VITALS — OXYGEN SATURATION: 96 % | HEART RATE: 98 BPM

## 2022-07-11 DIAGNOSIS — Z79.52 CURRENT CHRONIC USE OF SYSTEMIC STEROIDS: Primary | ICD-10-CM

## 2022-07-11 DIAGNOSIS — Z79.60 LONG-TERM USE OF IMMUNOSUPPRESSANT MEDICATION: ICD-10-CM

## 2022-07-11 DIAGNOSIS — M31.30 GRANULOMATOSIS WITH POLYANGIITIS WITHOUT RENAL INVOLVEMENT (H): ICD-10-CM

## 2022-07-11 PROCEDURE — 94642 AEROSOL INHALATION TREATMENT: CPT

## 2022-07-11 PROCEDURE — 94640 AIRWAY INHALATION TREATMENT: CPT | Performed by: INTERNAL MEDICINE

## 2022-07-11 RX ORDER — MEPERIDINE HYDROCHLORIDE 25 MG/ML
25 INJECTION INTRAMUSCULAR; INTRAVENOUS; SUBCUTANEOUS EVERY 30 MIN PRN
Status: CANCELLED | OUTPATIENT
Start: 2022-08-08

## 2022-07-11 RX ORDER — NALOXONE HYDROCHLORIDE 0.4 MG/ML
0.2 INJECTION, SOLUTION INTRAMUSCULAR; INTRAVENOUS; SUBCUTANEOUS
Status: DISCONTINUED | OUTPATIENT
Start: 2022-07-11 | End: 2022-07-11 | Stop reason: HOSPADM

## 2022-07-11 RX ORDER — ALBUTEROL SULFATE 0.83 MG/ML
2.5 SOLUTION RESPIRATORY (INHALATION) ONCE
Status: CANCELLED
Start: 2022-08-08 | End: 2022-08-08

## 2022-07-11 RX ORDER — NALOXONE HYDROCHLORIDE 0.4 MG/ML
0.2 INJECTION, SOLUTION INTRAMUSCULAR; INTRAVENOUS; SUBCUTANEOUS
Status: CANCELLED | OUTPATIENT
Start: 2022-08-08

## 2022-07-11 RX ORDER — ALBUTEROL SULFATE 0.83 MG/ML
2.5 SOLUTION RESPIRATORY (INHALATION) ONCE
Status: COMPLETED | OUTPATIENT
Start: 2022-07-11 | End: 2022-07-11

## 2022-07-11 RX ORDER — ALBUTEROL SULFATE 0.83 MG/ML
2.5 SOLUTION RESPIRATORY (INHALATION)
Status: CANCELLED | OUTPATIENT
Start: 2022-08-08

## 2022-07-11 RX ORDER — ALBUTEROL SULFATE 90 UG/1
1-2 AEROSOL, METERED RESPIRATORY (INHALATION)
Status: CANCELLED
Start: 2022-08-08

## 2022-07-11 RX ORDER — EPINEPHRINE 1 MG/ML
0.3 INJECTION, SOLUTION INTRAMUSCULAR; SUBCUTANEOUS EVERY 5 MIN PRN
Status: CANCELLED | OUTPATIENT
Start: 2022-08-08

## 2022-07-11 RX ORDER — DIPHENHYDRAMINE HYDROCHLORIDE 50 MG/ML
50 INJECTION INTRAMUSCULAR; INTRAVENOUS
Status: CANCELLED
Start: 2022-08-08

## 2022-07-11 RX ORDER — MEPERIDINE HYDROCHLORIDE 25 MG/ML
25 INJECTION INTRAMUSCULAR; INTRAVENOUS; SUBCUTANEOUS EVERY 30 MIN PRN
Status: DISCONTINUED | OUTPATIENT
Start: 2022-07-11 | End: 2022-07-11 | Stop reason: HOSPADM

## 2022-07-11 RX ORDER — EPINEPHRINE 1 MG/ML
0.3 INJECTION, SOLUTION INTRAMUSCULAR; SUBCUTANEOUS EVERY 5 MIN PRN
Status: DISCONTINUED | OUTPATIENT
Start: 2022-07-11 | End: 2022-07-11 | Stop reason: HOSPADM

## 2022-07-11 RX ORDER — ALBUTEROL SULFATE 90 UG/1
1-2 AEROSOL, METERED RESPIRATORY (INHALATION)
Status: DISCONTINUED | OUTPATIENT
Start: 2022-07-11 | End: 2022-07-11 | Stop reason: HOSPADM

## 2022-07-11 RX ORDER — PENTAMIDINE ISETHIONATE 300 MG/300MG
300 INHALANT RESPIRATORY (INHALATION)
Status: CANCELLED
Start: 2022-08-08

## 2022-07-11 RX ORDER — ALBUTEROL SULFATE 0.83 MG/ML
2.5 SOLUTION RESPIRATORY (INHALATION)
Status: DISCONTINUED | OUTPATIENT
Start: 2022-07-11 | End: 2022-07-11 | Stop reason: HOSPADM

## 2022-07-11 RX ORDER — METHYLPREDNISOLONE SODIUM SUCCINATE 125 MG/2ML
125 INJECTION, POWDER, LYOPHILIZED, FOR SOLUTION INTRAMUSCULAR; INTRAVENOUS
Status: CANCELLED
Start: 2022-08-08

## 2022-07-11 RX ORDER — METHYLPREDNISOLONE SODIUM SUCCINATE 125 MG/2ML
125 INJECTION, POWDER, LYOPHILIZED, FOR SOLUTION INTRAMUSCULAR; INTRAVENOUS
Status: DISCONTINUED | OUTPATIENT
Start: 2022-07-11 | End: 2022-07-11 | Stop reason: HOSPADM

## 2022-07-11 RX ORDER — PENTAMIDINE ISETHIONATE 300 MG/300MG
300 INHALANT RESPIRATORY (INHALATION)
Status: DISCONTINUED | OUTPATIENT
Start: 2022-07-11 | End: 2022-07-11 | Stop reason: HOSPADM

## 2022-07-11 RX ORDER — DIPHENHYDRAMINE HYDROCHLORIDE 50 MG/ML
50 INJECTION INTRAMUSCULAR; INTRAVENOUS
Status: DISCONTINUED | OUTPATIENT
Start: 2022-07-11 | End: 2022-07-11 | Stop reason: HOSPADM

## 2022-07-11 RX ADMIN — PENTAMIDINE ISETHIONATE 300 MG: 300 INHALANT RESPIRATORY (INHALATION) at 09:35

## 2022-07-11 RX ADMIN — ALBUTEROL SULFATE 2.5 MG: 0.83 SOLUTION RESPIRATORY (INHALATION) at 09:29

## 2022-07-11 NOTE — PROGRESS NOTES
Pentamidine Tx Note:     Pre-Tx Vitals: SpO2 96%, HR 98, RR 14, BS diminished but clear bilat.    Pt was given 2.5mg Albuterol followed by 300mg Pentamidine diluted in 6 mL sterile water, both given via expiratory filtered nebulizers.    Post-Tx Vitals: SpO2 95, HR 93, RR 12, BS clear bilat    Pt tolerated tx well. No adverse reactions noted.

## 2022-07-12 ENCOUNTER — INFUSION THERAPY VISIT (OUTPATIENT)
Dept: INFUSION THERAPY | Facility: CLINIC | Age: 77
End: 2022-07-12
Attending: INTERNAL MEDICINE
Payer: COMMERCIAL

## 2022-07-12 VITALS
DIASTOLIC BLOOD PRESSURE: 91 MMHG | HEART RATE: 100 BPM | OXYGEN SATURATION: 95 % | SYSTOLIC BLOOD PRESSURE: 155 MMHG | WEIGHT: 121.1 LBS | TEMPERATURE: 99.5 F | BODY MASS INDEX: 21.45 KG/M2 | RESPIRATION RATE: 16 BRPM

## 2022-07-12 DIAGNOSIS — T50.905S ADVERSE EFFECT OF DRUG, SEQUELA: ICD-10-CM
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
## 2022-07-12 DIAGNOSIS — M31.30 GRANULOMATOSIS WITH POLYANGIITIS WITHOUT RENAL INVOLVEMENT (H): ICD-10-CM
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
## 2022-07-12 DIAGNOSIS — K21.9 GASTROESOPHAGEAL REFLUX DISEASE WITHOUT ESOPHAGITIS: ICD-10-CM

## 2022-07-12 DIAGNOSIS — M80.00XS AGE-RELATED OSTEOPOROSIS WITH CURRENT PATHOLOGICAL FRACTURE, SEQUELA: ICD-10-CM

## 2022-07-12 PROCEDURE — 96375 TX/PRO/DX INJ NEW DRUG ADDON: CPT | Performed by: NURSE PRACTITIONER

## 2022-07-12 PROCEDURE — 99207 PR NO CHARGE LOS: CPT

## 2022-07-12 PROCEDURE — 96415 CHEMO IV INFUSION ADDL HR: CPT | Performed by: NURSE PRACTITIONER

## 2022-07-12 PROCEDURE — 96413 CHEMO IV INFUSION 1 HR: CPT | Performed by: NURSE PRACTITIONER

## 2022-07-12 RX ORDER — ACETAMINOPHEN 325 MG/1
650 TABLET ORAL ONCE
Status: COMPLETED | OUTPATIENT
Start: 2022-07-12 | End: 2022-07-12

## 2022-07-12 RX ORDER — MEPERIDINE HYDROCHLORIDE 25 MG/ML
25 INJECTION INTRAMUSCULAR; INTRAVENOUS; SUBCUTANEOUS EVERY 30 MIN PRN
Status: CANCELLED | OUTPATIENT
Start: 2022-07-13

## 2022-07-12 RX ORDER — DIPHENHYDRAMINE HCL 25 MG
50 CAPSULE ORAL ONCE
Status: CANCELLED
Start: 2022-07-13

## 2022-07-12 RX ORDER — METHYLPREDNISOLONE SODIUM SUCCINATE 125 MG/2ML
100 INJECTION, POWDER, LYOPHILIZED, FOR SOLUTION INTRAMUSCULAR; INTRAVENOUS ONCE
Status: CANCELLED | OUTPATIENT
Start: 2022-07-13

## 2022-07-12 RX ORDER — HEPARIN SODIUM (PORCINE) LOCK FLUSH IV SOLN 100 UNIT/ML 100 UNIT/ML
5 SOLUTION INTRAVENOUS
Status: CANCELLED | OUTPATIENT
Start: 2022-07-13

## 2022-07-12 RX ORDER — METHYLPREDNISOLONE SODIUM SUCCINATE 125 MG/2ML
125 INJECTION, POWDER, LYOPHILIZED, FOR SOLUTION INTRAMUSCULAR; INTRAVENOUS
Status: CANCELLED
Start: 2022-07-13

## 2022-07-12 RX ORDER — ALBUTEROL SULFATE 0.83 MG/ML
2.5 SOLUTION RESPIRATORY (INHALATION)
Status: CANCELLED | OUTPATIENT
Start: 2022-07-13

## 2022-07-12 RX ORDER — DIPHENHYDRAMINE HYDROCHLORIDE 50 MG/ML
50 INJECTION INTRAMUSCULAR; INTRAVENOUS
Status: CANCELLED
Start: 2022-07-13

## 2022-07-12 RX ORDER — DIPHENHYDRAMINE HCL 25 MG
50 CAPSULE ORAL ONCE
Status: COMPLETED | OUTPATIENT
Start: 2022-07-12 | End: 2022-07-12

## 2022-07-12 RX ORDER — HEPARIN SODIUM,PORCINE 10 UNIT/ML
5 VIAL (ML) INTRAVENOUS
Status: CANCELLED | OUTPATIENT
Start: 2022-07-13

## 2022-07-12 RX ORDER — ACETAMINOPHEN 325 MG/1
650 TABLET ORAL ONCE
Status: CANCELLED
Start: 2022-07-13

## 2022-07-12 RX ORDER — NALOXONE HYDROCHLORIDE 0.4 MG/ML
0.2 INJECTION, SOLUTION INTRAMUSCULAR; INTRAVENOUS; SUBCUTANEOUS
Status: CANCELLED | OUTPATIENT
Start: 2022-07-13

## 2022-07-12 RX ORDER — EPINEPHRINE 1 MG/ML
0.3 INJECTION, SOLUTION INTRAMUSCULAR; SUBCUTANEOUS EVERY 5 MIN PRN
Status: CANCELLED | OUTPATIENT
Start: 2022-07-13

## 2022-07-12 RX ORDER — METHYLPREDNISOLONE SODIUM SUCCINATE 125 MG/2ML
100 INJECTION, POWDER, LYOPHILIZED, FOR SOLUTION INTRAMUSCULAR; INTRAVENOUS ONCE
Status: COMPLETED | OUTPATIENT
Start: 2022-07-12 | End: 2022-07-12

## 2022-07-12 RX ORDER — ALBUTEROL SULFATE 90 UG/1
1-2 AEROSOL, METERED RESPIRATORY (INHALATION)
Status: CANCELLED
Start: 2022-07-13

## 2022-07-12 RX ADMIN — METHYLPREDNISOLONE SODIUM SUCCINATE 100 MG: 125 INJECTION INTRAMUSCULAR; INTRAVENOUS at 10:08

## 2022-07-12 RX ADMIN — Medication 50 MG: at 09:56

## 2022-07-12 RX ADMIN — ACETAMINOPHEN 650 MG: 325 TABLET ORAL at 09:56

## 2022-07-12 RX ADMIN — Medication 250 ML: at 10:06

## 2022-07-12 NOTE — PROGRESS NOTES
Infusion Nursing Note:  Nury Cárdenas presents today for Rapid Truxima.    Patient seen by provider today: No   present during visit today: Not Applicable.    Note: Patient reports worsening chills today-states they normally last 1-2 hours in the AM and today it has been about 4 hours so far. Temperature noted at 100.1. Page sent to Dr. Maguire and received TORB to proceed with Truxima today per Dr. Maguire.    Temperature recheck 99.5.     Intravenous Access:  Peripheral IV placed.    Treatment Conditions:  Biological Infusion Checklist:  ~~~ NOTE: If the patient answers yes to any of the questions below, hold the infusion and contact ordering provider or on-call provider.    1. Have you recently had an elevated temperature, fever, chills, productive cough, coughing for 3 weeks or longer or hemoptysis, abnormal vital signs, night sweats,  chest pain or have you noticed a decrease in your appetite, unexplained weight loss or fatigue? Yes, chills are lasting longer with each infusion-patient has not checked her temperature at home but denies feeling feverish  2. Do you have any open wounds or new incisions? No  3. Do you have any recent or upcoming hospitalizations, surgeries or dental procedures? No  4. Do you currently have or recently have had any signs of illness or infection or are you on any antibiotics? No  5. Have you had any new, sudden or worsening abdominal pain? No  6. Have you or anyone in your household received a live vaccination in the past 4 weeks? Please note:  No live vaccines while on biologic/chemotherapy until 6 months after the last treatment.  Patient can receive the flu vaccine (shot only) and the pneumovax.  It is optimal for the patient to get these vaccines mid cycle, but they can be given at any time as long as it is not on the day of the infusion. No  7. Have you recently been diagnosed with any new nervous system diseases (ie. Multiple sclerosis, Guillain [place], seizures,  neurological changes) or cancer diagnosis? No  8. Are you on any form of radiation or chemotherapy? No  9. Are you pregnant or breast feeding or do you have plans of pregnancy in the future? No  10. Have you been having any signs of worsening depression or suicidal ideations?  (benlysta only) No  11. Have there been any other new onset medical symptoms? No      Post Infusion Assessment:  Patient tolerated infusion without incident.  Site patent and intact, free from redness, edema or discomfort.  No evidence of extravasations.  Access discontinued per protocol.     Discharge Plan:   AVS to patient via MYCHART.  Patient will return 7/19/2022 for next appointment.   Patient discharged in stable condition accompanied by: self.  Departure Mode: Ambulatory.      Barb Rosenberg RN

## 2022-07-13 ENCOUNTER — ALLIED HEALTH/NURSE VISIT (OUTPATIENT)
Dept: FAMILY MEDICINE | Facility: CLINIC | Age: 77
End: 2022-07-13
Payer: COMMERCIAL

## 2022-07-13 VITALS — DIASTOLIC BLOOD PRESSURE: 78 MMHG | SYSTOLIC BLOOD PRESSURE: 134 MMHG

## 2022-07-13 DIAGNOSIS — Z01.30 BP CHECK: Primary | ICD-10-CM

## 2022-07-13 PROCEDURE — 99207 PR NO CHARGE NURSE ONLY: CPT | Performed by: FAMILY MEDICINE

## 2022-07-13 NOTE — PROGRESS NOTES
Nury Cárdenas was evaluated at [facility] on July 13, 2022 at which time her blood pressure was:    BP Readings from Last 3 Encounters:   07/13/22 134/78   07/12/22 (!) 155/91   07/06/22 (!) 151/88     Pulse Readings from Last 3 Encounters:   07/12/22 100   07/11/22 98   07/06/22 95       Reviewed lifestyle modifications for blood pressure control and reduction: including making healthy food choices, managing weight, getting regular exercise, smoking cessation, reducing alcohol consumption, monitoring blood pressure regularly.     Symptoms: None    BP Goal:< 140/90 mmHg    BP Assessment:  BP at goal    Potential Reasons for BP too high: NA - Not applicable    BP Follow-Up Plan: Recheck BP in 6 months at pharmacy    Recommendation to Provider: continue with therapy     Patient requested blood pressure check be forwarded to Rheumatology as well.    Note completed by:   Henrietta Mcfarlane, PharmD MS  [facility] [place]  Pharmacy Manager  July 13, 2022

## 2022-07-15 ENCOUNTER — OFFICE VISIT (OUTPATIENT)
Dept: DERMATOLOGY | Facility: CLINIC | Age: 77
End: 2022-07-15
Payer: COMMERCIAL

## 2022-07-15 DIAGNOSIS — H02.60 XANTHELASMA: Primary | ICD-10-CM

## 2022-07-15 PROCEDURE — 99213 OFFICE O/P EST LOW 20 MIN: CPT | Performed by: DERMATOLOGY

## 2022-07-15 ASSESSMENT — PAIN SCALES - GENERAL: PAINLEVEL: NO PAIN (0)

## 2022-07-15 NOTE — LETTER
7/15/2022         RE: Nury Cárdenas  [address] [facility] [address]        Dear Colleague,    Thank you for referring your patient, Nury Cárdenas, to the [facility]. Please see a copy of my visit note below.    [facility] Dermatology Note  Encounter Date: Jul 15, 2022  Office Visit     Dermatology Problem List:  1. Urticarial dermatitis - left medial thigh - bx 1/25/22  2. Chronic skin changes of the left lower extremity - hemosiderosis; in context of resolving cellulitis and history of DVT in setting of granulomatosis with polyangiitis (in remission)  3. Relevant medical history: GPA (diagnoses in 2014, initially treated with rituximab and steroids, then methotrexate, then prednisone alone)  4. History of Min's granulomatosis    ____________________________________________    Assessment & Plan:    # Papules c/w xanthelasma. Reviewed that derm path is needed for definitive diagnosis, however, clinically this is the most consistent. Of note she had recent zoster in V1 on the rignt side so this will need a plan with any procedure in the area.    - Last CMP and TSH reviewed. Both wnl.   - Fasting lipid panel ordered today   - Referred to oculoplastics for possible removal    Procedures Performed:   None.    Follow-up: with eye clinic    Staff and Scribe:     Scribe Disclosure:   I, Salvador Pat, am serving as a scribe to document services personally performed by this physician, Dr. Masha May, based on data collection and the provider's statements to me.     Provider Disclosure:   The documentation recorded by the scribe accurately reflects the services I personally performed and the decisions made by me.    Masha May MD    Department of Dermatology  [facility]: Phone: [phone], Fax:[phone]  [facility]: Phone:  [phone], Fax: [phone]      ____________________________________________    CC: Derm Problem (Patient reports eye swelling by the end of day, has inclusion cyst near right eye. Taking prednisone and methotrexate.)    HPI:  Ms. Nury Cárdenas is a(n) 77 year old female who presents today as a return patient for a spot check.    Last seen 1/25/22 by Dr. Cole. A biopsy was taken from the left medial thigh (urticarial dermatitis). Last seen 7/7/22 by ophthalmology for a possible shingles infection that had resolved at the time of the visit.    Today, she presents with concern about swelling around the eye at the end of the day and an inclusion cyst near her right eye. She has been taking prednisone and methotrexate for this. Ophthalmology did evaluate the inclusion cyst, and wanted to take a biopsy of this. She has had these lesions near the eyes for over a year.    She notes the rash she was seen for in the past has resolved.    Patient is otherwise feeling well, without additional skin concerns.    Labs Reviewed:  N/A    Physical Exam:  Vitals: There were no vitals taken for this visit.  SKIN: Focused examination of the bilateral crows feet was performed.  - yellow papule at the right crows feet 7 mm in size  - Similar lesion on the left crows feet    - No other lesions of concern on areas examined.     Medications:  Current Outpatient Medications   Medication     acyclovir (ZOVIRAX) 800 MG tablet     aspirin-acetaminophen-caffeine (EXCEDRIN MIGRAINE) 250-250-65 MG tablet     calcium-magnesium (CALMAG) 500-250 MG TABS per tablet     cholecalciferol 1000 UNITS TABS     folic acid (FOLVITE) 1 MG tablet     loratadine (CLARITIN) 10 MG tablet     methotrexate 2.5 MG tablet     omeprazole (PRILOSEC) 20 MG DR capsule     predniSONE (DELTASONE) 10 MG tablet     propranolol (INDERAL) 20 MG tablet     riTUXimab (RITUXAN) 100 MG/10ML SOLN     No current facility-administered medications for this visit.      Facility-Administered Medications Ordered in Other Visits   Medication     sodium chloride (PF) 0.9% PF flush 60 mL      Past Medical History:   Patient Active Problem List   Diagnosis     GERD (gastroesophageal reflux disease)     Fibroids     Migraines     Hearing loss     Osteopenia     HYPERLIPIDEMIA LDL GOAL <160     Advanced directives, counseling/discussion     Inflammatory arthritis     Synovitis of wrist     Chronic constipation     Granulomatosis with polyangiitis without renal involvement (H)     Chronic steroid use     Dyspnea     Pulmonary embolism (H)     Calculus of kidney, right     Chronic anticoagulation     Long-term (current) use of anticoagulants [Z79.01]     Long-term use of immunosuppressant medication     Primary osteoarthritis involving multiple joints     Cellulitis     Cat scratch left lower extremity     Other pulmonary embolism without acute cor pulmonale, unspecified chronicity (H)     Age-related osteoporosis without current pathological fracture     Age-related osteoporosis with current pathological fracture, sequela     Adverse effects of medication     Combined forms of age-related cataract, mild-mod, of right eye     Pseudophakia, Yag Caps, os     History of vitrectomy - macular hole, os (MVE)     Hx of macular hole of left eye     Hemifacial spasm     Immunosuppression (H)     Clinical diagnosis of COVID-19     History of COVID-19     Lesion of right eyelid     COPD (chronic obstructive pulmonary disease) (H)     Polyneuropathy associated with underlying disease (H)     Esodeviation     History of strabismus surgery     Current chronic use of systemic steroids     Herpes zoster ophthalmicus, right     Past Medical History:   Diagnosis Date     Amblyopia      Atypical pneumonia 06/14/2014     Atypical pneumonia      Atypical pneumonia      Atypical pneumonia      COPD (chronic obstructive pulmonary disease) (H) 07/20/2021     Coronary artery disease 1982    heart beat hard made  me tired     Dyslipidemia      Fibroids      GERD (gastroesophageal reflux disease)      Granulomatosis with polyangiitis (Wegener's)      Hearing loss      Inflammatory arthritis     thumb     Migraines      MVP (mitral valve prolapse)     had an echo that was normal in 2007 she is on Inderal     Nonsenile cataract      Osteopenia      PE (pulmonary embolism) 10/2014    ? GPA associated, on warfarin      Strabismus      Synovitis of wrist 2009    right        CC No referring provider defined for this encounter. on close of this encounter.    Nury Cárdenas's goals for this visit include:   Chief Complaint   Patient presents with     Derm Problem     Patient reports eye swelling by the end of day, has inclusion cyst near right eye. Taking prednisone and methotrexate.       She requests these members of her care team be copied on today's visit information: no    PCP: Phil Abbott    Referring Provider:  No referring provider defined for this encounter.    There were no vitals taken for this visit.    Do you need any medication refills at today's visit? No  Farzana Mix LPN          Again, thank you for allowing me to participate in the care of your patient.        Sincerely,        Masha May MD

## 2022-07-15 NOTE — PROGRESS NOTES
Nury Cárdenas's goals for this visit include:   Chief Complaint   Patient presents with     Derm Problem     Patient reports eye swelling by the end of day, has inclusion cyst near right eye. Taking prednisone and methotrexate.       She requests these members of her care team be copied on today's visit information: no    PCP: Phil Abbott    Referring Provider:  No referring provider defined for this encounter.    There were no vitals taken for this visit.    Do you need any medication refills at today's visit? No  Farzana Mix LPN

## 2022-07-15 NOTE — PROGRESS NOTES
[facility] Dermatology Note  Encounter Date: Jul 15, 2022  Office Visit     Dermatology Problem List:  1. Urticarial dermatitis - left medial thigh - bx 1/25/22  2. Chronic skin changes of the left lower extremity - hemosiderosis; in context of resolving cellulitis and history of DVT in setting of granulomatosis with polyangiitis (in remission)  3. Relevant medical history: GPA (diagnoses in 2014, initially treated with rituximab and steroids, then methotrexate, then prednisone alone)  4. History of Min's granulomatosis    ____________________________________________    Assessment & Plan:    # Papules c/w xanthelasma. Reviewed that derm path is needed for definitive diagnosis, however, clinically this is the most consistent. Of note she had recent zoster in V1 on the rignt side so this will need a plan with any procedure in the area.    - Last CMP and TSH reviewed. Both wnl.   - Fasting lipid panel ordered today   - Referred to oculoplastics for possible removal    Procedures Performed:   None.    Follow-up: with eye clinic    Staff and Scribe:     Scribe Disclosure:   I, Salvador Pat, am serving as a scribe to document services personally performed by this physician, Dr. Masha May, based on data collection and the provider's statements to me.     Provider Disclosure:   The documentation recorded by the scribe accurately reflects the services I personally performed and the decisions made by me.    Masha May MD    Department of Dermatology  [facility]: Phone: [phone], Fax:[phone]  [facility]: Phone: [phone], Fax: [phone]      ____________________________________________    CC: Derm Problem (Patient reports eye swelling by the end of day, has inclusion cyst near right eye. Taking prednisone and methotrexate.)    HPI:  Ms. Nury Cárdenas is  a(n) 77 year old female who presents today as a return patient for a spot check.    Last seen 1/25/22 by Dr. Cole. A biopsy was taken from the left medial thigh (urticarial dermatitis). Last seen 7/7/22 by ophthalmology for a possible shingles infection that had resolved at the time of the visit.    Today, she presents with concern about swelling around the eye at the end of the day and an inclusion cyst near her right eye. She has been taking prednisone and methotrexate for this. Ophthalmology did evaluate the inclusion cyst, and wanted to take a biopsy of this. She has had these lesions near the eyes for over a year.    She notes the rash she was seen for in the past has resolved.    Patient is otherwise feeling well, without additional skin concerns.    Labs Reviewed:  N/A    Physical Exam:  Vitals: There were no vitals taken for this visit.  SKIN: Focused examination of the bilateral crows feet was performed.  - yellow papule at the right crows feet 7 mm in size  - Similar lesion on the left crows feet    - No other lesions of concern on areas examined.     Medications:  Current Outpatient Medications   Medication     acyclovir (ZOVIRAX) 800 MG tablet     aspirin-acetaminophen-caffeine (EXCEDRIN MIGRAINE) 250-250-65 MG tablet     calcium-magnesium (CALMAG) 500-250 MG TABS per tablet     cholecalciferol 1000 UNITS TABS     folic acid (FOLVITE) 1 MG tablet     loratadine (CLARITIN) 10 MG tablet     methotrexate 2.5 MG tablet     omeprazole (PRILOSEC) 20 MG DR capsule     predniSONE (DELTASONE) 10 MG tablet     propranolol (INDERAL) 20 MG tablet     riTUXimab (RITUXAN) 100 MG/10ML SOLN     No current facility-administered medications for this visit.     Facility-Administered Medications Ordered in Other Visits   Medication     sodium chloride (PF) 0.9% PF flush 60 mL      Past Medical History:   Patient Active Problem List   Diagnosis     GERD (gastroesophageal reflux disease)     Fibroids     Migraines      Hearing loss     Osteopenia     HYPERLIPIDEMIA LDL GOAL <160     Advanced directives, counseling/discussion     Inflammatory arthritis     Synovitis of wrist     Chronic constipation     Granulomatosis with polyangiitis without renal involvement (H)     Chronic steroid use     Dyspnea     Pulmonary embolism (H)     Calculus of kidney, right     Chronic anticoagulation     Long-term (current) use of anticoagulants [Z79.01]     Long-term use of immunosuppressant medication     Primary osteoarthritis involving multiple joints     Cellulitis     Cat scratch left lower extremity     Other pulmonary embolism without acute cor pulmonale, unspecified chronicity (H)     Age-related osteoporosis without current pathological fracture     Age-related osteoporosis with current pathological fracture, sequela     Adverse effects of medication     Combined forms of age-related cataract, mild-mod, of right eye     Pseudophakia, Yag Caps, os     History of vitrectomy - macular hole, os (MVE)     Hx of macular hole of left eye     Hemifacial spasm     Immunosuppression (H)     Clinical diagnosis of COVID-19     History of COVID-19     Lesion of right eyelid     COPD (chronic obstructive pulmonary disease) (H)     Polyneuropathy associated with underlying disease (H)     Esodeviation     History of strabismus surgery     Current chronic use of systemic steroids     Herpes zoster ophthalmicus, right     Past Medical History:   Diagnosis Date     Amblyopia      Atypical pneumonia 06/14/2014     Atypical pneumonia      Atypical pneumonia      Atypical pneumonia      COPD (chronic obstructive pulmonary disease) (H) 07/20/2021     Coronary artery disease 1982    heart beat hard made me tired     Dyslipidemia      Fibroids      GERD (gastroesophageal reflux disease)      Granulomatosis with polyangiitis (Wegener's)      Hearing loss      Inflammatory arthritis     thumb     Migraines      MVP (mitral valve prolapse)     had an echo that was  normal in 2007 she is on Inderal     Nonsenile cataract      Osteopenia      PE (pulmonary embolism) 10/2014    ? GPA associated, on warfarin      Strabismus      Synovitis of wrist 2009    right        CC No referring provider defined for this encounter. on close of this encounter.

## 2022-07-19 ENCOUNTER — INFUSION THERAPY VISIT (OUTPATIENT)
Dept: INFUSION THERAPY | Facility: CLINIC | Age: 77
End: 2022-07-19
Attending: INTERNAL MEDICINE
Payer: COMMERCIAL

## 2022-07-19 VITALS
DIASTOLIC BLOOD PRESSURE: 92 MMHG | TEMPERATURE: 98.2 F | OXYGEN SATURATION: 96 % | HEART RATE: 92 BPM | BODY MASS INDEX: 20.92 KG/M2 | SYSTOLIC BLOOD PRESSURE: 152 MMHG | WEIGHT: 118.1 LBS | RESPIRATION RATE: 16 BRPM

## 2022-07-19 DIAGNOSIS — K21.9 GASTROESOPHAGEAL REFLUX DISEASE WITHOUT ESOPHAGITIS: ICD-10-CM

## 2022-07-19 DIAGNOSIS — T50.905S ADVERSE EFFECT OF DRUG, SEQUELA: ICD-10-CM

## 2022-07-19 DIAGNOSIS — M31.30 GRANULOMATOSIS WITH POLYANGIITIS WITHOUT RENAL INVOLVEMENT (H): ICD-10-CM
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
## 2022-07-19 DIAGNOSIS — M80.00XS AGE-RELATED OSTEOPOROSIS WITH CURRENT PATHOLOGICAL FRACTURE, SEQUELA: ICD-10-CM

## 2022-07-19 PROCEDURE — 96375 TX/PRO/DX INJ NEW DRUG ADDON: CPT | Performed by: NURSE PRACTITIONER

## 2022-07-19 PROCEDURE — 99207 PR NO CHARGE LOS: CPT

## 2022-07-19 PROCEDURE — 96413 CHEMO IV INFUSION 1 HR: CPT | Performed by: NURSE PRACTITIONER

## 2022-07-19 PROCEDURE — 96415 CHEMO IV INFUSION ADDL HR: CPT | Performed by: NURSE PRACTITIONER

## 2022-07-19 RX ORDER — METHYLPREDNISOLONE SODIUM SUCCINATE 125 MG/2ML
100 INJECTION, POWDER, LYOPHILIZED, FOR SOLUTION INTRAMUSCULAR; INTRAVENOUS ONCE
Status: COMPLETED | OUTPATIENT
Start: 2022-07-19 | End: 2022-07-19

## 2022-07-19 RX ORDER — ALBUTEROL SULFATE 90 UG/1
1-2 AEROSOL, METERED RESPIRATORY (INHALATION)
Status: CANCELLED
Start: 2022-07-20

## 2022-07-19 RX ORDER — HEPARIN SODIUM,PORCINE 10 UNIT/ML
5 VIAL (ML) INTRAVENOUS
Status: CANCELLED | OUTPATIENT
Start: 2022-07-20

## 2022-07-19 RX ORDER — ACETAMINOPHEN 325 MG/1
650 TABLET ORAL ONCE
Status: CANCELLED
Start: 2022-07-20

## 2022-07-19 RX ORDER — DIPHENHYDRAMINE HCL 25 MG
50 CAPSULE ORAL ONCE
Status: CANCELLED
Start: 2022-07-20

## 2022-07-19 RX ORDER — MEPERIDINE HYDROCHLORIDE 25 MG/ML
25 INJECTION INTRAMUSCULAR; INTRAVENOUS; SUBCUTANEOUS EVERY 30 MIN PRN
Status: CANCELLED | OUTPATIENT
Start: 2022-07-20

## 2022-07-19 RX ORDER — HEPARIN SODIUM (PORCINE) LOCK FLUSH IV SOLN 100 UNIT/ML 100 UNIT/ML
5 SOLUTION INTRAVENOUS
Status: CANCELLED | OUTPATIENT
Start: 2022-07-20

## 2022-07-19 RX ORDER — NALOXONE HYDROCHLORIDE 0.4 MG/ML
0.2 INJECTION, SOLUTION INTRAMUSCULAR; INTRAVENOUS; SUBCUTANEOUS
Status: CANCELLED | OUTPATIENT
Start: 2022-07-20

## 2022-07-19 RX ORDER — ALBUTEROL SULFATE 0.83 MG/ML
2.5 SOLUTION RESPIRATORY (INHALATION)
Status: CANCELLED | OUTPATIENT
Start: 2022-07-20

## 2022-07-19 RX ORDER — METHYLPREDNISOLONE SODIUM SUCCINATE 125 MG/2ML
125 INJECTION, POWDER, LYOPHILIZED, FOR SOLUTION INTRAMUSCULAR; INTRAVENOUS
Status: CANCELLED
Start: 2022-07-20

## 2022-07-19 RX ORDER — ACETAMINOPHEN 325 MG/1
650 TABLET ORAL ONCE
Status: COMPLETED | OUTPATIENT
Start: 2022-07-19 | End: 2022-07-19

## 2022-07-19 RX ORDER — EPINEPHRINE 1 MG/ML
0.3 INJECTION, SOLUTION INTRAMUSCULAR; SUBCUTANEOUS EVERY 5 MIN PRN
Status: CANCELLED | OUTPATIENT
Start: 2022-07-20

## 2022-07-19 RX ORDER — DIPHENHYDRAMINE HCL 25 MG
50 CAPSULE ORAL ONCE
Status: COMPLETED | OUTPATIENT
Start: 2022-07-19 | End: 2022-07-19

## 2022-07-19 RX ORDER — METHYLPREDNISOLONE SODIUM SUCCINATE 125 MG/2ML
100 INJECTION, POWDER, LYOPHILIZED, FOR SOLUTION INTRAMUSCULAR; INTRAVENOUS ONCE
Status: CANCELLED | OUTPATIENT
Start: 2022-07-20

## 2022-07-19 RX ORDER — DIPHENHYDRAMINE HYDROCHLORIDE 50 MG/ML
50 INJECTION INTRAMUSCULAR; INTRAVENOUS
Status: CANCELLED
Start: 2022-07-20

## 2022-07-19 RX ADMIN — Medication 250 ML: at 09:59

## 2022-07-19 RX ADMIN — METHYLPREDNISOLONE SODIUM SUCCINATE 100 MG: 125 INJECTION INTRAMUSCULAR; INTRAVENOUS at 10:03

## 2022-07-19 RX ADMIN — ACETAMINOPHEN 650 MG: 325 TABLET ORAL at 10:02

## 2022-07-19 RX ADMIN — Medication 50 MG: at 10:02

## 2022-07-19 ASSESSMENT — PAIN SCALES - GENERAL: PAINLEVEL: NO PAIN (0)

## 2022-07-19 NOTE — PROGRESS NOTES
Infusion Nursing Note:  Nury Cárdenas presents today for Rapid Rituxan.    Patient seen by provider today: No   present during visit today: Not Applicable.    Note: The pt reports experiencing intermittent chills at home. She reports her provider is aware as she sent [employer] Messages to them about this. She denies any other medical concerns at this time and reports tolerating her infusions well.     Intravenous Access:  Peripheral IV placed.    Treatment Conditions:  Biological Infusion Checklist:  ~~~ NOTE: If the patient answers yes to any of the questions below, hold the infusion and contact ordering provider or on-call provider.    1. Have you recently had an elevated temperature, fever, chills, productive cough, coughing for 3 weeks or longer or hemoptysis, abnormal vital signs, night sweats,  chest pain or have you noticed a decrease in your appetite, unexplained weight loss or fatigue? Yes, Chills noted - pt notified provider.  2. Do you have any open wounds or new incisions? No  3. Do you have any recent or upcoming hospitalizations, surgeries or dental procedures? No  4. Do you currently have or recently have had any signs of illness or infection or are you on any antibiotics? No  5. Have you had any new, sudden or worsening abdominal pain? No  6. Have you or anyone in your household received a live vaccination in the past 4 weeks? Please note:  No live vaccines while on biologic/chemotherapy until 6 months after the last treatment.  Patient can receive the flu vaccine (shot only) and the pneumovax.  It is optimal for the patient to get these vaccines mid cycle, but they can be given at any time as long as it is not on the day of the infusion. No  7. Have you recently been diagnosed with any new nervous system diseases (ie. Multiple sclerosis, Guillain [place], seizures, neurological changes) or cancer diagnosis? No  8. Are you on any form of radiation or chemotherapy? No  9. Are you pregnant  or breast feeding or do you have plans of pregnancy in the future? No  10. Have you been having any signs of worsening depression or suicidal ideations?  (benlysta only) No  11. Have there been any other new onset medical symptoms? No      Post Infusion Assessment:  Patient tolerated infusion without incident.  Site patent and intact, free from redness, edema or discomfort.  No evidence of extravasations.  Access discontinued per protocol.  Biologic Infusion Post Education: Call the triage nurse at your clinic or seek medical attention if you have chills and/or temperature greater than or equal to 100.5, uncontrolled nausea/vomiting, diarrhea, constipation, dizziness, shortness of breath, chest pain, heart palpitations, weakness or any other new or concerning symptoms, questions or concerns.  You cannot have any live virus vaccines prior to or during treatment or up to 6 months post infusion.  If you have an upcoming surgery, medical procedure or dental procedure during treatment, this should be discussed with your ordering physician and your surgeon/dentist.  If you are having any concerning symptom, if you are unsure if you should get your next infusion or wish to speak to a provider before your next infusion, please call your care coordinator or triage nurse at your clinic to notify them so we can adequately serve you.     Discharge Plan:   AVS to patient via [employer].  Patient will as directed by her provider for future infusions.  Patient discharged in stable condition accompanied by: self.  Departure Mode: Ambulatory.    Jillian Blackman RN

## 2022-07-20 ENCOUNTER — ALLIED HEALTH/NURSE VISIT (OUTPATIENT)
Dept: FAMILY MEDICINE | Facility: CLINIC | Age: 77
End: 2022-07-20
Payer: COMMERCIAL

## 2022-07-20 VITALS — SYSTOLIC BLOOD PRESSURE: 132 MMHG | DIASTOLIC BLOOD PRESSURE: 74 MMHG

## 2022-07-20 DIAGNOSIS — Z01.30 BP CHECK: Primary | ICD-10-CM

## 2022-07-20 PROCEDURE — 99207 PR NO CHARGE NURSE ONLY: CPT | Performed by: FAMILY MEDICINE

## 2022-07-20 NOTE — PROGRESS NOTES
Nury Cárdenas was evaluated at [facility] on July 20, 2022 at which time her blood pressure was:    BP Readings from Last 3 Encounters:   07/20/22 132/74   07/19/22 (!) 152/92   07/13/22 134/78     Pulse Readings from Last 3 Encounters:   07/19/22 92   07/12/22 100   07/11/22 98       Reviewed lifestyle modifications for blood pressure control and reduction: including making healthy food choices, managing weight, getting regular exercise, smoking cessation, reducing alcohol consumption, monitoring blood pressure regularly.     Symptoms: None    BP Goal:< 140/90 mmHg    BP Assessment:  BP at goal    Potential Reasons for BP too high: NA - Not applicable    BP Follow-Up Plan: Recheck BP in 6 months at pharmacy    Recommendation to Provider: Continue current therapies    Patient wanted blood pressure check forwarded to Rheumatology.     Note completed by:   Henrietta Mcfarlane, PharmD MS  [facility] [place]  Pharmacy Manager  July 20, 2022

## 2022-07-22 ENCOUNTER — LAB (OUTPATIENT)
Dept: LAB | Facility: CLINIC | Age: 77
End: 2022-07-22
Payer: COMMERCIAL

## 2022-07-22 DIAGNOSIS — H02.60 XANTHELASMA: ICD-10-CM

## 2022-07-22 LAB
CHOLEST SERPL-MCNC: 252 MG/DL
FASTING STATUS PATIENT QL REPORTED: YES
HDLC SERPL-MCNC: 97 MG/DL
LDLC SERPL CALC-MCNC: 132 MG/DL
NONHDLC SERPL-MCNC: 155 MG/DL
TRIGL SERPL-MCNC: 114 MG/DL

## 2022-07-22 PROCEDURE — 80061 LIPID PANEL: CPT

## 2022-07-22 PROCEDURE — 36415 COLL VENOUS BLD VENIPUNCTURE: CPT

## 2022-07-29 ENCOUNTER — TELEPHONE (OUTPATIENT)
Dept: INFECTIOUS DISEASES | Facility: CLINIC | Age: 77
End: 2022-07-29

## 2022-07-29 NOTE — TELEPHONE ENCOUNTER
[facility] Call Center    Phone Message    May a detailed message be left on voicemail: yes     Reason for Call: Other: patient calling in asking for medication refill.  She wsa unsure of medication.  It is a yearly prescription by Dr Sol. It is for a sore that keeps coming back.  Please reach out to patient.     Action Taken: ID    Travel Screening: Not Applicable

## 2022-08-01 ENCOUNTER — LAB (OUTPATIENT)
Dept: LAB | Facility: CLINIC | Age: 77
End: 2022-08-01
Payer: COMMERCIAL

## 2022-08-01 DIAGNOSIS — M31.30 GRANULOMATOSIS WITH POLYANGIITIS WITHOUT RENAL INVOLVEMENT (H): ICD-10-CM

## 2022-08-01 DIAGNOSIS — Z79.60 LONG-TERM USE OF IMMUNOSUPPRESSANT MEDICATION: ICD-10-CM

## 2022-08-01 DIAGNOSIS — K21.9 GASTROESOPHAGEAL REFLUX DISEASE WITHOUT ESOPHAGITIS: ICD-10-CM

## 2022-08-01 DIAGNOSIS — M15.0 PRIMARY OSTEOARTHRITIS INVOLVING MULTIPLE JOINTS: ICD-10-CM

## 2022-08-01 LAB
ALBUMIN SERPL-MCNC: 3.3 G/DL (ref 3.4–5)
ALBUMIN UR-MCNC: NEGATIVE MG/DL
ALT SERPL W P-5'-P-CCNC: 27 U/L (ref 0–50)
APPEARANCE UR: CLEAR
AST SERPL W P-5'-P-CCNC: 16 U/L (ref 0–45)
BASOPHILS # BLD AUTO: 0 10E3/UL (ref 0–0.2)
BASOPHILS NFR BLD AUTO: 0 %
BILIRUB UR QL STRIP: NEGATIVE
COLOR UR AUTO: YELLOW
CREAT SERPL-MCNC: 0.76 MG/DL (ref 0.52–1.04)
CRP SERPL-MCNC: <2.9 MG/L (ref 0–8)
EOSINOPHIL # BLD AUTO: 0 10E3/UL (ref 0–0.7)
EOSINOPHIL NFR BLD AUTO: 0 %
ERYTHROCYTE [DISTWIDTH] IN BLOOD BY AUTOMATED COUNT: 17.6 % (ref 10–15)
ERYTHROCYTE [SEDIMENTATION RATE] IN BLOOD BY WESTERGREN METHOD: 6 MM/HR (ref 0–30)
GFR SERPL CREATININE-BSD FRML MDRD: 80 ML/MIN/1.73M2
GLUCOSE UR STRIP-MCNC: NEGATIVE MG/DL
HCT VFR BLD AUTO: 41.8 % (ref 35–47)
HGB BLD-MCNC: 14 G/DL (ref 11.7–15.7)
HGB UR QL STRIP: NEGATIVE
KETONES UR STRIP-MCNC: NEGATIVE MG/DL
LEUKOCYTE ESTERASE UR QL STRIP: NEGATIVE
LYMPHOCYTES # BLD AUTO: 0.6 10E3/UL (ref 0.8–5.3)
LYMPHOCYTES NFR BLD AUTO: 6 %
MCH RBC QN AUTO: 31 PG (ref 26.5–33)
MCHC RBC AUTO-ENTMCNC: 33.5 G/DL (ref 31.5–36.5)
MCV RBC AUTO: 93 FL (ref 78–100)
MONOCYTES # BLD AUTO: 0.6 10E3/UL (ref 0–1.3)
MONOCYTES NFR BLD AUTO: 5 %
NEUTROPHILS # BLD AUTO: 9.5 10E3/UL (ref 1.6–8.3)
NEUTROPHILS NFR BLD AUTO: 89 %
NITRATE UR QL: NEGATIVE
PH UR STRIP: 6 [PH] (ref 5–7)
PLATELET # BLD AUTO: 212 10E3/UL (ref 150–450)
RBC # BLD AUTO: 4.51 10E6/UL (ref 3.8–5.2)
RBC #/AREA URNS AUTO: NORMAL /HPF
SP GR UR STRIP: <=1.005 (ref 1–1.03)
UROBILINOGEN UR STRIP-ACNC: 0.2 E.U./DL
WBC # BLD AUTO: 10.7 10E3/UL (ref 4–11)
WBC #/AREA URNS AUTO: NORMAL /HPF

## 2022-08-01 PROCEDURE — 85025 COMPLETE CBC W/AUTO DIFF WBC: CPT

## 2022-08-01 PROCEDURE — 83516 IMMUNOASSAY NONANTIBODY: CPT

## 2022-08-01 PROCEDURE — 81001 URINALYSIS AUTO W/SCOPE: CPT

## 2022-08-01 PROCEDURE — 84450 TRANSFERASE (AST) (SGOT): CPT

## 2022-08-01 PROCEDURE — 86140 C-REACTIVE PROTEIN: CPT

## 2022-08-01 PROCEDURE — 36415 COLL VENOUS BLD VENIPUNCTURE: CPT

## 2022-08-01 PROCEDURE — 83876 ASSAY MYELOPEROXIDASE: CPT

## 2022-08-01 PROCEDURE — 86256 FLUORESCENT ANTIBODY TITER: CPT

## 2022-08-01 PROCEDURE — 82565 ASSAY OF CREATININE: CPT

## 2022-08-01 PROCEDURE — 82040 ASSAY OF SERUM ALBUMIN: CPT

## 2022-08-01 PROCEDURE — 84460 ALANINE AMINO (ALT) (SGPT): CPT

## 2022-08-01 PROCEDURE — 85652 RBC SED RATE AUTOMATED: CPT

## 2022-08-01 PROCEDURE — 86036 ANCA SCREEN EACH ANTIBODY: CPT

## 2022-08-02 LAB
ANCA AB PATTERN SER IF-IMP: ABNORMAL
C-ANCA TITR SER IF: ABNORMAL {TITER}
MYELOPEROXIDASE AB SER IA-ACNC: <0.3 U/ML
MYELOPEROXIDASE AB SER IA-ACNC: NEGATIVE
PROTEINASE3 AB SER IA-ACNC: 7 U/ML
PROTEINASE3 AB SER IA-ACNC: POSITIVE

## 2022-08-03 NOTE — TELEPHONE ENCOUNTER
Patient is calling back in again.  She is still out of medication. Name is Medication is Valaciclovir it's 500MG.  Normally prescribed by Dr Sol.  Please advise.  Please send to [id number] - [place], [place] - [address] AT [facility] & [address]. Thank you.

## 2022-08-04 NOTE — TELEPHONE ENCOUNTER
Addressed via [employer]. Unable to provide refills as patient has not been seen in clinic since 2015.

## 2022-08-04 NOTE — TELEPHONE ENCOUNTER
Per Patient is getting frustrated with not getting a call back. Patient is wanting to know what is going on with her prescription. Patient states she is needing a refill on her  medication and she states she has left a few messages with no call backs. Patient states she is needing this ASAP and states the medication is for a Sore that will not go away on its own. . Please advise.

## 2022-08-05 ENCOUNTER — OFFICE VISIT (OUTPATIENT)
Dept: RHEUMATOLOGY | Facility: CLINIC | Age: 77
End: 2022-08-05
Payer: COMMERCIAL

## 2022-08-05 VITALS
DIASTOLIC BLOOD PRESSURE: 80 MMHG | BODY MASS INDEX: 20.73 KG/M2 | SYSTOLIC BLOOD PRESSURE: 153 MMHG | OXYGEN SATURATION: 96 % | HEART RATE: 68 BPM | WEIGHT: 117 LBS

## 2022-08-05 DIAGNOSIS — M31.30 GRANULOMATOSIS WITH POLYANGIITIS WITHOUT RENAL INVOLVEMENT (H): ICD-10-CM

## 2022-08-05 DIAGNOSIS — Z79.60 LONG-TERM USE OF IMMUNOSUPPRESSANT MEDICATION: ICD-10-CM

## 2022-08-05 DIAGNOSIS — M15.0 PRIMARY OSTEOARTHRITIS INVOLVING MULTIPLE JOINTS: ICD-10-CM

## 2022-08-05 DIAGNOSIS — K21.9 GASTROESOPHAGEAL REFLUX DISEASE WITHOUT ESOPHAGITIS: ICD-10-CM

## 2022-08-05 PROCEDURE — 99214 OFFICE O/P EST MOD 30 MIN: CPT | Performed by: INTERNAL MEDICINE

## 2022-08-05 RX ORDER — PREDNISONE 10 MG/1
10 TABLET ORAL DAILY
Qty: 90 TABLET | Refills: 1 | Status: SHIPPED | OUTPATIENT
Start: 2022-08-05 | End: 2022-10-25

## 2022-08-05 ASSESSMENT — PAIN SCALES - GENERAL: PAINLEVEL: NO PAIN (0)

## 2022-08-05 NOTE — NURSING NOTE
"Chief Complaint   Patient presents with     RECHECK     Granulomatosis with polyangiitis without renal involvement       Initial BP (!) 153/80 (BP Location: Left arm, Patient Position: Sitting, Cuff Size: Adult Regular)   Pulse 68   Wt 53.1 kg (117 lb)   SpO2 96%   BMI 20.73 kg/m   Estimated body mass index is 20.73 kg/m  as calculated from the following:    Height as of 6/28/22: 1.6 m (5' 3\").    Weight as of this encounter: 53.1 kg (117 lb)..  She requests these members of her care team be copied on today's visit information:     PCP: Phil Abbott    Do you need any medication refills at today's visit? NO     SUKHJINDER Monson   Email: [email or web address]  [facility] - Rheumatology  Phone: [phone]  Fax: [phone]      "

## 2022-08-05 NOTE — PATIENT INSTRUCTIONS
Get lung function testing  Continue prednisone 10 mg daily for 1 month, then 5 mg daily for 1 month, then stop  Continue the methotrexate and folic acid  Stop the pentamidine inhalations  Get monthly lab testing  Follow with me in 3-4 months

## 2022-08-05 NOTE — PROGRESS NOTES
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 Course complicated by severe upper airway inflammation, Wegener's lung and destructive/erosive joint disease. Treated initially with rituximab 375 mg/m2 x4 and high dose corticsteroids, and most recently with methotrexate.   Alendronate therapy complicated by LE pain. Recent relapse of GPA treated with rituximab and methotrexate maintenance therapy.    She reports modest dyspnea, worse with exertion. She has a bit of cough and nasal congestion. She has some mild sinus drainage and pressure on the tip of the nose. Her muscles and joints are fine.    She feels pretty good. She has some dysphoria with unsteadiness that she relates to prednisone 20 mg that she has been on for the past 10 days. Methotrexate 10 mg weekly is well tolerated with about a day of malaise. She takes folic acid 1 mg daily.     PMI:  Medical-invasive pulmonary aspirgillosis, GPA, osteoarthritis, DVT with pulmonary embolism, osteoporosis with vertebral fracture (T = -2.0 2-2021), GERD, hyperlipidemia, +Tb exposure, nephrolithiasis, retinal tear, cataract, COVID-19, sensorineural hearing loss  Surgical-lung biopsy, appendectomy, eye surgery, tonsillectomy, cone biopsy, right wrist synovectomy  Injuries-left wrist fracture x2, left 5th toe fracture, vertebral fracture    SH:  Lives at home alone. Former smoker. No EtOH. Tuberculosis exposure as a child. Ambidextrous. Rare EtOH    FH:  Son with mitochondrial myopathy  Daughter with palpitations  Sibs dead with colon and anal cancer  Father dead with lung cancer  Mother dead with emphysema and osteoporosis    PMSF history personally obtained and updated by me this visit in the clinic.    ROS:  +rare palpitation  +paresthesia round the mouth and on the bottoms of her feet  +allergies to fosamax amoxicillin, augmentin, erythromycin, zithromax, nickel, codeine, adhesive tape  Remainder of the 14 point ROS obtained and  found negative.    Physical Exam:  Constitutional: WD-WN-WG cooperative; deconditioned  Eyes: nl EOM, PERRLA, vision, conjunctiva, sclera  ENT: nl external ears, nose, hearing, lips, teeth, gums, throat  Neck: no mass or thyroid enlargement  Resp: lungs clear to auscultation, nl to palpation, nl effort  CV: RRR, no murmurs, rubs or gallops, no edema  GI: no ABD mass or tenderness, no HSM  : not tested  Lymph: no cervical or epitrochlear nodes  MS: +Hand wasting; right wrist with no flexion or extension; left wrist extension to 45 degrees; 1st CMC joint squaring bilaterally with some thumb laxity; right neck rotation 45 degrees right and 45 degrees left with head forward position and minimal neck extension; All other TMJ, neck, shoulder, elbow, wrist, hand, spine, hip, knee, ankle, and foot joints were examined and otherwise found normal. Normal  strength. +severe kyphosis. Normal tuck and prayer.  Skin: no nail pitting, alopecia; +scale over the bridge of the nose  Neuro: nl cranial nerves, strength, sensation  Psych: nl judgement, orientation, memory, affect    Laboratory:    Component      Latest Ref Rng & Units 8/1/2022   WBC      4.0 - 11.0 10e3/uL 10.7   RBC Count      3.80 - 5.20 10e6/uL 4.51   Hemoglobin      11.7 - 15.7 g/dL 14.0   Hematocrit      35.0 - 47.0 % 41.8   MCV      78 - 100 fL 93   MCH      26.5 - 33.0 pg 31.0   MCHC      31.5 - 36.5 g/dL 33.5   RDW      10.0 - 15.0 % 17.6 (H)   Platelet Count      150 - 450 10e3/uL 212   % Neutrophils      % 89   % Lymphocytes      % 6   % Monocytes      % 5   % Eosinophils      % 0   % Basophils      % 0   Absolute Neutrophils      1.6 - 8.3 10e3/uL 9.5 (H)   Absolute Lymphocytes      0.8 - 5.3 10e3/uL 0.6 (L)   Absolute Monocytes      0.0 - 1.3 10e3/uL 0.6   Absolute Eosinophils      0.0 - 0.7 10e3/uL 0.0   Absolute Basophils      0.0 - 0.2 10e3/uL 0.0   Color Urine      Colorless, Straw, Light Yellow, Yellow Yellow   Appearance Urine      Clear Clear    Glucose Urine      Negative mg/dL Negative   Bilirubin Urine      Negative Negative   Ketones Urine      Negative mg/dL Negative   Specific Gravity Urine      1.003 - 1.035 <=1.005   Blood Urine      Negative Negative   pH Urine      5.0 - 7.0 6.0   Protein Albumin Urine      Negative mg/dL Negative   Urobilinogen Urine      0.2, 1.0 E.U./dL 0.2   Nitrite Urine      Negative Negative   Leukocyte Esterase Urine      Negative Negative   MPO Erika IgG Instrument Value      <3.5 U/mL <0.3   Myeloperoxidase Antibody IgG      Negative Negative   Proteinase 3 Erika IgG Instrument Value      <2.0 U/mL 7.0 (H)   Proteinase 3 Antibody IgG      Negative Positive (A)   Neutrophil Cytoplasmic Antibody      <1:10 1:10 (H)   Neutrophil Cytoplasmic Antibody Pattern       Cytoplasmic ANCA (c-ANCA) staining pattern observed and confirmed on formalin-fixed neutrophils.   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 She is now free of any signs of systemic inflammation, and her ANCA serologies continue to fall. She is feeling better. Good tolerance of the methotrexate and I anticipate using this long term to suppress GPA relapse. We will decide in November whether to infuse rituximab 500 mg to maintain remission. Get monthly lab testing.    Long term management of immunosuppression-with increased risk of toxcity and infection as indicated above.  Plan to repeat lab testing monthly. We also agree that the benefits of continued immunosuppression outweigh the risks of COVID-19 infection. She is resistant to COVID vaccination.    Plan follow up in 3-4 months.    A total of 26 minutes  was spent in face to face patient interaction and chart review on the day of service.

## 2022-08-17 NOTE — TELEPHONE ENCOUNTER
RECORDS RECEIVED FROM: Internal   REASON FOR VISIT: Burning Sensations    Date of Appt: 10/25/2022   NOTES (FOR ALL VISITS) STATUS DETAILS   OFFICE NOTE from referring provider Internal 05/20/2022 Dr Saldaña my chart message   OFFICE NOTE from other specialist N/A    DISCHARGE SUMMARY from hospital N/A    DISCHARGE REPORT from the ER N/A    OPERATIVE REPORT N/A    MEDICATION LIST N/A    IMAGING  (FOR ALL VISITS)     EMG N/A    EEG N/A    LUMBAR PUNCTURE N/A    COLEEN SCAN N/A    ULTRASOUND (CAROTID BILAT) *VASCULAR* N/A    MRI (HEAD, NECK, SPINE) N/A    CT (HEAD, NECK, SPINE) N/A

## 2022-08-24 ENCOUNTER — MYC MEDICAL ADVICE (OUTPATIENT)
Dept: FAMILY MEDICINE | Facility: CLINIC | Age: 77
End: 2022-08-24

## 2022-08-24 DIAGNOSIS — M31.30 GRANULOMATOSIS WITH POLYANGIITIS WITHOUT RENAL INVOLVEMENT (H): ICD-10-CM

## 2022-08-24 DIAGNOSIS — Z79.60 LONG-TERM USE OF IMMUNOSUPPRESSANT MEDICATION: ICD-10-CM

## 2022-08-24 DIAGNOSIS — M15.0 PRIMARY OSTEOARTHRITIS INVOLVING MULTIPLE JOINTS: ICD-10-CM

## 2022-08-24 DIAGNOSIS — K21.9 GASTROESOPHAGEAL REFLUX DISEASE WITHOUT ESOPHAGITIS: ICD-10-CM

## 2022-08-24 DIAGNOSIS — T78.40XA ALLERGIC REACTION, INITIAL ENCOUNTER: ICD-10-CM

## 2022-08-25 RX ORDER — LORATADINE 10 MG/1
10 TABLET ORAL DAILY
Qty: 90 TABLET | Refills: 0 | Status: SHIPPED | OUTPATIENT
Start: 2022-08-25 | End: 2022-11-21

## 2022-08-25 NOTE — TELEPHONE ENCOUNTER
Medication/Dose: methotrexate 2.5 MG tablet    Last Written : 8/5/22  Last Quantity: 48, # refills: 1  Last Office Visit :  8/5/22  Pending appointment: 12/16/21    Denied as pharmacy should have prescription on file. This was relayed via E-Prescribe to pharmacy.    KOMAL HydeN, RN

## 2022-09-08 ENCOUNTER — LAB (OUTPATIENT)
Dept: LAB | Facility: CLINIC | Age: 77
End: 2022-09-08
Payer: COMMERCIAL

## 2022-09-08 DIAGNOSIS — M15.0 PRIMARY OSTEOARTHRITIS INVOLVING MULTIPLE JOINTS: ICD-10-CM

## 2022-09-08 DIAGNOSIS — K21.9 GASTROESOPHAGEAL REFLUX DISEASE WITHOUT ESOPHAGITIS: ICD-10-CM

## 2022-09-08 DIAGNOSIS — E78.5 HYPERLIPIDEMIA LDL GOAL <100: ICD-10-CM

## 2022-09-08 DIAGNOSIS — Z79.60 LONG-TERM USE OF IMMUNOSUPPRESSANT MEDICATION: ICD-10-CM

## 2022-09-08 DIAGNOSIS — M31.30 GRANULOMATOSIS WITH POLYANGIITIS WITHOUT RENAL INVOLVEMENT (H): ICD-10-CM

## 2022-09-08 PROBLEM — Z96.1 PSEUDOPHAKIA: Status: ACTIVE | Noted: 2019-10-20

## 2022-09-08 LAB
ALBUMIN SERPL-MCNC: 3.6 G/DL (ref 3.4–5)
ALBUMIN UR-MCNC: NEGATIVE MG/DL
ALT SERPL W P-5'-P-CCNC: 21 U/L (ref 0–50)
APPEARANCE UR: CLEAR
AST SERPL W P-5'-P-CCNC: 16 U/L (ref 0–45)
BASOPHILS # BLD AUTO: 0 10E3/UL (ref 0–0.2)
BASOPHILS NFR BLD AUTO: 1 %
BILIRUB UR QL STRIP: NEGATIVE
CHOLEST SERPL-MCNC: 229 MG/DL
COLOR UR AUTO: YELLOW
CREAT SERPL-MCNC: 0.79 MG/DL (ref 0.52–1.04)
CRP SERPL-MCNC: <2.9 MG/L (ref 0–8)
EOSINOPHIL # BLD AUTO: 0.1 10E3/UL (ref 0–0.7)
EOSINOPHIL NFR BLD AUTO: 1 %
ERYTHROCYTE [DISTWIDTH] IN BLOOD BY AUTOMATED COUNT: 15.5 % (ref 10–15)
ERYTHROCYTE [SEDIMENTATION RATE] IN BLOOD BY WESTERGREN METHOD: 3 MM/HR (ref 0–30)
FASTING STATUS PATIENT QL REPORTED: YES
GFR SERPL CREATININE-BSD FRML MDRD: 76 ML/MIN/1.73M2
GLUCOSE UR STRIP-MCNC: NEGATIVE MG/DL
HCT VFR BLD AUTO: 47.2 % (ref 35–47)
HDLC SERPL-MCNC: 97 MG/DL
HGB BLD-MCNC: 15.8 G/DL (ref 11.7–15.7)
HGB UR QL STRIP: ABNORMAL
KETONES UR STRIP-MCNC: NEGATIVE MG/DL
LDLC SERPL CALC-MCNC: 107 MG/DL
LEUKOCYTE ESTERASE UR QL STRIP: ABNORMAL
LYMPHOCYTES # BLD AUTO: 1.5 10E3/UL (ref 0.8–5.3)
LYMPHOCYTES NFR BLD AUTO: 21 %
MCH RBC QN AUTO: 32.4 PG (ref 26.5–33)
MCHC RBC AUTO-ENTMCNC: 33.5 G/DL (ref 31.5–36.5)
MCV RBC AUTO: 97 FL (ref 78–100)
MONOCYTES # BLD AUTO: 0.6 10E3/UL (ref 0–1.3)
MONOCYTES NFR BLD AUTO: 9 %
NEUTROPHILS # BLD AUTO: 5 10E3/UL (ref 1.6–8.3)
NEUTROPHILS NFR BLD AUTO: 68 %
NITRATE UR QL: NEGATIVE
NONHDLC SERPL-MCNC: 132 MG/DL
PH UR STRIP: 6 [PH] (ref 5–7)
PLATELET # BLD AUTO: 212 10E3/UL (ref 150–450)
RBC # BLD AUTO: 4.88 10E6/UL (ref 3.8–5.2)
RBC #/AREA URNS AUTO: ABNORMAL /HPF
SP GR UR STRIP: 1.02 (ref 1–1.03)
SQUAMOUS #/AREA URNS AUTO: ABNORMAL /LPF
TRIGL SERPL-MCNC: 126 MG/DL
UROBILINOGEN UR STRIP-ACNC: 0.2 E.U./DL
WBC # BLD AUTO: 7.2 10E3/UL (ref 4–11)
WBC #/AREA URNS AUTO: ABNORMAL /HPF

## 2022-09-08 PROCEDURE — 85652 RBC SED RATE AUTOMATED: CPT

## 2022-09-08 PROCEDURE — 36415 COLL VENOUS BLD VENIPUNCTURE: CPT

## 2022-09-08 PROCEDURE — 81001 URINALYSIS AUTO W/SCOPE: CPT

## 2022-09-08 PROCEDURE — 84450 TRANSFERASE (AST) (SGOT): CPT

## 2022-09-08 PROCEDURE — 86036 ANCA SCREEN EACH ANTIBODY: CPT

## 2022-09-08 PROCEDURE — 86140 C-REACTIVE PROTEIN: CPT

## 2022-09-08 PROCEDURE — 84460 ALANINE AMINO (ALT) (SGPT): CPT

## 2022-09-08 PROCEDURE — 85025 COMPLETE CBC W/AUTO DIFF WBC: CPT

## 2022-09-08 PROCEDURE — 82565 ASSAY OF CREATININE: CPT

## 2022-09-08 PROCEDURE — 86256 FLUORESCENT ANTIBODY TITER: CPT

## 2022-09-08 PROCEDURE — 83516 IMMUNOASSAY NONANTIBODY: CPT

## 2022-09-08 PROCEDURE — 82040 ASSAY OF SERUM ALBUMIN: CPT

## 2022-09-08 PROCEDURE — 80061 LIPID PANEL: CPT

## 2022-09-08 PROCEDURE — 83876 ASSAY MYELOPEROXIDASE: CPT

## 2022-09-08 NOTE — RESULT ENCOUNTER NOTE
Pat,    Your cholesterol is improving but is still high enough to warrant treatment with medication. Follow-up with your provider to discuss this at an appointment or your next wellness visit.     Elsie Pickett MD

## 2022-09-09 LAB
ANCA AB PATTERN SER IF-IMP: ABNORMAL
C-ANCA TITR SER IF: ABNORMAL {TITER}
MYELOPEROXIDASE AB SER IA-ACNC: <0.3 U/ML
MYELOPEROXIDASE AB SER IA-ACNC: NEGATIVE
PROTEINASE3 AB SER IA-ACNC: 5 U/ML
PROTEINASE3 AB SER IA-ACNC: POSITIVE

## 2022-09-25 NOTE — PROGRESS NOTES
Nury Cárdenas was evaluated at [facility] on June 29, 2022 at which time her blood pressure was:    BP Readings from Last 3 Encounters:   06/29/22 138/70   06/28/22 (!) 153/99   05/09/22 (!) 148/77     Pulse Readings from Last 3 Encounters:   06/28/22 91   05/09/22 87   04/04/22 77       Reviewed lifestyle modifications for blood pressure control and reduction: including making healthy food choices, managing weight, getting regular exercise, smoking cessation, reducing alcohol consumption, monitoring blood pressure regularly.     Symptoms: None    BP Goal:< 140/90 mmHg    BP Assessment:  BP at goal    Potential Reasons for BP too high: NA - Not applicable    BP Follow-Up Plan: Recheck BP in 6 months at pharmacy    Recommendation to Provider: None, follow up blood pressure, was 153/99 yesterday at infusion, BP normal today     Note completed by: myranda Wang pharmacist     hypoactive

## 2022-10-02 NOTE — NURSING NOTE
Nury Cárdenas's goals for this visit include:   Chief Complaint   Patient presents with     Rash     Rash x 4-5 days.        She requests these members of her care team be copied on today's visit information:     PCP: Phil Abbott    Referring Provider:  Referred Self, MD  No address on file    There were no vitals taken for this visit.    Do you need any medication refills at today's visit? Erika Kunz on 1/25/2022 at 1:21 PM       seizures

## 2022-10-10 ENCOUNTER — LAB (OUTPATIENT)
Dept: LAB | Facility: CLINIC | Age: 77
End: 2022-10-10
Payer: COMMERCIAL

## 2022-10-10 DIAGNOSIS — K21.9 GASTROESOPHAGEAL REFLUX DISEASE WITHOUT ESOPHAGITIS: ICD-10-CM

## 2022-10-10 DIAGNOSIS — M31.30 GRANULOMATOSIS WITH POLYANGIITIS WITHOUT RENAL INVOLVEMENT (H): ICD-10-CM

## 2022-10-10 DIAGNOSIS — M15.0 PRIMARY OSTEOARTHRITIS INVOLVING MULTIPLE JOINTS: ICD-10-CM

## 2022-10-10 DIAGNOSIS — Z79.60 LONG-TERM USE OF IMMUNOSUPPRESSANT MEDICATION: ICD-10-CM

## 2022-10-10 LAB
ALBUMIN SERPL-MCNC: 3.5 G/DL (ref 3.4–5)
ALBUMIN UR-MCNC: NEGATIVE MG/DL
ALT SERPL W P-5'-P-CCNC: 24 U/L (ref 0–50)
APPEARANCE UR: CLEAR
AST SERPL W P-5'-P-CCNC: 21 U/L (ref 0–45)
BASOPHILS # BLD AUTO: 0 10E3/UL (ref 0–0.2)
BASOPHILS NFR BLD AUTO: 1 %
BILIRUB UR QL STRIP: NEGATIVE
COLOR UR AUTO: YELLOW
CREAT SERPL-MCNC: 0.69 MG/DL (ref 0.52–1.04)
CRP SERPL-MCNC: <2.9 MG/L (ref 0–8)
EOSINOPHIL # BLD AUTO: 0.1 10E3/UL (ref 0–0.7)
EOSINOPHIL NFR BLD AUTO: 1 %
ERYTHROCYTE [DISTWIDTH] IN BLOOD BY AUTOMATED COUNT: 13.7 % (ref 10–15)
ERYTHROCYTE [SEDIMENTATION RATE] IN BLOOD BY WESTERGREN METHOD: 7 MM/HR (ref 0–30)
GFR SERPL CREATININE-BSD FRML MDRD: 88 ML/MIN/1.73M2
GLUCOSE UR STRIP-MCNC: NEGATIVE MG/DL
HCT VFR BLD AUTO: 44 % (ref 35–47)
HGB BLD-MCNC: 14.7 G/DL (ref 11.7–15.7)
HGB UR QL STRIP: NEGATIVE
KETONES UR STRIP-MCNC: NEGATIVE MG/DL
LEUKOCYTE ESTERASE UR QL STRIP: NEGATIVE
LYMPHOCYTES # BLD AUTO: 0.8 10E3/UL (ref 0.8–5.3)
LYMPHOCYTES NFR BLD AUTO: 12 %
MCH RBC QN AUTO: 33 PG (ref 26.5–33)
MCHC RBC AUTO-ENTMCNC: 33.4 G/DL (ref 31.5–36.5)
MCV RBC AUTO: 99 FL (ref 78–100)
MONOCYTES # BLD AUTO: 0.5 10E3/UL (ref 0–1.3)
MONOCYTES NFR BLD AUTO: 8 %
NEUTROPHILS # BLD AUTO: 4.9 10E3/UL (ref 1.6–8.3)
NEUTROPHILS NFR BLD AUTO: 78 %
NITRATE UR QL: NEGATIVE
PH UR STRIP: 7.5 [PH] (ref 5–7)
PLATELET # BLD AUTO: 226 10E3/UL (ref 150–450)
RBC # BLD AUTO: 4.45 10E6/UL (ref 3.8–5.2)
RBC #/AREA URNS AUTO: NORMAL /HPF
SP GR UR STRIP: 1.01 (ref 1–1.03)
UROBILINOGEN UR STRIP-ACNC: 0.2 E.U./DL
WBC # BLD AUTO: 6.3 10E3/UL (ref 4–11)
WBC #/AREA URNS AUTO: NORMAL /HPF

## 2022-10-10 PROCEDURE — 83876 ASSAY MYELOPEROXIDASE: CPT

## 2022-10-10 PROCEDURE — 81001 URINALYSIS AUTO W/SCOPE: CPT

## 2022-10-10 PROCEDURE — 86140 C-REACTIVE PROTEIN: CPT

## 2022-10-10 PROCEDURE — 86036 ANCA SCREEN EACH ANTIBODY: CPT

## 2022-10-10 PROCEDURE — 82040 ASSAY OF SERUM ALBUMIN: CPT

## 2022-10-10 PROCEDURE — 83516 IMMUNOASSAY NONANTIBODY: CPT

## 2022-10-10 PROCEDURE — 82565 ASSAY OF CREATININE: CPT

## 2022-10-10 PROCEDURE — 86256 FLUORESCENT ANTIBODY TITER: CPT

## 2022-10-10 PROCEDURE — 84460 ALANINE AMINO (ALT) (SGPT): CPT

## 2022-10-10 PROCEDURE — 36415 COLL VENOUS BLD VENIPUNCTURE: CPT

## 2022-10-10 PROCEDURE — 84450 TRANSFERASE (AST) (SGOT): CPT

## 2022-10-10 PROCEDURE — 85025 COMPLETE CBC W/AUTO DIFF WBC: CPT

## 2022-10-10 PROCEDURE — 85652 RBC SED RATE AUTOMATED: CPT

## 2022-10-11 ENCOUNTER — OFFICE VISIT (OUTPATIENT)
Dept: NURSING | Facility: CLINIC | Age: 77
End: 2022-10-11
Attending: INTERNAL MEDICINE
Payer: COMMERCIAL

## 2022-10-11 VITALS — WEIGHT: 121 LBS | OXYGEN SATURATION: 97 % | BODY MASS INDEX: 21.43 KG/M2 | HEART RATE: 74 BPM

## 2022-10-11 DIAGNOSIS — M31.30 GRANULOMATOSIS WITH POLYANGIITIS WITHOUT RENAL INVOLVEMENT (H): ICD-10-CM

## 2022-10-11 DIAGNOSIS — M15.0 PRIMARY OSTEOARTHRITIS INVOLVING MULTIPLE JOINTS: ICD-10-CM

## 2022-10-11 DIAGNOSIS — Z79.60 LONG-TERM USE OF IMMUNOSUPPRESSANT MEDICATION: ICD-10-CM

## 2022-10-11 DIAGNOSIS — K21.9 GASTROESOPHAGEAL REFLUX DISEASE WITHOUT ESOPHAGITIS: ICD-10-CM

## 2022-10-11 LAB
ANCA AB PATTERN SER IF-IMP: NORMAL
C-ANCA TITR SER IF: NORMAL {TITER}

## 2022-10-11 PROCEDURE — 94729 DIFFUSING CAPACITY: CPT | Performed by: INTERNAL MEDICINE

## 2022-10-11 PROCEDURE — 94375 RESPIRATORY FLOW VOLUME LOOP: CPT | Performed by: INTERNAL MEDICINE

## 2022-10-12 LAB
DLCOCOR-%PRED-PRE: 91 %
DLCOCOR-PRE: 17.13 ML/MIN/MMHG
DLCOUNC-%PRED-PRE: 94 %
DLCOUNC-PRE: 17.78 ML/MIN/MMHG
DLCOUNC-PRED: 18.76 ML/MIN/MMHG
ERV-%PRED-PRE: 105 %
ERV-PRE: 0.85 L
ERV-PRED: 0.8 L
EXPTIME-PRE: 7.06 SEC
FEF2575-%PRED-PRE: 78 %
FEF2575-PRE: 1.26 L/SEC
FEF2575-PRED: 1.6 L/SEC
FEFMAX-%PRED-PRE: 135 %
FEFMAX-PRE: 6.67 L/SEC
FEFMAX-PRED: 4.94 L/SEC
FEV1-%PRED-PRE: 98 %
FEV1-PRE: 1.93 L
FEV1FEV6-PRE: 72 %
FEV1FEV6-PRED: 78 %
FEV1FVC-PRE: 72 %
FEV1FVC-PRED: 77 %
FEV1SVC-PRE: 67 %
FEV1SVC-PRED: 69 %
FIFMAX-PRE: 3.13 L/SEC
FVC-%PRED-PRE: 103 %
FVC-PRE: 2.68 L
FVC-PRED: 2.58 L
IC-%PRED-PRE: 99 %
IC-PRE: 2.06 L
IC-PRED: 2.06 L
MYELOPEROXIDASE AB SER IA-ACNC: <0.3 U/ML
MYELOPEROXIDASE AB SER IA-ACNC: NEGATIVE
PROTEINASE3 AB SER IA-ACNC: 3.1 U/ML
PROTEINASE3 AB SER IA-ACNC: POSITIVE
VA-%PRED-PRE: 105 %
VA-PRE: 4.84 L
VC-%PRED-PRE: 101 %
VC-PRE: 2.9 L
VC-PRED: 2.86 L

## 2022-10-24 NOTE — PROGRESS NOTES
"[facility] Consultation    Nury Cárdenas MRN# [id number]   Age: 77 year old YOB: 1945     Requesting physician: Phil Munroe     Reason for Consultation: tingling sensations      History of Presenting Symptoms:   Nury Cárdenas is a 77 year old female who presents today for evaluation of tinglings sensations.    Patient has history of GPA diagnosed in 2014. She was really sick at the time. At this time she noticed a tingling sensation in the feet. This was stable for many years.     Around the beginning of the year she started getting what she thought was sinus infections. It was cleared up with antibiotics, but then came back again. In May she was feeling short of breath with walking. She developed severe chills. She saw Dr Maguire and labs suggested she was in a flare up of GPA. She was given Rituximab, methotrexate, and several courses of steroids prednisone. She is now just on methotrexate. Symptoms are doing much better compared to before and her markers of GPA are improving.    More recently she started feeling a tight sensation in the toes. It is bothersome at night. It helps to put a warm blanket on them. The tingling sensation is not worse at all.     A few months ago she started to feel spots on the top of her head that come and go. When she massages them, they hurt (about 7/10). She notices brief feelings of nausea and \"tipsy\" with them. She doesn't notice clear light sensitivity with them. These overall have improved in the last few weeks.    She has history of migraines and is on propranolol and has been for many years. She has history of ocular migraines as well.     Sometimes she wakes up in the morning with constant spinning sensations. She has a hard time walking when it happens. If she takes an Excedrin she is usually good with 15 minutes. These episodes have been happening for many years and she has gone to the hospital for them in the past.     She " gets a blue appearance on the tops of the hands at night. She has arthritis in the hands as well.    She notices occasional twinges of pain in the left flank and abdomen.     She has posterior neck pain that is bothersome at times. It helps to wear a neck brace. It is harder to hold her head up and straight.       Past Medical History:     Patient Active Problem List   Diagnosis     GERD (gastroesophageal reflux disease)     Fibroids     Migraines     Hearing loss     Osteopenia     HYPERLIPIDEMIA LDL GOAL <160     Advanced directives, counseling/discussion     Inflammatory arthritis     Synovitis of wrist     Chronic constipation     Granulomatosis with polyangiitis without renal involvement (H)     Chronic steroid use     Dyspnea     Pulmonary embolism (H)     Calculus of kidney, right     Chronic anticoagulation     Long-term (current) use of anticoagulants [Z79.01]     Long-term use of immunosuppressant medication     Primary osteoarthritis involving multiple joints     Cellulitis     Cat scratch left lower extremity     Other pulmonary embolism without acute cor pulmonale, unspecified chronicity (H)     Age-related osteoporosis without current pathological fracture     Age-related osteoporosis with current pathological fracture, sequela     Adverse effects of medication     Combined forms of age-related cataract, mild-mod, of right eye     Pseudophakia, Yag Caps, os     History of vitrectomy - macular hole, os (MVE)     Hx of macular hole of left eye     Hemifacial spasm     Immunosuppression (H)     Clinical diagnosis of COVID-19     History of COVID-19     Lesion of right eyelid     COPD (chronic obstructive pulmonary disease) (H)     Polyneuropathy associated with underlying disease (H)     Esodeviation     History of strabismus surgery     Current chronic use of systemic steroids     Herpes zoster ophthalmicus, right     Past Medical History:   Diagnosis Date     Amblyopia      Atypical pneumonia 06/14/2014      Atypical pneumonia      Atypical pneumonia      Atypical pneumonia      COPD (chronic obstructive pulmonary disease) (H) 07/20/2021     Coronary artery disease 1982    heart beat hard made me tired     Dyslipidemia      Fibroids      GERD (gastroesophageal reflux disease)      Granulomatosis with polyangiitis (Wegener's)      Hearing loss      Inflammatory arthritis     thumb     Migraines      MVP (mitral valve prolapse)     had an echo that was normal in 2007 she is on Inderal     Nonsenile cataract      Osteopenia      PE (pulmonary embolism) 10/2014    ? GPA associated, on warfarin      Strabismus      Synovitis of wrist 2009    right        Past Surgical History:     Past Surgical History:   Procedure Laterality Date     ABDOMEN SURGERY      appendix out     APPENDECTOMY       BIOPSY  1972    cone biopsy     CATARACT IOL, RT/LT       CL AFF SURGICAL PATHOLOGY      cone biopsy 1975     COLONOSCOPY       COLONOSCOPY WITH CO2 INSUFFLATION N/A 12/20/2017    Procedure: COLONOSCOPY WITH CO2 INSUFFLATION;  Screening  BMI: 20.7  Ph[facility]: [facility]  [phone]  [facility]  Referring: [facility]  [facility];  Surgeon: Duane, William Charles, MD;  Location:  OR     EYE SURGERY      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     HC ESOPHAGOSCOPY, DIAGNOSTIC       LASER YAG CAPSULOTOMY      left eye     OPTICAL TRACKING SYSTEM BRONCHOSCOPY  6/19/2014    Procedure: OPTICAL TRACKING SYSTEM BRONCHOSCOPY;  Surgeon: Angel Kathleen MD;  Location:  GI     PHACOEMULSIFICATION WITH STANDARD INTRAOCULAR LENS IMPLANT  01/2018    left eye (SR)     SOFT TISSUE SURGERY      remove senovial fluid from right wrist     STRABISMUS SURGERY       SURGICAL HISTORY OF -   as a child    strabismus repair right eye     SURGICAL HISTORY OF -   8-2009    right wrist debridement     TONSILLECTOMY      as a child     TONSILLECTOMY & ADENOIDECTOMY       TUBAL LIGATION       VITRECTOMY PARSPLANA  01/2018     left eye - mac hole (MVE)        Social History:     Social History     Tobacco Use     Smoking status: Former     Packs/day: 2.00     Years: 20.00     Pack years: 40.00     Types: Cigarettes     Start date: 1961     Quit date: 1983     Years since quittin.2     Smokeless tobacco: Never   Substance Use Topics     Alcohol use: No     Drug use: No        Family History:     Family History   Problem Relation Age of Onset     Respiratory Mother         emphysema     Aneurysm Mother      Chronic Obstructive Pulmonary Disease Mother      Thyroid Disease Mother      Osteoporosis Mother      Other Cancer Father      Cancer Father         lung cancer     Arthritis Maternal Grandmother         rheumatoid     Heart Disease Maternal Grandmother         unsure of details     Heart Disease Maternal Grandfather      Neurologic Disorder Paternal Grandmother         migraines     Alzheimer Disease Paternal Grandmother      Unknown/Adopted Paternal Grandfather      Cancer Sister         stage 4 anal cancer age 62 years     Colon Cancer Sister      Substance Abuse Sister      Cancer - colorectal Brother      Colon Cancer Brother      Substance Abuse Sister      Anesthesia Reaction Daughter         Medications:     Current Outpatient Medications   Medication Sig     acyclovir (ZOVIRAX) 800 MG tablet Take 1 tablet (800 mg) by mouth 5 times daily for 7 days (Patient not taking: Reported on 2022)     aspirin-acetaminophen-caffeine (EXCEDRIN MIGRAINE) 250-250-65 MG tablet Take 1 tablet by mouth every 6 hours as needed for headaches     calcium-magnesium (CALMAG) 500-250 MG TABS per tablet Take 1 tablet by mouth 2 times daily (with meals)     cholecalciferol 1000 UNITS TABS Take 1,000 Units by mouth daily     folic acid (FOLVITE) 1 MG tablet Take 1 tablet (1 mg) by mouth daily     loratadine (CLARITIN) 10 MG tablet Take 1 tablet (10 mg) by mouth daily     methotrexate 2.5 MG tablet Take 4 tablets (10 mg) by mouth every 7  days Labs every 8 - 12 weeks for refills.     predniSONE (DELTASONE) 10 MG tablet Take 1 tablet (10 mg) by mouth daily     propranolol (INDERAL) 20 MG tablet TAKE 1 TABLET BY MOUTH DAILY     riTUXimab (RITUXAN) 100 MG/10ML SOLN      No current facility-administered medications for this visit.     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's syndrom     Adhesive Tape      Rash itches     Amoxicillin      vomiting     Augmentin      vomiting     Codeine      vomiting     Erythromycin      vomiting     Nickel      itches rash     Sulfa Drugs Itching     Tegaderm Alginate Ag      LW Other1: -ALL MEDICATIONS MAKE PATIENT VIOLENTLY ILL; ADHESIVE BANDAGES; NICKEL     Zithromax [Azithromycin Dihydrate]      Vomiting/ skin blisters        Review of Systems:   As above     Physical Exam:   Vitals: /74 (BP Location: Right arm, Patient Position: Sitting, Cuff Size: Adult Regular)   Pulse 80   Wt 53.8 kg (118 lb 8 oz)   BMI 20.99 kg/m     General: Seated comfortably in no acute distress.  Lungs: breathing comfortably  Extremities: no edema  Neurologic:     Mental Status: Fully alert, attentive. Normal memory and fund of knowledge. Language normal, speech clear and fluent, no paraphasic errors.      Cranial Nerves: Visual fields intact. PERRL. EOMI with normal smooth pursuit. Facial sensation intact/symmetric. Facial movements symmetric. Hearing not formally tested but intact to conversation. Palate elevation symmetric, uvula midline. No dysarthria. Shoulder shrug strong bilaterally. Tongue protrusion midline.     Motor: No tremors or other abnormal movements observed. Muscle tone normal throughout. Normal/symmetric rapid finger tapping. Strength 5/5 throughout upper and lower extremities as below.      Right Left   Shoulder abduction        5 5  "  Elbow extension 5 5   Elbow flexion 5 5   Wrist extension         5 5   Finger extension 5 5   ADM 5 5   FDI 5 5   APB 5 5   Hip flexion 5 5   Knee flexion 5 5   Knee extension 5 5   Dorsiflexion 5 5   Plantar flexion 5 5        Deep Tendon     Right Left   Biceps 1 1   BRD 1 1   Triceps 1 1   Patellar 2 2   Achilles 1 1   Plantar Flexor Flexor   Clonus Absent Absent        Sensory: Intact/symmetric to light touch, pinprick, temperature, and proprioception throughout upper and lower extremities. Vibration is ~2 seconds in the bilateral great toes and ~4-5 seconds in the bilateral ankles, normal in the hands. Negative Romberg.      Coordination: Finger-nose-finger and heel-shin intact without dysmetria.      Gait: Cautious, but steady casual gait. Able to walk on toes, heels and tandem with mild difficulty.         Data: Pertinent prior to visit   Imaging:  MRI brain 2018  IMPRESSION:    1. Normal MR appearance of the internal auditory canals, labyrinthine  structures, as well as the intracranial course of the seventh and  eighth cranial nerves.   2. No evidence of acute infarct, mass, hemorrhage, or herniation.  3. Mild nonspecific white matter changes likely due to chronic  microvascular ischemic disease.      Procedures:  None    Laboratory:  CRP normal, Proteinase 3 Antibody IgG positive, ANCA negative (10/2022)  A1c 5.5 (1/2022)  SSA/SSB negative (2014)         Assessment and Plan:   Assessment:  Nury Cárdenas is a 77 year old female with history of GPA who presents today for evaluation of tinglings sensations. Patient reports tingling in the feet since diagnosis of GPA in 2014. She had a recent flare of GPA in May and notes an increasing \"tight\" sensation in the feet since then. I suspect tingling sensations are related to mild sensory polyneuropathy, possibly related to underlying GPA. Additional neuropathy labs were ordered as below. Patient also reports intermittent head pains in recent months with " associated dizziness and nausea. She has strong history of migraines and these pains could be migraine related. Propranolol dosage could be increased in the future if these worsen.     Plan:  - Serum B12, ELP, immunofixation  - Consider propranolol increase if head pains worsen    Follow up in Neurology clinic as needed should new concerns arise.    Ari Crump MD   of Neurology  [facility]      The total time of this encounter today amounted to 51 minutes. This time included time spent with the patient, prep work, ordering tests, and performing post visit documentation.

## 2022-10-25 ENCOUNTER — OFFICE VISIT (OUTPATIENT)
Dept: NEUROLOGY | Facility: CLINIC | Age: 77
End: 2022-10-25
Attending: INTERNAL MEDICINE
Payer: COMMERCIAL

## 2022-10-25 ENCOUNTER — PRE VISIT (OUTPATIENT)
Dept: NEUROLOGY | Facility: CLINIC | Age: 77
End: 2022-10-25

## 2022-10-25 VITALS
HEART RATE: 80 BPM | DIASTOLIC BLOOD PRESSURE: 74 MMHG | BODY MASS INDEX: 20.99 KG/M2 | SYSTOLIC BLOOD PRESSURE: 129 MMHG | WEIGHT: 118.5 LBS

## 2022-10-25 DIAGNOSIS — M31.30 GRANULOMATOSIS WITH POLYANGIITIS WITHOUT RENAL INVOLVEMENT (H): ICD-10-CM

## 2022-10-25 DIAGNOSIS — M15.0 PRIMARY OSTEOARTHRITIS INVOLVING MULTIPLE JOINTS: ICD-10-CM

## 2022-10-25 DIAGNOSIS — G62.9 NEUROPATHY: Primary | ICD-10-CM

## 2022-10-25 PROCEDURE — 99215 OFFICE O/P EST HI 40 MIN: CPT | Performed by: INTERNAL MEDICINE

## 2022-10-25 NOTE — PATIENT INSTRUCTIONS
Let me know if the head pain worsens. We could consider increasing the propranolol for migraines.       We are checking a couple labs looking at different causes of neuropathy. Get these drawn in November with the rest of your labs.

## 2022-10-25 NOTE — LETTER
"    10/25/2022         RE: Nury Cárdenas  [address] [address]        Dear Colleague,    Thank you for referring your patient, Nury Cárdenas, to the [facility] [place]. Please see a copy of my visit note below.    [facility] Neurology Consultation    Nury Cárdenas MRN# [id number]   Age: 77 year old YOB: 1945     Requesting physician: Phil Munroe     Reason for Consultation: tingling sensations      History of Presenting Symptoms:   Nury Cárdenas is a 77 year old female who presents today for evaluation of tinglings sensations.    Patient has history of GPA diagnosed in 2014. She was really sick at the time. At this time she noticed a tingling sensation in the feet. This was stable for many years.     Around the beginning of the year she started getting what she thought was sinus infections. It was cleared up with antibiotics, but then came back again. In May she was feeling short of breath with walking. She developed severe chills. She saw Dr Maguire and labs suggested she was in a flare up of GPA. She was given Rituximab, methotrexate, and several courses of steroids prednisone. She is now just on methotrexate. Symptoms are doing much better compared to before and her markers of GPA are improving.    More recently she started feeling a tight sensation in the toes. It is bothersome at night. It helps to put a warm blanket on them. The tingling sensation is not worse at all.     A few months ago she started to feel spots on the top of her head that come and go. When she massages them, they hurt (about 7/10). She notices brief feelings of nausea and \"tipsy\" with them. She doesn't notice clear light sensitivity with them. These overall have improved in the last few weeks.    She has history of migraines and is on propranolol and has been for many years. She has history of ocular migraines as well.     Sometimes she wakes up in " the morning with constant spinning sensations. She has a hard time walking when it happens. If she takes an Excedrin she is usually good with 15 minutes. These episodes have been happening for many years and she has gone to the hospital for them in the past.     She gets a blue appearance on the tops of the hands at night. She has arthritis in the hands as well.    She notices occasional twinges of pain in the left flank and abdomen.     She has posterior neck pain that is bothersome at times. It helps to wear a neck brace. It is harder to hold her head up and straight.       Past Medical History:     Patient Active Problem List   Diagnosis     GERD (gastroesophageal reflux disease)     Fibroids     Migraines     Hearing loss     Osteopenia     HYPERLIPIDEMIA LDL GOAL <160     Advanced directives, counseling/discussion     Inflammatory arthritis     Synovitis of wrist     Chronic constipation     Granulomatosis with polyangiitis without renal involvement (H)     Chronic steroid use     Dyspnea     Pulmonary embolism (H)     Calculus of kidney, right     Chronic anticoagulation     Long-term (current) use of anticoagulants [Z79.01]     Long-term use of immunosuppressant medication     Primary osteoarthritis involving multiple joints     Cellulitis     Cat scratch left lower extremity     Other pulmonary embolism without acute cor pulmonale, unspecified chronicity (H)     Age-related osteoporosis without current pathological fracture     Age-related osteoporosis with current pathological fracture, sequela     Adverse effects of medication     Combined forms of age-related cataract, mild-mod, of right eye     Pseudophakia, Yag Caps, os     History of vitrectomy - macular hole, os (MVE)     Hx of macular hole of left eye     Hemifacial spasm     Immunosuppression (H)     Clinical diagnosis of COVID-19     History of COVID-19     Lesion of right eyelid     COPD (chronic obstructive pulmonary disease) (H)      Polyneuropathy associated with underlying disease (H)     Esodeviation     History of strabismus surgery     Current chronic use of systemic steroids     Herpes zoster ophthalmicus, right     Past Medical History:   Diagnosis Date     Amblyopia      Atypical pneumonia 06/14/2014     Atypical pneumonia      Atypical pneumonia      Atypical pneumonia      COPD (chronic obstructive pulmonary disease) (H) 07/20/2021     Coronary artery disease 1982    heart beat hard made me tired     Dyslipidemia      Fibroids      GERD (gastroesophageal reflux disease)      Granulomatosis with polyangiitis (Wegener's)      Hearing loss      Inflammatory arthritis     thumb     Migraines      MVP (mitral valve prolapse)     had an echo that was normal in 2007 she is on Inderal     Nonsenile cataract      Osteopenia      PE (pulmonary embolism) 10/2014    ? GPA associated, on warfarin      Strabismus      Synovitis of wrist 2009    right        Past Surgical History:     Past Surgical History:   Procedure Laterality Date     ABDOMEN SURGERY      appendix out     APPENDECTOMY       BIOPSY  1972    cone biopsy     CATARACT IOL, RT/LT       CL AFF SURGICAL PATHOLOGY      cone biopsy 1975     COLONOSCOPY       COLONOSCOPY WITH CO2 INSUFFLATION N/A 12/20/2017    Procedure: COLONOSCOPY WITH CO2 INSUFFLATION;  Screening  BMI: 20.7  Pharmacy: [facility]  [phone]  [employer]Med[employer]  Referring: [facility]  Patient get ill from [employer];  Surgeon: Duane, William Charles, MD;  Location: [facility] OR     EYE SURGERY      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     HC ESOPHAGOSCOPY, DIAGNOSTIC       LASER YAG CAPSULOTOMY      left eye     OPTICAL TRACKING SYSTEM BRONCHOSCOPY  6/19/2014    Procedure: OPTICAL TRACKING SYSTEM BRONCHOSCOPY;  Surgeon: Angel Kathleen MD;  Location: [facility]     PHACOEMULSIFICATION WITH STANDARD INTRAOCULAR LENS IMPLANT  01/2018    left eye (SR)     SOFT TISSUE SURGERY      remove senovial fluid from right wrist      STRABISMUS SURGERY       SURGICAL HISTORY OF -   as a child    strabismus repair right eye     SURGICAL HISTORY OF -       right wrist debridement     TONSILLECTOMY      as a child     TONSILLECTOMY & ADENOIDECTOMY       TUBAL LIGATION       VITRECTOMY PARSPLANA  2018    left eye - mac hole (MVE)        Social History:     Social History     Tobacco Use     Smoking status: Former     Packs/day: 2.00     Years: 20.00     Pack years: 40.00     Types: Cigarettes     Start date: 1961     Quit date: 1983     Years since quittin.2     Smokeless tobacco: Never   Substance Use Topics     Alcohol use: No     Drug use: No        Family History:     Family History   Problem Relation Age of Onset     Respiratory Mother         emphysema     Aneurysm Mother      Chronic Obstructive Pulmonary Disease Mother      Thyroid Disease Mother      Osteoporosis Mother      Other Cancer Father      Cancer Father         lung cancer     Arthritis Maternal Grandmother         rheumatoid     Heart Disease Maternal Grandmother         unsure of details     Heart Disease Maternal Grandfather      Neurologic Disorder Paternal Grandmother         migraines     Alzheimer Disease Paternal Grandmother      Unknown/Adopted Paternal Grandfather      Cancer Sister         stage 4 anal cancer age 62 years     Colon Cancer Sister      Substance Abuse Sister      Cancer - colorectal Brother      Colon Cancer Brother      Substance Abuse Sister      Anesthesia Reaction Daughter         Medications:     Current Outpatient Medications   Medication Sig     acyclovir (ZOVIRAX) 800 MG tablet Take 1 tablet (800 mg) by mouth 5 times daily for 7 days (Patient not taking: Reported on 2022)     aspirin-acetaminophen-caffeine (EXCEDRIN MIGRAINE) 250-250-65 MG tablet Take 1 tablet by mouth every 6 hours as needed for headaches     calcium-magnesium (CALMAG) 500-250 MG TABS per tablet Take 1 tablet by mouth 2 times daily (with meals)      cholecalciferol 1000 UNITS TABS Take 1,000 Units by mouth daily     folic acid (FOLVITE) 1 MG tablet Take 1 tablet (1 mg) by mouth daily     loratadine (CLARITIN) 10 MG tablet Take 1 tablet (10 mg) by mouth daily     methotrexate 2.5 MG tablet Take 4 tablets (10 mg) by mouth every 7 days Labs every 8 - 12 weeks for refills.     predniSONE (DELTASONE) 10 MG tablet Take 1 tablet (10 mg) by mouth daily     propranolol (INDERAL) 20 MG tablet TAKE 1 TABLET BY MOUTH DAILY     riTUXimab (RITUXAN) 100 MG/10ML SOLN      No current facility-administered medications for this visit.     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's syndrom     Adhesive Tape      Rash itches     Amoxicillin      vomiting     Augmentin      vomiting     Codeine      vomiting     Erythromycin      vomiting     Nickel      itches rash     Sulfa Drugs Itching     Tegaderm Alginate Ag      LW Other1: -ALL MEDICATIONS MAKE PATIENT VIOLENTLY ILL; ADHESIVE BANDAGES; NICKEL     Zithromax [Azithromycin Dihydrate]      Vomiting/ skin blisters        Review of Systems:   As above     Physical Exam:   Vitals: /74 (BP Location: Right arm, Patient Position: Sitting, Cuff Size: Adult Regular)   Pulse 80   Wt 53.8 kg (118 lb 8 oz)   BMI 20.99 kg/m     General: Seated comfortably in no acute distress.  Lungs: breathing comfortably  Extremities: no edema  Neurologic:     Mental Status: Fully alert, attentive. Normal memory and fund of knowledge. Language normal, speech clear and fluent, no paraphasic errors.      Cranial Nerves: Visual fields intact. PERRL. EOMI with normal smooth pursuit. Facial sensation intact/symmetric. Facial movements symmetric. Hearing not formally tested but intact to conversation. Palate elevation symmetric, uvula midline. No dysarthria. Shoulder shrug strong  bilaterally. Tongue protrusion midline.     Motor: No tremors or other abnormal movements observed. Muscle tone normal throughout. Normal/symmetric rapid finger tapping. Strength 5/5 throughout upper and lower extremities as below.      Right Left   Shoulder abduction        5 5   Elbow extension 5 5   Elbow flexion 5 5   Wrist extension         5 5   Finger extension 5 5   ADM 5 5   FDI 5 5   APB 5 5   Hip flexion 5 5   Knee flexion 5 5   Knee extension 5 5   Dorsiflexion 5 5   Plantar flexion 5 5        Deep Tendon     Right Left   Biceps 1 1   BRD 1 1   Triceps 1 1   Patellar 2 2   Achilles 1 1   Plantar Flexor Flexor   Clonus Absent Absent        Sensory: Intact/symmetric to light touch, pinprick, temperature, and proprioception throughout upper and lower extremities. Vibration is ~2 seconds in the bilateral great toes and ~4-5 seconds in the bilateral ankles, normal in the hands. Negative Romberg.      Coordination: Finger-nose-finger and heel-shin intact without dysmetria.      Gait: Cautious, but steady casual gait. Able to walk on toes, heels and tandem with mild difficulty.         Data: Pertinent prior to visit   Imaging:  MRI brain 2018  IMPRESSION:    1. Normal MR appearance of the internal auditory canals, labyrinthine  structures, as well as the intracranial course of the seventh and  eighth cranial nerves.   2. No evidence of acute infarct, mass, hemorrhage, or herniation.  3. Mild nonspecific white matter changes likely due to chronic  microvascular ischemic disease.      Procedures:  None    Laboratory:  CRP normal, Proteinase 3 Antibody IgG positive, ANCA negative (10/2022)  A1c 5.5 (1/2022)  SSA/SSB negative (2014)         Assessment and Plan:   Assessment:  Nuyr Cárdenas is a 77 year old female with history of GPA who presents today for evaluation of tinglings sensations. Patient reports tingling in the feet since diagnosis of GPA in 2014. She had a recent flare of GPA in May and notes an  "increasing \"tight\" sensation in the feet since then. I suspect tingling sensations are related to mild sensory polyneuropathy, possibly related to underlying GPA. Additional neuropathy labs were ordered as below. Patient also reports intermittent head pains in recent months with associated dizziness and nausea. She has strong history of migraines and these pains could be migraine related. Propranolol dosage could be increased in the future if these worsen.     Plan:  - Serum B12, ELP, immunofixation  - Consider propranolol increase if head pains worsen    Follow up in Neurology clinic as needed should new concerns arise.    Ari Crump MD   of Neurology  [facility]      The total time of this encounter today amounted to 51 minutes. This time included time spent with the patient, prep work, ordering tests, and performing post visit documentation.        Again, thank you for allowing me to participate in the care of your patient.        Sincerely,        Ari Crump MD    "

## 2022-11-09 ENCOUNTER — LAB (OUTPATIENT)
Dept: LAB | Facility: CLINIC | Age: 77
End: 2022-11-09
Payer: COMMERCIAL

## 2022-11-09 DIAGNOSIS — M15.0 PRIMARY OSTEOARTHRITIS INVOLVING MULTIPLE JOINTS: ICD-10-CM

## 2022-11-09 DIAGNOSIS — G62.9 NEUROPATHY: ICD-10-CM

## 2022-11-09 DIAGNOSIS — L65.9 ALOPECIA: ICD-10-CM

## 2022-11-09 DIAGNOSIS — M31.30 GRANULOMATOSIS WITH POLYANGIITIS WITHOUT RENAL INVOLVEMENT (H): ICD-10-CM

## 2022-11-09 DIAGNOSIS — K21.9 GASTROESOPHAGEAL REFLUX DISEASE WITHOUT ESOPHAGITIS: ICD-10-CM

## 2022-11-09 DIAGNOSIS — Z79.60 LONG-TERM USE OF IMMUNOSUPPRESSANT MEDICATION: ICD-10-CM

## 2022-11-09 DIAGNOSIS — E78.5 HYPERLIPIDEMIA LDL GOAL <100: ICD-10-CM

## 2022-11-09 LAB
ALBUMIN SERPL-MCNC: 3.6 G/DL (ref 3.4–5)
ALBUMIN UR-MCNC: NEGATIVE MG/DL
ALT SERPL W P-5'-P-CCNC: 23 U/L (ref 0–50)
APPEARANCE UR: ABNORMAL
AST SERPL W P-5'-P-CCNC: 15 U/L (ref 0–45)
BASOPHILS # BLD AUTO: 0.1 10E3/UL (ref 0–0.2)
BASOPHILS NFR BLD AUTO: 1 %
BILIRUB UR QL STRIP: NEGATIVE
CHOLEST SERPL-MCNC: 218 MG/DL
COLOR UR AUTO: YELLOW
CREAT SERPL-MCNC: 0.7 MG/DL (ref 0.52–1.04)
CRP SERPL-MCNC: <2.9 MG/L (ref 0–8)
EOSINOPHIL # BLD AUTO: 0.1 10E3/UL (ref 0–0.7)
EOSINOPHIL NFR BLD AUTO: 1 %
ERYTHROCYTE [DISTWIDTH] IN BLOOD BY AUTOMATED COUNT: 13.2 % (ref 10–15)
ERYTHROCYTE [SEDIMENTATION RATE] IN BLOOD BY WESTERGREN METHOD: 7 MM/HR (ref 0–30)
FASTING STATUS PATIENT QL REPORTED: YES
GFR SERPL CREATININE-BSD FRML MDRD: 88 ML/MIN/1.73M2
GLUCOSE UR STRIP-MCNC: NEGATIVE MG/DL
HCT VFR BLD AUTO: 42.8 % (ref 35–47)
HDLC SERPL-MCNC: 90 MG/DL
HGB BLD-MCNC: 14.3 G/DL (ref 11.7–15.7)
HGB UR QL STRIP: NEGATIVE
KETONES UR STRIP-MCNC: NEGATIVE MG/DL
LDLC SERPL CALC-MCNC: 114 MG/DL
LEUKOCYTE ESTERASE UR QL STRIP: ABNORMAL
LYMPHOCYTES # BLD AUTO: 0.9 10E3/UL (ref 0.8–5.3)
LYMPHOCYTES NFR BLD AUTO: 18 %
MCH RBC QN AUTO: 31.9 PG (ref 26.5–33)
MCHC RBC AUTO-ENTMCNC: 33.4 G/DL (ref 31.5–36.5)
MCV RBC AUTO: 96 FL (ref 78–100)
MONOCYTES # BLD AUTO: 0.5 10E3/UL (ref 0–1.3)
MONOCYTES NFR BLD AUTO: 9 %
NEUTROPHILS # BLD AUTO: 3.7 10E3/UL (ref 1.6–8.3)
NEUTROPHILS NFR BLD AUTO: 71 %
NITRATE UR QL: NEGATIVE
NONHDLC SERPL-MCNC: 128 MG/DL
PH UR STRIP: 6 [PH] (ref 5–7)
PLATELET # BLD AUTO: 228 10E3/UL (ref 150–450)
RBC # BLD AUTO: 4.48 10E6/UL (ref 3.8–5.2)
RBC #/AREA URNS AUTO: ABNORMAL /HPF
SP GR UR STRIP: 1.02 (ref 1–1.03)
SQUAMOUS #/AREA URNS AUTO: ABNORMAL /LPF
TOTAL PROTEIN SERUM FOR ELP: 5.7 G/DL (ref 6.4–8.3)
TRIGL SERPL-MCNC: 72 MG/DL
TSH SERPL DL<=0.005 MIU/L-ACNC: 2.84 MU/L (ref 0.4–4)
UROBILINOGEN UR STRIP-ACNC: 0.2 E.U./DL
VIT B12 SERPL-MCNC: 478 PG/ML (ref 232–1245)
WBC # BLD AUTO: 5.2 10E3/UL (ref 4–11)
WBC #/AREA URNS AUTO: ABNORMAL /HPF

## 2022-11-09 PROCEDURE — 81001 URINALYSIS AUTO W/SCOPE: CPT

## 2022-11-09 PROCEDURE — 83876 ASSAY MYELOPEROXIDASE: CPT

## 2022-11-09 PROCEDURE — 84460 ALANINE AMINO (ALT) (SGPT): CPT

## 2022-11-09 PROCEDURE — 85652 RBC SED RATE AUTOMATED: CPT

## 2022-11-09 PROCEDURE — 80061 LIPID PANEL: CPT

## 2022-11-09 PROCEDURE — 86036 ANCA SCREEN EACH ANTIBODY: CPT

## 2022-11-09 PROCEDURE — 84155 ASSAY OF PROTEIN SERUM: CPT

## 2022-11-09 PROCEDURE — 82565 ASSAY OF CREATININE: CPT

## 2022-11-09 PROCEDURE — 83516 IMMUNOASSAY NONANTIBODY: CPT

## 2022-11-09 PROCEDURE — 82040 ASSAY OF SERUM ALBUMIN: CPT

## 2022-11-09 PROCEDURE — 36415 COLL VENOUS BLD VENIPUNCTURE: CPT

## 2022-11-09 PROCEDURE — 84450 TRANSFERASE (AST) (SGOT): CPT

## 2022-11-09 PROCEDURE — 84165 PROTEIN E-PHORESIS SERUM: CPT

## 2022-11-09 PROCEDURE — 82607 VITAMIN B-12: CPT

## 2022-11-09 PROCEDURE — 86140 C-REACTIVE PROTEIN: CPT

## 2022-11-09 PROCEDURE — 86256 FLUORESCENT ANTIBODY TITER: CPT

## 2022-11-09 PROCEDURE — 85025 COMPLETE CBC W/AUTO DIFF WBC: CPT

## 2022-11-09 PROCEDURE — 86334 IMMUNOFIX E-PHORESIS SERUM: CPT

## 2022-11-09 PROCEDURE — 84443 ASSAY THYROID STIM HORMONE: CPT

## 2022-11-10 LAB
ALBUMIN SERPL ELPH-MCNC: 3.7 G/DL (ref 3.7–5.1)
ALPHA1 GLOB SERPL ELPH-MCNC: 0.3 G/DL (ref 0.2–0.4)
ALPHA2 GLOB SERPL ELPH-MCNC: 0.6 G/DL (ref 0.5–0.9)
ANCA AB PATTERN SER IF-IMP: NORMAL
B-GLOBULIN SERPL ELPH-MCNC: 0.7 G/DL (ref 0.6–1)
C-ANCA TITR SER IF: NORMAL {TITER}
GAMMA GLOB SERPL ELPH-MCNC: 0.5 G/DL (ref 0.7–1.6)
M PROTEIN SERPL ELPH-MCNC: 0 G/DL
PROT PATTERN SERPL ELPH-IMP: ABNORMAL
PROT PATTERN SERPL IFE-IMP: NORMAL

## 2022-11-11 LAB
MYELOPEROXIDASE AB SER IA-ACNC: <0.3 U/ML
MYELOPEROXIDASE AB SER IA-ACNC: NEGATIVE
PROTEINASE3 AB SER IA-ACNC: 3.2 U/ML
PROTEINASE3 AB SER IA-ACNC: POSITIVE

## 2022-11-19 ENCOUNTER — HEALTH MAINTENANCE LETTER (OUTPATIENT)
Age: 77
End: 2022-11-19

## 2022-11-20 DIAGNOSIS — T78.40XA ALLERGIC REACTION, INITIAL ENCOUNTER: ICD-10-CM

## 2022-11-21 RX ORDER — LORATADINE 10 MG/1
TABLET ORAL
Qty: 90 TABLET | Refills: 0 | Status: SHIPPED | OUTPATIENT
Start: 2022-11-21 | End: 2023-02-17

## 2022-11-23 ENCOUNTER — LAB (OUTPATIENT)
Dept: LAB | Facility: CLINIC | Age: 77
End: 2022-11-23
Payer: COMMERCIAL

## 2022-11-23 DIAGNOSIS — G62.9 NEUROPATHY: ICD-10-CM

## 2022-11-23 PROCEDURE — 86335 IMMUNFIX E-PHORSIS/URINE/CSF: CPT

## 2022-11-25 LAB — PROT ELPH PNL UR ELPH: NORMAL

## 2022-12-05 NOTE — PROGRESS NOTES
Bria is a 77 year old who is being evaluated via a billable telephone visit.      What phone number would you like to be contacted at?   [phone]   How would you like to obtain your AVS? Mail a copy  Phone call duration: 22 minutes      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 Course complicated by severe upper airway inflammation, Wegener's lung and destructive/erosive joint disease. Treated initially with rituximab 375 mg/m2 x4 and high dose corticsteroids, and most recently with methotrexate.   Alendronate therapy complicated by LE pain. 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    Today Bria is well and denies any sypmtoms of active GPA. No complaint of upper airway inflammation or dypsnea. She does have persistent neck pain. She complains of neck pain at the end of the day.    We discuss the role of methotrexate as a preventive against GPA relapse. We discuss the alternative of q6 months rituximab infusion. She indicates that she would like to be off of immunosuppression.    Methotrexate 10 mg weekly and folic acid 1 mg daily, but she thinks that this leads to thinning of the hair. She also feels that she has some headache at the same time each week. She wants to stop the methotrexate if it is just used as a prophylactic.    PMI:  Medical-invasive pulmonary aspirgillosis, GPA, osteoarthritis, DVT with pulmonary embolism, osteoporosis with vertebral fracture (T = -2.0 2-2021), GERD, hyperlipidemia, +Tb exposure, nephrolithiasis, retinal tear, cataract, COVID-19, sensorineural hearing loss  Surgical-lung biopsy, appendectomy, eye surgery, tonsillectomy, cone biopsy, right wrist synovectomy  Injuries-left wrist fracture x2, left 5th toe fracture, vertebral fracture    SH:  Lives at home alone. Former smoker. No EtOH. Tuberculosis exposure as a child. Ambidextrous. Rare EtOH    FH:  Son with mitochondrial myopathy  Daughter with  palpitations  Sibs dead with colon and anal cancer  Father dead with lung cancer  Mother dead with emphysema and osteoporosis    PMSF history personally obtained and updated by me this visit in the clinic.    ROS:  +increasing arthritis in her neck and knees  +allergies to fosamax amoxicillin, augmentin, erythromycin, zithromax, nickel, codeine, adhesive tape  Remainder of the 14 point ROS obtained and found negative.    Laboratory:    Component      Latest Ref Rng & Units 12/7/2022   WBC      4.0 - 11.0 10e3/uL 5.0   RBC Count      3.80 - 5.20 10e6/uL 4.52   Hemoglobin      11.7 - 15.7 g/dL 14.3   Hematocrit      35.0 - 47.0 % 42.8   MCV      78 - 100 fL 95   MCH      26.5 - 33.0 pg 31.6   MCHC      31.5 - 36.5 g/dL 33.4   RDW      10.0 - 15.0 % 13.3   Platelet Count      150 - 450 10e3/uL 206   % Neutrophils      % 69   % Lymphocytes      % 18   % Monocytes      % 10   % Eosinophils      % 2   % Basophils      % 1   Absolute Neutrophils      1.6 - 8.3 10e3/uL 3.4   Absolute Lymphocytes      0.8 - 5.3 10e3/uL 0.9   Absolute Monocytes      0.0 - 1.3 10e3/uL 0.5   Absolute Eosinophils      0.0 - 0.7 10e3/uL 0.1   Absolute Basophils      0.0 - 0.2 10e3/uL 0.0   Color Urine      Colorless, Straw, Light Yellow, Yellow Yellow   Appearance Urine      Clear Clear   Glucose Urine      Negative mg/dL Negative   Bilirubin Urine      Negative Negative   Ketones Urine      Negative mg/dL Negative   Specific Gravity Urine      1.003 - 1.035 1.020   Blood Urine      Negative Negative   pH Urine      5.0 - 7.0 6.5   Protein Albumin Urine      Negative mg/dL Negative   Urobilinogen Urine      0.2, 1.0 E.U./dL 0.2   Nitrite Urine      Negative Negative   Leukocyte Esterase Urine      Negative Small (A)   MPO Erika IgG Instrument Value      <3.5 U/mL <0.3   Myeloperoxidase Antibody IgG      Negative Negative   Proteinase 3 Erika IgG Instrument Value      <2.0 U/mL 2.6 (H)   Proteinase 3 Antibody IgG      Negative Equivocal (A)   RBC  Urine      0-2 /HPF /HPF 0-2   WBC Urine      0-5 /HPF /HPF 5-10 (A)   Squamous Epithelial /LPF Urine      None Seen /LPF Few (A)   Creatinine      0.52 - 1.04 mg/dL 0.72   GFR Estimate      >60 mL/min/1.73m2 85   Neutrophil Cytoplasmic Antibody      <1:10 <1:10   Neutrophil Cytoplasmic Antibody Pattern       The ANCA IFA is <1:10.  No further testing will be performed.   Albumin      3.4 - 5.0 g/dL 3.6   ALT      0 - 50 U/L 19   AST      0 - 45 U/L 17   CRP Inflammation      0.0 - 8.0 mg/L <2.9   Sed Rate      0 - 30 mm/hr 6     Component Neutrophil Cytoplasmic Antibody   Latest Ref Rng & Units <1:10   10/12/2018 <1:10   4/19/2019 <1:10   10/11/2019 <1:10   2/3/2021 1:160 (A)   4/20/2021 1:80 (A)   8/18/2021 1:40 (H)   11/1/2021 1:80 (H)   1/12/2022 1:10 (H)   1/23/2022 1:10 (H)   5/4/2022 1:40 (H)   5/20/2022 1:160 (H)   6/28/2022 1:40 (H)   8/1/2022 1:10 (H)   9/8/2022 1:10 (H)   10/10/2022 <1:10   11/9/2022 <1:10   12/7/2022 <1:10     Component Proteinase 3 Erika IgG Instrument Value   Latest Ref Rng & Units <2.0 U/mL   8/18/2021 103.0 (H)   11/1/2021 78.0 (H)   1/12/2022 31.0 (H)   1/23/2022 27.0 (H)   5/4/2022 41.0 (H)   5/20/2022 66.0 (H)   6/28/2022 21.0 (H)   8/1/2022 7.0 (H)   9/8/2022 5.0 (H)   10/10/2022 3.1 (H)   11/9/2022 3.2 (H)   12/7/2022 2.6 (H)     Impression:    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 This appears now to be in remission with normalizing ANCA and PR3 testing and no symptoms. Her recent course is complicated by hair loss and this may relate to methotrexate use. We have discussed the risks and benefits of maintenance therapy to protect against GPA relapse and we agree to stop all DMARD therapy and carefully monitor for recurrence of symptoms or change in the serologies.     Neck pain-with history of both inflammatory arthritis and osteoporosis with vertebral fracture. Either of these could be occurring here. Alternatively this may relate to age  related OA progression in the neck or soft tissue discomfort with posture issues. Plan to get Xrays of the neck to assess. I doubt inflammatory joint disease at present. If no resolution of the problem or it progresses, then ortho or neurosurgery evaluation would be needed.    Long term management of immunosuppression-she is resistant to COVID vaccination.    Plan follow up in 6 months in person.    A total of 37 minutes was spent in telephone patient interaction and chart review on the day of service.

## 2022-12-07 ENCOUNTER — LAB (OUTPATIENT)
Dept: LAB | Facility: CLINIC | Age: 77
End: 2022-12-07
Payer: COMMERCIAL

## 2022-12-07 DIAGNOSIS — M15.0 PRIMARY OSTEOARTHRITIS INVOLVING MULTIPLE JOINTS: ICD-10-CM

## 2022-12-07 DIAGNOSIS — Z79.60 LONG-TERM USE OF IMMUNOSUPPRESSANT MEDICATION: ICD-10-CM

## 2022-12-07 DIAGNOSIS — M31.30 GRANULOMATOSIS WITH POLYANGIITIS WITHOUT RENAL INVOLVEMENT (H): ICD-10-CM

## 2022-12-07 DIAGNOSIS — K21.9 GASTROESOPHAGEAL REFLUX DISEASE WITHOUT ESOPHAGITIS: ICD-10-CM

## 2022-12-07 LAB
ALBUMIN SERPL-MCNC: 3.6 G/DL (ref 3.4–5)
ALBUMIN UR-MCNC: NEGATIVE MG/DL
ALT SERPL W P-5'-P-CCNC: 19 U/L (ref 0–50)
APPEARANCE UR: CLEAR
AST SERPL W P-5'-P-CCNC: 17 U/L (ref 0–45)
BASOPHILS # BLD AUTO: 0 10E3/UL (ref 0–0.2)
BASOPHILS NFR BLD AUTO: 1 %
BILIRUB UR QL STRIP: NEGATIVE
COLOR UR AUTO: YELLOW
CREAT SERPL-MCNC: 0.72 MG/DL (ref 0.52–1.04)
CRP SERPL-MCNC: <2.9 MG/L (ref 0–8)
EOSINOPHIL # BLD AUTO: 0.1 10E3/UL (ref 0–0.7)
EOSINOPHIL NFR BLD AUTO: 2 %
ERYTHROCYTE [DISTWIDTH] IN BLOOD BY AUTOMATED COUNT: 13.3 % (ref 10–15)
ERYTHROCYTE [SEDIMENTATION RATE] IN BLOOD BY WESTERGREN METHOD: 6 MM/HR (ref 0–30)
GFR SERPL CREATININE-BSD FRML MDRD: 85 ML/MIN/1.73M2
GLUCOSE UR STRIP-MCNC: NEGATIVE MG/DL
HCT VFR BLD AUTO: 42.8 % (ref 35–47)
HGB BLD-MCNC: 14.3 G/DL (ref 11.7–15.7)
HGB UR QL STRIP: NEGATIVE
KETONES UR STRIP-MCNC: NEGATIVE MG/DL
LEUKOCYTE ESTERASE UR QL STRIP: ABNORMAL
LYMPHOCYTES # BLD AUTO: 0.9 10E3/UL (ref 0.8–5.3)
LYMPHOCYTES NFR BLD AUTO: 18 %
MCH RBC QN AUTO: 31.6 PG (ref 26.5–33)
MCHC RBC AUTO-ENTMCNC: 33.4 G/DL (ref 31.5–36.5)
MCV RBC AUTO: 95 FL (ref 78–100)
MONOCYTES # BLD AUTO: 0.5 10E3/UL (ref 0–1.3)
MONOCYTES NFR BLD AUTO: 10 %
NEUTROPHILS # BLD AUTO: 3.4 10E3/UL (ref 1.6–8.3)
NEUTROPHILS NFR BLD AUTO: 69 %
NITRATE UR QL: NEGATIVE
PH UR STRIP: 6.5 [PH] (ref 5–7)
PLATELET # BLD AUTO: 206 10E3/UL (ref 150–450)
RBC # BLD AUTO: 4.52 10E6/UL (ref 3.8–5.2)
RBC #/AREA URNS AUTO: ABNORMAL /HPF
SP GR UR STRIP: 1.02 (ref 1–1.03)
SQUAMOUS #/AREA URNS AUTO: ABNORMAL /LPF
UROBILINOGEN UR STRIP-ACNC: 0.2 E.U./DL
WBC # BLD AUTO: 5 10E3/UL (ref 4–11)
WBC #/AREA URNS AUTO: ABNORMAL /HPF

## 2022-12-07 PROCEDURE — 81001 URINALYSIS AUTO W/SCOPE: CPT

## 2022-12-07 PROCEDURE — 84450 TRANSFERASE (AST) (SGOT): CPT

## 2022-12-07 PROCEDURE — 85025 COMPLETE CBC W/AUTO DIFF WBC: CPT

## 2022-12-07 PROCEDURE — 86256 FLUORESCENT ANTIBODY TITER: CPT

## 2022-12-07 PROCEDURE — 86036 ANCA SCREEN EACH ANTIBODY: CPT

## 2022-12-07 PROCEDURE — 84460 ALANINE AMINO (ALT) (SGPT): CPT

## 2022-12-07 PROCEDURE — 82565 ASSAY OF CREATININE: CPT

## 2022-12-07 PROCEDURE — 83516 IMMUNOASSAY NONANTIBODY: CPT

## 2022-12-07 PROCEDURE — 83876 ASSAY MYELOPEROXIDASE: CPT

## 2022-12-07 PROCEDURE — 82040 ASSAY OF SERUM ALBUMIN: CPT

## 2022-12-07 PROCEDURE — 85652 RBC SED RATE AUTOMATED: CPT

## 2022-12-07 PROCEDURE — 86140 C-REACTIVE PROTEIN: CPT

## 2022-12-07 PROCEDURE — 36415 COLL VENOUS BLD VENIPUNCTURE: CPT

## 2022-12-08 LAB
ANCA AB PATTERN SER IF-IMP: NORMAL
C-ANCA TITR SER IF: NORMAL {TITER}

## 2022-12-08 NOTE — PROGRESS NOTES
My left knee cap hurts at times and tender to touch then too.  Also have a spot on the back top of my head that hurts sometimes and is also tender to touch then too  Answers for HPI/ROS submitted by the patient on 12/2/2022  How many servings of fruits and vegetables do you eat daily?: 0-1  On average, how many sweetened beverages do you drink each day (Examples: soda, juice, sweet tea, etc.  Do NOT count diet or artificially sweetened beverages)?: 1  How many minutes a day do you exercise enough to make your heart beat faster?: 9 or less  How many days a week do you exercise enough to make your heart beat faster?: 3 or less  How many days per week do you miss taking your medication?: 0  What is the reason for your visit today?: My knee was aching but seems to have stopped. Just want to find out why.  Also have been having pain in the back of my neck and head.  When did your symptoms begin?: More than a month  What are your symptoms?: My knee was aching, the pain in my neck is often and the pain in the back of my head up to the top is only at night or early morning.op  How would you describe these symptoms?: Moderate  Are your symptoms:: Staying the same  Have you had these symptoms before?: No  Is there anything that makes you feel better?: Putting a neck brace on seems to help sometimes.

## 2022-12-09 ENCOUNTER — OFFICE VISIT (OUTPATIENT)
Dept: FAMILY MEDICINE | Facility: CLINIC | Age: 77
End: 2022-12-09
Payer: COMMERCIAL

## 2022-12-09 VITALS
TEMPERATURE: 98 F | HEIGHT: 64 IN | DIASTOLIC BLOOD PRESSURE: 74 MMHG | WEIGHT: 122.8 LBS | HEART RATE: 70 BPM | BODY MASS INDEX: 20.96 KG/M2 | OXYGEN SATURATION: 100 % | SYSTOLIC BLOOD PRESSURE: 142 MMHG | RESPIRATION RATE: 16 BRPM

## 2022-12-09 DIAGNOSIS — M54.2 PAIN IN NECK: ICD-10-CM

## 2022-12-09 DIAGNOSIS — I10 ESSENTIAL HYPERTENSION: ICD-10-CM

## 2022-12-09 DIAGNOSIS — M25.569 ACUTE KNEE PAIN, UNSPECIFIED LATERALITY: Primary | ICD-10-CM

## 2022-12-09 LAB
MYELOPEROXIDASE AB SER IA-ACNC: <0.3 U/ML
MYELOPEROXIDASE AB SER IA-ACNC: NEGATIVE
PROTEINASE3 AB SER IA-ACNC: 2.6 U/ML
PROTEINASE3 AB SER IA-ACNC: ABNORMAL

## 2022-12-09 PROCEDURE — 99213 OFFICE O/P EST LOW 20 MIN: CPT | Performed by: FAMILY MEDICINE

## 2022-12-09 ASSESSMENT — PAIN SCALES - GENERAL: PAINLEVEL: NO PAIN (0)

## 2022-12-09 NOTE — PROGRESS NOTES
Assessment & Plan     Acute knee pain, unspecified laterality    -  Pain was along inner joint line but improved now. Could has been caused by a trigger but better now.    Pain in neck (back side)   -  the pain is on and off, attributing it to Methotrexate but also spend most of the time reading/knitting with neck looking down. Discussed postural adjustment and will discuss concerns about Methotrexate with rheumatology    HTN;    BP slightly above goal; readings at home are fine.    Will check some home readings and send me some in a week or two    Phil Abbott MD  [facility] FRIREGINA Fraser is a 77 year old, presenting for the following health issues:  Knee Pain   1. Knee is feeling better   2. back of neck Lt side, is really painful has been wearing a brace as needed, having a hard time holding head up> Does a lot of reading and looking heada hurt. Takes Methotraxate  3. also the top of the left side on her head is very tender; thinks could be related to Methotraxate use; will discuss with rheumatology.     BP Readings from Last 6 Encounters:   12/09/22 (!) 142/74   10/25/22 129/74   08/05/22 (!) 153/80   07/20/22 132/74   07/19/22 (!) 152/92   07/13/22 134/78     History of Present Illness       Reason for visit:  My knee was aching but seems to have stopped. Just want to find out why.  Also have been having pain in the back of my neck and head.  Symptom onset:  More than a month  Symptoms include:  My knee was aching, the pain in my neck is often and the pain in the back of my head up to the top is only at night or early morning.op  Symptom intensity:  Moderate  Symptom progression:  Staying the same  Had these symptoms before:  No  What makes it better:  Putting a neck brace on seems to help sometimes.    She eats 0-1 servings of fruits and vegetables daily.She consumes 1 sweetened beverage(s) daily.She exercises with enough effort to increase her heart rate 9 or less  "minutes per day.  She exercises with enough effort to increase her heart rate 3 or less days per week.   She is taking medications regularly.     My left knee cap hurts at times and tender to touch then too.  Also have a spot on the back top of my head that hurts sometimes and is also tender to touch then too  Answers for HPI/ROS submitted by the patient on 12/2/2022  How many servings of fruits and vegetables do you eat daily?: 0-1  On average, how many sweetened beverages do you drink each day (Examples: soda, juice, sweet tea, etc.  Do NOT count diet or artificially sweetened beverages)?: 1  How many minutes a day do you exercise enough to make your heart beat faster?: 9 or less  How many days a week do you exercise enough to make your heart beat faster?: 3 or less  How many days per week do you miss taking your medication?: 0  What is the reason for your visit today?: My knee was aching but seems to have stopped. Just want to find out why.  Also have been having pain in the back of my neck and head.  When did your symptoms begin?: More than a month  What are your symptoms?: My knee was aching, the pain in my neck is often and the pain in the back of my head up to the top is only at night or early morning.op  How would you describe these symptoms?: Moderate  Are your symptoms:: Staying the same  Have you had these symptoms before?: No  Is there anything that makes you feel better?: Putting a neck brace on seems to help sometimes.    Review of Systems   Constitutional, HEENT, cardiovascular, pulmonary, gi and gu systems are negative, except as otherwise noted.      Objective    BP (!) 142/74   Pulse 70   Temp 98  F (36.7  C) (Oral)   Resp 16   Ht 1.625 m (5' 3.98\")   Wt 55.7 kg (122 lb 12.8 oz)   SpO2 100%   BMI 21.09 kg/m    Body mass index is 21.09 kg/m .  Physical Exam   GENERAL: healthy, alert and no distress  NECK: slight limitation of extension  RESP: lungs clear to auscultation - no rales, rhonchi or " wheezes  CV: regular rate and rhythm,  no murmur, click or rub  MS: no gross musculoskeletal defects noted, no edema

## 2022-12-16 ENCOUNTER — MYC MEDICAL ADVICE (OUTPATIENT)
Dept: FAMILY MEDICINE | Facility: CLINIC | Age: 77
End: 2022-12-16

## 2022-12-16 ENCOUNTER — VIRTUAL VISIT (OUTPATIENT)
Dept: RHEUMATOLOGY | Facility: CLINIC | Age: 77
End: 2022-12-16
Payer: COMMERCIAL

## 2022-12-16 DIAGNOSIS — M31.30 GRANULOMATOSIS WITH POLYANGIITIS WITHOUT RENAL INVOLVEMENT (H): ICD-10-CM

## 2022-12-16 DIAGNOSIS — Z79.60 LONG-TERM USE OF IMMUNOSUPPRESSANT MEDICATION: ICD-10-CM

## 2022-12-16 DIAGNOSIS — K21.9 GASTROESOPHAGEAL REFLUX DISEASE WITHOUT ESOPHAGITIS: ICD-10-CM

## 2022-12-16 DIAGNOSIS — M15.0 PRIMARY OSTEOARTHRITIS INVOLVING MULTIPLE JOINTS: ICD-10-CM

## 2022-12-16 PROCEDURE — 99214 OFFICE O/P EST MOD 30 MIN: CPT | Mod: 95 | Performed by: INTERNAL MEDICINE

## 2022-12-16 NOTE — TELEPHONE ENCOUNTER
"Routing traciekadeem kemal to PCP as FYI with stable BP readings.    \"Hi! I saw you last Fri about some pains and you wanted me to check my BP every day and let you know today how they were. I did it every am and pm.Fri pm 127/80, Sat 117/71 130/69  Sun 113/65  124/72  Mon 121/73  131/82   Tues 128/75  142/75   Wed 135/75  122/69   Thurs 121/77 forgot pm :(  Fri 128/74     It looks like they are pretty much ok now.  I think it was because I had just walked up the stairs and that machine had my arm SO TIGHT and for SO LONG that it REALLY HURT that is why it was so high last Fri I think :) It did come down after that :)\"'    Vandana Melgar RN      "

## 2022-12-16 NOTE — PATIENT INSTRUCTIONS
Schedule lab testing every 2 months  Get xray of the cervical spine  Follow up with me in 6 months

## 2022-12-27 ENCOUNTER — ANCILLARY PROCEDURE (OUTPATIENT)
Dept: GENERAL RADIOLOGY | Facility: CLINIC | Age: 77
End: 2022-12-27
Attending: INTERNAL MEDICINE
Payer: COMMERCIAL

## 2022-12-27 DIAGNOSIS — M31.30 GRANULOMATOSIS WITH POLYANGIITIS WITHOUT RENAL INVOLVEMENT (H): ICD-10-CM

## 2022-12-27 DIAGNOSIS — M15.0 PRIMARY OSTEOARTHRITIS INVOLVING MULTIPLE JOINTS: ICD-10-CM

## 2022-12-27 DIAGNOSIS — Z79.60 LONG-TERM USE OF IMMUNOSUPPRESSANT MEDICATION: ICD-10-CM

## 2022-12-27 DIAGNOSIS — K21.9 GASTROESOPHAGEAL REFLUX DISEASE WITHOUT ESOPHAGITIS: ICD-10-CM

## 2022-12-27 PROCEDURE — 72050 X-RAY EXAM NECK SPINE 4/5VWS: CPT | Mod: GC | Performed by: RADIOLOGY

## 2022-12-30 ENCOUNTER — TELEPHONE (OUTPATIENT)
Dept: NEUROSURGERY | Facility: CLINIC | Age: 77
End: 2022-12-30

## 2023-01-12 ENCOUNTER — OFFICE VISIT (OUTPATIENT)
Dept: NEUROSURGERY | Facility: CLINIC | Age: 78
End: 2023-01-12
Attending: INTERNAL MEDICINE
Payer: COMMERCIAL

## 2023-01-12 VITALS — DIASTOLIC BLOOD PRESSURE: 83 MMHG | OXYGEN SATURATION: 95 % | SYSTOLIC BLOOD PRESSURE: 132 MMHG | HEART RATE: 80 BPM

## 2023-01-12 DIAGNOSIS — M54.2 CERVICALGIA: Primary | ICD-10-CM

## 2023-01-12 PROCEDURE — 99204 OFFICE O/P NEW MOD 45 MIN: CPT | Performed by: NURSE PRACTITIONER

## 2023-01-12 ASSESSMENT — PAIN SCALES - GENERAL: PAINLEVEL: MILD PAIN (3)

## 2023-01-12 NOTE — PATIENT INSTRUCTIONS
Referral to physical therapy. They will call you to schedule.     Okay to continue to wear neck brace as tolerated.     Please call our clinic if you would like to proceed with a cervical spine MRI or possible injections for neck pain.     Please call our clinic if symptoms persist, change, or worsen.     [facility] Neurosurgery  [address] [address]  Tel [phone]

## 2023-01-12 NOTE — LETTER
1/12/2023         RE: Nury Cárdenas  [address] [address]        Dear Colleague,    Thank you for referring your patient, Nury Cárdenas, to the [facility]. Please see a copy of my visit note below.    Dr. Jamal Luther   [facility] Neurosurgery Clinic Visit      CC: neck pain     Primary Care Provider: Phil Abbott    Reason For Visit:   I was asked by Dr. Maguire to consult on the patient      HPI: Nury Cárdenas is a 77 year old female who presents for evaluation of chronic neck pain. Symptoms developed a few months ago, denies any recent falls or trauma. Patient describes aching posterior neck pain. Denies any radicular pain, numbness, weakness, falls, foot drop, saddle anesthesia, or bladder/bowel changes. She reports neck pain is intermittent and mostly occurs in the mornings and afternoons. Pain worsens with flexion motions such as when reading and crocheting. Pain improves when wearing a soft collar. She has tried home PT exercises as well.     Current pain: 0/10     Past Medical History:   Diagnosis Date     Amblyopia      Atypical pneumonia 06/14/2014     Atypical pneumonia      Atypical pneumonia      Atypical pneumonia      COPD (chronic obstructive pulmonary disease) (H) 07/20/2021     Coronary artery disease 1982    heart beat hard made me tired     Dyslipidemia      Fibroids      GERD (gastroesophageal reflux disease)      Granulomatosis with polyangiitis (Wegener's)      Hearing loss      Inflammatory arthritis     thumb     Migraines      MVP (mitral valve prolapse)     had an echo that was normal in 2007 she is on Inderal     Nonsenile cataract      Osteopenia      PE (pulmonary embolism) 10/2014    ? GPA associated, on warfarin      Strabismus      Synovitis of wrist 2009    right       Past Medical History reviewed with patient during visit.    Past Surgical History:   Procedure Laterality Date     ABDOMEN SURGERY       appendix out     APPENDECTOMY       BIOPSY  1972    cone biopsy     CATARACT IOL, RT/LT       CL AFF SURGICAL PATHOLOGY      cone biopsy 1975     COLONOSCOPY       COLONOSCOPY WITH CO2 INSUFFLATION N/A 12/20/2017    Procedure: COLONOSCOPY WITH CO2 INSUFFLATION;  Screening  BMI: 20.7  [facility]  [phone]  [facility]  Referring: Milena  Patient get ill from [facility];  Surgeon: Duane, William Charles, MD;  Location: [facility] OR     EYE SURGERY      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     HC ESOPHAGOSCOPY, DIAGNOSTIC       LASER YAG CAPSULOTOMY      left eye     OPTICAL TRACKING SYSTEM BRONCHOSCOPY  6/19/2014    Procedure: OPTICAL TRACKING SYSTEM BRONCHOSCOPY;  Surgeon: Angel Kathleen MD;  Location:  GI     PHACOEMULSIFICATION WITH STANDARD INTRAOCULAR LENS IMPLANT  01/2018    left eye (SR)     SOFT TISSUE SURGERY      remove senovial fluid from right wrist     STRABISMUS SURGERY       SURGICAL HISTORY OF -   as a child    strabismus repair right eye     SURGICAL HISTORY OF -   8-2009    right wrist debridement     TONSILLECTOMY      as a child     TONSILLECTOMY & ADENOIDECTOMY       TUBAL LIGATION       VITRECTOMY PARSPLANA  01/2018    left eye - mac hole (MVE)     Past Surgical History reviewed with patient during visit.    Current Outpatient Medications   Medication     aspirin-acetaminophen-caffeine (EXCEDRIN MIGRAINE) 250-250-65 MG tablet     calcium-magnesium (CALMAG) 500-250 MG TABS per tablet     cholecalciferol 1000 UNITS TABS     loratadine (CLARITIN) 10 MG tablet     propranolol (INDERAL) 20 MG tablet     No current facility-administered medications for this visit.     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     Adhesive Tape      Rash itches     Amoxicillin      vomiting     Augmentin      vomiting      Codeine      vomiting     Erythromycin      vomiting     Nickel      itches rash     Sulfa Drugs Itching     Tegaderm Alginate Ag      LW Other1: -ALL MEDICATIONS MAKE PATIENT VIOLENTLY ILL; ADHESIVE BANDAGES; NICKEL     Zithromax [Azithromycin Dihydrate]      Vomiting/ skin blisters       Social History     Socioeconomic History     Marital status:      Spouse name: None     Number of children: None     Years of education: None     Highest education level: None   Tobacco Use     Smoking status: Former     Packs/day: 2.00     Years: 20.00     Pack years: 40.00     Types: Cigarettes     Start date: 1961     Quit date: 1983     Years since quittin.4     Smokeless tobacco: Never   Substance and Sexual Activity     Alcohol use: No     Comment: 1-2 glasses per year     Drug use: No     Sexual activity: Never   Other Topics Concern     Parent/sibling w/ CABG, MI or angioplasty before 65F 55M? No       Family History   Problem Relation Age of Onset     Respiratory Mother         emphysema     Aneurysm Mother      Chronic Obstructive Pulmonary Disease Mother      Thyroid Disease Mother      Osteoporosis Mother      Other Cancer Father      Cancer Father         lung cancer     Arthritis Maternal Grandmother         rheumatoid     Heart Disease Maternal Grandmother         unsure of details     Heart Disease Maternal Grandfather      Neurologic Disorder Paternal Grandmother         migraines     Alzheimer Disease Paternal Grandmother      Unknown/Adopted Paternal Grandfather      Cancer Sister         stage 4 anal cancer age 62 years     Colon Cancer Sister      Substance Abuse Sister      Cancer - colorectal Brother      Colon Cancer Brother      Substance Abuse Sister      Anesthesia Reaction Daughter        ROS: 10 point ROS neg other than the symptoms noted above in the HPI.    Vital Signs: /83   Pulse 80   SpO2 95%     Physical Examination:  Constitutional:  Alert, well nourished,  NAD.  HEENT: Normocephalic, atraumatic.   Pulmonary:  Without shortness of breath, normal effort.   Cardiovascular:  No pitting edema of BLE.      Neurological:  Awake  Alert  Oriented x 3  Speech clear  Cranial nerves II - XII grossly intact  PERRL  EOMI  Motor exam:  Shoulder Abduction  Right:  5/5   Left:  5/5  Biceps                      Right:  5/5   Left:  5/5  Triceps                     Right:  5/5   Left:  5/5  Wrist Extensors        Right:  5/5   Left:  5/5  Wrist Flexors            Right:  5/5   Left:  5/5  Intrinsics                   Right:  5/5   Left:  5/5    Iliopsoas  (hip flexion)               Right: 5/5  Left:  5/5  Quadriceps  (knee extension)       Right:  5/5  Left:  5/5  Hamstrings  (knee flexion)            Right:  5/5  Left:  5/5  Gastroc Soleus  (PF)                          Right:  5/5  Left:  5/5  Tibialis Ant  (DF)                          Right:  5/5  Left:  5/5  EHL                          Right:  5/5  Left:  5/5         Sensation normal to BUE and BLE     Reflexes are 2+ in the patellar and Achilles. There is no clonus.     Reflexes are 2+ in the brachial radialis and triceps. Negative Vamshi sign bilaterally.    Musculoskeletal:  Gait: Able to stand from a seated position. Normal non-antalgic, non-myelopathic gait.   Cervical examination reveals some pain with flexion.    Moderate tenderness to palpation of left sided cervical paraspinous muscles.     Imaging:   EXAM: XR CERVICAL SPINE W FLEX/EXT G/E 4 VIEWS 12/27/2022 11:01 AM  FINDINGS: AP, lateral, flexion, and extension views of the cervical  spine were obtained. Trace anterolisthesis at C4-5, not significantly  change during flexion or extension. Multilevel disc space narrowing  most pronounced at C5-6 and C6-7, with large anterior osteophyte at  C5-6. No acute fracture or subluxation. No prevertebral soft tissue  thickening. Airway and lung apices are clear. Diffusely demineralized  bones.                                                               IMPRESSION:  Multilevel cervical spondylosis.    Assessment/Plan:   77 year old female who presents for evaluation of chronic posterior neck pain. XR shows degenerative changes at C5-6 and C6-7. Patient reports pain is intermittent and mostly occurs with flexion. We discussed symptoms, imaging, and next steps. Patient is not interested in MRI or possible injections at this time. She would prefer to start a course of PT and continue wearing her soft cervical collar. Placed referral to PT. Advised patient to follow-up if symptoms persist, change, or worsen.     Advised patient to call our clinic with any questions or concerns. Discussed red flag symptoms and advised to seek medical attention if these develop. Patient voiced understanding and agreement.      Anusha Grimm CNP  [facility] Neurosurgery  [address] [address]  Tel [phone]  Pager [phone]          Again, thank you for allowing me to participate in the care of your patient.        Sincerely,        Anusha Grimm, ANALI

## 2023-01-12 NOTE — PROGRESS NOTES
Dr. Jamal Luther   [facility] Neurosurgery Clinic Visit      CC: neck pain     Primary Care Provider: Phil Abbott    Reason For Visit:   I was asked by Dr. Maguire to consult on the patient      HPI: Nury Cárdenas is a 77 year old female who presents for evaluation of chronic neck pain. Symptoms developed a few months ago, denies any recent falls or trauma. Patient describes aching posterior neck pain. Denies any radicular pain, numbness, weakness, falls, foot drop, saddle anesthesia, or bladder/bowel changes. She reports neck pain is intermittent and mostly occurs in the mornings and afternoons. Pain worsens with flexion motions such as when reading and crocheting. Pain improves when wearing a soft collar. She has tried home PT exercises as well.     Current pain: 0/10     Past Medical History:   Diagnosis Date     Amblyopia      Atypical pneumonia 06/14/2014     Atypical pneumonia      Atypical pneumonia      Atypical pneumonia      COPD (chronic obstructive pulmonary disease) (H) 07/20/2021     Coronary artery disease 1982    heart beat hard made me tired     Dyslipidemia      Fibroids      GERD (gastroesophageal reflux disease)      Granulomatosis with polyangiitis (Wegener's)      Hearing loss      Inflammatory arthritis     thumb     Migraines      MVP (mitral valve prolapse)     had an echo that was normal in 2007 she is on Inderal     Nonsenile cataract      Osteopenia      PE (pulmonary embolism) 10/2014    ? GPA associated, on warfarin      Strabismus      Synovitis of wrist 2009    right       Past Medical History reviewed with patient during visit.    Past Surgical History:   Procedure Laterality Date     ABDOMEN SURGERY      appendix out     APPENDECTOMY       BIOPSY  1972    cone biopsy     CATARACT IOL, RT/LT       CL AFF SURGICAL PATHOLOGY      cone biopsy 1975     COLONOSCOPY       COLONOSCOPY WITH CO2 INSUFFLATION N/A 12/20/2017    Procedure: COLONOSCOPY WITH CO2 INSUFFLATION;   Screening  BMI: 20.7  [facility]  [phone]  [facility]  Referring: Milena  Patient get ill from [facility];  Surgeon: Duane, William Charles, MD;  Location: [facility] OR     EYE SURGERY      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     HC ESOPHAGOSCOPY, DIAGNOSTIC       LASER YAG CAPSULOTOMY      left eye     OPTICAL TRACKING SYSTEM BRONCHOSCOPY  6/19/2014    Procedure: OPTICAL TRACKING SYSTEM BRONCHOSCOPY;  Surgeon: Angel Kathleen MD;  [facility]: [facility]     PHACOEMULSIFICATION WITH STANDARD INTRAOCULAR LENS IMPLANT  01/2018    left eye (SR)     SOFT TISSUE SURGERY      remove senovial fluid from right wrist     STRABISMUS SURGERY       SURGICAL HISTORY OF -   as a child    strabismus repair right eye     SURGICAL HISTORY OF -   8-2009    right wrist debridement     TONSILLECTOMY      as a child     TONSILLECTOMY & ADENOIDECTOMY       TUBAL LIGATION       VITRECTOMY PARSPLANA  01/2018    left eye - mac hole (MVE)     Past Surgical History reviewed with patient during visit.    Current Outpatient Medications   Medication     aspirin-acetaminophen-caffeine (EXCEDRIN MIGRAINE) 250-250-65 MG tablet     calcium-magnesium (CALMAG) 500-250 MG TABS per tablet     cholecalciferol 1000 UNITS TABS     loratadine (CLARITIN) 10 MG tablet     propranolol (INDERAL) 20 MG tablet     No current facility-administered medications for this visit.     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     Adhesive Tape      Rash itches     Amoxicillin      vomiting     Augmentin      vomiting     Codeine      vomiting     Erythromycin      vomiting     Nickel      itches rash     Sulfa Drugs Itching     Tegaderm Alginate Ag      LW Other1: -ALL MEDICATIONS MAKE PATIENT VIOLENTLY ILL; ADHESIVE BANDAGES; NICKEL     Zithromax [Azithromycin Dihydrate]       Vomiting/ skin blisters       Social History     Socioeconomic History     Marital status:      Spouse name: None     Number of children: None     Years of education: None     Highest education level: None   Tobacco Use     Smoking status: Former     Packs/day: 2.00     Years: 20.00     Pack years: 40.00     Types: Cigarettes     Start date: 1961     Quit date: 1983     Years since quittin.4     Smokeless tobacco: Never   Substance and Sexual Activity     Alcohol use: No     Comment: 1-2 glasses per year     Drug use: No     Sexual activity: Never   Other Topics Concern     Parent/sibling w/ CABG, MI or angioplasty before 65F 55M? No       Family History   Problem Relation Age of Onset     Respiratory Mother         emphysema     Aneurysm Mother      Chronic Obstructive Pulmonary Disease Mother      Thyroid Disease Mother      Osteoporosis Mother      Other Cancer Father      Cancer Father         lung cancer     Arthritis Maternal Grandmother         rheumatoid     Heart Disease Maternal Grandmother         unsure of details     Heart Disease Maternal Grandfather      Neurologic Disorder Paternal Grandmother         migraines     Alzheimer Disease Paternal Grandmother      Unknown/Adopted Paternal Grandfather      Cancer Sister         stage 4 anal cancer age 62 years     Colon Cancer Sister      Substance Abuse Sister      Cancer - colorectal Brother      Colon Cancer Brother      Substance Abuse Sister      Anesthesia Reaction Daughter        ROS: 10 point ROS neg other than the symptoms noted above in the HPI.    Vital Signs: /83   Pulse 80   SpO2 95%     Physical Examination:  Constitutional:  Alert, well nourished, NAD.  HEENT: Normocephalic, atraumatic.   Pulmonary:  Without shortness of breath, normal effort.   Cardiovascular:  No pitting edema of BLE.      Neurological:  Awake  Alert  Oriented x 3  Speech clear  Cranial nerves II - XII grossly intact  PERRL  EOMI  Motor  exam:  Shoulder Abduction  Right:  5/5   Left:  5/5  Biceps                      Right:  5/5   Left:  5/5  Triceps                     Right:  5/5   Left:  5/5  Wrist Extensors        Right:  5/5   Left:  5/5  Wrist Flexors            Right:  5/5   Left:  5/5  Intrinsics                   Right:  5/5   Left:  5/5    Iliopsoas  (hip flexion)               Right: 5/5  Left:  5/5  Quadriceps  (knee extension)       Right:  5/5  Left:  5/5  Hamstrings  (knee flexion)            Right:  5/5  Left:  5/5  Gastroc Soleus  (PF)                          Right:  5/5  Left:  5/5  Tibialis Ant  (DF)                          Right:  5/5  Left:  5/5  EHL                          Right:  5/5  Left:  5/5         Sensation normal to BUE and BLE     Reflexes are 2+ in the patellar and Achilles. There is no clonus.     Reflexes are 2+ in the brachial radialis and triceps. Negative Vamshi sign bilaterally.    Musculoskeletal:  Gait: Able to stand from a seated position. Normal non-antalgic, non-myelopathic gait.   Cervical examination reveals some pain with flexion.    Moderate tenderness to palpation of left sided cervical paraspinous muscles.     Imaging:   EXAM: XR CERVICAL SPINE W FLEX/EXT G/E 4 VIEWS 12/27/2022 11:01 AM  FINDINGS: AP, lateral, flexion, and extension views of the cervical  spine were obtained. Trace anterolisthesis at C4-5, not significantly  change during flexion or extension. Multilevel disc space narrowing  most pronounced at C5-6 and C6-7, with large anterior osteophyte at  C5-6. No acute fracture or subluxation. No prevertebral soft tissue  thickening. Airway and lung apices are clear. Diffusely demineralized  bones.                                                              IMPRESSION:  Multilevel cervical spondylosis.    Assessment/Plan:   77 year old female who presents for evaluation of chronic posterior neck pain. XR shows degenerative changes at C5-6 and C6-7. Patient reports pain is intermittent and  mostly occurs with flexion. We discussed symptoms, imaging, and next steps. Patient is not interested in MRI or possible injections at this time. She would prefer to start a course of PT and continue wearing her soft cervical collar. Placed referral to PT. Advised patient to follow-up if symptoms persist, change, or worsen.     Advised patient to call our clinic with any questions or concerns. Discussed red flag symptoms and advised to seek medical attention if these develop. Patient voiced understanding and agreement.      Anusha Grimm CNP  [facility] Neurosurgery  [address] [address]  Tel [phone]  Pager [phone]

## 2023-01-12 NOTE — NURSING NOTE
"Nury Cárdenas is a 77 year old female who presents for:  Chief Complaint   Patient presents with     Consult     Neck pain, radiating superior to skull base        Initial Vitals:  /83   Pulse 80   SpO2 95%  Estimated body mass index is 21.09 kg/m  as calculated from the following:    Height as of 12/9/22: 5' 3.98\" (1.625 m).    Weight as of 12/9/22: 122 lb 12.8 oz (55.7 kg).. There is no height or weight on file to calculate BSA. BP completed using cuff size: large  Mild Pain (3)    Nursing Comments:     Ramin Giordano MA    "

## 2023-02-01 ENCOUNTER — OFFICE VISIT (OUTPATIENT)
Dept: OPHTHALMOLOGY | Facility: CLINIC | Age: 78
End: 2023-02-01
Attending: DERMATOLOGY
Payer: COMMERCIAL

## 2023-02-01 DIAGNOSIS — H02.60 XANTHELASMA: ICD-10-CM

## 2023-02-01 PROCEDURE — 92285 EXTERNAL OCULAR PHOTOGRAPHY: CPT | Performed by: OPHTHALMOLOGY

## 2023-02-01 PROCEDURE — 99213 OFFICE O/P EST LOW 20 MIN: CPT | Performed by: OPHTHALMOLOGY

## 2023-02-01 ASSESSMENT — TONOMETRY
IOP_METHOD: TONOPEN
OD_IOP_MMHG: 08
OS_IOP_MMHG: 09

## 2023-02-01 ASSESSMENT — VISUAL ACUITY
OS_CC: 20/25
OD_CC: 20/30
METHOD: SNELLEN - LINEAR
CORRECTION_TYPE: GLASSES
OD_CC+: +2

## 2023-02-01 NOTE — PROGRESS NOTES
"     Chief Complaint(s) and History of Present Illness(es)     xanthelasma           Comments    Patient referred by Dr. May for xanthelasma evaluation. Patient states   she has growths around both eyes for the past few years. Largest one being   around her right eye.  Her eye doctor thought they were cysts and tried to   \"pinch one out\" but nothing happened. She states that they do get   sore/painful at times. And that the largest bump does swell up at times.   No discharge/bleeding.       Padilla Crow COA    Very slow growth. No history of cutaneous malignancy. She does have a history of GPA.   No pain or irritation. Occasionally the one on the right lower eyelid seems to bother her.      Assessment & Plan     Nury Cárdenas is a 77 year old female with the following diagnoses:    Diagnosis Comments   1. Xanthelasma        Right lateral lower eyelid, and left lateral lower eyelid, and left nasal upper eyelid subcutaneous nodules. Favor xanthelasma. The lateral aspect is an atypical location, but looks very similar to the lesion involving her nasal left upper eyelid. She does have known diagnosis of GPA but doesn't look like cutaneous manifestation of GPA.     She did have lipids checked 2 months ago, and results were reviewed.     We discussed options: excisional biopsy versus observation. She feels they are minimally symptomatic. Will observe, but she will return if enlarging or becomes more symptomatic.           Attending Physician Attestation: Complete documentation of historical and exam elements from today's encounter can be found in the full encounter summary report (not reduplicated in this progress note). I personally obtained the chief complaint(s) and history of present illness. I confirmed and edited as necessary the review of systems, past medical/surgical history, family history, social history, and examination findings as documented by others; and I examined the patient myself. I personally " reviewed the relevant tests, images, and reports as documented above. I formulated and edited as necessary the assessment and plan and discussed the findings and management plan with the patient.  -Shaun Hernandez MD

## 2023-02-01 NOTE — NURSING NOTE
"Chief Complaints and History of Present Illnesses   Patient presents with     xanthelasma       Chief Complaint(s) and History of Present Illness(es)     xanthelasma           Comments    Patient referred by Dr. May for xanthelasma evaluation. Patient states she has growths around both eyes for the past few years. Largest one being around her right eye.  Her eye doctor thought they were cysts and tried to \"pinch one out\" but nothing happened. She states that they do get sore/painful at times. And that the largest bump does swell up at times.   No discharge/bleeding.                 Padilla Crow, Ophthalmic Assistant   "

## 2023-02-01 NOTE — LETTER
" 2023         RE:  :  MRN: Nury Cárdenas  1945  [id number]     Dear Dr. May,    Thank you for asking me to see your patient, Nury Cárdenas, for an oculoplastic   consultation.  My assessment and plan are below.  For further details, please see my attached clinic note.      Chief Complaint(s) and History of Present Illness(es)     xanthelasma           Comments    Patient referred by Dr. May for xanthelasma evaluation. Patient states   she has growths around both eyes for the past few years. Largest one being   around her right eye.  Her eye doctor thought they were cysts and tried to   \"pinch one out\" but nothing happened. She states that they do get   sore/painful at times. And that the largest bump does swell up at times.   No discharge/bleeding.       Padilla Crow COA    Very slow growth. No history of cutaneous malignancy. She does have a history of GPA.   No pain or irritation. Occasionally the one on the right lower eyelid seems to bother her.      Assessment & Plan     Nury Cárdenas is a 77 year old female with the following diagnoses:    Diagnosis Comments   1. Xanthelasma        Right lateral lower eyelid, and left lateral lower eyelid, and left nasal upper eyelid subcutaneous nodules. Favor xanthelasma. The lateral aspect is an atypical location, but looks very similar to the lesion involving her nasal left upper eyelid. She does have known diagnosis of GPA but doesn't look like cutaneous manifestation of GPA.     She did have lipids checked 2 months ago, and results were reviewed.     We discussed options: excisional biopsy versus observation. She feels they are minimally symptomatic. Will observe, but she will return if enlarging or becomes more symptomatic.            Again, thank you for allowing me to participate in the care of your patient.      Sincerely,    Shaun Hernandez MD  Department of Ophthalmology and Visual Neurosciences  [facility]    CC: Masha " MD Lalo  [address]  [facility] [address]  Via In Basket

## 2023-02-02 ENCOUNTER — LAB (OUTPATIENT)
Dept: LAB | Facility: CLINIC | Age: 78
End: 2023-02-02
Payer: COMMERCIAL

## 2023-02-02 DIAGNOSIS — K21.9 GASTROESOPHAGEAL REFLUX DISEASE WITHOUT ESOPHAGITIS: ICD-10-CM

## 2023-02-02 DIAGNOSIS — M15.0 PRIMARY OSTEOARTHRITIS INVOLVING MULTIPLE JOINTS: ICD-10-CM

## 2023-02-02 DIAGNOSIS — M31.30 GRANULOMATOSIS WITH POLYANGIITIS WITHOUT RENAL INVOLVEMENT (H): ICD-10-CM

## 2023-02-02 DIAGNOSIS — Z79.60 LONG-TERM USE OF IMMUNOSUPPRESSANT MEDICATION: ICD-10-CM

## 2023-02-02 LAB
CRP SERPL-MCNC: <2.9 MG/L (ref 0–8)
ERYTHROCYTE [SEDIMENTATION RATE] IN BLOOD BY WESTERGREN METHOD: 4 MM/HR (ref 0–30)

## 2023-02-02 PROCEDURE — 86036 ANCA SCREEN EACH ANTIBODY: CPT

## 2023-02-02 PROCEDURE — 86140 C-REACTIVE PROTEIN: CPT

## 2023-02-02 PROCEDURE — 36415 COLL VENOUS BLD VENIPUNCTURE: CPT

## 2023-02-02 PROCEDURE — 86256 FLUORESCENT ANTIBODY TITER: CPT

## 2023-02-02 PROCEDURE — 83876 ASSAY MYELOPEROXIDASE: CPT

## 2023-02-02 PROCEDURE — 83516 IMMUNOASSAY NONANTIBODY: CPT

## 2023-02-02 PROCEDURE — 85652 RBC SED RATE AUTOMATED: CPT

## 2023-02-03 LAB
ANCA AB PATTERN SER IF-IMP: ABNORMAL
C-ANCA TITR SER IF: ABNORMAL {TITER}

## 2023-02-06 DIAGNOSIS — G43.909 MIGRAINE WITHOUT STATUS MIGRAINOSUS, NOT INTRACTABLE, UNSPECIFIED MIGRAINE TYPE: ICD-10-CM

## 2023-02-07 ENCOUNTER — THERAPY VISIT (OUTPATIENT)
Dept: PHYSICAL THERAPY | Facility: CLINIC | Age: 78
End: 2023-02-07
Payer: COMMERCIAL

## 2023-02-07 DIAGNOSIS — M54.2 CERVICALGIA: ICD-10-CM

## 2023-02-07 PROCEDURE — 97110 THERAPEUTIC EXERCISES: CPT | Mod: GP | Performed by: PHYSICAL THERAPIST

## 2023-02-07 PROCEDURE — 97161 PT EVAL LOW COMPLEX 20 MIN: CPT | Mod: GP | Performed by: PHYSICAL THERAPIST

## 2023-02-07 RX ORDER — PROPRANOLOL HYDROCHLORIDE 20 MG/1
TABLET ORAL
Qty: 90 TABLET | Refills: 0 | Status: SHIPPED | OUTPATIENT
Start: 2023-02-07 | End: 2023-05-05

## 2023-02-07 NOTE — PROGRESS NOTES
Physical Therapy Initial Evaluation  Subjective:    Patient Health History  Nury Cárdenas being seen for pain in the back of my neck.     Date of Onset: months ago - I thought it might be from methotrexate? less now that I'm off med    Problem occurred: This is not an injury, I had an xray done and there is a bone spur and other problems   Pain is reported as 1/10 on pain scale.  General health as reported by patient is good.  Pertinent medical history includes: history of fractures, migraines/headaches, numbness/tingling, osteoporosis and rheumatoid arthritis.     Medical allergies: adhesives.   Surgeries include:  Other. Other surgery history details: Cone biopsy, tubes tied, eye surgery, appendix out, cataract, hole in retina gas put in eye,.     Other medications details: see list of meds in My Chart.    Current occupation is retired.   Primary job tasks include:  Computer work and prolonged sitting.                  Therapist Generated HPI Evaluation  Problem details: Patient reports that she has pain in her posterior neck that has been worsening for awhile.  She was on methotrexate for relapsing wegener's disease that started in May/June 2022.  The pain neck got worse with medication, so is now improving since being off meds since December.  She has pain in her posterior neck when she wakes up that goes up through head.  She feels like her head feels heavy and doesn't have strength to hold head up as the day progresses. Soft cervical collar seems to help.  The days of pain seem to be getting less.  Did see PT for neck and shoulder pain and felt better, has been doing the exercises since then.  Would like to do exercises, is hard to get in to PT regularly.  .         Type of problem:  Cervical spine.    This is a chronic condition.  Condition occurred with:  Degenerative joint disease and repetition/overuse.  Where condition occurred: at home.  Patient reports pain:  Upper cervical spine.  Pain is described  as sharp and aching and is intermittent.  Pain radiates to:  Head (posterior head gets tender and feels nauseated). Pain is worse during the night (worse with waking up, progressively worse through day).  Since onset symptoms are gradually improving.  Associated symptoms:  Ringing in ears. Symptoms are exacerbated by looking up or down and sitting  and relieved by bracing/immobilizing.  Special tests included:  X-ray.                            Objective:  Standing Alignment:    Cervical/Thoracic:  Forward head and thoracic kyphosis increased (significant amount of lower cervical spine flexion)  Shoulder/UE:  Rounded shoulders                                  Cervical/Thoracic Evaluation    AROM:  AROM Cervical:    Flexion:          WNL, coming from upper cervical spine due to lower cervical spine in sig flexion  Extension:       Decreased, coming from upper cervical spine   Rotation:         Left: wnl      Right: wnl   Side Bend:      Left:     Right:   AROM Thoracic:    Flexion:               Wnl  Extension:          Wnl  Rotation:            Left: wnl     Right: wnl      Headaches: none  Cervical Myotomes:    C1-2 (Neck Flex): Left:  4+    Right: 5-  C3 (neck side bend): Left: 5    Right: 5  C4 (shrug):  Left: 5    Right: 5  C5 (Deltoid):  Left: 5    Right: 5  C6 (Biceps):  Left: 5    Right: 5  C7 (Triceps):  Left: 5    Right: 5  C8 (Thumb Ext): Left: 5    Right: 5  T1 (Intrinsics): Left: 5    Right: 5    Neural Tension:      Left side:  Radial and Ulnar  negative.    Right side:  Radial and Ulnar  negative.  Cervical Dermatomes:    C1 left:  Normal-light touch       C1 right:  Normal-light touch    C2 left:  Normal-light touch     C2 right:  Normal-light touch    C3 left:  Normal-light touch     C3 right:  Normal-light touch    C4 left:  Normal-light touch     C4 right:  Normal-light touch    C5 left:  Normal-light touch     C5 right:  Normal-light touch    C6 left:  Normal-light touch     C6 right:   Normal-light touch    C7 left:  Normal-light touch     C7 right:  Normal-light touch    C8 left:  Normal-light touch     C8 right:  Normal-light touch    T1 left:  Normal-light touch     T1 right:  Normal-light touch  Cervical Palpation:    Tenderness present at Left:    Upper Trap; Levator; Erector Spinae and Suboccipitals  Tenderness present at Right:    Upper Trap; Levator; Erector Spinae and Suboccipitals    Cervical Stability/Joint Clearing:        Negative:ALAR Ligament and TLA AP                                              General     ROS    Assessment/Plan:    Patient is a 78 year old female with cervical complaints.    Patient has the following significant findings with corresponding treatment plan.                Diagnosis 1:  Cervical pain  Pain -  manual therapy, self management, education, directional preference exercise and home program  Decreased ROM/flexibility - manual therapy and therapeutic exercise  Decreased joint mobility - manual therapy and therapeutic exercise  Decreased strength - therapeutic exercise and therapeutic activities  Impaired muscle performance - neuro re-education  Decreased function - therapeutic activities  Impaired posture - neuro re-education    Therapy Evaluation Codes:   1) History comprised of:   Personal factors that impact the plan of care:      None.    Comorbidity factors that impact the plan of care are:      None.     Medications impacting care: None.  2) Examination of Body Systems comprised of:   Body structures and functions that impact the plan of care:      Cervical spine.   Activity limitations that impact the plan of care are:      Bending, Lifting, Reading/Computer work, Sitting, Standing and Sleeping.  3) Clinical presentation characteristics are:   Stable/Uncomplicated.  4) Decision-Making    Low complexity using standardized patient assessment instrument and/or measureable assessment of functional outcome.  Cumulative Therapy Evaluation is: Low  complexity.    Previous and current functional limitations:  (See Goal Flow Sheet for this information)    Short term and Long term goals: (See Goal Flow Sheet for this information)     Communication ability:  Patient appears to be able to clearly communicate and understand verbal and written communication and follow directions correctly.  Treatment Explanation - The following has been discussed with the patient:   RX ordered/plan of care  Anticipated outcomes  Possible risks and side effects  This patient would benefit from PT intervention to resume normal activities.   Rehab potential is excellent.    Frequency:  1-2 X a month, once daily  Duration:  for 12 weeks  Discharge Plan:  Achieve all LTG.  Independent in home treatment program.  Reach maximal therapeutic benefit.    Please refer to the daily flowsheet for treatment today, total treatment time and time spent performing 1:1 timed codes.

## 2023-02-08 LAB
MYELOPEROXIDASE AB SER IA-ACNC: <0.3 U/ML
MYELOPEROXIDASE AB SER IA-ACNC: NEGATIVE
PROTEINASE3 AB SER IA-ACNC: 2.9 U/ML
PROTEINASE3 AB SER IA-ACNC: ABNORMAL

## 2023-02-09 DIAGNOSIS — M31.30 GRANULOMATOSIS WITH POLYANGIITIS, UNSPECIFIED WHETHER RENAL INVOLVEMENT (H): Primary | ICD-10-CM

## 2023-02-13 NOTE — PROGRESS NOTES
[facility]    OUTPATIENT Physical Therapy ORTHOPEDIC EVALUATION  PLAN OF TREATMENT FOR OUTPATIENT REHABILITATION  (COMPLETE FOR INITIAL CLAIMS ONLY)  Patient's Last Name, First Name, M.I.  YOB: 1945  Nury Cárdenas    Provider s Name:  [facility]   Medical Record No.  [id number]   Start of Care Date:  02/07/23   Onset Date:       Treatment Diagnosis:  Cervical pain Medical Diagnosis:  Cervicalgia       Goals:     02/07/23 0500   Body Part   Goals listed below are for neck pain   Goal #1   Goal #1 posture/body mechanics   Previous Functional Level Patient reports fair posture and proper body mechanics   Current Functional Level Poor posture/body mechanics   Performance level worsening throughout the day, with sitting/knitting   STG Target Performance Improve patient awareness to maintain optimum posture the following percentage of time   Performance Level 50% of the time   Rationale to prevent neck/back pain and avoid injury when lifting/carrying and performing tasks requiring bending   Due Date 03/21/23   LTG Target Performance Patient able to demonstrate good posture and body mechanics without prompting   Performance Level 80% of the time   Rationale to prevent neck/back pain and avoid injury when lifting/carrying and performing tasks requiring bending   Due Date 05/02/23         Therapy Frequency:  every 1-2 weeks  Predicted Duration of Therapy Intervention:  12 weeks    Opal Magana, PT                 I CERTIFY THE NEED FOR THESE SERVICES FURNISHED UNDER        THIS PLAN OF TREATMENT AND WHILE UNDER MY CARE     (Physician attestation of this document indicates review and certification of the therapy plan).                     Certification Date From:  02/07/23   Certification Date To:  05/02/23    Referring Provider:  Anusha Dutton  Assessment        See Epic Evaluation SOC Date: 02/07/23

## 2023-02-17 DIAGNOSIS — T78.40XA ALLERGIC REACTION, INITIAL ENCOUNTER: ICD-10-CM

## 2023-02-17 RX ORDER — LORATADINE 10 MG/1
TABLET ORAL
Qty: 90 TABLET | Refills: 0 | Status: SHIPPED | OUTPATIENT
Start: 2023-02-17 | End: 2023-04-16

## 2023-03-01 ENCOUNTER — LAB (OUTPATIENT)
Dept: LAB | Facility: CLINIC | Age: 78
End: 2023-03-01
Payer: COMMERCIAL

## 2023-03-01 DIAGNOSIS — M31.30 GRANULOMATOSIS WITH POLYANGIITIS WITHOUT RENAL INVOLVEMENT (H): ICD-10-CM

## 2023-03-01 DIAGNOSIS — K21.9 GASTROESOPHAGEAL REFLUX DISEASE WITHOUT ESOPHAGITIS: ICD-10-CM

## 2023-03-01 DIAGNOSIS — M15.0 PRIMARY OSTEOARTHRITIS INVOLVING MULTIPLE JOINTS: ICD-10-CM

## 2023-03-01 DIAGNOSIS — Z79.60 LONG-TERM USE OF IMMUNOSUPPRESSANT MEDICATION: ICD-10-CM

## 2023-03-01 PROCEDURE — 86036 ANCA SCREEN EACH ANTIBODY: CPT

## 2023-03-01 PROCEDURE — 83876 ASSAY MYELOPEROXIDASE: CPT

## 2023-03-01 PROCEDURE — 85652 RBC SED RATE AUTOMATED: CPT

## 2023-03-01 PROCEDURE — 83516 IMMUNOASSAY NONANTIBODY: CPT

## 2023-03-01 PROCEDURE — 36415 COLL VENOUS BLD VENIPUNCTURE: CPT

## 2023-03-01 PROCEDURE — 86140 C-REACTIVE PROTEIN: CPT

## 2023-03-01 PROCEDURE — 86037 ANCA TITER EACH ANTIBODY: CPT

## 2023-03-02 LAB
ANCA AB PATTERN SER IF-IMP: NORMAL
C-ANCA TITR SER IF: NORMAL {TITER}

## 2023-03-07 ENCOUNTER — OFFICE VISIT (OUTPATIENT)
Dept: FAMILY MEDICINE | Facility: CLINIC | Age: 78
End: 2023-03-07
Payer: COMMERCIAL

## 2023-03-07 VITALS
SYSTOLIC BLOOD PRESSURE: 133 MMHG | HEART RATE: 77 BPM | DIASTOLIC BLOOD PRESSURE: 63 MMHG | WEIGHT: 120.8 LBS | RESPIRATION RATE: 17 BRPM | OXYGEN SATURATION: 97 % | HEIGHT: 64 IN | BODY MASS INDEX: 20.62 KG/M2

## 2023-03-07 DIAGNOSIS — D84.9 IMMUNOSUPPRESSION (H): ICD-10-CM

## 2023-03-07 DIAGNOSIS — M31.30 GRANULOMATOSIS WITH POLYANGIITIS WITHOUT RENAL INVOLVEMENT (H): ICD-10-CM

## 2023-03-07 DIAGNOSIS — J44.9 CHRONIC OBSTRUCTIVE PULMONARY DISEASE, UNSPECIFIED COPD TYPE (H): ICD-10-CM

## 2023-03-07 DIAGNOSIS — G63 POLYNEUROPATHY ASSOCIATED WITH UNDERLYING DISEASE (H): ICD-10-CM

## 2023-03-07 DIAGNOSIS — R41.4: Primary | ICD-10-CM

## 2023-03-07 DIAGNOSIS — I26.99 ACUTE PULMONARY EMBOLISM, UNSPECIFIED PULMONARY EMBOLISM TYPE, UNSPECIFIED WHETHER ACUTE COR PULMONALE PRESENT (H): ICD-10-CM

## 2023-03-07 DIAGNOSIS — M54.2 NECK PAIN: ICD-10-CM

## 2023-03-07 PROCEDURE — 99213 OFFICE O/P EST LOW 20 MIN: CPT | Performed by: FAMILY MEDICINE

## 2023-03-07 ASSESSMENT — PAIN SCALES - GENERAL: PAINLEVEL: NO PAIN (0)

## 2023-03-07 NOTE — PROGRESS NOTES
"  Assessment & Plan     Parietal abherent sensation: Left sided  -  Probably neuropathic, from recent flare up of Wegener's, spontaneously improving will monitor    Neck pain    -  Likely muscular as occurs in morning. 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    Acute pulmonary embolism, unspecified pulmonary embolism type, unspecified whether acute cor pulmonale present (H)    -     Resolved at this time    Chronic obstructive pulmonary disease, unspecified COPD type (H)    -  Stable not needing any medication; superimposed Wegener    Phil Abbott MD  [facility] SUDHIR Fraser is a 78 year old, presenting for the following health issues:  Discuss Neck Pain     Nape of neck:     Worse after [place]'s flare up   This happens in the morning.    Sleeps well. Has a thin pillow.    Has some neck exercises     Worried because mother had a brain aneurysm and a lung one too.     Left parietal head pain:   Feels spongy and painful.   It is on and off, sporadic; usually in morning when gets up.   No headaches or migraines, in a while now.    History of Present Illness       Headaches:   Since the patient's last clinic visit, headaches are: no change  The patient is getting headaches:  Not often usually as I'm waking up  She is able to do normal daily activities when she has a migraine.  The patient is taking the following rescue/relief medications:  No rescue/relief medications   Patient states \"I get total relief\" from the rescue/relief medications.   The patient is taking the following medications to prevent " "migraines:  No medications to prevent migraines  In the past 4 weeks, the patient has gone to an Urgent Care or Emergency Room 0 times times due to headaches.    She eats 2-3 servings of fruits and vegetables daily.She consumes 1 sweetened beverage(s) daily.She exercises with enough effort to increase her heart rate 9 or less minutes per day.  She exercises with enough effort to increase her heart rate 3 or less days per week.   She is taking medications regularly.       Review of Systems   Constitutional, HEENT, cardiovascular, pulmonary, gi and gu systems are negative, except as otherwise noted.      Objective    /63 (BP Location: Right arm, Cuff Size: Adult Regular)   Pulse 77   Resp 17   Ht 1.63 m (5' 4.17\")   Wt 54.8 kg (120 lb 12.8 oz)   SpO2 97%   BMI 20.62 kg/m    Body mass index is 20.62 kg/m .  Physical Exam   GENERAL: healthy, alert and no distress  HEAD: Lt parietal: no skin changes. Has a small spot with increased sensitivity  RESP: lungs clear to auscultation - no rales, rhonchi or wheezes  CV: regular rate and rhythm, no murmur, click or rub, no peripheral edema   MS: no gross musculoskeletal defects noted, no edema    "

## 2023-03-14 ENCOUNTER — TRANSFERRED RECORDS (OUTPATIENT)
Dept: HEALTH INFORMATION MANAGEMENT | Facility: CLINIC | Age: 78
End: 2023-03-14

## 2023-03-27 ENCOUNTER — MYC MEDICAL ADVICE (OUTPATIENT)
Dept: RHEUMATOLOGY | Facility: CLINIC | Age: 78
End: 2023-03-27
Payer: COMMERCIAL

## 2023-03-28 NOTE — TELEPHONE ENCOUNTER
Third note sent to patient to reassure that her labs are stable and require no change.      Katharine Taylor RN

## 2023-03-28 NOTE — TELEPHONE ENCOUNTER
The Proteinase 3 level remains low relative to June 2022. The ANCA test is negative. I don't recommend any intervention at this time.        Component Proteinase 3 Erika IgG Instrument Value   Latest Ref Rng & Units <2.0 U/mL   8/18/2021 103.0 (H)   11/1/2021 1/12/2022 1/23/2022 5/4/2022 5/20/2022 66.0 (H)   6/28/2022 21.0 (H)   8/1/2022 7.0 (H)   9/8/2022 5.0 (H)   10/10/2022 3.1 (H)   11/9/2022 3.2 (H)   12/7/2022 2.6 (H)   2/2/2023 2.9 (H)   3/1/2023 3.2 (H)     Component Proteinase 3 Antibody IgG Neutrophil Cytoplasmic Antibody   Latest Ref Rng & Units Negative <1:10   8/18/2021 Positive (A) 1:40 (H)   11/1/2021  1:80 (H)   1/12/2022  1:10 (H)   1/23/2022  1:10 (H)   5/4/2022  1:40 (H)   5/20/2022 Positive (A) 1:160 (H)   6/28/2022 Positive (A) 1:40 (H)   8/1/2022 Positive (A) 1:10 (H)   9/8/2022 Positive (A) 1:10 (H)   10/10/2022 Positive (A) <1:10   11/9/2022 Positive (A) <1:10   12/7/2022 Equivocal (A) <1:10   2/2/2023 Equivocal (A) 1:10 (H)   3/1/2023 Positive (A) <1:10

## 2023-04-03 ENCOUNTER — OFFICE VISIT (OUTPATIENT)
Dept: OPHTHALMOLOGY | Facility: CLINIC | Age: 78
End: 2023-04-03
Payer: COMMERCIAL

## 2023-04-03 DIAGNOSIS — H52.4 PRESBYOPIA: ICD-10-CM

## 2023-04-03 DIAGNOSIS — H25.811 COMBINED FORMS OF AGE-RELATED CATARACT OF RIGHT EYE: Primary | ICD-10-CM

## 2023-04-03 DIAGNOSIS — Z98.890 HISTORY OF VITRECTOMY: ICD-10-CM

## 2023-04-03 DIAGNOSIS — H35.342 MACULAR HOLE OF LEFT EYE: ICD-10-CM

## 2023-04-03 DIAGNOSIS — H50.00 ESODEVIATION: ICD-10-CM

## 2023-04-03 DIAGNOSIS — Z96.1 PSEUDOPHAKIA: ICD-10-CM

## 2023-04-03 DIAGNOSIS — H02.9 LESION OF RIGHT EYELID: ICD-10-CM

## 2023-04-03 DIAGNOSIS — Z01.01 ENCOUNTER FOR EXAMINATION OF EYES AND VISION WITH ABNORMAL FINDINGS: ICD-10-CM

## 2023-04-03 DIAGNOSIS — Z98.890 HISTORY OF STRABISMUS SURGERY: ICD-10-CM

## 2023-04-03 DIAGNOSIS — H02.60 XANTHELASMA: ICD-10-CM

## 2023-04-03 PROCEDURE — 92015 DETERMINE REFRACTIVE STATE: CPT | Performed by: OPHTHALMOLOGY

## 2023-04-03 PROCEDURE — 92014 COMPRE OPH EXAM EST PT 1/>: CPT | Performed by: OPHTHALMOLOGY

## 2023-04-03 ASSESSMENT — REFRACTION_FINALRX
OD_HPRISM: 4.0
OS_HPRISM: 4.0
OD_HBASE: OUT
OS_HBASE: OUT

## 2023-04-03 ASSESSMENT — VISUAL ACUITY
OS_CC+: -2
OD_CC+: -2
OD_CC: 20/30
OS_CC: 20/25
METHOD: SNELLEN - LINEAR
CORRECTION_TYPE: GLASSES

## 2023-04-03 ASSESSMENT — REFRACTION_WEARINGRX
OS_HBASE: OUT
OD_AXIS: 165
OS_CYLINDER: +1.25
OS_AXIS: 028
OS_ADD: +2.75
OD_HBASE: OUT
OS_HPRISM: 4.0
OS_SPHERE: -0.50
OD_SPHERE: +0.75
OD_HPRISM: 4.0
OD_CYLINDER: +0.50
SPECS_TYPE: PAL
OD_ADD: +2.75

## 2023-04-03 ASSESSMENT — REFRACTION_MANIFEST
OD_AXIS: 155
OD_ADD: +2.75
OS_CYLINDER: +1.25
OS_AXIS: 035
OS_ADD: +2.75
OD_SPHERE: +0.25
OD_CYLINDER: +0.75
OS_SPHERE: -0.25

## 2023-04-03 ASSESSMENT — CONF VISUAL FIELD
OS_NORMAL: 1
OS_INFERIOR_NASAL_RESTRICTION: 0
OD_NORMAL: 1
OD_SUPERIOR_NASAL_RESTRICTION: 0
OS_SUPERIOR_TEMPORAL_RESTRICTION: 0
OS_SUPERIOR_NASAL_RESTRICTION: 0
OD_SUPERIOR_TEMPORAL_RESTRICTION: 0
OD_INFERIOR_NASAL_RESTRICTION: 0
METHOD: COUNTING FINGERS
OS_INFERIOR_TEMPORAL_RESTRICTION: 0
OD_INFERIOR_TEMPORAL_RESTRICTION: 0

## 2023-04-03 ASSESSMENT — CUP TO DISC RATIO
OS_RATIO: 0.4
OD_RATIO: 0.3

## 2023-04-03 ASSESSMENT — TONOMETRY
IOP_METHOD: APPLANATION
OS_IOP_MMHG: 11
OD_IOP_MMHG: 12

## 2023-04-03 NOTE — PROGRESS NOTES
" Current Eye Medications: refresh - both eyes PRN tearing      Subjective: comprehensive eye exam - unsure if vision is worsening, has to get closer to road signs to see them clearly. Eyes constantly feel like there's \"liquid\" behind them, feels like area around eyes look swollen, appearance worsening as the day progresses.  Not having double vision.     Previous rash cleared.     Objective:  See Ophthalmology Exam.       Assessment:  Stable eye exam.      ICD-10-CM    1. Combined forms of age-related cataract, mild-mod, of right eye  H25.811       2. Pseudophakia, Yag Caps, os  Z96.1       3. Hx of macular hole of left eye  H35.342       4. History of vitrectomy - macular hole, os (MVE)  Z98.890       5. Lesion of right eyelid  H02.9       6. History of strabismus surgery  Z98.890       7. Esodeviation  H50.00       8. Xanthelasma  H02.60       9. Encounter for examination of eyes and vision with abnormal findings  Z01.01       10. Presbyopia  H52.4            Plan:  Glasses prescription given - optional  May use artificial tears up to four times a day (like Refresh Optive, Systane Balance, TheraTears, or generic artificial tears are ok. Avoid \"get the red out\" drops).   Lid hygiene discussed and encouraged for both eyes a few times daily as needed.   Possible posterior vitreous detachment (sudden onset large floater and/or flashing lights) right eye discussed.  To AM anytime for right lid lesion removal.  Call in November 2023 for an appointment in March 2024 for Complete Exam    Dr. Mcleod [phone]         "

## 2023-04-03 NOTE — LETTER
"    4/3/2023         RE: Nury Cárdenas  [address] [address]        Dear Colleague,    Thank you for referring your patient, Nury Cárdenas, to the [facility]. Please see a copy of my visit note below.     Current Eye Medications: refresh - both eyes PRN tearing      Subjective: comprehensive eye exam - unsure if vision is worsening, has to get closer to road signs to see them clearly. Eyes constantly feel like there's \"liquid\" behind them, feels like area around eyes look swollen, appearance worsening as the day progresses.  Not having double vision.     Previous rash cleared.     Objective:  See Ophthalmology Exam.       Assessment:  Stable eye exam.      ICD-10-CM    1. Combined forms of age-related cataract, mild-mod, of right eye  H25.811       2. Pseudophakia, Yag Caps, os  Z96.1       3. Hx of macular hole of left eye  H35.342       4. History of vitrectomy - macular hole, os (MVE)  Z98.890       5. Lesion of right eyelid  H02.9       6. History of strabismus surgery  Z98.890       7. Esodeviation  H50.00       8. Xanthelasma  H02.60       9. Encounter for examination of eyes and vision with abnormal findings  Z01.01       10. Presbyopia  H52.4            Plan:  Glasses prescription given - optional  May use artificial tears up to four times a day (like Refresh Optive, Systane Balance, TheraTears, or generic artificial tears are ok. Avoid \"get the red out\" drops).   Lid hygiene discussed and encouraged for both eyes a few times daily as needed.   Possible posterior vitreous detachment (sudden onset large floater and/or flashing lights) right eye discussed.  To AM anytime for right lid lesion removal.  Call in November 2023 for an appointment in March 2024 for Complete Exam    Dr. Mcleod [phone]             Again, thank you for allowing me to participate in the care of your patient.        Sincerely,        Wiley Mcleod MD    "

## 2023-04-03 NOTE — PATIENT INSTRUCTIONS
"Glasses prescription given - optional  May use artificial tears up to four times a day (like Refresh Optive, Systane Balance, TheraTears, or generic artificial tears are ok. Avoid \"get the red out\" drops).   Lid hygiene discussed and encouraged for both eyes a few times daily as needed.   Possible posterior vitreous detachment (sudden onset large floater and/or flashing lights) right eye discussed.  Call in November 2023 for an appointment in March 2024 for Complete Exam    Dr. Mcleod [phone]    "

## 2023-04-06 ENCOUNTER — LAB (OUTPATIENT)
Dept: LAB | Facility: CLINIC | Age: 78
End: 2023-04-06
Payer: COMMERCIAL

## 2023-04-06 DIAGNOSIS — M31.30 GRANULOMATOSIS WITH POLYANGIITIS, UNSPECIFIED WHETHER RENAL INVOLVEMENT (H): ICD-10-CM

## 2023-04-06 PROCEDURE — 86140 C-REACTIVE PROTEIN: CPT

## 2023-04-06 PROCEDURE — 83516 IMMUNOASSAY NONANTIBODY: CPT

## 2023-04-06 PROCEDURE — 86037 ANCA TITER EACH ANTIBODY: CPT

## 2023-04-06 PROCEDURE — 85652 RBC SED RATE AUTOMATED: CPT

## 2023-04-06 PROCEDURE — 86036 ANCA SCREEN EACH ANTIBODY: CPT

## 2023-04-06 PROCEDURE — 83876 ASSAY MYELOPEROXIDASE: CPT

## 2023-04-06 PROCEDURE — 36415 COLL VENOUS BLD VENIPUNCTURE: CPT

## 2023-04-07 LAB
ANCA AB PATTERN SER IF-IMP: NORMAL
C-ANCA TITR SER IF: NORMAL {TITER}
MYELOPEROXIDASE AB SER IA-ACNC: <0.3 U/ML
MYELOPEROXIDASE AB SER IA-ACNC: NEGATIVE
PROTEINASE3 AB SER IA-ACNC: 3.3 U/ML
PROTEINASE3 AB SER IA-ACNC: POSITIVE

## 2023-05-05 DIAGNOSIS — G43.909 MIGRAINE WITHOUT STATUS MIGRAINOSUS, NOT INTRACTABLE, UNSPECIFIED MIGRAINE TYPE: ICD-10-CM

## 2023-05-05 RX ORDER — PROPRANOLOL HYDROCHLORIDE 20 MG/1
TABLET ORAL
Qty: 90 TABLET | Refills: 0 | Status: SHIPPED | OUTPATIENT
Start: 2023-05-05 | End: 2023-08-15

## 2023-05-19 NOTE — PATIENT INSTRUCTIONS
[facility]    If you have any questions regarding to your visit please contact your care team:       Team Purple:   Clinic Hours Telephone Number   Dr. Shanti Prabhakar   7am-7pm  Monday - Thursday   7am-5pm  Fridays  [phone]  (Appointment scheduling available 24/7)    Questions about your Visit?   Team Line:  [phone]   Urgent Care - [facility] - 11am-9pm Monday-Friday Saturday-Sunday- 9am-5pm   [place] - 5pm-9pm Monday-Friday Saturday-Sunday- 9am-5pm  [phone] - [facility]  [phone] - [place]       What options do I have for visits at the clinic other than the traditional office visit?  To expand how we care for you, many of our providers are utilizing electronic visits (e-visits) and telephone visits, when medically appropriate, for interactions with their patients rather than a visit in the clinic.   We also offer nurse visits for many medical concerns. Just like any other service, we will bill your insurance company for this type of visit based on time spent on the phone with your provider. Not all insurance companies cover these visits. Please check with your medical insurance if this type of visit is covered. You will be responsible for any charges that are not paid by your insurance.      E-visits via [email or web address]:  generally incur a $35.00 fee.  Telephone visits:  Time spent on the phone: *charged based on time that is spent on the phone in increments of 10 minutes. Estimated cost:   5-10 mins $30.00   11-20 mins. $59.00   21-30 mins. $85.00     Use SocietyOnehart (secure email communication and access to your chart) to send your primary care provider a message or make an appointment. Ask someone on your Team how to sign up for [email or web address].  For a Price Quote for your services, please call our Consumer Price Line at [phone].  As always, Thank you for trusting us with your health care  needs!    Nidia Bonilla MA     actual

## 2023-06-01 ENCOUNTER — HEALTH MAINTENANCE LETTER (OUTPATIENT)
Age: 78
End: 2023-06-01

## 2023-06-26 ENCOUNTER — LAB (OUTPATIENT)
Dept: LAB | Facility: CLINIC | Age: 78
End: 2023-06-26
Payer: COMMERCIAL

## 2023-06-26 DIAGNOSIS — M15.0 PRIMARY OSTEOARTHRITIS INVOLVING MULTIPLE JOINTS: ICD-10-CM

## 2023-06-26 DIAGNOSIS — K21.9 GASTROESOPHAGEAL REFLUX DISEASE WITHOUT ESOPHAGITIS: ICD-10-CM

## 2023-06-26 DIAGNOSIS — Z79.60 LONG-TERM USE OF IMMUNOSUPPRESSANT MEDICATION: ICD-10-CM

## 2023-06-26 DIAGNOSIS — M31.30 GRANULOMATOSIS WITH POLYANGIITIS WITHOUT RENAL INVOLVEMENT (H): ICD-10-CM

## 2023-06-26 LAB
CRP SERPL-MCNC: <3 MG/L
ERYTHROCYTE [SEDIMENTATION RATE] IN BLOOD BY WESTERGREN METHOD: 4 MM/HR (ref 0–30)

## 2023-06-26 PROCEDURE — 86036 ANCA SCREEN EACH ANTIBODY: CPT

## 2023-06-26 PROCEDURE — 85652 RBC SED RATE AUTOMATED: CPT

## 2023-06-26 PROCEDURE — 83516 IMMUNOASSAY NONANTIBODY: CPT

## 2023-06-26 PROCEDURE — 36415 COLL VENOUS BLD VENIPUNCTURE: CPT

## 2023-06-26 PROCEDURE — 86037 ANCA TITER EACH ANTIBODY: CPT

## 2023-06-26 PROCEDURE — 82784 ASSAY IGA/IGD/IGG/IGM EACH: CPT | Performed by: INTERNAL MEDICINE

## 2023-06-26 PROCEDURE — 83876 ASSAY MYELOPEROXIDASE: CPT

## 2023-06-26 PROCEDURE — 86140 C-REACTIVE PROTEIN: CPT

## 2023-06-27 LAB
ANCA AB PATTERN SER IF-IMP: NORMAL
C-ANCA TITR SER IF: NORMAL {TITER}
MYELOPEROXIDASE AB SER IA-ACNC: <0.3 U/ML
MYELOPEROXIDASE AB SER IA-ACNC: NEGATIVE
PROTEINASE3 AB SER IA-ACNC: 2.5 U/ML
PROTEINASE3 AB SER IA-ACNC: ABNORMAL

## 2023-06-29 ENCOUNTER — OFFICE VISIT (OUTPATIENT)
Dept: PODIATRY | Facility: CLINIC | Age: 78
End: 2023-06-29
Payer: COMMERCIAL

## 2023-06-29 VITALS — WEIGHT: 120 LBS | DIASTOLIC BLOOD PRESSURE: 74 MMHG | SYSTOLIC BLOOD PRESSURE: 124 MMHG | BODY MASS INDEX: 20.49 KG/M2

## 2023-06-29 DIAGNOSIS — G63 POLYNEUROPATHY ASSOCIATED WITH UNDERLYING DISEASE (H): Primary | ICD-10-CM

## 2023-06-29 DIAGNOSIS — M79.5 RESIDUAL FOREIGN BODY IN SOFT TISSUE: ICD-10-CM

## 2023-06-29 PROCEDURE — 10120 INC&RMVL FB SUBQ TISS SMPL: CPT | Mod: LT | Performed by: PODIATRIST

## 2023-06-29 PROCEDURE — 99214 OFFICE O/P EST MOD 30 MIN: CPT | Mod: 25 | Performed by: PODIATRIST

## 2023-06-29 NOTE — PATIENT INSTRUCTIONS
We wish you continued good healing. If you have any questions or concerns, please do not hesitate to contact us at  [phone]    [employer] (secure e-mail communication and access to your chart) to send a message or to make an appointment.    Please remember to call and schedule a follow up appointment if one was recommended at your earliest convenience.     PODIATRY CLINIC HOURS  TELEPHONE NUMBER    Dr. Chet GLASSPHEATHER [facility]        Clinics:  [facility]  [facility], [facility], [facility]  Tuesday 1PM-6PM  [facility]  Wednesday 745AM-330PM  [place]  Thursday/Friday 745AM-230PM  [place]   1st and 3rd Mondays  845-430 PM   [place] APPOINTMENTS  [phone]    [place] APPOINTMENTS  [phone]    [place] APPOINTMENTS  [phone]        If you need a medication refill, please contact us you may need lab work and/or a follow up visit prior to your refill (i.e. Antifungal medications).  If MRI needed please call Imaging at [phone]   HOW DO I GET MY KNEE SCOOTER? Knee scooters can be picked up at ANY Medical Supply stores with your knee scooter Prescription.  OR  Bring your signed prescription to an [facility] Medical Equipment showroom.  Call or bring in your Orthotics order to any Orthotics locations. Or call [phone]

## 2023-06-29 NOTE — PROGRESS NOTES
S:  Patient complains of left foot pain.  Points to plantar interspace.  Describes as a burning pain.  aggravated by activity and relieved by rest.  Started recently.  Denies erythema edema or drainage.  She has a history of peripheral neuropathy.  Patient is anticoagulated.    ROS: See above       Allergies   Allergen Reactions     Vancomycin Other (See Comments)     Red man's syndrom     Adhesive Tape      Rash itches     Amoxicillin      vomiting     Amoxicillin-Pot Clavulanate      vomiting     Erythromycin      vomiting     Morphine And Related      vomiting     Nickel      itches rash     Sulfa Antibiotics Itching     Tegaderm Alginate Ag Rope      LW Other1: -ALL MEDICATIONS MAKE PATIENT VIOLENTLY ILL; ADHESIVE BANDAGES; NICKEL     Zithromax [Azithromycin Dihydrate]      Vomiting/ skin blisters       Current Outpatient Medications   Medication Sig Dispense Refill     aspirin-acetaminophen-caffeine (EXCEDRIN MIGRAINE) 250-250-65 MG tablet Take 1 tablet by mouth every 6 hours as needed for headaches       calcium-magnesium (CALMAG) 500-250 MG TABS per tablet Take 1 tablet by mouth 2 times daily (with meals)       cholecalciferol 1000 UNITS TABS Take 1,000 Units by mouth daily       propranolol (INDERAL) 20 MG tablet TAKE 1 TABLET BY MOUTH DAILY 90 tablet 0       Patient Active Problem List   Diagnosis     GERD (gastroesophageal reflux disease)     Fibroids     Migraines     Hearing loss     Osteopenia     HYPERLIPIDEMIA LDL GOAL <160     Advanced directives, counseling/discussion     Inflammatory arthritis     Synovitis of wrist     Chronic constipation     Granulomatosis with polyangiitis without renal involvement (H)     Chronic steroid use     Dyspnea     Pulmonary embolism (H)     Calculus of kidney, right     Chronic anticoagulation     Long-term (current) use of anticoagulants [Z79.01]     Long-term use of immunosuppressant medication     Primary osteoarthritis involving multiple joints     Cellulitis      Cat scratch left lower extremity     Other pulmonary embolism without acute cor pulmonale, unspecified chronicity (H)     Age-related osteoporosis without current pathological fracture     Age-related osteoporosis with current pathological fracture, sequela     Adverse effects of medication     Combined forms of age-related cataract, mild-mod, of right eye     Pseudophakia, Yag Caps, os     History of vitrectomy - macular hole, os (MVE)     Hx of macular hole of left eye     Hemifacial spasm     Immunosuppression (H)     Clinical diagnosis of COVID-19     History of COVID-19     Lesion of right eyelid     COPD (chronic obstructive pulmonary disease) (H)     Polyneuropathy associated with underlying disease (H)     Esodeviation     History of strabismus surgery     Current chronic use of systemic steroids     Herpes zoster ophthalmicus, right       Past Medical History:   Diagnosis Date     Amblyopia      Atypical pneumonia 06/14/2014     Atypical pneumonia      Atypical pneumonia      Atypical pneumonia      COPD (chronic obstructive pulmonary disease) (H) 07/20/2021     Coronary artery disease 1982    heart beat hard made me tired     Dyslipidemia      Fibroids      GERD (gastroesophageal reflux disease)      Granulomatosis with polyangiitis (Wegener's)      Hearing loss      Inflammatory arthritis     thumb     Migraines      MVP (mitral valve prolapse)     had an echo that was normal in 2007 she is on Inderal     Nonsenile cataract      Osteopenia      PE (pulmonary embolism) 10/2014    ? GPA associated, on warfarin      Strabismus      Synovitis of wrist 2009    right       Past Surgical History:   Procedure Laterality Date     ABDOMEN SURGERY      appendix out     APPENDECTOMY       BIOPSY  1972    cone biopsy     CATARACT IOL, RT/LT       CL AFF SURGICAL PATHOLOGY      cone biopsy 1975     COLONOSCOPY       COLONOSCOPY WITH CO2 INSUFFLATION N/A 12/20/2017    Procedure: COLONOSCOPY WITH CO2 INSUFFLATION;   Screening  BMI: 20.7  Pharmacy: [facility]  [phone]  [facility]  Referring: Milena  Patient get ill from [facility];  Surgeon: Duane, William Charles, MD;  Location: [facility] OR     EYE SURGERY      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     HC ESOPHAGOSCOPY, DIAGNOSTIC       LASER YAG CAPSULOTOMY      left eye     OPTICAL TRACKING SYSTEM BRONCHOSCOPY  6/19/2014    Procedure: OPTICAL TRACKING SYSTEM BRONCHOSCOPY;  Surgeon: Angel Kathleen MD;  Location:  GI     PHACOEMULSIFICATION WITH STANDARD INTRAOCULAR LENS IMPLANT  01/2018    left eye (SR)     SOFT TISSUE SURGERY      remove senovial fluid from right wrist     STRABISMUS SURGERY       SURGICAL HISTORY OF -   as a child    strabismus repair right eye     SURGICAL HISTORY OF -   8-2009    right wrist debridement     TONSILLECTOMY      as a child     TONSILLECTOMY & ADENOIDECTOMY       TUBAL LIGATION       VITRECTOMY PARSPLANA  01/2018    left eye - mac hole (MVE)       Family History   Problem Relation Age of Onset     Respiratory Mother         emphysema     Aneurysm Mother      Chronic Obstructive Pulmonary Disease Mother      Thyroid Disease Mother      Osteoporosis Mother      Other Cancer Father      Cancer Father         lung cancer     Arthritis Maternal Grandmother         rheumatoid     Heart Disease Maternal Grandmother         unsure of details     Heart Disease Maternal Grandfather      Neurologic Disorder Paternal Grandmother         migraines     Alzheimer Disease Paternal Grandmother      Unknown/Adopted Paternal Grandfather      Cancer Sister         stage 4 anal cancer age 62 years     Colon Cancer Sister      Substance Abuse Sister      Cancer - colorectal Brother      Colon Cancer Brother      Substance Abuse Sister      Anesthesia Reaction Daughter        Social History     Tobacco Use     Smoking status: Former     Packs/day: 2.00     Years: 20.00     Pack years: 40.00     Types: Cigarettes     Start date: 2/8/1961     Quit  date: 1983     Years since quittin.9     Smokeless tobacco: Never   Substance Use Topics     Alcohol use: No     Comment: 1-2 glasses per year         O: /74   Wt 54.4 kg (120 lb)   BMI 20.49 kg/m  .      Constitutional/ general:  Pt is in no apparent distress, appears well-nourished.  Cooperative with history and physical exam.     Psych:  The patient answered questions appropriately.  Normal affect.  Seems to have reasonable expectations, in terms of treatment.     Lungs:  Non labored breathing, non labored speech. No cough.  No audible wheezing. Even, quiet breathing.       Vascular:  Pedal pulses are palpable bilaterally for both the DP and PT arteries.  CFT < 3 sec.  No edema.  Pedal hair growth noted.     Neuro:  Alert and oriented x 3. Coordinated gait.  Light touch sensation is diminished on digits    Musculoskeletal:    Lower extremity muscle strength is normal.  Patient is ambulatory without an assistive device or brace .     Derm: Somewhat thin and shiny.  No erythema, ecchymosis, or cyanosis.  Hair growth noted.  Pain under left  first interspace plantar.  There is thick skin here.  With debriding this down we encountered a foreign body. This foreign body was incised and removed.  No additional foreign body was noticed.  No sinus tracts, erythema, or edema was noted.    A:  Foreign body left foot        Peripheral neuropathy    P:    Discussed patient may have foreign body in foot.  Discussed debridement and exploration with patient.  Patient in agreement.  Debrided lesion down and noticed foreign body.  Patient gave verbal consent and this foreign body was incised and removed.  Wound was explored, cleansed and dressed with a bandaid.  Postopp instructions given.  We will protect feet since she has peripheral neuropathy and watch them daily.  RETURN TO CLINIC PRN.    Chet Villanueva DPM, RADHA

## 2023-06-29 NOTE — LETTER
6/29/2023         RE: Nury Cárdenas  [address] [facility] [address]        Dear Colleague,    Thank you for referring your patient, Nury Cárdenas, to the [facility]. Please see a copy of my visit note below.    S:  Patient complains of left foot pain.  Points to plantar interspace.  Describes as a burning pain.  aggravated by activity and relieved by rest.  Started recently.  Denies erythema edema or drainage.  She has a history of peripheral neuropathy.  Patient is anticoagulated.    ROS: See above       Allergies   Allergen Reactions     Vancomycin Other (See Comments)     Red man's syndrom     Adhesive Tape      Rash itches     Amoxicillin      vomiting     Amoxicillin-Pot Clavulanate      vomiting     Erythromycin      vomiting     Morphine And Related      vomiting     Nickel      itches rash     Sulfa Antibiotics Itching     Tegaderm Alginate Ag Rope      LW Other1: -ALL MEDICATIONS MAKE PATIENT VIOLENTLY ILL; ADHESIVE BANDAGES; NICKEL     Zithromax [Azithromycin Dihydrate]      Vomiting/ skin blisters       Current Outpatient Medications   Medication Sig Dispense Refill     aspirin-acetaminophen-caffeine (EXCEDRIN MIGRAINE) 250-250-65 MG tablet Take 1 tablet by mouth every 6 hours as needed for headaches       calcium-magnesium (CALMAG) 500-250 MG TABS per tablet Take 1 tablet by mouth 2 times daily (with meals)       cholecalciferol 1000 UNITS TABS Take 1,000 Units by mouth daily       propranolol (INDERAL) 20 MG tablet TAKE 1 TABLET BY MOUTH DAILY 90 tablet 0       Patient Active Problem List   Diagnosis     GERD (gastroesophageal reflux disease)     Fibroids     Migraines     Hearing loss     Osteopenia     HYPERLIPIDEMIA LDL GOAL <160     Advanced directives, counseling/discussion     Inflammatory arthritis     Synovitis of wrist     Chronic constipation     Granulomatosis with polyangiitis without renal involvement (H)     Chronic steroid use     Dyspnea      Pulmonary embolism (H)     Calculus of kidney, right     Chronic anticoagulation     Long-term (current) use of anticoagulants [Z79.01]     Long-term use of immunosuppressant medication     Primary osteoarthritis involving multiple joints     Cellulitis     Cat scratch left lower extremity     Other pulmonary embolism without acute cor pulmonale, unspecified chronicity (H)     Age-related osteoporosis without current pathological fracture     Age-related osteoporosis with current pathological fracture, sequela     Adverse effects of medication     Combined forms of age-related cataract, mild-mod, of right eye     Pseudophakia, Yag Caps, os     History of vitrectomy - macular hole, os (MVE)     Hx of macular hole of left eye     Hemifacial spasm     Immunosuppression (H)     Clinical diagnosis of COVID-19     History of COVID-19     Lesion of right eyelid     COPD (chronic obstructive pulmonary disease) (H)     Polyneuropathy associated with underlying disease (H)     Esodeviation     History of strabismus surgery     Current chronic use of systemic steroids     Herpes zoster ophthalmicus, right       Past Medical History:   Diagnosis Date     Amblyopia      Atypical pneumonia 06/14/2014     Atypical pneumonia      Atypical pneumonia      Atypical pneumonia      COPD (chronic obstructive pulmonary disease) (H) 07/20/2021     Coronary artery disease 1982    heart beat hard made me tired     Dyslipidemia      Fibroids      GERD (gastroesophageal reflux disease)      Granulomatosis with polyangiitis (Wegener's)      Hearing loss      Inflammatory arthritis     thumb     Migraines      MVP (mitral valve prolapse)     had an echo that was normal in 2007 she is on Inderal     Nonsenile cataract      Osteopenia      PE (pulmonary embolism) 10/2014    ? GPA associated, on warfarin      Strabismus      Synovitis of wrist 2009    right       Past Surgical History:   Procedure Laterality Date     ABDOMEN SURGERY      appendix  out     APPENDECTOMY       BIOPSY  1972    cone biopsy     CATARACT IOL, RT/LT       CL AFF SURGICAL PATHOLOGY      cone biopsy 1975     COLONOSCOPY       COLONOSCOPY WITH CO2 INSUFFLATION N/A 12/20/2017    Procedure: COLONOSCOPY WITH CO2 INSUFFLATION;  Screening  BMI: 20.7  Pharmacy: [facility]  [phone]  [facility]  Referring: Milena  Patient get ill from [facility];  Surgeon: Duane, William Charles, MD;  Location: [facility] OR     EYE SURGERY      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     HC ESOPHAGOSCOPY, DIAGNOSTIC       LASER YAG CAPSULOTOMY      left eye     OPTICAL TRACKING SYSTEM BRONCHOSCOPY  6/19/2014    Procedure: OPTICAL TRACKING SYSTEM BRONCHOSCOPY;  Surgeon: Angel Kathleen MD;  Location:  GI     PHACOEMULSIFICATION WITH STANDARD INTRAOCULAR LENS IMPLANT  01/2018    left eye (SR)     SOFT TISSUE SURGERY      remove senovial fluid from right wrist     STRABISMUS SURGERY       SURGICAL HISTORY OF -   as a child    strabismus repair right eye     SURGICAL HISTORY OF -   8-2009    right wrist debridement     TONSILLECTOMY      as a child     TONSILLECTOMY & ADENOIDECTOMY       TUBAL LIGATION       VITRECTOMY PARSPLANA  01/2018    left eye - mac hole (MVE)       Family History   Problem Relation Age of Onset     Respiratory Mother         emphysema     Aneurysm Mother      Chronic Obstructive Pulmonary Disease Mother      Thyroid Disease Mother      Osteoporosis Mother      Other Cancer Father      Cancer Father         lung cancer     Arthritis Maternal Grandmother         rheumatoid     Heart Disease Maternal Grandmother         unsure of details     Heart Disease Maternal Grandfather      Neurologic Disorder Paternal Grandmother         migraines     Alzheimer Disease Paternal Grandmother      Unknown/Adopted Paternal Grandfather      Cancer Sister         stage 4 anal cancer age 62 years     Colon Cancer Sister      Substance Abuse Sister      Cancer - colorectal Brother      Colon  Cancer Brother      Substance Abuse Sister      Anesthesia Reaction Daughter        Social History     Tobacco Use     Smoking status: Former     Packs/day: 2.00     Years: 20.00     Pack years: 40.00     Types: Cigarettes     Start date: 1961     Quit date: 1983     Years since quittin.9     Smokeless tobacco: Never   Substance Use Topics     Alcohol use: No     Comment: 1-2 glasses per year         O: /74   Wt 54.4 kg (120 lb)   BMI 20.49 kg/m  .      Constitutional/ general:  Pt is in no apparent distress, appears well-nourished.  Cooperative with history and physical exam.     Psych:  The patient answered questions appropriately.  Normal affect.  Seems to have reasonable expectations, in terms of treatment.     Lungs:  Non labored breathing, non labored speech. No cough.  No audible wheezing. Even, quiet breathing.       Vascular:  Pedal pulses are palpable bilaterally for both the DP and PT arteries.  CFT < 3 sec.  No edema.  Pedal hair growth noted.     Neuro:  Alert and oriented x 3. Coordinated gait.  Light touch sensation is diminished on digits    Musculoskeletal:    Lower extremity muscle strength is normal.  Patient is ambulatory without an assistive device or brace .     Derm: Somewhat thin and shiny.  No erythema, ecchymosis, or cyanosis.  Hair growth noted.  Pain under left  first interspace plantar.  There is thick skin here.  With debriding this down we encountered a foreign body. This foreign body was incised and removed.  No additional foreign body was noticed.  No sinus tracts, erythema, or edema was noted.    A:  Foreign body left foot        Peripheral neuropathy    P:    Discussed patient may have foreign body in foot.  Discussed debridement and exploration with patient.  Patient in agreement.  Debrided lesion down and noticed foreign body.  Patient gave verbal consent and this foreign body was incised and removed.  Wound was explored, cleansed and dressed with a bandaid.   Postopp instructions given.  We will protect feet since she has peripheral neuropathy and watch them daily.  RETURN TO CLINIC PRN.    Chet Villanueva DPM, FACFAS        Again, thank you for allowing me to participate in the care of your patient.        Sincerely,        Chet Villanueva DPM

## 2023-06-30 ENCOUNTER — VIRTUAL VISIT (OUTPATIENT)
Dept: RHEUMATOLOGY | Facility: CLINIC | Age: 78
End: 2023-06-30
Payer: COMMERCIAL

## 2023-06-30 DIAGNOSIS — M31.30 GRANULOMATOSIS WITH POLYANGIITIS, UNSPECIFIED WHETHER RENAL INVOLVEMENT (H): Primary | ICD-10-CM

## 2023-06-30 DIAGNOSIS — Z79.60 LONG-TERM USE OF IMMUNOSUPPRESSANT MEDICATION: ICD-10-CM
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
## 2023-06-30 DIAGNOSIS — T50.905S ADVERSE EFFECT OF DRUG, SEQUELA: ICD-10-CM

## 2023-06-30 DIAGNOSIS — M80.00XS AGE-RELATED OSTEOPOROSIS WITH CURRENT PATHOLOGICAL FRACTURE, SEQUELA: ICD-10-CM

## 2023-06-30 DIAGNOSIS — K21.9 GASTROESOPHAGEAL REFLUX DISEASE WITHOUT ESOPHAGITIS: ICD-10-CM

## 2023-06-30 DIAGNOSIS — M31.30 GRANULOMATOSIS WITH POLYANGIITIS WITHOUT RENAL INVOLVEMENT (H): ICD-10-CM

## 2023-06-30 LAB
IGA SERPL-MCNC: 94 MG/DL (ref 84–499)
IGG SERPL-MCNC: 519 MG/DL (ref 610–1616)
IGM SERPL-MCNC: 64 MG/DL (ref 35–242)

## 2023-06-30 PROCEDURE — 99213 OFFICE O/P EST LOW 20 MIN: CPT | Mod: 93 | Performed by: INTERNAL MEDICINE

## 2023-06-30 RX ORDER — DIPHENHYDRAMINE HYDROCHLORIDE 50 MG/ML
50 INJECTION INTRAMUSCULAR; INTRAVENOUS
Status: CANCELLED
Start: 2023-06-30

## 2023-06-30 RX ORDER — ALBUTEROL SULFATE 0.83 MG/ML
2.5 SOLUTION RESPIRATORY (INHALATION)
Status: CANCELLED | OUTPATIENT
Start: 2023-06-30

## 2023-06-30 RX ORDER — HEPARIN SODIUM (PORCINE) LOCK FLUSH IV SOLN 100 UNIT/ML 100 UNIT/ML
5 SOLUTION INTRAVENOUS
Status: CANCELLED | OUTPATIENT
Start: 2023-06-30

## 2023-06-30 RX ORDER — HEPARIN SODIUM,PORCINE 10 UNIT/ML
5-20 VIAL (ML) INTRAVENOUS DAILY PRN
Status: CANCELLED | OUTPATIENT
Start: 2023-06-30

## 2023-06-30 RX ORDER — MEPERIDINE HYDROCHLORIDE 25 MG/ML
25 INJECTION INTRAMUSCULAR; INTRAVENOUS; SUBCUTANEOUS EVERY 30 MIN PRN
Status: CANCELLED | OUTPATIENT
Start: 2023-06-30

## 2023-06-30 RX ORDER — METHYLPREDNISOLONE SODIUM SUCCINATE 125 MG/2ML
125 INJECTION, POWDER, LYOPHILIZED, FOR SOLUTION INTRAMUSCULAR; INTRAVENOUS
Status: CANCELLED
Start: 2023-06-30

## 2023-06-30 RX ORDER — DIPHENHYDRAMINE HCL 25 MG
50 CAPSULE ORAL ONCE
Status: CANCELLED
Start: 2023-06-30

## 2023-06-30 RX ORDER — EPINEPHRINE 1 MG/ML
0.3 INJECTION, SOLUTION INTRAMUSCULAR; SUBCUTANEOUS EVERY 5 MIN PRN
Status: CANCELLED | OUTPATIENT
Start: 2023-06-30

## 2023-06-30 RX ORDER — ACETAMINOPHEN 325 MG/1
650 TABLET ORAL ONCE
Status: CANCELLED
Start: 2023-06-30

## 2023-06-30 RX ORDER — METHYLPREDNISOLONE SODIUM SUCCINATE 125 MG/2ML
125 INJECTION, POWDER, LYOPHILIZED, FOR SOLUTION INTRAMUSCULAR; INTRAVENOUS ONCE
Status: CANCELLED | OUTPATIENT
Start: 2023-06-30

## 2023-06-30 RX ORDER — ALBUTEROL SULFATE 90 UG/1
1-2 AEROSOL, METERED RESPIRATORY (INHALATION)
Status: CANCELLED
Start: 2023-06-30

## 2023-06-30 NOTE — Clinical Note
Pat is going to get one infusion of rituximab to protect against GPA relapse. She may need care coordination.

## 2023-06-30 NOTE — PATIENT INSTRUCTIONS
Schedule lab testing every 2 months  Schedule an infusion of rituximab  Follow up with me in 6 months

## 2023-06-30 NOTE — PROGRESS NOTES
Bria is a 78 year old who is being evaluated via a billable telephone visit.      What phone number would you like to be contacted at? [phone]  How would you like to obtain your AVS? 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 Course complicated by severe upper airway inflammation, Wegener's lung and destructive/erosive joint disease. Treated initially with rituximab 375 mg/m2 x4 and high dose corticsteroids, and most recently with methotrexate.   Alendronate therapy complicated by LE pain. 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    She describes fleeting shooting pains on the left flank radiating to the midline. She denies any skin rash or irritation. She has a history of past shingles but known currently.    Otherwise she is doing okay. The last 6 weeks she has spent time in [place] with her ill son and she feels this has contributed to reduced energy. No weight loss, fevers or sweats. She reports that she can still have chills. She has modest dyspnea with exertion. She has symptoms of nasal congestion on the left and sinus drainage with post-nasal drip. She is using omeprazole and feels this has helped.    Methotrexate was discontinued last visit and now she wonders if her MCDANIEL is a little worse.     PMI:  Medical-invasive pulmonary aspirgillosis, GPA, osteoarthritis, DVT with pulmonary embolism, osteoporosis with vertebral fracture (T = -2.0 2-2021), GERD, hyperlipidemia, +Tb exposure, nephrolithiasis, retinal tear, cataract, COVID-19, sensorineural hearing loss  Surgical-lung biopsy, appendectomy, eye surgery, tonsillectomy, cone biopsy, right wrist synovectomy  Injuries-left wrist fracture x2, left 5th toe fracture, vertebral fracture    SH:  Lives at home alone. Former smoker. No EtOH. Tuberculosis exposure as a child.  Ambidextrous. Rare EtOH    FH:  Son with mitochondrial myopathy  Daughter with palpitations  Sibs dead with colon and anal cancer  Father dead with lung cancer  Mother dead with emphysema and osteoporosis    PMSF history personally obtained and updated by me this visit in the clinic.    ROS:  +as above  +allergies to fosamax amoxicillin, augmentin, erythromycin, zithromax, nickel, codeine, adhesive tape  Remainder of the 14 point ROS obtained and found negative.    Laboratory:    Component      Latest Ref Rng 6/26/2023  9:36 AM   MPO Erika IgG Instrument Value      <3.5 U/mL <0.3    Myeloperoxidase Antibody IgG      Negative  Negative    Proteinase 3 Erika IgG Instrument Value      <2.0 U/mL 2.5 (H)    Proteinase 3 Antibody IgG      Negative  Equivocal !    CRP Inflammation      <5.00 mg/L <3.00    Sed Rate      0 - 30 mm/hr 4           Component Neutrophil Cytoplasmic Antibody   Latest Ref Rng & Units <1:10   10/12/2018 <1:10   4/19/2019 <1:10   10/11/2019 <1:10   2/3/2021 1:160 (A)   4/20/2021 1:80 (A)   8/18/2021 1:40 (H)   11/1/2021 1:80 (H)   1/12/2022 1:10 (H)   1/23/2022 1:10 (H)   5/4/2022 1:40 (H)   5/20/2022 1:160 (H)   6/28/2022 1:40 (H)   8/1/2022 1:10 (H)   9/8/2022 1:10 (H)   10/10/2022 <1:10   11/9/2022 <1:10   12/7/2022 <1:10     Component Proteinase 3 Erika IgG Instrument Value   Latest Ref Rng & Units <2.0 U/mL   8/18/2021 103.0 (H)   11/1/2021 78.0 (H)   1/12/2022 31.0 (H)   1/23/2022 27.0 (H)   5/4/2022 41.0 (H)   5/20/2022 66.0 (H)   6/28/2022 21.0 (H)   8/1/2022 7.0 (H)   9/8/2022 5.0 (H)   10/10/2022 3.1 (H)   11/9/2022 3.2 (H)   12/7/2022 2.6 (H)     Impression:    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 Although it appears her current symptoms are mild and it is unlikely she has a disease recurrence, she does have relapsing disease and persistent PR3 antibody. She is intolerant of methotrexate. Based on all of this we have discussed the risks and benefits of  rituximab 500 mg infusion twice yearly and she agrees to its use. Plan to get serologies and inflammatory markers every 2 months.    Long term management of immunosuppression-with increased risk for IgG deficiency. Get Ig levels now. We also agree that the benefits of continued immunosuppression outweigh the risks of COVID-19 infection. She is resistant to COVID vaccination.    Plan follow up in 6 months in person.    A total of 28 minutes was spent in telephone patient interaction and chart review on the day of service.

## 2023-07-02 ENCOUNTER — MYC MEDICAL ADVICE (OUTPATIENT)
Dept: FAMILY MEDICINE | Facility: CLINIC | Age: 78
End: 2023-07-02
Payer: COMMERCIAL

## 2023-07-03 ENCOUNTER — OFFICE VISIT (OUTPATIENT)
Dept: FAMILY MEDICINE | Facility: CLINIC | Age: 78
End: 2023-07-03
Payer: COMMERCIAL

## 2023-07-03 VITALS
HEIGHT: 64 IN | WEIGHT: 121.2 LBS | RESPIRATION RATE: 16 BRPM | SYSTOLIC BLOOD PRESSURE: 131 MMHG | BODY MASS INDEX: 20.69 KG/M2 | HEART RATE: 87 BPM | OXYGEN SATURATION: 97 % | DIASTOLIC BLOOD PRESSURE: 84 MMHG

## 2023-07-03 DIAGNOSIS — M54.2 NECK PAIN: ICD-10-CM

## 2023-07-03 DIAGNOSIS — R51.9 HEADACHE DISORDER: ICD-10-CM

## 2023-07-03 DIAGNOSIS — I49.8 FLUTTERING HEART: Primary | ICD-10-CM

## 2023-07-03 PROCEDURE — 93000 ELECTROCARDIOGRAM COMPLETE: CPT | Performed by: FAMILY MEDICINE

## 2023-07-03 PROCEDURE — 99213 OFFICE O/P EST LOW 20 MIN: CPT | Performed by: FAMILY MEDICINE

## 2023-07-03 ASSESSMENT — PAIN SCALES - GENERAL: PAINLEVEL: NO PAIN (0)

## 2023-07-03 NOTE — PROGRESS NOTES
Assessment & Plan     Fluttering heart    EKG is benign, similar to one obtained last year. If experiences further episodes will consider heart monitor.  - EKG 12-lead complete w/read - Clinics    Headache disorder   -  Originating from neck strain, at site where there is tenderness. No red flags    Neck pain    Tenderness at base of neck likely muscular, discussed neck stretches, head and tylenol as needed. Pillow use also discussed, avoid big pillows     See Patient Instructions    Phil Abbott MD  [facility] [place]    Subjective   Pat is a 78 year old, presenting for the following health issues:  Heart Fluttering    Heart flutter   Started on Saturday   Got SOB; lasted till about noon, felt like was going     Headache   Lt sided; lasted overnight;   Worse when laying down   Felt a little nausea, no vomiting    Visio is normal           7/3/2023     7:47 AM   Additional Questions   Roomed by Madison BONDS   Accompanied by self   Saturday night went to bed and felt kind of off all day but at bedtime her heart was fluttering and beating fast and headache all on left side also felt very short of beath   History of Present Illness       Reason for visit:  Sat night heart fluttering bad headache and neck pain only on left side short of breath pain on left side   in am very weak short of breath after climbing stairs ok after i ate a little and sat for a coupl hours  Symptom onset:  1-3 days ago  Symptom intensity:  Moderate  Symptom progression:  Improving  Had these symptoms before:  Yes  Has tried/received treatment for these symptoms:  No  What makes it better:  The headche is usually only just before i wake up and goesawaway as soon as i get up  bur this time they started as soon as i laid down and went pretty much all night but stopped when i got up    She eats 0-1 servings of fruits and vegetables daily.She consumes 1 sweetened beverage(s) daily.She exercises with enough effort to increase her  "heart rate 9 or less minutes per day.  She exercises with enough effort to increase her heart rate 3 or less days per week.   She is taking medications regularly.     Review of Systems   HEENT, cardiovascular, pulmonary, gi and gu systems are negative, except as otherwise noted.      Objective    /84 (BP Location: Right arm, Cuff Size: Adult Regular)   Pulse 87   Resp 16   Ht 1.63 m (5' 4.17\")   Wt 55 kg (121 lb 3.2 oz)   SpO2 97%   BMI 20.69 kg/m    Body mass index is 20.69 kg/m .  Physical Exam   GENERAL: healthy, alert and no distress  NECK: tenderness at base of neck Rt side  RESP: lungs clear to auscultation - no rales, rhonchi or wheezes  CV: regular rate and rhythm,  no murmur, click or rub, no peripheral edema      "

## 2023-07-03 NOTE — PROGRESS NOTES
Last night when I went to bed my heart was like fluttering and beating a little hard on just the left side and my head had that pain again it was all on the back of my head and on my left side. I have been having a slight shooting pain on the left side of my chest near the bra line and going towards the center of my chest just on occasion and they only last seconds.  But last night it seemed to be hurting a little too.  I was away from home and wasn't sure what to do. The headache woke me up a few times.  When I got up in the am the headache went away and at some point during the night the fluttering stopped.  I felt really weak this am but I ate a little and just sat for a couple hours and seemed to be ok so then I drove home.  I took a nap when I got home it had been a 3 hr drive so I was tired.  Now I feel marlo ok just weak and the center of my chest feels marlo funny, and I hope it doesn't happen again tonight.    I want to know why I am getting these headaches only on the back of my head and only on the left side and usually only in the am just before I get up and once I am up the pain stops!  But last night was totally different. I think I need an XRAY or MRI of my neck and head and maybe an ultra sound or something for my heart.  What do you think? THANKS!!!

## 2023-07-03 NOTE — TELEPHONE ENCOUNTER
Please see office visit notes 7/3/23, patient was seen by PCP for symptoms in an appointment.    MIMI Gay RN  [facility]

## 2023-08-04 ENCOUNTER — LAB (OUTPATIENT)
Dept: LAB | Facility: CLINIC | Age: 78
End: 2023-08-04
Payer: COMMERCIAL

## 2023-08-04 ENCOUNTER — DOCUMENTATION ONLY (OUTPATIENT)
Dept: OTHER | Facility: CLINIC | Age: 78
End: 2023-08-04

## 2023-08-04 DIAGNOSIS — K21.9 GASTROESOPHAGEAL REFLUX DISEASE WITHOUT ESOPHAGITIS: ICD-10-CM
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
## 2023-08-04 DIAGNOSIS — M31.30 GRANULOMATOSIS WITH POLYANGIITIS WITHOUT RENAL INVOLVEMENT (H): ICD-10-CM

## 2023-08-04 DIAGNOSIS — Z79.60 LONG-TERM USE OF IMMUNOSUPPRESSANT MEDICATION: ICD-10-CM

## 2023-08-04 DIAGNOSIS — M80.00XS AGE-RELATED OSTEOPOROSIS WITH CURRENT PATHOLOGICAL FRACTURE, SEQUELA: ICD-10-CM

## 2023-08-04 DIAGNOSIS — L92.9 GRANULOMATOSIS: ICD-10-CM

## 2023-08-04 DIAGNOSIS — M31.30 GRANULOMATOSIS WITH POLYANGIITIS, UNSPECIFIED WHETHER RENAL INVOLVEMENT (H): ICD-10-CM

## 2023-08-04 DIAGNOSIS — T50.905S ADVERSE EFFECT OF DRUG, SEQUELA: ICD-10-CM

## 2023-08-04 DIAGNOSIS — K76.89 OTHER SPECIFIED DISEASES OF LIVER: ICD-10-CM

## 2023-08-04 LAB
ALBUMIN SERPL BCG-MCNC: 4.2 G/DL (ref 3.5–5.2)
ALBUMIN UR-MCNC: NEGATIVE MG/DL
ALP SERPL-CCNC: 78 U/L (ref 35–104)
ALT SERPL W P-5'-P-CCNC: 13 U/L (ref 0–50)
ANION GAP SERPL CALCULATED.3IONS-SCNC: 12 MMOL/L (ref 7–15)
APPEARANCE UR: CLEAR
AST SERPL W P-5'-P-CCNC: 26 U/L (ref 0–45)
BASOPHILS # BLD AUTO: 0.1 10E3/UL (ref 0–0.2)
BASOPHILS NFR BLD AUTO: 1 %
BILIRUB SERPL-MCNC: 0.4 MG/DL
BILIRUB UR QL STRIP: NEGATIVE
BUN SERPL-MCNC: 12.2 MG/DL (ref 8–23)
CALCIUM SERPL-MCNC: 9.5 MG/DL (ref 8.8–10.2)
CD19 B CELL COMMENT: ABNORMAL
CD19 CELLS # BLD: 43 CELLS/UL (ref 107–698)
CD19 CELLS NFR BLD: 3 % (ref 6–27)
CHLORIDE SERPL-SCNC: 103 MMOL/L (ref 98–107)
COLOR UR AUTO: YELLOW
CREAT SERPL-MCNC: 0.68 MG/DL (ref 0.51–0.95)
DEPRECATED HCO3 PLAS-SCNC: 23 MMOL/L (ref 22–29)
EOSINOPHIL # BLD AUTO: 0.1 10E3/UL (ref 0–0.7)
EOSINOPHIL NFR BLD AUTO: 1 %
ERYTHROCYTE [DISTWIDTH] IN BLOOD BY AUTOMATED COUNT: 12.3 % (ref 10–15)
GFR SERPL CREATININE-BSD FRML MDRD: 88 ML/MIN/1.73M2
GLUCOSE SERPL-MCNC: 116 MG/DL (ref 70–99)
GLUCOSE UR STRIP-MCNC: NEGATIVE MG/DL
HCT VFR BLD AUTO: 42.5 % (ref 35–47)
HGB BLD-MCNC: 14.4 G/DL (ref 11.7–15.7)
HGB UR QL STRIP: NEGATIVE
IMM GRANULOCYTES # BLD: 0 10E3/UL
IMM GRANULOCYTES NFR BLD: 0 %
KETONES UR STRIP-MCNC: NEGATIVE MG/DL
LEUKOCYTE ESTERASE UR QL STRIP: ABNORMAL
LYMPHOCYTES # BLD AUTO: 1.1 10E3/UL (ref 0.8–5.3)
LYMPHOCYTES NFR BLD AUTO: 24 %
MCH RBC QN AUTO: 30.4 PG (ref 26.5–33)
MCHC RBC AUTO-ENTMCNC: 33.9 G/DL (ref 31.5–36.5)
MCV RBC AUTO: 90 FL (ref 78–100)
MONOCYTES # BLD AUTO: 0.4 10E3/UL (ref 0–1.3)
MONOCYTES NFR BLD AUTO: 9 %
NEUTROPHILS # BLD AUTO: 3.1 10E3/UL (ref 1.6–8.3)
NEUTROPHILS NFR BLD AUTO: 65 %
NITRATE UR QL: NEGATIVE
PH UR STRIP: 5.5 [PH] (ref 5–7)
PLATELET # BLD AUTO: 197 10E3/UL (ref 150–450)
POTASSIUM SERPL-SCNC: 4.1 MMOL/L (ref 3.4–5.3)
PROT SERPL-MCNC: 6 G/DL (ref 6.4–8.3)
RBC # BLD AUTO: 4.73 10E6/UL (ref 3.8–5.2)
RBC #/AREA URNS AUTO: ABNORMAL /HPF
SODIUM SERPL-SCNC: 138 MMOL/L (ref 136–145)
SP GR UR STRIP: 1.01 (ref 1–1.03)
SQUAMOUS #/AREA URNS AUTO: ABNORMAL /LPF
UROBILINOGEN UR STRIP-ACNC: 0.2 E.U./DL
WBC # BLD AUTO: 4.8 10E3/UL (ref 4–11)
WBC #/AREA URNS AUTO: ABNORMAL /HPF

## 2023-08-04 PROCEDURE — 86355 B CELLS TOTAL COUNT: CPT

## 2023-08-04 PROCEDURE — 80053 COMPREHEN METABOLIC PANEL: CPT

## 2023-08-04 PROCEDURE — 85025 COMPLETE CBC W/AUTO DIFF WBC: CPT

## 2023-08-04 PROCEDURE — 81001 URINALYSIS AUTO W/SCOPE: CPT

## 2023-08-04 PROCEDURE — 82977 ASSAY OF GGT: CPT

## 2023-08-04 PROCEDURE — 36415 COLL VENOUS BLD VENIPUNCTURE: CPT

## 2023-08-06 NOTE — PROGRESS NOTES
"                   OUTPATIENT METABOLIC INITIAL VISIT          Date: 2023      Patient:  Nury Cárdenas   :   1945   MRN:     [id number]      Nury Cárdenas  [address] [facility] [address]    Dear Dr. Phil Abbott and Nury Cárdenas,    Thank you for sending Nury Cárdenas to the [facility] Monday \"Metabolic clinic\" for consultation and treatment of:    1. 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)     Chronic steroid use     Dyspnea     Pulmonary embolism (H)     Calculus of kidney, right     Chronic anticoagulation     Long-term (current) use of anticoagulants [Z79.01]     Long-term use of immunosuppressant medication     Primary osteoarthritis involving multiple joints     Cellulitis     Cat scratch left lower extremity     Other pulmonary embolism without acute cor pulmonale, unspecified chronicity (H)     Age-related osteoporosis without current pathological fracture     Age-related osteoporosis with current pathological fracture, sequela     Adverse effects of medication     Combined forms of age-related cataract, mild-mod, of right eye     Pseudophakia, Yag Caps, os     History of vitrectomy - macular hole, os (MVE)     Hx of macular hole of left eye     Hemifacial spasm     Immunosuppression (H)     Clinical diagnosis of COVID-19     History of COVID-19     Lesion of right eyelid     COPD (chronic obstructive pulmonary disease) (H)     Polyneuropathy associated with underlying " "disease (H)     Esodeviation     History of strabismus surgery     Current chronic use of systemic steroids     Herpes zoster ophthalmicus, right       Significant family history of Luke-Sullivan syndrome in her son who passed away due to myopathy and related complications. He reportedly passed away due to liver failure related complications. Following the diagnosis of mitochondrial deletion in her son, Bria and her daughter had testing for similar deletion and it was not detected. 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     Bria is back today since her daughter was found to have elevated GGT without any known etiology. This resulted in the current referral to evaluate whether this was due to the mitochondrial disease or not.    No history of any significant hearing loss, cardiomyopathy, myopathy, diabetes or other endocrinological abnormalities.   She was diagnosed with Min's granulomatosis in 2014 and is on rituximab and methotrexate and mild hearing loss 2 years ago.         Medications:  Current Outpatient Medications   Medication Sig     aspirin-acetaminophen-caffeine (EXCEDRIN MIGRAINE) 250-250-65 MG tablet Take 1 tablet by mouth every 6 hours as needed for headaches     calcium-magnesium (CALMAG) 500-250 MG TABS per tablet Take 1 tablet by mouth 2 times daily (with meals)     cholecalciferol 1000 UNITS TABS Take 1,000 Units by mouth daily     loratadine (CLARITIN) 10 MG tablet TAKE 1 TABLET(10 MG) BY MOUTH DAILY     omeprazole (PRILOSEC) 20 MG DR capsule Take 20 mg by mouth daily     propranolol (INDERAL) 20 MG tablet TAKE 1 TABLET BY MOUTH DAILY     No current facility-administered medications for this visit.     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     Adhesive Tape      Rash itches     Amoxicillin      " "vomiting     Amoxicillin-Pot Clavulanate      vomiting     Erythromycin      vomiting     Morphine And Related      vomiting     Nickel      itches rash     Sulfa Antibiotics Itching     Tegaderm Alginate Ag Rope      LW Other1: -ALL MEDICATIONS MAKE PATIENT VIOLENTLY ILL; ADHESIVE BANDAGES; NICKEL     Zithromax [Azithromycin Dihydrate]      Vomiting/ skin blisters       Physical Examination:  BP (!) 163/79 (BP Location: Right arm, Patient Position: Sitting, Cuff Size: Adult Small)   Pulse 67   Ht 5' 4.76\" (164.5 cm)   Wt 122 lb 9.2 oz (55.6 kg)   HC 52 cm (20.47\")   BMI 20.55 kg/m         FAMILY HISTORY: A brief family medical history was reviewed.  REVIEW OF SYSTEMS: The review of systems negative for new eye, ear, heart, lung, liver, spleen, gastrointestinal, bone, muscle, integumentary, endocrinologic, brain or psychiatric issues except as noted above.  PHYSICAL EXAMINATION:   General: The patient is oriented to person, place and time at an age-appropriate manner.   HEENT: The facial features are normal and symmetric. The ears are of normal position and configuration and hearing is grossly normal.  The tongue protrudes normally without fasciculations.  Neck: The neck  appears to be supple with full range of motion  Chest: The chest is of normal configuration. There is no tachypnea or other visible abnormality.  Heart: The patient appears to be well perfused, and in no apparent distress.  Abdomen: Not examined.  Extremities: The extremities are of normal configuration.  Back: Not examined,  Integument: The  visible portion of the integument is  of normal appearance without significant changes in pigmentation, birthmarks, or lesions.  Neuromuscular:  Mental Status Exam: Alert, awake. Fully oriented. No dysarthria; speech of normal fluency.  Cranial Nerves: EOMs intact, no nystagmus, facial movements symmetric.   Motor: There appears to be normal movement in all four extremities, no atrophy or fasciculations. " No tremors.  Gait: By visual examination, there appears to be normal gait; normal arm swing and stance.    LABORATORY RESULTS: Laboratory studies from the past year were reviewed.    ASSESSMENT:  1. Granulomatosus with polyangitis  2. On methotrexate and rituximab therapy  3. Family history of Kearnes Sayer syndrome in Bria's son who passed away  4. Negative mitochondrial DNA deletion testing for Bria    PLAN/RECOMMENDATIONS:  1.  Ch[place]aloon-Roland syndrome (KSS) is a progressive multisystem disorder defined by onset before age 20 years, pigmentary retinopathy, and PEO; additional features include cerebellar ataxia, impaired intellect (intellectual disability, dementia, or both), sensorineural hearing loss, ptosis, oropharyngeal and esophageal dysfunction, exercise intolerance, muscle weakness, cardiac conduction block, and endocrinopathy.   2. At this time, Bria does not have any clinical features of mitochondrial disease and with a negative mitochondrial DNA testing, the possibility for her to develop any symptoms are low. However, with mitochondrial heteroplasmy, the possibility of her having mitochondrial deletion in the germ cells cannot be completely ruled out either.  3. No additional testing is recommended for Bria. However, for her daughter, testing for mitochondrial deletions to know the size of the deletion and heteroplasmy level is important. Even a negative deletion testing, the possibility of a low level heteroplasmy in other tissues cannot be completely ruled out. If mitochondrial disease is still suspected, testing on liver biopsy specimen could be considered.   4. No regular follow up is recommended for Bria. However, we are happy to re-evaluate her in the presence of any additional questions or concerns.    With warmest regards,       Cecy Dill MD     Division of Genetics and Metabolism    Appointments: [phone]      Monday afternoons: Metabolic/Lysosomal storage clinic               [facility] laboratory: [phone]/ [phone]               [facility] laboratory: [phone]    Nurse Coordinator, Metabolism and Genetics:  Jillian Chery RN, [phone]    Pharmacotherapy Consultant:  Monika Saavedra, PharmD, Pharmacotherapy for Metabolic Disorders ([facility]): [phone]    Genetic Counselor:  Britany Perez MS, [facility] (Genetic test Results): [phone]    Metabolic Dietician:  Stefanie Cash, Registered Dietician: [phone]    Advanced Therapies Clinic Scheduler:  Nadine Mon, [phone]    Copies to:     Dr. Phil Abbott  [address] [place] [id number]    Nury Cárdenas  [address] [facility] [address]    Dr. Phil Abbott MD  [address] [place] [address]      70 minutes spent by me on the date of the encounter doing chart review, history and exam, documentation and further activities per the note

## 2023-08-07 ENCOUNTER — OFFICE VISIT (OUTPATIENT)
Dept: PEDIATRICS | Facility: CLINIC | Age: 78
End: 2023-08-07
Attending: FAMILY MEDICINE
Payer: COMMERCIAL

## 2023-08-07 ENCOUNTER — PATIENT OUTREACH (OUTPATIENT)
Dept: ONCOLOGY | Facility: CLINIC | Age: 78
End: 2023-08-07
Payer: COMMERCIAL

## 2023-08-07 ENCOUNTER — OFFICE VISIT (OUTPATIENT)
Dept: CONSULT | Facility: CLINIC | Age: 78
End: 2023-08-07
Attending: FAMILY MEDICINE
Payer: COMMERCIAL

## 2023-08-07 VITALS
HEIGHT: 65 IN | DIASTOLIC BLOOD PRESSURE: 79 MMHG | HEART RATE: 67 BPM | SYSTOLIC BLOOD PRESSURE: 163 MMHG | WEIGHT: 122.58 LBS | BODY MASS INDEX: 20.42 KG/M2

## 2023-08-07 DIAGNOSIS — Z84.81 FAMILY HISTORY OF CARRIER OF GENETIC DISEASE: ICD-10-CM

## 2023-08-07 DIAGNOSIS — Z80.0 FAMILY HISTORY OF COLON CANCER: Primary | ICD-10-CM

## 2023-08-07 DIAGNOSIS — Z71.83 ENCOUNTER FOR NONPROCREATIVE GENETIC COUNSELING: ICD-10-CM

## 2023-08-07 DIAGNOSIS — Z84.81 FAMILY HISTORY OF GENETIC DISEASE CARRIER: Primary | ICD-10-CM

## 2023-08-07 PROCEDURE — G0463 HOSPITAL OUTPT CLINIC VISIT: HCPCS | Performed by: PEDIATRICS

## 2023-08-07 PROCEDURE — 99205 OFFICE O/P NEW HI 60 MIN: CPT | Performed by: PEDIATRICS

## 2023-08-07 PROCEDURE — 96040 HC GENETIC COUNSELING, EACH 30 MINUTES: CPT | Performed by: GENETIC COUNSELOR, MS

## 2023-08-07 NOTE — PROGRESS NOTES
New Patient Oncology Nurse Navigator Note     Referring provider: Britany Perez GC     Referring Clinic/Organization: [facility]     Referred to (specialty:) Genetic Counseling      Date Referral Received: August 7, 2023     Evaluation for:    Z71.83 (ICD-10-CM) - Encounter for nonprocreative genetic counseling  Z80.0 (ICD-10-CM) - Family history of colon cancer    Writer received referral, reviewed for appropriate plan, and referral transferred to New Patient Scheduling for completion.    8/9/23 - When New Patient Scheduling called to schedule the patient voiced her understanding that she only needed to receive saliva kit and proceed with testing and that no other genetic counseling was needed. Writer will clarify with ordering provider.      On 8/7/23 Britany Perez GC wrote:     Family History of Cancer  The family history of cancer is potentially concerning for an inherited genetic risk for cancer. While the majority of cancer is sporadic in nature, some families carry a genetic predisposition to cancer. These families have a clustering of cancer in the family and/or early ages of onset. Along with the cancer types already seen in the family, the risk for other types of cancer may also be increased. Genetic counseling and testing is available to determine if there is an identifiable genetic risk factor. Bria was interested in pursuing this testing so a referral to our Cancer Risk Management Program will be placed. If a genetic risk factor is identified, changes in management including increased screenings and/or risk reducing surgeries may be offered. Other family members may also be tested for the genetic risk factor.  Given that Bria does not have any symptoms of KSS and had previous negative testing, repeat genetic testing was not recommended for her today.    8/9 12:21 - Telephoned and left voice message for Pat requesting call back. Patient returned call and we  reviewed Britany Perez's recommendation. Patient declined for now. (Ordering provider informed via staff message)    8/14 8:36 - Pat left voice message on 8/11 indicating willingness to proceed with genetic counseling referral. Telephoned and spoke with Pat and she will expect another call from New Patient Scheduling to book that.

## 2023-08-07 NOTE — PATIENT INSTRUCTIONS
"Pediatric Metabolism/PKU Clinic  [facility]  Pediatric Specialty Clinic ([facility])      Formula   We did not make any changes to your formula today.   We will review the lab results and call you with our recommendations.  *Do not make any formula changes without first speaking to your dietician or doctor.       Medications   We did not make any changes to your medications today. We will review the lab results and call you with our recommendations.  *Do not make any medication changes without first speaking to your doctor.  **Please contact us at least one week in advance during regular business hours if you are about to run out of formula or medication       Emergency & Sick Calls   Keep your emergency letter with your child at all times  (at their school, in your vehicles, purse/bag and home, etc).  Consider making a medical alert bracelet.    If your child is unresponsive or has other life threatening medical emergency YOU SHOULD CALL 911.     If your child is NOT ACTING NORMALLY such as: confused or sleepier than normal, having nausea or vomiting, not tolerating their formula or medications, breathing faster than normal, has a fever, diarrhea, or other parental concern CALL US IMMEDIATELY.     Call [phone] and ask the  to \"page Genetic Metabolic Physician on-call\"   If no one calls you back within 15 minutes call again.        Helpful Numbers   To schedule appointments:  Pediatric Call Center for [facility]: [phone]  Neuropsychology [phone]   Radiology/ Imaging/ Echocardiogram: [phone]   Services:   [phone]     For questions about medications/ supplies or non-urgent medical concerns:        Jillian LE, RN, PHN  Nurse Care Coordinator               Ph: [phone]        Lyly Briseno APRN, CNP             Ph: [phone]    For questions about your child's nutrition:  Stefanie Cash RD  Ph: [phone]    For " questions about genetic counseling:               If you have not already done so consider signing up for [employer] by speaking with the person at the  on your way out or go to [email or web address] to sign up online.      You should receive a phone call about your next appointment. If you do not receive this within two weeks of your visit, please call [phone].

## 2023-08-07 NOTE — PROGRESS NOTES
Name:  Nury Cárdenas  :   1945  MRN:   [id number]  Date of service: Aug 7, 2023  Primary Provider: Phil Abbott  Referring Provider: Phil Abbott    Presenting Information:  Bria, a 78 year old female, was seen was seen at the [facility] for evaluation of family history of [place]-Bianka Syndrome. Bria was accompanied to this visit by her daughter, Ina. I met with Bria at the request of Dr. Dill to obtain a family history.       Personal History:   Bria has no history of any significant hearing loss, cardiomyopathy, myopathy, diabetes or other endocrinological abnormalities. She was diagnosed with Min's granulomatosis in  and is on rituximab and methotrexate and mild hearing loss 2 years ago. Refer to Dr. Dill's note for a more detailed personal history.    Patient Active Problem List   Diagnosis    GERD (gastroesophageal reflux disease)    Fibroids    Migraines    Hearing loss    Osteopenia    HYPERLIPIDEMIA LDL GOAL <160    Advanced directives, counseling/discussion    Inflammatory arthritis    Synovitis of wrist    Chronic constipation    Granulomatosis with polyangiitis without renal involvement (H)    Chronic steroid use    Dyspnea    Pulmonary embolism (H)    Calculus of kidney, right    Chronic anticoagulation    Long-term (current) use of anticoagulants [Z79.01]    Long-term use of immunosuppressant medication    Primary osteoarthritis involving multiple joints    Cellulitis    Cat scratch left lower extremity    Other pulmonary embolism without acute cor pulmonale, unspecified chronicity (H)    Age-related osteoporosis without current pathological fracture    Age-related osteoporosis with current pathological fracture, sequela    Adverse effects of medication    Combined forms of age-related cataract, mild-mod, of right eye    Pseudophakia, Yag Caps, os    History of vitrectomy - macular hole, os (MVE)    Hx of macular hole of left eye     Hemifacial spasm    Immunosuppression (H)    Clinical diagnosis of COVID-19    History of COVID-19    Lesion of right eyelid    COPD (chronic obstructive pulmonary disease) (H)    Polyneuropathy associated with underlying disease (H)    Esodeviation    History of strabismus surgery    Current chronic use of systemic steroids    Herpes zoster ophthalmicus, right     Past Medical History:   Diagnosis Date    Amblyopia     Atypical pneumonia 06/14/2014    Atypical pneumonia     Atypical pneumonia     Atypical pneumonia     COPD (chronic obstructive pulmonary disease) (H) 07/20/2021    Coronary artery disease 1982    heart beat hard made me tired    Dyslipidemia     Fibroids     GERD (gastroesophageal reflux disease)     Granulomatosis with polyangiitis (Wegener's)     Hearing loss     Inflammatory arthritis     thumb    Migraines     MVP (mitral valve prolapse)     had an echo that was normal in 2007 she is on Inderal    Nonsenile cataract     Osteopenia     PE (pulmonary embolism) 10/2014    ? GPA associated, on warfarin     Strabismus     Synovitis of wrist 2009    right     Previous Genetic Testing        Family History:   A pedigree was obtained today and scanned into the EMR. The following information was provided:    Children:   Son recently passed away at age 58 from complications of Luke-Bianka syndrome. He had some seizures as a teenager, but was otherwise healthy until his 30s when he had surgery for ptosis and was found to have myopathy. He had genetic testing in 2000 which identified a mitochondrial deletion consistent with a diagnosis of Luke-Lempster syndrome (heteroplasmy level and specific deletion not reported):    Granddaughter passed away from suicide.  Grandson (30s) had testicular cancer.   Two great-grandsons are well.  Granddaughter (30s) is well.  Great-granddaughter (15) has VACTERL association. Genetic testing was reportedly negative.  Great-granddaughter is well.   Daughter (54) was found to  have elevated GGT following a COVID-19 diagnosis. These levels have remained elevated even with changes to diet and no other signs of liver dysfunction. She had negative mitochondrial deletion testing in 2002.  Siblings:  Brother passed away at age 50 from colon cancer.  Sister passed away at age 62 from anal cancer.   Parents:  Mother passed away at age 77 from emphysema and COPD.  Father passed away at age 60 from lung cancer.     The family history is otherwise negative for muscle weakness, vision loss, ataxia, hearing loss, short stature,and known genetic disorders. Consanguinity is denied.    Family History of Cancer  The family history of cancer is potentially concerning for an inherited genetic risk for cancer. While the majority of cancer is sporadic in nature, some families carry a genetic predisposition to cancer. These families have a clustering of cancer in the family and/or early ages of onset. Along with the cancer types already seen in the family, the risk for other types of cancer may also be increased. Genetic counseling and testing is available to determine if there is an identifiable genetic risk factor. Bria was interested in pursuing this testing so a referral to our Cancer Risk Management Program will be placed. If a genetic risk factor is identified, changes in management including increased screenings and/or risk reducing surgeries may be offered. Other family members may also be tested for the genetic risk factor.      Discussion and Assessment:  Luke-Bianka syndrome (KSS) is a neuromuscular disorder characterized by three main features: 1) paralysis of certain eye muscles (called chronic progressive external ophthalmoplegia - CPEO), 2) retinal degeneration causing pigmentary retinopathy, and 3) heart disease (cardiac conduction defects). Symptoms usually begin before age 20. Other less common symptoms of KSS can include ataxia (uncoordinated movements), hearing loss, kidney dysfunction,  muscle weakness, dementia, shorter stature, and diabetes.    Mitochondria, which are the energy producers of the cells, have their own mitochondrial DNA (mtDNA) which is separate from the DNA that is found in the nucleus of the cell (nuclear DNA).  Changes (variants) within the mtDNA disrupt the normal function of the mitochondria within the cells. This can cause problems with the systems in the body that require more energy including the eyes, heart, other muscles, and brain. About 90% of individuals with KSS have a large deletion (missing genetic material) within the mitochondria DNA (mtDNA).  Most individuals with Carrier-Bianka syndrome have a single, large deletion of mtDNA, ranging from 1,000 to 10,000 DNA building blocks (nucleotides). This deletion encompasses as many as 12 genes. Every cell has hundreds of mitochondria. Genetic variants can be found within all of the mitochondria (homoplasmic) or in just some of the mitochondria (heteroplasmic). The percentage of mtDNA with variants can affect the symptoms an individual will develop. Individuals who have a higher level of heteroplasmy may have an earlier onset of symptoms or greater severity of the condition.    In many cases of KSS, the condition is not inherited but rather is caused by mitochondrial deletions that occur after conception (called somatic variants). The mtDNA deletion may be present in only some cells and tissues. While less common, some cases of KSS are inherited and caused by variants that are transmitted by the mother. The mitochondria are only inherited from our mothers. If a mother has a mtDNA variant, each of her children is at risk of inheriting the same mtDNA variant. This is known as mitochondrial inheritance. The amount of heteroplasmy (the portion of mitochondria containing the variant) typically varies between a mother and her children. Mitochondrial disorders can appear in every generation of a family and can affect both males and  females, but fathers do not pass mitochondrial variants to their children.      Testing for KSS involves evaluating the mitochondrial DNA for the presence of the deletion. The deletion can be present in all tissues, but is most prevalent in muscle tissue. Occasionally the deletion is not detectable in blood. Therefore, a negative result in blood does not rule out KSS entirely.     Given that Bria does not have any symptoms of KSS and had previous negative testing, repeat genetic testing was not recommended for her today. However, we discussed the option of repeat testing for Bria's daughter, Ina today. The family was in agreement with this plan. Refer to Ina's chart for more details.     Bria did not have additional questions at this time, but she is encouraged to reach out if questions come up.       Plan:  Mitochondrial deletion testing for Bria's daughter, Ina, was pursued today.  Cancer genetic counseling referral placed for Bria given family history of cancer.   Follow-up as recommended by Dr. Dill.       Britany Perez, [facility]  Genetic Counselor  [facility]   Phone: [phone]        Approximate Time Spent in Consultation: 30 min

## 2023-08-07 NOTE — LETTER
"2023      RE: Nury Cárdenas  [address] [address]     Dear Colleague,    Thank you for the opportunity to participate in the care of your patient, Nury Cárdenas, at the [facility] at [facility]. Please see a copy of my visit note below.                       OUTPATIENT METABOLIC INITIAL VISIT          Date: 2023      Patient:  Nury Cárdenas   :   1945   MRN:     [id number]      Nury Cárdenas  [address] [address]    Dear Dr. Phil Abbott and Nury Cárdenas,    Thank you for sending Nury Cárdenas to the [facility] Monday \"Metabolic clinic\" for consultation and treatment of:    1. Family history of genetic disease carrier         PAST MEDICAL HISTORY:    From the oral history, and medical records that are available, these items are noted:    Patient Active Problem List   Diagnosis    GERD (gastroesophageal reflux disease)    Fibroids    Migraines    Hearing loss    Osteopenia    HYPERLIPIDEMIA LDL GOAL <160    Advanced directives, counseling/discussion    Inflammatory arthritis    Synovitis of wrist    Chronic constipation    Granulomatosis with polyangiitis without renal involvement (H)    Chronic steroid use    Dyspnea    Pulmonary embolism (H)    Calculus of kidney, right    Chronic anticoagulation    Long-term (current) use of anticoagulants [Z79.01]    Long-term use of immunosuppressant medication    Primary osteoarthritis involving multiple joints    Cellulitis    Cat scratch left lower extremity    Other pulmonary embolism without acute cor pulmonale, unspecified chronicity (H)    Age-related osteoporosis without current pathological fracture    Age-related osteoporosis with current pathological fracture, sequela    Adverse effects of medication    Combined forms of age-related cataract, mild-mod, of right eye    Pseudophakia, " "Yag Caps, os    History of vitrectomy - macular hole, os (MVE)    Hx of macular hole of left eye    Hemifacial spasm    Immunosuppression (H)    Clinical diagnosis of COVID-19    History of COVID-19    Lesion of right eyelid    COPD (chronic obstructive pulmonary disease) (H)    Polyneuropathy associated with underlying disease (H)    Esodeviation    History of strabismus surgery    Current chronic use of systemic steroids    Herpes zoster ophthalmicus, right       Significant family history of [place] syndrome in her son who passed away due to myopathy and related complications. He reportedly passed away due to liver failure related complications. Following the diagnosis of mitochondrial deletion in her son, Bria and her daughter had testing for similar deletion and it was not detected. 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     Bria is back today since her daughter was found to have elevated GGT without any known etiology. This resulted in the current referral to evaluate whether this was due to the mitochondrial disease or not.    No history of any significant hearing loss, cardiomyopathy, myopathy, diabetes or other endocrinological abnormalities.   She was diagnosed with Min's granulomatosis in 2014 and is on rituximab and methotrexate and mild hearing loss 2 years ago.         Medications:  Current Outpatient Medications   Medication Sig    aspirin-acetaminophen-caffeine (EXCEDRIN MIGRAINE) 250-250-65 MG tablet Take 1 tablet by mouth every 6 hours as needed for headaches    calcium-magnesium (CALMAG) 500-250 MG TABS per tablet Take 1 tablet by mouth 2 times daily (with meals)    cholecalciferol 1000 UNITS TABS Take 1,000 Units by mouth daily    loratadine (CLARITIN) 10 MG tablet TAKE 1 TABLET(10 MG) BY MOUTH DAILY    omeprazole (PRILOSEC) 20 MG DR capsule Take 20 mg by mouth daily    propranolol (INDERAL) 20 MG tablet TAKE 1 TABLET BY MOUTH DAILY     No current facility-administered " "medications for this visit.     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    Adhesive Tape      Rash itches    Amoxicillin      vomiting    Amoxicillin-Pot Clavulanate      vomiting    Erythromycin      vomiting    Morphine And Related      vomiting    Nickel      itches rash    Sulfa Antibiotics Itching    Tegaderm Alginate Ag Rope      LW Other1: -ALL MEDICATIONS MAKE PATIENT VIOLENTLY ILL; ADHESIVE BANDAGES; NICKEL    Zithromax [Azithromycin Dihydrate]      Vomiting/ skin blisters       Physical Examination:  BP (!) 163/79 (BP Location: Right arm, Patient Position: Sitting, Cuff Size: Adult Small)   Pulse 67   Ht 5' 4.76\" (164.5 cm)   Wt 122 lb 9.2 oz (55.6 kg)   HC 52 cm (20.47\")   BMI 20.55 kg/m         FAMILY HISTORY: A brief family medical history was reviewed.  REVIEW OF SYSTEMS: The review of systems negative for new eye, ear, heart, lung, liver, spleen, gastrointestinal, bone, muscle, integumentary, endocrinologic, brain or psychiatric issues except as noted above.  PHYSICAL EXAMINATION:   General: The patient is oriented to person, place and time at an age-appropriate manner.   HEENT: The facial features are normal and symmetric. The ears are of normal position and configuration and hearing is grossly normal.  The tongue protrudes normally without fasciculations.  Neck: The neck  appears to be supple with full range of motion  Chest: The chest is of normal configuration. There is no tachypnea or other visible abnormality.  Heart: The patient appears to be well perfused, and in no apparent distress.  Abdomen: Not examined.  Extremities: The extremities are of normal configuration.  Back: Not examined,  Integument: The  visible portion of the integument is  of normal appearance without significant changes in pigmentation, " birthmarks, or lesions.  Neuromuscular:  Mental Status Exam: Alert, awake. Fully oriented. No dysarthria; speech of normal fluency.  Cranial Nerves: EOMs intact, no nystagmus, facial movements symmetric.   Motor: There appears to be normal movement in all four extremities, no atrophy or fasciculations. No tremors.  Gait: By visual examination, there appears to be normal gait; normal arm swing and stance.    LABORATORY RESULTS: Laboratory studies from the past year were reviewed.    ASSESSMENT:  Granulomatosus with polyangitis  On methotrexate and rituximab therapy  Family history of Kearnes Sayer syndrome in Bria's son who passed away  Negative mitochondrial DNA deletion testing for Bria    PLAN/RECOMMENDATIONS:   [employer]-Bianka syndrome (KSS) is a progressive multisystem disorder defined by onset before age 20 years, pigmentary retinopathy, and PEO; additional features include cerebellar ataxia, impaired intellect (intellectual disability, dementia, or both), sensorineural hearing loss, ptosis, oropharyngeal and esophageal dysfunction, exercise intolerance, muscle weakness, cardiac conduction block, and endocrinopathy.   At this time, Bria does not have any clinical features of mitochondrial disease and with a negative mitochondrial DNA testing, the possibility for her to develop any symptoms are low. However, with mitochondrial heteroplasmy, the possibility of her having mitochondrial deletion in the germ cells cannot be completely ruled out either.  No additional testing is recommended for Bria. However, for her daughter, testing for mitochondrial deletions to know the size of the deletion and heteroplasmy level is important. Even a negative deletion testing, the possibility of a low level heteroplasmy in other tissues cannot be completely ruled out. If mitochondrial disease is still suspected, testing on liver biopsy specimen could be considered.   No regular follow up is recommended for Bria. However, we are happy to  re-evaluate her in the presence of any additional questions or concerns.    With warmest regards,       Cecy Dill MD     Division of Genetics and Metabolism    Appointments: [phone]      Monday afternoons: Metabolic/Lysosomal storage clinic              [facility]: [phone]/ [phone]               [facility] laboratory: [phone]    Nurse Coordinator, Metabolism and Genetics:  Jillian Chery RN, [phone]    Pharmacotherapy Consultant:  Monika Saavedra, PharmD, Pharmacotherapy for Metabolic Disorders (PIM[facility]): [phone]    Genetic Counselor:  Britany Perez MS, [facility] (Genetic test Results): [phone]    Metabolic Dietician:  Stefanie Cash, Registered Dietician: [phone]    Advanced Therapies Clinic Scheduler:  Nadine Mon, [phone]    Copies to:     Dr. Phil Abbott  [address] [address]    Nury Cárdenas  [address] [place] [address]  [place] [address]    Dr. Phil Abbott MD  [address] [facility] [place] [address]      70 minutes spent by me on the date of the encounter doing chart review, history and exam, documentation and further activities per the note

## 2023-08-07 NOTE — NURSING NOTE
"Chief Complaint   Patient presents with    Consult     Mitochondrial myopathy family history.  Both Pat and Daughter have higher levels. Tested about 30 years ago, but Doctors didn't have a lot of information and weren't able to tell them much.       Vitals:    08/07/23 1226   BP: (!) 163/79   BP Location: Right arm   Patient Position: Sitting   Cuff Size: Adult Small   Pulse: 67   Weight: 122 lb 9.2 oz (55.6 kg)   Height: 5' 4.76\" (164.5 cm)   HC: 52 cm (20.47\")       Patient MyChart Active? Yes  If no, would they like to sign up? N/A    Has patient signed a Consent to Communicate form to discuss health information with guardians if applicable? Does not apply     Does patient need PHQ-2 completed today? No    Depression Response    Patient completed the PHQ-9 assessment for depression and scored >9? Does not apply   Question 9 on the PHQ-9 was positive for suicidality? Does not apply   Does patient have current mental health provider? Does not apply     I personally notified the following: clinic nurse     Corinna Sr, EMT  August 7, 2023  "

## 2023-08-07 NOTE — Clinical Note
2023      RE: Nury Cárdenas  [address] [facility] [address]     Dear Colleague,    Thank you for the opportunity to participate in the care of your patient, Nury Cárdenas, at the [facility] at [facility]. Please see a copy of my visit note below.    Name:  Nury Cárdenas  :   1945  MRN:   [id number]  Date of service: Aug 7, 2023  Primary Provider: Phil Abbott  Referring Provider: Phil Abbott    Presenting Information:  Bria, a 78 year old female, was seen was seen at the [facility] for evaluation of family history of Luke-[place] Syndrome. Bria was accompanied to this visit by her daughter, Ina. I met with Bria at the request of Dr. Dill to obtain a family history.       Personal History:   Bria has no history of any significant hearing loss, cardiomyopathy, myopathy, diabetes or other endocrinological abnormalities. She was diagnosed with Min's granulomatosis in  and is on rituximab and methotrexate and mild hearing loss 2 years ago. Refer to Dr. Dill's note for a more detailed personal history.    Patient Active Problem List   Diagnosis    GERD (gastroesophageal reflux disease)    Fibroids    Migraines    Hearing loss    Osteopenia    HYPERLIPIDEMIA LDL GOAL <160    Advanced directives, counseling/discussion    Inflammatory arthritis    Synovitis of wrist    Chronic constipation    Granulomatosis with polyangiitis without renal involvement (H)    Chronic steroid use    Dyspnea    Pulmonary embolism (H)    Calculus of kidney, right    Chronic anticoagulation    Long-term (current) use of anticoagulants [Z79.01]    Long-term use of immunosuppressant medication    Primary osteoarthritis involving multiple joints    Cellulitis    Cat scratch left lower extremity    Other pulmonary embolism without acute cor pulmonale, unspecified chronicity (H)     Age-related osteoporosis without current pathological fracture    Age-related osteoporosis with current pathological fracture, sequela    Adverse effects of medication    Combined forms of age-related cataract, mild-mod, of right eye    Pseudophakia, Yag Caps, os    History of vitrectomy - macular hole, os (MVE)    Hx of macular hole of left eye    Hemifacial spasm    Immunosuppression (H)    Clinical diagnosis of COVID-19    History of COVID-19    Lesion of right eyelid    COPD (chronic obstructive pulmonary disease) (H)    Polyneuropathy associated with underlying disease (H)    Esodeviation    History of strabismus surgery    Current chronic use of systemic steroids    Herpes zoster ophthalmicus, right     Past Medical History:   Diagnosis Date    Amblyopia     Atypical pneumonia 06/14/2014    Atypical pneumonia     Atypical pneumonia     Atypical pneumonia     COPD (chronic obstructive pulmonary disease) (H) 07/20/2021    Coronary artery disease 1982    heart beat hard made me tired    Dyslipidemia     Fibroids     GERD (gastroesophageal reflux disease)     Granulomatosis with polyangiitis (Wegener's)     Hearing loss     Inflammatory arthritis     thumb    Migraines     MVP (mitral valve prolapse)     had an echo that was normal in 2007 she is on Inderal    Nonsenile cataract     Osteopenia     PE (pulmonary embolism) 10/2014    ? GPA associated, on warfarin     Strabismus     Synovitis of wrist 2009    right     Previous Genetic Testing        Family History:   A pedigree was obtained today and scanned into the EMR. The following information was provided:    Children:   Son recently passed away at age 58 from complications of Luke-Bianka syndrome. He had some seizures as a teenager, but was otherwise healthy until his 30s when he had surgery for ptosis and was found to have myopathy. He had genetic testing in 2000 which identified a mitochondrial deletion consistent with a diagnosis of Luke-Sarita syndrome  (heteroplasmy level and specific deletion not reported):    Granddaughter passed away from suicide.  Grandson (30s) had testicular cancer.   Two great-grandsons are well.  Granddaughter (30s) is well.  Great-granddaughter (15) has VACTERL association. Genetic testing was reportedly negative.  Great-granddaughter is well.   Daughter (54) was found to have elevated GGT following a COVID-19 diagnosis. These levels have remained elevated even with changes to diet and no other signs of liver dysfunction.   Siblings:  Brother passed away at age 50 from colon cancer.  Sister passed away at age 62 from anal cancer.   Parents:  Mother passed away at age 77 from emphysema and COPD.  Father passed away at age 60 from lung cancer.     The family history is otherwise negative for muscle weakness, vision loss, ataxia, hearing loss, short stature,and known genetic disorders. Consanguinity is denied.    Family History of Cancer  The family history of cancer is potentially concerning for an inherited genetic risk for cancer. While the majority of cancer is sporadic in nature, some families carry a genetic predisposition to cancer. These families have a clustering of cancer in the family and/or early ages of onset. Along with the cancer types already seen in the family, the risk for other types of cancer may also be increased. Genetic counseling and testing is available to determine if there is an identifiable genetic risk factor. Bria was interested in pursuing this testing so a referral to our Cancer Risk Management Program will be placed. If a genetic risk factor is identified, changes in management including increased screenings and/or risk reducing surgeries may be offered. Other family members may also be tested for the genetic risk factor.      Discussion and Assessment:  [place] syndrome (KSS) is a neuromuscular disorder characterized by three main features: 1) paralysis of certain eye muscles (called chronic progressive  external ophthalmoplegia - CPEO), 2) retinal degeneration causing pigmentary retinopathy, and 3) heart disease (cardiac conduction defects). Symptoms usually begin before age 20. Other less common symptoms of KSS can include ataxia (uncoordinated movements), hearing loss, kidney dysfunction, muscle weakness, dementia, shorter stature, and diabetes.    Mitochondria, which are the energy producers of the cells, have their own mitochondrial DNA (mtDNA) which is separate from the DNA that is found in the nucleus of the cell (nuclear DNA).  Changes (variants) within the mtDNA disrupt the normal function of the mitochondria within the cells. This can cause problems with the systems in the body that require more energy including the eyes, heart, other muscles, and brain. About 90% of individuals with KSS have a large deletion (missing genetic material) within the mitochondria DNA (mtDNA).  Most individuals with [place] syndrome have a single, large deletion of mtDNA, ranging from 1,000 to 10,000 DNA building blocks (nucleotides). This deletion encompasses as many as 12 genes. Every cell has hundreds of mitochondria. Genetic variants can be found within all of the mitochondria (homoplasmic) or in just some of the mitochondria (heteroplasmic). The percentage of mtDNA with variants can affect the symptoms an individual will develop. Individuals who have a higher level of heteroplasmy may have an earlier onset of symptoms or greater severity of the condition.    In many cases of KSS, the condition is not inherited but rather is caused by mitochondrial deletions that occur after conception (called somatic variants). The mtDNA deletion may be present in only some cells and tissues. While less common, some cases of KSS are inherited and caused by variants that are transmitted by the mother. The mitochondria are only inherited from our mothers. If a mother has a mtDNA variant, each of her children is at risk of inheriting the  same mtDNA variant. This is known as mitochondrial inheritance. The amount of heteroplasmy (the portion of mitochondria containing the variant) typically varies between a mother and her children. Mitochondrial disorders can appear in every generation of a family and can affect both males and females, but fathers do not pass mitochondrial variants to their children.      Testing for KSS involves evaluating the mitochondrial DNA for the presence of the deletion. The deletion can be present in all tissues, but is most prevalent in muscle tissue. Occasionally the deletion is not detectable in blood. Therefore, a negative result in blood does not rule out KSS entirely.     Given that Bria does not have any symptoms of KSS and had previous negative testing, repeat genetic testing was not recommended for her today. However, we discussed the option of repeat testing for Bria's daughter, Ina today. The family was in agreement with this plan. Refer to Ina's chart for more details.     Bria did not have additional questions at this time, but she is encouraged to reach out if questions come up.       Plan:  Mitochondrial deletion testing for Bria's daughter, Ina, was pursued today.  Cancer genetic counseling referral placed for Bria given family history of cancer.   Follow-up as recommended by Dr. Dill.       Britany Perez, [facility]  Genetic Counselor  [facility]   Phone: [phone]        Approximate Time Spent in Consultation: 30 min       Please do not hesitate to contact me if you have any questions/concerns.     Sincerely,       Britany Perez

## 2023-08-08 LAB — GGT SERPL-CCNC: 28 U/L (ref 5–36)

## 2023-08-14 ENCOUNTER — DOCUMENTATION ONLY (OUTPATIENT)
Dept: OTHER | Facility: CLINIC | Age: 78
End: 2023-08-14
Payer: COMMERCIAL

## 2023-08-15 ENCOUNTER — MYC MEDICAL ADVICE (OUTPATIENT)
Dept: FAMILY MEDICINE | Facility: CLINIC | Age: 78
End: 2023-08-15
Payer: COMMERCIAL

## 2023-08-15 DIAGNOSIS — G43.909 MIGRAINE WITHOUT STATUS MIGRAINOSUS, NOT INTRACTABLE, UNSPECIFIED MIGRAINE TYPE: ICD-10-CM

## 2023-08-15 RX ORDER — PROPRANOLOL HYDROCHLORIDE 20 MG/1
20 TABLET ORAL DAILY
Qty: 90 TABLET | Refills: 0 | Status: SHIPPED | OUTPATIENT
Start: 2023-08-15 | End: 2023-11-13

## 2023-08-18 ENCOUNTER — OFFICE VISIT (OUTPATIENT)
Dept: NEUROSURGERY | Facility: CLINIC | Age: 78
End: 2023-08-18
Attending: NURSE PRACTITIONER
Payer: COMMERCIAL

## 2023-08-18 VITALS — SYSTOLIC BLOOD PRESSURE: 143 MMHG | DIASTOLIC BLOOD PRESSURE: 78 MMHG | OXYGEN SATURATION: 97 % | HEART RATE: 67 BPM

## 2023-08-18 DIAGNOSIS — M54.2 CERVICALGIA: Primary | ICD-10-CM

## 2023-08-18 DIAGNOSIS — R42 DIZZINESS: ICD-10-CM

## 2023-08-18 PROCEDURE — G0463 HOSPITAL OUTPT CLINIC VISIT: HCPCS | Performed by: NURSE PRACTITIONER

## 2023-08-18 PROCEDURE — 99213 OFFICE O/P EST LOW 20 MIN: CPT | Performed by: NURSE PRACTITIONER

## 2023-08-18 ASSESSMENT — PAIN SCALES - GENERAL: PAINLEVEL: NO PAIN (0)

## 2023-08-18 NOTE — LETTER
"    8/18/2023         RE: Nury Cárdenas  [address] [facility] [address] [address]        Dear Colleague,    Thank you for referring your patient, Nury Cárdenas, to the [facility] [employer]. Please see a copy of my visit note below.    [facility] Visit      CC: neck pain, headaches, dizziness     Primary Care Provider: Phil Abbott    Reason For Visit:   Follow-up       HPI: Nury Cárdenas is a 77 year old female who presents for evaluation of chronic neck pain. Symptoms developed a few months ago, denies any recent falls or trauma. Patient describes aching posterior neck pain. Denies any radicular pain, numbness, weakness, falls, foot drop, saddle anesthesia, or bladder/bowel changes. She reports neck pain is intermittent and mostly occurs in the mornings and afternoons. Pain worsens with flexion motions such as when reading and crocheting. Pain improves when wearing a soft collar. She has tried home PT exercises as well.     8/18/23: Patient presents for follow-up. She woke up with increased neck pain and headaches x 1 week ago. Denies trauma or injuries at onset. She also reports balance issues, dizziness, mild nausea, and \"twitching\" in left eye and lip that occurs at onset of headaches. Patient reports she has experienced these episodes intermittently over the years, but they have become more frequent over the past few months. Denies any arm pain, numbness, weakness, vomiting, vision changes, speech changes, falls, or recent trauma. Symptoms worsen when laying flat and improve with Excedrin. Per chart review, patient previously saw Neurologist Dr. Crump in 2022.     Current pain: 0/10     Past Medical History:   Diagnosis Date     Amblyopia      Atypical pneumonia 06/14/2014     Atypical pneumonia      Atypical pneumonia      Atypical pneumonia      COPD (chronic obstructive pulmonary disease) (H) 07/20/2021     Coronary artery " disease 1982    heart beat hard made me tired     Dyslipidemia      Fibroids      GERD (gastroesophageal reflux disease)      Granulomatosis with polyangiitis (Wegener's)      Hearing loss      Inflammatory arthritis     thumb     Migraines      MVP (mitral valve prolapse)     had an echo that was normal in 2007 she is on Inderal     Nonsenile cataract      Osteopenia      PE (pulmonary embolism) 10/2014    ? GPA associated, on warfarin      Strabismus      Synovitis of wrist 2009    right       Past Medical History reviewed with patient during visit.    Past Surgical History:   Procedure Laterality Date     ABDOMEN SURGERY      appendix out     APPENDECTOMY       BIOPSY  1972    cone biopsy     CATARACT IOL, RT/LT       CL AFF SURGICAL PATHOLOGY      cone biopsy 1975     COLONOSCOPY       COLONOSCOPY WITH CO2 INSUFFLATION N/A 12/20/2017    Procedure: COLONOSCOPY WITH CO2 INSUFFLATION;  Screening  BMI: 20.7  [facility]armacy: [email or web address]  [phone]  [facility]  [facility]  Patient get ill from [employer] [facility];  Surgeon: Duane, William Charles, MD;  Location:  OR     EYE SURGERY      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     HC ESOPHAGOSCOPY, DIAGNOSTIC       LASER YAG CAPSULOTOMY      left eye     OPTICAL TRACKING SYSTEM BRONCHOSCOPY  6/19/2014    Procedure: OPTICAL TRACKING SYSTEM BRONCHOSCOPY;  Surgeon: Angel Kathleen MD;  Location:  GI     PHACOEMULSIFICATION WITH STANDARD INTRAOCULAR LENS IMPLANT  01/2018    left eye (SR)     SOFT TISSUE SURGERY      remove senovial fluid from right wrist     STRABISMUS SURGERY       SURGICAL HISTORY OF -   as a child    strabismus repair right eye     SURGICAL HISTORY OF -   8-2009    right wrist debridement     TONSILLECTOMY      as a child     TONSILLECTOMY & ADENOIDECTOMY       TUBAL LIGATION       VITRECTOMY PARSPLANA  01/2018    left eye - mac hole (MVE)     Past Surgical History reviewed with patient during visit.    Current Outpatient Medications    Medication     aspirin-acetaminophen-caffeine (EXCEDRIN MIGRAINE) 250-250-65 MG tablet     calcium-magnesium (CALMAG) 500-250 MG TABS per tablet     cholecalciferol 1000 UNITS TABS     loratadine (CLARITIN) 10 MG tablet     omeprazole (PRILOSEC) 20 MG DR capsule     propranolol (INDERAL) 20 MG tablet     No current facility-administered medications for this visit.     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     Adhesive Tape      Rash itches     Amoxicillin      vomiting     Amoxicillin-Pot Clavulanate      vomiting     Erythromycin      vomiting     Morphine And Related      vomiting     Nickel      itches rash     Sulfa Antibiotics Itching     Tegaderm Alginate Ag Rope      LW Other1: -ALL MEDICATIONS MAKE PATIENT VIOLENTLY ILL; ADHESIVE BANDAGES; NICKEL     Zithromax [Azithromycin Dihydrate]      Vomiting/ skin blisters       Social History     Socioeconomic History     Marital status:      Spouse name: None     Number of children: None     Years of education: None     Highest education level: None   Tobacco Use     Smoking status: Former     Packs/day: 2.00     Years: 20.00     Pack years: 40.00     Types: Cigarettes     Start date: 1961     Quit date: 1983     Years since quittin.4     Smokeless tobacco: Never   Substance and Sexual Activity     Alcohol use: No     Comment: 1-2 glasses per year     Drug use: No     Sexual activity: Never   Other Topics Concern     Parent/sibling w/ CABG, MI or angioplasty before 65F 55M? No       Family History   Problem Relation Age of Onset     Respiratory Mother         emphysema     Aneurysm Mother      Chronic Obstructive Pulmonary Disease Mother      Thyroid Disease Mother      Osteoporosis Mother      Other Cancer Father      Cancer Father         lung cancer     Arthritis  Maternal Grandmother         rheumatoid     Heart Disease Maternal Grandmother         unsure of details     Heart Disease Maternal Grandfather      Neurologic Disorder Paternal Grandmother         migraines     Alzheimer Disease Paternal Grandmother      Unknown/Adopted Paternal Grandfather      Cancer Sister         stage 4 anal cancer age 62 years     Colon Cancer Sister      Substance Abuse Sister      Cancer - colorectal Brother      Colon Cancer Brother      Substance Abuse Sister      Anesthesia Reaction Daughter        ROS: 10 point ROS neg other than the symptoms noted above in the HPI.    Vital Signs: BP (!) 143/78   Pulse 67   SpO2 97%     Physical Examination:  Constitutional:  Alert, well nourished, NAD.  HEENT: Normocephalic, atraumatic.   Pulmonary:  Without shortness of breath, normal effort.   Cardiovascular:  No pitting edema of BLE.      Neurological:  Awake  Alert  Oriented x 3  Speech clear  Cranial nerves II - XII grossly intact  PERRL  EOMI  Motor exam:  Shoulder Abduction  Right:  5/5   Left:  5/5  Biceps                      Right:  5/5   Left:  5/5  Triceps                     Right:  5/5   Left:  5/5  Wrist Extensors        Right:  5/5   Left:  5/5  Wrist Flexors            Right:  5/5   Left:  5/5  Intrinsics                   Right:  5/5   Left:  5/5    Iliopsoas  (hip flexion)               Right: 5/5  Left:  5/5  Quadriceps  (knee extension)       Right:  5/5  Left:  5/5  Hamstrings  (knee flexion)            Right:  5/5  Left:  5/5  Gastroc Soleus  (PF)                          Right:  5/5  Left:  5/5  Tibialis Ant  (DF)                          Right:  5/5  Left:  5/5  EHL                          Right:  5/5  Left:  5/5         Sensation normal to BUE and BLE     Reflexes are 2+ in the patellar and Achilles. There is no clonus.     Reflexes are 2+ in the brachial radialis and triceps. Negative Vamshi sign bilaterally.    Musculoskeletal:  Gait: Able to stand from a seated  "position. Normal non-antalgic, non-myelopathic gait.   Cervical examination reveals good range of motion.   No tenderness to palpation of cervical spine.     Imaging:   EXAM: XR CERVICAL SPINE W FLEX/EXT G/E 4 VIEWS 12/27/2022 11:01 AM  FINDINGS: AP, lateral, flexion, and extension views of the cervical  spine were obtained. Trace anterolisthesis at C4-5, not significantly  change during flexion or extension. Multilevel disc space narrowing  most pronounced at C5-6 and C6-7, with large anterior osteophyte at  C5-6. No acute fracture or subluxation. No prevertebral soft tissue  thickening. Airway and lung apices are clear. Diffusely demineralized  bones.                                                              IMPRESSION:  Multilevel cervical spondylosis.    MRI BRAIN WITHOUT AND WITH CONTRAST 11/12/2018 3:12 PM   IMPRESSION:    1. Normal MR appearance of the internal auditory canals, labyrinthine  structures, as well as the intracranial course of the seventh and  eighth cranial nerves.   2. No evidence of acute infarct, mass, hemorrhage, or herniation.  3. Mild nonspecific white matter changes likely due to chronic  microvascular ischemic disease.      Assessment/Plan:   78 year old female who presents for evaluation of chronic posterior neck pain, headaches, balance issues, dizziness, mild nausea, and \"twitching\" in left eye and lip that occurs at onset of headaches. Patient has experienced these episodes intermittently over the years, but they have become more frequent over the past few months. Per chart review, patient previously saw Neurologist Dr. Crupm last year. Reviewed cervical spine XR from 2022 and brain MRI from 2018. We discussed next steps.     - Cervical spine MRI and brain MRI ordered for further evaluation  - Patient plans to schedule follow-up with Neurology as well    Advised patient to call our clinic with any questions or concerns. Discussed red flag symptoms and advised to seek medical " attention if these develop. Patient voiced understanding and agreement.     Anusha Grimm CNP  [facility] Neurosurgery  [address]  [place] [address]  Tel [phone]  Pager [phone]      Again, thank you for allowing me to participate in the care of your patient.        Sincerely,        Anusha Grimm, NP

## 2023-08-18 NOTE — Clinical Note
Can we please call patient/daughter to schedule MRIs? I ordered them in the clinic today but was notified they were cancelled (not sure why). I re ordered them now. Thanks!

## 2023-08-18 NOTE — NURSING NOTE
"Nury Cárdenas is a 78 year old female who presents for:  Chief Complaint   Patient presents with    RECHECK     Cervicalgia. Left eye twitches when pain in head and neck starts. Sometimes lip twitches.         Initial Vitals:  BP (!) 143/78   Pulse 67   SpO2 97%  Estimated body mass index is 20.55 kg/m  as calculated from the following:    Height as of 8/7/23: 5' 4.76\" (1.645 m).    Weight as of 8/7/23: 122 lb 9.2 oz (55.6 kg).. There is no height or weight on file to calculate BSA. BP completed using cuff size: small regular  No Pain (0)        Steff Goodrich   "

## 2023-08-18 NOTE — PROGRESS NOTES
"[facility] Neurosurgery Clinic Visit      CC: neck pain, headaches, dizziness     Primary Care Provider: Phil Abbott    Reason For Visit:   Follow-up       HPI: Nury Cárdenas is a 77 year old female who presents for evaluation of chronic neck pain. Symptoms developed a few months ago, denies any recent falls or trauma. Patient describes aching posterior neck pain. Denies any radicular pain, numbness, weakness, falls, foot drop, saddle anesthesia, or bladder/bowel changes. She reports neck pain is intermittent and mostly occurs in the mornings and afternoons. Pain worsens with flexion motions such as when reading and crocheting. Pain improves when wearing a soft collar. She has tried home PT exercises as well.     8/18/23: Patient presents for follow-up. She woke up with increased neck pain and headaches x 1 week ago. Denies trauma or injuries at onset. She also reports balance issues, dizziness, mild nausea, and \"twitching\" in left eye and lip that occurs at onset of headaches. Patient reports she has experienced these episodes intermittently over the years, but they have become more frequent over the past few months. Denies any arm pain, numbness, weakness, vomiting, vision changes, speech changes, falls, or recent trauma. Symptoms worsen when laying flat and improve with Excedrin. Per chart review, patient previously saw Neurologist Dr. Crump in 2022.     Current pain: 0/10     Past Medical History:   Diagnosis Date    Amblyopia     Atypical pneumonia 06/14/2014    Atypical pneumonia     Atypical pneumonia     Atypical pneumonia     COPD (chronic obstructive pulmonary disease) (H) 07/20/2021    Coronary artery disease 1982    heart beat hard made me tired    Dyslipidemia     Fibroids     GERD (gastroesophageal reflux disease)     Granulomatosis with polyangiitis (Wegener's)     Hearing loss     Inflammatory arthritis     thumb    Migraines     MVP (mitral valve prolapse)     had an echo that " was normal in 2007 she is on Inderal    Nonsenile cataract     Osteopenia     PE (pulmonary embolism) 10/2014    ? GPA associated, on warfarin     Strabismus     Synovitis of wrist 2009    right       Past Medical History reviewed with patient during visit.    Past Surgical History:   Procedure Laterality Date    ABDOMEN SURGERY      appendix out    APPENDECTOMY      BIOPSY  1972    cone biopsy    CATARACT IOL, RT/LT      CL AFF SURGICAL PATHOLOGY      cone biopsy 1975    COLONOSCOPY      COLONOSCOPY WITH CO2 INSUFFLATION N/A 12/20/2017    Procedure: COLONOSCOPY WITH CO2 INSUFFLATION;  Screening  BMI: 20.7  Pharmacy: [facility]  [phone]  [facility]  [facility]  [facility] get ill from [facility];  Surgeon: Duane, William Charles, MD;  Location: [facility] OR    EYE SURGERY      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    HC ESOPHAGOSCOPY, DIAGNOSTIC      LASER YAG CAPSULOTOMY      left eye    OPTICAL TRACKING SYSTEM BRONCHOSCOPY  6/19/2014    Procedure: OPTICAL TRACKING SYSTEM BRONCHOSCOPY;  Surgeon: Angel Kathleen MD;  Location:  GI    PHACOEMULSIFICATION WITH STANDARD INTRAOCULAR LENS IMPLANT  01/2018    left eye (SR)    SOFT TISSUE SURGERY      remove senovial fluid from right wrist    STRABISMUS SURGERY      SURGICAL HISTORY OF -   as a child    strabismus repair right eye    SURGICAL HISTORY OF -   8-2009    right wrist debridement    TONSILLECTOMY      as a child    TONSILLECTOMY & ADENOIDECTOMY      TUBAL LIGATION      VITRECTOMY PARSPLANA  01/2018    left eye - mac hole (MVE)     Past Surgical History reviewed with patient during visit.    Current Outpatient Medications   Medication    aspirin-acetaminophen-caffeine (EXCEDRIN MIGRAINE) 250-250-65 MG tablet    calcium-magnesium (CALMAG) 500-250 MG TABS per tablet    cholecalciferol 1000 UNITS TABS    loratadine (CLARITIN) 10 MG tablet    omeprazole (PRILOSEC) 20 MG DR capsule    propranolol (INDERAL) 20 MG tablet     No current  facility-administered medications for this visit.     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    Adhesive Tape      Rash itches    Amoxicillin      vomiting    Amoxicillin-Pot Clavulanate      vomiting    Erythromycin      vomiting    Morphine And Related      vomiting    Nickel      itches rash    Sulfa Antibiotics Itching    Tegaderm Alginate Ag Rope      LW Other1: -ALL MEDICATIONS MAKE PATIENT VIOLENTLY ILL; ADHESIVE BANDAGES; NICKEL    Zithromax [Azithromycin Dihydrate]      Vomiting/ skin blisters       Social History     Socioeconomic History    Marital status:      Spouse name: None    Number of children: None    Years of education: None    Highest education level: None   Tobacco Use    Smoking status: Former     Packs/day: 2.00     Years: 20.00     Pack years: 40.00     Types: Cigarettes     Start date: 1961     Quit date: 1983     Years since quittin.4    Smokeless tobacco: Never   Substance and Sexual Activity    Alcohol use: No     Comment: 1-2 glasses per year    Drug use: No    Sexual activity: Never   Other Topics Concern    Parent/sibling w/ CABG, MI or angioplasty before 65F 55M? No       Family History   Problem Relation Age of Onset    Respiratory Mother         emphysema    Aneurysm Mother     Chronic Obstructive Pulmonary Disease Mother     Thyroid Disease Mother     Osteoporosis Mother     Other Cancer Father     Cancer Father         lung cancer    Arthritis Maternal Grandmother         rheumatoid    Heart Disease Maternal Grandmother         unsure of details    Heart Disease Maternal Grandfather     Neurologic Disorder Paternal Grandmother         migraines    Alzheimer Disease Paternal Grandmother     Unknown/Adopted Paternal Grandfather     Cancer Sister         stage 4 anal cancer age 62 years    Colon  Cancer Sister     Substance Abuse Sister     Cancer - colorectal Brother     Colon Cancer Brother     Substance Abuse Sister     Anesthesia Reaction Daughter        ROS: 10 point ROS neg other than the symptoms noted above in the HPI.    Vital Signs: BP (!) 143/78   Pulse 67   SpO2 97%     Physical Examination:  Constitutional:  Alert, well nourished, NAD.  HEENT: Normocephalic, atraumatic.   Pulmonary:  Without shortness of breath, normal effort.   Cardiovascular:  No pitting edema of BLE.      Neurological:  Awake  Alert  Oriented x 3  Speech clear  Cranial nerves II - XII grossly intact  PERRL  EOMI  Motor exam:  Shoulder Abduction  Right:  5/5   Left:  5/5  Biceps                      Right:  5/5   Left:  5/5  Triceps                     Right:  5/5   Left:  5/5  Wrist Extensors        Right:  5/5   Left:  5/5  Wrist Flexors            Right:  5/5   Left:  5/5  Intrinsics                   Right:  5/5   Left:  5/5    Iliopsoas  (hip flexion)               Right: 5/5  Left:  5/5  Quadriceps  (knee extension)       Right:  5/5  Left:  5/5  Hamstrings  (knee flexion)            Right:  5/5  Left:  5/5  Gastroc Soleus  (PF)                          Right:  5/5  Left:  5/5  Tibialis Ant  (DF)                          Right:  5/5  Left:  5/5  EHL                          Right:  5/5  Left:  5/5         Sensation normal to BUE and BLE     Reflexes are 2+ in the patellar and Achilles. There is no clonus.     Reflexes are 2+ in the brachial radialis and triceps. Negative Vamshi sign bilaterally.    Musculoskeletal:  Gait: Able to stand from a seated position. Normal non-antalgic, non-myelopathic gait.   Cervical examination reveals good range of motion.   No tenderness to palpation of cervical spine.     Imaging:   EXAM: XR CERVICAL SPINE W FLEX/EXT G/E 4 VIEWS 12/27/2022 11:01 AM  FINDINGS: AP, lateral, flexion, and extension views of the cervical  spine were obtained. Trace anterolisthesis at C4-5, not  "significantly  change during flexion or extension. Multilevel disc space narrowing  most pronounced at C5-6 and C6-7, with large anterior osteophyte at  C5-6. No acute fracture or subluxation. No prevertebral soft tissue  thickening. Airway and lung apices are clear. Diffusely demineralized  bones.                                                              IMPRESSION:  Multilevel cervical spondylosis.    MRI BRAIN WITHOUT AND WITH CONTRAST 11/12/2018 3:12 PM   IMPRESSION:    1. Normal MR appearance of the internal auditory canals, labyrinthine  structures, as well as the intracranial course of the seventh and  eighth cranial nerves.   2. No evidence of acute infarct, mass, hemorrhage, or herniation.  3. Mild nonspecific white matter changes likely due to chronic  microvascular ischemic disease.      Assessment/Plan:   78 year old female who presents for evaluation of chronic posterior neck pain, headaches, balance issues, dizziness, mild nausea, and \"twitching\" in left eye and lip that occurs at onset of headaches. Patient has experienced these episodes intermittently over the years, but they have become more frequent over the past few months. Per chart review, patient previously saw Neurologist Dr. Crump last year. Reviewed cervical spine XR from 2022 and brain MRI from 2018. We discussed next steps.     - Cervical spine MRI and brain MRI ordered for further evaluation  - Patient plans to schedule follow-up with Neurology as well    Advised patient to call our clinic with any questions or concerns. Discussed red flag symptoms and advised to seek medical attention if these develop. Patient voiced understanding and agreement.     Anusha Grimm [facility]surgery  [address] [address]  Tel [phone]  Pager [phone]    "

## 2023-08-18 NOTE — PATIENT INSTRUCTIONS
Order for brain MRI and cervical spine MRI. You can stop at the  to schedule, or call [phone]. I will call with the results and next steps.     Please call our clinic if symptoms persist, change, or worsen at any time.    [facility]  [address] [address]  Tel [phone]

## 2023-08-29 ENCOUNTER — ANCILLARY PROCEDURE (OUTPATIENT)
Dept: MRI IMAGING | Facility: CLINIC | Age: 78
End: 2023-08-29
Attending: NURSE PRACTITIONER
Payer: COMMERCIAL

## 2023-08-29 ENCOUNTER — MYC MEDICAL ADVICE (OUTPATIENT)
Dept: NEUROSURGERY | Facility: CLINIC | Age: 78
End: 2023-08-29

## 2023-08-29 ENCOUNTER — TELEPHONE (OUTPATIENT)
Dept: NEUROLOGY | Facility: CLINIC | Age: 78
End: 2023-08-29

## 2023-08-29 DIAGNOSIS — R42 DIZZINESS: ICD-10-CM

## 2023-08-29 DIAGNOSIS — M89.9 LESION OF PARIETAL BONE: Primary | ICD-10-CM

## 2023-08-29 DIAGNOSIS — M54.2 CERVICALGIA: ICD-10-CM

## 2023-08-29 LAB — RADIOLOGIST FLAGS: ABNORMAL

## 2023-08-29 PROCEDURE — 72141 MRI NECK SPINE W/O DYE: CPT | Performed by: RADIOLOGY

## 2023-08-29 PROCEDURE — A9585 GADOBUTROL INJECTION: HCPCS | Mod: JZ | Performed by: RADIOLOGY

## 2023-08-29 PROCEDURE — 70553 MRI BRAIN STEM W/O & W/DYE: CPT | Performed by: RADIOLOGY

## 2023-08-29 RX ORDER — GADOBUTROL 604.72 MG/ML
0.1 INJECTION INTRAVENOUS ONCE
Status: COMPLETED | OUTPATIENT
Start: 2023-08-29 | End: 2023-08-29

## 2023-08-29 RX ADMIN — GADOBUTROL 5.5 ML: 604.72 INJECTION INTRAVENOUS at 08:10

## 2023-08-29 NOTE — TELEPHONE ENCOUNTER
Called patient to review imaging results as stated below.     MRI BRAIN WITHOUT AND WITH CONTRAST  8/29/2023 8:27 AM     IMPRESSION:    1. New abnormal enhancing 2.8 cm lesion in the left parietal bone.  This was not present on 11/12/2018 and is concerning for a neoplastic  process, possibly a bony metastases.  2. No suspicious enhancing lesions in the brain parenchyma. No other  acute intracranial abnormality.  3. Mild nonspecific white matter changes most likely due to chronic  microvascular ischemic disease. These appear similar to prior MR  11/12/2018.    MRI CERVICAL SPINE WITHOUT CONTRAST 8/29/2023 7:39 AM   IMPRESSION:    1. Multilevel degenerative changes throughout the cervical spine as  detailed above.  2. No spinal canal narrowing at any level. Moderate neural foraminal  narrowings on the left at C3-C4 and bilaterally at C5-C6 and C6-C7.  3. No focal destructive bony lesions appreciated in the cervical  spine.     Plan:   - Stealth head CT and chest/abdomen/pelvis CT ordered to further evaluate left parietal bone lesion   - Follow up with Dr. Luther after imaging has been completed  - Sent message to care team to assist with scheduling   - Recommend PT and cervical MBB/RFA for neck pain. Patient declined and would like to focus on work up for left parietal bone lesion.     Advised patient to call our clinic if symptoms persist, change, or worsen. Patient voiced understanding and agreement with this plan.     Anusha Grimm CNP  [facility]  [address] [address]  Tel [phone]  Pager [phone]

## 2023-08-30 ENCOUNTER — ANCILLARY PROCEDURE (OUTPATIENT)
Dept: CT IMAGING | Facility: CLINIC | Age: 78
End: 2023-08-30
Attending: NURSE PRACTITIONER
Payer: COMMERCIAL

## 2023-08-30 ENCOUNTER — TELEPHONE (OUTPATIENT)
Dept: NEUROSURGERY | Facility: CLINIC | Age: 78
End: 2023-08-30
Payer: COMMERCIAL

## 2023-08-30 DIAGNOSIS — M89.9 LESION OF PARIETAL BONE: ICD-10-CM

## 2023-08-30 PROCEDURE — 71260 CT THORAX DX C+: CPT | Mod: TC | Performed by: RADIOLOGY

## 2023-08-30 PROCEDURE — 74177 CT ABD & PELVIS W/CONTRAST: CPT | Mod: TC | Performed by: RADIOLOGY

## 2023-08-30 PROCEDURE — 70450 CT HEAD/BRAIN W/O DYE: CPT | Mod: TC | Performed by: RADIOLOGY

## 2023-08-30 RX ORDER — IOPAMIDOL 755 MG/ML
74 INJECTION, SOLUTION INTRAVASCULAR ONCE
Status: DISCONTINUED | OUTPATIENT
Start: 2023-08-30 | End: 2023-08-30

## 2023-08-30 RX ORDER — IOPAMIDOL 755 MG/ML
74 INJECTION, SOLUTION INTRAVASCULAR ONCE
Status: COMPLETED | OUTPATIENT
Start: 2023-08-30 | End: 2023-08-30

## 2023-08-30 RX ADMIN — IOPAMIDOL 74 ML: 755 INJECTION, SOLUTION INTRAVASCULAR at 14:25

## 2023-08-30 NOTE — TELEPHONE ENCOUNTER
Left message for patient to call clinic and schedule the 2 scans that have been ordered for the patient. Once the scans are scheduled, a follow up visit with Dr. Lutehr will need to be scheduled as well, as he will review and discuss the next steps in patient care plan.

## 2023-08-31 ENCOUNTER — OFFICE VISIT (OUTPATIENT)
Dept: NEUROSURGERY | Facility: CLINIC | Age: 78
End: 2023-08-31
Payer: COMMERCIAL

## 2023-08-31 ENCOUNTER — MYC MEDICAL ADVICE (OUTPATIENT)
Dept: NEUROSURGERY | Facility: CLINIC | Age: 78
End: 2023-08-31

## 2023-08-31 VITALS — SYSTOLIC BLOOD PRESSURE: 131 MMHG | OXYGEN SATURATION: 96 % | DIASTOLIC BLOOD PRESSURE: 76 MMHG | HEART RATE: 69 BPM

## 2023-08-31 DIAGNOSIS — M89.9 SKULL LESION: Primary | ICD-10-CM

## 2023-08-31 DIAGNOSIS — R94.02 ABNORMAL BRAIN SCAN: ICD-10-CM

## 2023-08-31 PROCEDURE — 99214 OFFICE O/P EST MOD 30 MIN: CPT | Performed by: NEUROLOGICAL SURGERY

## 2023-08-31 ASSESSMENT — PAIN SCALES - GENERAL: PAINLEVEL: SEVERE PAIN (7)

## 2023-08-31 NOTE — NURSING NOTE
"Nury Cárdenas is a 78 year old female who presents for:  Chief Complaint   Patient presents with    RECHECK     Left parietal bone lesion on brain, waking up with headaches in the AM around 3am that subside once upright         Initial Vitals:  /76   Pulse 69   SpO2 96%  Estimated body mass index is 20.55 kg/m  as calculated from the following:    Height as of 8/7/23: 5' 4.76\" (1.645 m).    Weight as of 8/7/23: 122 lb 9.2 oz (55.6 kg).. There is no height or weight on file to calculate BSA. BP completed using cuff size: regular  Severe Pain (7)    Nursing Comments:       Marilyn Zaragoza    "

## 2023-08-31 NOTE — PROGRESS NOTES
"HPI: Nury Cárdenas is a 77 year old female who presents for evaluation of chronic neck pain. Symptoms developed a few months ago, denies any recent falls or trauma. Patient describes aching posterior neck pain. Denies any radicular pain, numbness, weakness, falls, foot drop, saddle anesthesia, or bladder/bowel changes. She reports neck pain is intermittent and mostly occurs in the mornings and afternoons. Pain worsens with flexion motions such as when reading and crocheting. Pain improves when wearing a soft collar. She has tried home PT exercises as well.     8/18/23: Patient presents for follow-up. She woke up with increased neck pain and headaches x 1 week ago. Denies trauma or injuries at onset. She also reports balance issues, dizziness, mild nausea, and \"twitching\" in left eye and lip that occurs at onset of headaches. Patient reports she has experienced these episodes intermittently over the years, but they have become more frequent over the past few months. Denies any arm pain, numbness, weakness, vomiting, vision changes, speech changes, falls, or recent trauma. Symptoms worsen when laying flat and improve with Excedrin. Per chart review, patient previously saw Neurologist Dr. Crump in 2022.     8/31/23: MRI, personally reviewed, showed a new 2.8 cm enhancing left parietal skull lesion, which was not present on MRI in 2018.  CT CAP was negative for malignancy. Continued intermittent headaches as described above.      Past Medical History:   Diagnosis Date    Amblyopia     Atypical pneumonia 06/14/2014    Atypical pneumonia     Atypical pneumonia     Atypical pneumonia     COPD (chronic obstructive pulmonary disease) (H) 07/20/2021    Coronary artery disease 1982    heart beat hard made me tired    Dyslipidemia     Fibroids     GERD (gastroesophageal reflux disease)     Granulomatosis with polyangiitis (Wegener's)     Hearing loss     Inflammatory arthritis     thumb    Migraines     MVP (mitral valve " prolapse)     had an echo that was normal in 2007 she is on Inderal    Nonsenile cataract     Osteopenia     PE (pulmonary embolism) 10/2014    ? GPA associated, on warfarin     Strabismus     Synovitis of wrist 2009    right       Past Medical History reviewed with patient during visit.    Past Surgical History:   Procedure Laterality Date    ABDOMEN SURGERY      appendix out    APPENDECTOMY      BIOPSY  1972    cone biopsy    CATARACT IOL, RT/LT      CL AFF SURGICAL PATHOLOGY      cone biopsy 1975    COLONOSCOPY      COLONOSCOPY WITH CO2 INSUFFLATION N/A 12/20/2017    Procedure: COLONOSCOPY WITH CO2 INSUFFLATION;  Screening  BMI: 20.7  Pharmacy: [facility]  [phone]  [facility]  [facility]  [facility];  Surgeon: Duane, William Charles, MD;  Location: [facility] OR    EYE SURGERY      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    HC ESOPHAGOSCOPY, DIAGNOSTIC      LASER YAG CAPSULOTOMY      left eye    OPTICAL TRACKING SYSTEM BRONCHOSCOPY  6/19/2014    Procedure: OPTICAL TRACKING SYSTEM BRONCHOSCOPY;  Surgeon: Angel Kathleen MD;  Location:  GI    PHACOEMULSIFICATION WITH STANDARD INTRAOCULAR LENS IMPLANT  01/2018    left eye (SR)    SOFT TISSUE SURGERY      remove senovial fluid from right wrist    STRABISMUS SURGERY      SURGICAL HISTORY OF -   as a child    strabismus repair right eye    SURGICAL HISTORY OF -   8-2009    right wrist debridement    TONSILLECTOMY      as a child    TONSILLECTOMY & ADENOIDECTOMY      TUBAL LIGATION      VITRECTOMY PARSPLANA  01/2018    left eye - mac hole (MVE)     Past Surgical History reviewed with patient during visit.    Current Outpatient Medications   Medication    aspirin-acetaminophen-caffeine (EXCEDRIN MIGRAINE) 250-250-65 MG tablet    calcium-magnesium (CALMAG) 500-250 MG TABS per tablet    cholecalciferol 1000 UNITS TABS    loratadine (CLARITIN) 10 MG tablet    omeprazole (PRILOSEC) 20 MG DR capsule    propranolol (INDERAL) 20 MG  tablet     No current facility-administered medications for this visit.     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    Adhesive Tape      Rash itches    Amoxicillin      vomiting    Amoxicillin-Pot Clavulanate      vomiting    Erythromycin      vomiting    Morphine And Related      vomiting    Nickel      itches rash    Sulfa Antibiotics Itching    Tegaderm Alginate Ag Rope      LW Other1: -ALL MEDICATIONS MAKE PATIENT VIOLENTLY ILL; ADHESIVE BANDAGES; NICKEL    Zithromax [Azithromycin Dihydrate]      Vomiting/ skin blisters       Social History     Socioeconomic History    Marital status:      Spouse name: None    Number of children: None    Years of education: None    Highest education level: None   Tobacco Use    Smoking status: Former     Packs/day: 2.00     Years: 20.00     Pack years: 40.00     Types: Cigarettes     Start date: 1961     Quit date: 1983     Years since quittin.4    Smokeless tobacco: Never   Substance and Sexual Activity    Alcohol use: No     Comment: 1-2 glasses per year    Drug use: No    Sexual activity: Never   Other Topics Concern    Parent/sibling w/ CABG, MI or angioplasty before 65F 55M? No       Family History   Problem Relation Age of Onset    Respiratory Mother         emphysema    Aneurysm Mother     Chronic Obstructive Pulmonary Disease Mother     Thyroid Disease Mother     Osteoporosis Mother     Other Cancer Father     Cancer Father         lung cancer    Arthritis Maternal Grandmother         rheumatoid    Heart Disease Maternal Grandmother         unsure of details    Heart Disease Maternal Grandfather     Neurologic Disorder Paternal Grandmother         migraines    Alzheimer Disease Paternal Grandmother     Unknown/Adopted Paternal Grandfather     Cancer Sister         stage 4 anal cancer  age 62 years    Colon Cancer Sister     Substance Abuse Sister     Cancer - colorectal Brother     Colon Cancer Brother     Substance Abuse Sister     Anesthesia Reaction Daughter        ROS: 10 point ROS neg other than the symptoms noted above in the HPI.    Vital Signs: /76   Pulse 69   SpO2 96%     Physical Examination:  Constitutional:  Alert, well nourished, NAD.  HEENT: Normocephalic, atraumatic.   Pulmonary:  Without shortness of breath, normal effort.   Cardiovascular:  No pitting edema of BLE.      Neurological:  Awake  Alert  Oriented x 3  Speech clear  Cranial nerves II - XII grossly intact  PERRL  EOMI  Motor exam:  Shoulder Abduction  Right:  5/5   Left:  5/5  Biceps                      Right:  5/5   Left:  5/5  Triceps                     Right:  5/5   Left:  5/5  Wrist Extensors        Right:  5/5   Left:  5/5  Wrist Flexors            Right:  5/5   Left:  5/5  Intrinsics                   Right:  5/5   Left:  5/5    Iliopsoas  (hip flexion)               Right: 5/5  Left:  5/5  Quadriceps  (knee extension)       Right:  5/5  Left:  5/5  Hamstrings  (knee flexion)            Right:  5/5  Left:  5/5  Gastroc Soleus  (PF)                          Right:  5/5  Left:  5/5  Tibialis Ant  (DF)                          Right:  5/5  Left:  5/5  EHL                          Right:  5/5  Left:  5/5         Sensation normal to BUE and BLE     Reflexes are 2+ in the patellar and Achilles. There is no clonus.     Reflexes are 2+ in the brachial radialis and triceps. Negative Vamshi sign bilaterally.    Musculoskeletal:  Gait: Able to stand from a seated position. Normal non-antalgic, non-myelopathic gait.   Cervical examination reveals good range of motion.   No tenderness to palpation of cervical spine.     Imaging:   EXAM: XR CERVICAL SPINE W FLEX/EXT G/E 4 VIEWS 12/27/2022 11:01 AM  FINDINGS: AP, lateral, flexion, and extension views of the cervical  spine were obtained. Trace anterolisthesis at  C4-5, not significantly  change during flexion or extension. Multilevel disc space narrowing  most pronounced at C5-6 and C6-7, with large anterior osteophyte at  C5-6. No acute fracture or subluxation. No prevertebral soft tissue  thickening. Airway and lung apices are clear. Diffusely demineralized  bones.                                                              IMPRESSION:  Multilevel cervical spondylosis.    MRI BRAIN WITHOUT AND WITH CONTRAST 11/12/2018 3:12 PM   IMPRESSION:    1. Normal MR appearance of the internal auditory canals, labyrinthine  structures, as well as the intracranial course of the seventh and  eighth cranial nerves.   2. No evidence of acute infarct, mass, hemorrhage, or herniation.  3. Mild nonspecific white matter changes likely due to chronic  microvascular ischemic disease.      Assessment/Plan:     Discussed that I recommend surgery for biopsy/resection of mass  Risks and benefits discussed  Patient will discuss timing with family and let us know

## 2023-08-31 NOTE — LETTER
"    8/31/2023         RE: Nury Cárdenas  [address] [facility] [address]        Dear Colleague,    Thank you for referring your patient, Nury Cárdenas, to the [facility]. Please see a copy of my visit note below.    HPI: Nury Cárdenas is a 77 year old female who presents for evaluation of chronic neck pain. Symptoms developed a few months ago, denies any recent falls or trauma. Patient describes aching posterior neck pain. Denies any radicular pain, numbness, weakness, falls, foot drop, saddle anesthesia, or bladder/bowel changes. She reports neck pain is intermittent and mostly occurs in the mornings and afternoons. Pain worsens with flexion motions such as when reading and crocheting. Pain improves when wearing a soft collar. She has tried home PT exercises as well.     8/18/23: Patient presents for follow-up. She woke up with increased neck pain and headaches x 1 week ago. Denies trauma or injuries at onset. She also reports balance issues, dizziness, mild nausea, and \"twitching\" in left eye and lip that occurs at onset of headaches. Patient reports she has experienced these episodes intermittently over the years, but they have become more frequent over the past few months. Denies any arm pain, numbness, weakness, vomiting, vision changes, speech changes, falls, or recent trauma. Symptoms worsen when laying flat and improve with Excedrin. Per chart review, patient previously saw Neurologist Dr. Crump in 2022.     8/31/23: MRI, personally reviewed, showed a new 2.8 cm enhancing left parietal skull lesion, which was not present on MRI in 2018.  CT CAP was negative for malignancy. Continued intermittent headaches as described above.      Past Medical History:   Diagnosis Date     Amblyopia      Atypical pneumonia 06/14/2014     Atypical pneumonia      Atypical pneumonia      Atypical pneumonia      COPD (chronic obstructive pulmonary disease) (H) 07/20/2021     " Coronary artery disease 1982    heart beat hard made me tired     Dyslipidemia      Fibroids      GERD (gastroesophageal reflux disease)      Granulomatosis with polyangiitis (Wegener's)      Hearing loss      Inflammatory arthritis     thumb     Migraines      MVP (mitral valve prolapse)     had an echo that was normal in 2007 she is on Inderal     Nonsenile cataract      Osteopenia      PE (pulmonary embolism) 10/2014    ? GPA associated, on warfarin      Strabismus      Synovitis of wrist 2009    right       Past Medical History reviewed with patient during visit.    Past Surgical History:   Procedure Laterality Date     ABDOMEN SURGERY      appendix out     APPENDECTOMY       BIOPSY  1972    cone biopsy     CATARACT IOL, RT/LT       CL AFF SURGICAL PATHOLOGY      cone biopsy 1975     COLONOSCOPY       COLONOSCOPY WITH CO2 INSUFFLATION N/A 12/20/2017    Procedure: COLONOSCOPY WITH CO2 INSUFFLATION;  Screening  BMI: 20.7  Pharmacy: [facility]  [phone]  [facility]  Referring: Laurita  Patient get ill from [facility];  Surgeon: Duane, William Charles, MD;  Location:  OR     EYE SURGERY      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     HC ESOPHAGOSCOPY, DIAGNOSTIC       LASER YAG CAPSULOTOMY      left eye     OPTICAL TRACKING SYSTEM BRONCHOSCOPY  6/19/2014    Procedure: OPTICAL TRACKING SYSTEM BRONCHOSCOPY;  Surgeon: Angel Kathleen MD;  Location:  GI     PHACOEMULSIFICATION WITH STANDARD INTRAOCULAR LENS IMPLANT  01/2018    left eye (SR)     SOFT TISSUE SURGERY      remove senovial fluid from right wrist     STRABISMUS SURGERY       SURGICAL HISTORY OF -   as a child    strabismus repair right eye     SURGICAL HISTORY OF -   8-2009    right wrist debridement     TONSILLECTOMY      as a child     TONSILLECTOMY & ADENOIDECTOMY       TUBAL LIGATION       VITRECTOMY PARSPLANA  01/2018    left eye - mac hole (MVE)     Past Surgical History reviewed with patient during visit.    Current Outpatient  Medications   Medication     aspirin-acetaminophen-caffeine (EXCEDRIN MIGRAINE) 250-250-65 MG tablet     calcium-magnesium (CALMAG) 500-250 MG TABS per tablet     cholecalciferol 1000 UNITS TABS     loratadine (CLARITIN) 10 MG tablet     omeprazole (PRILOSEC) 20 MG DR capsule     propranolol (INDERAL) 20 MG tablet     No current facility-administered medications for this visit.     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     Adhesive Tape      Rash itches     Amoxicillin      vomiting     Amoxicillin-Pot Clavulanate      vomiting     Erythromycin      vomiting     Morphine And Related      vomiting     Nickel      itches rash     Sulfa Antibiotics Itching     Tegaderm Alginate Ag Rope      LW Other1: -ALL MEDICATIONS MAKE PATIENT VIOLENTLY ILL; ADHESIVE BANDAGES; NICKEL     Zithromax [Azithromycin Dihydrate]      Vomiting/ skin blisters       Social History     Socioeconomic History     Marital status:      Spouse name: None     Number of children: None     Years of education: None     Highest education level: None   Tobacco Use     Smoking status: Former     Packs/day: 2.00     Years: 20.00     Pack years: 40.00     Types: Cigarettes     Start date: 1961     Quit date: 1983     Years since quittin.4     Smokeless tobacco: Never   Substance and Sexual Activity     Alcohol use: No     Comment: 1-2 glasses per year     Drug use: No     Sexual activity: Never   Other Topics Concern     Parent/sibling w/ CABG, MI or angioplasty before 65F 55M? No       Family History   Problem Relation Age of Onset     Respiratory Mother         emphysema     Aneurysm Mother      Chronic Obstructive Pulmonary Disease Mother      Thyroid Disease Mother      Osteoporosis Mother      Other Cancer Father      Cancer Father         lung cancer      Arthritis Maternal Grandmother         rheumatoid     Heart Disease Maternal Grandmother         unsure of details     Heart Disease Maternal Grandfather      Neurologic Disorder Paternal Grandmother         migraines     Alzheimer Disease Paternal Grandmother      Unknown/Adopted Paternal Grandfather      Cancer Sister         stage 4 anal cancer age 62 years     Colon Cancer Sister      Substance Abuse Sister      Cancer - colorectal Brother      Colon Cancer Brother      Substance Abuse Sister      Anesthesia Reaction Daughter        ROS: 10 point ROS neg other than the symptoms noted above in the HPI.    Vital Signs: /76   Pulse 69   SpO2 96%     Physical Examination:  Constitutional:  Alert, well nourished, NAD.  HEENT: Normocephalic, atraumatic.   Pulmonary:  Without shortness of breath, normal effort.   Cardiovascular:  No pitting edema of BLE.      Neurological:  Awake  Alert  Oriented x 3  Speech clear  Cranial nerves II - XII grossly intact  PERRL  EOMI  Motor exam:  Shoulder Abduction  Right:  5/5   Left:  5/5  Biceps                      Right:  5/5   Left:  5/5  Triceps                     Right:  5/5   Left:  5/5  Wrist Extensors        Right:  5/5   Left:  5/5  Wrist Flexors            Right:  5/5   Left:  5/5  Intrinsics                   Right:  5/5   Left:  5/5    Iliopsoas  (hip flexion)               Right: 5/5  Left:  5/5  Quadriceps  (knee extension)       Right:  5/5  Left:  5/5  Hamstrings  (knee flexion)            Right:  5/5  Left:  5/5  Gastroc Soleus  (PF)                          Right:  5/5  Left:  5/5  Tibialis Ant  (DF)                          Right:  5/5  Left:  5/5  EHL                          Right:  5/5  Left:  5/5         Sensation normal to BUE and BLE     Reflexes are 2+ in the patellar and Achilles. There is no clonus.     Reflexes are 2+ in the brachial radialis and triceps. Negative Vamshi sign bilaterally.    Musculoskeletal:  Gait: Able to stand from a seated  position. Normal non-antalgic, non-myelopathic gait.   Cervical examination reveals good range of motion.   No tenderness to palpation of cervical spine.     Imaging:   EXAM: XR CERVICAL SPINE W FLEX/EXT G/E 4 VIEWS 12/27/2022 11:01 AM  FINDINGS: AP, lateral, flexion, and extension views of the cervical  spine were obtained. Trace anterolisthesis at C4-5, not significantly  change during flexion or extension. Multilevel disc space narrowing  most pronounced at C5-6 and C6-7, with large anterior osteophyte at  C5-6. No acute fracture or subluxation. No prevertebral soft tissue  thickening. Airway and lung apices are clear. Diffusely demineralized  bones.                                                              IMPRESSION:  Multilevel cervical spondylosis.    MRI BRAIN WITHOUT AND WITH CONTRAST 11/12/2018 3:12 PM   IMPRESSION:    1. Normal MR appearance of the internal auditory canals, labyrinthine  structures, as well as the intracranial course of the seventh and  eighth cranial nerves.   2. No evidence of acute infarct, mass, hemorrhage, or herniation.  3. Mild nonspecific white matter changes likely due to chronic  microvascular ischemic disease.      Assessment/Plan:     Discussed that I recommend surgery for biopsy/resection of mass  Risks and benefits discussed  Patient will discuss timing with family and let us know      Again, thank you for allowing me to participate in the care of your patient.        Sincerely,        Jamal Luther MD

## 2023-09-15 ENCOUNTER — HOSPITAL ENCOUNTER (OUTPATIENT)
Dept: PET IMAGING | Facility: HOSPITAL | Age: 78
Discharge: HOME | End: 2023-09-15
Attending: NEUROLOGICAL SURGERY | Admitting: NEUROLOGICAL SURGERY
Payer: COMMERCIAL

## 2023-09-15 DIAGNOSIS — R94.02 ABNORMAL BRAIN SCAN: ICD-10-CM

## 2023-09-15 DIAGNOSIS — M89.9 SKULL LESION: ICD-10-CM

## 2023-09-15 LAB — GLUCOSE BLDC GLUCOMTR-MCNC: 83 MG/DL (ref 70–99)

## 2023-09-15 PROCEDURE — A9552 F18 FDG: HCPCS | Performed by: NEUROLOGICAL SURGERY

## 2023-09-15 PROCEDURE — 82962 GLUCOSE BLOOD TEST: CPT

## 2023-09-15 PROCEDURE — 78816 PET IMAGE W/CT FULL BODY: CPT | Mod: PS

## 2023-09-15 PROCEDURE — 343N000001 HC RX 343: Performed by: NEUROLOGICAL SURGERY

## 2023-09-15 RX ADMIN — FLUDEOXYGLUCOSE F-18 9.86 MILLICURIE: 500 INJECTION, SOLUTION INTRAVENOUS at 07:19

## 2023-09-27 ENCOUNTER — TELEPHONE (OUTPATIENT)
Dept: NEUROSURGERY | Facility: CLINIC | Age: 78
End: 2023-09-27
Payer: COMMERCIAL

## 2023-10-03 ENCOUNTER — LAB (OUTPATIENT)
Dept: LAB | Facility: CLINIC | Age: 78
End: 2023-10-03
Payer: COMMERCIAL

## 2023-10-03 DIAGNOSIS — Z79.60 LONG-TERM USE OF IMMUNOSUPPRESSANT MEDICATION: ICD-10-CM

## 2023-10-03 DIAGNOSIS — M15.0 PRIMARY OSTEOARTHRITIS INVOLVING MULTIPLE JOINTS: ICD-10-CM

## 2023-10-03 DIAGNOSIS — K21.9 GASTROESOPHAGEAL REFLUX DISEASE WITHOUT ESOPHAGITIS: ICD-10-CM

## 2023-10-03 DIAGNOSIS — M31.30 GRANULOMATOSIS WITH POLYANGIITIS WITHOUT RENAL INVOLVEMENT (H): ICD-10-CM

## 2023-10-03 DIAGNOSIS — M31.30 GRANULOMATOSIS WITH POLYANGIITIS, UNSPECIFIED WHETHER RENAL INVOLVEMENT (H): ICD-10-CM

## 2023-10-03 LAB
ALBUMIN SERPL BCG-MCNC: 4.1 G/DL (ref 3.5–5.2)
ALBUMIN UR-MCNC: NEGATIVE MG/DL
ALP SERPL-CCNC: 82 U/L (ref 35–104)
ALT SERPL W P-5'-P-CCNC: 14 U/L (ref 0–50)
ANION GAP SERPL CALCULATED.3IONS-SCNC: 10 MMOL/L (ref 7–15)
APPEARANCE UR: ABNORMAL
AST SERPL W P-5'-P-CCNC: 23 U/L (ref 0–45)
BASO+EOS+MONOS # BLD AUTO: NORMAL 10*3/UL
BASO+EOS+MONOS NFR BLD AUTO: NORMAL %
BASOPHILS # BLD AUTO: 0 10E3/UL (ref 0–0.2)
BASOPHILS NFR BLD AUTO: 1 %
BILIRUB SERPL-MCNC: 0.4 MG/DL
BILIRUB UR QL STRIP: NEGATIVE
BUN SERPL-MCNC: 14 MG/DL (ref 8–23)
CALCIUM SERPL-MCNC: 9.5 MG/DL (ref 8.8–10.2)
CHLORIDE SERPL-SCNC: 103 MMOL/L (ref 98–107)
COLOR UR AUTO: YELLOW
CREAT SERPL-MCNC: 0.73 MG/DL (ref 0.51–0.95)
CRP SERPL-MCNC: <3 MG/L
DEPRECATED HCO3 PLAS-SCNC: 27 MMOL/L (ref 22–29)
EGFRCR SERPLBLD CKD-EPI 2021: 83 ML/MIN/1.73M2
EOSINOPHIL # BLD AUTO: 0.1 10E3/UL (ref 0–0.7)
EOSINOPHIL NFR BLD AUTO: 1 %
ERYTHROCYTE [DISTWIDTH] IN BLOOD BY AUTOMATED COUNT: 12.5 % (ref 10–15)
ERYTHROCYTE [SEDIMENTATION RATE] IN BLOOD BY WESTERGREN METHOD: 4 MM/HR (ref 0–30)
GLUCOSE SERPL-MCNC: 87 MG/DL (ref 70–99)
GLUCOSE UR STRIP-MCNC: NEGATIVE MG/DL
HCT VFR BLD AUTO: 43.9 % (ref 35–47)
HGB BLD-MCNC: 14.5 G/DL (ref 11.7–15.7)
HGB UR QL STRIP: NEGATIVE
IMM GRANULOCYTES # BLD: 0 10E3/UL
IMM GRANULOCYTES NFR BLD: 0 %
KETONES UR STRIP-MCNC: NEGATIVE MG/DL
LEUKOCYTE ESTERASE UR QL STRIP: ABNORMAL
LYMPHOCYTES # BLD AUTO: 1.2 10E3/UL (ref 0.8–5.3)
LYMPHOCYTES NFR BLD AUTO: 22 %
MCH RBC QN AUTO: 29.6 PG (ref 26.5–33)
MCHC RBC AUTO-ENTMCNC: 33 G/DL (ref 31.5–36.5)
MCV RBC AUTO: 90 FL (ref 78–100)
MONOCYTES # BLD AUTO: 0.5 10E3/UL (ref 0–1.3)
MONOCYTES NFR BLD AUTO: 9 %
NEUTROPHILS # BLD AUTO: 3.7 10E3/UL (ref 1.6–8.3)
NEUTROPHILS NFR BLD AUTO: 67 %
NITRATE UR QL: NEGATIVE
PH UR STRIP: 6 [PH] (ref 5–7)
PLATELET # BLD AUTO: 190 10E3/UL (ref 150–450)
POTASSIUM SERPL-SCNC: 4.8 MMOL/L (ref 3.4–5.3)
PROT SERPL-MCNC: 6 G/DL (ref 6.4–8.3)
RBC # BLD AUTO: 4.9 10E6/UL (ref 3.8–5.2)
RBC #/AREA URNS AUTO: ABNORMAL /HPF
SODIUM SERPL-SCNC: 140 MMOL/L (ref 135–145)
SP GR UR STRIP: <=1.005 (ref 1–1.03)
SQUAMOUS #/AREA URNS AUTO: ABNORMAL /LPF
UROBILINOGEN UR STRIP-ACNC: 0.2 E.U./DL
WBC # BLD AUTO: 5.6 10E3/UL (ref 4–11)
WBC #/AREA URNS AUTO: ABNORMAL /HPF

## 2023-10-03 PROCEDURE — 36415 COLL VENOUS BLD VENIPUNCTURE: CPT

## 2023-10-03 PROCEDURE — 85025 COMPLETE CBC W/AUTO DIFF WBC: CPT

## 2023-10-03 PROCEDURE — 80053 COMPREHEN METABOLIC PANEL: CPT

## 2023-10-03 PROCEDURE — 83516 IMMUNOASSAY NONANTIBODY: CPT

## 2023-10-03 PROCEDURE — 85652 RBC SED RATE AUTOMATED: CPT

## 2023-10-03 PROCEDURE — 86140 C-REACTIVE PROTEIN: CPT

## 2023-10-03 PROCEDURE — 86036 ANCA SCREEN EACH ANTIBODY: CPT

## 2023-10-03 PROCEDURE — 86037 ANCA TITER EACH ANTIBODY: CPT

## 2023-10-03 PROCEDURE — 83876 ASSAY MYELOPEROXIDASE: CPT

## 2023-10-03 PROCEDURE — 81001 URINALYSIS AUTO W/SCOPE: CPT

## 2023-10-04 LAB
ANCA AB PATTERN SER IF-IMP: NORMAL
C-ANCA TITR SER IF: NORMAL {TITER}

## 2023-10-06 LAB
MYELOPEROXIDASE AB SER IA-ACNC: <0.3 U/ML
MYELOPEROXIDASE AB SER IA-ACNC: NEGATIVE
PROTEINASE3 AB SER IA-ACNC: 2 U/ML
PROTEINASE3 AB SER IA-ACNC: ABNORMAL

## 2023-11-11 DIAGNOSIS — G43.909 MIGRAINE WITHOUT STATUS MIGRAINOSUS, NOT INTRACTABLE, UNSPECIFIED MIGRAINE TYPE: ICD-10-CM

## 2023-11-13 RX ORDER — PROPRANOLOL HYDROCHLORIDE 20 MG/1
20 TABLET ORAL DAILY
Qty: 90 TABLET | Refills: 0 | Status: SHIPPED | OUTPATIENT
Start: 2023-11-13 | End: 2024-02-09

## 2023-12-08 ENCOUNTER — MYC MEDICAL ADVICE (OUTPATIENT)
Dept: FAMILY MEDICINE | Facility: CLINIC | Age: 78
End: 2023-12-08
Payer: COMMERCIAL

## 2023-12-08 ENCOUNTER — MYC MEDICAL ADVICE (OUTPATIENT)
Dept: OPHTHALMOLOGY | Facility: CLINIC | Age: 78
End: 2023-12-08
Payer: COMMERCIAL

## 2023-12-08 ENCOUNTER — MYC MEDICAL ADVICE (OUTPATIENT)
Dept: RHEUMATOLOGY | Facility: CLINIC | Age: 78
End: 2023-12-08
Payer: COMMERCIAL

## 2023-12-11 NOTE — TELEPHONE ENCOUNTER
Patient had CT scan and MRI completed on 12/8/23 at [facility]. Please review and advise. Thank you.      SUKHJINDER Xiong  Rheumatology/Infectious disease  [facility]   Rheumatology ph:[phone]  Infectious Disease ph:[phone]

## 2023-12-11 NOTE — PROGRESS NOTES
Virtual Visit Details  Type of service:  Telephone Visit   Phone call duration: 55 minutes     12/12/2023    Referring Provider: Cecy Dill MD     Presenting Information:   I met with Nury Cárdenas, along with her daughter Ina, today for her telephone genetic counseling visit through the Cancer [facility] Program to discuss her family history of colon cancer. She is here today to review this history, cancer screening recommendations, and available genetic testing options.    Personal History:  Nury is a 78 year old female. She does not have any personal history of cancer. In her hormonal-based medical history, she had her first menstrual period at age 12, her first child at age 17, and underwent menopause at age 49. Nury has her ovaries, fallopian tubes and uterus in place, and reports no ovarian cancer screening to date. She reports no history of hormone replacement therapy. Nury reports oral contraceptive use for approximately one year.       Nury most recent mammogram in June 2017 was normal. Nury began having colonoscopies in her 50's. Her most recent colonoscopy in December 2017 found one 6 mm tubular adenoma in the cecum. Follow-up was recommended in 5 years. Nury reports a history of dermatological exams. She does not regularly do any other cancer screening at this time. Nury reported a history of smoking for approximately 15 years and no current alcohol use.    Of note, Nury was recently seen in the General Genetics at the [facility] to discuss her son's history of Luke-Bianka syndrome (KSS). During her evaluation, it was determined that Nury did not have any symptoms of KSS and had previous negative testing, therefore, repeat genetic testing was not recommended for her.     Family History: (Please see scanned pedigree for detailed family history information)  Nury's daughter started colonoscopies in her 40's. She has been found to have  polyps on every exam. She's had an estimated total of 5-6 colon polyps. She reported having a hysterectomy due to fibroids, and one ovary removed. She is currently 54.  Nury's son was diagnosed with Rowley-[place] syndrome in his 30's. He passed away at age 58.   His son (Nury's grandson )was diagnosed with testicular cancer. He is currently in his 30's.    Nury's sister was diagnosed with squamous cell carcinoma of the anus at age 60. She passed away at age 62.   Nury's brother was diagnosed with colon cancer at age 48. He passed away at age 50.  Of note, Nury's siblings did not have biological children.   Nury's father was diagnosed with lung cancer at age 60 and passed away shortly after.   Her maternal ethnicity is [place]. Her paternal ethnicity is [place].  There is no known Ashkenazi Lutheran ancestry on either side of her family. There is no reported consanguinity.    Discussion:  Nury's family history of colon cancer is suggestive of a hereditary cancer syndrome.  We reviewed the features of sporadic, familial, and hereditary cancers.   The vast majority of cancers are considered sporadic and not primarily due to an inherited factor. Individuals can develop cancer due to aging, chance events, environmental exposures or lifestyles.  Features typically seen in high risk families include: cancers diagnosed under age 50, multiple relatives with similar cancers on the same side of the family, cancers in multiple generations, and relatives with certain rare cancers (i.e., male breast cancer).   We discussed the natural history and genetics of several hereditary cancer syndromes, including St syndrome.   St syndrome can be caused by a mutation in one of five genes:  MLH1, MSH2, MSH6, PMS2, and EPCAM. A single mutation in one of the St Syndrome genes increases the risk for several cancers, such as colon cancer, endometrial/uterine cancer, gastric cancer, and ovarian cancer. Other  cancers have also been reported in some families with St Syndrome include pancreatic, bladder, biliary tract, urothelial, small bowel, prostate, breast and brain cancers.  A detailed handout regarding St syndrome and the information we discussed will be provided to Nury via [facility] and can be found in the after visit summary. Topics included: inheritance pattern, cancer risks, cancer screening recommendations, and also risks, benefits and limitations of testing.  In looking at Nury's personal and family history, it is possible that a cancer susceptibility gene is present due to her brother diagnosed with colon cancer at age 48.  Based on her personal and family history, Nury meets current National Comprehensive Cancer Network (NCCN) criteria for genetic testing of St syndrome genes (i.e. EPCAM, MLH1, MSH2, MSH6, and PMS2).   We discussed that there are additional genes that could cause increased risk for colon cancer. As many of these genes present with overlapping features in a family and accurate cancer risk cannot always be established based upon the pedigree analysis alone, it would be reasonable for Nury to consider panel genetic testing to analyze multiple genes at once.  We reviewed genetic testing options for Nury based on her personal and family history: a panel of genes associated with an increased risk for hereditary colorectal cancers, or larger panel options to include genes associated with increased risk for multiple different cancer types. Nury expressed interest in the St syndrome genes with automatic reflex to the CancerNext panel through [facility].   Genetic testing is available for 36 genes associated with hereditary cancer: APC, HIRAM, AXIN2, BARD1, BMPR1A, BRCA1, BRCA2, BRIP1, CDH1, CDK4, CDKN2A, CHEK2, DICER1, EPCAM, GREM1, HOXB13, MBD4, MLH1, MSH2, MSH3, MSH6, MUTYH, NBN, NF1, NTHL1, PALB2, PMS2, POLD1, POLE, PTEN, RAD51C, RAD51D, RECQL, SMAD4,  SMARCA4, STK11, and TP53.  We discussed that many of the genes in the CancerNext panel are associated with specific hereditary cancer syndromes and published management guidelines: Hereditary Breast and Ovarian Cancer syndrome (BRCA1, BRCA2), St syndrome (MLH1, MSH2, MSH6, PMS2, EPCAM), Familial Adenomatous Polyposis (APC), Hereditary Diffuse Gastric Cancer (CDH1), Familial Atypical Multiple Mole Melanoma syndrome (CDK4, CDKN2A), Juvenile Polyposis syndrome (BMPR1A, SMAD4), Cowden syndrome (PTEN), Li Fraumeni syndrome (TP53), Peutz-Jeghers syndrome (STK11), MUTYH Associated Polyposis (MUTYH), and Neurofibromatosis type 1 (NF1).   The HIRAM, AXIN2, BRIP1, CHEK2, GREM1, MSH3, NBN, NTHL1, PALB2, POLD1, POLE, RAD51C, and RAD51D genes are associated with increased cancer risk and have published management guidelines for certain cancers.    The remaining genes (BARD1, DICER1, HOXB13, RECQL, and SMARCA4) are associated with increased cancer risk and may allow us to make medical recommendations when mutations are identified.  Nury would like to submit a blood sample for her genetic testing. She will go to her [facility] at her earliest convenience to get her blood drawn for her genetic testing.   Verbal consent was given over the phone and written on the consent form. Turnaround time is approximately 3-4 weeks.  Medical Management: For Nury, we reviewed that the information from genetic testing may determine:  additional cancer screening for which Nury may qualify (i.e. mammogram and breast MRI, more frequent colonoscopies, more frequent dermatologic exams, etc.),  options for risk reducing surgeries Nury could consider (i.e. bilateral mastectomy, surgery to remove her ovaries and/or uterus, etc.),    and targeted chemotherapies if she were to develop certain cancers in the future (i.e. immunotherapy for individuals with St syndrome, PARP inhibitors, etc.).   These recommendations  and possible targeted chemotherapies will be discussed in detail once genetic testing is completed.     Plan:  1) Today Nury elected to proceed with the St syndrome genes with automatic reflex to the CancerNext panel through [facility]. Therefore, consent was reviewed and verbally obtained for this testing.   2) Nury plans to schedule her blood draw appointment at a clinic that is convenient to her.   3) The results should be available in 3-4 weeks.  4) Nury will either have a telephone visit or video visit to discuss the results.  Additional recommendations about screening will be made at that time.    Nury is 78 year old and is being evaluated via a billable telephone visit.    Time spent on phone: 55 minutes    Madison Rosales MS, [facility]  Licensed, Certified Genetic Counselor  Phone: [phone]

## 2023-12-11 NOTE — TELEPHONE ENCOUNTER
Provider recommendations sent to patient.      SUKHJINDER Xiong  Rheumatology/Infectious disease  [facility]   Rheumatology ph:[phone]  Infectious Disease ph:[phone]

## 2023-12-11 NOTE — TELEPHONE ENCOUNTER
There is nothing about these head/brain studies that concern me for ANCA-positive vasculitis. I recommend that she follow up with her neurosurgeon for any further instructions.

## 2023-12-11 NOTE — TELEPHONE ENCOUNTER
Please review MRI results in [facility] and advise what next steps are.    Edilma Schneider MA

## 2023-12-11 NOTE — TELEPHONE ENCOUNTER
Reviewed both CT and MRi reports, no concerns.    CT: 12/8/23   The calvarium is intact. No lytic or sclerotic calvarial lesion is visualized. No periosteal reaction.   Mild diffuse cerebral volume loss. No mass effect or midline shift. The gray-white differentiation is maintained.     12/8/23:   Impression:   1. Faintly enhancing, T1 hypointense, T2/FLAIR hyperintense 3 cm lesion within the left parietal calvarium demonstrates overall nonaggressive features, and is occult by CT and cold on PET. These findings are most suggestive of a benign vasoformative lesion such as hemangioma.   2. No evidence of acute intracranial abnormality or suspicious intracranial enhancement.   3. Mild generalized cerebral volume loss and mild chronic microangiopathy changes.

## 2023-12-12 ENCOUNTER — VIRTUAL VISIT (OUTPATIENT)
Dept: ONCOLOGY | Facility: CLINIC | Age: 78
End: 2023-12-12
Attending: PEDIATRICS
Payer: COMMERCIAL

## 2023-12-12 DIAGNOSIS — Z80.0 FAMILY HISTORY OF RECTAL CANCER: ICD-10-CM

## 2023-12-12 DIAGNOSIS — Z80.43 FAMILY HISTORY OF TESTICULAR CANCER: ICD-10-CM

## 2023-12-12 DIAGNOSIS — Z80.1 FAMILY HISTORY OF MALIGNANT NEOPLASM OF TRACHEA, BRONCHUS, AND LUNG: ICD-10-CM

## 2023-12-12 DIAGNOSIS — Z80.0 FAMILY HISTORY OF COLON CANCER: Primary | ICD-10-CM

## 2023-12-12 PROCEDURE — 96040 HC GENETIC COUNSELING, EACH 30 MINUTES: CPT | Mod: TEL,95

## 2023-12-12 NOTE — NURSING NOTE
Is the patient currently in the state of [place]? YES    Visit mode:TELEPHONE    If the visit is dropped, the patient can be reconnected by: TELEPHONE VISIT: Phone number:   Telephone Information:   Mobile [phone]       Will anyone else be joining the visit? Daughter, Ina, is present  (If patient encounters technical issues they should call [phone])    How would you like to obtain your AVS? MyChart    Are changes needed to the allergy or medication list? N/A    Reason for visit: Consult    Magi WEBB

## 2023-12-12 NOTE — LETTER
Cancer Risk Management  Program Locations    [facility]  [facility]  [facility]  [facility]  [facility] [place] [facility]  Mailing Address  Cancer Risk Management Program  [facility]  [address]  [place] [address]    New patient appointments  [phone]    December 12, 2023    Nury Cárdenas  [address] [address]    Dear Nury,    It was a pleasure talking with you via your virtual genetic counseling visit on 12/12/2023.  Below is a copy of the progress note from that recent visit through the Cancer Risk Management Program.  If you have any additional questions, please feel free to contact me.    Referring Provider: Cecy Dill MD     Presenting Information:   I met with Nury Cárdenas, along with her daughter Ina, today for her telephone genetic counseling visit through the Cancer Risk Management Program to discuss her family history of colon cancer. She is here today to review this history, cancer screening recommendations, and available genetic testing options.    Personal History:  Nury is a 78 year old female. She does not have any personal history of cancer. In her hormonal-based medical history, she had her first menstrual period at age 12, her first child at age 17, and underwent menopause at age 49. Nury has her ovaries, fallopian tubes and uterus in place, and reports no ovarian cancer screening to date. She reports no history of hormone replacement therapy. Nury reports oral contraceptive use for approximately one year.       Nury most recent mammogram in June 2017 was normal. Nury began having colonoscopies in her 50's. Her most recent colonoscopy in December 2017 found one 6 mm tubular adenoma in the cecum. Follow-up was recommended in 5 years. Nury reports a history of dermatological exams. She does not  regularly do any other cancer screening at this time. Nury reported a history of smoking for approximately 15 years and no current alcohol use.    Of note, Nury was recently seen in the General Genetics at the [facility] to discuss her son's history of McCrory-Harvel syndrome (KSS). During her evaluation, it was determined that Nury did not have any symptoms of KSS and had previous negative testing, therefore, repeat genetic testing was not recommended for her.     Family History: (Please see scanned pedigree for detailed family history information)  Nury's daughter started colonoscopies in her 40's. She has been found to have polyps on every exam. She's had an estimated total of 5-6 colon polyps. She reported having a hysterectomy due to fibroids, and one ovary removed. She is currently 54.  Nury's son was diagnosed with McCrory-Bianka syndrome in his 30's. He passed away at age 58.   His son (Nury's grandson )was diagnosed with testicular cancer. He is currently in his 30's.    Nury's sister was diagnosed with squamous cell carcinoma of the anus at age 60. She passed away at age 62.   Nury's brother was diagnosed with colon cancer at age 48. He passed away at age 50.  Of note, Nury's siblings did not have biological children.   Nury's father was diagnosed with lung cancer at age 60 and passed away shortly after.   Her maternal ethnicity is [place]. Her paternal ethnicity is [place].  There is no known Ashkenazi Amish ancestry on either side of her family. There is no reported consanguinity.    Discussion:  Nury's family history of colon cancer is suggestive of a hereditary cancer syndrome.  We reviewed the features of sporadic, familial, and hereditary cancers.   The vast majority of cancers are considered sporadic and not primarily due to an inherited factor. Individuals can develop cancer due to aging, chance events, environmental exposures or  lifestyles.  Features typically seen in high risk families include: cancers diagnosed under age 50, multiple relatives with similar cancers on the same side of the family, cancers in multiple generations, and relatives with certain rare cancers (i.e., male breast cancer).   We discussed the natural history and genetics of several hereditary cancer syndromes, including St syndrome.   St syndrome can be caused by a mutation in one of five genes:  MLH1, MSH2, MSH6, PMS2, and EPCAM. A single mutation in one of the St Syndrome genes increases the risk for several cancers, such as colon cancer, endometrial/uterine cancer, gastric cancer, and ovarian cancer. Other cancers have also been reported in some families with St Syndrome include pancreatic, bladder, biliary tract, urothelial, small bowel, prostate, breast and brain cancers.  A detailed handout regarding St syndrome and the information we discussed will be provided to Nury via [employer] and can be found in the after visit summary. Topics included: inheritance pattern, cancer risks, cancer screening recommendations, and also risks, benefits and limitations of testing.  In looking at Nury's personal and family history, it is possible that a cancer susceptibility gene is present due to her brother diagnosed with colon cancer at age 48.  Based on her personal and family history, Nury meets current National Comprehensive Cancer Network (NCCN) criteria for genetic testing of St syndrome genes (i.e. EPCAM, MLH1, MSH2, MSH6, and PMS2).   We discussed that there are additional genes that could cause increased risk for colon cancer. As many of these genes present with overlapping features in a family and accurate cancer risk cannot always be established based upon the pedigree analysis alone, it would be reasonable for Nury to consider panel genetic testing to analyze multiple genes at once.  We reviewed genetic testing options for Nury  based on her personal and family history: a panel of genes associated with an increased risk for hereditary colorectal cancers, or larger panel options to include genes associated with increased risk for multiple different cancer types. Nury expressed interest in the St syndrome genes with automatic reflex to the CancerNext panel through [facility].   Genetic testing is available for 36 genes associated with hereditary cancer: APC, HIRAM, AXIN2, BARD1, BMPR1A, BRCA1, BRCA2, BRIP1, CDH1, CDK4, CDKN2A, CHEK2, DICER1, EPCAM, GREM1, HOXB13, MBD4, MLH1, MSH2, MSH3, MSH6, MUTYH, NBN, NF1, NTHL1, PALB2, PMS2, POLD1, POLE, PTEN, RAD51C, RAD51D, RECQL, SMAD4, SMARCA4, STK11, and TP53.  We discussed that many of the genes in the CancerNext panel are associated with specific hereditary cancer syndromes and published management guidelines: Hereditary Breast and Ovarian Cancer syndrome (BRCA1, BRCA2), St syndrome (MLH1, MSH2, MSH6, PMS2, EPCAM), Familial Adenomatous Polyposis (APC), Hereditary Diffuse Gastric Cancer (CDH1), Familial Atypical Multiple Mole Melanoma syndrome (CDK4, CDKN2A), Juvenile Polyposis syndrome (BMPR1A, SMAD4), Cowden syndrome (PTEN), Li Fraumeni syndrome (TP53), Peutz-Jeghers syndrome (STK11), MUTYH Associated Polyposis (MUTYH), and Neurofibromatosis type 1 (NF1).   The HIRAM, AXIN2, BRIP1, CHEK2, GREM1, MSH3, NBN, NTHL1, PALB2, POLD1, POLE, RAD51C, and RAD51D genes are associated with increased cancer risk and have published management guidelines for certain cancers.    The remaining genes (BARD1, DICER1, HOXB13, RECQL, and SMARCA4) are associated with increased cancer risk and may allow us to make medical recommendations when mutations are identified.  Nury would like to submit a blood sample for her genetic testing. She will go to her [facility] at her earliest convenience to get her blood drawn for her genetic testing.   Verbal consent was given over the  phone and written on the consent form. Turnaround time is approximately 3-4 weeks.  Medical Management: For Nury, we reviewed that the information from genetic testing may determine:  additional cancer screening for which Nury may qualify (i.e. mammogram and breast MRI, more frequent colonoscopies, more frequent dermatologic exams, etc.),  options for risk reducing surgeries Nury could consider (i.e. bilateral mastectomy, surgery to remove her ovaries and/or uterus, etc.),    and targeted chemotherapies if she were to develop certain cancers in the future (i.e. immunotherapy for individuals with St syndrome, PARP inhibitors, etc.).   These recommendations and possible targeted chemotherapies will be discussed in detail once genetic testing is completed.     Plan:  1) Today Nury elected to proceed with the St syndrome genes with automatic reflex to the CancerNext panel through [email or web address]. Therefore, consent was reviewed and verbally obtained for this testing.   2) Nury plans to schedule her blood draw appointment at a clinic that is convenient to her.   3) The results should be available in 3-4 weeks.  4) Nury will either have a telephone visit or video visit to discuss the results.  Additional recommendations about screening will be made at that time.    Nury is 78 year old and is being evaluated via a billable telephone visit.    Time spent on phone: 55 minutes    Madison Rosales MS, [facility] [place] [facility]  Licensed, Certified Genetic Counselor  Phone: [phone]

## 2023-12-12 NOTE — PATIENT INSTRUCTIONS
Assessing Cancer Risk  Only about 5-10% of cancers are thought to be due to an inherited cancer susceptibility gene.    These families often have:  Several people with the same or related types of cancer  Cancers diagnosed at a young age (before age 50)  Individuals with more than one primary cancer  Multiple generations of the family affected with cancer    Comprehensive Colon Cancer Panel  We each inherit two copies of every gene in our bodies: one from our mother, and one from our father. Each gene has a specific job to do. When a gene has a mistake or  mutation  in it, it does not work like it should.      This handout will review common hereditary colon cancer syndromes, and other genes related to an increased risk for colon cancer.  The genes that will be discussed in this handout are: APC, BMPR1A, CDH1, CHEK2, EPCAM, GREM1, MLH1, MSH2, MSH6, MUTYH, PMS2, POLD1, POLE, PTEN, SMAD4, STK11, and TP53.  These genes are clinically actionable, meaning there are published guidelines for cancer screening and management for individuals who are found to carry mutations in these genes. Inheriting a mutation does not mean a person will develop cancer, but it does significantly increase his or her risk above the general population risk.      Familial Adenomatous Polyposis (FAP)  FAP is a hereditary cancer syndrome caused by mutations in the APC gene. The condition is known to cause hundreds to thousands of adenomatous polyps in the colon, creating a  carpet  of polyps. Some individuals have what is called attenuated FAP (AFAP), a milder form of FAP with fewer polyps and typically later onset. Individuals with an APC gene mutation are at an increased risk for colon, thyroid, and duodenal cancers, as well as several other types of cancer1.  Other features of this condition may include: osteomas, dental anomalies, benign skin lesions, CHRPE ( freckle  on the inside of the eye), and desmoid tumors.      Lifetime Cancer Risks     Cancer Type General Population FAP   Colon  4.1% near 100%   Thyroid (papillary) 1.2% Up to 12%   Duodenal <1% Up to 10%   Liver (hepatoblastoma)  <1% Up to 2.5% before age 5   Pancreas 1.7% Up to 2%   Stomach <1% Up to 7.1%       Juvenile Polyposis Syndrome (JPS)  JPS is characterized by hamartomatous polyps, called juvenile polyps, in the gastrointestinal tract.  Juvenile  refers to the type of polyps seen in this hereditary cancer condition, not the age of onset. Currently, mutations in two genes are known to cause JPS: BMPR1A and SMAD4. Of individuals clinically diagnosed with JPS, only 40% have an identifiable mutation in one of these genes, suggesting there are other genes that cause JPS that have not been discovered yet. Individuals with JPS are at an increased risk for colon cancer and stomach cancer 2,3,4. Pancreatic and small bowel cancers have also been reported in JPS, but the actual risks are unknown.         Lifetime Cancer Risks   Cancer Type General Population JPS   Colon 4.1% 40-50%   Gastric/Duodenal <1% Up to 21%     Some individuals with SMAD4 mutations have a condition called JPS/HHT (Juvenile Polyposis/Hereditary Hemorrhagic Telangiectasia) where in addition to JPS, individuals may have nose bleeds and clotting issues.     Hereditary Diffuse Gastric Cancer (HDGC)  Currently, mutations in one gene are known to cause Hereditary Diffuse Gastric Cancer: CDH1.  Individuals with HDGC are at increased risk for diffuse gastric cancer and lobular breast cancer. Of people diagnosed with HDGC, 30-50% have a mutation in the CDH1 gene.  This suggests there are likely mutations in other genes that may cause HDGC that have not been identified yet. Individuals with HDGC may also be at increased risk for colon cancer.      Lifetime Cancer Risks   Cancer Type General Population HDGC   Diffuse Gastric <1% 67-83%   Breast 12% 41-60%     St syndrome  Mutations in five different genes are known to cause St  syndrome: MLH1, MSH2, MSH6, PMS2, and EPCAM. Individuals with St syndrome have an increased risk for colon, uterine, ovarian, small bowel, stomach, urinary tract, and brain cancer, as well as several other types of cancer. The exact lifetime cancer risks are dependent upon the gene in which the mutation was identified.      Lifetime Cancer Risks   Cancer Type General Population St syndrome   Colon 5% Up to 61%   Uterine 2-3% Up to 57%   Stomach <1% Up to 9%   Ovarian 2% Up to 38%   Urinary tract <1% Up to 28%   Hepatobiliary tract <1% Up to 3.7%   Small bowel <1% Up to 11%   Brain/CNS <1% Up to 7.7%   Pancreas <1% Up to 6.2%       MUTYH-Associated Polyposis (MAP)  MAP is a hereditary cancer syndrome caused by mutations in the MUTYH gene. Unlike the other hereditary cancer genes discussed in this handout, two mutations in the MUTYH gene cause MAP and increase cancer risk. Those affected with MAP typically have between  adenomatous polyps. This syndrome also increases the risk for colon and duodenal cancer. Current research suggests that other cancers may be associated with MUTYH mutations, as well. The table below includes the risk that someone with two MUTYH gene mutations would develop cancer in their lifetime; of note, there is also an increased colon cancer risk for individuals who carry only one MUTYH gene mutation5,6,7.       Lifetime Cancer Risks   Cancer Type General Population MAP   Colon 5% 70-90%   Duodenal  <1% 4%       Cowden syndrome  Cowden syndrome is a hereditary condition that increases the risk for breast, thyroid, endometrial, colon, and kidney cancer.  A single mutation in the PTEN gene causes Cowden syndrome and increases cancer risk.  The table below shows the chance that someone with a PTEN mutation would develop cancer in their lifetime8,9.  Other benign features seen in some individuals with Cowden syndrome include benign skin lesions (facial papules, keratoses, lipomas),  learning disabilities, autism, thyroid nodules, hamartomatous colon polyps, and larger head size.      Lifetime Cancer Risks   Cancer Type General Population Cowden Syndrome   Breast 12% 40-60%*   Thyroid 1% 35%   Renal 1-2% 34%   Endometrial 2-3% 28%   Colon 4.1% 9%   Melanoma 2-3% Up to 6%     *One recent study found breast cancer risk to be increased to 85%    Peutz-Jeghers syndrome (PJS)  PJS is a hereditary cancer syndrome caused by mutations in the STK11 gene. This condition can be distinguished from other hereditary syndromes by the presence of hamartomatous polyps in the gastrointestinal tract and freckles present in unusual places such as the hands, feet, neck, and lips. Individuals with Peutz-Jeghers syndrome have an increased risk for colon, breast, pancreatic, and other cancers3.  Men are at risk for testicular tumors which can affect hormones in the body. Women are at risk for sex cord tumors of the ovaries and a rare aggressive type of cervical cancer.     Lifetime Cancer Risks   Cancer Type General Population PJS   Breast 12% 32-54%   Colon 4.1% 39%   Stomach <1% 29%   Pancreas 1.5% 11-36%   Small Intestine <1% 13%   Ovarian  <2%  >20%   Lung 6-7% 7-17%     Additional Genes Associated with Increased Colon Cancer Risk  CHEK2  CHEK2 is a moderate-risk breast cancer gene.  Women who have a mutation in CHEK2 have around a 2-fold increased risk for breast cancer compared to the general population, and this risk may be higher depending upon family history.12,13,14. Mutations in CHEK2 have also been shown to increase the risk of a number of other cancers, including colon and prostate, however these cancer risks are currently not well understood.     GREM1  GREM1 is a moderate-risk colon polyposis gene. Duplications of this gene are more commonly found in individuals with Ashkenazi Jew [id number]. Mutations in GREM1 are associated with colon polyps and therefore an increased risk of colon cancer; however  the estimated cancer risk is not well [id number].     POLD1 and POLE  POLD1 and POLE are moderate-risk colon cancer genes. Carriers of a mutation in one of these genes increases the lifetime risk of colorectal [id number],18,19,20. Mutations in these genes may also be associated with increased risk for other cancers including: endometrial cancer, duodenal adenomas and carcinomas, and brain tumors.    TP53  Li Fraumeni syndrome (LFS) is a hereditary cancer predisposition syndrome. LFS is caused by a mutation in the TP53 gene. A single mutation in one of the copies of TP53 increases the risk for multiple cancers. Individuals with LFS are at an increased risk for developing cancer at a young age. The general lifetime risk for development of cancer is 50% by age 30 and 90% by age 60.      Core Cancers: Sarcomas, Breast, Brain, Lung, Leukemias/Lymphomas, Adrenocortical carcinomas  Other Cancers: Gastrointestinal, Thyroid, Skin, Genitourinary    Genetic Testing  Genetic testing involves a simple blood test and will look at the genetic information in genes associated with an increased risk of colon cancer. The tests look for any harmful mutations that are associated with increased cancer risk.  If possible, it is recommended that the person(s) who has had cancer be tested before other family members.  That person will give us the most useful information about whether or not a specific gene mutation is associated with the cancer in the family.     Results  There are three possible results from genetic testing:  Positive--a harmful mutation was identified  Negative--no mutation was identified  Variant of unknown significance--a variation in one of the genes was identified, but it is unclear how this impacts cancer risk in the family  Advantages and Disadvantages  There are advantages and disadvantages to genetic testing of these genes.    Advantages  May clarify your cancer risk  Can help you make medical decisions  May  explain the cancers in your family  May give useful information to your family members (if you share your results)    Disadvantages  Possible negative emotional impact of learning about inherited cancer risk  Uncertainty in interpreting a negative test result in some situations  Possible genetic discrimination concerns (see below)    Inheritance   Most mutations in the genes outlined above are inherited in an autosomal dominant pattern.  This means that if a parent has a mutation, each of their children will have a 50% chance of inheriting that same mutation.  Therefore, each child--male or female--would have a 50% chance of being at increased risk for developing cancer.                                              Image obtained from [employer] Reference, 2013     In the case of MUTYH-Associated Polyposis (MAP) this hereditary cancer syndrome is inherited in an autosomal recessive pattern. This means that each parent of an individual with MAP is a carrier of MAP, meaning that they have only one mutation in MUTYH. They still have one functioning copy of their gene.  Carriers are at a slightly higher risk for colon cancer than the general population. If each parent is a carrier for MAP, they have a 25% of having a child who is affected with MAP, meaning the child inherited both gene mutations - one from each parent.       Image obtained from [employer] Reference, 2016    Genetic Information Nondiscrimination Act (CHOLO)  The Genetic Information Nondiscrimination Act of 2008 (CHOLO) is a federal law that protects individuals from health insurance or employment discrimination based on a genetic test result alone (with some exceptions, including employers with fewer than 15 employees, and ).  Although rare, CHOLO  does not cover discrimination protections in terms of life insurance, long term care, or disability insurances.  Visit the [employer] [place] website to learn more. Visit the  National Human Genome Research [place] at Genome.gov/06872698 to learn more.    Reducing Cancer Risk  Each of the genes listed within this handout have nationally recognized cancer screening guidelines that would be recommended for individuals who test positive.  In addition to increased cancer screening, surgeries may be offered or recommended to reduce cancer risk in certain cases.  Recommendations are based upon an individual s genetic test result as well as their personal and family history of cancer.    Questions to Think About Regarding Genetic Testing  What effect will the test result have on me and my relationship with my family members if I have an inherited gene mutation?  If I don t have a gene mutation?  Should I share my test results, and how will my family react to this news, which may also affect them?  Are my children ready to learn new information that may one day affect their own health?    Resources    PTEN World Wave [email or web address]   No Stomach for Cancer, Inc. nostomachforcancer.org   Stomach Cancer Relief Network scrnet.org   Collaborative Group of the Americas on Inherited Colorectal Cancer (CGA) cgaicc.com   Cancer Care cancercare.org   American Cancer Society (ACS) cancer.org   National Cancer [place] (NCI) cancer.org   St Syndrome International [email or web address]       Please call us if you have any questions or concerns.     Cancer Risk Management Program [phone] ([phone])  Raymond Crow, MS [facility] [place]  [phone]  Madison Rosales, MS, [facility] [place] [phone]  Julia Conn, MS, [facility] [place]  [phone]  Lucero Lam, MS, [facility] [place]  [phone]  Chelsi Patel, MS, [facility] [place]  [phone]  Lisa Lang, MS, [facility] [place] [phone]  Goldie Reyna, MS, [facility] [place] [phone]    References    Julia ALICIA, Mahamed J, Edmond G, Rosalie E, Shawnee J, et al. The Prevalence of thyroid cancer and benign thyroid disease in patients with familial adenomatous polyposis may be higher than previously recognized. Clin Colorectal Cancer.  2012;11:304-308.  Marisabel L, Roger Green A, Morenita L, Dionisio YBARRA, Charles K, et al. Risk of colorectal cancer in juvenile polyposis. Gut. 2007;56:965-967.  Jeffry FG, Rocky MN, Faisal CA. Colorectal cancer risk in hamartomatous polyposis syndromes. World Journal of Gastrointestinal Surgery. 2015;27:25-32  Mohsen MIN. Guidance on gastrointestinal surveillance for hereditary non-polyposis colorectal cancer, familial adenomatous polypolis, juvenile polyposis, and Peutz-Jeghers syndrome. Gut. 2002;51:21-27.  Robert AK, Mikel MAX, Swati JG et al. Risk of extracolonic cancers for people with biallelic and monoallelic mutations in MUTYH. Int J of Cancer. 2016;139:1785-7331.  Bruno S, Ashwini S, Martha H, Kimberly K, Kruse M, et al. MUTYH-associated polyposis: 70 of 71 patients with biallelic mutations present with an attenuated or atypical phenotype. Int J of Cancer. 2006;119:807-814.  Bran G, Justino F, Balbuena I, Pinchev M, Mantoloking H, et al. MUTYH mutation carriers have increased breast cancer risk. Cancer. 2012;7296-3032.  Philipp MH, Nichol J, Logan J, Kirsten LA, [place] MS, Eng C. Lifetime cancer risks in individuals with germline PTEN mutations. Clin Cancer Res. 2012;18:400-7.  Luis Angel THOMAS. Cowden Syndrome: A Critical Review of the Clinical Literature. J Kellie . 2009:18:13-27.  National Comprehensive Cancer Network. Clinical practice guidelines in oncology, colorectal cancer screening. Available online (registration required). 2013.  National Cancer [place]. SEER Cancer Stat Fact Sheets.  December 2013.  CHEK2 Breast Cancer Case-Control Consortium. CHEK2*1100delC and susceptibility to breast cancer: A collaborative analysis involving 10,860 breast cancer cases and 9,065 controls from 10 studies. Am J Hum Kellie, 74 (2004), pp. 8892-1638  Lai T, Amadeo S, Kae K, et al. Spectrum of Mutations in BRCA1, BRCA2, CHEK2, and TP53 in Families at High Risk of Breast Cancer. SUSANA.  2006;295(12):4132-6568.  Emma YBARRA, Mai D, Lisa JACKSON, et al. Risk of breast cancer in women with a CHEK2 mutation with and without a family history of breast cancer. J Clin Oncol. 2011;29:4122-4473.  Celia AGARWAL, Nabil E, Jonathan J, Jessica N, Rylee M et al. Defining the polyposis/colorectal cancer phenotype associated with the GREM1 duplication: counseling and management guidelines. Kellie .Res. 2016;98:1-5.  Karen E, Karla S, Gulshan JACKSON, Sheila Castillo, et al. Hereditary mixed polyposis syndrome is caused by a 40kb upstream duplication that leads to increased and ectopic expression of the BMP antagonist GREM1. Miesha Kellie. 2015;44:699-703.  TATE Jose. et al. Germline mutations affecting the proofreading domains of POLE and POLD1 predispose to colorectal adenomas and carcinomas. Miesha. Kellie. 45, 136-44 (2013).  JOSE Priest. et al. POLE and POLD1 mutations in 529 farhad with familial colorectal cancer and/or polyposis: review of reported cases and recommendations for genetic testing and surveillance. Kellie. Med. (2015). doi:10.1038/gim.2015.75  LALA Rhodes. et al. New insights into POLE and POLD1 germline mutations in familial colorectal cancer and polyposis. Hum. Mol. Kellie. 23, 6436-12 (2014).  ORA Regan. et al. Frequency and phenotypic spectrum of germline mutations in POLE and seven other polymerase genes in 266 patients with colorectal adenomas and carcinomas. Int. J. Cancer 137, 320-31 (2015).

## 2023-12-12 NOTE — Clinical Note
12/12/2023         RE: Nury Cárdenas  [address] [facility] [id number]        Dear Colleague,    Thank you for referring your patient, Nury Cárdenas, to the [facility]. Please see a copy of my visit note below.    Virtual Visit Details  Type of service:  Telephone Visit   Phone call duration: 55 minutes     12/12/2023    Referring Provider: Cecy Dill MD     Presenting Information:   I met with Nury Cárdenas, along with her daughter Ina, today for her telephone genetic counseling visit through the Cancer Risk Management Program to discuss her family history of colon cancer. She is here today to review this history, cancer screening recommendations, and available genetic testing options.    Personal History:  Nury is a 78 year old female. She does not have any personal history of cancer. In her hormonal-based medical history, she had her first menstrual period at age 12, her first child at age 17, and underwent menopause at age 49. Nury has her ovaries, fallopian tubes and uterus in place, and reports no ovarian cancer screening to date. She reports no history of hormone replacement therapy. Nury reports oral contraceptive use for approximately one year.       Nury most recent mammogram in June 2017 was normal. Nury began having colonoscopies in her 50's. Her most recent colonoscopy in December 2017 found one 6 mm tubular adenoma in the cecum. Follow-up was recommended in 5 years. Nury reports a history of dermatological exams. She does not regularly do any other cancer screening at this time. Nury reported a history of smoking for approximately 15 years and no current alcohol use.    Of note, Nury was recently seen in the General Genetics at the [facility] to discuss her son's history of Luke-Piermont syndrome (KSS). During her evaluation, it was determined that Nury did not have any symptoms of KSS and had  previous negative testing, therefore, repeat genetic testing was not recommended for her.     Family History: (Please see scanned pedigree for detailed family history information)  Nury's daughter started colonoscopies in her 40's. She has been found to have polyps on every exam. She's had an estimated total of 5-6 colon polyps. She reported having a hysterectomy due to fibroids, and one ovary removed. She is currently 54.  Nury's son was diagnosed with Othello-Bianka syndrome in his 30's. He passed away at age 58.   His son (Nury's grandson )was diagnosed with testicular cancer. He is currently in his 30's.    Nury's sister was diagnosed with squamous cell carcinoma of the anus at age 60. She passed away at age 62.   Nury's brother was diagnosed with colon cancer at age 48. He passed away at age 50.  Of note, Nury's siblings did not have biological children.   Nury's father was diagnosed with lung cancer at age 60 and passed away shortly after.   Her maternal ethnicity is Swedish. Her paternal ethnicity is Vietnamese.  There is no known Ashkenazi Faith ancestry on either side of her family. There is no reported consanguinity.    Discussion:  Nury's family history of colon cancer is suggestive of a hereditary cancer syndrome.  We reviewed the features of sporadic, familial, and hereditary cancers.   The vast majority of cancers are considered sporadic and not primarily due to an inherited factor. Individuals can develop cancer due to aging, chance events, environmental exposures or lifestyles.  Features typically seen in high risk families include: cancers diagnosed under age 50, multiple relatives with similar cancers on the same side of the family, cancers in multiple generations, and relatives with certain rare cancers (i.e., male breast cancer).   We discussed the natural history and genetics of several hereditary cancer syndromes, including St syndrome.   St syndrome can be  caused by a mutation in one of five genes:  MLH1, MSH2, MSH6, PMS2, and EPCAM. A single mutation in one of the St Syndrome genes increases the risk for several cancers, such as colon cancer, endometrial/uterine cancer, gastric cancer, and ovarian cancer. Other cancers have also been reported in some families with St Syndrome include pancreatic, bladder, biliary tract, urothelial, small bowel, prostate, breast and brain cancers.  A detailed handout regarding St syndrome and the information we discussed will be provided to Nury via [facility] and can be found in the after visit summary. Topics included: inheritance pattern, cancer risks, cancer screening recommendations, and also risks, benefits and limitations of testing.  In looking at Nury's personal and family history, it is possible that a cancer susceptibility gene is present due to her brother diagnosed with colon cancer at age 48.  Based on her personal and family history, Nury meets current National Comprehensive Cancer Network (NCCN) criteria for genetic testing of St syndrome genes (i.e. EPCAM, MLH1, MSH2, MSH6, and PMS2).   We discussed that there are additional genes that could cause increased risk for colon cancer. As many of these genes present with overlapping features in a family and accurate cancer risk cannot always be established based upon the pedigree analysis alone, it would be reasonable for Nury to consider panel genetic testing to analyze multiple genes at once.  We reviewed genetic testing options for Nury based on her personal and family history: a panel of genes associated with an increased risk for hereditary colorectal cancers, or larger panel options to include genes associated with increased risk for multiple different cancer types. Nury expressed interest in the St syndrome genes with automatic reflex to the CancerNext panel through [facility].   Genetic testing is available for 36  genes associated with hereditary cancer: APC, HIRAM, AXIN2, BARD1, BMPR1A, BRCA1, BRCA2, BRIP1, CDH1, CDK4, CDKN2A, CHEK2, DICER1, EPCAM, GREM1, HOXB13, MBD4, MLH1, MSH2, MSH3, MSH6, MUTYH, NBN, NF1, NTHL1, PALB2, PMS2, POLD1, POLE, PTEN, RAD51C, RAD51D, RECQL, SMAD4, SMARCA4, STK11, and TP53.  We discussed that many of the genes in the CancerNext panel are associated with specific hereditary cancer syndromes and published management guidelines: Hereditary Breast and Ovarian Cancer syndrome (BRCA1, BRCA2), St syndrome (MLH1, MSH2, MSH6, PMS2, EPCAM), Familial Adenomatous Polyposis (APC), Hereditary Diffuse Gastric Cancer (CDH1), Familial Atypical Multiple Mole Melanoma syndrome (CDK4, CDKN2A), Juvenile Polyposis syndrome (BMPR1A, SMAD4), Cowden syndrome (PTEN), Li Fraumeni syndrome (TP53), Peutz-Jeghers syndrome (STK11), MUTYH Associated Polyposis (MUTYH), and Neurofibromatosis type 1 (NF1).   The HIRAM, AXIN2, BRIP1, CHEK2, GREM1, MSH3, NBN, NTHL1, PALB2, POLD1, POLE, RAD51C, and RAD51D genes are associated with increased cancer risk and have published management guidelines for certain cancers.    The remaining genes (BARD1, DICER1, HOXB13, RECQL, and SMARCA4) are associated with increased cancer risk and may allow us to make medical recommendations when mutations are identified.  Nury would like to submit a blood sample for her genetic testing. She will go to her [facility] at her earliest convenience to get her blood drawn for her genetic testing.   Verbal consent was given over the phone and written on the consent form. Turnaround time is approximately 3-4 weeks.  Medical Management: For Nury, we reviewed that the information from genetic testing may determine:  additional cancer screening for which Nury may qualify (i.e. mammogram and breast MRI, more frequent colonoscopies, more frequent dermatologic exams, etc.),  options for risk reducing surgeries Nury could consider  (i.e. bilateral mastectomy, surgery to remove her ovaries and/or uterus, etc.),    and targeted chemotherapies if she were to develop certain cancers in the future (i.e. immunotherapy for individuals with St syndrome, PARP inhibitors, etc.).   These recommendations and possible targeted chemotherapies will be discussed in detail once genetic testing is completed.     Plan:  1) Today Nury elected to proceed with the Ts syndrome genes with automatic reflex to the CancerNext panel through [facility]. Therefore, consent was reviewed and verbally obtained for this testing.   2) Nury plans to schedule her blood draw appointment at a clinic that is convenient to her.   3) The results should be available in 3-4 weeks.  4) Nury will either have a telephone visit or video visit to discuss the results.  Additional recommendations about screening will be made at that time.    Nury is 78 year old and is being evaluated via a billable telephone visit.    Time spent on phone: 55 minutes    Madison Rosales MS, [facility]  Licensed, Certified Genetic Counselor  Phone: [phone]      Again, thank you for allowing me to participate in the care of your patient.        Sincerely,        Madison Rosales GC

## 2023-12-13 ENCOUNTER — OFFICE VISIT (OUTPATIENT)
Dept: OPHTHALMOLOGY | Facility: CLINIC | Age: 78
End: 2023-12-13
Payer: COMMERCIAL

## 2023-12-13 ENCOUNTER — LAB (OUTPATIENT)
Dept: LAB | Facility: CLINIC | Age: 78
End: 2023-12-13
Payer: COMMERCIAL

## 2023-12-13 DIAGNOSIS — Z80.43 FAMILY HISTORY OF TESTICULAR CANCER: ICD-10-CM

## 2023-12-13 DIAGNOSIS — H35.342 MACULAR HOLE OF LEFT EYE: Primary | ICD-10-CM

## 2023-12-13 DIAGNOSIS — Z80.0 FAMILY HISTORY OF COLON CANCER: ICD-10-CM

## 2023-12-13 DIAGNOSIS — H25.811 COMBINED FORMS OF AGE-RELATED CATARACT OF RIGHT EYE: ICD-10-CM

## 2023-12-13 DIAGNOSIS — Z96.1 PSEUDOPHAKIA: ICD-10-CM

## 2023-12-13 DIAGNOSIS — Z80.1 FAMILY HISTORY OF MALIGNANT NEOPLASM OF TRACHEA, BRONCHUS, AND LUNG: ICD-10-CM

## 2023-12-13 DIAGNOSIS — Z80.0 FAMILY HISTORY OF RECTAL CANCER: ICD-10-CM

## 2023-12-13 PROCEDURE — 36415 COLL VENOUS BLD VENIPUNCTURE: CPT

## 2023-12-13 PROCEDURE — 99000 SPECIMEN HANDLING OFFICE-LAB: CPT

## 2023-12-13 PROCEDURE — 92012 INTRM OPH EXAM EST PATIENT: CPT | Performed by: OPHTHALMOLOGY

## 2023-12-13 ASSESSMENT — VISUAL ACUITY
CORRECTION_TYPE: GLASSES
METHOD: SNELLEN - LINEAR
OS_CC: 20/30
OS_CC+: +2
OD_CC: 20/40

## 2023-12-13 ASSESSMENT — TONOMETRY
OS_IOP_MMHG: 09
OD_IOP_MMHG: 10
IOP_METHOD: ICARE

## 2023-12-13 NOTE — PATIENT INSTRUCTIONS
"Continue preservative free artifical tears (Refresh Plus, Systane Complete. Avoid generic artifical tears and \"get the red out\" drops)    Return visit in 4 months for a complete exam     Wiley Mcleod M.D.  [phone]    "

## 2023-12-13 NOTE — PROGRESS NOTES
" Current Eye Medications:  Refresh preservative free each morning, then as needed.       Subjective:  The patient complains of blurry vision in each eye.  Left eye always has a foreign body sensation especially around her left lower eyelid.  She is constantly \"aware\" of her left eye, but never feels this way about her right eye.    She is wondering if Dr. Mcleod had an opportunity to review her MRI.  Left eye salcido frequently.    She has an appointment with a Neurosurgeon tomorrow.       Objective:  See Ophthalmology Exam.       Assessment:  Stable anterior segment and vision both eyes in patient with a history of a vitrectomy left eye for a macular hole.      Plan:  Continue preservative free artifical tears (Refresh Plus, Systane Complete. Avoid generic artifical tears and \"get the red out\" drops)    Reassured regarding \"thinning of ocular lens\" on CT scan.    Return visit in 4 months for a complete exam     Wiley Mcleod M.D.  [phone]     "

## 2023-12-13 NOTE — LETTER
"    12/13/2023         RE: Nury Cárdenas  [address] [facility] [address]        Dear Colleague,    Thank you for referring your patient, Nury Cárdenas, to the [facility]. Please see a copy of my visit note below.     Current Eye Medications:  Refresh preservative free each morning, then as needed.       Subjective:  The patient complains of blurry vision in each eye.  Left eye always has a foreign body sensation especially around her left lower eyelid.  She is constantly \"aware\" of her left eye, but never feels this way about her right eye.    She is wondering if Dr. Mcleod had an opportunity to review her MRI.  Left eye salcido frequently.    She has an appointment with a Neurosurgeon tomorrow.       Objective:  See Ophthalmology Exam.       Assessment:  Stable anterior segment and vision both eyes in patient with a history of a vitrectomy left eye for a macular hole.      Plan:  Continue preservative free artifical tears (Refresh Plus, Systane Complete. Avoid generic artifical tears and \"get the red out\" drops)    Reassured regarding \"thinning of ocular lens\" on CT scan.    Return visit in 4 months for a complete exam     Wiley Mcleod M.D.  [phone]       Again, thank you for allowing me to participate in the care of your patient.        Sincerely,        Wiley Mcleod MD  "

## 2023-12-28 ENCOUNTER — LAB (OUTPATIENT)
Dept: LAB | Facility: CLINIC | Age: 78
End: 2023-12-28
Payer: COMMERCIAL

## 2023-12-28 DIAGNOSIS — K21.9 GASTROESOPHAGEAL REFLUX DISEASE WITHOUT ESOPHAGITIS: ICD-10-CM

## 2023-12-28 DIAGNOSIS — M31.30 GRANULOMATOSIS WITH POLYANGIITIS WITHOUT RENAL INVOLVEMENT (H): ICD-10-CM

## 2023-12-28 DIAGNOSIS — Z79.60 LONG-TERM USE OF IMMUNOSUPPRESSANT MEDICATION: ICD-10-CM

## 2023-12-28 DIAGNOSIS — M15.0 PRIMARY OSTEOARTHRITIS INVOLVING MULTIPLE JOINTS: ICD-10-CM

## 2023-12-28 PROCEDURE — 36415 COLL VENOUS BLD VENIPUNCTURE: CPT

## 2023-12-28 PROCEDURE — 85652 RBC SED RATE AUTOMATED: CPT

## 2023-12-28 PROCEDURE — 83516 IMMUNOASSAY NONANTIBODY: CPT

## 2023-12-28 PROCEDURE — 86140 C-REACTIVE PROTEIN: CPT

## 2023-12-28 PROCEDURE — 86037 ANCA TITER EACH ANTIBODY: CPT

## 2023-12-28 PROCEDURE — 86036 ANCA SCREEN EACH ANTIBODY: CPT

## 2023-12-28 PROCEDURE — 83876 ASSAY MYELOPEROXIDASE: CPT

## 2023-12-29 DIAGNOSIS — T78.40XA ALLERGIC REACTION, INITIAL ENCOUNTER: ICD-10-CM

## 2023-12-29 LAB
ANCA AB PATTERN SER IF-IMP: NORMAL
C-ANCA TITR SER IF: NORMAL {TITER}
MYELOPEROXIDASE AB SER IA-ACNC: 0.3 U/ML
MYELOPEROXIDASE AB SER IA-ACNC: NEGATIVE
PROTEINASE3 AB SER IA-ACNC: 2.5 U/ML
PROTEINASE3 AB SER IA-ACNC: ABNORMAL

## 2023-12-29 RX ORDER — LORATADINE 10 MG/1
TABLET ORAL
Qty: 90 TABLET | Refills: 1 | Status: SHIPPED | OUTPATIENT
Start: 2023-12-29

## 2024-01-01 ENCOUNTER — MYC MEDICAL ADVICE (OUTPATIENT)
Dept: FAMILY MEDICINE | Facility: CLINIC | Age: 79
End: 2024-01-01
Payer: COMMERCIAL

## 2024-01-02 NOTE — TELEPHONE ENCOUNTER
Ear plugging can accompany sinus infections viral/bacterial/allergic. About sinus drainage; not all of them need antibiotic and would usually would like to get more information to see if meets threshold for antibiotic can do a virtual visit to discuss the options.

## 2024-01-05 ENCOUNTER — OFFICE VISIT (OUTPATIENT)
Dept: RHEUMATOLOGY | Facility: CLINIC | Age: 79
End: 2024-01-05
Payer: COMMERCIAL

## 2024-01-05 VITALS
OXYGEN SATURATION: 95 % | WEIGHT: 121 LBS | SYSTOLIC BLOOD PRESSURE: 137 MMHG | BODY MASS INDEX: 20.28 KG/M2 | HEART RATE: 76 BPM | DIASTOLIC BLOOD PRESSURE: 85 MMHG

## 2024-01-05 DIAGNOSIS — Z79.60 LONG-TERM USE OF IMMUNOSUPPRESSANT MEDICATION: ICD-10-CM

## 2024-01-05 DIAGNOSIS — M85.89 OSTEOPENIA OF MULTIPLE SITES: ICD-10-CM

## 2024-01-05 DIAGNOSIS — M31.30 GRANULOMATOSIS WITH POLYANGIITIS WITHOUT RENAL INVOLVEMENT (H): ICD-10-CM

## 2024-01-05 DIAGNOSIS — M15.0 PRIMARY OSTEOARTHRITIS INVOLVING MULTIPLE JOINTS: Primary | ICD-10-CM

## 2024-01-05 PROCEDURE — 99215 OFFICE O/P EST HI 40 MIN: CPT | Performed by: INTERNAL MEDICINE

## 2024-01-05 PROCEDURE — 99417 PROLNG OP E/M EACH 15 MIN: CPT | Performed by: INTERNAL MEDICINE

## 2024-01-05 ASSESSMENT — PAIN SCALES - GENERAL: PAINLEVEL: NO PAIN (0)

## 2024-01-05 NOTE — PROGRESS NOTES
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 Course complicated by severe upper airway inflammation, Wegener's lung and destructive/erosive joint disease. Treated initially with rituximab 375 mg/m2 x4 and high dose corticsteroids, and most recently with methotrexate.   Alendronate therapy complicated by LE pain. 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    In the interim, a left parietal bone lesion was detected and evaluated. This is currently thought to be a benign vascular lesion with no further treatment planned. This evaluated delayed her scheduling of rituximab and this has now been rescheduled to January 11th.    She reports that her joints have been very good. She reports some modest MCDANIEL, but no palpitations. Her energy is good. No upper respiratory symptoms today. She has been off of methotrexate for the past year. 7 months ago maintenance rituximab 500 mg every 6 months was recommended but has not been completed. No fracture to report.    PMI:  Medical-invasive pulmonary aspirgillosis, GPA, osteoarthritis, DVT with pulmonary embolism, osteoporosis with vertebral fracture (T = -2.0 2-2021), GERD, hyperlipidemia, +Tb exposure, nephrolithiasis, retinal tear, cataract, COVID-19, sensorineural hearing loss  Surgical-lung biopsy, appendectomy, eye surgery, tonsillectomy, cone biopsy, right wrist synovectomy  Injuries-left wrist fracture x2, left 5th toe fracture, vertebral fracture    SH:  Lives at home alone. Former smoker. No EtOH. Tuberculosis exposure as a child. Ambidextrous. Rare EtOH    FH:  Son dead with mitochondrial myopathy  Daughter with palpitations  Sibs dead with colon and anal cancer  Father dead with lung cancer  Mother dead with emphysema and osteoporosis    PMSF history personally obtained and updated by me this visit in the clinic.    ROS:  +ear plugging and nasal discharge last week that has passed  +sporadic  night time pain in her calvarian that she can control with excedrin  +erythema on her nose  +numbness on the bottom of her feet  +abnormal lower/left visual field on the left eye  +allergies to fosamax amoxicillin, augmentin, erythromycin, zithromax, nickel, codeine, adhesive tape  Remainder of the 14 point ROS obtained and found negative.    Physical Exam:  Constitutional: WD-WN-WG cooperative; deconditioned  Eyes: nl EOM, PERRLA, vision, conjunctiva, sclera  ENT: nl external ears, nose, hearing, lips, teeth, gums, throat; upper and lower plates  Neck: no mass or thyroid enlargement  Resp: lungs clear to auscultation, nl to palpation, nl effort  CV: RRR, no murmurs, rubs or gallops, no edema  GI: no ABD mass or tenderness, no HSM  : not tested  Lymph: no cervical or epitrochlear nodes  MS: +Hand wasting; right wrist with no flexion or extension; left wrist extension to 45 degrees; 1st CMC joint squaring bilaterally with some thumb laxity; right neck rotation 45 degrees right and 45 degrees left with head forward position and minimal neck extension; All other TMJ, neck, shoulder, elbow, wrist, hand, spine, hip, knee, ankle, and foot joints were examined and otherwise found normal. Normal  strength. +severe kyphosis. Normal tuck and prayer.  Skin: no nail pitting, alopecia; +redness over the bridge of the nose  Neuro: nl cranial nerves, strength, sensation  Psych: nl judgement, orientation, memory, affect    Laboratory:    Component      Latest Ref [facility] 10/3/2023  9:41 AM   WBC      4.0 - 11.0 10e3/uL 5.6    RBC Count      3.80 - 5.20 10e6/uL 4.90    Hemoglobin      11.7 - 15.7 g/dL 14.5    Hematocrit      35.0 - 47.0 % 43.9    MCV      78 - 100 fL 90    MCH      26.5 - 33.0 pg 29.6    MCHC      31.5 - 36.5 g/dL 33.0    RDW      10.0 - 15.0 % 12.5    Platelet Count      150 - 450 10e3/uL 190    % Neutrophils      % 67    % Lymphocytes      % 22    % Monocytes      % 9    % Eosinophils      % 1    % Basophils       % 1    % Immature Granulocytes      % 0    Absolute Neutrophils      1.6 - 8.3 10e3/uL 3.7    Absolute Lymphocytes      0.8 - 5.3 10e3/uL 1.2    Absolute Monocytes      0.0 - 1.3 10e3/uL 0.5    Absolute Eosinophils      0.0 - 0.7 10e3/uL 0.1    Absolute Basophils      0.0 - 0.2 10e3/uL 0.0    Absolute Immature Granulocytes      <=0.4 10e3/uL 0.0    Sodium      135 - 145 mmol/L 140    Potassium      3.4 - 5.3 mmol/L 4.8    Carbon Dioxide (CO2)      22 - 29 mmol/L 27    Anion Gap      7 - 15 mmol/L 10    Urea Nitrogen      8.0 - 23.0 mg/dL 14.0    Creatinine      0.51 - 0.95 mg/dL 0.73    GFR Estimate      >60 mL/min/1.73m2 83    Calcium      8.8 - 10.2 mg/dL 9.5    Chloride      98 - 107 mmol/L 103    Glucose      70 - 99 mg/dL 87    Alkaline Phosphatase      35 - 104 U/L 82    AST      0 - 45 U/L 23    ALT      0 - 50 U/L 14    Protein Total      6.4 - 8.3 g/dL 6.0 (L)    Albumin      3.5 - 5.2 g/dL 4.1    Bilirubin Total      <=1.2 mg/dL 0.4    Color Urine      Colorless, Straw, Light Yellow, Yellow  Yellow    Appearance Urine      Clear  Slightly Cloudy !    Glucose Urine      Negative mg/dL Negative    Bilirubin Urine      Negative  Negative    Ketones Urine      Negative mg/dL Negative    Specific Gravity Urine      1.003 - 1.035  <=1.005    Blood Urine      Negative  Negative    pH Urine      5.0 - 7.0  6.0    Protein Albumin Urine      Negative mg/dL Negative    Urobilinogen Urine      0.2, 1.0 E.U./dL 0.2    Nitrite Urine      Negative  Negative    Leukocyte Esterase Urine      Negative  Trace !    MPO Erika IgG Instrument Value      <3.5 U/mL <0.3    Myeloperoxidase Antibody IgG      Negative  Negative    Proteinase 3 Erika IgG Instrument Value      <2.0 U/mL 2.0 (H)    Proteinase 3 Antibody IgG      Negative  Equivocal !    RBC Urine      0-2 /HPF /HPF 0-2    WBC Urine      0-5 /HPF /HPF 0-5    Squamous Epithelial /LPF Urine      None Seen /LPF Few !    Neutrophil Cytoplasmic Antibody      <1:10  <1:10     Neutrophil Cytoplasmic Antibody Pattern The ANCA IFA is <1:10. No further testing will be performed.    Sed Rate      0 - 30 mm/hr 4    CRP Inflammation      <5.00 mg/L <3.00       Legend:  (L) Low  ! Abnormal  (H) High    Component      Latest Ref Rng 12/28/2023  10:30 AM   CRP Inflammation      <5.00 mg/L <3.00    Sed Rate      0 - 30 mm/hr 4           Neutrophil Cytoplasmic Antibody   Latest Ref Rng <1:10    5/20/2022  1:54 PM 1:160 (H)    6/28/2022  9:01 AM 1:40 (H)    8/1/2022  10:07 AM 1:10 (H)    9/8/2022  7:07 AM 1:10 (H)    10/10/2022  10:00 AM <1:10    11/9/2022  7:42 AM <1:10    12/7/2022  7:25 AM <1:10    2/2/2023  10:38 AM 1:10 (H)    3/1/2023  10:23 AM <1:10    4/6/2023  9:11 AM <1:10    6/26/2023  9:36 AM <1:10    10/3/2023  9:41 AM <1:10    12/28/2023  10:30 AM <1:10       Legend:  (H) High     Proteinase 3 Antibody IgG MPO Erika IgG Instrument Value   Latest Ref Rng Negative  <3.5 U/mL   10/10/2022  10:00 AM Positive !  <0.3    11/9/2022  7:42 AM Positive !  <0.3    12/7/2022  7:25 AM Equivocal !  <0.3    2/2/2023  10:38 AM Equivocal !  <0.3    3/1/2023  10:23 AM Positive !  <0.3    4/6/2023  9:11 AM Positive !  <0.3    6/26/2023  9:36 AM Equivocal !  <0.3    10/3/2023  9:41 AM Equivocal !  <0.3    12/28/2023  10:30 AM Equivocal !  0.3       MR BRAIN W/WO CONTRAST  Order: [id number]  Narrative    For Patients:  As a result of the 21st Century Cures Act, medical imaging exams and procedure reports are released immediately into your electronic medical record.  You may view this report before your referring provider.  If you have questions, please contact your health care provider.      Indication:  Incidental left parietal skull lesion    Technique:  Multiplanar, multisequence MRI of the brain obtained without and with contrast.  A total of 10 mL of Gadavist IV contrast was administered.    Comparison:  Earlier same day CT head, PET-CT report 09/15/2023, outside CT head report 08/30/2023, outside MRI  brain report 08/29/2023    Findings:  Mild diffuse prominence of the ventricles and cortical sulci, compatible with generalized cerebral volume loss. Minor punctate foci of FLAIR hyperintensity throughout the supratentorial white matter, typical of chronic microangiopathy. Small chronic lacunar infarcts are noted within the left parietal/periatrial white matter. No acute/subacute ischemia, intracranial hemorrhage, or abnormal extra-axial fluid collection. No midline shift, hydrocephalus, or herniation.  Midline structures are unremarkable.    There is hyperostosis frontalis interna. A 3 cm focal area of T1 hypointensity, T2/FLAIR hyperintensity, and subtle enhancement is noted at the left parietal calvarium, without periostitis, cortical destruction, or other aggressive features. This lesion is indiscernible on same day CT.    Major intracranial vasculature is unremarkable for technique. Minor ethmoid air cell mucosal thickening. Trace nonspecific fluid throughout the right mastoid air cells. Left lens implant.    Impression:  1. Faintly enhancing, T1 hypointense, T2/FLAIR hyperintense 3 cm lesion within the left parietal calvarium demonstrates overall nonaggressive features, and is occult by CT and cold on PET. These findings are most suggestive of a benign vasoformative lesion such as hemangioma.  2. No evidence of acute intracranial abnormality or suspicious intracranial enhancement.  3. Mild generalized cerebral volume loss and mild chronic microangiopathy changes.         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 This is very complicated and relapsing disease. We again spend >50% of the encounter in discussion of the treatment options to prevent relapse of GPA. We know that she can relapse even under careful observation and we know that she previously did not tolerate maintenance therapy with methotrexate. I again believe that Rituximab 500 mg infusion twice  yearly would offer protection from relapse and acceptable risk of infection and and she again agrees to its use. She plans to get her infusion in the next week. Follow the serologies and inflammation markers.    Osteoporosis-with history of osteopenia and previous fracture. Stable vitamin D level. No current active management. I will ask her to get a DEXA scan now.    Long term management of immunosuppression-with increased risk for IgG deficiency. Plan to repeat the Ig levels now.     Plan follow up in 6 months.    A total of 60 minutes was spent in telephone patient interaction and chart review on the day of service.

## 2024-01-05 NOTE — PATIENT INSTRUCTIONS
Schedule lab testing every 2-4 months  Schedule an infusion of rituximab in January  Get a DEXA scan  Follow up with me in 6 months

## 2024-01-08 NOTE — PROGRESS NOTES
"Virtual Visit Details  Type of service:  Video Visit   Originating Location (pt. Location): Home  Distant Location (provider location):  Off-site  Platform used for Video Visit: Camilo    1/11/2024    Referring Provider: Cecy Dill MD      Presenting Information:  I spoke to Nury today to discuss her genetic testing results. Her blood was drawn on 12/13/2023. The  St syndrome genes with automatic reflex to the CancerNext panel was ordered from [facility]. This testing was done because of Nury's  family history of colon cancer.    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    APC gene. This variant is called c.6710G>A (p.L0605B).  APC gene. This variant is called c.6785G>T (p.X2902C).    Of note, this assay determined that the variants in the APC gene are on different chromosomes (in trans). No other variants or mutations were found in these genes. Given the uncertain significance of this result, medical management decisions should NOT be made based on this test result alone.    Of note, Nury tested negative for mutations in the following genes by sequencing and deletion/duplication analysis: HIRAM, AXIN2, BARD1, BMPR1A, BRCA1, BRCA2, BRIP1, CDH1, CDK4, CDKN2A, CHEK2, DICER1, EPCAM, GREM1, HOXB13, MBD4, MLH1, MSH2, MSH3, MSH6, MUTYH, NBN, NF1, NTHL1, PALB2, PMS2, POLD1, POLE, PTEN, RAD51C, RAD51D, RECQL, SMAD4, SMARCA4, STK11, and TP53. We reviewed the autosomal dominant inheritance of these genes. Nury cannot pass on a mutation in any of these genes to her children based on this test result. Mutations in these genes do not skip generations.      A copy of the test report can be found in the Laboratory tab, dated 12/13/2023, and named \"LABORATORY MISCELLANEOUS ORDER\". The report is scanned in as a linked document.    Interpretation:  We discussed several different " interpretations of this inconclusive test result. It is not clear if any of these variants are associated with increased cancer risk. These variants may be benign changes that do not increased cancer risk, or they may be harmful mutations that cause increased risk for certain cancers.    APC gene. The first variant found is called c.6710G>A (p.T3660B). While the second variant found is called c.6785G>T (p.R1154Z). Of note, this assay determined that the variants in the APC gene are on different chromosomes (in trans).   Harmful mutations in the APC gene cause Familial Adenomatous Polyposis (FAP). Individuals with FAP have 100's- 1000's of colonic polyps and have an increased risk for colon, stomach, duodenal, thyroid, pancreatic, and other cancers such as brain/ central nervous system tumors and hepatoblastoma in children. Individuals with FAP may also be at risk for desmoid tumors, sebaceous cysts, osteomas, and supernumerary teeth. We discussed that medical management decisions are NOT recommended for individuals with a VUS result.     The laboratory is working to determine if any of these variants are harmful or benign, and they will contact me if any of them are reclassified. If these variants are determined to be benign, there may be a different gene or combination of genes and environment that are associated with the cancers in this family.    It is also important to consider that her affected relatives may have had a mutation in one of the genes tested and she did not inherit it.     Inheritance:  We reviewed the autosomal dominant inheritance of the variants in the APC gene.  We discussed that Nury has a 50% chance to pass each of these variants to each of her children. Likewise, there is a 50% chance for each of these variants to be present in each of her siblings. Because it is unclear what, if any, risk is associated with these variants, clinical genetic testing for these APC variants alone is not  recommended for relatives.    Family Studies:  The laboratory may offer additional research based testing for specific relatives in order to help reclassify these variants.    Screening:  Based on this inconclusive test result, it is important for Nury and her relatives to refer back to the family history for appropriate cancer screening.    Per National Comprehensive Cancer Network (NCCN) guidelines, individuals with a first degree relative with colorectal cancer diagnosed at any age should begin colonoscopy at age 40, or 10 years before the earliest diagnosis of colorectal cancer, whichever is first. In this family, colonoscopy should start at age 38. Colonoscopy should be repeated every 5 years, or based on colonoscopy findings. This would apply to Nury. These recommendations may change based on personal and family history as well as personal preference, and should be discussed with an individuals physician.      Other population cancer screening options, such as those recommended by the American Cancer Society and the National Comprehensive Cancer Network (NCCN), are also appropriate for Nury and her family. These screening recommendations may change if there are changes to Nury's personal and/or family history of cancer.   Final screening recommendations should be made by each individual's primary care provider.    Summary:  We do not have an explanation for Nury's family history of cancer. While no genetic changes were identified, Nury may still be at risk for certain cancers due to family history, environmental factors, or other genetic causes not identified by this test.  Because of that, it is important that she continue with cancer screening based on her personal and family history as discussed above.    Genetic testing is rapidly advancing, and new cancer susceptibility genes will most likely be identified in the future.  Therefore, I encouraged Nury to contact me annually or  if there are changes in her personal or family history.  This may change how we assess her cancer risk, screening, and the testing we would offer.    Plan:  1.  A copy of the test results will be mailed to Nury.   2. She plans to follow-up with her other providers.  3. She should contact me regularly, or sooner if her family history changes.  4. I will attempt to contact Nury if the laboratory informs me that these variants have been reclassified.  This may change screening and testing recommendations for Nury and her relatives.    If Nury has any further questions, I encouraged her to contact me at [phone].    Time spent on video: 5 minutes    Madison Rosales MS, [facility] [place]  Licensed, Certified Genetic Counselor  Phone: [phone]

## 2024-01-11 ENCOUNTER — VIRTUAL VISIT (OUTPATIENT)
Dept: ONCOLOGY | Facility: CLINIC | Age: 79
End: 2024-01-11
Payer: COMMERCIAL

## 2024-01-11 DIAGNOSIS — Z80.0 FAMILY HISTORY OF RECTAL CANCER: ICD-10-CM

## 2024-01-11 DIAGNOSIS — Z80.43 FAMILY HISTORY OF TESTICULAR CANCER: ICD-10-CM

## 2024-01-11 DIAGNOSIS — Z80.0 FAMILY HISTORY OF COLON CANCER: ICD-10-CM

## 2024-01-11 DIAGNOSIS — Z80.1 FAMILY HISTORY OF MALIGNANT NEOPLASM OF TRACHEA, BRONCHUS, AND LUNG: ICD-10-CM

## 2024-01-11 PROCEDURE — 999N000069 HC STATISTIC GENETIC COUNSELING, < 16 MIN: Mod: GT,95

## 2024-01-11 NOTE — PATIENT INSTRUCTIONS
Genetic Testing  Genetic testing involved a blood or saliva test which looked at the genetic information in select genes for variants associated with cancer risk.  This testing may have included analysis of a single gene due to a known variant in the family, multiple genes most associated with the cancers in a family, or an expanded panel of genes related to many types of cancers.    Results  There are several possible genetic test results, including:   Positive--a harmful mutation (also known as a  pathogenic  or  likely pathogenic  variant) was identified in a gene associated with increased cancer risk.  These risks, as well as medical management options, depend on the specific genetic variant identified.    Negative--no variants were identified in the genes analyzed   Variant of unknown significance--a variant was identified in one or more genes, though it is currently unclear how this impacts cancer risk in the family.  Genetic testing labs are working to collect evidence about these uncertain variants and may provide updates in the future.    What is a Variant of Unknown Significance?  A variant of unknown significance (VUS) is a genetic change with unclear clinical significance.  Scientists currently do not know if this specific variant is associated with increased cancer risks,  or if it is a benign (harmless) change with no impact on health.       A variant may be of uncertain significance for several reasons.  It is possible that this specific variant has not been seen before by the laboratory, or only in a small number of families.  There is currently not enough information to know how this variant may impact your health.          Reclassification  Genetic testing laboratories and researchers are collecting evidence to determine the importance of variants of unknown significance.  Many variants of uncertain significance are later reclassified as benign findings, however some may be associated with  increased cancer risk.  Laboratories will often notify the genetic counselor/ordering provider when a patient s VUS has been reclassified.        Some families may be candidates for participation in the laboratory s variant research programs to help determine the importance of their VUS.  Participating in these programs does not guarantee that families will learn the significance of their VUS immediately.  It could be months or years before a VUS is reclassified.       Screening Recommendations  A combination of personal and family history factors may inform cancer risk and medical management recommendations.  Population cancer screening options, such as those recommended by the American Cancer Society and the National Comprehensive Cancer Network (NCCN) are appropriate for many families at average risk for cancer.  However, earlier and/or more frequent screening may be recommended based on personal factors (lifestyle, exposures, medications, screening results), family history of cancer, and sometimes genetic factors.  These cancer risk management options should be discussed in more detail with an individual's medical providers.      It is usually not recommended that other relatives have genetic testing for the VUS unless it is done as part of the laboratory s variant research program because an individual s test results should not influence their cancer screening until we determine the importance of the VUS.  Your genetic counselor can help you and your relatives understand the risks and benefits of all genetic testing and cancer screening options.    Please call us if you have any questions or concerns.   Cancer Risk Management Program (Appointments: [phone])  Raymond Crow, MS [facility] [place]  [phone]  Madison Rosales, MS, [facility] [place] [phone]  Julia Conn, MS, [facility] [place]  [phone]  Lucero Lam, MS, [facility] [place]  [phone]  Chelsi Patel, MS, [facility] [place]  [phone]  Lisa Lang, MS, [facility] [place] [phone]  Goldie Reyna MS,  [facility] [place] [phone]

## 2024-01-11 NOTE — LETTER
Cancer Risk Management  Program Locations    [facility]  [facility]  [facility]  [facility]  Mailing Address  Cancer Risk Management Program  [facility]  [address]  [place] [address]    New patient appointments  [phone]    January 11, 2024    Nury Cárdenas  [address] [address]    Dear Nury,    It was a pleasure talking with you via your virtual genetic counseling visit on 1/11/2024.  Below is a copy of the progress note from that recent visit through the Cancer Risk Management Program.  If you have any additional questions, please feel free to contact me.    Referring Provider: Cecy Dill MD      Presenting Information:  I spoke to Nury today to discuss her genetic testing results. Her blood was drawn on 12/13/2023. The  St syndrome genes with automatic reflex to the CancerNext panel was ordered from [facility]. This testing was done because of Nury's  family history of colon cancer.    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    APC gene. This variant is called c.6710G>A (p.L4253K).  APC gene. This variant is called c.6785G>T (p.X1808F).    Of note, this assay determined that the variants in the APC gene are on different chromosomes (in trans). No other variants or mutations were found in these genes. Given the uncertain significance of this result, medical management decisions should NOT be made based on this test result alone.    Of note, Nury tested negative for mutations in the following genes by sequencing and deletion/duplication analysis: HIRAM, AXIN2, BARD1, BMPR1A, BRCA1, BRCA2, BRIP1, CDH1, CDK4, CDKN2A, CHEK2, DICER1, EPCAM, GREM1, HOXB13, MBD4, MLH1, MSH2, MSH3,  "MSH6, MUTYH, NBN, NF1, NTHL1, PALB2, PMS2, POLD1, POLE, PTEN, RAD51C, RAD51D, RECQL, SMAD4, SMARCA4, STK11, and TP53. We reviewed the autosomal dominant inheritance of these genes. Nury cannot pass on a mutation in any of these genes to her children based on this test result. Mutations in these genes do not skip generations.      A copy of the test report can be found in the Laboratory tab, dated 12/13/2023, and named \"LABORATORY MISCELLANEOUS ORDER\". The report is scanned in as a linked document.    Interpretation:  We discussed several different interpretations of this inconclusive test result. It is not clear if any of these variants are associated with increased cancer risk. These variants may be benign changes that do not increased cancer risk, or they may be harmful mutations that cause increased risk for certain cancers.    APC gene. The first variant found is called c.6710G>A (p.W0372Z). While the second variant found is called c.6785G>T (p.K9078L). Of note, this assay determined that the variants in the APC gene are on different chromosomes (in trans).   Harmful mutations in the APC gene cause Familial Adenomatous Polyposis (FAP). Individuals with FAP have 100's- 1000's of colonic polyps and have an increased risk for colon, stomach, duodenal, thyroid, pancreatic, and other cancers such as brain/ central nervous system tumors and hepatoblastoma in children. Individuals with FAP may also be at risk for desmoid tumors, sebaceous cysts, osteomas, and supernumerary teeth. We discussed that medical management decisions are NOT recommended for individuals with a VUS result.     The laboratory is working to determine if any of these variants are harmful or benign, and they will contact me if any of them are reclassified. If these variants are determined to be benign, there may be a different gene or combination of genes and environment that are associated with the cancers in this family.    It is also " important to consider that her affected relatives may have had a mutation in one of the genes tested and she did not inherit it.     Inheritance:  We reviewed the autosomal dominant inheritance of the variants in the APC gene.  We discussed that Nury has a 50% chance to pass each of these variants to each of her children. Likewise, there is a 50% chance for each of these variants to be present in each of her siblings. Because it is unclear what, if any, risk is associated with these variants, clinical genetic testing for these APC variants alone is not recommended for relatives.    Family Studies:  The laboratory may offer additional research based testing for specific relatives in order to help reclassify these variants.    Screening:  Based on this inconclusive test result, it is important for Nury and her relatives to refer back to the family history for appropriate cancer screening.    Per National Comprehensive Cancer Network (NCCN) guidelines, individuals with a first degree relative with colorectal cancer diagnosed at any age should begin colonoscopy at age 40, or 10 years before the earliest diagnosis of colorectal cancer, whichever is first. In this family, colonoscopy should start at age 38. Colonoscopy should be repeated every 5 years, or based on colonoscopy findings. This would apply to Nury. These recommendations may change based on personal and family history as well as personal preference, and should be discussed with an individuals physician.      Other population cancer screening options, such as those recommended by the American Cancer Society and the National Comprehensive Cancer Network (NCCN), are also appropriate for Nury and her family. These screening recommendations may change if there are changes to Nury's personal and/or family history of cancer.   Final screening recommendations should be made by each individual's primary care provider.    Summary:  We do not have an  explanation for Nury's family history of cancer. While no genetic changes were identified, Nury may still be at risk for certain cancers due to family history, environmental factors, or other genetic causes not identified by this test.  Because of that, it is important that she continue with cancer screening based on her personal and family history as discussed above.    Genetic testing is rapidly advancing, and new cancer susceptibility genes will most likely be identified in the future.  Therefore, I encouraged Nury to contact me annually or if there are changes in her personal or family history.  This may change how we assess her cancer risk, screening, and the testing we would offer.    Plan:  1.  A copy of the test results will be mailed to Nury.   2. She plans to follow-up with her other providers.  3. She should contact me regularly, or sooner if her family history changes.  4. I will attempt to contact Nury if the laboratory informs me that these variants have been reclassified.  This may change screening and testing recommendations for Nury and her relatives.    If Nury has any further questions, I encouraged her to contact me at [phone].    Time spent on video: 5 minutes    Madison Rosales MS, [facility]  Licensed, Certified Genetic Counselor  Phone: [phone]

## 2024-01-11 NOTE — NURSING NOTE
Is the patient currently in the state of [place]? YES    Visit mode:VIDEO    If the visit is dropped, the patient can be reconnected by: VIDEO VISIT: Send to e-mail at: [email or web address]    Will anyone else be joining the visit? YES: How would they like to receive their invitation? Send to e-mail: NA  (If patient encounters technical issues they should call [phone]    How would you like to obtain your AVS? MyChart    Are changes needed to the allergy or medication list? N/A    Reason for visit: RECHECK    True MULTANIF

## 2024-01-11 NOTE — Clinical Note
1/11/2024         RE: Nury Cárdenas  [address] [facility] [address]        Dear Colleague,    Thank you for referring your patient, Nury Cárdenas, to the [facility]. Please see a copy of my visit note below.    Virtual Visit Details  Type of service:  Video Visit   Originating Location (pt. Location): Home  Distant Location (provider location):  Off-site  Platform used for Video Visit: AmWell    1/11/2024    Referring Provider: Cecy Dill MD      Presenting Information:  I spoke to Nury today to discuss her genetic testing results. Her blood was drawn on 12/13/2023. The  St syndrome genes with automatic reflex to the CancerNext panel was ordered from [facility]. This testing was done because of Nury's  family history of colon cancer.    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    APC gene. This variant is called c.6710G>A (p.T5175O).  APC gene. This variant is called c.6785G>T (p.O4693S).    Of note, this assay determined that the variants in the APC gene are on different chromosomes (in trans). No other variants or mutations were found in these genes. Given the uncertain significance of this result, medical management decisions should NOT be made based on this test result alone.    Of note, Nury tested negative for mutations in the following genes by sequencing and deletion/duplication analysis: HIRAM, AXIN2, BARD1, BMPR1A, BRCA1, BRCA2, BRIP1, CDH1, CDK4, CDKN2A, CHEK2, DICER1, EPCAM, GREM1, HOXB13, MBD4, MLH1, MSH2, MSH3, MSH6, MUTYH, NBN, NF1, NTHL1, PALB2, PMS2, POLD1, POLE, PTEN, RAD51C, RAD51D, RECQL, SMAD4, SMARCA4, STK11, and TP53. We reviewed the autosomal dominant inheritance of these genes. Nury cannot pass on a mutation in any of these genes to her children based on this test result. Mutations in these genes do not  "skip generations.      A copy of the test report can be found in the Laboratory tab, dated 12/13/2023, and named \"LABORATORY MISCELLANEOUS ORDER\". The report is scanned in as a linked document.    Interpretation:  We discussed several different interpretations of this inconclusive test result. It is not clear if any of these variants are associated with increased cancer risk. These variants may be benign changes that do not increased cancer risk, or they may be harmful mutations that cause increased risk for certain cancers.    APC gene. The first variant found is called c.6710G>A (p.E6057J). While the second variant found is called c.6785G>T (p.V0940D). Of note, this assay determined that the variants in the APC gene are on different chromosomes (in trans).   Harmful mutations in the APC gene cause Familial Adenomatous Polyposis (FAP). Individuals with FAP have 100's- 1000's of colonic polyps and have an increased risk for colon, stomach, duodenal, thyroid, pancreatic, and other cancers such as brain/ central nervous system tumors and hepatoblastoma in children. Individuals with FAP may also be at risk for desmoid tumors, sebaceous cysts, osteomas, and supernumerary teeth. We discussed that medical management decisions are NOT recommended for individuals with a VUS result.     The laboratory is working to determine if any of these variants are harmful or benign, and they will contact me if any of them are reclassified. If these variants are determined to be benign, there may be a different gene or combination of genes and environment that are associated with the cancers in this family.    It is also important to consider that her affected relatives may have had a mutation in one of the genes tested and she did not inherit it.     Inheritance:  We reviewed the autosomal dominant inheritance of the variants in the APC gene.  We discussed that Nury has a 50% chance to pass each of these variants to each of her " children. Likewise, there is a 50% chance for each of these variants to be present in each of her siblings. Because it is unclear what, if any, risk is associated with these variants, clinical genetic testing for these APC variants alone is not recommended for relatives.    Family Studies:  The laboratory may offer additional research based testing for specific relatives in order to help reclassify these variants.    Screening:  Based on this inconclusive test result, it is important for Nury and her relatives to refer back to the family history for appropriate cancer screening.    Per National Comprehensive Cancer Network (NCCN) guidelines, individuals with a first degree relative with colorectal cancer diagnosed at any age should begin colonoscopy at age 40, or 10 years before the earliest diagnosis of colorectal cancer, whichever is first. In this family, colonoscopy should start at age 38. Colonoscopy should be repeated every 5 years, or based on colonoscopy findings. This would apply to Nury. These recommendations may change based on personal and family history as well as personal preference, and should be discussed with an individuals physician.      Other population cancer screening options, such as those recommended by the American Cancer Society and the National Comprehensive Cancer Network (NCCN), are also appropriate for Nury and her family. These screening recommendations may change if there are changes to Nury's personal and/or family history of cancer.   Final screening recommendations should be made by each individual's primary care provider.    Summary:  We do not have an explanation for Nury's family history of cancer. While no genetic changes were identified, Nury may still be at risk for certain cancers due to family history, environmental factors, or other genetic causes not identified by this test.  Because of that, it is important that she continue with cancer screening  based on her personal and family history as discussed above.    Genetic testing is rapidly advancing, and new cancer susceptibility genes will most likely be identified in the future.  Therefore, I encouraged Nury to contact me annually or if there are changes in her personal or family history.  This may change how we assess her cancer risk, screening, and the testing we would offer.    Plan:  1.  A copy of the test results will be mailed to Nury.   2. She plans to follow-up with her other providers.  3. She should contact me regularly, or sooner if her family history changes.  4. I will attempt to contact Nury if the laboratory informs me that these variants have been reclassified.  This may change screening and testing recommendations for Nury and her relatives.    If Nury has any further questions, I encouraged her to contact me at [phone].    Time spent on video: 5 minutes    Madison Rosales MS, [facility]  Licensed, Certified Genetic Counselor  Phone: [phone]      Again, thank you for allowing me to participate in the care of your patient.        Sincerely,        Madison Rosales GC

## 2024-01-18 ENCOUNTER — OFFICE VISIT (OUTPATIENT)
Dept: FAMILY MEDICINE | Facility: CLINIC | Age: 79
End: 2024-01-18
Payer: COMMERCIAL

## 2024-01-18 VITALS
WEIGHT: 122.2 LBS | BODY MASS INDEX: 20.36 KG/M2 | SYSTOLIC BLOOD PRESSURE: 138 MMHG | OXYGEN SATURATION: 97 % | HEIGHT: 65 IN | TEMPERATURE: 97.1 F | RESPIRATION RATE: 18 BRPM | DIASTOLIC BLOOD PRESSURE: 80 MMHG | HEART RATE: 74 BPM

## 2024-01-18 DIAGNOSIS — L71.0 PERIORAL DERMATITIS: Primary | ICD-10-CM

## 2024-01-18 DIAGNOSIS — J44.9 CHRONIC OBSTRUCTIVE PULMONARY DISEASE, UNSPECIFIED COPD TYPE (H): ICD-10-CM

## 2024-01-18 DIAGNOSIS — D84.9 IMMUNOSUPPRESSION (H): ICD-10-CM

## 2024-01-18 DIAGNOSIS — I26.99 ACUTE PULMONARY EMBOLISM, UNSPECIFIED PULMONARY EMBOLISM TYPE, UNSPECIFIED WHETHER ACUTE COR PULMONALE PRESENT (H): ICD-10-CM

## 2024-01-18 DIAGNOSIS — G63 POLYNEUROPATHY ASSOCIATED WITH UNDERLYING DISEASE (H): ICD-10-CM

## 2024-01-18 PROCEDURE — 99213 OFFICE O/P EST LOW 20 MIN: CPT | Performed by: PHYSICIAN ASSISTANT

## 2024-01-18 RX ORDER — RESPIRATORY SYNCYTIAL VIRUS VACCINE 120MCG/0.5
0.5 KIT INTRAMUSCULAR ONCE
Qty: 1 EACH | Refills: 0 | Status: CANCELLED | OUTPATIENT
Start: 2024-01-18 | End: 2024-01-18

## 2024-01-18 ASSESSMENT — PAIN SCALES - GENERAL: PAINLEVEL: NO PAIN (0)

## 2024-01-18 NOTE — PROGRESS NOTES
"  Assessment & Plan   Problem List Items Addressed This Visit    None  Visit Diagnoses       Perioral dermatitis    -  Primary    Relevant Medications    metroNIDAZOLE (METROCREAM) 0.75 % external cream           Apply metronidazole cream twice a day   May take 6-8 weeks for full improvement  Follow up with primary care provider if not better in 3-4 weeks     20 minutes spent by me on the date of the encounter doing chart review, history and exam, documentation and further activities per the note           Subjective   Pat is a 78 year old, presenting for the following health issues:  Derm Problem (Refused physical)      1/18/2024     2:01 PM   Additional Questions   Roomed by cheyanne laureano   Accompanied by none         1/18/2024     2:01 PM   Patient Reported Additional Medications   Patient reports taking the following new medications none     History of Present Illness       Reason for visit:  Check a rash on my face see if infection Rituximab IV sched 1-22-24 Can't have infection to get it doneShe consumes 1 sweetened beverage(s) daily. She exercises with enough effort to increase her heart rate 3 or less days per week.   She is taking medications regularly.         Rash  Onset/Duration:  few months  Description  Location: around nose and mouth  Character: red  Itching: mild  Intensity:  mild  Progression of Symptoms:  worsening  Accompanying signs and symptoms:   Fever: No  Body aches or joint pain: No  Sore throat symptoms: No  Recent cold symptoms: No  History:           Previous episodes of similar rash: None  New exposures:  None  Recent travel: No  Exposure to similar rash: No  Precipitating or alleviating factors: yes  Therapies tried and outcome: none        Review of Systems  Constitutional, HEENT, cardiovascular, pulmonary, gi and gu systems are negative, except as otherwise noted.      Objective    /80   Pulse 74   Temp 97.1  F (36.2  C) (Tympanic)   Resp 18   Ht 1.645 m (5' 4.76\")   Wt 55.4 " kg (122 lb 3.2 oz)   SpO2 97%   BMI 20.49 kg/m    Body mass index is 20.49 kg/m .    Physical Exam   GENERAL: healthy, alert and no distress  EYES: Eyes grossly normal to inspection,  conjunctivae and sclerae normal  HENT: normal cephalic/atraumatic, face is symmetrical,   RESP: no difficulty breathing, no use of accessory muscles observed.   CV: no peripheral edema and color normal, no parasternal heaves visualized.   MS: no gross musculoskeletal defects noted, no edema  SKIN: erythematous papules around mouth, nose and on top of the nose  NEURO: Normal strength and tone, mentation intact and speech normal  PSYCH: mentation appears normal, affect normal/bright              Signed Electronically by: Lilia Maddox PA-C

## 2024-01-18 NOTE — PATIENT INSTRUCTIONS
At [facility], we strive to deliver an exceptional experience to you, every time we see you. If you receive a survey, please complete it as we do value your feedback.  If you have MyChart, you can expect to receive results automatically within 24 hours of their completion.  Your provider will send a note interpreting your results as well.   If you do not have MyChart, you should receive your results in about a week by mail.    Your care team:                            Family Medicine Internal Medicine   MD Chan Bell MD Shantel Branch-Fleming, MD Srinivasa Vaka, MD Katya Belousova, PAYOLANDA Contreras, MD Pediatrics   Xavier Rizzo, PAMD Mara Adams MD Amelia Massimini APRN CNP   GURPREET Tracy CNP, MD Charanya Pasupathi, MD Kathleen Widmer, NP coming October 2023 Same-Day (No follow up visit)    ELIEL Keith PA coming Oct 2023     Clinic hours: Monday - Thursday 7 am-6 pm; Fridays 7 am-5 pm.   Urgent care: Monday - Friday 10 am- 8 pm; Saturday and Sunday 9 am-5 pm.    Clinic: [phone]       [facility]: Monday - Thursday 8 am - 7 pm; Friday 8 am - 6 pm  [facility]: [phone]

## 2024-01-22 ENCOUNTER — INFUSION THERAPY VISIT (OUTPATIENT)
Dept: INFUSION THERAPY | Facility: CLINIC | Age: 79
End: 2024-01-22
Attending: INTERNAL MEDICINE
Payer: COMMERCIAL

## 2024-01-22 VITALS
DIASTOLIC BLOOD PRESSURE: 86 MMHG | HEART RATE: 83 BPM | SYSTOLIC BLOOD PRESSURE: 155 MMHG | OXYGEN SATURATION: 95 % | TEMPERATURE: 98.4 F | BODY MASS INDEX: 20.57 KG/M2 | WEIGHT: 122.7 LBS | RESPIRATION RATE: 16 BRPM

## 2024-01-22 DIAGNOSIS — M80.00XS AGE-RELATED OSTEOPOROSIS WITH CURRENT PATHOLOGICAL FRACTURE, SEQUELA: ICD-10-CM
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
## 2024-01-22 DIAGNOSIS — T50.905S ADVERSE EFFECT OF DRUG, SEQUELA: ICD-10-CM

## 2024-01-22 DIAGNOSIS — M31.30 GRANULOMATOSIS WITH POLYANGIITIS WITHOUT RENAL INVOLVEMENT (H): ICD-10-CM

## 2024-01-22 DIAGNOSIS — T50.905S ADVERSE EFFECT OF DRUG, SEQUELA: Primary | ICD-10-CM

## 2024-01-22 DIAGNOSIS — K21.9 GASTROESOPHAGEAL REFLUX DISEASE WITHOUT ESOPHAGITIS: Primary | ICD-10-CM

## 2024-01-22 DIAGNOSIS — K21.9 GASTROESOPHAGEAL REFLUX DISEASE WITHOUT ESOPHAGITIS: ICD-10-CM

## 2024-01-22 PROCEDURE — 96415 CHEMO IV INFUSION ADDL HR: CPT

## 2024-01-22 PROCEDURE — 82784 ASSAY IGA/IGD/IGG/IGM EACH: CPT | Performed by: INTERNAL MEDICINE

## 2024-01-22 PROCEDURE — 86355 B CELLS TOTAL COUNT: CPT | Performed by: INTERNAL MEDICINE

## 2024-01-22 PROCEDURE — 250N000013 HC RX MED GY IP 250 OP 250 PS 637: Performed by: INTERNAL MEDICINE

## 2024-01-22 PROCEDURE — 258N000003 HC RX IP 258 OP 636: Performed by: INTERNAL MEDICINE

## 2024-01-22 PROCEDURE — 36415 COLL VENOUS BLD VENIPUNCTURE: CPT | Performed by: INTERNAL MEDICINE

## 2024-01-22 PROCEDURE — 96413 CHEMO IV INFUSION 1 HR: CPT

## 2024-01-22 PROCEDURE — 250N000011 HC RX IP 250 OP 636: Mod: JZ | Performed by: INTERNAL MEDICINE

## 2024-01-22 PROCEDURE — 96375 TX/PRO/DX INJ NEW DRUG ADDON: CPT

## 2024-01-22 RX ORDER — DIPHENHYDRAMINE HCL 25 MG
50 CAPSULE ORAL ONCE
Status: COMPLETED | OUTPATIENT
Start: 2024-01-22 | End: 2024-01-22

## 2024-01-22 RX ORDER — ACETAMINOPHEN 325 MG/1
650 TABLET ORAL ONCE
Status: COMPLETED | OUTPATIENT
Start: 2024-01-22 | End: 2024-01-22

## 2024-01-22 RX ORDER — DIPHENHYDRAMINE HYDROCHLORIDE 50 MG/ML
50 INJECTION INTRAMUSCULAR; INTRAVENOUS
Status: CANCELLED
Start: 2024-01-22

## 2024-01-22 RX ORDER — HEPARIN SODIUM,PORCINE 10 UNIT/ML
5-20 VIAL (ML) INTRAVENOUS DAILY PRN
Status: CANCELLED | OUTPATIENT
Start: 2024-01-22

## 2024-01-22 RX ORDER — METHYLPREDNISOLONE SODIUM SUCCINATE 125 MG/2ML
125 INJECTION, POWDER, LYOPHILIZED, FOR SOLUTION INTRAMUSCULAR; INTRAVENOUS ONCE
Status: CANCELLED | OUTPATIENT
Start: 2024-01-22

## 2024-01-22 RX ORDER — ACETAMINOPHEN 325 MG/1
650 TABLET ORAL ONCE
Status: CANCELLED
Start: 2024-01-22

## 2024-01-22 RX ORDER — METHYLPREDNISOLONE SODIUM SUCCINATE 125 MG/2ML
125 INJECTION, POWDER, LYOPHILIZED, FOR SOLUTION INTRAMUSCULAR; INTRAVENOUS
Status: CANCELLED
Start: 2024-01-22

## 2024-01-22 RX ORDER — ALBUTEROL SULFATE 90 UG/1
1-2 AEROSOL, METERED RESPIRATORY (INHALATION)
Status: CANCELLED
Start: 2024-01-22

## 2024-01-22 RX ORDER — ALBUTEROL SULFATE 0.83 MG/ML
2.5 SOLUTION RESPIRATORY (INHALATION)
Status: CANCELLED | OUTPATIENT
Start: 2024-01-22

## 2024-01-22 RX ORDER — MEPERIDINE HYDROCHLORIDE 25 MG/ML
25 INJECTION INTRAMUSCULAR; INTRAVENOUS; SUBCUTANEOUS EVERY 30 MIN PRN
Status: CANCELLED | OUTPATIENT
Start: 2024-01-22

## 2024-01-22 RX ORDER — METHYLPREDNISOLONE SODIUM SUCCINATE 125 MG/2ML
125 INJECTION, POWDER, LYOPHILIZED, FOR SOLUTION INTRAMUSCULAR; INTRAVENOUS ONCE
Status: COMPLETED | OUTPATIENT
Start: 2024-01-22 | End: 2024-01-22

## 2024-01-22 RX ORDER — EPINEPHRINE 1 MG/ML
0.3 INJECTION, SOLUTION INTRAMUSCULAR; SUBCUTANEOUS EVERY 5 MIN PRN
Status: CANCELLED | OUTPATIENT
Start: 2024-01-22

## 2024-01-22 RX ORDER — HEPARIN SODIUM (PORCINE) LOCK FLUSH IV SOLN 100 UNIT/ML 100 UNIT/ML
5 SOLUTION INTRAVENOUS
Status: CANCELLED | OUTPATIENT
Start: 2024-01-22

## 2024-01-22 RX ORDER — DIPHENHYDRAMINE HCL 25 MG
50 CAPSULE ORAL ONCE
Status: CANCELLED
Start: 2024-01-22

## 2024-01-22 RX ADMIN — METHYLPREDNISOLONE SODIUM SUCCINATE 125 MG: 125 INJECTION, POWDER, FOR SOLUTION INTRAMUSCULAR; INTRAVENOUS at 14:34

## 2024-01-22 RX ADMIN — DIPHENHYDRAMINE HYDROCHLORIDE 50 MG: 25 CAPSULE ORAL at 14:10

## 2024-01-22 RX ADMIN — ACETAMINOPHEN 650 MG: 325 TABLET ORAL at 14:10

## 2024-01-22 RX ADMIN — RITUXIMAB-ABBS 500 MG: 10 INJECTION, SOLUTION INTRAVENOUS at 14:47

## 2024-01-22 RX ADMIN — SODIUM CHLORIDE 250 ML: 9 INJECTION, SOLUTION INTRAVENOUS at 14:34

## 2024-01-22 NOTE — PROGRESS NOTES
Infusion Nursing Note:  Nury Cárdenas presents today for Rapid Rituxan.    Patient seen by provider today: No   present during visit today: Not Applicable.    Note: Premedications of tylenol, oral benadryl and solumedrol given prior to infusion start.    Rates: Rapid Infusion Rituximab: Infuse 20 % (200 mL/hr) over the first 30 minutes and the remainder (400 mL/hr) over 60 minutes     Patient using Metrocream for perioral dermatitis, okay to proceed, see [employer] messages on 1/14/23.     Intravenous Access:  Peripheral IV placed.    Treatment Conditions:  Biological Infusion Checklist:  ~~~ NOTE: If the patient answers yes to any of the questions below, hold the infusion and contact ordering provider or on-call provider.    Have you recently had an elevated temperature, fever, chills, productive cough, coughing for 3 weeks or longer or hemoptysis,  abnormal vital signs, night sweats,  chest pain or have you noticed a decrease in your appetite, unexplained weight loss or fatigue? No  Do you have any open wounds or new incisions? No  Do you have any upcoming hospitalizations or surgeries? Does not include esophagogastroduodenoscopy, colonoscopy, endoscopic retrograde cholangiopancreatography (ERCP), endoscopic ultrasound (EUS), dental procedures or joint aspiration/steroid injections No  Do you currently have any signs of illness or infection or are you on any antibiotics? No  Have you had any new, sudden or worsening abdominal pain? No  Have you or anyone in your household received a live vaccination in the past 4 weeks? Please note: No live vaccines while on biologic/chemotherapy until 6 months after the last treatment. Patient can receive the flu vaccine (shot only), pneumovax and the Covid vaccine. It is optimal for the patient to get these vaccines mid cycle, but they can be given at any time as long as it is not on the day of the infusion. No  Have you recently been diagnosed with any new nervous  system diseases (ie. Multiple sclerosis, Guillain [place], seizures, neurological changes) or cancer diagnosis? Are you on any form of radiation or chemotherapy? No  Have there been any other new onset medical symptoms? No  Post Infusion Assessment:  Patient tolerated infusion without incident.  Site patent and intact, free from redness, edema or discomfort.  No evidence of extravasations.  Access discontinued per protocol.  Biologic Infusion Post Education: Call the triage nurse at your clinic or seek medical attention if you have chills and/or temperature greater than or equal to 100.5, uncontrolled nausea/vomiting, diarrhea, constipation, dizziness, shortness of breath, chest pain, heart palpitations, weakness or any other new or concerning symptoms, questions or concerns.  You cannot have any live virus vaccines prior to or during treatment or up to 6 months post infusion.  If you have an upcoming surgery, medical procedure or dental procedure during treatment, this should be discussed with your ordering physician and your surgeon/dentist.  If you are having any concerning symptom, if you are unsure if you should get your next infusion or wish to speak to a provider before your next infusion, please call your care coordinator or triage nurse at your clinic to notify them so we can adequately serve you.       Discharge Plan:   Discharge instructions reviewed with: Patient.  Patient and/or family verbalized understanding of discharge instructions and all questions answered.  Patient discharged in stable condition accompanied by: self.  Departure Mode: Ambulatory.  Future appts have been reviewed and crosschecked with appt note and plan.      Radha Lozoya RN

## 2024-01-23 ENCOUNTER — ANCILLARY PROCEDURE (OUTPATIENT)
Dept: BONE DENSITY | Facility: CLINIC | Age: 79
End: 2024-01-23
Attending: INTERNAL MEDICINE
Payer: COMMERCIAL

## 2024-01-23 DIAGNOSIS — Z79.60 LONG-TERM USE OF IMMUNOSUPPRESSANT MEDICATION: ICD-10-CM

## 2024-01-23 DIAGNOSIS — M85.89 OSTEOPENIA OF MULTIPLE SITES: ICD-10-CM

## 2024-01-23 DIAGNOSIS — M31.30 GRANULOMATOSIS WITH POLYANGIITIS WITHOUT RENAL INVOLVEMENT (H): ICD-10-CM

## 2024-01-23 DIAGNOSIS — M15.0 PRIMARY OSTEOARTHRITIS INVOLVING MULTIPLE JOINTS: ICD-10-CM

## 2024-01-23 LAB
CD19 B CELL COMMENT: NORMAL
CD19 CELLS # BLD: 125 CELLS/UL (ref 107–698)
CD19 CELLS NFR BLD: 7 % (ref 6–27)
IGA SERPL-MCNC: 93 MG/DL (ref 84–499)
IGG SERPL-MCNC: 499 MG/DL (ref 610–1616)
IGM SERPL-MCNC: 64 MG/DL (ref 35–242)

## 2024-01-23 PROCEDURE — 77080 DXA BONE DENSITY AXIAL: CPT | Mod: TC | Performed by: PHYSICIAN ASSISTANT

## 2024-01-24 ENCOUNTER — MYC MEDICAL ADVICE (OUTPATIENT)
Dept: RHEUMATOLOGY | Facility: CLINIC | Age: 79
End: 2024-01-24
Payer: COMMERCIAL

## 2024-01-26 NOTE — TELEPHONE ENCOUNTER
Patient would like recommendations for a neurologist at [place]. She is requesting to see neurology for twitching in the left side of her face. Still currently having pain in the back of her head and neck. Please review and advise. Thank you.      SUKHJINDER Xiong  Rheumatology/Infectious disease  [facility]   Rheumatology ph:[phone]  Infectious Disease ph:[phone]

## 2024-01-27 ENCOUNTER — MYC MEDICAL ADVICE (OUTPATIENT)
Dept: FAMILY MEDICINE | Facility: CLINIC | Age: 79
End: 2024-01-27
Payer: COMMERCIAL

## 2024-01-29 ENCOUNTER — OFFICE VISIT (OUTPATIENT)
Dept: FAMILY MEDICINE | Facility: CLINIC | Age: 79
End: 2024-01-29
Payer: COMMERCIAL

## 2024-01-29 VITALS
SYSTOLIC BLOOD PRESSURE: 119 MMHG | RESPIRATION RATE: 16 BRPM | WEIGHT: 121.8 LBS | DIASTOLIC BLOOD PRESSURE: 76 MMHG | BODY MASS INDEX: 20.79 KG/M2 | HEART RATE: 83 BPM | OXYGEN SATURATION: 96 % | HEIGHT: 64 IN

## 2024-01-29 DIAGNOSIS — R25.3 MUSCLE TWITCHING: ICD-10-CM

## 2024-01-29 DIAGNOSIS — Z29.11 NEED FOR VACCINATION AGAINST RESPIRATORY SYNCYTIAL VIRUS: ICD-10-CM

## 2024-01-29 DIAGNOSIS — L71.9 ROSACEA: Primary | ICD-10-CM

## 2024-01-29 DIAGNOSIS — R42 DIZZINESS: ICD-10-CM

## 2024-01-29 DIAGNOSIS — M89.9 SKULL LESION: ICD-10-CM

## 2024-01-29 DIAGNOSIS — R26.81 UNSTEADY GAIT WHEN WALKING: ICD-10-CM

## 2024-01-29 DIAGNOSIS — Z23 NEED FOR SHINGLES VACCINE: ICD-10-CM

## 2024-01-29 PROCEDURE — 99214 OFFICE O/P EST MOD 30 MIN: CPT | Performed by: FAMILY MEDICINE

## 2024-01-29 ASSESSMENT — PAIN SCALES - GENERAL: PAINLEVEL: NO PAIN (0)

## 2024-01-29 NOTE — PROGRESS NOTES
Assessment & Plan     Rosacea     Rash clearing with metronidazole cream, discussed pathophysiology and treatment for rosecea and fact that ca recur    Crainal lesion: Lt occipital   Reviewed imagin23; thought to be benign. No new neurological symptoms. Surveillance    Muscle twitching (lt nasolabial)    -  reports improving as the Rosacea is improving, will manage expectantly    Dizziness    -  Consistent with vertigo but could also be due weakness.  The cranial lesion seen on occiput unlikely to be cause of the dizziness.    Unsteady gait when walking   Recommended use of cane for balance as needed  - Cane Order for DME - ONLY FOR DME    Need for shingles vaccine  Need for vaccination against respiratory syncytial virus    - wishes to defer      See Patient Instructions    Subjective   Pat is a 78 year old, presenting for the following health issues:    Check Rash on Nose   Rosacea started on Metronidazole cream    Muscle twitching;  Gets twitch on upper lip to lower Lt eye      Pain in scalp: at night and early morning once in a while   Left side of occiput   MR BRAIN 23    Faintly enhancing, T1 hypointense, T2/FLAIR hyperintense 3 cm lesion within the left parietal calvarium demonstrates overall nonaggressive features, and is occult by CT and cold on PET. These findings are most suggestive of a benign vasoformative lesion such as hemangioma.     Dizziness (vertigo)   Lately been feeling whoozy randomly; has to sit down till passes, worse if lays down.   Can still walk; but has to has to hold unto something.    Tinnitus    Affecting some tones and    Had hearing test, tried hearing aids but did not work.    Balance feeling a little tipsy          2024     8:09 AM   Additional Questions   Roomed by JESU LÓPEZ   Accompanied by self     History of Present Illness       Reason for visit:  Check a rash on my face see if infection Rituximab IV sched 24 Can't have infection to get it doneShe  "consumes 1 sweetened beverage(s) daily. She exercises with enough effort to increase her heart rate 3 or less days per week.   She is taking medications regularly.         Review of Systems  Constitutional, HEENT, cardiovascular, pulmonary, gi and gu systems are negative, except as otherwise noted.      Objective    /76 (BP Location: Left arm, Cuff Size: Adult Regular)   Pulse 83   Resp 16   Ht 1.63 m (5' 4.17\")   Wt 55.2 kg (121 lb 12.8 oz)   SpO2 96%   BMI 20.79 kg/m    Body mass index is 20.79 kg/m .  Physical Exam   GENERAL: alert and no distress  NECK: no adenopathy, no asymmetry, masses, or scars  RESP: lungs clear to auscultation - no rales, rhonchi or wheezes  CV: regular rate and rhythm, no murmur, click or rub, no peripheral edema  MS: no gross musculoskeletal defects noted, slow gait with thoracic kyphosis  NEURO: Moderate strength and tone, mentation intact and speech normal          Signed Electronically by: Phil Abbott MD    "

## 2024-01-30 NOTE — TELEPHONE ENCOUNTER
Patient had office visit yesterday with PCP closing encounter    Sydnee Helms RN  [facility]

## 2024-02-09 DIAGNOSIS — G43.909 MIGRAINE WITHOUT STATUS MIGRAINOSUS, NOT INTRACTABLE, UNSPECIFIED MIGRAINE TYPE: ICD-10-CM

## 2024-02-09 RX ORDER — PROPRANOLOL HYDROCHLORIDE 20 MG/1
20 TABLET ORAL DAILY
Qty: 90 TABLET | Refills: 1 | Status: SHIPPED | OUTPATIENT
Start: 2024-02-09 | End: 2024-08-06

## 2024-03-12 ENCOUNTER — TRANSFERRED RECORDS (OUTPATIENT)
Dept: HEALTH INFORMATION MANAGEMENT | Facility: CLINIC | Age: 79
End: 2024-03-12
Payer: COMMERCIAL

## 2024-03-20 ENCOUNTER — OFFICE VISIT (OUTPATIENT)
Dept: NEUROLOGY | Facility: CLINIC | Age: 79
End: 2024-03-20
Payer: COMMERCIAL

## 2024-03-20 VITALS
BODY MASS INDEX: 20.49 KG/M2 | SYSTOLIC BLOOD PRESSURE: 132 MMHG | WEIGHT: 120 LBS | DIASTOLIC BLOOD PRESSURE: 76 MMHG | HEART RATE: 72 BPM

## 2024-03-20 DIAGNOSIS — R51.9 OCCIPITAL HEADACHE: Primary | ICD-10-CM

## 2024-03-20 DIAGNOSIS — G51.32 HEMIFACIAL SPASM OF LEFT SIDE OF FACE: ICD-10-CM

## 2024-03-20 DIAGNOSIS — G62.9 NEUROPATHY: ICD-10-CM

## 2024-03-20 PROCEDURE — 99215 OFFICE O/P EST HI 40 MIN: CPT | Performed by: INTERNAL MEDICINE

## 2024-03-20 NOTE — LETTER
3/20/2024         RE: Nury Cárdenas  [address] [address]        Dear Colleague,    Thank you for referring your patient, Nury Cárdenas, to the [facility] [place]. Please see a copy of my visit note below.    [facility] Neurology Follow Up Visit    Nury Cárdenas MRN# [id number]   Age: 79 year old YOB: 1945     Brief history of symptoms: The patient was initially seen in neurologic consultation on 10/25/2022 for evaluation of tingling sensations. Please see the comprehensive neurologic consultation note from that date in the Epic records for details.     Interval history:   Patient developed a pain in the back of the head last July. She only gets it at night. It is on the left side.     She went to a chiropractor who is working on her neck. This has been helpful. Currently symptoms are almost every night, but it is slight pain. Pain is a sharp ache that is constantly present. There are no quick bolts of pain.     Patient developed some twitching movements on left lip, cheek, lower lid last summer as well. It was worse previously, but she still has mild symptoms that come and go. Symptoms are brief lasting a few minutes.     She still has the tightness sensation in the feet. It is about the same. Some days the right one is more tight.       Past Medical History:     Patient Active Problem List   Diagnosis     GERD (gastroesophageal reflux disease)     Fibroids     Migraines     Hearing loss     Osteopenia     HYPERLIPIDEMIA LDL GOAL <160     Inflammatory arthritis     Synovitis of wrist     Chronic constipation     Granulomatosis with polyangiitis without renal involvement (H)     Chronic steroid use     Dyspnea     Pulmonary embolism (H)     Calculus of kidney, right     Chronic anticoagulation     Long-term (current) use of anticoagulants [Z79.01]     Long-term use of immunosuppressant medication     Primary osteoarthritis involving multiple joints      Cellulitis     Cat scratch left lower extremity     Other pulmonary embolism without acute cor pulmonale, unspecified chronicity (H)     Age-related osteoporosis without current pathological fracture     Age-related osteoporosis with current pathological fracture, sequela     Adverse effects of medication     Combined forms of age-related cataract, mild-mod, of right eye     Pseudophakia, Yag Caps, os     History of vitrectomy - macular hole, os (MVE)     Hx of macular hole of left eye     Hemifacial spasm     Immunosuppression (H24)     Clinical diagnosis of COVID-19     History of COVID-19     Lesion of right eyelid     COPD (chronic obstructive pulmonary disease) (H)     Polyneuropathy associated with underlying disease (H24)     Esodeviation     History of strabismus surgery     Current chronic use of systemic steroids     Herpes zoster ophthalmicus, right     Family history of genetic disease carrier     Past Medical History:   Diagnosis Date     Amblyopia      Atypical pneumonia 06/14/2014     Atypical pneumonia      Atypical pneumonia      Atypical pneumonia      COPD (chronic obstructive pulmonary disease) (H) 07/20/2021     Coronary artery disease 1982    heart beat hard made me tired     Dyslipidemia      Fibroids      GERD (gastroesophageal reflux disease)      Granulomatosis with polyangiitis (Wegener's)      Hearing loss      Inflammatory arthritis     thumb     Migraines      MVP (mitral valve prolapse)     had an echo that was normal in 2007 she is on Inderal     Nonsenile cataract      Osteopenia      PE (pulmonary embolism) 10/2014    ? GPA associated, on warfarin      Strabismus      Synovitis of wrist 2009    right        Past Surgical History:     Past Surgical History:   Procedure Laterality Date     ABDOMEN SURGERY      appendix out     APPENDECTOMY       BIOPSY  1972    cone biopsy     CATARACT IOL, RT/LT       CL AFF SURGICAL PATHOLOGY      cone biopsy 1975     COLONOSCOPY        COLONOSCOPY WITH CO2 INSUFFLATION N/A 2017    Procedure: COLONOSCOPY WITH CO2 INSUFFLATION;  Screening  BMI: 20.7  Pharmacy: [facility]  [phone]  [facility]  Referring: Milena  Patient get ill from [facility];  Surgeon: Duane, William Charles, MD;  Location: [facility] OR     EYE SURGERY      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     HC ESOPHAGOSCOPY, DIAGNOSTIC       LASER YAG CAPSULOTOMY      left eye     OPTICAL TRACKING SYSTEM BRONCHOSCOPY  2014    Procedure: OPTICAL TRACKING SYSTEM BRONCHOSCOPY;  Surgeon: Angel Kathleen MD;  Location:  GI     PHACOEMULSIFICATION WITH STANDARD INTRAOCULAR LENS IMPLANT  2018    left eye (SR)     SOFT TISSUE SURGERY      remove senovial fluid from right wrist     STRABISMUS SURGERY       SURGICAL HISTORY OF -   as a child    strabismus repair right eye     SURGICAL HISTORY OF -       right wrist debridement     TONSILLECTOMY      as a child     TONSILLECTOMY & ADENOIDECTOMY       TUBAL LIGATION       VITRECTOMY PARSPLANA  2018    left eye - mac hole (MVE)        Social History:     Social History     Tobacco Use     Smoking status: Former     Packs/day: 2.00     Years: 20.00     Additional pack years: 0.00     Total pack years: 40.00     Types: Cigarettes     Start date: 1961     Quit date: 1983     Years since quittin.6     Smokeless tobacco: Never   Substance Use Topics     Alcohol use: No     Comment: 1-2 glasses per year     Drug use: No        Family History:     Family History   Problem Relation Age of Onset     Respiratory Mother         emphysema     Aneurysm Mother      Chronic Obstructive Pulmonary Disease Mother      Thyroid Disease Mother      Osteoporosis Mother      Other Cancer Father      Cancer Father         lung cancer     Arthritis Maternal Grandmother         rheumatoid     Heart Disease Maternal Grandmother         unsure of details     Heart Disease Maternal Grandfather      Neurologic Disorder Paternal  Grandmother         migraines     Alzheimer Disease Paternal Grandmother      Unknown/Adopted Paternal Grandfather      Cancer Sister         stage 4 anal cancer age 62 years     Colon Cancer Sister      Substance Abuse Sister      Cancer - colorectal Brother      Colon Cancer Brother      Substance Abuse Sister      Anesthesia Reaction Daughter         Medications:     Current Outpatient Medications   Medication Sig     aspirin-acetaminophen-caffeine (EXCEDRIN MIGRAINE) 250-250-65 MG tablet Take 1 tablet by mouth every 6 hours as needed for headaches     calcium-magnesium (CALMAG) 500-250 MG TABS per tablet Take 1 tablet by mouth 2 times daily (with meals)     cholecalciferol 1000 UNITS TABS Take 1,000 Units by mouth daily     loratadine (CLARITIN) 10 MG tablet TAKE 1 TABLET(10 MG) BY MOUTH DAILY     metroNIDAZOLE (METROCREAM) 0.75 % external cream Apply topically 2 times daily     omeprazole (PRILOSEC) 20 MG DR capsule Take 20 mg by mouth daily     propranolol (INDERAL) 20 MG tablet TAKE 1 TABLET(20 MG) BY MOUTH DAILY     No current facility-administered medications for this visit.     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's syndrom     Adhesive Tape      Rash itches     Amoxicillin      vomiting     Amoxicillin-Pot Clavulanate      vomiting     Erythromycin      vomiting     Morphine And Related      vomiting     Nickel      itches rash     Sulfa Antibiotics Itching     Tegaderm Alginate Ag Rope      LW Other1: -ALL MEDICATIONS MAKE PATIENT VIOLENTLY ILL; ADHESIVE BANDAGES; NICKEL     Zithromax [Azithromycin Dihydrate]      Vomiting/ skin blisters        Review of Systems:   As above     Physical Exam:   Vitals: /76 (BP Location: Right arm, Patient Position: Sitting, Cuff Size: Adult Regular)   Pulse 72   Wt 54.4 kg (120 lb)    BMI 20.49 kg/m     General: Seated comfortably in no acute distress.  HEENT: Optic discs sharp on funduscopic exam.   Lungs: breathing comfortably  Neurologic:     Mental Status: Fully alert, attentive. Language normal, speech clear and fluent, no paraphasic errors.      Cranial Nerves: Visual fields intact. Right pupil 1 mm bigger than left. EOMI with normal smooth pursuit. Facial sensation intact/symmetric. Facial movements symmetric. Hearing not formally tested but intact to conversation. Palate elevation symmetric, uvula midline. No dysarthria. Shoulder shrug strong bilaterally. Tongue protrusion midline.     Motor: No tremors or other abnormal movements observed. Muscle tone normal throughout. Strength as below.      Right Left   Shoulder abduction        5 5   Elbow extension 5 5   Elbow flexion 5 5   Wrist extension         5 5   Finger extension 5 5   ADM 5 5   FDI 5 5   APB 4+ 5-   Hip flexion 5 5   Knee flexion 5 5   Knee extension 5 5   Dorsiflexion 5 5   Plantar flexion 5 5        Deep Tendon Reflexes: 1+/symmetric throughout upper and lower extremities with exception of 2+ patella. No clonus. Toes downgoing bilaterally.     Sensory: Decreased sensation to light touch in the bilateral feet, otherwise intact. Temperature sensation intact throughout. Vibration is ~2 seconds in the bilateral great toes, normal in the hands. Proprioception is intact throughout.      Coordination: Finger-nose-finger and heel-shin intact without dysmetria.      Gait: Steady casual gait. Able to walk on toes, heels and tandem without difficulty.     Data reviewed on previous visits    Imaging:  MRI brain 2018  IMPRESSION:    1. Normal MR appearance of the internal auditory canals, labyrinthine  structures, as well as the intracranial course of the seventh and  eighth cranial nerves.   2. No evidence of acute infarct, mass, hemorrhage, or herniation.  3. Mild nonspecific white matter changes likely due to chronic  microvascular  ischemic disease.      Laboratory:  CRP normal, Proteinase 3 Antibody IgG positive, ANCA negative (10/2022)  A1c 5.5 (1/2022)  SSA/SSB negative (2014)    Pertinent Investigations since last visit:   ELP with hypogammaglobulemia, no M protein on IMX, B12 normal (10/2022)  Urine IMX normal (11/2022)    MRI brain 8/2023  IMPRESSION:    1. New abnormal enhancing 2.8 cm lesion in the left parietal bone.  This was not present on 11/12/2018 and is concerning for a neoplastic  process, possibly a bony metastases.  2. No suspicious enhancing lesions in the brain parenchyma. No other  acute intracranial abnormality.  3. Mild nonspecific white matter changes most likely due to chronic  microvascular ischemic disease. These appear similar to prior MR  11/12/2018.    MRI cervical 8/2023  IMPRESSION:    1. Multilevel degenerative changes throughout the cervical spine as  detailed above.  2. No spinal canal narrowing at any level. Moderate neural foraminal  narrowings on the left at C3-C4 and bilaterally at C5-C6 and C6-C7.  3. No focal destructive bony lesions appreciated in the cervical  spine.     MRI brain 12/2023  Impression:   1. Faintly enhancing, T1 hypointense, T2/FLAIR hyperintense 3 cm lesion within the left parietal calvarium demonstrates overall nonaggressive features, and is occult by CT and cold on PET. These findings are most suggestive of a benign vasoformative lesion such as hemangioma.   2. No evidence of acute intracranial abnormality or suspicious intracranial enhancement.   3. Mild generalized cerebral volume loss and mild chronic microangiopathy changes.          Assessment and Plan:   Assessment:  Nury Cárdenas is a 79 year old female with history of GPA who presents today for follow-up of mild sensory polyneuropathy (possible related to underlying GPA versus idiopathic) and evaluation of left facial twitching and left sided head pains. I suspect head pains are most likely related to atypical tension  type headache in the setting of significant neck pain. She has had benefit from seeing the chiropractor. PT referral could be considered in the future. Occipital nerve block or oral migraine medication could be considered as well if needed. Left side facial twitching likely represents mild hemifacial spasm. If these symptoms become more bothersome, we can refer patient for Botox. MRI brain did not show a concerning cause of hemifacial spasm or head pain. Sensory neuropathy appears stable on exam. I encouraged her to call if symptoms worsen.      Plan:  - Call with worsening neurological symptoms    Follow up in Neurology clinic as needed should new concerns arise.    Ari Crump MD   of Neurology  [facility]    The total time of this encounter today amounted to 41 minutes. This time included time spent with the patient, prep work, ordering tests, and performing post visit documentation.      Again, thank you for allowing me to participate in the care of your patient.        Sincerely,        Ari Crump MD

## 2024-03-20 NOTE — PROGRESS NOTES
[facility] Neurology Follow Up Visit    Nury Cárdenas MRN# [id number]   Age: 79 year old YOB: 1945     Brief history of symptoms: The patient was initially seen in neurologic consultation on 10/25/2022 for evaluation of tingling sensations. Please see the comprehensive neurologic consultation note from that date in the Epic records for details.     Interval history:   Patient developed a pain in the back of the head last July. She only gets it at night. It is on the left side.     She went to a chiropractor who is working on her neck. This has been helpful. Currently symptoms are almost every night, but it is slight pain. Pain is a sharp ache that is constantly present. There are no quick bolts of pain.     Patient developed some twitching movements on left lip, cheek, lower lid last summer as well. It was worse previously, but she still has mild symptoms that come and go. Symptoms are brief lasting a few minutes.     She still has the tightness sensation in the feet. It is about the same. Some days the right one is more tight.       Past Medical History:     Patient Active Problem List   Diagnosis    GERD (gastroesophageal reflux disease)    Fibroids    Migraines    Hearing loss    Osteopenia    HYPERLIPIDEMIA LDL GOAL <160    Inflammatory arthritis    Synovitis of wrist    Chronic constipation    Granulomatosis with polyangiitis without renal involvement (H)    Chronic steroid use    Dyspnea    Pulmonary embolism (H)    Calculus of kidney, right    Chronic anticoagulation    Long-term (current) use of anticoagulants [Z79.01]    Long-term use of immunosuppressant medication    Primary osteoarthritis involving multiple joints    Cellulitis    Cat scratch left lower extremity    Other pulmonary embolism without acute cor pulmonale, unspecified chronicity (H)    Age-related osteoporosis without current pathological fracture    Age-related osteoporosis with current pathological fracture, sequela    Adverse  effects of medication    Combined forms of age-related cataract, mild-mod, of right eye    Pseudophakia, Yag Caps, os    History of vitrectomy - macular hole, os (MVE)    Hx of macular hole of left eye    Hemifacial spasm    Immunosuppression (H24)    Clinical diagnosis of COVID-19    History of COVID-19    Lesion of right eyelid    COPD (chronic obstructive pulmonary disease) (H)    Polyneuropathy associated with underlying disease (H24)    Esodeviation    History of strabismus surgery    Current chronic use of systemic steroids    Herpes zoster ophthalmicus, right    Family history of genetic disease carrier     Past Medical History:   Diagnosis Date    Amblyopia     Atypical pneumonia 06/14/2014    Atypical pneumonia     Atypical pneumonia     Atypical pneumonia     COPD (chronic obstructive pulmonary disease) (H) 07/20/2021    Coronary artery disease 1982    heart beat hard made me tired    Dyslipidemia     Fibroids     GERD (gastroesophageal reflux disease)     Granulomatosis with polyangiitis (Wegener's)     Hearing loss     Inflammatory arthritis     thumb    Migraines     MVP (mitral valve prolapse)     had an echo that was normal in 2007 she is on Inderal    Nonsenile cataract     Osteopenia     PE (pulmonary embolism) 10/2014    ? GPA associated, on warfarin     Strabismus     Synovitis of wrist 2009    right        Past Surgical History:     Past Surgical History:   Procedure Laterality Date    ABDOMEN SURGERY      appendix out    APPENDECTOMY      BIOPSY  1972    cone biopsy    CATARACT IOL, RT/LT      CL AFF SURGICAL PATHOLOGY      cone biopsy 1975    COLONOSCOPY      COLONOSCOPY WITH CO2 INSUFFLATION N/A 12/20/2017    Procedure: COLONOSCOPY WITH CO2 INSUFFLATION;  Screening  BMI: 20.7  Pharmacy: [facility]  [phone]  [facility]  Referring: [facility]  Patient get ill from [facility];  Surgeon: Duane, William Charles, MD;  Location:  OR    EYE SURGERY      lazy eye corrected muscle on  both    HC ESOPHAGOSCOPY, DIAGNOSTIC      LASER YAG CAPSULOTOMY      left eye    OPTICAL TRACKING SYSTEM BRONCHOSCOPY  2014    Procedure: OPTICAL TRACKING SYSTEM BRONCHOSCOPY;  Surgeon: Angel Kathleen MD;  Location:  GI    PHACOEMULSIFICATION WITH STANDARD INTRAOCULAR LENS IMPLANT  2018    left eye (SR)    SOFT TISSUE SURGERY      remove senovial fluid from right wrist    STRABISMUS SURGERY      SURGICAL HISTORY OF -   as a child    strabismus repair right eye    SURGICAL HISTORY OF -       right wrist debridement    TONSILLECTOMY      as a child    TONSILLECTOMY & ADENOIDECTOMY      TUBAL LIGATION      VITRECTOMY PARSPLANA  2018    left eye - mac hole (MVE)        Social History:     Social History     Tobacco Use    Smoking status: Former     Packs/day: 2.00     Years: 20.00     Additional pack years: 0.00     Total pack years: 40.00     Types: Cigarettes     Start date: 1961     Quit date: 1983     Years since quittin.6    Smokeless tobacco: Never   Substance Use Topics    Alcohol use: No     Comment: 1-2 glasses per year    Drug use: No        Family History:     Family History   Problem Relation Age of Onset    Respiratory Mother         emphysema    Aneurysm Mother     Chronic Obstructive Pulmonary Disease Mother     Thyroid Disease Mother     Osteoporosis Mother     Other Cancer Father     Cancer Father         lung cancer    Arthritis Maternal Grandmother         rheumatoid    Heart Disease Maternal Grandmother         unsure of details    Heart Disease Maternal Grandfather     Neurologic Disorder Paternal Grandmother         migraines    Alzheimer Disease Paternal Grandmother     Unknown/Adopted Paternal Grandfather     Cancer Sister         stage 4 anal cancer age 62 years    Colon Cancer Sister     Substance Abuse Sister     Cancer - colorectal Brother     Colon Cancer Brother     Substance Abuse Sister     Anesthesia Reaction Daughter         Medications:      Current Outpatient Medications   Medication Sig    aspirin-acetaminophen-caffeine (EXCEDRIN MIGRAINE) 250-250-65 MG tablet Take 1 tablet by mouth every 6 hours as needed for headaches    calcium-magnesium (CALMAG) 500-250 MG TABS per tablet Take 1 tablet by mouth 2 times daily (with meals)    cholecalciferol 1000 UNITS TABS Take 1,000 Units by mouth daily    loratadine (CLARITIN) 10 MG tablet TAKE 1 TABLET(10 MG) BY MOUTH DAILY    metroNIDAZOLE (METROCREAM) 0.75 % external cream Apply topically 2 times daily    omeprazole (PRILOSEC) 20 MG DR capsule Take 20 mg by mouth daily    propranolol (INDERAL) 20 MG tablet TAKE 1 TABLET(20 MG) BY MOUTH DAILY     No current facility-administered medications for this visit.     Facility-Administered Medications Ordered in Other Visits   Medication    sodium chloride (PF) 0.9% PF flush 60 mL        Allergies:     Allergies   Allergen Reactions    Vancomycin Other (See Comments)     Red man's syndrom    Adhesive Tape      Rash itches    Amoxicillin      vomiting    Amoxicillin-Pot Clavulanate      vomiting    Erythromycin      vomiting    Morphine And Related      vomiting    Nickel      itches rash    Sulfa Antibiotics Itching    Tegaderm Alginate Ag Rope      LW Other1: -ALL MEDICATIONS MAKE PATIENT VIOLENTLY ILL; ADHESIVE BANDAGES; NICKEL    Zithromax [Azithromycin Dihydrate]      Vomiting/ skin blisters        Review of Systems:   As above     Physical Exam:   Vitals: /76 (BP Location: Right arm, Patient Position: Sitting, Cuff Size: Adult Regular)   Pulse 72   Wt 54.4 kg (120 lb)   BMI 20.49 kg/m     General: Seated comfortably in no acute distress.  HEENT: Optic discs sharp on funduscopic exam.   Lungs: breathing comfortably  Neurologic:     Mental Status: Fully alert, attentive. Language normal, speech clear and fluent, no paraphasic errors.      Cranial Nerves: Visual fields intact. Right pupil 1 mm bigger than left. EOMI with normal smooth pursuit. Facial  sensation intact/symmetric. Facial movements symmetric. Hearing not formally tested but intact to conversation. Palate elevation symmetric, uvula midline. No dysarthria. Shoulder shrug strong bilaterally. Tongue protrusion midline.     Motor: No tremors or other abnormal movements observed. Muscle tone normal throughout. Strength as below.      Right Left   Shoulder abduction        5 5   Elbow extension 5 5   Elbow flexion 5 5   Wrist extension         5 5   Finger extension 5 5   ADM 5 5   FDI 5 5   APB 4+ 5-   Hip flexion 5 5   Knee flexion 5 5   Knee extension 5 5   Dorsiflexion 5 5   Plantar flexion 5 5        Deep Tendon Reflexes: 1+/symmetric throughout upper and lower extremities with exception of 2+ patella. No clonus. Toes downgoing bilaterally.     Sensory: Decreased sensation to light touch in the bilateral feet, otherwise intact. Temperature sensation intact throughout. Vibration is ~2 seconds in the bilateral great toes, normal in the hands. Proprioception is intact throughout.      Coordination: Finger-nose-finger and heel-shin intact without dysmetria.      Gait: Steady casual gait. Able to walk on toes, heels and tandem without difficulty.     Data reviewed on previous visits    Imaging:  MRI brain 2018  IMPRESSION:    1. Normal MR appearance of the internal auditory canals, labyrinthine  structures, as well as the intracranial course of the seventh and  eighth cranial nerves.   2. No evidence of acute infarct, mass, hemorrhage, or herniation.  3. Mild nonspecific white matter changes likely due to chronic  microvascular ischemic disease.      Laboratory:  CRP normal, Proteinase 3 Antibody IgG positive, ANCA negative (10/2022)  A1c 5.5 (1/2022)  SSA/SSB negative (2014)    Pertinent Investigations since last visit:   ELP with hypogammaglobulemia, no M protein on IMX, B12 normal (10/2022)  Urine IMX normal (11/2022)    MRI brain 8/2023  IMPRESSION:    1. New abnormal enhancing 2.8 cm lesion in the  left parietal bone.  This was not present on 11/12/2018 and is concerning for a neoplastic  process, possibly a bony metastases.  2. No suspicious enhancing lesions in the brain parenchyma. No other  acute intracranial abnormality.  3. Mild nonspecific white matter changes most likely due to chronic  microvascular ischemic disease. These appear similar to prior MR  11/12/2018.    MRI cervical 8/2023  IMPRESSION:    1. Multilevel degenerative changes throughout the cervical spine as  detailed above.  2. No spinal canal narrowing at any level. Moderate neural foraminal  narrowings on the left at C3-C4 and bilaterally at C5-C6 and C6-C7.  3. No focal destructive bony lesions appreciated in the cervical  spine.     MRI brain 12/2023  Impression:   1. Faintly enhancing, T1 hypointense, T2/FLAIR hyperintense 3 cm lesion within the left parietal calvarium demonstrates overall nonaggressive features, and is occult by CT and cold on PET. These findings are most suggestive of a benign vasoformative lesion such as hemangioma.   2. No evidence of acute intracranial abnormality or suspicious intracranial enhancement.   3. Mild generalized cerebral volume loss and mild chronic microangiopathy changes.          Assessment and Plan:   Assessment:  Nury Cárdenas is a 79 year old female with history of GPA who presents today for follow-up of mild sensory polyneuropathy (possible related to underlying GPA versus idiopathic) and evaluation of left facial twitching and left sided head pains. I suspect head pains are most likely related to atypical tension type headache in the setting of significant neck pain. She has had benefit from seeing the chiropractor. PT referral could be considered in the future. Occipital nerve block or oral migraine medication could be considered as well if needed. Left side facial twitching likely represents mild hemifacial spasm. If these symptoms become more bothersome, we can refer patient for Botox.  MRI brain did not show a concerning cause of hemifacial spasm or head pain. Sensory neuropathy appears stable on exam. I encouraged her to call if symptoms worsen.      Plan:  - Call with worsening neurological symptoms    Follow up in Neurology clinic as needed should new concerns arise.    Ari Crump MD   of Neurology  [facility]    The total time of this encounter today amounted to 41 minutes. This time included time spent with the patient, prep work, ordering tests, and performing post visit documentation.

## 2024-04-05 ENCOUNTER — APPOINTMENT (OUTPATIENT)
Dept: LAB | Facility: CLINIC | Age: 79
End: 2024-04-05
Payer: COMMERCIAL

## 2024-04-05 ENCOUNTER — LAB (OUTPATIENT)
Dept: LAB | Facility: CLINIC | Age: 79
End: 2024-04-05
Payer: COMMERCIAL

## 2024-04-05 DIAGNOSIS — M31.30 GRANULOMATOSIS WITH POLYANGIITIS, UNSPECIFIED WHETHER RENAL INVOLVEMENT (H): ICD-10-CM

## 2024-04-05 DIAGNOSIS — Z79.60 LONG-TERM USE OF IMMUNOSUPPRESSANT MEDICATION: ICD-10-CM

## 2024-04-05 LAB
ALBUMIN UR-MCNC: NEGATIVE MG/DL
APPEARANCE UR: CLEAR
BACTERIA #/AREA URNS HPF: ABNORMAL /HPF
BASOPHILS # BLD AUTO: 0.1 10E3/UL (ref 0–0.2)
BASOPHILS NFR BLD AUTO: 1 %
BILIRUB UR QL STRIP: NEGATIVE
COLOR UR AUTO: YELLOW
EOSINOPHIL # BLD AUTO: 0.1 10E3/UL (ref 0–0.7)
EOSINOPHIL NFR BLD AUTO: 1 %
ERYTHROCYTE [DISTWIDTH] IN BLOOD BY AUTOMATED COUNT: 12.6 % (ref 10–15)
GLUCOSE UR STRIP-MCNC: NEGATIVE MG/DL
HCT VFR BLD AUTO: 43.3 % (ref 35–47)
HGB BLD-MCNC: 14.8 G/DL (ref 11.7–15.7)
HGB UR QL STRIP: ABNORMAL
IMM GRANULOCYTES # BLD: 0 10E3/UL
IMM GRANULOCYTES NFR BLD: 0 %
KETONES UR STRIP-MCNC: NEGATIVE MG/DL
LEUKOCYTE ESTERASE UR QL STRIP: ABNORMAL
LYMPHOCYTES # BLD AUTO: 1.2 10E3/UL (ref 0.8–5.3)
LYMPHOCYTES NFR BLD AUTO: 23 %
MCH RBC QN AUTO: 30.8 PG (ref 26.5–33)
MCHC RBC AUTO-ENTMCNC: 34.2 G/DL (ref 31.5–36.5)
MCV RBC AUTO: 90 FL (ref 78–100)
MONOCYTES # BLD AUTO: 0.5 10E3/UL (ref 0–1.3)
MONOCYTES NFR BLD AUTO: 10 %
NEUTROPHILS # BLD AUTO: 3.4 10E3/UL (ref 1.6–8.3)
NEUTROPHILS NFR BLD AUTO: 65 %
NITRATE UR QL: NEGATIVE
PH UR STRIP: 5.5 [PH] (ref 5–7)
PLATELET # BLD AUTO: 193 10E3/UL (ref 150–450)
RBC # BLD AUTO: 4.8 10E6/UL (ref 3.8–5.2)
RBC #/AREA URNS AUTO: ABNORMAL /HPF
SP GR UR STRIP: 1.01 (ref 1–1.03)
SQUAMOUS #/AREA URNS AUTO: ABNORMAL /LPF
UROBILINOGEN UR STRIP-ACNC: 0.2 E.U./DL
WBC # BLD AUTO: 5.2 10E3/UL (ref 4–11)
WBC #/AREA URNS AUTO: ABNORMAL /HPF

## 2024-04-05 PROCEDURE — 83516 IMMUNOASSAY NONANTIBODY: CPT | Performed by: INTERNAL MEDICINE

## 2024-04-05 PROCEDURE — 80053 COMPREHEN METABOLIC PANEL: CPT

## 2024-04-05 PROCEDURE — 85025 COMPLETE CBC W/AUTO DIFF WBC: CPT

## 2024-04-05 PROCEDURE — 36415 COLL VENOUS BLD VENIPUNCTURE: CPT

## 2024-04-05 PROCEDURE — 81001 URINALYSIS AUTO W/SCOPE: CPT

## 2024-04-05 PROCEDURE — 86140 C-REACTIVE PROTEIN: CPT | Performed by: INTERNAL MEDICINE

## 2024-04-06 LAB
ALBUMIN SERPL BCG-MCNC: 4.2 G/DL (ref 3.5–5.2)
ALP SERPL-CCNC: 83 U/L (ref 40–150)
ALT SERPL W P-5'-P-CCNC: 15 U/L (ref 0–50)
ANION GAP SERPL CALCULATED.3IONS-SCNC: 9 MMOL/L (ref 7–15)
AST SERPL W P-5'-P-CCNC: 20 U/L (ref 0–45)
BILIRUB SERPL-MCNC: 0.5 MG/DL
BUN SERPL-MCNC: 12.6 MG/DL (ref 8–23)
CALCIUM SERPL-MCNC: 9.4 MG/DL (ref 8.8–10.2)
CHLORIDE SERPL-SCNC: 104 MMOL/L (ref 98–107)
CREAT SERPL-MCNC: 0.77 MG/DL (ref 0.51–0.95)
DEPRECATED HCO3 PLAS-SCNC: 27 MMOL/L (ref 22–29)
EGFRCR SERPLBLD CKD-EPI 2021: 78 ML/MIN/1.73M2
GLUCOSE SERPL-MCNC: 86 MG/DL (ref 70–99)
POTASSIUM SERPL-SCNC: 4.7 MMOL/L (ref 3.4–5.3)
PROT SERPL-MCNC: 6.1 G/DL (ref 6.4–8.3)
SODIUM SERPL-SCNC: 140 MMOL/L (ref 135–145)

## 2024-04-08 ENCOUNTER — OFFICE VISIT (OUTPATIENT)
Dept: OPHTHALMOLOGY | Facility: CLINIC | Age: 79
End: 2024-04-08
Payer: COMMERCIAL

## 2024-04-08 DIAGNOSIS — H52.4 PRESBYOPIA: ICD-10-CM

## 2024-04-08 DIAGNOSIS — Z79.60 LONG-TERM USE OF IMMUNOSUPPRESSANT MEDICATION: Primary | ICD-10-CM

## 2024-04-08 DIAGNOSIS — H25.811 COMBINED FORMS OF AGE-RELATED CATARACT OF RIGHT EYE: ICD-10-CM

## 2024-04-08 DIAGNOSIS — M31.30 GRANULOMATOSIS WITH POLYANGIITIS WITHOUT RENAL INVOLVEMENT (H): ICD-10-CM

## 2024-04-08 DIAGNOSIS — Z96.1 PSEUDOPHAKIA: Primary | ICD-10-CM

## 2024-04-08 DIAGNOSIS — H50.00 ESODEVIATION: ICD-10-CM

## 2024-04-08 DIAGNOSIS — B02.30 HERPES ZOSTER OPHTHALMICUS: ICD-10-CM

## 2024-04-08 DIAGNOSIS — Z01.01 ENCOUNTER FOR EXAMINATION OF EYES AND VISION WITH ABNORMAL FINDINGS: ICD-10-CM

## 2024-04-08 DIAGNOSIS — Z98.890 HISTORY OF VITRECTOMY: ICD-10-CM

## 2024-04-08 DIAGNOSIS — H35.342 MACULAR HOLE OF LEFT EYE: ICD-10-CM

## 2024-04-08 DIAGNOSIS — Z98.890 HISTORY OF STRABISMUS SURGERY: ICD-10-CM

## 2024-04-08 LAB — CRP SERPL-MCNC: <3 MG/L

## 2024-04-08 PROCEDURE — 92014 COMPRE OPH EXAM EST PT 1/>: CPT | Performed by: OPHTHALMOLOGY

## 2024-04-08 PROCEDURE — 92015 DETERMINE REFRACTIVE STATE: CPT | Performed by: OPHTHALMOLOGY

## 2024-04-08 ASSESSMENT — CUP TO DISC RATIO
OD_RATIO: 0.3
OS_RATIO: 0.4

## 2024-04-08 ASSESSMENT — REFRACTION_WEARINGRX
SPECS_TYPE: PAL
OS_SPHERE: -0.50
OS_CYLINDER: +1.25
OS_ADD: +2.75
OD_HBASE: OUT
OS_AXIS: 028
OD_CYLINDER: +0.50
OD_SPHERE: +0.75
OD_AXIS: 165
OD_ADD: +2.75
OS_HPRISM: 4.0
OS_HBASE: OUT
OD_HPRISM: 4.0

## 2024-04-08 ASSESSMENT — REFRACTION_MANIFEST
OD_CYLINDER: +0.25
OD_SPHERE: +0.50
OD_ADD: +3.00
OS_CYLINDER: +1.50
OS_ADD: +3.00
OS_AXIS: 017
OD_AXIS: 167
OS_SPHERE: -0.25

## 2024-04-08 ASSESSMENT — CONF VISUAL FIELD
OD_SUPERIOR_TEMPORAL_RESTRICTION: 0
OS_INFERIOR_NASAL_RESTRICTION: 0
OS_SUPERIOR_NASAL_RESTRICTION: 0
OD_INFERIOR_TEMPORAL_RESTRICTION: 0
OD_INFERIOR_NASAL_RESTRICTION: 0
OD_NORMAL: 1
OS_INFERIOR_TEMPORAL_RESTRICTION: 0
OS_NORMAL: 1
OD_SUPERIOR_NASAL_RESTRICTION: 0
OS_SUPERIOR_TEMPORAL_RESTRICTION: 0

## 2024-04-08 ASSESSMENT — VISUAL ACUITY
METHOD: SNELLEN - LINEAR
OD_CC: 20/40
CORRECTION_TYPE: GLASSES
OD_CC+: +2
OS_CC: 20/25
OS_CC+: -2

## 2024-04-08 ASSESSMENT — TONOMETRY
IOP_METHOD: APPLANATION
OD_IOP_MMHG: 11
OS_IOP_MMHG: 11

## 2024-04-08 NOTE — PROGRESS NOTES
" Current Eye Medications:  Refresh PF twice daily     Subjective: Here for complete eye exam. Patient complains of vision continuing to be fuzzy in both eyes at distance and near. Street signs are difficult to see while driving. Also, eyes feel slightly uncomfortable.     Was told doesn't need brain surgery.  +/- occipital block.     Objective:  See Ophthalmology Exam.       Assessment:  Stable eye exam.      ICD-10-CM    1. Pseudophakia, Yag Caps, os  Z96.1       2. Combined forms of age-related cataract, mild-mod, of right eye  H25.811       3. Hx of macular hole of left eye  H35.342       4. History of vitrectomy - macular hole, os (MVE)  Z98.890       5. History of strabismus surgery  Z98.890       6. Esodeviation  H50.00       7. Herpes zoster ophthalmicus, right  B02.30       8. Encounter for examination of eyes and vision with abnormal findings  Z01.01       9. Presbyopia  H52.4            Plan:  Glasses prescription given - optional    May use artificial tears up to four times a day (like Refresh Optive, Systane Balance, or TheraTears. Avoid \"get the red out\" drops and generic artifical tears).     Possible posterior vitreous detachment (sudden onset large floater and/or flashing lights) right eye discussed.     Call in December 2024 for an appointment in April 2025 for Complete Exam    Dr. Mcleod [phone]       "

## 2024-04-08 NOTE — LETTER
"    4/8/2024         RE: Nury Cárdenas  [address] [address]        Dear Colleague,    Thank you for referring your patient, Nury Cárdenas, to the [facility]. Please see a copy of my visit note below.     Current Eye Medications:  Refresh PF twice daily     Subjective: Here for complete eye exam. Patient complains of vision continuing to be fuzzy in both eyes at distance and near. Street signs are difficult to see while driving. Also, eyes feel slightly uncomfortable.     Was told doesn't need brain surgery.  +/- occipital block.     Objective:  See Ophthalmology Exam.       Assessment:  Stable eye exam.      ICD-10-CM    1. Pseudophakia, Yag Caps, os  Z96.1       2. Combined forms of age-related cataract, mild-mod, of right eye  H25.811       3. Hx of macular hole of left eye  H35.342       4. History of vitrectomy - macular hole, os (MVE)  Z98.890       5. History of strabismus surgery  Z98.890       6. Esodeviation  H50.00       7. Herpes zoster ophthalmicus, right  B02.30       8. Encounter for examination of eyes and vision with abnormal findings  Z01.01       9. Presbyopia  H52.4            Plan:  Glasses prescription given - optional    May use artificial tears up to four times a day (like Refresh Optive, Systane Balance, or TheraTears. Avoid \"get the red out\" drops and generic artifical tears).     Possible posterior vitreous detachment (sudden onset large floater and/or flashing lights) right eye discussed.     Call in December 2024 for an appointment in April 2025 for Complete Exam    Dr. Mcleod [phone]         Again, thank you for allowing me to participate in the care of your patient.        Sincerely,        Wiley Mcleod MD  "

## 2024-04-08 NOTE — PATIENT INSTRUCTIONS
"Glasses prescription given - optional    May use artificial tears up to four times a day (like Refresh Optive, Systane Balance, or TheraTears. Avoid \"get the red out\" drops and generic artifical tears).     Possible posterior vitreous detachment (sudden onset large floater and/or flashing lights) right eye discussed.     Call in December 2024 for an appointment in April 2025 for Complete Exam    Dr. Mcleod [phone]    "

## 2024-04-10 LAB
MYELOPEROXIDASE AB SER IA-ACNC: <0.3 U/ML
MYELOPEROXIDASE AB SER IA-ACNC: NEGATIVE
PROTEINASE3 AB SER IA-ACNC: 2.1 U/ML
PROTEINASE3 AB SER IA-ACNC: ABNORMAL

## 2024-05-17 ENCOUNTER — OFFICE VISIT (OUTPATIENT)
Dept: PODIATRY | Facility: CLINIC | Age: 79
End: 2024-05-17
Payer: COMMERCIAL

## 2024-05-17 VITALS — DIASTOLIC BLOOD PRESSURE: 53 MMHG | SYSTOLIC BLOOD PRESSURE: 104 MMHG | HEART RATE: 80 BPM

## 2024-05-17 DIAGNOSIS — G63 POLYNEUROPATHY ASSOCIATED WITH UNDERLYING DISEASE (H): Primary | ICD-10-CM

## 2024-05-17 DIAGNOSIS — L84 CALLUS: ICD-10-CM

## 2024-05-17 PROCEDURE — 99213 OFFICE O/P EST LOW 20 MIN: CPT | Mod: 25 | Performed by: PODIATRIST

## 2024-05-17 PROCEDURE — 11056 PARNG/CUTG B9 HYPRKR LES 2-4: CPT | Mod: Q8 | Performed by: PODIATRIST

## 2024-05-17 NOTE — PATIENT INSTRUCTIONS
We wish you continued good healing. If you have any questions or concerns, please do not hesitate to contact us at  [phone]    [email or web address] (secure e-mail communication and access to your chart) to send a message or to make an appointment.    Please remember to call and schedule a follow up appointment if one was recommended at your earliest convenience.     PODIATRY CLINIC HOURS  TELEPHONE NUMBER    Dr. Chet GLASSPHEATHER FACFAS        Clinics:  [facility]   [place]  Tuesday 1PM-6PM  [place]  Wednesday 745AM-330PM  [place]  Monday 2nd,4th  830AM-4PM  Thursday/Friday 745AM-230PM     [place] APPOINTMENTS  [phone]    [place] APPOINTMENTS  [phone]          If you need a medication refill, please contact us you may need lab work and/or a follow up visit prior to your refill (i.e. Antifungal medications).  If MRI needed please call Imaging at [phone]   HOW DO I GET MY KNEE SCOOTER? Knee scooters can be picked up at ANY Medical Supply stores with your knee scooter Prescription.  OR  Bring your signed prescription to an [facility] showroom.   Set up an appointment for your custom Orthotics. Call any Orthotics locations call [phone]

## 2024-05-17 NOTE — PROGRESS NOTES
Subjective:      Patient is having pain in the ball of both feet.  Said this for several months now.  Denies past history of foot ulcers.  Describes it as a burning pain which is aggravated by activity and relieved by rest.  Has history of peripheral neuropathy.    Primary care is Dr. Abbott last seen 1/29/24    ROS: see above         Allergies   Allergen Reactions    Vancomycin Other (See Comments)     Red man's syndrom    Adhesive Tape      Rash itches    Amoxicillin      vomiting    Amoxicillin-Pot Clavulanate      vomiting    Erythromycin      vomiting    Morphine And Codeine      vomiting    Nickel      itches rash    Sulfa Antibiotics Itching    Tegaderm Alginate Ag Rope      LW Other1: -ALL MEDICATIONS MAKE PATIENT VIOLENTLY ILL; ADHESIVE BANDAGES; NICKEL    Zithromax [Azithromycin Dihydrate]      Vomiting/ skin blisters       Current Outpatient Medications   Medication Sig Dispense Refill    aspirin-acetaminophen-caffeine (EXCEDRIN MIGRAINE) 250-250-65 MG tablet Take 1 tablet by mouth every 6 hours as needed for headaches      calcium-magnesium (CALMAG) 500-250 MG TABS per tablet Take 1 tablet by mouth 2 times daily (with meals)      cholecalciferol 1000 UNITS TABS Take 1,000 Units by mouth daily      loratadine (CLARITIN) 10 MG tablet TAKE 1 TABLET(10 MG) BY MOUTH DAILY 90 tablet 1    omeprazole (PRILOSEC) 20 MG DR capsule Take 20 mg by mouth daily      propranolol (INDERAL) 20 MG tablet TAKE 1 TABLET(20 MG) BY MOUTH DAILY 90 tablet 1       Patient Active Problem List   Diagnosis    GERD (gastroesophageal reflux disease)    Fibroids    Migraines    Hearing loss    Osteopenia    HYPERLIPIDEMIA LDL GOAL <160    Inflammatory arthritis    Synovitis of wrist    Chronic constipation    Granulomatosis with polyangiitis without renal involvement (H)    Chronic steroid use    Dyspnea    Pulmonary embolism (H)    Calculus of kidney, right    Chronic anticoagulation    Long-term (current) use of anticoagulants  [Z79.01]    Long-term use of immunosuppressant medication    Primary osteoarthritis involving multiple joints    Cellulitis    Cat scratch left lower extremity    Other pulmonary embolism without acute cor pulmonale, unspecified chronicity (H)    Age-related osteoporosis without current pathological fracture    Age-related osteoporosis with current pathological fracture, sequela    Adverse effects of medication    Combined forms of age-related cataract, mild-mod, of right eye    Pseudophakia, Yag Caps, os    History of vitrectomy - macular hole, os (MVE)    Hx of macular hole of left eye    Hemifacial spasm    Immunosuppression (H24)    Clinical diagnosis of COVID-19    History of COVID-19    Lesion of right eyelid    COPD (chronic obstructive pulmonary disease) (H)    Polyneuropathy associated with underlying disease (H24)    Esodeviation    History of strabismus surgery    Current chronic use of systemic steroids    Herpes zoster ophthalmicus, right    Family history of genetic disease carrier       Past Medical History:   Diagnosis Date    Amblyopia     Atypical pneumonia 06/14/2014    Atypical pneumonia     Atypical pneumonia     Atypical pneumonia     COPD (chronic obstructive pulmonary disease) (H) 07/20/2021    Coronary artery disease 1982    heart beat hard made me tired    Dyslipidemia     Fibroids     GERD (gastroesophageal reflux disease)     Granulomatosis with polyangiitis (Wegener's)     Hearing loss     Inflammatory arthritis     thumb    Migraines     MVP (mitral valve prolapse)     had an echo that was normal in 2007 she is on Inderal    Nonsenile cataract     Osteopenia     PE (pulmonary embolism) 10/2014    ? GPA associated, on warfarin     Strabismus     Synovitis of wrist 2009    right       Past Surgical History:   Procedure Laterality Date    ABDOMEN SURGERY      appendix out    APPENDECTOMY      BIOPSY  1972    cone biopsy    CATARACT IOL, RT/LT      CL AFF SURGICAL PATHOLOGY      cone biopsy  1975    COLONOSCOPY      COLONOSCOPY WITH CO2 INSUFFLATION N/A 12/20/2017    Procedure: COLONOSCOPY WITH CO2 INSUFFLATION;  Screening  BMI: 20.7  [facility]: [facility]  [phone]  [facility]  Referring: Milena  Patient get ill from [facility];  Surgeon: Duane, William Charles, MD;  Location: [facility] OR    EYE SURGERY      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    HC ESOPHAGOSCOPY, DIAGNOSTIC      LASER YAG CAPSULOTOMY      left eye    OPTICAL TRACKING SYSTEM BRONCHOSCOPY  6/19/2014    Procedure: OPTICAL TRACKING SYSTEM BRONCHOSCOPY;  Surgeon: Angel Kathleen MD;  Location: [facility]    PHACOEMULSIFICATION WITH STANDARD INTRAOCULAR LENS IMPLANT  01/2018    left eye (SR)    SOFT TISSUE SURGERY      remove senovial fluid from right wrist    STRABISMUS SURGERY      SURGICAL HISTORY OF -   as a child    strabismus repair right eye    SURGICAL HISTORY OF -   8-2009    right wrist debridement    TONSILLECTOMY      as a child    TONSILLECTOMY & ADENOIDECTOMY      TUBAL LIGATION      VITRECTOMY PARSPLANA  01/2018    left eye - mac hole (MVE)       Family History   Problem Relation Age of Onset    Respiratory Mother         emphysema    Aneurysm Mother     Chronic Obstructive Pulmonary Disease Mother     Thyroid Disease Mother     Osteoporosis Mother     Other Cancer Father     Cancer Father         lung cancer    Arthritis Maternal Grandmother         rheumatoid    Heart Disease Maternal Grandmother         unsure of details    Heart Disease Maternal Grandfather     Neurologic Disorder Paternal Grandmother         migraines    Alzheimer Disease Paternal Grandmother     Unknown/Adopted Paternal Grandfather     Cancer Sister         stage 4 anal cancer age 62 years    Colon Cancer Sister     Substance Abuse Sister     Cancer - colorectal Brother     Colon Cancer Brother     Substance Abuse Sister     Anesthesia Reaction Daughter        Social History     Tobacco Use    Smoking status: Former     Current packs/day:  0.00     Average packs/day: 2.0 packs/day for 22.5 years (45.0 ttl pk-yrs)     Types: Cigarettes     Start date: 1961     Quit date: 1983     Years since quittin.8    Smokeless tobacco: Never   Substance Use Topics    Alcohol use: No     Comment: 1-2 glasses per year         Exam:    Vitals: /53   Pulse 80   BMI: There is no height or weight on file to calculate BMI.  Height: Data Unavailable    Constitutional/ general:  Pt is in no apparent distress, appears well-nourished.  Cooperative with history and physical exam.     Psych:  The patient answered questions appropriately.  Normal affect.  Seems to have reasonable expectations, in terms of treatment.     Lymphatic:  Popliteal lymph nodes not enlarged.     Lungs:  Non labored breathing, non labored speech. No cough.  No audible wheezing. Even, quiet breathing.       Vascular:   DP 1/4 and PT 0/4.  CFT < 3 sec.  positive ankle edema and varicosities noted.  Negative pedal hair growth.    Neuro:  Alert and oriented x 3. Coordinated gait.  Light touch sensation is diminished.  Monofilament intact on all digits    Derm:  Skin thin, shiny, atrophic with no hair growth noted.  No erythema, ecchymosis, or cyanosis.      Musculoskeletal:    Lower extremity muscle strength is normal.  Patient is ambulatory without an assistive device or brace.   Normal arch with weightbearing.  No forefoot or rear foot deformities noted.  MS 5/5 all compartments.  Normal ROM all fore foot and rearfoot joints.  All nails are thickened, elongated, discolored with subungual debris.  Patient has calluses right foot subsecond metatarsal head and left foot subfirst metatarsal head..  No masses or breakdown in the skin bilaterally.  No erythema or ecchymosis noted bilateral.     A/P  Peripheral neuropathy  Calluses       Calluses debrided with a fifteen blade.  Gave patient instructions on daily foot examination and wearing good shoes at all times. Will RETURN TO CLINIC ASAP  if notices any problems.  Discussed with patient  that I do not do routine foot care in my practice unless patient at significant risk of limb loss.  Will refer to foot care service/nurse.  Return to clinic prn.          Chet Villanueva DPM, FACFAS

## 2024-05-17 NOTE — LETTER
5/17/2024         RE: Nury Cárdenas  [address] [facility] [address]        Dear Colleague,    Thank you for referring your patient, Nury Cárdenas, to the [facility]. Please see a copy of my visit note below.    Subjective:      Patient is having pain in the ball of both feet.  Said this for several months now.  Denies past history of foot ulcers.  Describes it as a burning pain which is aggravated by activity and relieved by rest.  Has history of peripheral neuropathy.    Primary care is Dr. Abbott last seen 1/29/24    ROS: see above         Allergies   Allergen Reactions     Vancomycin Other (See Comments)     Red man's syndrom     Adhesive Tape      Rash itches     Amoxicillin      vomiting     Amoxicillin-Pot Clavulanate      vomiting     Erythromycin      vomiting     Morphine And Codeine      vomiting     Nickel      itches rash     Sulfa Antibiotics Itching     Tegaderm Alginate Ag Rope      LW Other1: -ALL MEDICATIONS MAKE PATIENT VIOLENTLY ILL; ADHESIVE BANDAGES; NICKEL     Zithromax [Azithromycin Dihydrate]      Vomiting/ skin blisters       Current Outpatient Medications   Medication Sig Dispense Refill     aspirin-acetaminophen-caffeine (EXCEDRIN MIGRAINE) 250-250-65 MG tablet Take 1 tablet by mouth every 6 hours as needed for headaches       calcium-magnesium (CALMAG) 500-250 MG TABS per tablet Take 1 tablet by mouth 2 times daily (with meals)       cholecalciferol 1000 UNITS TABS Take 1,000 Units by mouth daily       loratadine (CLARITIN) 10 MG tablet TAKE 1 TABLET(10 MG) BY MOUTH DAILY 90 tablet 1     omeprazole (PRILOSEC) 20 MG DR capsule Take 20 mg by mouth daily       propranolol (INDERAL) 20 MG tablet TAKE 1 TABLET(20 MG) BY MOUTH DAILY 90 tablet 1       Patient Active Problem List   Diagnosis     GERD (gastroesophageal reflux disease)     Fibroids     Migraines     Hearing loss     Osteopenia     HYPERLIPIDEMIA LDL GOAL <160     Inflammatory arthritis      Synovitis of wrist     Chronic constipation     Granulomatosis with polyangiitis without renal involvement (H)     Chronic steroid use     Dyspnea     Pulmonary embolism (H)     Calculus of kidney, right     Chronic anticoagulation     Long-term (current) use of anticoagulants [Z79.01]     Long-term use of immunosuppressant medication     Primary osteoarthritis involving multiple joints     Cellulitis     Cat scratch left lower extremity     Other pulmonary embolism without acute cor pulmonale, unspecified chronicity (H)     Age-related osteoporosis without current pathological fracture     Age-related osteoporosis with current pathological fracture, sequela     Adverse effects of medication     Combined forms of age-related cataract, mild-mod, of right eye     Pseudophakia, Yag Caps, os     History of vitrectomy - macular hole, os (MVE)     Hx of macular hole of left eye     Hemifacial spasm     Immunosuppression (H24)     Clinical diagnosis of COVID-19     History of COVID-19     Lesion of right eyelid     COPD (chronic obstructive pulmonary disease) (H)     Polyneuropathy associated with underlying disease (H24)     Esodeviation     History of strabismus surgery     Current chronic use of systemic steroids     Herpes zoster ophthalmicus, right     Family history of genetic disease carrier       Past Medical History:   Diagnosis Date     Amblyopia      Atypical pneumonia 06/14/2014     Atypical pneumonia      Atypical pneumonia      Atypical pneumonia      COPD (chronic obstructive pulmonary disease) (H) 07/20/2021     Coronary artery disease 1982    heart beat hard made me tired     Dyslipidemia      Fibroids      GERD (gastroesophageal reflux disease)      Granulomatosis with polyangiitis (Wegener's)      Hearing loss      Inflammatory arthritis     thumb     Migraines      MVP (mitral valve prolapse)     had an echo that was normal in 2007 she is on Inderal     Nonsenile cataract      Osteopenia      PE  (pulmonary embolism) 10/2014    ? GPA associated, on warfarin      Strabismus      Synovitis of wrist 2009    right       Past Surgical History:   Procedure Laterality Date     ABDOMEN SURGERY      appendix out     APPENDECTOMY       BIOPSY  1972    cone biopsy     CATARACT IOL, RT/LT       CL AFF SURGICAL PATHOLOGY      cone biopsy 1975     COLONOSCOPY       COLONOSCOPY WITH CO2 INSUFFLATION N/A 12/20/2017    Procedure: COLONOSCOPY WITH CO2 INSUFFLATION;  Screening  BMI: 20.7  [facility]: [facility]  [phone]  [facility]  Referring: [facility]  Patient get ill from [facility];  Surgeon: Duane, William Charles, MD;  Location: [facility] OR     EYE SURGERY      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     HC ESOPHAGOSCOPY, DIAGNOSTIC       LASER YAG CAPSULOTOMY      left eye     OPTICAL TRACKING SYSTEM BRONCHOSCOPY  6/19/2014    Procedure: OPTICAL TRACKING SYSTEM BRONCHOSCOPY;  Surgeon: Angel Kathleen MD;  Location:  GI     PHACOEMULSIFICATION WITH STANDARD INTRAOCULAR LENS IMPLANT  01/2018    left eye (SR)     SOFT TISSUE SURGERY      remove senovial fluid from right wrist     STRABISMUS SURGERY       SURGICAL HISTORY OF -   as a child    strabismus repair right eye     SURGICAL HISTORY OF -   8-2009    right wrist debridement     TONSILLECTOMY      as a child     TONSILLECTOMY & ADENOIDECTOMY       TUBAL LIGATION       VITRECTOMY PARSPLANA  01/2018    left eye - mac hole (MVE)       Family History   Problem Relation Age of Onset     Respiratory Mother         emphysema     Aneurysm Mother      Chronic Obstructive Pulmonary Disease Mother      Thyroid Disease Mother      Osteoporosis Mother      Other Cancer Father      Cancer Father         lung cancer     Arthritis Maternal Grandmother         rheumatoid     Heart Disease Maternal Grandmother         unsure of details     Heart Disease Maternal Grandfather      Neurologic Disorder Paternal Grandmother         migraines     Alzheimer Disease Paternal  Grandmother      Unknown/Adopted Paternal Grandfather      Cancer Sister         stage 4 anal cancer age 62 years     Colon Cancer Sister      Substance Abuse Sister      Cancer - colorectal Brother      Colon Cancer Brother      Substance Abuse Sister      Anesthesia Reaction Daughter        Social History     Tobacco Use     Smoking status: Former     Current packs/day: 0.00     Average packs/day: 2.0 packs/day for 22.5 years (45.0 ttl pk-yrs)     Types: Cigarettes     Start date: 1961     Quit date: 1983     Years since quittin.8     Smokeless tobacco: Never   Substance Use Topics     Alcohol use: No     Comment: 1-2 glasses per year         Exam:    Vitals: /53   Pulse 80   BMI: There is no height or weight on file to calculate BMI.  Height: Data Unavailable    Constitutional/ general:  Pt is in no apparent distress, appears well-nourished.  Cooperative with history and physical exam.     Psych:  The patient answered questions appropriately.  Normal affect.  Seems to have reasonable expectations, in terms of treatment.     Lymphatic:  Popliteal lymph nodes not enlarged.     Lungs:  Non labored breathing, non labored speech. No cough.  No audible wheezing. Even, quiet breathing.       Vascular:   DP 1/4 and PT 0/4.  CFT < 3 sec.  positive ankle edema and varicosities noted.  Negative pedal hair growth.    Neuro:  Alert and oriented x 3. Coordinated gait.  Light touch sensation is diminished.  Monofilament intact on all digits    Derm:  Skin thin, shiny, atrophic with no hair growth noted.  No erythema, ecchymosis, or cyanosis.      Musculoskeletal:    Lower extremity muscle strength is normal.  Patient is ambulatory without an assistive device or brace.   Normal arch with weightbearing.  No forefoot or rear foot deformities noted.  MS 5/5 all compartments.  Normal ROM all fore foot and rearfoot joints.  All nails are thickened, elongated, discolored with subungual debris.  Patient has calluses  right foot subsecond metatarsal head and left foot subfirst metatarsal head..  No masses or breakdown in the skin bilaterally.  No erythema or ecchymosis noted bilateral.     A/P  Peripheral neuropathy  Calluses       Calluses debrided with a fifteen blade.  Gave patient instructions on daily foot examination and wearing good shoes at all times. Will RETURN TO CLINIC ASAP if notices any problems.  Discussed with patient  that I do not do routine foot care in my practice unless patient at significant risk of limb loss.  Will refer to foot care service/nurse.  Return to clinic prn.          Chet Villanueva DPM, FACFAS                 Again, thank you for allowing me to participate in the care of your patient.        Sincerely,        Chet Villanueva DPM

## 2024-06-16 ENCOUNTER — HEALTH MAINTENANCE LETTER (OUTPATIENT)
Age: 79
End: 2024-06-16

## 2024-07-08 ENCOUNTER — LAB (OUTPATIENT)
Dept: LAB | Facility: CLINIC | Age: 79
End: 2024-07-08
Payer: COMMERCIAL

## 2024-07-08 DIAGNOSIS — E78.5 HYPERLIPIDEMIA LDL GOAL <160: Primary | ICD-10-CM

## 2024-07-08 DIAGNOSIS — Z79.60 LONG-TERM USE OF IMMUNOSUPPRESSANT MEDICATION: ICD-10-CM

## 2024-07-08 DIAGNOSIS — M31.30 GRANULOMATOSIS WITH POLYANGIITIS WITHOUT RENAL INVOLVEMENT (H): ICD-10-CM

## 2024-07-08 LAB
ALBUMIN SERPL BCG-MCNC: 4.3 G/DL (ref 3.5–5.2)
ALBUMIN UR-MCNC: NEGATIVE MG/DL
ALP SERPL-CCNC: 89 U/L (ref 40–150)
ALT SERPL W P-5'-P-CCNC: 13 U/L (ref 0–50)
ANION GAP SERPL CALCULATED.3IONS-SCNC: 8 MMOL/L (ref 7–15)
APPEARANCE UR: CLEAR
AST SERPL W P-5'-P-CCNC: 20 U/L (ref 0–45)
BACTERIA #/AREA URNS HPF: NORMAL /HPF
BASOPHILS # BLD AUTO: 0.1 10E3/UL (ref 0–0.2)
BASOPHILS NFR BLD AUTO: 1 %
BILIRUB SERPL-MCNC: 0.4 MG/DL
BILIRUB UR QL STRIP: NEGATIVE
BUN SERPL-MCNC: 13.3 MG/DL (ref 8–23)
CALCIUM SERPL-MCNC: 9.3 MG/DL (ref 8.8–10.2)
CHLORIDE SERPL-SCNC: 103 MMOL/L (ref 98–107)
CHOLEST SERPL-MCNC: 230 MG/DL
COLOR UR AUTO: YELLOW
CREAT SERPL-MCNC: 0.68 MG/DL (ref 0.51–0.95)
CRP SERPL-MCNC: <3 MG/L
DEPRECATED HCO3 PLAS-SCNC: 28 MMOL/L (ref 22–29)
EGFRCR SERPLBLD CKD-EPI 2021: 88 ML/MIN/1.73M2
EOSINOPHIL # BLD AUTO: 0.1 10E3/UL (ref 0–0.7)
EOSINOPHIL NFR BLD AUTO: 1 %
ERYTHROCYTE [DISTWIDTH] IN BLOOD BY AUTOMATED COUNT: 12.7 % (ref 10–15)
ERYTHROCYTE [SEDIMENTATION RATE] IN BLOOD BY WESTERGREN METHOD: 4 MM/HR (ref 0–30)
FASTING STATUS PATIENT QL REPORTED: NO
FASTING STATUS PATIENT QL REPORTED: NO
GLUCOSE SERPL-MCNC: 78 MG/DL (ref 70–99)
GLUCOSE UR STRIP-MCNC: NEGATIVE MG/DL
HCT VFR BLD AUTO: 43.3 % (ref 35–47)
HDLC SERPL-MCNC: 86 MG/DL
HGB BLD-MCNC: 14.6 G/DL (ref 11.7–15.7)
HGB UR QL STRIP: NEGATIVE
IMM GRANULOCYTES # BLD: 0 10E3/UL
IMM GRANULOCYTES NFR BLD: 0 %
KETONES UR STRIP-MCNC: NEGATIVE MG/DL
LDLC SERPL CALC-MCNC: 111 MG/DL
LEUKOCYTE ESTERASE UR QL STRIP: NEGATIVE
LYMPHOCYTES # BLD AUTO: 1.1 10E3/UL (ref 0.8–5.3)
LYMPHOCYTES NFR BLD AUTO: 21 %
MCH RBC QN AUTO: 30.8 PG (ref 26.5–33)
MCHC RBC AUTO-ENTMCNC: 33.7 G/DL (ref 31.5–36.5)
MCV RBC AUTO: 91 FL (ref 78–100)
MONOCYTES # BLD AUTO: 0.5 10E3/UL (ref 0–1.3)
MONOCYTES NFR BLD AUTO: 10 %
NEUTROPHILS # BLD AUTO: 3.6 10E3/UL (ref 1.6–8.3)
NEUTROPHILS NFR BLD AUTO: 68 %
NITRATE UR QL: NEGATIVE
NONHDLC SERPL-MCNC: 144 MG/DL
PH UR STRIP: 6 [PH] (ref 5–7)
PLATELET # BLD AUTO: 186 10E3/UL (ref 150–450)
POTASSIUM SERPL-SCNC: 4.4 MMOL/L (ref 3.4–5.3)
PROT SERPL-MCNC: 6.1 G/DL (ref 6.4–8.3)
RBC # BLD AUTO: 4.74 10E6/UL (ref 3.8–5.2)
RBC #/AREA URNS AUTO: NORMAL /HPF
SODIUM SERPL-SCNC: 139 MMOL/L (ref 135–145)
SP GR UR STRIP: <=1.005 (ref 1–1.03)
SQUAMOUS #/AREA URNS AUTO: NORMAL /LPF
TRIGL SERPL-MCNC: 165 MG/DL
UROBILINOGEN UR STRIP-ACNC: 0.2 E.U./DL
WBC # BLD AUTO: 5.3 10E3/UL (ref 4–11)
WBC #/AREA URNS AUTO: NORMAL /HPF

## 2024-07-08 PROCEDURE — 81001 URINALYSIS AUTO W/SCOPE: CPT

## 2024-07-08 PROCEDURE — 85025 COMPLETE CBC W/AUTO DIFF WBC: CPT

## 2024-07-08 PROCEDURE — 80061 LIPID PANEL: CPT

## 2024-07-08 PROCEDURE — 80053 COMPREHEN METABOLIC PANEL: CPT

## 2024-07-08 PROCEDURE — 83876 ASSAY MYELOPEROXIDASE: CPT

## 2024-07-08 PROCEDURE — 83516 IMMUNOASSAY NONANTIBODY: CPT

## 2024-07-08 PROCEDURE — 36415 COLL VENOUS BLD VENIPUNCTURE: CPT

## 2024-07-08 PROCEDURE — 86036 ANCA SCREEN EACH ANTIBODY: CPT

## 2024-07-08 PROCEDURE — 86140 C-REACTIVE PROTEIN: CPT

## 2024-07-08 PROCEDURE — 85652 RBC SED RATE AUTOMATED: CPT

## 2024-07-09 LAB
ANCA AB PATTERN SER IF-IMP: NORMAL
C-ANCA TITR SER IF: NORMAL {TITER}
MYELOPEROXIDASE AB SER IA-ACNC: <0.3 U/ML
MYELOPEROXIDASE AB SER IA-ACNC: NEGATIVE
PROTEINASE3 AB SER IA-ACNC: 1.7 U/ML
PROTEINASE3 AB SER IA-ACNC: NEGATIVE

## 2024-07-12 ENCOUNTER — OFFICE VISIT (OUTPATIENT)
Dept: RHEUMATOLOGY | Facility: CLINIC | Age: 79
End: 2024-07-12
Payer: COMMERCIAL

## 2024-07-12 VITALS
DIASTOLIC BLOOD PRESSURE: 71 MMHG | WEIGHT: 121 LBS | OXYGEN SATURATION: 98 % | BODY MASS INDEX: 20.66 KG/M2 | SYSTOLIC BLOOD PRESSURE: 122 MMHG | HEART RATE: 69 BPM

## 2024-07-12 DIAGNOSIS — Z79.60 LONG-TERM USE OF IMMUNOSUPPRESSANT MEDICATION: Primary | ICD-10-CM

## 2024-07-12 DIAGNOSIS — M31.30 GRANULOMATOSIS WITH POLYANGIITIS WITHOUT RENAL INVOLVEMENT (H): ICD-10-CM

## 2024-07-12 PROCEDURE — 99214 OFFICE O/P EST MOD 30 MIN: CPT | Performed by: INTERNAL MEDICINE

## 2024-07-12 PROCEDURE — G2211 COMPLEX E/M VISIT ADD ON: HCPCS | Performed by: INTERNAL MEDICINE

## 2024-07-12 ASSESSMENT — PAIN SCALES - GENERAL: PAINLEVEL: NO PAIN (0)

## 2024-07-12 NOTE — PROGRESS NOTES
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 Course complicated by severe upper airway inflammation, Wegener's lung and destructive/erosive joint disease. Treated initially with rituximab 375 mg/m2 x4 and high dose corticsteroids, and most recently with methotrexate.   Alendronate therapy complicated by LE pain. 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    She feels good and her weight is stable. She can feel cold easily, but no fevers, chills or sweats. Energy is stable. She can have fleeting pinching pain in the lower left breast. She has some MCDANIEL. She can have some fluid retention at the end of the day. No palpitations.    Rituximab 500 mg every 6 months has been the current treatment plan, with her last infusion in January.     PMI:  Medical-invasive pulmonary aspirgillosis, GPA, osteoarthritis, DVT with pulmonary embolism, osteoporosis with vertebral fracture (T = -2.0 2-2021), GERD, hyperlipidemia, +Tb exposure, nephrolithiasis, retinal tear, cataract, COVID-19, sensorineural hearing loss  Surgical-lung biopsy, appendectomy, eye surgery, tonsillectomy, cone biopsy, right wrist synovectomy  Injuries-left wrist fracture x2, left 5th toe fracture, vertebral fracture    SH:  Lives at home alone. Former smoker. No EtOH. Tuberculosis exposure as a child. Ambidextrous. Rare EtOH    FH:  Son dead with mitochondrial myopathy  Daughter with palpitations  Sibs dead with colon and anal cancer  Father dead with lung cancer  Mother dead with emphysema and osteoporosis    PMSF history personally obtained and updated by me this visit in the clinic.    ROS:  +chronic constipation  +minor sinus congestion  +allergies to fosamax amoxicillin, augmentin, erythromycin, zithromax, nickel, codeine, adhesive tape  Remainder of the 14 point ROS obtained and found negative.    Physical Exam:  Constitutional: WD-WN-WG cooperative; deconditioned, kyphotic,  slight instability on her feet due to core weakness  Eyes: nl EOM, PERRLA, vision, conjunctiva, sclera  ENT: nl external ears, nose, hearing, lips, teeth, gums, throat; upper and lower plates  Neck: no mass or thyroid enlargement  Resp: lungs clear to auscultation, nl to palpation, nl effort  CV: RRR, no murmurs, rubs or gallops, no edema  GI: no ABD mass or tenderness, no HSM  : not tested  Lymph: no cervical or epitrochlear nodes  MS: +Hand wasting; right wrist with no flexion or extension; left wrist extension to 45 degrees; 1st CMC joint squaring bilaterally with some thumb laxity R>L; right neck rotation 45 degrees right and 45 degrees left with head forward position and minimal neck extension; All other TMJ, neck, shoulder, elbow, wrist, hand, spine, hip, knee, ankle, and foot joints were examined and otherwise found normal. Normal  strength. +severe kyphosis. Normal tuck and prayer.  Skin: no nail pitting, alopecia  Neuro: nl cranial nerves, strength, sensation  Psych: nl judgement, orientation, memory, affect    Laboratory:    Component      Latest Ref Rn 7/8/2024  10:12 AM   WBC      4.0 - 11.0 10e3/uL 5.3    RBC Count      3.80 - 5.20 10e6/uL 4.74    Hemoglobin      11.7 - 15.7 g/dL 14.6    Hematocrit      35.0 - 47.0 % 43.3    MCV      78 - 100 fL 91    MCH      26.5 - 33.0 pg 30.8    MCHC      31.5 - 36.5 g/dL 33.7    RDW      10.0 - 15.0 % 12.7    Platelet Count      150 - 450 10e3/uL 186    % Neutrophils      % 68    % Lymphocytes      % 21    % Monocytes      % 10    % Eosinophils      % 1    % Basophils      % 1    % Immature Granulocytes      % 0    Absolute Neutrophils      1.6 - 8.3 10e3/uL 3.6    Absolute Lymphocytes      0.8 - 5.3 10e3/uL 1.1    Absolute Monocytes      0.0 - 1.3 10e3/uL 0.5    Absolute Eosinophils      0.0 - 0.7 10e3/uL 0.1    Absolute Basophils      0.0 - 0.2 10e3/uL 0.1    Absolute Immature Granulocytes      <=0.4 10e3/uL 0.0    Sodium      135 - 145 mmol/L 139     Potassium      3.4 - 5.3 mmol/L 4.4    Carbon Dioxide (CO2)      22 - 29 mmol/L 28    Anion Gap      7 - 15 mmol/L 8    Urea Nitrogen      8.0 - 23.0 mg/dL 13.3    Creatinine      0.51 - 0.95 mg/dL 0.68    GFR Estimate      >60 mL/min/1.73m2 88    Calcium      8.8 - 10.2 mg/dL 9.3    Chloride      98 - 107 mmol/L 103    Glucose      70 - 99 mg/dL 78    Alkaline Phosphatase      40 - 150 U/L 89    AST      0 - 45 U/L 20    ALT      0 - 50 U/L 13    Protein Total      6.4 - 8.3 g/dL 6.1 (L)    Albumin      3.5 - 5.2 g/dL 4.3    Bilirubin Total      <=1.2 mg/dL 0.4    Patient Fasting? No    Patient Fasting? No    Color Urine      Colorless, Straw, Light Yellow, Yellow  Yellow    Appearance Urine      Clear  Clear    Glucose Urine      Negative mg/dL Negative    Bilirubin Urine      Negative  Negative    Ketones Urine      Negative mg/dL Negative    Specific Gravity Urine      1.003 - 1.035  <=1.005    Blood Urine      Negative  Negative    pH Urine      5.0 - 7.0  6.0    Protein Albumin Urine      Negative mg/dL Negative    Urobilinogen Urine      0.2, 1.0 E.U./dL 0.2    Nitrite Urine      Negative  Negative    Leukocyte Esterase Urine      Negative  Negative    Cholesterol      <200 mg/dL 230 (H)    Triglycerides      <150 mg/dL 165 (H)    HDL Cholesterol      >=50 mg/dL 86    LDL Cholesterol Calculated      <=100 mg/dL 111 (H)    Non HDL Cholesterol      <130 mg/dL 144 (H)    MPO Erika IgG Instrument Value      <3.5 U/mL <0.3    Myeloperoxidase Antibody IgG      Negative  Negative    Proteinase 3 Erika IgG Instrument Value      <2.0 U/mL 1.7    Proteinase 3 Antibody IgG      Negative  Negative    Bacteria Urine      None Seen /HPF None Seen    RBC Urine      0-2 /HPF /HPF 0-2    WBC Urine      0-5 /HPF /HPF 0-5    Squamous Epithelial /LPF Urine      None Seen /LPF None Seen    Neutrophil Cytoplasmic Antibody      <=1:10  <1:10    Neutrophil Cytoplasmic Antibody Pattern The ANCA IFA is <1:10.  No further testing will be  performed.    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 This is very complicated and relapsing disease. Nonetheless, she is disease-free today and there is no evidence of systemic inflammatory disease. She is interested in treating GPA with rituximab only if signs of disease appears. I agree to this plan. She will get lab testing every 3 months including serologies and inflammation markers.    Osteoporosis-with history of osteopenia and previous fracture. Stable vitamin D level. No recent fracture. No current active management. She defers any further discussion of her bones.     Long term management of immunosuppression-with increased risk for IgG deficiency. Plan to repeat the Ig levels and CD19 with the next blood draw.     Plan follow up in 6 months.    A total of 24 minutes was spent in telephone patient interaction and chart review on the day of service.    The longitudinal plan of care for the diagnosis(es)/condition(s) as documented were addressed during this visit. Due to the added complexity in care, I will continue to support Pat in the subsequent management and with ongoing continuity of care.

## 2024-07-19 LAB — SCANNED LAB RESULT: NORMAL

## 2024-07-29 ENCOUNTER — DOCUMENTATION ONLY (OUTPATIENT)
Dept: ONCOLOGY | Facility: CLINIC | Age: 79
End: 2024-07-29

## 2024-07-29 NOTE — PROGRESS NOTES
"7/29/2024    Presenting Information:  Nury Cárdenas was previously seen as part of the Cancer Risk Management Program due to her family history of colon cancer. Her blood was drawn on 12/13/2023 and the St syndrome genes with automatic reflex to the CancerNext panel was ordered from [facility]. 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 The first variant is called c.6785G>T (p.A2466L). While the second variant is called c.6710G>A (p.R8546V).    There have been no updates to the second identified APC gene variant (c.6710G>A) found in Nury at this time. This variant remains classified as a VUS.     No clinically significant variants were detected at that time in the following genes tested:  HIRAM, AXIN2, BARD1, BMPR1A, BRCA1, BRCA2, BRIP1, CDH1, CDK4, CDKN2A, CHEK2, DICER1, EPCAM, GREM1, HOXB13, MBD4, MLH1, MSH2, MSH3, MSH6, MUTYH, NBN, NF1, NTHL1, PALB2, PMS2, POLD1, POLE, PTEN, RAD51C, RAD51D, RECQL, SMAD4, SMARCA4, STK11, and TP53.    Result Reclassification:  Recently [employer] contacted me with a new report. 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 This means that the first APC variant found in Nury is most likely NOT associated with Familial Adenomatous Polyposis (FAP) and an increased risk for colon polyps and cancer.      A copy of this updated genetic test report can be found in the Laboratory tab called \"LAB MISC ORDER\" on 12/13/2023.    Screening Recommendations:  Nury should continue with cancer screening based on personal medical and family history, as previously discussed.    Summary:  We do not have an explanation for Nury's family history of cancer. While no genetic changes were identified, Nury may still be at risk for certain cancers due to family history, environmental factors, or other genetic causes not identified by this test. Because of that, it is important that " she continue with cancer screening based on her personal and family history as previously discussed.    Genetic testing is rapidly advancing, and new cancer susceptibility genes will most likely be identified in the future. Therefore, I encourage Nury to contact me regularly OR if there are changes in her personal or family history. This may change how we assess her cancer risk, screening, and the testing we would offer.    Plan:   1) I will send Nury a copy of the new test report that reflects the change in classification.  2) If there are any further questions or concerns, she should not hesitate to contact me at [phone].    Madison Rosales MS, [facility] [place]  Licensed, Certified Genetic Counselor  Phone: [phone]

## 2024-08-06 DIAGNOSIS — G43.909 MIGRAINE WITHOUT STATUS MIGRAINOSUS, NOT INTRACTABLE, UNSPECIFIED MIGRAINE TYPE: ICD-10-CM

## 2024-08-06 RX ORDER — PROPRANOLOL HYDROCHLORIDE 20 MG/1
20 TABLET ORAL DAILY
Qty: 90 TABLET | Refills: 0 | Status: SHIPPED | OUTPATIENT
Start: 2024-08-06

## 2024-08-10 SDOH — HEALTH STABILITY: PHYSICAL HEALTH: ON AVERAGE, HOW MANY DAYS PER WEEK DO YOU ENGAGE IN MODERATE TO STRENUOUS EXERCISE (LIKE A BRISK WALK)?: 0 DAYS

## 2024-08-10 SDOH — HEALTH STABILITY: PHYSICAL HEALTH: ON AVERAGE, HOW MANY MINUTES DO YOU ENGAGE IN EXERCISE AT THIS LEVEL?: 0 MIN

## 2024-08-12 ENCOUNTER — OFFICE VISIT (OUTPATIENT)
Dept: FAMILY MEDICINE | Facility: CLINIC | Age: 79
End: 2024-08-12
Attending: FAMILY MEDICINE
Payer: COMMERCIAL

## 2024-08-12 VITALS
TEMPERATURE: 97.4 F | SYSTOLIC BLOOD PRESSURE: 126 MMHG | OXYGEN SATURATION: 97 % | BODY MASS INDEX: 20.86 KG/M2 | WEIGHT: 122.2 LBS | DIASTOLIC BLOOD PRESSURE: 80 MMHG | HEART RATE: 67 BPM | RESPIRATION RATE: 16 BRPM | HEIGHT: 64 IN

## 2024-08-12 DIAGNOSIS — R23.4 SCAB: ICD-10-CM

## 2024-08-12 DIAGNOSIS — H90.3 SENSORINEURAL HEARING LOSS (SNHL) OF BOTH EARS: ICD-10-CM

## 2024-08-12 DIAGNOSIS — Z00.00 ENCOUNTER FOR MEDICARE ANNUAL WELLNESS EXAM: Primary | ICD-10-CM

## 2024-08-12 PROCEDURE — G0439 PPPS, SUBSEQ VISIT: HCPCS | Performed by: FAMILY MEDICINE

## 2024-08-12 ASSESSMENT — PAIN SCALES - GENERAL: PAINLEVEL: NO PAIN (0)

## 2024-08-12 NOTE — PATIENT INSTRUCTIONS
Patient Education   Preventive Care Advice   This is general advice given by our system to help you stay healthy. However, your care team may have specific advice just for you. Please talk to your care team about your preventive care needs.  Nutrition  Eat 5 or more servings of fruits and vegetables each day.  Try wheat bread, brown rice and whole grain pasta (instead of white bread, rice, and pasta).  Get enough calcium and vitamin D. Check the label on foods and aim for 100% of the RDA (recommended daily allowance).  Lifestyle  Exercise at least 150 minutes each week  (30 minutes a day, 5 days a week).  Do muscle strengthening activities 2 days a week. These help control your weight and prevent disease.  No smoking.  Wear sunscreen to prevent skin cancer.  Have a dental exam and cleaning every 6 months.  Yearly exams  See your health care team every year to talk about:  Any changes in your health.  Any medicines your care team has prescribed.  Preventive care, family planning, and ways to prevent chronic diseases.  Shots (vaccines)   HPV shots (up to age 26), if you've never had them before.  Hepatitis B shots (up to age 59), if you've never had them before.  COVID-19 shot: Get this shot when it's due.  Flu shot: Get a flu shot every year.  Tetanus shot: Get a tetanus shot every 10 years.  Pneumococcal, hepatitis A, and RSV shots: Ask your care team if you need these based on your risk.  Shingles shot (for age 50 and up)  General health tests  Diabetes screening:  Starting at age 35, Get screened for diabetes at least every 3 years.  If you are younger than age 35, ask your care team if you should be screened for diabetes.  Cholesterol test: At age 39, start having a cholesterol test every 5 years, or more often if advised.  Bone density scan (DEXA): At age 50, ask your care team if you should have this scan for osteoporosis (brittle bones).  Hepatitis C: Get tested at least once in your life.  STIs (sexually  transmitted infections)  Before age 24: Ask your care team if you should be screened for STIs.  After age 24: Get screened for STIs if you're at risk. You are at risk for STIs (including HIV) if:  You are sexually active with more than one person.  You don't use condoms every time.  You or a partner was diagnosed with a sexually transmitted infection.  If you are at risk for HIV, ask about PrEP medicine to prevent HIV.  Get tested for HIV at least once in your life, whether you are at risk for HIV or not.  Cancer screening tests  Cervical cancer screening: If you have a cervix, begin getting regular cervical cancer screening tests starting at age 21.  Breast cancer scan (mammogram): If you've ever had breasts, begin having regular mammograms starting at age 40. This is a scan to check for breast cancer.  Colon cancer screening: It is important to start screening for colon cancer at age 45.  Have a colonoscopy test every 10 years (or more often if you're at risk) Or, ask your provider about stool tests like a FIT test every year or Cologuard test every 3 years.  To learn more about your testing options, visit:   .  For help making a decision, visit:   https://bit.ly/dt36655.  Prostate cancer screening test: If you have a prostate, ask your care team if a prostate cancer screening test (PSA) at age 55 is right for you.  Lung cancer screening: If you are a current or former smoker ages 50 to 80, ask your care team if ongoing lung cancer screenings are right for you.  For informational purposes only. Not to replace the advice of your health care provider. Copyright   2023 [facility]. All rights reserved. Clinically reviewed by the [facility] Transitions Program. [employer] [id number] - REV 01/24.  Preventing Falls: Care Instructions  Injuries and health problems such as trouble walking or poor eyesight can increase your risk of falling. So can some medicines. But there are things you can do to help  "prevent falls. You can exercise to get stronger. You can also arrange your home to make it safer.    Talk to your doctor about the medicines you take. Ask if any of them increase the risk of falls and whether they can be changed or stopped.   Try to exercise regularly. It can help improve your strength and balance. This can help lower your risk of falling.     Practice fall safety and prevention.    Wear low-heeled shoes that fit well and give your feet good support. Talk to your doctor if you have foot problems that make this hard.  Carry a cellphone or wear a medical alert device that you can use to call for help.  Use stepladders instead of chairs to reach high objects. Don't climb if you're at risk for falls. Ask for help, if needed.  Wear the correct eyeglasses, if you need them.    Make your home safer.    Remove rugs, cords, clutter, and furniture from walkways.  Keep your house well lit. Use night-lights in hallways and bathrooms.  Install and use sturdy handrails on stairways.  Wear nonskid footwear, even inside. Don't walk barefoot or in socks without shoes.    Be safe outside.    Use handrails, curb cuts, and ramps whenever possible.  Keep your hands free by using a shoulder bag or backpack.  Try to walk in well-lit areas. Watch out for uneven ground, changes in pavement, and debris.  Be careful in the winter. Walk on the grass or gravel when sidewalks are slippery. Use de-icer on steps and walkways. Add non-slip devices to shoes.    Put grab bars and nonskid mats in your shower or tub and near the toilet. Try to use a shower chair or bath bench when bathing.   Get into a tub or shower by putting in your weaker leg first. Get out with your strong side first. Have a phone or medical alert device in the bathroom with you.   Where can you learn more?  Go to https://www.[employer].net/patiented  Enter G117 in the search box to learn more about \"Preventing Falls: Care Instructions.\"  Current as of: July 17, " 2023               Content Version: 14.0    7133-7170 [employer].   Care instructions adapted under license by your healthcare professional. If you have questions about a medical condition or this instruction, always ask your healthcare professional. [employer] disclaims any warranty or liability for your use of this information.      Hearing Loss: Care Instructions  Overview     Hearing loss is a sudden or slow decrease in how well you hear. It can range from slight to profound. Permanent hearing loss can occur with aging. It also can happen when you are exposed long-term to loud noise. Examples include listening to loud music, riding motorcycles, or being around other loud machines.  Hearing loss can affect your work and home life. It can make you feel lonely or depressed. You may feel that you have lost your independence. But hearing aids and other devices can help you hear better and feel connected to others.  Follow-up care is a key part of your treatment and safety. Be sure to make and go to all appointments, and call your doctor if you are having problems. It's also a good idea to know your test results and keep a list of the medicines you take.  How can you care for yourself at home?  Avoid loud noises whenever possible. This helps keep your hearing from getting worse.  Always wear hearing protection around loud noises.  Wear a hearing aid as directed.  A professional can help you pick a hearing aid that will work best for you.  You can also get hearing aids over the counter for mild to moderate hearing loss.  Have hearing tests as your doctor suggests. They can show whether your hearing has changed. Your hearing aid may need to be adjusted.  Use other devices as needed. These may include:  Telephone amplifiers and hearing aids that can connect to a television, stereo, radio, or microphone.  Devices that use lights or vibrations. These alert you to the doorbell, a ringing  "telephone, or a baby monitor.  Television closed-captioning. This shows the words at the bottom of the screen. Most new TVs can do this.  TTY (text telephone). This lets you type messages back and forth on the telephone instead of talking or listening. These devices are also called TDD. When messages are typed on the keyboard, they are sent over the phone line to a receiving TTY. The message is shown on a monitor.  Use text messaging, social media, and email if it is hard for you to communicate by telephone.  Try to learn a listening technique called speechreading. It is not lipreading. You pay attention to people's gestures, expressions, posture, and tone of voice. These clues can help you understand what a person is saying. Face the person you are talking to, and have them face you. Make sure the lighting is good. You need to see the other person's face clearly.  Think about counseling if you need help to adjust to your hearing loss.  When should you call for help?  Watch closely for changes in your health, and be sure to contact your doctor if:    You think your hearing is getting worse.     You have new symptoms, such as dizziness or nausea.   Where can you learn more?  Go to https://www.[employer].net/patiented  Enter R798 in the search box to learn more about \"Hearing Loss: Care Instructions.\"  Current as of: September 27, 2023               Content Version: 14.0    0709-4405 [employer].   Care instructions adapted under license by your healthcare professional. If you have questions about a medical condition or this instruction, always ask your healthcare professional. [employer] disclaims any warranty or liability for your use of this information.      Bladder Training: Care Instructions  Your Care Instructions     Bladder training is used to treat urge incontinence and stress incontinence. Urge incontinence means that the need to urinate comes on so fast that you can't get to a " toilet in time. Stress incontinence means that you leak urine because of pressure on your bladder. For example, it may happen when you laugh, cough, or lift something heavy.  Bladder training can increase how long you can wait before you have to urinate. It can also help your bladder hold more urine. And it can give you better control over the urge to urinate.  It is important to remember that bladder training takes a few weeks to a few months to make a difference. You may not see results right away, but don't give up.  Follow-up care is a key part of your treatment and safety. Be sure to make and go to all appointments, and call your doctor if you are having problems. It's also a good idea to know your test results and keep a list of the medicines you take.  How can you care for yourself at home?  Work with your doctor to come up with a bladder training program that is right for you. You may use one or more of the following methods.  Delayed urination  In the beginning, try to keep from urinating for 5 minutes after you first feel the need to go.  While you wait, take deep, slow breaths to relax. Kegel exercises can also help you delay the need to go to the bathroom.  After some practice, when you can easily wait 5 minutes to urinate, try to wait 10 minutes before you urinate.  Slowly increase the waiting period until you are able to control when you have to urinate.  Scheduled urination  Empty your bladder when you first wake up in the morning.  Schedule times throughout the day when you will urinate.  Start by going to the bathroom every hour, even if you don't need to go.  Slowly increase the time between trips to the bathroom.  When you have found a schedule that works well for you, keep doing it.  If you wake up during the night and have to urinate, do it. Apply your schedule to waking hours only.  Kegel exercises  These tighten and strengthen pelvic muscles, which can help you control the flow of urine. (If  "doing these exercises causes pain, stop doing them and talk with your doctor.) To do Kegel exercises:  Squeeze your muscles as if you were trying not to pass gas. Or squeeze your muscles as if you were stopping the flow of urine. Your belly, legs, and buttocks shouldn't move.  Hold the squeeze for 3 seconds, then relax for 5 to 10 seconds.  Start with 3 seconds, then add 1 second each week until you are able to squeeze for 10 seconds.  Repeat the exercise 10 times a session. Do 3 to 8 sessions a day.  When should you call for help?  Watch closely for changes in your health, and be sure to contact your doctor if:    Your incontinence is getting worse.     You do not get better as expected.   Where can you learn more?  Go to https://www.[employer].net/patiented  Enter V684 in the search box to learn more about \"Bladder Training: Care Instructions.\"  Current as of: November 15, 2023               Content Version: 14.0    0358-6096 [email or web address].   Care instructions adapted under license by your healthcare professional. If you have questions about a medical condition or this instruction, always ask your healthcare professional. [employer] disclaims any warranty or liability for your use of this information.         "

## 2024-08-12 NOTE — PROGRESS NOTES
Preventive Care Visit  [facility]  Phil Abbott MD, Family Medicine  Aug 12, 2024      Assessment & Plan     Encounter for Medicare annual wellness exam   - Lives alone, semi independent, no safety concerns    Sensorineural hearing loss (SNHL) of both ears  The hearing aids but were not convenient, we will follow-up again to see if they can fit different ones  - Adult Audiology  Referral    Scab: recurrent Lt ear lobe  Due to recurrent nature, discussed evaluation by dermatologist  - Adult Dermatology  Referral      Patient has been advised of split billing requirements and indicates understanding: Yes        Counseling  Appropriate preventive services were addressed with this patient via screening, questionnaire, or discussion as appropriate for fall prevention, nutrition, physical activity, Tobacco-use cessation, weight loss and cognition.  Checklist reviewing preventive services available has been given to the patient.  Reviewed patient's diet, addressing concerns and/or questions.   The patient was instructed to see the dentist every 6 months.   The patient was provided with written information regarding signs of hearing loss.   Information on urinary incontinence and treatment options given to patient.       See Patient Instructions    Subjective   Pat is a 79 year old, presenting for the following:  Physical (Last seen 1/29/2024)  Itching ear lobe: Lt with recurrent, to see dern for rash around the mouth      8/12/2024     9:58 AM   Additional Questions   Roomed by JESU LÓPEZ       Health Care Directive  Patient has a Health Care Directive on file  Advance care planning document is on file and is current.    HPI      Functional capacity:    Living arrangement: Lives by self, hs family around (daughter), in own home, [employer] family help with yardwork/daughter.      Ambulation; a little unsteady joseph if turns too fast,, has vertigo/uses cane    Vision: had cataract ,  seeing ophthalmology    Hearing: referral to audiology    Bladder control: urgency    Memory: Good      Driving:  still drives    ADLS: Need help with: None  Telephone use    Transportation    Shopping    Preparing meals    Housework    Bathing    Laundry    Medication administration    Money management    Toileting    Feeding    Dressing     Hobbies: Crotches a lot.          8/10/2024   General Health   How would you rate your overall physical health? Good   Feel stress (tense, anxious, or unable to sleep) Not at all            8/10/2024   Nutrition   Diet: Regular (no restrictions)            8/10/2024   Exercise   Days per week of moderate/strenous exercise 0 days   Average minutes spent exercising at this level 0 min      (!) EXERCISE CONCERN      8/10/2024   Social Factors   Worry food won't last until get money to buy more No   Food not last or not have enough money for food? No   Do you have housing? (Housing is defined as stable permanent housing and does not include staying ouside in a car, in a tent, in an abandoned building, in an overnight shelter, or couch-surfing.) Yes   Are you worried about losing your housing? No   Lack of transportation? No   Unable to get utilities (heat,electricity)? No            8/12/2024   Fall Risk   Gait Speed Test Interpretation Less than or equal to 5.00 seconds - PASS            8/10/2024   Activities of Daily Living- Home Safety   Needs help with the following daily activites None of the above   Safety concerns in the home None of the above            8/10/2024   Dental   Dentist two times every year? (!) NO            8/10/2024   Hearing Screening   Hearing concerns? (!) I FEEL THAT PEOPLE ARE MUMBLING OR NOT SPEAKING CLEARLY.    (!) I NEED TO ASK PEOPLE TO SPEAK UP OR REPEAT THEMSELVES.    (!) IT'S HARD TO FOLLOW A CONVERSATION IN A NOISY RESTAURANT OR CROWDED ROOM.    (!) TROUBLE UNDESTANDING A SPEAKER IN A PUBLIC MEETING OR Jew SERVICE.    (!) TROUBLE  UNDERSTANDING SOFT OR WHISPERED SPEECH.       Multiple values from one day are sorted in reverse-chronological order         8/10/2024   Driving Risk Screening   Patient/family members have concerns about driving No            8/10/2024   General Alertness/Fatigue Screening   Have you been more tired than usual lately? No            8/10/2024   Urinary Incontinence Screening   Bothered by leaking urine in past 6 months Yes        Today's PHQ-2 Score:       2024    11:28 AM   PHQ-2 (  [employer])   Q1: Little interest or pleasure in doing things 0   Q2: Feeling down, depressed or hopeless 0   PHQ-2 Score 0   Q1: Little interest or pleasure in doing things Not at all   Q2: Feeling down, depressed or hopeless Not at all   PHQ-2 Score 0           8/10/2024   Substance Use   Alcohol more than 3/day or more than 7/wk Not Applicable   Do you have a current opioid prescription? No   How severe/bad is pain from 1 to 10? 0/10 (No Pain)   Do you use any other substances recreationally? No        Social History     Tobacco Use    Smoking status: Former     Current packs/day: 0.00     Average packs/day: 2.0 packs/day for 22.5 years (45.0 ttl pk-yrs)     Types: Cigarettes     Start date: 1961     Quit date: 1983     Years since quittin.0     Passive exposure: Past    Smokeless tobacco: Never   Vaping Use    Vaping status: Never Used   Substance Use Topics    Alcohol use: No     Comment: 1-2 glasses per year    Drug use: No          Mammogram Screening - After age 74- determine frequency with patient based on health status, life expectancy and patient goals      Fracture Risk Assessment Tool  Link to Frax Calculator  Use the information below to complete the Frax calculator  : 1945  Sex: female  Weight (kg): 55.4 kg (actual weight)  Height (cm): 163.5 cm  Previous Fragility Fracture:  Yes  History of parent with fractured hip:  No  Current Smoking:  No  Patient has been on glucocorticoids for more than 3  months (5mg/day or more): No  Rheumatoid Arthritis on Problem List:  No  Secondary Osteoporosis on Problem List:  No  Consumes 3 or more units of alcohol per day: No  Femoral Neck BMD (g/cm2)    Reviewed and updated as needed this visit by Provider                    Patient Active Problem List   Diagnosis    GERD (gastroesophageal reflux disease)    Fibroids    Migraines    Hearing loss    Osteopenia    HYPERLIPIDEMIA LDL GOAL <160    Inflammatory arthritis    Synovitis of wrist    Chronic constipation    Granulomatosis with polyangiitis without renal involvement (H)    Chronic steroid use    Dyspnea    Pulmonary embolism (H)    Calculus of kidney, right    Chronic anticoagulation    Long-term (current) use of anticoagulants [Z79.01]    Long-term use of immunosuppressant medication    Primary osteoarthritis involving multiple joints    Cellulitis    Cat scratch left lower extremity    Other pulmonary embolism without acute cor pulmonale, unspecified chronicity (H)    Age-related osteoporosis without current pathological fracture    Age-related osteoporosis with current pathological fracture, sequela    Adverse effects of medication    Combined forms of age-related cataract, mild-mod, of right eye    Pseudophakia, Yag Caps, os    History of vitrectomy - macular hole, os (MVE)    Hx of macular hole of left eye    Hemifacial spasm    Immunosuppression (H24)    Clinical diagnosis of COVID-19    History of COVID-19    Lesion of right eyelid    COPD (chronic obstructive pulmonary disease) (H)    Polyneuropathy associated with underlying disease (H24)    Esodeviation    History of strabismus surgery    Current chronic use of systemic steroids    Herpes zoster ophthalmicus, right    Family history of genetic disease carrier     Past Surgical History:   Procedure Laterality Date    ABDOMEN SURGERY      appendix out    APPENDECTOMY      BIOPSY  1972    cone biopsy    CATARACT IOL, RT/LT      CL AFF SURGICAL PATHOLOGY       cone biopsy     COLONOSCOPY      COLONOSCOPY WITH CO2 INSUFFLATION N/A 2017    Procedure: COLONOSCOPY WITH CO2 INSUFFLATION;  Screening  BMI: 20.7  [facility]: [facility]  [phone]  [facility]Medicare  Referring: Milena  Patient get ill from [facility];  Surgeon: Duane, William Charles, MD;  Location: [facility] OR    EYE SURGERY      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    HC ESOPHAGOSCOPY, DIAGNOSTIC      LASER YAG CAPSULOTOMY      left eye    OPTICAL TRACKING SYSTEM BRONCHOSCOPY  2014    Procedure: OPTICAL TRACKING SYSTEM BRONCHOSCOPY;  Surgeon: Angel Kathleen MD;  Location:  GI    PHACOEMULSIFICATION WITH STANDARD INTRAOCULAR LENS IMPLANT  2018    left eye (SR)    SOFT TISSUE SURGERY      remove senovial fluid from right wrist    STRABISMUS SURGERY      SURGICAL HISTORY OF -   as a child    strabismus repair right eye    SURGICAL HISTORY OF -       right wrist debridement    TONSILLECTOMY      as a child    TONSILLECTOMY & ADENOIDECTOMY      TUBAL LIGATION      VITRECTOMY PARSPLANA  2018    left eye - mac hole (MVE)       Social History     Tobacco Use    Smoking status: Former     Current packs/day: 0.00     Average packs/day: 2.0 packs/day for 22.5 years (45.0 ttl pk-yrs)     Types: Cigarettes     Start date: 1961     Quit date: 1983     Years since quittin.0     Passive exposure: Past    Smokeless tobacco: Never   Substance Use Topics    Alcohol use: No     Comment: 1-2 glasses per year     Family History   Problem Relation Age of Onset    Respiratory Mother         emphysema    Aneurysm Mother     Chronic Obstructive Pulmonary Disease Mother     Thyroid Disease Mother     Osteoporosis Mother     Other Cancer Father     Cancer Father         lung cancer    Arthritis Maternal Grandmother         rheumatoid    Heart Disease Maternal Grandmother         unsure of details    Heart Disease Maternal Grandfather     Neurologic Disorder Paternal Grandmother          migraines    Alzheimer Disease Paternal Grandmother     Unknown/Adopted Paternal Grandfather     Cancer Sister         stage 4 anal cancer age 62 years    Colon Cancer Sister     Substance Abuse Sister     Cancer - colorectal Brother     Colon Cancer Brother     Substance Abuse Sister     Anesthesia Reaction Daughter          Current providers sharing in care for this patient include:  Patient Care Team:  Phil Abbott MD as PCP - General (Family Practice)  Iain Estes MD as MD (Rheumatology)  Natalie Gleason RN as Specialty Care Coordinator (Rheumatology)  Alvaro Maguire MD as Assigned Rheumatology Provider  Phil Abbott MD as Assigned PCP  Wiley Mcleod MD as Assigned Surgical Provider  Ari Crump MD as MD (Neurology)  Edilma Benson APRN CNP as Nurse Practitioner (Dermatology)  Cecy Dill MD as Assigned Pediatric Specialist Provider  Ari Crump MD as Assigned Neuroscience Provider    The following health maintenance items are reviewed in Epic and correct as of today:  Health Maintenance   Topic Date Due    COPD ACTION PLAN  Never done    Pneumococcal Vaccine: 65+ Years (1 of 2 - PCV) Never done    ZOSTER IMMUNIZATION (1 of 2) Never done    RSV VACCINE (Pregnancy & 60+) (1 - 1-dose 60+ series) Never done    MEDICARE ANNUAL WELLNESS VISIT  01/13/2015    COVID-19 Vaccine (1 - 2023-24 season) Never done    INFLUENZA VACCINE (1) 09/01/2024    ANNUAL REVIEW OF HM ORDERS  01/18/2025    EYE EXAM  04/08/2025    LIPID  07/08/2025    FALL RISK ASSESSMENT  08/12/2025    GLUCOSE  07/08/2027    DTAP/TDAP/TD IMMUNIZATION (3 - Td or Tdap) 07/28/2027    ADVANCE CARE PLANNING  08/14/2028    DEXA  01/23/2039    SPIROMETRY  Completed    MIGRAINE ACTION PLAN  Completed    PHQ-2 (once per calendar year)  Completed    IPV IMMUNIZATION  Aged Out    HPV IMMUNIZATION  Aged Out    MENINGITIS IMMUNIZATION  Aged Out    RSV MONOCLONAL ANTIBODY  Aged Out    MAMMO SCREENING  Discontinued  "   COLORECTAL CANCER SCREENING  Discontinued         Review of Systems  Constitutional, neuro, ENT, endocrine, pulmonary, cardiac, gastrointestinal, genitourinary, musculoskeletal, integument and psychiatric systems are negative, except as otherwise noted.     Objective    Exam  /80 (BP Location: Right arm, Cuff Size: Adult Regular)   Pulse 67   Temp 97.4  F (36.3  C) (Oral)   Resp 16   Ht 1.635 m (5' 4.37\")   Wt 55.4 kg (122 lb 3.2 oz)   SpO2 97%   BMI 20.74 kg/m     Estimated body mass index is 20.74 kg/m  as calculated from the following:    Height as of this encounter: 1.635 m (5' 4.37\").    Weight as of this encounter: 55.4 kg (122 lb 3.2 oz).    Physical Exam  GENERAL: alert and no distress  EYES: Eyes grossly normal to inspection, PERRL and conjunctivae and sclerae normal  HENT: ear canals and TM's normal, nose and mouth without ulcers or lesions  NECK: no adenopathy, no asymmetry, masses, or scars  RESP: lungs clear to auscultation - no rales, rhonchi or wheezes  CV: regular rate and rhythm, no murmur, click or rub, no peripheral edema  ABDOMEN: soft, nontender,  no masses and bowel sounds normal  MS: no gross musculoskeletal defects noted, a little upper back kyphosis  SKIN: no suspicious lesions or rashes  NEURO: Normal strength and tone, mentation intact and speech normal  PSYCH: mentation appears normal, affect normal/bright        8/12/2024   Mini Cog   Clock Draw Score 2 Normal   3 Item Recall 3 objects recalled   Mini Cog Total Score 5            Signed Electronically by: Phil Abbott MD    "

## 2024-09-16 ENCOUNTER — TRANSFERRED RECORDS (OUTPATIENT)
Dept: HEALTH INFORMATION MANAGEMENT | Facility: CLINIC | Age: 79
End: 2024-09-16
Payer: COMMERCIAL

## 2024-09-27 ENCOUNTER — OFFICE VISIT (OUTPATIENT)
Dept: AUDIOLOGY | Facility: CLINIC | Age: 79
End: 2024-09-27
Attending: FAMILY MEDICINE
Payer: COMMERCIAL

## 2024-09-27 DIAGNOSIS — H90.3 SENSORINEURAL HEARING LOSS (SNHL) OF BOTH EARS: ICD-10-CM

## 2024-09-27 DIAGNOSIS — H69.93 DISORDER OF BOTH EUSTACHIAN TUBES: ICD-10-CM

## 2024-09-27 DIAGNOSIS — H90.6 MIXED HEARING LOSS, BILATERAL: Primary | ICD-10-CM

## 2024-09-27 PROCEDURE — 92567 TYMPANOMETRY: CPT | Performed by: AUDIOLOGIST

## 2024-09-27 PROCEDURE — 92557 COMPREHENSIVE HEARING TEST: CPT | Performed by: AUDIOLOGIST

## 2024-09-27 NOTE — PROGRESS NOTES
AUDIOLOGY REPORT    SUBJECTIVE:  Nury Cárdenas is a 79 year old female who was seen in the Audiology Clinic at the [facility] for audiologic evaluation, referred by Phil Abbott MD. The patient has been seen previously in this clinic on 3/30/2021 for assessment and results indicated bilateral mild to severe mixed hearing loss. She was having eustachian tube dysfunction at that time and saw ENT. The patient reports worsening hearing since she was last tested, noting that she is hearing speech but not understanding it. She has trouble hearing soft voices. The patient also has constant bilateral tinnitus that has been present for years. She reports that she may have had a tympanic membrane perforation at one time, and PE tubes have also been suggested, but she has not had ear surgery. She does not have ear pain or pressure. She notes occasional vertigo. 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 She does not have a family history of hearing loss. The patient has a pair of over the counter hearing aids (Audien Atom Pro 2), but has been struggling with feedback and uncomfortable fit. The patient notes difficulty with communication in a variety of listening situations. The patient was unaccompanied to today's appointment.     OBJECTIVE:  Otoscopic exam indicates ears are clear of cerumen bilaterally     Pure Tone Thresholds assessed using conventional audiometry with good  reliability from 250-8000 Hz bilaterally using insert earphones and circumaural headphones     RIGHT:  mild sloping to severe mixed hearing loss    LEFT:    normal hearing sensitivity through 1000 Hz sloping to  mild to moderately severe  mixed hearing loss    Tympanogram:    RIGHT: negative pressure     LEFT:   restricted eardrum mobility (type B)  NOTE: Pain reported during tympanometry in both ears.    Reflexes (reported by stimulus ear):  Could not  seal    Speech Reception Threshold:    RIGHT: 30 dB HL    LEFT:   35 dB HL    Word Recognition Score:     RIGHT: 100% at 70 dB HL using NU-6 recorded word list.    LEFT:   100% at 75 dB HL using NU-6 recorded word list.      ASSESSMENT:     ICD-10-CM    1. Mixed hearing loss, bilateral  H90.6 Cmprhn Audiometry Thrshld Eval & Speech Recog (85807)     Tympanometry (impedance - testing) (32785)      2. Sensorineural hearing loss (SNHL) of both ears  H90.3 Adult Audiology  Referral      3. Disorder of both eustachian tubes  H69.93 Cmprhn Audiometry Thrshld Eval & Speech Recog (21464)     Tympanometry (impedance - testing) (08338)          Compared to patient's previous audiogram dated 3/30/2021, thresholds are stable with the exception of 10-15 dB improvement in the left ear at 250-1000 Hz. Today s results were discussed with the patient in detail.     The patient brought in her OTC hearing aids. Two different types of domes were tried, but it was difficult to find a comfortable fit without feedback.     PLAN:  Patient was counseled regarding hearing loss and impact on communication. It is recommended that the patient follow up with ENT regarding the eustachian tube dysfunction. Discussed amplification options. The patient was encouraged to check with her insurance to see if she has benefits for prescription hearing aids. Please call this clinic with questions regarding these results or recommendations.      Cynthia Linn, CCC-A  [place] Licensed Audiologist #[id number]  9/27/2024

## 2024-10-03 ENCOUNTER — OFFICE VISIT (OUTPATIENT)
Dept: DERMATOLOGY | Facility: CLINIC | Age: 79
End: 2024-10-03
Payer: COMMERCIAL

## 2024-10-03 ENCOUNTER — LAB (OUTPATIENT)
Dept: LAB | Facility: CLINIC | Age: 79
End: 2024-10-03
Payer: COMMERCIAL

## 2024-10-03 DIAGNOSIS — M31.30 GRANULOMATOSIS WITH POLYANGIITIS WITHOUT RENAL INVOLVEMENT (H): ICD-10-CM

## 2024-10-03 DIAGNOSIS — Z79.60 LONG-TERM USE OF IMMUNOSUPPRESSANT MEDICATION: ICD-10-CM

## 2024-10-03 DIAGNOSIS — L71.9 ACNE ROSACEA: Primary | ICD-10-CM

## 2024-10-03 PROCEDURE — 86140 C-REACTIVE PROTEIN: CPT

## 2024-10-03 PROCEDURE — 83516 IMMUNOASSAY NONANTIBODY: CPT

## 2024-10-03 PROCEDURE — 83876 ASSAY MYELOPEROXIDASE: CPT

## 2024-10-03 PROCEDURE — 36415 COLL VENOUS BLD VENIPUNCTURE: CPT

## 2024-10-03 PROCEDURE — 85652 RBC SED RATE AUTOMATED: CPT

## 2024-10-03 PROCEDURE — 86036 ANCA SCREEN EACH ANTIBODY: CPT

## 2024-10-03 PROCEDURE — 99214 OFFICE O/P EST MOD 30 MIN: CPT | Performed by: NURSE PRACTITIONER

## 2024-10-03 RX ORDER — METRONIDAZOLE 7.5 MG/G
GEL TOPICAL 2 TIMES DAILY
Qty: 45 G | Refills: 11 | Status: SHIPPED | OUTPATIENT
Start: 2024-10-03

## 2024-10-03 NOTE — LETTER
10/3/2024      Nury Cárdenas  [address] [facility] [address]      Dear Colleague,    Thank you for referring your patient, Nury Cárdenas, to the [facility]. Please see a copy of my visit note below.    [facility] Dermatology Note  Encounter Date: Oct 3, 2024  Office Visit     Reviewed patients past medical history and pertinent chart review prior to patients visit today.     Dermatology Problem List:  # Acne Rosacea, metronidazole 0.75% gel  # Rash/eruption - left medial thigh - bx 1/25/22  # Chronic skin changes of the left lower extremity - hemosiderosis; in context of resolving cellulitis and history of DVT in setting of granulomatosis with polyangiitis (in remission)  # Relevant medical history: GPA (diagnoses in 2014, initially treated with rituximab and steroids, then methotrexate, then prednisone alone)     ____________________________________________    Assessment & Plan:     # Rosacea, papulopustular and erythrotelangiectatic. Discussed natural history and etiology of rosacea today. Counseled on dividing treatments into targeting the erythema versus acneiform lesions. For erythema, discussed topical creams such as Mirvaso/brimonidine which in our experience have been mixed, have potential for rebound redness, and they are quite expensive, as well as laser treatment. Patient will let me know if they would like referral to a dermatologist for laser treatment for erythema. For acneiform lesions, we discussed starting topical metronidazole cream or gel, azelaic acid, as well as oral antibiotics. Discussed potential side effects of each. Discussed avoidance of triggers such as heat, sunlight, alcohol, spicy foods. If patient can pinpoint a trigger that causes flushing it may be easier to avoid if it is bothersome. We discussed that flushing is a very difficult symptom of rosacea to treat but that some oral medications can sometimes be helpful. At this time  patient is less bothered by redness but would like to try a plan for breakouts. We discussed topical and oral therapies and we will plan for management as below.     -Reviewed symptoms of ocular rosacea: Symptoms of this condition include: bloodshot and watery eyes, eyes that feel dry, irritated, or gritty, burning, stinging in the eyes, blurred vision, light sensitivity. Patient denies these symptoms today.     -Use a gentle cleanser BID prior to applying medication and moisturizer to face.   -Start metronidazile 0.75% cream BID to affected areas of the face.   -Patient may notice some dryness but advised to use a non-comedogenic moisturizer such as Cetaphil cream, Cera Ve Cream, or Vanicream for dryness after applying medication or throughout the day as needed.   -Start moisturizer with SPF 30+ to face and neck in the morning after applying medication.   -Avoid extended sun exposure and reapply sunscreen every 2-3 hours when sun exposed.   -Avoid retinoids, acids, or other irritation/drying chemicals, washes, or creams as these may exacerbate rosacea flares.     Follow up in 3-4 months, sooner PRN new or worsening concerns.        Sera Benson, [facility]  Dermatology    _______________________________________    CC: Rash (Rash area that come and go on face and around mouth and hairline ( inially thought it was shingles 2 years ago and tx;'d with topical and oral and resolved some what)/Lesion to left ear that has been flared with swelling and pain. Off and on flaring and tx'ing with ABX oint/[place]  [facility] [place]    HPI:  Ms. Nury Cárdenas is a(n) 79 year old female who presents today as a return patient for bumps that keep recurring on her cheeks and nose area. She has been seen by primary care and was given metornidazole 0.75% cream which she uses with outbreaks and does seem to clear it up but then she stops and it continues to come back over and over. She had a scab on her left ear this summer that was itchy but it has  resolved.     Patient is otherwise feeling well, without additional skin concerns.      Physical Exam:  SKIN: Focused examination of  Face, ears and neck was performed.  - background erythema with overlying acneiform papules on the cheeks and nose   -no worrisome lesions on ears  - No other lesions of concern on areas examined.     Medications:  Current Outpatient Medications   Medication Sig Dispense Refill     metroNIDAZOLE (METROGEL) 0.75 % external gel Apply topically 2 times daily. For rosacea 45 g 11     aspirin-acetaminophen-caffeine (EXCEDRIN MIGRAINE) 250-250-65 MG tablet Take 1 tablet by mouth every 6 hours as needed for headaches       calcium-magnesium (CALMAG) 500-250 MG TABS per tablet Take 1 tablet by mouth 2 times daily (with meals)       cholecalciferol 1000 UNITS TABS Take 1,000 Units by mouth daily       loratadine (CLARITIN) 10 MG tablet TAKE 1 TABLET(10 MG) BY MOUTH DAILY 90 tablet 1     omeprazole (PRILOSEC) 20 MG DR capsule Take 20 mg by mouth daily       propranolol (INDERAL) 20 MG tablet TAKE 1 TABLET(20 MG) BY MOUTH DAILY 90 tablet 0     No current facility-administered medications for this visit.     Facility-Administered Medications Ordered in Other Visits   Medication Dose Route Frequency Provider Last Rate Last Admin     sodium chloride (PF) 0.9% PF flush 60 mL  60 mL Intravenous Once Phil Abbott MD          Past Medical History:   Patient Active Problem List   Diagnosis     GERD (gastroesophageal reflux disease)     Fibroids     Migraines     Hearing loss     Osteopenia     HYPERLIPIDEMIA LDL GOAL <160     Inflammatory arthritis     Synovitis of wrist     Chronic constipation     Granulomatosis with polyangiitis without renal involvement (H)     Chronic steroid use     Dyspnea     Pulmonary embolism (H)     Calculus of kidney, right     Chronic anticoagulation     Long-term (current) use of anticoagulants [Z79.01]     Long-term use of immunosuppressant medication     Primary  osteoarthritis involving multiple joints     Cellulitis     Cat scratch left lower extremity     Other pulmonary embolism without acute cor pulmonale, unspecified chronicity (H)     Age-related osteoporosis without current pathological fracture     Age-related osteoporosis with current pathological fracture, sequela     Adverse effects of medication     Combined forms of age-related cataract, mild-mod, of right eye     Pseudophakia, Yag Caps, os     History of vitrectomy - macular hole, os (MVE)     Hx of macular hole of left eye     Hemifacial spasm     Immunosuppression (H)     Clinical diagnosis of COVID-19     History of COVID-19     Lesion of right eyelid     COPD (chronic obstructive pulmonary disease) (H)     Polyneuropathy associated with underlying disease (H)     Esodeviation     History of strabismus surgery     Current chronic use of systemic steroids     Herpes zoster ophthalmicus, right     Family history of genetic disease carrier     Past Medical History:   Diagnosis Date     Amblyopia      Atypical pneumonia 06/14/2014     Atypical pneumonia      Atypical pneumonia      Atypical pneumonia      COPD (chronic obstructive pulmonary disease) (H) 07/20/2021     Coronary artery disease 1982    heart beat hard made me tired     Dyslipidemia      Fibroids      GERD (gastroesophageal reflux disease)      Granulomatosis with polyangiitis (Wegener's)      Hearing loss      Inflammatory arthritis     thumb     Migraines      MVP (mitral valve prolapse)     had an echo that was normal in 2007 she is on Inderal     Nonsenile cataract      Osteopenia      PE (pulmonary embolism) 10/2014    ? GPA associated, on warfarin      Strabismus      Synovitis of wrist 2009    right       CC Referred Self, MD  No address on file on close of this encounter.       Again, thank you for allowing me to participate in the care of your patient.        Sincerely,        Edilma Benson, GURPREET CNP

## 2024-10-03 NOTE — PROGRESS NOTES
[facility] Dermatology Note  Encounter Date: Oct 3, 2024  Office Visit     Reviewed patients past medical history and pertinent chart review prior to patients visit today.     Dermatology Problem List:  # Acne Rosacea, metronidazole 0.75% gel  # Rash/eruption - left medial thigh - bx 1/25/22  # Chronic skin changes of the left lower extremity - hemosiderosis; in context of resolving cellulitis and history of DVT in setting of granulomatosis with polyangiitis (in remission)  # Relevant medical history: GPA (diagnoses in 2014, initially treated with rituximab and steroids, then methotrexate, then prednisone alone)     ____________________________________________    Assessment & Plan:     # Rosacea, papulopustular and erythrotelangiectatic. Discussed natural history and etiology of rosacea today. Counseled on dividing treatments into targeting the erythema versus acneiform lesions. For erythema, discussed topical creams such as Mirvaso/brimonidine which in our experience have been mixed, have potential for rebound redness, and they are quite expensive, as well as laser treatment. Patient will let me know if they would like referral to a dermatologist for laser treatment for erythema. For acneiform lesions, we discussed starting topical metronidazole cream or gel, azelaic acid, as well as oral antibiotics. Discussed potential side effects of each. Discussed avoidance of triggers such as heat, sunlight, alcohol, spicy foods. If patient can pinpoint a trigger that causes flushing it may be easier to avoid if it is bothersome. We discussed that flushing is a very difficult symptom of rosacea to treat but that some oral medications can sometimes be helpful. At this time patient is less bothered by redness but would like to try a plan for breakouts. We discussed topical and oral therapies and we will plan for management as below.     -Reviewed symptoms of ocular rosacea: Symptoms of this condition  include: bloodshot and watery eyes, eyes that feel dry, irritated, or gritty, burning, stinging in the eyes, blurred vision, light sensitivity. Patient denies these symptoms today.     -Use a gentle cleanser BID prior to applying medication and moisturizer to face.   -Start metronidazile 0.75% cream BID to affected areas of the face.   -Patient may notice some dryness but advised to use a non-comedogenic moisturizer such as Cetaphil cream, Cera Ve Cream, or Vanicream for dryness after applying medication or throughout the day as needed.   -Start moisturizer with SPF 30+ to face and neck in the morning after applying medication.   -Avoid extended sun exposure and reapply sunscreen every 2-3 hours when sun exposed.   -Avoid retinoids, acids, or other irritation/drying chemicals, washes, or creams as these may exacerbate rosacea flares.     Follow up in 3-4 months, sooner PRN new or worsening concerns.        Sera Benson, [facility]  Dermatology    _______________________________________    CC: Rash (Rash area that come and go on face and around mouth and hairline ( inially thought it was shingles 2 years ago and tx;'d with topical and oral and resolved some what)/Lesion to left ear that has been flared with swelling and pain. Off and on flaring and tx'ing with ABX oint/Cousins  [facility])    HPI:  Ms. Nury Cárdenas is a(n) 79 year old female who presents today as a return patient for bumps that keep recurring on her cheeks and nose area. She has been seen by primary care and was given metornidazole 0.75% cream which she uses with outbreaks and does seem to clear it up but then she stops and it continues to come back over and over. She had a scab on her left ear this summer that was itchy but it has resolved.     Patient is otherwise feeling well, without additional skin concerns.      Physical Exam:  SKIN: Focused examination of  Face, ears and neck was performed.  - background erythema with overlying acneiform papules on the  cheeks and nose   -no worrisome lesions on ears  - No other lesions of concern on areas examined.     Medications:  Current Outpatient Medications   Medication Sig Dispense Refill    metroNIDAZOLE (METROGEL) 0.75 % external gel Apply topically 2 times daily. For rosacea 45 g 11    aspirin-acetaminophen-caffeine (EXCEDRIN MIGRAINE) 250-250-65 MG tablet Take 1 tablet by mouth every 6 hours as needed for headaches      calcium-magnesium (CALMAG) 500-250 MG TABS per tablet Take 1 tablet by mouth 2 times daily (with meals)      cholecalciferol 1000 UNITS TABS Take 1,000 Units by mouth daily      loratadine (CLARITIN) 10 MG tablet TAKE 1 TABLET(10 MG) BY MOUTH DAILY 90 tablet 1    omeprazole (PRILOSEC) 20 MG DR capsule Take 20 mg by mouth daily      propranolol (INDERAL) 20 MG tablet TAKE 1 TABLET(20 MG) BY MOUTH DAILY 90 tablet 0     No current facility-administered medications for this visit.     Facility-Administered Medications Ordered in Other Visits   Medication Dose Route Frequency Provider Last Rate Last Admin    sodium chloride (PF) 0.9% PF flush 60 mL  60 mL Intravenous Once Phil Abbott MD          Past Medical History:   Patient Active Problem List   Diagnosis    GERD (gastroesophageal reflux disease)    Fibroids    Migraines    Hearing loss    Osteopenia    HYPERLIPIDEMIA LDL GOAL <160    Inflammatory arthritis    Synovitis of wrist    Chronic constipation    Granulomatosis with polyangiitis without renal involvement (H)    Chronic steroid use    Dyspnea    Pulmonary embolism (H)    Calculus of kidney, right    Chronic anticoagulation    Long-term (current) use of anticoagulants [Z79.01]    Long-term use of immunosuppressant medication    Primary osteoarthritis involving multiple joints    Cellulitis    Cat scratch left lower extremity    Other pulmonary embolism without acute cor pulmonale, unspecified chronicity (H)    Age-related osteoporosis without current pathological fracture    Age-related  osteoporosis with current pathological fracture, sequela    Adverse effects of medication    Combined forms of age-related cataract, mild-mod, of right eye    Pseudophakia, Yag Caps, os    History of vitrectomy - macular hole, os (MVE)    Hx of macular hole of left eye    Hemifacial spasm    Immunosuppression (H)    Clinical diagnosis of COVID-19    History of COVID-19    Lesion of right eyelid    COPD (chronic obstructive pulmonary disease) (H)    Polyneuropathy associated with underlying disease (H)    Esodeviation    History of strabismus surgery    Current chronic use of systemic steroids    Herpes zoster ophthalmicus, right    Family history of genetic disease carrier     Past Medical History:   Diagnosis Date    Amblyopia     Atypical pneumonia 06/14/2014    Atypical pneumonia     Atypical pneumonia     Atypical pneumonia     COPD (chronic obstructive pulmonary disease) (H) 07/20/2021    Coronary artery disease 1982    heart beat hard made me tired    Dyslipidemia     Fibroids     GERD (gastroesophageal reflux disease)     Granulomatosis with polyangiitis (Wegener's)     Hearing loss     Inflammatory arthritis     thumb    Migraines     MVP (mitral valve prolapse)     had an echo that was normal in 2007 she is on Inderal    Nonsenile cataract     Osteopenia     PE (pulmonary embolism) 10/2014    ? GPA associated, on warfarin     Strabismus     Synovitis of wrist 2009    right       CC Referred Self, MD  No address on file on close of this encounter.

## 2024-10-04 LAB
ANCA AB PATTERN SER IF-IMP: NORMAL
C-ANCA TITR SER IF: NORMAL {TITER}
MYELOPEROXIDASE AB SER IA-ACNC: <0.3 U/ML
MYELOPEROXIDASE AB SER IA-ACNC: NEGATIVE
PROTEINASE3 AB SER IA-ACNC: 2.1 U/ML
PROTEINASE3 AB SER IA-ACNC: ABNORMAL

## 2024-11-07 DIAGNOSIS — G43.909 MIGRAINE WITHOUT STATUS MIGRAINOSUS, NOT INTRACTABLE, UNSPECIFIED MIGRAINE TYPE: ICD-10-CM

## 2024-11-07 RX ORDER — PROPRANOLOL HCL 20 MG
20 TABLET ORAL DAILY
Qty: 90 TABLET | Refills: 2 | Status: SHIPPED | OUTPATIENT
Start: 2024-11-07

## 2024-12-04 ENCOUNTER — MYC MEDICAL ADVICE (OUTPATIENT)
Dept: FAMILY MEDICINE | Facility: CLINIC | Age: 79
End: 2024-12-04
Payer: COMMERCIAL

## 2024-12-11 ENCOUNTER — OFFICE VISIT (OUTPATIENT)
Dept: FAMILY MEDICINE | Facility: CLINIC | Age: 79
End: 2024-12-11
Payer: COMMERCIAL

## 2024-12-11 VITALS
SYSTOLIC BLOOD PRESSURE: 144 MMHG | BODY MASS INDEX: 21.97 KG/M2 | WEIGHT: 124 LBS | HEIGHT: 63 IN | HEART RATE: 71 BPM | RESPIRATION RATE: 19 BRPM | OXYGEN SATURATION: 96 % | TEMPERATURE: 96.9 F | DIASTOLIC BLOOD PRESSURE: 80 MMHG

## 2024-12-11 DIAGNOSIS — Z02.9 ADMINISTRATIVE ENCOUNTER: Primary | ICD-10-CM

## 2024-12-11 PROCEDURE — 99212 OFFICE O/P EST SF 10 MIN: CPT | Performed by: FAMILY MEDICINE

## 2024-12-11 ASSESSMENT — PAIN SCALES - GENERAL: PAINLEVEL_OUTOF10: NO PAIN (0)

## 2024-12-11 NOTE — PROGRESS NOTES
"  Assessment & Plan     Administrative encounter   Here to review POLST form, reviewed and signed    See Patient Instructions    Subjective   Bria is a 79 year old, presenting for the following health issues:  Forms  REviewed POLST form and signed      12/11/2024     9:56 AM   Additional Questions   Roomed by curly Fu ma   Accompanied by self     History of Present Illness       Reason for visit:  To get my POLST form signed   She is taking medications regularly.        Review of Systems  Constitutional, HEENT, cardiovascular, pulmonary, gi and gu systems are negative, except as otherwise noted.      Objective    BP (!) 144/80   Pulse 71   Temp 96.9  F (36.1  C) (Temporal)   Resp 19   Ht 1.61 m (5' 3.39\")   Wt 56.2 kg (124 lb)   SpO2 96%   BMI 21.70 kg/m    Body mass index is 21.7 kg/m .  Physical Exam   GENERAL: alert and no distress  RESP: lungs clear to auscultation - no rales, rhonchi or wheezes  CV: regular rate and rhythm, no murmur, click or rub, no peripheral edema  PSYCH: mentation appears normal, affect normal/bright      Signed Electronically by: Phil Abbott MD    "

## 2025-01-02 ENCOUNTER — LAB (OUTPATIENT)
Dept: LAB | Facility: CLINIC | Age: 80
End: 2025-01-02
Payer: COMMERCIAL

## 2025-01-02 DIAGNOSIS — M31.30 GRANULOMATOSIS WITH POLYANGIITIS WITHOUT RENAL INVOLVEMENT (H): ICD-10-CM

## 2025-01-02 DIAGNOSIS — Z79.60 LONG-TERM USE OF IMMUNOSUPPRESSANT MEDICATION: ICD-10-CM

## 2025-01-20 NOTE — PROGRESS NOTES
Chief Complaint - Hearing loss    History of Present Illness - Nury Cárdenas is a 79 year old female who presents to me today with hearing loss. She only occasionally gets a deep sharp ear pain in either ear, not at same time. Lasts seconds or a minute. She got hearing aids, has difficulty getting them in. There is no history of ear surgery. She has new hearing aids, but gets frustrated and doesn't want to use them.     Tests personally reviewed today for this visit:   1.) audiogram was performed 9/27/24 as part of the evaluation and personally reviewed. The audiogram showed mixed hearing loss bilaterally.  Similar to 2021, but back then there was more of a conductive hearing loss  2.) tympanograms were performed 9/27/24 and were normal Type B left ear, type C right      Past Medical History -   Patient Active Problem List   Diagnosis    GERD (gastroesophageal reflux disease)    Fibroids    Migraines    Hearing loss    Osteopenia    HYPERLIPIDEMIA LDL GOAL <160    Inflammatory arthritis    Synovitis of wrist    Chronic constipation    Granulomatosis with polyangiitis without renal involvement (H)    Chronic steroid use    Dyspnea    Pulmonary embolism (H)    Calculus of kidney, right    Chronic anticoagulation    Long-term (current) use of anticoagulants [Z79.01]    Long-term use of immunosuppressant medication    Primary osteoarthritis involving multiple joints    Cellulitis    Cat scratch left lower extremity    Other pulmonary embolism without acute cor pulmonale, unspecified chronicity (H)    Age-related osteoporosis without current pathological fracture    Age-related osteoporosis with current pathological fracture, sequela    Adverse effects of medication    Combined forms of age-related cataract, mild-mod, of right eye    Pseudophakia, Yag Caps, os    History of vitrectomy - macular hole, os (MVE)    Hx of macular hole of left eye    Hemifacial spasm    Immunosuppression    Clinical diagnosis of COVID-19     History of COVID-19    Lesion of right eyelid    COPD (chronic obstructive pulmonary disease) (H)    Polyneuropathy associated with underlying disease    Esodeviation    History of strabismus surgery    Current chronic use of systemic steroids    Herpes zoster ophthalmicus, right    Family history of genetic disease carrier       Current Medications -   Current Outpatient Medications:     aspirin-acetaminophen-caffeine (EXCEDRIN MIGRAINE) 250-250-65 MG tablet, Take 1 tablet by mouth every 6 hours as needed for headaches, Disp: , Rfl:     calcium-magnesium (CALMAG) 500-250 MG TABS per tablet, Take 1 tablet by mouth 2 times daily (with meals), Disp: , Rfl:     cholecalciferol 1000 UNITS TABS, Take 1,000 Units by mouth daily, Disp: , Rfl:     loratadine (CLARITIN) 10 MG tablet, TAKE 1 TABLET(10 MG) BY MOUTH DAILY, Disp: 90 tablet, Rfl: 1    metroNIDAZOLE (METROGEL) 0.75 % external gel, Apply topically 2 times daily. For rosacea, Disp: 45 g, Rfl: 11    omeprazole (PRILOSEC) 20 MG DR capsule, Take 20 mg by mouth daily, Disp: , Rfl:     propranolol (INDERAL) 20 MG tablet, TAKE 1 TABLET(20 MG) BY MOUTH DAILY, Disp: 90 tablet, Rfl: 2  No current facility-administered medications for this visit.    Facility-Administered Medications Ordered in Other Visits:     sodium chloride (PF) 0.9% PF flush 60 mL, 60 mL, Intravenous, Once, Phil Abbott MD    Allergies -   Allergies   Allergen Reactions    Vancomycin Other (See Comments)     Red man's syndrom    Adhesive Tape      Rash itches    Amoxicillin      vomiting    Amoxicillin-Pot Clavulanate      vomiting    Erythromycin      vomiting    Morphine And Codeine      vomiting    Nickel      itches rash    Sulfa Antibiotics Itching    Tegaderm Alginate Ag Rope      LW Other1: -ALL MEDICATIONS MAKE PATIENT VIOLENTLY ILL; ADHESIVE BANDAGES; NICKEL    Zithromax [Azithromycin Dihydrate]      Vomiting/ skin blisters       Social History -   Social History     Socioeconomic  History    Marital status:    Tobacco Use    Smoking status: Former     Current packs/day: 0.00     Average packs/day: 2.0 packs/day for 22.5 years (45.0 ttl pk-yrs)     Types: Cigarettes     Start date: 1961     Quit date: 1983     Years since quittin.5     Passive exposure: Past    Smokeless tobacco: Never   Vaping Use    Vaping status: Never Used   Substance and Sexual Activity    Alcohol use: No     Comment: 1-2 glasses per year    Drug use: No    Sexual activity: Never   Other Topics Concern    Parent/sibling w/ CABG, MI or angioplasty before 65F 55M? No     Social Drivers of Health     Financial Resource Strain: Low Risk  (8/10/2024)    Financial Resource Strain     Within the past 12 months, have you or your family members you live with been unable to get utilities (heat, electricity) when it was really needed?: No   Food Insecurity: Low Risk  (8/10/2024)    Food Insecurity     Within the past 12 months, did you worry that your food would run out before you got money to buy more?: No     Within the past 12 months, did the food you bought just not last and you didn t have money to get more?: No   Transportation Needs: Low Risk  (8/10/2024)    Transportation Needs     Within the past 12 months, has lack of transportation kept you from medical appointments, getting your medicines, non-medical meetings or appointments, work, or from getting things that you need?: No   Physical Activity: Inactive (8/10/2024)    Exercise Vital Sign     Days of Exercise per Week: 0 days     Minutes of Exercise per Session: 0 min   Stress: No Stress Concern Present (8/10/2024)    [employer] - Occupational Stress Questionnaire     Feeling of Stress : Not at all   Interpersonal Safety: Low Risk  (2024)    Interpersonal Safety     Do you feel physically and emotionally safe where you currently live?: Yes     Within the past 12 months, have you been hit, slapped, kicked or otherwise  physically hurt by someone?: No     Within the past 12 months, have you been humiliated or emotionally abused in other ways by your partner or ex-partner?: No   Housing Stability: Low Risk  (8/10/2024)    Housing Stability     Do you have housing? : Yes     Are you worried about losing your housing?: No       Family History -   Family History   Problem Relation Age of Onset    Respiratory Mother         emphysema    Aneurysm Mother     Chronic Obstructive Pulmonary Disease Mother     Thyroid Disease Mother     Osteoporosis Mother     Other Cancer Father     Cancer Father         lung cancer    Cancer Sister         stage 4 anal cancer age 62 years    Colon Cancer Sister     Substance Abuse Sister     Substance Abuse Sister     Cancer - colorectal Brother     Colon Cancer Brother     Arthritis Maternal Grandmother         rheumatoid    Heart Disease Maternal Grandmother         unsure of details    Heart Disease Maternal Grandfather     Neurologic Disorder Paternal Grandmother         migraines    Alzheimer Disease Paternal Grandmother     Unknown/Adopted Paternal Grandfather     Anesthesia Reaction Daughter     Cowden Syndrome Other     Skin Cancer Cousin        Review of Systems - As per HPI and PMHx, otherwise 7 system review of the head and neck negative.    Physical Exam  General - The patient is in no distress.  Alert and oriented to person and place, answers questions and cooperates with examination appropriately.   Voice and Breathing - The patient was breathing comfortably without the use of accessory muscles. There was no wheezing, stridor, or stertor.  The patients voice was clear and strong.  Ears - some wax in both ears I removed. The tympanic membrane on the right is intact, retracted with a myringoincudopexy, no middle ear effusion. No acute infection.  No fluid or purulence was seen in the external canal. The tympanic membrane on the left is intact, also retracted some, no middle ear effusion. No  acute infection.  No fluid or purulence was seen in the external canal. Her hearing aids don't fit well in her ears. She gets feedback. The hearing aids are over the counter aids.   Neck - Soft, non-tender. Palpation of the occipital, submental, submandibular, internal jugular chain, and supraclavicular nodes did not demonstrate any abnormal lymph nodes or masses. No parotid masses. Palpation of the thyroid was soft and smooth, with no nodules or goiter appreciated.  The trachea was mobile and midline.  Neurological - Cranial nerves 2 through 12 were grossly intact. House-Brackmann grade 1 out of 6 bilaterally.      Assessment and Plan -     ICD-10-CM    1. Mixed hearing loss, bilateral  H90.6       2. Dysfunction of both eustachian tubes  H69.93           Nury Cárdenas is a 79 year old female who presents to me today with mixed hearing loss. She also has ETD and tympanic membrane retraction. I see no cholesteatoma. She can't pop ears. This may give her some infrequent short-lived pain. I recommend better hearing aids. Her over-the-counter ones have feedback and don't fit well. She can try smaller domes.     Marshall Car MD  Otolaryngology  [facility]

## 2025-01-22 ENCOUNTER — OFFICE VISIT (OUTPATIENT)
Dept: OTOLARYNGOLOGY | Facility: CLINIC | Age: 80
End: 2025-01-22
Payer: COMMERCIAL

## 2025-01-22 VITALS
OXYGEN SATURATION: 96 % | DIASTOLIC BLOOD PRESSURE: 75 MMHG | RESPIRATION RATE: 18 BRPM | HEART RATE: 73 BPM | SYSTOLIC BLOOD PRESSURE: 135 MMHG

## 2025-01-22 DIAGNOSIS — H69.93 DYSFUNCTION OF BOTH EUSTACHIAN TUBES: ICD-10-CM

## 2025-01-22 DIAGNOSIS — H90.6 MIXED HEARING LOSS, BILATERAL: Primary | ICD-10-CM

## 2025-01-22 PROCEDURE — 99203 OFFICE O/P NEW LOW 30 MIN: CPT | Performed by: OTOLARYNGOLOGY

## 2025-01-22 NOTE — LETTER
1/22/2025      Nury Cárdenas  [address] [address]      Dear Colleague,    Thank you for referring your patient, Nury Cárdenas, to the [facility]. Please see a copy of my visit note below.    Chief Complaint - Hearing loss    History of Present Illness - Nury Cárdenas is a 79 year old female who presents to me today with hearing loss. She only occasionally gets a deep sharp ear pain in either ear, not at same time. Lasts seconds or a minute. She got hearing aids, has difficulty getting them in. There is no history of ear surgery. She has new hearing aids, but gets frustrated and doesn't want to use them.     Tests personally reviewed today for this visit:   1.) audiogram was performed 9/27/24 as part of the evaluation and personally reviewed. The audiogram showed mixed hearing loss bilaterally.  Similar to 2021, but back then there was more of a conductive hearing loss  2.) tympanograms were performed 9/27/24 and were normal Type B left ear, type C right      Past Medical History -   Patient Active Problem List   Diagnosis     GERD (gastroesophageal reflux disease)     Fibroids     Migraines     Hearing loss     Osteopenia     HYPERLIPIDEMIA LDL GOAL <160     Inflammatory arthritis     Synovitis of wrist     Chronic constipation     Granulomatosis with polyangiitis without renal involvement (H)     Chronic steroid use     Dyspnea     Pulmonary embolism (H)     Calculus of kidney, right     Chronic anticoagulation     Long-term (current) use of anticoagulants [Z79.01]     Long-term use of immunosuppressant medication     Primary osteoarthritis involving multiple joints     Cellulitis     Cat scratch left lower extremity     Other pulmonary embolism without acute cor pulmonale, unspecified chronicity (H)     Age-related osteoporosis without current pathological fracture     Age-related osteoporosis with current pathological fracture, sequela     Adverse effects of  medication     Combined forms of age-related cataract, mild-mod, of right eye     Pseudophakia, Yag Caps, os     History of vitrectomy - macular hole, os (MVE)     Hx of macular hole of left eye     Hemifacial spasm     Immunosuppression     Clinical diagnosis of COVID-19     History of COVID-19     Lesion of right eyelid     COPD (chronic obstructive pulmonary disease) (H)     Polyneuropathy associated with underlying disease     Esodeviation     History of strabismus surgery     Current chronic use of systemic steroids     Herpes zoster ophthalmicus, right     Family history of genetic disease carrier       Current Medications -   Current Outpatient Medications:      aspirin-acetaminophen-caffeine (EXCEDRIN MIGRAINE) 250-250-65 MG tablet, Take 1 tablet by mouth every 6 hours as needed for headaches, Disp: , Rfl:      calcium-magnesium (CALMAG) 500-250 MG TABS per tablet, Take 1 tablet by mouth 2 times daily (with meals), Disp: , Rfl:      cholecalciferol 1000 UNITS TABS, Take 1,000 Units by mouth daily, Disp: , Rfl:      loratadine (CLARITIN) 10 MG tablet, TAKE 1 TABLET(10 MG) BY MOUTH DAILY, Disp: 90 tablet, Rfl: 1     metroNIDAZOLE (METROGEL) 0.75 % external gel, Apply topically 2 times daily. For rosacea, Disp: 45 g, Rfl: 11     omeprazole (PRILOSEC) 20 MG DR capsule, Take 20 mg by mouth daily, Disp: , Rfl:      propranolol (INDERAL) 20 MG tablet, TAKE 1 TABLET(20 MG) BY MOUTH DAILY, Disp: 90 tablet, Rfl: 2  No current facility-administered medications for this visit.    Facility-Administered Medications Ordered in Other Visits:      sodium chloride (PF) 0.9% PF flush 60 mL, 60 mL, Intravenous, Once, hPil Abbott MD    Allergies -   Allergies   Allergen Reactions     Vancomycin Other (See Comments)     Red man's syndrom     Adhesive Tape      Rash itches     Amoxicillin      vomiting     Amoxicillin-Pot Clavulanate      vomiting     Erythromycin      vomiting     Morphine And Codeine      vomiting      Nickel      itches rash     Sulfa Antibiotics Itching     Tegaderm Alginate Ag Rope      LW Other1: -ALL MEDICATIONS MAKE PATIENT VIOLENTLY ILL; ADHESIVE BANDAGES; NICKEL     Zithromax [Azithromycin Dihydrate]      Vomiting/ skin blisters       Social History -   Social History     Socioeconomic History     Marital status:    Tobacco Use     Smoking status: Former     Current packs/day: 0.00     Average packs/day: 2.0 packs/day for 22.5 years (45.0 ttl pk-yrs)     Types: Cigarettes     Start date: 1961     Quit date: 1983     Years since quittin.5     Passive exposure: Past     Smokeless tobacco: Never   Vaping Use     Vaping status: Never Used   Substance and Sexual Activity     Alcohol use: No     Comment: 1-2 glasses per year     Drug use: No     Sexual activity: Never   Other Topics Concern     Parent/sibling w/ CABG, MI or angioplasty before 65F 55M? No     Social Drivers of Health     Financial Resource Strain: Low Risk  (8/10/2024)    Financial Resource Strain      Within the past 12 months, have you or your family members you live with been unable to get utilities (heat, electricity) when it was really needed?: No   Food Insecurity: Low Risk  (8/10/2024)    Food Insecurity      Within the past 12 months, did you worry that your food would run out before you got money to buy more?: No      Within the past 12 months, did the food you bought just not last and you didn t have money to get more?: No   Transportation Needs: Low Risk  (8/10/2024)    Transportation Needs      Within the past 12 months, has lack of transportation kept you from medical appointments, getting your medicines, non-medical meetings or appointments, work, or from getting things that you need?: No   Physical Activity: Inactive (8/10/2024)    Exercise Vital Sign      Days of Exercise per Week: 0 days      Minutes of Exercise per Session: 0 min   Stress: No Stress Concern Present (8/10/2024)    [employer]  [employer]upational Health - Occupational Stress Questionnaire      Feeling of Stress : Not at all   Interpersonal Safety: Low Risk  (8/12/2024)    Interpersonal Safety      Do you feel physically and emotionally safe where you currently live?: Yes      Within the past 12 months, have you been hit, slapped, kicked or otherwise physically hurt by someone?: No      Within the past 12 months, have you been humiliated or emotionally abused in other ways by your partner or ex-partner?: No   Housing Stability: Low Risk  (8/10/2024)    Housing Stability      Do you have housing? : Yes      Are you worried about losing your housing?: No       Family History -   Family History   Problem Relation Age of Onset     Respiratory Mother         emphysema     Aneurysm Mother      Chronic Obstructive Pulmonary Disease Mother      Thyroid Disease Mother      Osteoporosis Mother      Other Cancer Father      Cancer Father         lung cancer     Cancer Sister         stage 4 anal cancer age 62 years     Colon Cancer Sister      Substance Abuse Sister      Substance Abuse Sister      Cancer - colorectal Brother      Colon Cancer Brother      Arthritis Maternal Grandmother         rheumatoid     Heart Disease Maternal Grandmother         unsure of details     Heart Disease Maternal Grandfather      Neurologic Disorder Paternal Grandmother         migraines     Alzheimer Disease Paternal Grandmother      Unknown/Adopted Paternal Grandfather      Anesthesia Reaction Daughter      Cowden Syndrome Other      Skin Cancer Cousin        Review of Systems - As per HPI and PMHx, otherwise 7 system review of the head and neck negative.    Physical Exam  General - The patient is in no distress.  Alert and oriented to person and place, answers questions and cooperates with examination appropriately.   Voice and Breathing - The patient was breathing comfortably without the use of accessory muscles. There was no wheezing, stridor, or stertor.  The patients  voice was clear and strong.  Ears - some wax in both ears I removed. The tympanic membrane on the right is intact, retracted with a myringoincudopexy, no middle ear effusion. No acute infection.  No fluid or purulence was seen in the external canal. The tympanic membrane on the left is intact, also retracted some, no middle ear effusion. No acute infection.  No fluid or purulence was seen in the external canal. Her hearing aids don't fit well in her ears. She gets feedback. The hearing aids are over the counter aids.   Neck - Soft, non-tender. Palpation of the occipital, submental, submandibular, internal jugular chain, and supraclavicular nodes did not demonstrate any abnormal lymph nodes or masses. No parotid masses. Palpation of the thyroid was soft and smooth, with no nodules or goiter appreciated.  The trachea was mobile and midline.  Neurological - Cranial nerves 2 through 12 were grossly intact. House-Brackmann grade 1 out of 6 bilaterally.      Assessment and Plan -     ICD-10-CM    1. Mixed hearing loss, bilateral  H90.6       2. Dysfunction of both eustachian tubes  H69.93           Nury Cárdenas is a 79 year old female who presents to me today with mixed hearing loss. She also has ETD and tympanic membrane retraction. I see no cholesteatoma. She can't pop ears. This may give her some infrequent short-lived pain. I recommend better hearing aids. Her over-the-counter ones have feedback and don't fit well. She can try smaller domes.     Marshall Car MD  Otolaryngology  [facility]      Again, thank you for allowing me to participate in the care of your patient.        Sincerely,        Marshall Car MD    Electronically signed

## 2025-02-18 ENCOUNTER — MYC MEDICAL ADVICE (OUTPATIENT)
Dept: RHEUMATOLOGY | Facility: CLINIC | Age: 80
End: 2025-02-18
Payer: COMMERCIAL

## 2025-02-19 NOTE — TELEPHONE ENCOUNTER
Per office notes from 11/30/23, Dr. Maguire recommended patient follow-up with Dr. Mcleod.      Airam KELLY RENETTA  Rheumatology/Infectious disease  [facility]   Rheumatology ph:[phone]  Infectious Disease ph:[phone]

## 2025-03-06 ENCOUNTER — TELEPHONE (OUTPATIENT)
Dept: RHEUMATOLOGY | Facility: CLINIC | Age: 80
End: 2025-03-06

## 2025-03-06 ENCOUNTER — MYC MEDICAL ADVICE (OUTPATIENT)
Dept: RHEUMATOLOGY | Facility: CLINIC | Age: 80
End: 2025-03-06

## 2025-03-06 ENCOUNTER — VIRTUAL VISIT (OUTPATIENT)
Dept: FAMILY MEDICINE | Facility: CLINIC | Age: 80
End: 2025-03-06
Payer: COMMERCIAL

## 2025-03-06 ENCOUNTER — MYC MEDICAL ADVICE (OUTPATIENT)
Dept: FAMILY MEDICINE | Facility: CLINIC | Age: 80
End: 2025-03-06

## 2025-03-06 DIAGNOSIS — J01.01 ACUTE RECURRENT MAXILLARY SINUSITIS: Primary | ICD-10-CM

## 2025-03-06 DIAGNOSIS — M31.30 GRANULOMATOSIS WITH POLYANGIITIS WITHOUT RENAL INVOLVEMENT (H): Primary | ICD-10-CM

## 2025-03-06 PROCEDURE — 98013 SYNCH AUDIO-ONLY EST LOW 20: CPT | Performed by: FAMILY MEDICINE

## 2025-03-06 RX ORDER — CEFDINIR 300 MG/1
300 CAPSULE ORAL 2 TIMES DAILY
Qty: 14 CAPSULE | Refills: 0 | Status: SHIPPED | OUTPATIENT
Start: 2025-03-06 | End: 2025-03-13

## 2025-03-06 RX ORDER — CEFDINIR 300 MG/1
300 CAPSULE ORAL 2 TIMES DAILY
Qty: 14 CAPSULE | Refills: 0 | Status: CANCELLED | OUTPATIENT
Start: 2025-03-06

## 2025-03-06 NOTE — PROGRESS NOTES
2/13/2017       RE: Sarah Felix  [address] [place] [address]     Dear Colleague,    Thank you for referring your patient, Sarah Felix, to the EYE CLINIC at [facility]. Please see a copy of my visit note below.        TODAY'S VISIT:     CHIEF COMPLAINT:  Patient is a 68 year old female who presents to the [facility] for diabetic eye exam    HPI:    Most recent Hgb A1c of 8.0 on 11/16    Endorsed seeing new floaters in upper field of view 1 month ago, not sure if it was in both eyes    Denies flashes of light or pain/irritation during that incident    Vision has gradually decreased over the years, noticing that she needed her glasses at home    Current glasses that she's wearing is about 8-9 yrs old    OPHTHALMIC/MEDICAL HISTORY:    Type 2 Diabetes mellitus    Myopic astigmatism w/ presbyopia both eyes    OPHTHALMIC PROCEDURE/SURGICAL HISTORY:    None    CURRENT OPHTHALMIC MEDICATIONS:    None    RETINAL IMAGING:    None      ASSESSMENT / PLAN:        1. Type 2 Diabetes mellitus, no ocular manifestations:   - continue excellent Diabetes mellitus management   - A1C is 8.0 per recent labs 11/2016   - no diabetic retinopathy noted today     2.  Trace Nuclear sclerotic cataract     - updated eyeglasses prescription given    Return to clinic in 1 year for dilated eye exam         Josette Houston MD PhD.  Professor & Chair                   Bria is a 80 year old who is being evaluated via a billable telephone visit.    What phone number would you like to be contacted at? [phone]  How would you like to obtain your AVS? MyChart  Originating Location (pt. Location): Home  {PROVIDER LOCATION On-site should be selected for visits conducted from your clinic location or adjoining [facility] hospital, academic office, or other nearby [facility] building. Off-site should be selected for all other provider locations, including home:[id number]}  Distant Location (provider location):  On-site  Telephone visit completed due to the patient did not consent to a video visit.    Assessment & Plan     Acute recurrent maxillary sinusitis  ***  - cefdinir (OMNICEF) 300 MG capsule  Dispense: 14 capsule; Refill: 0              {FOLLOW UP PLANS (Optional) Includes COVID19 Treatment Plan:672079}    Subjective   Bria is a 80 year old, presenting for the following health issues:  Sinus Problem (Hx wagners GPA.   Sinus congestion x1 week. )      3/6/2025     2:17 PM   Additional Questions   Roomed by Coby KOENIG RN     Sinus Problem     History of Present Illness       Reason for visit:  Sinus infection  Symptom onset:  1-2 weeks ago  Symptoms include:  Sore throat, coughing stuffy nose  Symptom intensity:  Moderate  Symptom progression:  Worsening  Had these symptoms before:  Yes  What makes it better:  Taking Cefdinir 300mg cap   it wards off a relapse of my Wegener's   She is taking medications regularly.      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    Physical Exam   General: Alert and no distress //Respiratory: No audible wheeze, cough, or shortness of breath // Psychiatric:  Appropriate affect, tone, and pace of words      {Diagnostic Test Results (Optional):761293}      Phone call duration: *** minutes  Signed Electronically by: Berenice Luke MD  {Email feedback regarding this note to [email or web address]   :[id number]}

## 2025-03-06 NOTE — TELEPHONE ENCOUNTER
Patient missed her phone appointment with Alvaro Bautista CNP   She stated that she did not receive a call but has been having phone problems on her land line which she though was already resolved.  Scheduled patient for another phone visit to discuss treatment for sinus symptoms with Dr. Luke.  Will put patient's mobile number on the appointment notes this time as land line may not be working properly     Bruce Law RN  [facility]

## 2025-03-06 NOTE — TELEPHONE ENCOUNTER
Called patient  Suggested that she submit an Evisit.  Patient not familiar with Evisits and would prefer a phone visit.  Explained that PCP is not in today but does have an opening tomorrow.  Patient would like to have this addressed today and is willing to speak with another provider    Phone visit scheduled with Alvaro Bautista CNP today at 1230    Bruce Law RN  [facility]

## 2025-03-06 NOTE — TELEPHONE ENCOUNTER
Rheumatology RN Patient Call    SITUATION:   Pat is wondering if provider wants to continue Cefdinir that Dr. Abbott PCP prescribed to treat a sinus infection last year.    She reports no fever,  just coughing and runny nose (yellowish/greenish) slight sore throat much like my Wegener's started back in 2014.     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      Current Rheumatology medications:   Last Rheumatology follow up:    Plan at 1/10/25 OV: monitor for disease recrudescence with watchful waiting. She agrees to this plan. Plan for lab testing every 3 months     Next Scheduled follow up:7/18/25 scheduled.        ASSESSMENT:   Last labs 1/2/25 and were WNL    Call to Pat.Sinus infection has lasted about a week.  Coughing up phlegm and has a runny nose (greenish yellowish)    She says she does not have a fever, but she notes she gets occasional chills    She has been taking children's pseudofed and Excedrin.    She feels symptoms may be getting a bit worse. This morning she woke up and was coughing/almost choking.     No SOB more than usual.    PLAN:   Routed to Dr. Abbott and Kellie zuniga for advisement    Pended in Cefdnir.

## 2025-03-18 ENCOUNTER — TELEPHONE (OUTPATIENT)
Dept: FAMILY MEDICINE | Facility: CLINIC | Age: 80
End: 2025-03-18

## 2025-03-18 ENCOUNTER — OFFICE VISIT (OUTPATIENT)
Dept: FAMILY MEDICINE | Facility: CLINIC | Age: 80
End: 2025-03-18
Payer: COMMERCIAL

## 2025-03-18 ENCOUNTER — MYC MEDICAL ADVICE (OUTPATIENT)
Dept: RHEUMATOLOGY | Facility: CLINIC | Age: 80
End: 2025-03-18

## 2025-03-18 VITALS
HEIGHT: 63 IN | TEMPERATURE: 97.6 F | SYSTOLIC BLOOD PRESSURE: 137 MMHG | RESPIRATION RATE: 16 BRPM | WEIGHT: 120.2 LBS | HEART RATE: 124 BPM | BODY MASS INDEX: 21.3 KG/M2 | OXYGEN SATURATION: 97 % | DIASTOLIC BLOOD PRESSURE: 83 MMHG

## 2025-03-18 DIAGNOSIS — Z79.60 LONG-TERM USE OF IMMUNOSUPPRESSANT MEDICATION: ICD-10-CM

## 2025-03-18 DIAGNOSIS — R09.81 CONGESTION OF NASAL SINUS: ICD-10-CM

## 2025-03-18 DIAGNOSIS — J44.9 CHRONIC OBSTRUCTIVE PULMONARY DISEASE, UNSPECIFIED COPD TYPE (H): ICD-10-CM

## 2025-03-18 DIAGNOSIS — R09.82 POST-NASAL DRAINAGE: ICD-10-CM

## 2025-03-18 DIAGNOSIS — M31.30 GRANULOMATOSIS WITH POLYANGIITIS WITHOUT RENAL INVOLVEMENT (H): Primary | ICD-10-CM

## 2025-03-18 DIAGNOSIS — R00.2 FLUTTERING SENSATION OF HEART: Primary | ICD-10-CM

## 2025-03-18 DIAGNOSIS — R53.83 OTHER FATIGUE: ICD-10-CM

## 2025-03-18 DIAGNOSIS — R00.0 SINUS TACHYCARDIA: ICD-10-CM

## 2025-03-18 PROCEDURE — 3075F SYST BP GE 130 - 139MM HG: CPT | Performed by: FAMILY MEDICINE

## 2025-03-18 PROCEDURE — 93000 ELECTROCARDIOGRAM COMPLETE: CPT | Performed by: FAMILY MEDICINE

## 2025-03-18 PROCEDURE — 3079F DIAST BP 80-89 MM HG: CPT | Performed by: FAMILY MEDICINE

## 2025-03-18 PROCEDURE — 99214 OFFICE O/P EST MOD 30 MIN: CPT | Performed by: FAMILY MEDICINE

## 2025-03-18 RX ORDER — IPRATROPIUM BROMIDE 42 UG/1
1 SPRAY, METERED NASAL 3 TIMES DAILY
Qty: 15 ML | Refills: 0 | Status: SHIPPED | OUTPATIENT
Start: 2025-03-18

## 2025-03-18 RX ORDER — IPRATROPIUM BROMIDE 42 UG/1
1 SPRAY, METERED NASAL 3 TIMES DAILY
Qty: 15 ML | Refills: 0 | Status: CANCELLED | OUTPATIENT
Start: 2025-03-18

## 2025-03-18 NOTE — PROGRESS NOTES
Assessment & Plan     Fluttering sensation of heart   Found to have tachycardia with pulse check and EKG; she is on low dose propranolol which she has taken for several years. She has been on antibiotics recently and is on Loratadine which she takes occasionally and started this recently. Discussed increasing dose of propranolol, and holding Loratadine; would like to stop Claritin and stay on same dose of propranolol first and if persist will then consider increasing dose. Also discussed further evaluation, with heart monitor, but will want to wait, however if symptoms worsen will call 911.  Will monitor HR and update in a week.  - EKG 12-lead complete w/read - Clinics  - TSH with free T4 reflex    Sinus tachycardia   Etiology no clear, could be medications, will check TSH  - TSH with free T4 reflex    Congestion of nasal sinus   Will hold antihistamine and do atrovent  - ipratropium (ATROVENT) 0.06 % nasal spray  Dispense: 15 mL; Refill: 0    Post-nasal drainage  - ipratropium (ATROVENT) 0.06 % nasal spray  Dispense: 15 mL; Refill: 0    Other fatigue    - possibly from physical deconditioning, tachycardia, medical conditions    Chronic obstructive pulmonary disease, unspecified COPD type (H)    -  stable      See Patient Instructions    Subjective   Pat is a 80 year old, presenting for the following health issues:  Pat is a 81 yo F with granulomatosis, multiple joint OA, gerd, hx PE. cad, migraines, osteopenia, hx kidney stonne, longterm anticoagulation, copd    Sinus Problem (Follow up)    Treated for Sinusitis with Omnicef for 10 days on   Patient was prescribed Cefdinir. She started taking it on 3/14, on 3/15 she did not take any as she thought she was getting better, however remained lethargic. On 3/16, symptoms seemed like they were worsening again, so she started taking the Cefdinir again. 3/17 had one episode of feeling like she was going to faint, she sat down and feeling subsided.   Been having cough on  "and off and drainage.  Has seasonal allergies and taking Loratadine.         3/18/2025    10:17 AM   Additional Questions   Roomed by eleanor     Sinus Problem     History of Present Illness       Reason for visit:  Sinus infection  Symptom onset:  1-2 weeks ago  Symptoms include:  Sore throat, coughing stuffy nose  Symptom intensity:  Moderate  Symptom progression:  Worsening  Had these symptoms before:  Yes  What makes it better:  Taking Cefdinir 300mg cap   it wards off a relapse of my Wegener's   She is taking medications regularly.          Review of Systems  Constitutional, cardiovascular, pulmonary, gi and gu systems are negative, except as otherwise noted.      Objective    /83   Pulse (!) 124   Temp 97.6  F (36.4  C) (Temporal)   Resp 16   Ht 1.61 m (5' 3.39\")   Wt 54.5 kg (120 lb 3.2 oz)   SpO2 97%   BMI 21.03 kg/m    Body mass index is 21.03 kg/m .  Physical Exam   GENERAL: alert and no distress  HENT: ear canals and TM's normal, nose and mouth without ulcers or lesions  RESP: lungs clear to auscultation - no rales, rhonchi or wheezes  CV: regular rate and rhythm, no murmur, click or rub, no peripheral edema  MS: no gross musculoskeletal defects noted, no edema      Signed Electronically by: Phil Abbott MD  "

## 2025-03-18 NOTE — TELEPHONE ENCOUNTER
Called patient.  Patient sent messages wanting to inform Dr. Maguire about recent sinusitis and fatigue symptoms.  These are similar to what she experienced last year when she had a flare of Mercado's disease.      Patient  was prescribed Cefdinir.    Patient had visit with her PCP today, due to her heart rate was beating so fast and hard, and she had a brief episode that she felt faint.  It resolved after a few seconds of sitting down.    In the provider's office visit today, her heart rate was in 120s.  EKG was normal    Plan:  Heart Fluttering:   Will hold Loratadine, do Atrovent nasal spray   Monitor HR for next 2 days; if still high will increase dose of propranolol (update in 3 days)   If feels very faintly; call 911       Katharine Taylor RN  Adult Rheumatology

## 2025-03-18 NOTE — PATIENT INSTRUCTIONS
Heart Fluttering:   Will hold Loratadine, do Atrovent nasal spray   Monitor HR for next 2 days; if still high will increase dose of propranolol (update in 3 days)   If feels very faintly; call [phone]

## 2025-03-18 NOTE — TELEPHONE ENCOUNTER
Patient calling to state she made an appointment with Dr. Abbott for later today,    Dr Abbott gave her an additional 3 days worth of antibiotics because the original 7 days did not seem to clear sinus infection.    She picked up the 3 days on Friday and has taken 1 or 2 and wants to know what to do.    She also had some feelings of her heart racing last night when laying down  and slept in the recliner so they would not return.    Has felt fine this morning.    Scheduled appointment with PCP today at 10:30 to be evaluated.    Christine M Klisch, RN

## 2025-03-18 NOTE — TELEPHONE ENCOUNTER
History of relapsing GPA, today with fatigue, light-headedness and tachycardia associated with nasal/sinus congestion.     Arrange to get lab testing and chest Xray now.

## 2025-03-19 ENCOUNTER — LAB (OUTPATIENT)
Dept: LAB | Facility: CLINIC | Age: 80
End: 2025-03-19
Payer: COMMERCIAL

## 2025-03-19 ENCOUNTER — TELEPHONE (OUTPATIENT)
Dept: CARDIOLOGY | Facility: CLINIC | Age: 80
End: 2025-03-19

## 2025-03-19 ENCOUNTER — ANCILLARY PROCEDURE (OUTPATIENT)
Dept: GENERAL RADIOLOGY | Facility: CLINIC | Age: 80
End: 2025-03-19
Attending: INTERNAL MEDICINE
Payer: COMMERCIAL

## 2025-03-19 DIAGNOSIS — Z79.60 LONG-TERM USE OF IMMUNOSUPPRESSANT MEDICATION: ICD-10-CM

## 2025-03-19 DIAGNOSIS — M31.30 GRANULOMATOSIS WITH POLYANGIITIS WITHOUT RENAL INVOLVEMENT (H): ICD-10-CM

## 2025-03-19 DIAGNOSIS — R00.0 SINUS TACHYCARDIA: ICD-10-CM

## 2025-03-19 DIAGNOSIS — R00.2 FLUTTERING SENSATION OF HEART: ICD-10-CM

## 2025-03-19 DIAGNOSIS — M31.30 GRANULOMATOSIS WITH POLYANGIITIS WITHOUT RENAL INVOLVEMENT (H): Primary | ICD-10-CM

## 2025-03-19 LAB
ALBUMIN SERPL BCG-MCNC: 4.4 G/DL (ref 3.5–5.2)
ALBUMIN UR-MCNC: 30 MG/DL
ALP SERPL-CCNC: 86 U/L (ref 40–150)
ALT SERPL W P-5'-P-CCNC: 20 U/L (ref 0–50)
ANION GAP SERPL CALCULATED.3IONS-SCNC: 12 MMOL/L (ref 7–15)
APPEARANCE UR: CLEAR
AST SERPL W P-5'-P-CCNC: 27 U/L (ref 0–45)
BACTERIA #/AREA URNS HPF: ABNORMAL /HPF
BASOPHILS # BLD AUTO: 0 10E3/UL (ref 0–0.2)
BASOPHILS NFR BLD AUTO: 1 %
BILIRUB SERPL-MCNC: 0.6 MG/DL
BILIRUB UR QL STRIP: NEGATIVE
BUN SERPL-MCNC: 14.5 MG/DL (ref 8–23)
CALCIUM SERPL-MCNC: 10.1 MG/DL (ref 8.8–10.4)
CHLORIDE SERPL-SCNC: 102 MMOL/L (ref 98–107)
COLOR UR AUTO: YELLOW
CREAT SERPL-MCNC: 0.81 MG/DL (ref 0.51–0.95)
CRP SERPL-MCNC: <3 MG/L
EGFRCR SERPLBLD CKD-EPI 2021: 73 ML/MIN/1.73M2
EOSINOPHIL # BLD AUTO: 0.1 10E3/UL (ref 0–0.7)
EOSINOPHIL NFR BLD AUTO: 1 %
ERYTHROCYTE [DISTWIDTH] IN BLOOD BY AUTOMATED COUNT: 12.4 % (ref 10–15)
ERYTHROCYTE [SEDIMENTATION RATE] IN BLOOD BY WESTERGREN METHOD: 4 MM/HR (ref 0–30)
GLUCOSE SERPL-MCNC: 155 MG/DL (ref 70–99)
GLUCOSE UR STRIP-MCNC: NEGATIVE MG/DL
HCO3 SERPL-SCNC: 25 MMOL/L (ref 22–29)
HCT VFR BLD AUTO: 49.5 % (ref 35–47)
HGB BLD-MCNC: 16.4 G/DL (ref 11.7–15.7)
HGB UR QL STRIP: ABNORMAL
IMM GRANULOCYTES # BLD: 0 10E3/UL
IMM GRANULOCYTES NFR BLD: 0 %
KETONES UR STRIP-MCNC: ABNORMAL MG/DL
LEUKOCYTE ESTERASE UR QL STRIP: NEGATIVE
LYMPHOCYTES # BLD AUTO: 1.3 10E3/UL (ref 0.8–5.3)
LYMPHOCYTES NFR BLD AUTO: 17 %
MCH RBC QN AUTO: 29.9 PG (ref 26.5–33)
MCHC RBC AUTO-ENTMCNC: 33.1 G/DL (ref 31.5–36.5)
MCV RBC AUTO: 90 FL (ref 78–100)
MONOCYTES # BLD AUTO: 0.5 10E3/UL (ref 0–1.3)
MONOCYTES NFR BLD AUTO: 7 %
MUCOUS THREADS #/AREA URNS LPF: PRESENT /LPF
NEUTROPHILS # BLD AUTO: 5.5 10E3/UL (ref 1.6–8.3)
NEUTROPHILS NFR BLD AUTO: 75 %
NITRATE UR QL: NEGATIVE
PH UR STRIP: 5.5 [PH] (ref 5–7)
PLATELET # BLD AUTO: 317 10E3/UL (ref 150–450)
POTASSIUM SERPL-SCNC: 5.2 MMOL/L (ref 3.4–5.3)
PROT SERPL-MCNC: 6.6 G/DL (ref 6.4–8.3)
RBC # BLD AUTO: 5.49 10E6/UL (ref 3.8–5.2)
RBC #/AREA URNS AUTO: ABNORMAL /HPF
SODIUM SERPL-SCNC: 139 MMOL/L (ref 135–145)
SP GR UR STRIP: 1.02 (ref 1–1.03)
SQUAMOUS #/AREA URNS AUTO: ABNORMAL /LPF
TSH SERPL DL<=0.005 MIU/L-ACNC: 2.51 UIU/ML (ref 0.3–4.2)
UROBILINOGEN UR STRIP-ACNC: 0.2 E.U./DL
WBC # BLD AUTO: 7.3 10E3/UL (ref 4–11)
WBC #/AREA URNS AUTO: ABNORMAL /HPF

## 2025-03-19 PROCEDURE — 36415 COLL VENOUS BLD VENIPUNCTURE: CPT

## 2025-03-19 PROCEDURE — 85652 RBC SED RATE AUTOMATED: CPT

## 2025-03-19 PROCEDURE — 81001 URINALYSIS AUTO W/SCOPE: CPT

## 2025-03-19 PROCEDURE — 85025 COMPLETE CBC W/AUTO DIFF WBC: CPT

## 2025-03-19 PROCEDURE — 71046 X-RAY EXAM CHEST 2 VIEWS: CPT | Mod: TC | Performed by: RADIOLOGY

## 2025-03-19 NOTE — TELEPHONE ENCOUNTER
This encounter is being sent to inform the clinic that this patient has a referral from     Phil Abbott MD    for the diagnoses of Fluttering sensation of heart [R00.2]  Sinus tachycardia [R00.0]  and has requested that this patient be seen within 1-2 weeks and/or with Cardiology.  Based on the availability of our provider(s), we are unable to accommodate this request.    Were all sites offered this patient?  Yes- prefers [facility] with Dr. Enamorado or [facility]    Does scheduling algorithm request to schedule next available?  Patient has been scheduled for the first available opening with Lyly Kumari  on 5/14/25.  We have informed the patient that the clinic will review their referral and reach out if a sooner appointment is medically necessary.

## 2025-03-20 LAB
ALBUMIN MFR UR ELPH: 34 MG/DL
ANCA AB PATTERN SER IF-IMP: NORMAL
C-ANCA TITR SER IF: NORMAL {TITER}
CREAT UR-MCNC: 209 MG/DL
PROT/CREAT 24H UR: 0.16 MG/MG CR (ref 0–0.2)

## 2025-03-21 ENCOUNTER — TELEPHONE (OUTPATIENT)
Dept: RHEUMATOLOGY | Facility: CLINIC | Age: 80
End: 2025-03-21

## 2025-03-21 PROCEDURE — 87086 URINE CULTURE/COLONY COUNT: CPT | Performed by: INTERNAL MEDICINE

## 2025-03-21 NOTE — TELEPHONE ENCOUNTER
Spoke to patient and discussed her urine results and symptoms.      Patient denies dysuria, urgency, frequency, pelvic pain, flank pain, fevers/chills or orange/blood in urine.   Patient states typically, with UTIs she will feel a real sense of urgency.  No symptoms at this time.    Called the [facility] lab, they had thrown her specimen out this morning.  Patient will go to lab and give another urine sample for culture.    Please contact Pat and see if she has any symptoms of UTI. Please also monitor the results of a urine culture that I have added today for any findings of infection (>100,000 bacteria).       Discussed the other result note message from Dr. Maguire as well.  Patient had no further questions.      Katharine Taylor RN  Adult Rheumatology

## 2025-03-21 NOTE — TELEPHONE ENCOUNTER
Appointment added to waitlsit. Continue to look for cancellations.    Amanda Heard, RN, BSN  Cardiology RN Care Coordinator   [place]   Phone: [phone]  Fax: [phone] ([place]) [phone] ([place])

## 2025-03-25 ENCOUNTER — TRANSFERRED RECORDS (OUTPATIENT)
Dept: HEALTH INFORMATION MANAGEMENT | Facility: CLINIC | Age: 80
End: 2025-03-25
Payer: COMMERCIAL

## 2025-04-07 ENCOUNTER — MYC MEDICAL ADVICE (OUTPATIENT)
Dept: FAMILY MEDICINE | Facility: CLINIC | Age: 80
End: 2025-04-07
Payer: COMMERCIAL

## 2025-04-08 NOTE — TELEPHONE ENCOUNTER
Coughs around this time are difficult to get rid off; several viruses going around, spring is starting and allergens are starting to appear. Mucinex does not cure the cough; they usually follow their natural course and go away after a while, mucinex only relieves symptoms and can be used as needed.

## 2025-04-08 NOTE — TELEPHONE ENCOUNTER
Routing to PCP    Patient called in stating she has not heard a response to the following message yet. Informed her we will send message to PCP now. Please review and advise.    Of note, patient states her computer is now broken so she cannot access [facility] right now. Please call patient with PCP response; Okay to leave detailed VM.    Jeanne ROSARIO RN  [facility]

## 2025-04-08 NOTE — TELEPHONE ENCOUNTER
Patient notified of provider's message as written.  Patient verbalized understanding.    Bruce Law RN  [facility]

## 2025-04-10 ENCOUNTER — OFFICE VISIT (OUTPATIENT)
Dept: OPHTHALMOLOGY | Facility: CLINIC | Age: 80
End: 2025-04-10
Payer: COMMERCIAL

## 2025-04-10 DIAGNOSIS — H25.811 COMBINED FORMS OF AGE-RELATED CATARACT OF RIGHT EYE: Primary | ICD-10-CM

## 2025-04-10 DIAGNOSIS — H35.342 MACULAR HOLE OF LEFT EYE: ICD-10-CM

## 2025-04-10 DIAGNOSIS — H52.4 PRESBYOPIA: ICD-10-CM

## 2025-04-10 DIAGNOSIS — Z01.01 ENCOUNTER FOR EXAMINATION OF EYES AND VISION WITH ABNORMAL FINDINGS: ICD-10-CM

## 2025-04-10 DIAGNOSIS — Z98.890 HISTORY OF VITRECTOMY: ICD-10-CM

## 2025-04-10 DIAGNOSIS — H50.00 ESODEVIATION: ICD-10-CM

## 2025-04-10 DIAGNOSIS — Z98.890 HISTORY OF STRABISMUS SURGERY: ICD-10-CM

## 2025-04-10 DIAGNOSIS — Z96.1 PSEUDOPHAKIA: ICD-10-CM

## 2025-04-10 PROCEDURE — 92134 CPTRZ OPH DX IMG PST SGM RTA: CPT | Performed by: OPHTHALMOLOGY

## 2025-04-10 PROCEDURE — 92015 DETERMINE REFRACTIVE STATE: CPT | Performed by: OPHTHALMOLOGY

## 2025-04-10 PROCEDURE — 92014 COMPRE OPH EXAM EST PT 1/>: CPT | Performed by: OPHTHALMOLOGY

## 2025-04-10 ASSESSMENT — VISUAL ACUITY
OD_CC: J2
OD_CC: 20/40
OD_CC+: -1
METHOD: SNELLEN - LINEAR
OS_PH_CC: 20/30
OS_CC: 20/40
OS_CC: J1
CORRECTION_TYPE: GLASSES

## 2025-04-10 ASSESSMENT — CONF VISUAL FIELD
OD_NORMAL: 1
OD_SUPERIOR_NASAL_RESTRICTION: 0
OS_NORMAL: 1
OS_INFERIOR_TEMPORAL_RESTRICTION: 0
OD_SUPERIOR_TEMPORAL_RESTRICTION: 0
OS_SUPERIOR_TEMPORAL_RESTRICTION: 0
OD_INFERIOR_NASAL_RESTRICTION: 0
OS_SUPERIOR_NASAL_RESTRICTION: 0
OD_INFERIOR_TEMPORAL_RESTRICTION: 0
OS_INFERIOR_NASAL_RESTRICTION: 0

## 2025-04-10 ASSESSMENT — REFRACTION_WEARINGRX
OS_AXIS: 033
SPECS_TYPE: PAL
OS_CYLINDER: +2.00
OD_HPRISM: 4BO
OD_ADD: +2.75
OD_AXIS: 148
OD_CYLINDER: +1.00
OD_SPHERE: +0.25
OS_HPRISM: 4BO
OS_SPHERE: -0.50
OS_ADD: +2.75

## 2025-04-10 ASSESSMENT — TONOMETRY
OS_IOP_MMHG: 15
IOP_METHOD: APPLANATION
OD_IOP_MMHG: 17

## 2025-04-10 ASSESSMENT — REFRACTION_MANIFEST
OS_AXIS: 022
OD_AXIS: 132
OS_CYLINDER: +1.50
OS_SPHERE: -0.25
OD_ADD: +3.00
OD_SPHERE: -0.50
OS_ADD: +3.00
OD_CYLINDER: +0.50

## 2025-04-10 ASSESSMENT — CUP TO DISC RATIO
OD_RATIO: 0.3
OS_RATIO: 0.4

## 2025-04-10 ASSESSMENT — REFRACTION_FINALRX
OS_HPRISM: 4BO
OD_HPRISM: 4BO

## 2025-04-10 NOTE — PATIENT INSTRUCTIONS
"Glasses prescription given - Don't recommend filling if you plan to proceed with cataract surgery in the near future     May use artificial tears up to four times a day (like Refresh Optive, Systane Balance, or TheraTears. Avoid \"get the red out\" drops and generic artifical tears).     Continue care with Dr. Petersen as directed.     Recommend scheduling a cataract evaluation with Barbra Montelongo MD.    Wiley Mcleod M.D.  [phone]        "

## 2025-04-10 NOTE — LETTER
"4/10/2025      Nury Cárdenas  [address] [address]      Dear Colleague,    Thank you for referring your patient, Nury Cárdenas, to the [facility]. Please see a copy of my visit note below.     Current Eye Medications:  Refresh both eyes each morning.        Subjective:  Comprehensive Eye Exam.  The patient complains of decreased vision \"it's just not sharp.\"  She frequently has a sensation of \"something\" in the way of her vision, left eye, inferiorly.  She went to see Dr. Shukla last fall due to a change in her vision.  She was given a new glasses prescription, but still feels her vision is not \"sharp.\"  Last visit with Dr Petersen was in February or March - her eyes were stable.       Objective:  See Ophthalmology Exam.       Assessment:  Cataract visually significant right eye; stable lens implant left eye after macular hole repair and vitrectomy.      ICD-10-CM    1. Combined forms of age-related cataract, mod, of right eye  H25.811       2. Pseudophakia, Yag Caps, os  Z96.1       3. Hx of macular hole of left eye  H35.342       4. History of vitrectomy - macular hole, os (MVE)  Z98.890       5. History of strabismus surgery  Z98.890       6. Esodeviation  H50.00       7. Encounter for examination of eyes and vision with abnormal findings  Z01.01       8. Presbyopia  H52.4            Plan:  Glasses prescription given - Don't recommend filling if you plan to proceed with cataract surgery in the near future     May use artificial tears up to four times a day (like Refresh Optive, Systane Balance, or TheraTears. Avoid \"get the red out\" drops and generic artifical tears).     Continue care with Dr. Petersen as directed.     Recommend scheduling a cataract evaluation with Barbra Montelongo MD.    Wiley Mcleod M.D.  [phone]        Again, thank you for allowing me to participate in the care of your patient.        Sincerely,        Wiley Mcleod, " MD    Electronically signed

## 2025-04-10 NOTE — PROGRESS NOTES
" Current Eye Medications:  Refresh both eyes each morning.        Subjective:  Comprehensive Eye Exam.  The patient complains of decreased vision \"it's just not sharp.\"  She frequently has a sensation of \"something\" in the way of her vision, left eye, inferiorly.  She went to see Dr. Shukla last fall due to a change in her vision.  She was given a new glasses prescription, but still feels her vision is not \"sharp.\"  Last visit with Dr Petersen was in February or March - her eyes were stable.       Objective:  See Ophthalmology Exam.       Assessment:  Cataract visually significant right eye; stable lens implant left eye after macular hole repair and vitrectomy.      ICD-10-CM    1. Combined forms of age-related cataract, mod, of right eye  H25.811       2. Pseudophakia, Yag Caps, os  Z96.1       3. Hx of macular hole of left eye  H35.342       4. History of vitrectomy - macular hole, os (MVE)  Z98.890       5. History of strabismus surgery  Z98.890       6. Esodeviation  H50.00       7. Encounter for examination of eyes and vision with abnormal findings  Z01.01       8. Presbyopia  H52.4            Plan:  Glasses prescription given - Don't recommend filling if you plan to proceed with cataract surgery in the near future     May use artificial tears up to four times a day (like Refresh Optive, Systane Balance, or TheraTears. Avoid \"get the red out\" drops and generic artifical tears).     Continue care with Dr. Petersen as directed.     Recommend scheduling a cataract evaluation with Barbra Montelongo MD.    Wiley Mcleod M.D.  [phone]        "

## 2025-04-15 NOTE — PATIENT INSTRUCTIONS
Wound Care After a Biopsy    What is a skin biopsy?  A skin biopsy allows the doctor to examine a very small piece of tissue under the microscope to determine the diagnosis and the best treatment for the skin condition. A local anesthetic (numbing medicine)  is injected with a very small needle into the skin area to be tested. A small piece of skin is taken from the area. Sometimes a suture (stitch) is used.     What are the risks of a skin biopsy?  I will experience scar, bleeding, swelling, pain, crusting and redness. I may experience incomplete removal or recurrence. Risks of this procedure are excessive bleeding, bruising, infection, nerve damage, numbness, thick (hypertrophic or keloidal) scar and non-diagnostic biopsy.    How should I care for my wound for the first 24 hours?    Keep the wound dry and covered for 24 hours    If it bleeds, hold direct pressure on the area for 15 minutes. If bleeding does not stop then go to the emergency room    Avoid strenuous exercise the first 1-2 days or as your doctor instructs you    How should I care for the wound after 24 hours?    After 24 hours, remove the bandage    You may bathe or shower as normal    If you had a scalp biopsy, you can shampoo as usual and can use shower water to clean the biopsy site daily    Clean the wound twice a day with gentle soap and water    Do not scrub, be gentle    Apply white petroleum/Vaseline after cleaning the wound with a cotton swab or a clean finger, and keep the site covered with a Bandaid /bandage. Bandages are not necessary with a scalp biopsy    If you are unable to cover the site with a Bandaid /bandage, re-apply ointment 2-3 times a day to keep the site moist. Moisture will help with healing    Avoid strenuous activity for first 1-2 days    Avoid lakes, rivers, pools, and oceans until the stitches are removed or the site is healed    How do I clean my wound?    Wash hands thoroughly with soap or use hand  before all  wound care    Clean the wound with gentle soap and water    Apply white petroleum/Vaseline  to wound after it is clean    Replace the Bandaid /bandage to keep the wound covered for the first few days or as instructed by your doctor    If you had a scalp biopsy, warm shower water to the area on a daily basis should suffice    What should I use to clean my wound?     Cotton-tipped applicators (Qtips )    White petroleum jelly (Vaseline ). Use a clean new container and use Q-tips to apply.    Bandaids   as needed    Gentle soap     How should I care for my wound long term?    Do not get your wound dirty    Keep up with wound care for one week or until the area is healed.    A small scab will form and fall off by itself when the area is completely healed. The area will be red and will become pink in color as it heals. Sun protection is very important for how your scar will turn out. Sunscreen with an SPF 30 or greater is recommended once the area is healed.    If you have stitches, stitches need to be removed in 10-14 days. You may return to our clinic for this or you may have it done locally at your doctor s office.    You should have some soreness but it should be mild and slowly go away over several days. Talk to your doctor about using tylenol for pain,    When should I call my doctor?  If you have increased:     Pain or swelling    Pus or drainage (clear or slightly yellow drainage is ok)    Temperature over 100F    Spreading redness or warmth around wound    When will I hear about my results?  The biopsy results can take 2 weeks to come back.  Your results will automatically release to [facility] before your provider has even reviewed them.  The clinic will call you with the results, send you a [email or web address] message, or have you schedule a follow-up clinic or phone time to discuss the results.  Contact our clinics if you do not hear from us in 2 weeks.    Who should I call with questions?    [facility],  [place]: [phone]    [facility]: [phone]    For urgent needs outside of business hours call the [facility] at [phone] and ask for the dermatology resident on call       [IUD Placement] : intrauterine device (IUD) placement [Time out performed] : Pre-procedure time out performed.  Patient's name, date of birth and procedure confirmed. [Consent Obtained] : Consent obtained [Abnormal Uterine Bleeding] : abnormal uterine bleeding [Risks] : risks [Benefits] : benefits [Alternatives] : alternatives [Patient] : patient [Infection] : infection [Bleeding] : bleeding [Pain] : pain [Expulsion] : expulsion [Failure] : failure [Uterine Perforation] : uterine perforation [Neg Pregnancy Test] : negative pregnancy test [Neg GC/Chlamydia] : negative GC/Chlamydia [No Premedication] : No premedication [Betadine] : Betadine [Tenaculum] : Tenaculum [Easy Passage] : Easy passage [Sounded to ___ cm] : sounded to [unfilled] ~Ucm [Post Placement Transvag. US] : post placement transvaginal ultrasound [Mirena IUD] : Mirena IUD [US Guidance] : US Guidance [History of Unprotected [place]] : no history of unprotected intercourse [Tolerated Well] : Patient tolerated the procedure well [No Complications] : No complications [None] : None [LMPDate] : 3/8/25 [de-identified] : strings cut to 3-4cm [de-identified] : TU04A [id number] [de-identified] : 05/2027 [de-identified] : 05/2032

## 2025-04-16 ENCOUNTER — MYC MEDICAL ADVICE (OUTPATIENT)
Dept: FAMILY MEDICINE | Facility: CLINIC | Age: 80
End: 2025-04-16
Payer: COMMERCIAL

## 2025-04-17 NOTE — TELEPHONE ENCOUNTER
Flagged for RN team to reach out to patient to triage cough and runny nose reported by patient.    Dana BETANCUR RN  [facility] Triage

## 2025-05-15 ENCOUNTER — OFFICE VISIT (OUTPATIENT)
Dept: OPHTHALMOLOGY | Facility: CLINIC | Age: 80
End: 2025-05-15
Payer: COMMERCIAL

## 2025-05-15 DIAGNOSIS — H35.342 MACULAR HOLE OF LEFT EYE: ICD-10-CM

## 2025-05-15 DIAGNOSIS — Z98.890 HISTORY OF VITRECTOMY: ICD-10-CM

## 2025-05-15 DIAGNOSIS — Z98.890 HISTORY OF STRABISMUS SURGERY: ICD-10-CM

## 2025-05-15 DIAGNOSIS — Z96.1 PSEUDOPHAKIA: ICD-10-CM

## 2025-05-15 DIAGNOSIS — H50.00 ESODEVIATION: ICD-10-CM

## 2025-05-15 DIAGNOSIS — H25.811 COMBINED FORMS OF AGE-RELATED CATARACT OF RIGHT EYE: Primary | ICD-10-CM

## 2025-05-15 PROCEDURE — 92014 COMPRE OPH EXAM EST PT 1/>: CPT | Performed by: STUDENT IN AN ORGANIZED HEALTH CARE EDUCATION/TRAINING PROGRAM

## 2025-05-15 ASSESSMENT — REFRACTION_WEARINGRX
OS_ADD: +2.75
OD_HPRISM: 4BO
OS_SPHERE: -0.50
OD_ADD: +2.75
OS_CYLINDER: +2.00
OS_AXIS: 033
OD_CYLINDER: +1.00
OD_SPHERE: +0.25
SPECS_TYPE: PAL
OS_HPRISM: 4BO
OD_AXIS: 148

## 2025-05-15 ASSESSMENT — VISUAL ACUITY
OD_BAT_HIGH: 20/70+1
OS_CC: 20/40
OD_CC+: -2
OD_BAT_MED: 20/50-2
OD_CC: 20/40
CORRECTION_TYPE: GLASSES
METHOD: SNELLEN - LINEAR

## 2025-05-15 ASSESSMENT — REFRACTION_MANIFEST
OS_SPHERE: -0.25
OD_SPHERE: -0.50
OD_CYLINDER: +0.50
OS_CYLINDER: +1.50
OS_AXIS: 022
OD_ADD: +3.00
OS_ADD: +3.00
OD_AXIS: 132

## 2025-05-15 ASSESSMENT — CUP TO DISC RATIO
OS_RATIO: 0.4
OD_RATIO: 0.3

## 2025-05-15 ASSESSMENT — TONOMETRY
OS_IOP_MMHG: 13
IOP_METHOD: APPLANATION
OD_IOP_MMHG: 10

## 2025-05-15 NOTE — PROGRESS NOTES
" Current Eye Medications:  Refresh both eyes each morning.       Subjective:  Dr. Mcleod recommended a cataract evaluation with Dr. Montelongo, right eye.  She complains of decreasing vision in her right eye.  Vision appears \"foggy\" in her right eye.  She continues to complains of an area of decreased vision inferiorly left eye.  Also the central vision is distorted.       Objective:  See Ophthalmology Exam.       Assessment:  Nury Cárdenas is a 80 year old female who presents with:   Encounter Diagnoses   Name Primary?    Combined forms of age-related cataract, mod, of right eye Visually significant cataract right eye. Recommend CE/IOL right eye. 30 min. Dil 6.5. Anticipate targeting mostly distance right eye.     Pseudophakia, Yag Caps, os     Hx of macular hole of left eye     History of vitrectomy - macular hole, os (MVE)     History of strabismus surgery     Esodeviation      Visually significant cataract that is interfering with daily activities of living. Plan for cataract extraction and intraocular lens implant right eye.  Risks, benefits, complications, and alternatives discussed with patient including possibility of limitations from coexistent eye disease and loss of vision. Target refraction and lens options discussed.  Patient understands and wishes to proceed with surgery.     Plan:  Offered cataract surgery of the right eye  at [facility]     Call Anali at [phone] if you have any questions or would like to schedule surgery.      Our surgery schedulers will mail your appointments to you     Barbra Montelongo MD  [phone]       " no

## 2025-05-15 NOTE — LETTER
"5/15/2025      Nury Cárdenas  [address] [address]      Dear Colleague,    Thank you for referring your patient, Nury Cárdenas, to the [facility]. Please see a copy of my visit note below.     Current Eye Medications:  Refresh both eyes each morning.       Subjective:  Dr. Mcleod recommended a cataract evaluation with Dr. Montelongo, right eye.  She complains of decreasing vision in her right eye.  Vision appears \"foggy\" in her right eye.  She continues to complains of an area of decreased vision inferiorly left eye.  Also the central vision is distorted.       Objective:  See Ophthalmology Exam.       Assessment:  Nury Cárdenas is a 80 year old female who presents with:   Encounter Diagnoses   Name Primary?     Combined forms of age-related cataract, mod, of right eye Visually significant cataract right eye. Recommend CE/IOL right eye. 30 min. Dil 6.5. Anticipate targeting mostly distance right eye.      Pseudophakia, Yag Caps, os      Hx of macular hole of left eye      History of vitrectomy - macular hole, os (MVE)      History of strabismus surgery      Esodeviation      Visually significant cataract that is interfering with daily activities of living. Plan for cataract extraction and intraocular lens implant right eye.  Risks, benefits, complications, and alternatives discussed with patient including possibility of limitations from coexistent eye disease and loss of vision. Target refraction and lens options discussed.  Patient understands and wishes to proceed with surgery.     Plan:  Offered cataract surgery of the right eye  at [facility]     Call Anali at [phone] if you have any questions or would like to schedule surgery.      Our surgery schedulers will mail your appointments to you     Barbra Montelongo MD  [phone]       Again, thank you for allowing me to participate in the care of your patient.        Sincerely,        Barbra CALDWELL" MD [place]    Electronically signed

## 2025-05-15 NOTE — PATIENT INSTRUCTIONS
Offered cataract surgery of the right eye  at [facility]     Call [facility] at [phone] if you have any questions or would like to schedule surgery.      Our surgery schedulers will mail your appointments to you     Barbra Montelongo MD  [phone]

## 2025-05-22 ENCOUNTER — TRANSFERRED RECORDS (OUTPATIENT)
Dept: HEALTH INFORMATION MANAGEMENT | Facility: CLINIC | Age: 80
End: 2025-05-22
Payer: COMMERCIAL

## 2025-05-27 ENCOUNTER — MYC MEDICAL ADVICE (OUTPATIENT)
Dept: ALLERGY | Facility: CLINIC | Age: 80
End: 2025-05-27
Payer: COMMERCIAL

## 2025-05-28 ENCOUNTER — OFFICE VISIT (OUTPATIENT)
Dept: FAMILY MEDICINE | Facility: CLINIC | Age: 80
End: 2025-05-28
Payer: COMMERCIAL

## 2025-05-28 VITALS
BODY MASS INDEX: 21.05 KG/M2 | OXYGEN SATURATION: 97 % | SYSTOLIC BLOOD PRESSURE: 148 MMHG | WEIGHT: 118.8 LBS | HEART RATE: 74 BPM | HEIGHT: 63 IN | TEMPERATURE: 98.3 F | DIASTOLIC BLOOD PRESSURE: 75 MMHG | RESPIRATION RATE: 20 BRPM

## 2025-05-28 DIAGNOSIS — L03.019 CELLULITIS OF FINGER, UNSPECIFIED LATERALITY: Primary | ICD-10-CM

## 2025-05-28 DIAGNOSIS — H93.8X2 PRESSURE SENSATION IN LEFT EAR: ICD-10-CM

## 2025-05-28 PROCEDURE — G2211 COMPLEX E/M VISIT ADD ON: HCPCS

## 2025-05-28 PROCEDURE — 3078F DIAST BP <80 MM HG: CPT

## 2025-05-28 PROCEDURE — 99214 OFFICE O/P EST MOD 30 MIN: CPT

## 2025-05-28 PROCEDURE — 3077F SYST BP >= 140 MM HG: CPT

## 2025-05-28 RX ORDER — CEPHALEXIN 500 MG/1
500 CAPSULE ORAL 4 TIMES DAILY
Qty: 20 CAPSULE | Refills: 0 | Status: SHIPPED | OUTPATIENT
Start: 2025-05-28 | End: 2025-06-02

## 2025-05-28 NOTE — PROGRESS NOTES
"  Assessment & Plan     Cellulitis of finger, unspecified laterality  - cephALEXin (KEFLEX) 500 MG capsule  Dispense: 20 capsule; Refill: 0  - extensive education provided on ER/follow up criteria     Pressure sensation in left ear  No evidence for infection at this time.   Education provided on OTC methods. Pt has appt with allergy next week and was instructed to not take antihistamines or other allergy medications   Education provided on follow-up criteria    Follow-up as symptoms worsen or fail to improve with treatment          Subjective   Pat is a 80 year old, presenting for the following health issues:    Patient reports for 12-hour history of stiffness and redness to finger.  Reports that on Saturday she was gardening and was poked by a shadi thorn.  She removed it and had no issues until this morning.    Denies- fever, bleeding, drainage, edema    Patient additionally acutely concerned for 1 week history of pressure and muffling to left ear.  Denies drainage, pain, fever, recent illness.  Reports this has occurred in the past.  PE demonstrates no cerumen impaction.  Discussed treatment methods however patient reports she is scheduled to see allergist next week.      Finger (Left hand 3rd digit/Had a shadi thorn in her finger and thinks that she removed it Saturday/Woke up this morning and it is red and swollen/Painful to the touch/Has been using her antibiotic ointment ) and Ear Problem (Left ear feels plugged/Every once in a while ear feels a \"ping\" but no pain )      5/28/2025     1:10 PM   Additional Questions   Roomed by eleanor     Ear Problem    History of Present Illness       Reason for visit:  Thorn in my finger  Symptoms include:  Swollen and marlo hurts hard to bend  Symptom progression:  Staying the same  Had these symptoms before:  No   She is taking medications regularly.                      Objective    BP (!) 148/75   Pulse 74   Temp 98.3  F (36.8  C) (Temporal)   Resp 20   Ht 1.61 m (5' " "3.39\")   Wt 53.9 kg (118 lb 12.8 oz)   SpO2 97%   BMI 20.79 kg/m    Body mass index is 20.79 kg/m .  Physical Exam   GENERAL: healthy, alert and no distress  EYES: Eyes grossly normal to inspection, PERRL and conjunctivae and sclerae normal  EARS: Canals clear. Clear effusion without bulging or erythema to L TM.   Neck: No visible JVD or lymphadenopathy   RESP: symmetrical rise in chest   CV: No peripheral edema notable   MS: no gross musculoskeletal defects noted  SKIN: erythema and tenderness of 3rd PP . Brisk cap refill. Full ROM. No involvement of other fingers, hand, or wrist. No wound noted. No drainage   PSYCH: mentation appears normal, affect normal/bright              Signed Electronically by: GURPREET Pate CNP  The longitudinal plan of care for the diagnosis(es)/condition(s) as documented were addressed during this visit. Due to the added complexity in care, I will continue to support Pat in the subsequent management and with ongoing continuity of care.    "

## 2025-06-04 ENCOUNTER — NURSE TRIAGE (OUTPATIENT)
Dept: FAMILY MEDICINE | Facility: CLINIC | Age: 80
End: 2025-06-04
Payer: COMMERCIAL

## 2025-06-04 NOTE — TELEPHONE ENCOUNTER
Routing my chart message to PCP    Pt has red bump on her knuckle that she states has improved but is still causing pain (had visit for this on 5/28), pt asking if she should be on cephalexin for longer (last day of med 6/2) or if you have any other recommendations.     Please advice.     Coby Magana RN

## 2025-06-05 ENCOUNTER — OFFICE VISIT (OUTPATIENT)
Dept: ALLERGY | Facility: CLINIC | Age: 80
End: 2025-06-05
Payer: COMMERCIAL

## 2025-06-05 VITALS — DIASTOLIC BLOOD PRESSURE: 85 MMHG | HEART RATE: 78 BPM | SYSTOLIC BLOOD PRESSURE: 140 MMHG | OXYGEN SATURATION: 98 %

## 2025-06-05 DIAGNOSIS — J31.0 NONALLERGIC RHINITIS: Primary | ICD-10-CM

## 2025-06-05 PROCEDURE — 3079F DIAST BP 80-89 MM HG: CPT | Performed by: ALLERGY & IMMUNOLOGY

## 2025-06-05 PROCEDURE — 3077F SYST BP >= 140 MM HG: CPT | Performed by: ALLERGY & IMMUNOLOGY

## 2025-06-05 PROCEDURE — 95004 PERQ TESTS W/ALRGNC XTRCS: CPT | Performed by: ALLERGY & IMMUNOLOGY

## 2025-06-05 PROCEDURE — 99203 OFFICE O/P NEW LOW 30 MIN: CPT | Mod: 25 | Performed by: ALLERGY & IMMUNOLOGY

## 2025-06-05 NOTE — LETTER
"6/5/2025      Nury Cárdenas  [address] [address]      Dear Colleague,    Thank you for referring your patient, Nury Cárdenas, to the [facility]. Please see a copy of my visit note below.    Nury Cárdenas was seen in the Allergy Clinic at [facility].    Nury Cárdenas is a 80 year old White female being seen today in consultation for possible allergies.    Had a cough from February through April. States it was a \"weird\" cough. She would cough once and bring up a lot of sputum that she would spit up. This occurred every 1-2 hours. Didn't have coughing fits. Was treated with antibiotics x 2 and eventually the cough resolved.    Historically she reports having an intermittent dry cough/tickle in her throat and rhinitis symptoms. Has often attributed this to possible allergies. Has never been tested and is interested in identifying possible triggers.     Past Medical History:   Diagnosis Date     Amblyopia      Atypical pneumonia 06/14/2014     Atypical pneumonia      Atypical pneumonia      Atypical pneumonia      COPD (chronic obstructive pulmonary disease) (H) 07/20/2021     Coronary artery disease 1982    heart beat hard made me tired     Dyslipidemia      Fibroids      GERD (gastroesophageal reflux disease)      Granulomatosis with polyangiitis (Wegener's)      Hearing loss      Inflammatory arthritis     thumb     Migraines      MVP (mitral valve prolapse)     had an echo that was normal in 2007 she is on Inderal     Nonsenile cataract      Osteopenia      PE (pulmonary embolism) 10/2014    ? GPA associated, on warfarin      Strabismus      Synovitis of wrist 2009    right     Family History   Problem Relation Age of Onset     Respiratory Mother         emphysema     Aneurysm Mother      Chronic Obstructive Pulmonary Disease Mother      Thyroid Disease Mother      Osteoporosis Mother      Other Cancer Father      Cancer Father         lung " cancer     Cancer Sister         stage 4 anal cancer age 62 years     Colon Cancer Sister      Substance Abuse Sister      Substance Abuse Sister      Cancer - colorectal Brother      Colon Cancer Brother      Arthritis Maternal Grandmother         rheumatoid     Heart Disease Maternal Grandmother         unsure of details     Heart Disease Maternal Grandfather      Neurologic Disorder Paternal Grandmother         migraines     Alzheimer Disease Paternal Grandmother      Unknown/Adopted Paternal Grandfather      Anesthesia Reaction Daughter      Cowden Syndrome Other      Skin Cancer Cousin      Colon Cancer Brother      Cancer - colorectal Brother      Colon Cancer Sister      Substance Abuse Sister      Anesthesia Reaction Daughter      Past Surgical History:   Procedure Laterality Date     ABDOMEN SURGERY      appendix out     APPENDECTOMY       BIOPSY  1972    cone biopsy     CATARACT IOL, RT/LT       CL AFF SURGICAL PATHOLOGY      cone biopsy 1975     COLONOSCOPY       COLONOSCOPY WITH CO2 INSUFFLATION N/A 12/20/2017    Procedure: COLONOSCOPY WITH CO2 INSUFFLATION;  Screening  BMI: 20.7  Ph[facility]: [facility]  [phone]  [facility]  Referring: Milena  Patient get ill from [facility];  Surgeon: Duane, William Charles, MD;  Location: [facility] OR     EYE SURGERY      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     HC ESOPHAGOSCOPY, DIAGNOSTIC       LASER YAG CAPSULOTOMY      left eye     OPTICAL TRACKING SYSTEM BRONCHOSCOPY  06/19/2014    Procedure: OPTICAL TRACKING SYSTEM BRONCHOSCOPY;  Surgeon: Angel Kathleen MD;  Location:  GI     PHACOEMULSIFICATION WITH STANDARD INTRAOCULAR LENS IMPLANT  01/2018    left eye (SR)     GA HAND/FINGER SURGERY UNLISTED       SOFT TISSUE SURGERY      remove senovial fluid from right wrist     STRABISMUS SURGERY       SURGICAL HISTORY OF -   as a child    strabismus repair right eye     SURGICAL HISTORY OF -   08/2009    right wrist debridement     TONSILLECTOMY      as a  child     TONSILLECTOMY & ADENOIDECTOMY       TUBAL LIGATION       VITRECTOMY PARSPLANA  01/2018    left eye - mac hole (M[facility])       ENVIRONMENTAL HISTORY:   [place]ury [place] The home is heated with a forced air. They do have central air conditioning. The patient's bedroom is furnished with hard megan in bedroom.  Pets inside the house include None. There is no history of cockroach or mice infestation. Do you smoke cigarettes or other recreational drugs? No Do you vape or use an e-cigarette? No. There is/are 0 smokers living in the house. There is/are 0 who smoke ecigarettes/vape living in the house. The house does not have a damp basement.     SOCIAL HISTORY:   Nury is retired. She lives alone.        Current Outpatient Medications:      aspirin-acetaminophen-caffeine (EXCEDRIN MIGRAINE) 250-250-65 MG tablet, Take 1 tablet by mouth every 6 hours as needed for headaches, Disp: , Rfl:      calcium-magnesium (CALMAG) 500-250 MG TABS per tablet, Take 1 tablet by mouth 2 times daily (with meals), Disp: , Rfl:      cholecalciferol 1000 UNITS TABS, Take 1,000 Units by mouth daily, Disp: , Rfl:      omeprazole (PRILOSEC) 20 MG DR capsule, Take 20 mg by mouth daily, Disp: , Rfl:      propranolol (INDERAL) 20 MG tablet, TAKE 1 TABLET(20 MG) BY MOUTH DAILY, Disp: 90 tablet, Rfl: 2  No current facility-administered medications for this visit.    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   Constitutional:       Appearance: Normal appearance.   HENT:      Head: Normocephalic and atraumatic.      Right Ear: External ear normal.      Left Ear: External ear normal.      Nose: No mucosal edema, congestion or rhinorrhea.      Mouth/Throat:      Mouth: Mucous membranes are moist. No oral lesions.      Pharynx: Oropharynx is clear. Uvula midline. No posterior oropharyngeal erythema.   Eyes:      General: Lids are normal.      Extraocular Movements: Extraocular movements intact.      Conjunctiva/sclera: Conjunctivae normal.   Neck:      Comments: No asymmetry, masses, or scars  Cardiovascular:      Rate and Rhythm: Normal rate and regular rhythm.      Heart sounds: S1 normal and S2 normal. No murmur heard.  Pulmonary:      Effort: Pulmonary effort is normal. No respiratory distress.      Breath sounds: Normal breath sounds and air entry.   Musculoskeletal:      Comments: No musculoskeletal defects noted   Skin:     General: Skin is warm and dry.      Findings: No lesion or rash.   Neurological:      General: No focal deficit present.      Mental Status: She is alert.   Psychiatric:         Mood and Affect: Mood and affect normal.           WORKUP: Skin testing    ENVIRONMENTAL PERCUTANEOUS SKIN TESTING: ADULT      6/5/2025     8:00 AM   [facility]   Consent Y   Ordering Physician Dr. Wilson   Interpreting Physician Dr. Wilson   Testing Technician GABBI Alejandre   Location Back   Time start: 08:53   Time End: 09:08   Positive Control: Histatrol*ALK 1 mg/ml 4/6   Negative Control: 50%  Glycerin 0   Cat Hair*ALK (10,000 BAU/ml) 0   AP Dog Hair/Dander (1:100 w/v) 0   Dust Mite p. 30,000 AU/ml 0   Dust Mite f. (30,000 AU/ml) 0   Don (W/F in millimeters) 0   Christian Grass (100,000 BAU/mL) 0   Red Cedar (W/F in millimeters) 0   Maple/Clarendon (W/F in millimeters) 0   Hackberry (W/F in millimeters) 0   Sterling (W/F in millimeters) 0   Edmonson *ALK (W/F in millimeters) 0   American Elm (W/F in millimeters) 0   Vida (W/F in millimeters) 0   Black Bushnell (W/F in millimeters) 0   Birch Mix (W/F in millimeters) 0   Alexandria (W/F in millimeters) 0   Oak (W/F in millimeters) 0   Cocklebur (W/F in millimeters) 0   [place] (W/F in millimeters) 0   White Damien (W/F in millimeters) 0   Careless (W/F in millimeters) 0   Nettle (W/F in millimeters) 0   English Plantain (W/F in millimeters) 0   Kochia (W/F in millimeters) 0   Lamb's Quarter (W/F in millimeters) 0   Marshelder (W/F in millimeters) 0   Ragweed Mix* ALK (W/F in millimeters) 0   Russian Thistle (W/F in millimeters) 0   Sagebrush/Mugwort (W/F in millimeters) 0   Sheep Sorrel (W/F in millimeters) 0   Feather Mix* ALK (W/F in millimeters) 0   Penicillium Mix (1:10 w/v) 0   Curvularia spicifera (1:10 w/v) 0   Epicoccum (1:10 w/v) 0   Aspergillus fumigatus (1:10 w/v): 0   Alternaria tenius (1:10 w/v) 0   H. Cladosporium (1:10 w/v) 0   Phoma herbarum (1:10 w/v) 0      Appropriate response to controls, all others negative    ASSESSMENT/PLAN:  Nury Cárdenas is a 80 year old female here for evaluation of cough.    1. Nonallergic rhinitis (Primary) - Persistent cough from February through April has since resolved. She has intermittent rhinitis symptoms and a dry cough/tickle in her throat. Skin testing today is negative for evidence of aeroallergen sensitization. We reviewed the pathophysiology and management of nonallergic rhinitis. She declined to pursue medications at this time as she does not find the symptoms to be particularly bothersome.  Advised to return for further discussion is symptoms change or worsen in the future.    - ALLERGY SKIN TESTS,ALLERGENS      Follow-up as needed      Thank you for allowing me to participate in the care of Nury Cárdenas.      Ayaan Wilson MD, [facility]  Allergy/Immunology  [facility]    Consent was obtained from the patient to use an AI documentation tool in the creation of this note.    Chart documentation done in part with Dragon Voice Recognition Software. Although reviewed after completion, some word and grammatical errors may remain.    Per provider verbal order, placed Adult Environmental Panel scratch test.  Consent was obtained prior to procedure.  Once panels were placed, patient was monitored for 15 minutes in clinic.  Provider read test after 15 minutes.  Pt tolerated procedure well.  All questions and concerns were addressed at office visit.     MIMI Kaur, RN, PHN                   Again, thank you for allowing me to participate in the care of your patient.        Sincerely,        Ayaan Wilson MD    Electronically signed

## 2025-06-05 NOTE — PROGRESS NOTES
"Nury Cárdenas was seen in the Allergy Clinic at [facility].    Nury Cárdenas is a 80 year old White female being seen today in consultation for possible allergies.    Had a cough from February through April. States it was a \"weird\" cough. She would cough once and bring up a lot of sputum that she would spit up. This occurred every 1-2 hours. Didn't have coughing fits. Was treated with antibiotics x 2 and eventually the cough resolved.    Historically she reports having an intermittent dry cough/tickle in her throat and rhinitis symptoms. Has often attributed this to possible allergies. Has never been tested and is interested in identifying possible triggers.     Past Medical History:   Diagnosis Date    Amblyopia     Atypical pneumonia 06/14/2014    Atypical pneumonia     Atypical pneumonia     Atypical pneumonia     COPD (chronic obstructive pulmonary disease) (H) 07/20/2021    Coronary artery disease 1982    heart beat hard made me tired    Dyslipidemia     Fibroids     GERD (gastroesophageal reflux disease)     Granulomatosis with polyangiitis (Wegener's)     Hearing loss     Inflammatory arthritis     thumb    Migraines     MVP (mitral valve prolapse)     had an echo that was normal in 2007 she is on Inderal    Nonsenile cataract     Osteopenia     PE (pulmonary embolism) 10/2014    ? GPA associated, on warfarin     Strabismus     Synovitis of wrist 2009    right     Family History   Problem Relation Age of Onset    Respiratory Mother         emphysema    Aneurysm Mother     Chronic Obstructive Pulmonary Disease Mother     Thyroid Disease Mother     Osteoporosis Mother     Other Cancer Father     Cancer Father         lung cancer    Cancer Sister         stage 4 anal cancer age 62 years    Colon Cancer Sister     Substance Abuse Sister     Substance Abuse Sister     Cancer - colorectal Brother     Colon Cancer Brother     Arthritis Maternal Grandmother         rheumatoid    Heart " Disease Maternal Grandmother         unsure of details    Heart Disease Maternal Grandfather     Neurologic Disorder Paternal Grandmother         migraines    Alzheimer Disease Paternal Grandmother     Unknown/Adopted Paternal Grandfather     Anesthesia Reaction Daughter     Cowden Syndrome Other     Skin Cancer Cousin     Colon Cancer Brother     Cancer - colorectal Brother     Colon Cancer Sister     Substance Abuse Sister     Anesthesia Reaction Daughter      Past Surgical History:   Procedure Laterality Date    ABDOMEN SURGERY      appendix out    APPENDECTOMY      BIOPSY  1972    cone biopsy    CATARACT IOL, RT/LT      CL AFF SURGICAL PATHOLOGY      cone biopsy 1975    COLONOSCOPY      COLONOSCOPY WITH CO2 INSUFFLATION N/A 12/20/2017    Procedure: COLONOSCOPY WITH CO2 INSUFFLATION;  Screening  BMI: 20.7  Pharmacy: [facility]  [phone]  [facility]  Referring: [facility]  Patient get ill from [facility];  Surgeon: Duane, William Charles, MD;  Location: [facility] OR    EYE SURGERY      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    HC ESOPHAGOSCOPY, DIAGNOSTIC      LASER YAG CAPSULOTOMY      left eye    OPTICAL TRACKING SYSTEM BRONCHOSCOPY  06/19/2014    Procedure: OPTICAL TRACKING SYSTEM BRONCHOSCOPY;  Surgeon: Angel Kathleen MD;  Location:  GI    PHACOEMULSIFICATION WITH STANDARD INTRAOCULAR LENS IMPLANT  01/2018    left eye (SR)    AR HAND/FINGER SURGERY UNLISTED      SOFT TISSUE SURGERY      remove senovial fluid from right wrist    STRABISMUS SURGERY      SURGICAL HISTORY OF -   as a child    strabismus repair right eye    SURGICAL HISTORY OF -   08/2009    right wrist debridement    TONSILLECTOMY      as a child    TONSILLECTOMY & ADENOIDECTOMY      TUBAL LIGATION      VITRECTOMY PARSPLANA  01/2018    left eye - mac hole ([facility])       ENVIRONMENTAL HISTORY:   [place] The home is heated with a forced air. They do have central air conditioning. The patient's  bedroom is furnished with hard megan in bedroom.  Pets inside the house include None. There is no history of cockroach or mice infestation. Do you smoke cigarettes or other recreational drugs? No Do you vape or use an e-cigarette? No. There is/are 0 smokers living in the house. There is/are 0 who smoke ecigarettes/vape living in the house. The house does not have a damp basement.     SOCIAL HISTORY:   Nury is retired. She lives alone.        Current Outpatient Medications:     aspirin-acetaminophen-caffeine (EXCEDRIN MIGRAINE) 250-250-65 MG tablet, Take 1 tablet by mouth every 6 hours as needed for headaches, Disp: , Rfl:     calcium-magnesium (CALMAG) 500-250 MG TABS per tablet, Take 1 tablet by mouth 2 times daily (with meals), Disp: , Rfl:     cholecalciferol 1000 UNITS TABS, Take 1,000 Units by mouth daily, Disp: , Rfl:     omeprazole (PRILOSEC) 20 MG DR capsule, Take 20 mg by mouth daily, Disp: , Rfl:     propranolol (INDERAL) 20 MG tablet, TAKE 1 TABLET(20 MG) BY MOUTH DAILY, Disp: 90 tablet, Rfl: 2  No current facility-administered medications for this visit.    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   Constitutional:       Appearance: Normal appearance.   HENT:      Head: Normocephalic and atraumatic.      Right Ear: External ear normal.      Left Ear: External ear normal.      Nose: No mucosal edema, congestion or rhinorrhea.      Mouth/Throat:      Mouth: Mucous membranes are moist. No oral lesions.      Pharynx: Oropharynx is clear. Uvula midline. No posterior oropharyngeal erythema.   Eyes:      General: Lids are normal.      Extraocular Movements: Extraocular movements intact.      Conjunctiva/sclera: Conjunctivae normal.   Neck:      Comments: No asymmetry, masses, or scars  Cardiovascular:      Rate and Rhythm: Normal rate and regular rhythm.      Heart sounds: S1 normal and S2 normal. No murmur heard.  Pulmonary:      Effort: Pulmonary effort is normal. No respiratory distress.      Breath sounds: Normal breath sounds and air entry.   Musculoskeletal:      Comments: No musculoskeletal defects noted   Skin:     General: Skin is warm and dry.      Findings: No lesion or rash.   Neurological:      General: No focal deficit present.      Mental Status: She is alert.   Psychiatric:         Mood and Affect: Mood and affect normal.           WORKUP: Skin testing    ENVIRONMENTAL PERCUTANEOUS SKIN TESTING: ADULT      6/5/2025     8:00 AM   [facility]   Consent Y   Ordering Physician Dr. Wilson   Interpreting Physician Dr. Wilson   Testing Technician GABBI Alejandre   Location Back   Time start: 08:53   Time End: 09:08   Positive Control: Histatrol*ALK 1 mg/ml 4/6   Negative Control: 50% Glycerin 0   Cat Hair*ALK (10,000 BAU/ml) 0   AP Dog Hair/Dander (1:100 w/v) 0   Dust Mite p. 30,000 AU/ml 0   Dust Mite f. (30,000 AU/ml) 0   Don (W/F in millimeters) 0   Christian Grass (100,000 BAU/mL) 0   Red Cedar (W/F in millimeters) 0   Maple/[place] (W/F in millimeters) 0   Hackberry (W/F in millimeters) 0   [place]Minn  (W/F in millimeters) 0   Centre *ALK (W/F in millimeters) 0   American Elm (W/F in millimeters) 0   Twiggs (W/F in millimeters) 0   Black [place] (W/F in millimeters) 0   Birch Mix (W/F in millimeters) 0   [place] (W/F in millimeters) 0   Oak (W/F in millimeters) 0   Cocklebur (W/F in millimeters) 0   [place] (W/F in millimeters) 0   White Damien (W/F in millimeters) 0   Careless (W/F in millimeters) 0   Nettle (W/F in millimeters) 0   English Plantain (W/F in millimeters) 0   Kochia (W/F in millimeters) 0   Lamb's Quarter (W/F in millimeters) 0   Marshelder (W/F in millimeters) 0   Ragweed Mix* ALK (W/F in millimeters) 0   Russian Thistle (W/F in millimeters) 0   Sagebrush/Mugwort (W/F in millimeters) 0   Sheep Sorrel (W/F in millimeters) 0   Feather Mix* ALK (W/F in millimeters) 0   Penicillium Mix (1:10 w/v) 0   Curvularia spicifera (1:10 w/v) 0   Epicoccum (1:10 w/v) 0   Aspergillus fumigatus (1:10 w/v): 0   Alternaria tenius (1:10 w/v) 0   H. Cladosporium (1:10 w/v) 0   Phoma herbarum (1:10 w/v) 0      Appropriate response to controls, all others negative    ASSESSMENT/PLAN:  Nury Cárdenas is a 80 year old female here for evaluation of cough.    1. Nonallergic rhinitis (Primary) - Persistent cough from February through April has since resolved. She has intermittent rhinitis symptoms and a dry cough/tickle in her throat. Skin testing today is negative for evidence of aeroallergen sensitization. We reviewed the pathophysiology and management of nonallergic rhinitis. She declined to pursue medications at this time as she does not find the symptoms to be particularly bothersome. Advised to return for further discussion is symptoms change or worsen in the future.    - ALLERGY SKIN TESTS,ALLERGENS      Follow-up as needed      Thank you for allowing me to participate in the care of uNry Cárdenas.      Ayaan Wilson MD, FAAAAI  Allergy/Immunology  [facility]  [facility]    Consent was obtained from the patient to use an AI documentation tool in the creation of this note.    Chart documentation done in part with Dragon Voice Recognition Software. Although reviewed after completion, some word and grammatical errors may remain.

## 2025-06-05 NOTE — PROGRESS NOTES
Per provider verbal order, placed Adult Environmental Panel scratch test.  Consent was obtained prior to procedure.  Once panels were placed, patient was monitored for 15 minutes in clinic.  Provider read test after 15 minutes.  Pt tolerated procedure well.  All questions and concerns were addressed at office visit.     KOMAL KaurN, RN, PHN

## 2025-06-05 NOTE — TELEPHONE ENCOUNTER
"Called pt regarding MyChart message and recommendation of provider below.         Writer advised pt be seen in the next 3 days. Pt verbalized understanding. Writer assisted in scheduling pt with appt for 3:10 tomorrow 6/6.     Reason for Disposition   Swollen joint with no fever or redness    Additional Information   Negative: Followed an injury   Negative: Wound looks infected (e.g., spreading redness, pus)   Negative: Caused by an animal bite   Negative: Caused by frostbite   Negative: Hand or wrist pain is main symptom   Negative: Looks infected (spreading redness, red streak, pus) and severe pain with movement   Negative: Patient sounds very sick or weak to the triager   Negative: Looks infected (e.g., spreading redness, red streak, pus)   Negative: Yellow pus under skin around fingernail (cuticle) or pus under fingernail   Negative: Painful blisters on fingertip   Negative: Numbness (i.e., loss of sensation) in hand or fingers of new-onset   Negative: MODERATE pain (e.g., interferes with normal activities) and present > 3 days   Negative: SEVERE pain (e.g., excruciating, unable to use hand at all)    Answer Assessment - Initial Assessment Questions  1. ONSET: \"When did the pain start?\"       5/28, pt evaluated  2. LOCATION and RADIATION: \"Where is the pain located?\"  (e.g., fingertip, around nail, joint, entire       Left middle finger  3. SEVERITY: \"How bad is the pain?\" \"What does it keep you from doing?\"   (Scale 1-10; or mild, moderate, severe)      Pt states that it doesn't hurt today, but it did hurt yesterday  4. APPEARANCE: \"What does the finger look like?\" (e.g., redness, swelling, bruising, pallor)      Pt can know make a fist, no redness now. The bump is still there but there is no redness anymore.  5. WORK OR EXERCISE: \"Has there been any recent work or exercise that involved this part (i.e., fingers or hand) of the body?\"      N/a  6. CAUSE: \"What do you think is causing the pain?\"      unknown  7. " "AGGRAVATING FACTORS: \"What makes the pain worse?\" (e.g., using computer)      no  8. OTHER SYMPTOMS: \"Do you have any other symptoms?\" (e.g., fever, neck pain, numbness)      Pt has no numbess or no loss of sensation in finger, denies fever.   9. PREGNANCY: \"Is there any chance you are pregnant?\" \"When was your last menstrual period?\"      N/a    Protocols used: Finger Pain-A-[place]    Pushpa Alexis RN    "

## 2025-06-06 ENCOUNTER — OFFICE VISIT (OUTPATIENT)
Dept: FAMILY MEDICINE | Facility: CLINIC | Age: 80
End: 2025-06-06
Payer: COMMERCIAL

## 2025-06-06 VITALS
RESPIRATION RATE: 16 BRPM | HEIGHT: 63 IN | HEART RATE: 80 BPM | DIASTOLIC BLOOD PRESSURE: 78 MMHG | OXYGEN SATURATION: 97 % | TEMPERATURE: 98.6 F | SYSTOLIC BLOOD PRESSURE: 142 MMHG | BODY MASS INDEX: 21.26 KG/M2 | WEIGHT: 120 LBS

## 2025-06-06 DIAGNOSIS — S80.862A INSECT BITE OF LEFT LOWER LEG, INITIAL ENCOUNTER: Primary | ICD-10-CM

## 2025-06-06 DIAGNOSIS — L03.012 CELLULITIS OF FINGER OF LEFT HAND: ICD-10-CM
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
## 2025-06-06 PROCEDURE — 99213 OFFICE O/P EST LOW 20 MIN: CPT

## 2025-06-06 PROCEDURE — 3077F SYST BP >= 140 MM HG: CPT

## 2025-06-06 PROCEDURE — G2211 COMPLEX E/M VISIT ADD ON: HCPCS

## 2025-06-06 PROCEDURE — 3078F DIAST BP <80 MM HG: CPT

## 2025-06-06 NOTE — PROGRESS NOTES
Assessment & Plan     Insect bite of left lower leg, initial encounter  Improving  Education provided on s/s infection and follow up criteria.  Continue to keep area clean and dry.   Return if worsening     Cellulitis of finger of left hand, subsequent encounter   Greatly improved . No evidence of retained plant fragments on exam today  -extensive education provided on ER/follow up criteria.            Subjective   Pat is a 80 year old, presenting for the following health issues:    Patient reports for finger cellulitis follow-up.  Previously seen in clinic after puncturing skin with a shadi thorn and experiencing erythema pain and edema.  She was treated with p.o. antibiotics.  Since that time she reports a great improvement to erythema, edema, pain, and range of motion.  Since completing course of Keflex these have not recurred.  She does note 1 small area of redness which has not fully resolved and she is concerned for a retained plant fragment but reports she cannot see fine detail well (baseline)    Patient additionally acutely concerned for single blister to left calf.  She suspects this was a spider bite obtained while working in her garden.  Has had these in the past and reports this looks similar.  Present for 2 to 3 days, is currently improving.  Mildly erythematic but this is not spreading, no new fevers, CP, SOB, dizziness, ROM limitations, difficulty with ambulation      Follow Up (Finger/Still red) and Insect Bite (Right leg/calf)      6/6/2025     3:41 PM   Additional Questions   Roomed by eleanor     History of Present Illness       Reason for visit:  Thorn in my finger  Symptoms include:  Swollen and marlo hurts hard to bend  Symptom progression:  Staying the same  Had these symptoms before:  No   She is taking medications regularly.                      Objective    There were no vitals taken for this visit.  There is no height or weight on file to calculate BMI.  Physical Exam   GENERAL: healthy,  alert and no distress  EYES: Eyes grossly normal to inspection, PERRL and conjunctivae and sclerae normal  Neck: No visible JVD or lymphadenopathy   RESP: symmetrical rise in chest   CV: No peripheral edema notable   MS: no gross musculoskeletal defects noted  SKIN: 1-2mm area of erythema to L 3rd PP. This was examined under magnification with no retained fragments or open wound visible.  (1) 5mm blister to L calf. Mild erythema to surrounding ~1in of tissue. Surrounding area non tender. No drainage noted. No edema.   PSYCH: mentation appears normal, affect normal/bright              Signed Electronically by: GURPREET Pate CNP

## 2025-06-09 NOTE — PROGRESS NOTES
Chief Complaint: Follow-up of mitral valve prolapse    HPI (2021): Nury Cárdenas is a 76 year old female with a past medical history of COPD, pulmonary embolism, and granulomatosis with angiitis who sought evaluation for mitral valve prolapse by self-referral.    Mrs. Cárdenas was reportedly diagnosed with mitral valve prolapse in the 1980s. She was started on propranolol at the time. Since then, she has been largely asymptomatic.  Mrs. Cárdenas reportedly has asked to come off of propranolol several times. However, it was continued because she also had severe migraines and has been used for migraine prophylaxis.  She last had an echocardiogram in 2014.  This showed that she had trace mitral regurgitation.    At the time of the consultation, she notes an absence of chest pain at rest, dyspnea at rest or with exertion, PND, orthopnea, peripheral edema, palpitations, lightheadedness or syncope. Mrs. Cárdenas states that she can easily walk a mile level ground, if she is like to walk her own pace.  She notes that she works out in the yard 4-5 times weekly or if she is not working on the yard, she goes out walking. Mrs. Cárdenas notes that she has very little red meat in her diet and focus on fresh fruit, vegetables, and lean meats.    A comprehensive ROS was done and the details are included above in the HPI.    Interval History 6/11/25:    Since Ms. Cárdenas's last visit, she had a relapse of her granulomatosis with polyangiitis last year. Symptoms now in remission. Further, she occasionally gets sharp/throbbing left hemithoracic chest pain. This radiated to her right ankle. The pain lasts for a few seconds at a time. No clear trigger or relieving factors. No HF symptoms.     A comprehensive ROS was done and the details are included above in the HPI.      Past Medical History:  - Mitral valve prolapse, diagnosed incidentally on ultrasound on echocardiogram   - No prior history of hypertension, T2DM, dyslipidemia,  CAD, TIA/stroke, vascular aneurysms, PAD  Past Medical History:   Diagnosis Date    Amblyopia     Atypical pneumonia 06/14/2014    Atypical pneumonia     Atypical pneumonia     Atypical pneumonia     COPD (chronic obstructive pulmonary disease) (H) 07/20/2021    Coronary artery disease 1982    heart beat hard made me tired    Dyslipidemia     Fibroids     GERD (gastroesophageal reflux disease)     Granulomatosis with polyangiitis (Wegener's)     Hearing loss     Inflammatory arthritis     thumb    Migraines     MVP (mitral valve prolapse)     had an echo that was normal in 2007 she is on Inderal    Nonsenile cataract     Osteopenia     PE (pulmonary embolism) 10/2014    ? GPA associated, on warfarin     Strabismus     Synovitis of wrist 2009    right       Past Surgical History:    Past Surgical History:   Procedure Laterality Date    ABDOMEN SURGERY      appendix out    APPENDECTOMY      BIOPSY  1972    cone biopsy    CATARACT IOL, RT/LT      CL AFF SURGICAL PATHOLOGY      cone biopsy 1975    COLONOSCOPY      COLONOSCOPY WITH CO2 INSUFFLATION N/A 12/20/2017    Procedure: COLONOSCOPY WITH CO2 INSUFFLATION;  Screening  BMI: 20.7  Pharmacy: [facility]  [phone]  [facility]Medicare  Referring: [facility]  Patient get ill from [facility];  Surgeon: Duane, William Charles, MD;  Location:  OR    EYE SURGERY      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    HC ESOPHAGOSCOPY, DIAGNOSTIC      LASER YAG CAPSULOTOMY      left eye    OPTICAL TRACKING SYSTEM BRONCHOSCOPY  06/19/2014    Procedure: OPTICAL TRACKING SYSTEM BRONCHOSCOPY;  Surgeon: Angel Kathleen MD;  Location:  GI    PHACOEMULSIFICATION WITH STANDARD INTRAOCULAR LENS IMPLANT  01/2018    left eye (SR)    LA HAND/FINGER SURGERY UNLISTED      SOFT TISSUE SURGERY      remove senovial fluid from right wrist    STRABISMUS SURGERY      SURGICAL HISTORY OF -   as a child    strabismus repair right eye    SURGICAL HISTORY OF -   08/2009    right wrist debridement     TONSILLECTOMY      as a child    TONSILLECTOMY & ADENOIDECTOMY      TUBAL LIGATION      VITRECTOMY PARSPLANA  01/2018    left eye - mac hole (MVE)       Drug History:  Home cardiac meds: Propranolol 20 mg q24h  Current Outpatient Medications   Medication Sig Dispense Refill    aspirin-acetaminophen-caffeine (EXCEDRIN MIGRAINE) 250-250-65 MG tablet Take 1 tablet by mouth every 6 hours as needed for headaches      calcium-magnesium (CALMAG) 500-250 MG TABS per tablet Take 1 tablet by mouth 2 times daily (with meals)      cholecalciferol 1000 UNITS TABS Take 1,000 Units by mouth daily      omeprazole (PRILOSEC) 20 MG DR capsule Take 20 mg by mouth daily      propranolol (INDERAL) 20 MG tablet TAKE 1 TABLET(20 MG) BY MOUTH DAILY 90 tablet 2         Family History:  - No family history of sudden cardiac death, premature CAD, valvular disorders  Family History   Problem Relation Age of Onset    Respiratory Mother         emphysema    Aneurysm Mother     Chronic Obstructive Pulmonary Disease Mother     Thyroid Disease Mother     Osteoporosis Mother     Other Cancer Father     Cancer Father         lung cancer    Cancer Sister         stage 4 anal cancer age 62 years    Colon Cancer Sister     Substance Abuse Sister     Substance Abuse Sister     Cancer - colorectal Brother     Colon Cancer Brother     Arthritis Maternal Grandmother         rheumatoid    Heart Disease Maternal Grandmother         unsure of details    Heart Disease Maternal Grandfather     Neurologic Disorder Paternal Grandmother         migraines    Alzheimer Disease Paternal Grandmother     Unknown/Adopted Paternal Grandfather     Anesthesia Reaction Daughter     Cowden Syndrome Other     Skin Cancer Cousin     Colon Cancer Brother     Cancer - colorectal Brother     Colon Cancer Sister     Substance Abuse Sister     Anesthesia Reaction Daughter        Social History:  Social History     Tobacco Use    Smoking status: Former     Current packs/day: 0.00      Average packs/day: 2.0 packs/day for 22.5 years (45.0 ttl pk-yrs)     Types: Cigarettes     Start date: 1961     Quit date: 1983     Years since quittin.8     Passive exposure: Past    Smokeless tobacco: Never   Substance Use Topics    Alcohol use: No     Comment: 1-2 glasses per year       Allergies   Allergen Reactions    Vancomycin Other (See Comments)     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  BMI= There is no height or weight on file to calculate BMI.    GENERAL: Healthy, alert and no distress  Eyes: No xanthelasmas.  NECK: No palpable neck masses/goitre and no evidence of retrosternal goitre.   ENT: Moist mucosal membranes.  RESPIRATORY: No signs of resp distress. Lungs CTAB.  CARDIOVASCULAR:  No JVD, regular, normal S1+S2 without any murmurs/sounds   ABDOMEN: ND, soft, non-tender, normal bowel sounds.  EXTREMITIES: Warm, well-perfused, no edema.   NEUROLOGY: GCS 15/15, no focal deficits.  VASC: No carotid bruits. Carotid, radial, brachial, popliteal, dorsalis pedis and posterior tibialis pulses are normal in volumes and symmetric bilaterally.   SKIN: No ecchymoses, no rashes.  PSYCH: Cooperative, pleasant affect.       Investigations:  I personally viewed and interpreted the following investigations:    Labs:    CBC RESULTS:  Lab Results   Component Value Date    WBC 7.3 2025    WBC 5.7 2021    RBC 5.49 (H) 2025    RBC 4.90 2021    HGB 16.4 (H) 2025    HGB 14.9 2021    HCT 49.5 (H) 2025    HCT 44.4 2021     MCV 90 03/19/2025    MCV 91 04/20/2021    MCH 29.9 03/19/2025    MCH 30.4 04/20/2021    MCHC 33.1 03/19/2025    MCHC 33.6 04/20/2021    RDW 12.4 03/19/2025    RDW 14.1 04/20/2021     03/19/2025     04/20/2021       CMP RESULTS:  Lab Results   Component Value Date     03/19/2025     04/20/2021    POTASSIUM 5.2 03/19/2025    POTASSIUM 4.2 04/04/2022    POTASSIUM 4.1 04/20/2021    CHLORIDE 102 03/19/2025    CHLORIDE 107 04/04/2022    CHLORIDE 107 04/20/2021    CO2 25 03/19/2025    CO2 25 04/04/2022    CO2 33 (H) 04/20/2021    ANIONGAP 12 03/19/2025    ANIONGAP 10 04/04/2022    ANIONGAP <1 (L) 04/20/2021     (H) 03/19/2025    GLC 83 09/15/2023    GLC 91 04/04/2022    GLC 80 04/20/2021    BUN 14.5 03/19/2025    BUN 14 04/04/2022    BUN 14 04/20/2021    CR 0.81 03/19/2025    CR 0.81 04/20/2021    GFRESTIMATED 73 03/19/2025    GFRESTIMATED 70 04/20/2021    GFRESTBLACK 81 04/20/2021    CLINTON 10.1 03/19/2025    CLINTON 9.4 04/20/2021    BILITOTAL 0.6 03/19/2025    BILITOTAL 0.4 04/20/2021    ALBUMIN 4.4 03/19/2025    ALBUMIN 3.6 12/07/2022    ALBUMIN 3.5 04/20/2021    ALKPHOS 86 03/19/2025    ALKPHOS 76 04/20/2021    ALT 20 03/19/2025    ALT 24 04/20/2021    AST 27 03/19/2025    AST 19 04/20/2021        INR RESULTS:  Lab Results   Component Value Date    INR 0.94 04/04/2022    INR 2.1 (A) 02/01/2019    INR 2.01 (H) 08/05/2017       BNP RESULTS:  Lab Results   Component Value Date    NTBNPI 671 06/14/2014       LIPIDS:  Lab Results   Component Value Date    CHOL 230 07/08/2024    CHOL 250 12/13/2017     Lab Results   Component Value Date    HDL 86 07/08/2024     12/13/2017     Lab Results   Component Value Date     07/08/2024     12/13/2017     Lab Results   Component Value Date    TRIG 165 07/08/2024    TRIG 101 12/13/2017     Lab Results   Component Value Date    CHOLHDLRATIO 3.4 10/15/2012           Recent Tests:    Echocardiogram (10/2014):  No evidence of MV prolapse with  only trace mitral regurgitation. Normal biventricular function.     Assessment and Plan:   Nury Cárdenas is a 80 year old female with a past medical history of mitral prolapse, COPD, and granulomatosis with polyangiitis who presented to me for follow-up evaluation of her mitral valve prolapse.    Ms. Cárdenas's chest pain symptoms  do not have a clear culprit. In view of this and her risk factors, I recommended a coronary CTA and TTE. I discussed the procedure in detail with the patient and specifically outlined the need to lie still and hold one's breath for 15 seconds, as well as the associated risks of contrast exposure, radiation exposure, possibility of further investigations based on test results (e.g., invasive coronary angiography), the possibility of non-cardiac findings, and the need to administer sublingual nitroglycerin.     Problems:  Mitral valve prolapse  Granulomatosis with polyangiitis  COPD  History of pulmonary emboli   Dyslipidemia    Plan:  - Coronary  CTA and TTE   - RTC after testing       A total of 45 minutes were spent on the day of the visit for chart review, care coordination, face-to-face consultation with the patient, and documentation.     Babak Enamorado [facility], [place]    Cardiovascular Division  Pager [phone]    CC  Patient Care Team:  Phil Abbott MD as PCP - General (Family Practice)  Iain Estes MD as MD (Rheumatology)  Natalie Gleason, GABBI as Specialty Care Coordinator (Rheumatology)  Alvaro Maguire MD as Assigned Rheumatology Provider  Phil Abbott MD as Assigned PCP  Ari Crump MD as MD (Neurology)  Edilma Benson APRN CNP as Nurse Practitioner (Dermatology)  Ari Crump MD as Assigned Neuroscience Provider  Edilma Benson APRN CNP as Nurse Practitioner (Dermatology)  Wiley Mcleod MD as Assigned Surgical Provider  Babak Enamorado MD as MD (Cardiovascular Disease)  Edilma Benson APRN CNP as Assigned Dermatology  Provider  Ayaan Wilson MD as MD (Allergy & Immunology)

## 2025-06-11 ENCOUNTER — OFFICE VISIT (OUTPATIENT)
Dept: CARDIOLOGY | Facility: CLINIC | Age: 80
End: 2025-06-11
Payer: COMMERCIAL

## 2025-06-11 VITALS
BODY MASS INDEX: 20.3 KG/M2 | DIASTOLIC BLOOD PRESSURE: 69 MMHG | OXYGEN SATURATION: 98 % | SYSTOLIC BLOOD PRESSURE: 121 MMHG | HEART RATE: 81 BPM | WEIGHT: 116 LBS

## 2025-06-11 DIAGNOSIS — R00.2 FLUTTERING SENSATION OF HEART: ICD-10-CM

## 2025-06-11 DIAGNOSIS — R07.9 CHEST PAIN, UNSPECIFIED TYPE: Primary | ICD-10-CM

## 2025-06-11 DIAGNOSIS — R00.0 SINUS TACHYCARDIA: ICD-10-CM

## 2025-06-11 NOTE — NURSING NOTE
"Chief Complaint   Patient presents with    Tachycardia    Palpitations     New General Cardiology for heart fluttering, sinus tachycardia       Initial /69 (BP Location: Right arm, Patient Position: Chair, Cuff Size: Adult Regular)   Pulse 81   Wt 52.6 kg (116 lb)   SpO2 98%   BMI 20.30 kg/m   Estimated body mass index is 20.3 kg/m  as calculated from the following:    Height as of 6/6/25: 1.61 m (5' 3.39\").    Weight as of this encounter: 52.6 kg (116 lb)..  BP completed using cuff size: regular    SUKHJINDER Vicente    "

## 2025-06-11 NOTE — PATIENT INSTRUCTIONS
Take your medicines every day, as directed     Changes made today:  CT of your heart's arteries (CT coronary angiogram) to evaluate cause of chest pain   Ultrasound of your heart (echocardiogram) to look at your heart valves and heart muscle       Cardiology Care Coordinators:      Giselle CALDWELL RN       Cardiology Rooming Staff:  Maylin YARBROUGH    Phone  [phone]      Fax [phone]    To Contact us     During Business Hours:  [phone]     If you are needing refills please contact your pharmacy.     For urgent after hour care please call the [facility] at [phone] or the [facility] at [phone] and ask to speak to the cardiologist on call.    If you are having a medical emergency, please call 911.            HOW TO CHECK YOUR BLOOD PRESSURE AT HOME:     Avoid eating, smoking, and exercising for at least 30 minutes before taking a reading.     Be sure you have taken your BP medication at least 2-3 hours before you check it.      Sit quietly for 10 minutes before a reading.      Sit in a chair with your feet flat on the floor. Rest your  arm on a table so that the arm cuff is at the same level as your heart.     Remain still during the reading.  Record your blood pressure and pulse in a log and bring to your next appointment.       Use [employer] allows you to communicate directly with your heart team through secure messaging.  [employer] can be accessed any time on your phone, computer, or tablet.  If you need assistance, we'd be happy to help!             Keep your Heart Appointments:     Follow-up with me after tests

## 2025-06-12 ENCOUNTER — TELEPHONE (OUTPATIENT)
Dept: OPHTHALMOLOGY | Facility: CLINIC | Age: 80
End: 2025-06-12
Payer: COMMERCIAL

## 2025-06-12 NOTE — TELEPHONE ENCOUNTER
"I spoke to patient. She has two \"tiny red bumps\", one on each corner of her upper right lid. It has been uncomfortable to her and she tries to lift the lid to make it feel better. She uses Refresh OU once per day. I've asked her to increase her Refresh drops a couple of times a day and for the next few days use as hot as tolerable but not burning compresses to her right eye at least three times per day. If it is not getting better by Monday then give us a call back to discuss further. May need an appointment.   "

## 2025-06-12 NOTE — TELEPHONE ENCOUNTER
[facility] Call Center    Phone Message    May a detailed message be left on voicemail: yes     Reason for Call: Symptoms or Concerns     If patient has red-flag symptoms, warm transfer to triage line    Current symptom or concern: Right eye- feels like something is in her eye. She can see two tiny red bumps in the upper corner of her eyelid. She states it's uncomfortable.     Symptoms have been present for:  1 week(s)    Has patient previously been seen for this? No    By : NA    Date: NA    Are there any new or worsening symptoms? Yes: She would like to further discuss with care team.     Action Taken: Other: eye    Travel Screening: Not Applicable

## 2025-06-14 ENCOUNTER — NURSE TRIAGE (OUTPATIENT)
Dept: FAMILY MEDICINE | Facility: CLINIC | Age: 80
End: 2025-06-14
Payer: COMMERCIAL

## 2025-06-15 PROBLEM — J31.0 NONALLERGIC RHINITIS: Status: ACTIVE | Noted: 2025-06-15

## 2025-06-16 NOTE — TELEPHONE ENCOUNTER
Scheduled pt for an appointment tomorrow. Advised of red flag symptoms and when to call 911 or the clinic. Pt voiced understanding.    Reason for Disposition   After 3 days and headache persists     Intermittent mild ache at the area of impact that lasts for just a minute or two then self-resolves    Additional Information   Negative: ACUTE NEURO SYMPTOM and symptom present now (Definition: Difficult to awaken OR confused thinking and talking OR slurred speech OR weakness of arms or legs OR unsteady walking.)   Negative: Knocked out (unconscious) > 1 minute   Negative: Seizure (convulsion) occurred  (Exception: Prior history of seizures and now alert and without Acute Neuro Symptoms.)   Negative: Neck pain after dangerous injury (e.g., MVA, diving, trampoline, contact sports, fall > 10 feet or 3 meters)  (Exception: Neck pain began > 1 hour after injury.)   Negative: Major bleeding (actively dripping or spurting) that can't be stopped   Negative: Penetrating head injury (e.g., knife, gunshot wound, metal object)   Negative: Sounds like a life-threatening emergency to the triager   Negative: Diagnosed with a concussion within last 14 days   Negative: Can't remember what happened (amnesia)   Negative: Vomiting once or more   Negative: Loss of vision or double vision is present now   Negative: Watery or blood-tinged fluid dripping from the nose or ears   Negative: ACUTE NEURO SYMPTOM and now fine  (Definition: Difficult to awaken OR confused thinking and talking OR slurred speech OR weakness of arms or legs OR unsteady walking.)   Negative: Knocked out (unconscious) < 1 minute and now fine   Negative: SEVERE headache   Negative: Dangerous injury (e.g., MVA, diving, trampoline, contact sports, fall > 10 feet or 3 meters) or severe blow from hard object (e.g., golf club or baseball bat)   Negative: Large swelling or bruise (> 2 inches or 5 cm)   Negative: Skin is split open or gaping (length > 1/2 inch or 12 mm)    "Negative: Bleeding won't stop after 10 minutes of direct pressure (using correct technique)   Negative: Taking Coumadin (warfarin) or other strong blood thinner, or known bleeding disorder (e.g., thrombocytopenia)   Negative: Age over 64 years with an area of head swelling or bruise   Negative: Sounds like a serious injury to the triager   Negative: HIV positive or severe immunodeficiency (severely weak immune system) and DIRTY cut or scrape   Negative: One or two 'black eyes' (bruising, purple color of eyelids), onset over 24 hours after head injury   Negative: Patient is confused or is an unreliable provider of information (e.g., dementia, severe intellectual disability, alcohol intoxication)   Negative: No prior tetanus shots (or is not fully vaccinated) and any wound (e.g., cut or scrape)   Negative: Last tetanus shot > 5 years ago and DIRTY cut or scrape   Negative: Last tetanus shot > 10 years ago and CLEAN cut or scrape   Negative: Patient wants to be seen    Answer Assessment - Initial Assessment Questions  1. MECHANISM: \"How did the injury happen?\" For falls, ask: \"What height did you fall from?\" and \"What surface did you fall against?\"       Stood up, hit top back left of head on a tree branch  2. ONSET: \"When did the injury happen?\" (e.g., minutes, hours ago)       About a week ago  3. NEUROLOGIC SYMPTOMS: \"Was there any loss of consciousness?\" \"Are there any other neurological symptoms?\"       No   4. MENTAL STATUS: \"Does the person know who they are, who you are, and where they are?\"       Yes  5. LOCATION: \"What part of the head was hit?\"       Top back left of head  6. SCALP APPEARANCE: \"What does the scalp look like? Is it bleeding now?\" If Yes, ask: \"Is it difficult to stop?\"       She is unable to see it, but her  a couple of days ago told her \"it looks like it may have bled, like you had a scratch, but it is healed up now.\" Pt states she does not feel any open areas or bumps in the " "area.  7. SIZE: For cuts, bruises, or swelling, ask: \"How large is it?\" (e.g., inches or centimeters)       N/a  8. PAIN: \"Is there any pain?\" If Yes, ask: \"How bad is it?\" (Scale 0-10; or none, mild, moderate, severe)      Intermittent, mild ache at the area for a minute or two, then self resolved  9. TETANUS: For any breaks in the skin, ask: \"When was the last tetanus booster?\"      N/a  10. BLOOD THINNERS: \"Do you take any blood thinners?\" (e.g., aspirin, clopidogrel / Plavix, coumadin, heparin). Notes: Other strong blood thinners include: Arixtra (fondaparinux), Eliquis (apixaban), Pradaxa (dabigatran), and Xarelto (rivaroxaban).        Took Excedrin a couple of days ago for chronic neck pain, but doesn't take this regularly (not daily)  11. OTHER SYMPTOMS: \"Do you have any other symptoms?\" (e.g., neck pain, vomiting)        None currently  12. PREGNANCY: \"Is there any chance you are pregnant?\" \"When was your last menstrual period?\"        N/a    Protocols used: Head Injury-A-[place]    GABBI [facility]   "

## 2025-06-17 ENCOUNTER — OFFICE VISIT (OUTPATIENT)
Dept: FAMILY MEDICINE | Facility: CLINIC | Age: 80
End: 2025-06-17
Payer: COMMERCIAL

## 2025-06-17 VITALS
HEART RATE: 70 BPM | TEMPERATURE: 97.3 F | WEIGHT: 116 LBS | DIASTOLIC BLOOD PRESSURE: 76 MMHG | RESPIRATION RATE: 16 BRPM | BODY MASS INDEX: 20.55 KG/M2 | SYSTOLIC BLOOD PRESSURE: 128 MMHG | HEIGHT: 63 IN | OXYGEN SATURATION: 96 %

## 2025-06-17 DIAGNOSIS — E78.5 HYPERLIPIDEMIA LDL GOAL <160: ICD-10-CM

## 2025-06-17 DIAGNOSIS — M79.2 NEURALGIA: ICD-10-CM

## 2025-06-17 DIAGNOSIS — S09.90XA INJURY OF HEAD, INITIAL ENCOUNTER: Primary | ICD-10-CM

## 2025-06-17 PROCEDURE — 3074F SYST BP LT 130 MM HG: CPT | Performed by: FAMILY MEDICINE

## 2025-06-17 PROCEDURE — 3078F DIAST BP <80 MM HG: CPT | Performed by: FAMILY MEDICINE

## 2025-06-17 PROCEDURE — 99214 OFFICE O/P EST MOD 30 MIN: CPT | Performed by: FAMILY MEDICINE

## 2025-06-17 NOTE — PROGRESS NOTES
Scheduled pt for an appointment tomorrow. Advised of red flag symptoms and when to call 911 or the clinic. Pt voiced understanding.     Reason for Disposition   After 3 days and headache persists     Intermittent mild ache at the area of impact that lasts for just a minute or two then self-resolves    Additional Information   Negative: ACUTE NEURO SYMPTOM and symptom present now (Definition: Difficult to awaken OR confused thinking and talking OR slurred speech OR weakness of arms or legs OR unsteady walking.)   Negative: Knocked out (unconscious) > 1 minute   Negative: Seizure (convulsion) occurred  (Exception: Prior history of seizures and now alert and without Acute Neuro Symptoms.)   Negative: Neck pain after dangerous injury (e.g., MVA, diving, trampoline, contact sports, fall > 10 feet or 3 meters)  (Exception: Neck pain began > 1 hour after injury.)   Negative: Major bleeding (actively dripping or spurting) that can't be stopped   Negative: Penetrating head injury (e.g., knife, gunshot wound, metal object)   Negative: Sounds like a life-threatening emergency to the triager   Negative: Diagnosed with a concussion within last 14 days   Negative: Can't remember what happened (amnesia)   Negative: Vomiting once or more   Negative: Loss of vision or double vision is present now   Negative: Watery or blood-tinged fluid dripping from the nose or ears   Negative: ACUTE NEURO SYMPTOM and now fine  (Definition: Difficult to awaken OR confused thinking and talking OR slurred speech OR weakness of arms or legs OR unsteady walking.)   Negative: Knocked out (unconscious) < 1 minute and now fine   Negative: SEVERE headache   Negative: Dangerous injury (e.g., MVA, diving, trampoline, contact sports, fall > 10 feet or 3 meters) or severe blow from hard object (e.g., golf club or baseball bat)   Negative: Large swelling or bruise (> 2 inches or 5 cm)   Negative: Skin is split open or gaping (length > 1/2 inch or 12 mm)    Negative: Bleeding won't stop after 10 minutes of direct pressure (using correct technique)   Negative: Taking Coumadin (warfarin) or other strong blood thinner, or known bleeding disorder (e.g., thrombocytopenia)   Negative: Age over 64 years with an area of head swelling or bruise

## 2025-06-17 NOTE — PROGRESS NOTES
Assessment & Plan     Injury of head, initial encounter   Had blunt trauma to the head; been having headaches since and worried about intracranial bleed. Discussed head injury at length joseph in the elderly and the risk of bleeding, Impact of injury was low, and has no signs of intracranial bleed and chances of bleeding later are low. Reassured patient, however if any symptoms worsen will return.    Neuralgia (scalp; Lt parietal,localized)   - non specific; possible from shingles    Hyperlipidemia LDL goal <160   - Last done 7/8/24, borderline high, recheck at wellness check    Follow-up       Subjective   Pat is a 80 year old, presenting for the following health issues:  Head Injury  Scheduled pt for an appointment tomorrow. Advised of red flag symptoms and when to call 911 or the clinic. Pt voiced understanding.     Reason for Disposition   After 3 days and headache persists     Intermittent mild ache at the area of impact that lasts for just a minute or two then self-resolves    Injury to head:  She was pick stuff in the yard about 2 weeks ago and bumped unto a branch stump overhead.  No dizziness, No LOS  Has a bruise in area and area around the area throbs  Sleep is okay, no memory concerns  Has neck pain (base up; has to position as her had to ensure     Neuralgia (scalp)  Localized spot on head with tenderness x for a while  It is tender to touch, even when her hairdresser doing hert hair has to be careful.        6/17/2025     4:07 PM   Additional Questions   Roomed by Jo     History of Present Illness       Reason for visit:  Hard bump on top back of my head on a tree branch  Symptom onset:  1-2 weeks ago  Symptoms include:  Occasional pain lasting just a short time  Symptom progression:  Staying the same  Had these symptoms before:  No  What makes it worse:  No  What makes it better:  I don't take Excedrin laith the pain is so short lived   She is taking medications regularly.        Review of  "Systems  Constitutional, HEENT, cardiovascular, pulmonary, gi and gu systems are negative, except as otherwise noted.      Objective    BP (!) 144/79   Pulse 70   Temp 97.3  F (36.3  C) (Temporal)   Resp 16   Ht 1.61 m (5' 3.39\")   Wt 52.6 kg (116 lb)   SpO2 96%   BMI 20.30 kg/m    Body mass index is 20.3 kg/m .  Physical Exam   GENERAL: alert and no distress  CRANIAL NERVES: Intact  HEAD: Localized small tender spot on lt parietal area  RESP: lungs clear to auscultation - no rales, rhonchi or wheezes  CV: regular rate and rhythm, no murmur, click or rub, no peripheral edema  MS: no gross musculoskeletal defects noted, no edema  NEURO: Normal strength and tone, mentation intact and speech normal    Signed Electronically by: Phil Abbott MD    "

## 2025-06-23 ENCOUNTER — OFFICE VISIT (OUTPATIENT)
Dept: FAMILY MEDICINE | Facility: CLINIC | Age: 80
End: 2025-06-23
Payer: COMMERCIAL

## 2025-06-23 ENCOUNTER — TELEPHONE (OUTPATIENT)
Dept: FAMILY MEDICINE | Facility: CLINIC | Age: 80
End: 2025-06-23

## 2025-06-23 VITALS
DIASTOLIC BLOOD PRESSURE: 80 MMHG | HEART RATE: 71 BPM | OXYGEN SATURATION: 93 % | TEMPERATURE: 97.4 F | SYSTOLIC BLOOD PRESSURE: 137 MMHG | HEIGHT: 63 IN | WEIGHT: 118 LBS | RESPIRATION RATE: 16 BRPM | BODY MASS INDEX: 20.91 KG/M2

## 2025-06-23 DIAGNOSIS — L24.9 IRRITANT CONTACT DERMATITIS, UNSPECIFIED TRIGGER: Primary | ICD-10-CM

## 2025-06-23 DIAGNOSIS — E78.5 HYPERLIPIDEMIA LDL GOAL <160: ICD-10-CM

## 2025-06-23 PROCEDURE — 3075F SYST BP GE 130 - 139MM HG: CPT | Performed by: FAMILY MEDICINE

## 2025-06-23 PROCEDURE — 99213 OFFICE O/P EST LOW 20 MIN: CPT | Performed by: FAMILY MEDICINE

## 2025-06-23 PROCEDURE — 3078F DIAST BP <80 MM HG: CPT | Performed by: FAMILY MEDICINE

## 2025-06-23 RX ORDER — CLOBETASOL PROPIONATE 0.5 MG/G
CREAM TOPICAL 2 TIMES DAILY
Qty: 15 G | Refills: 0 | Status: SHIPPED | OUTPATIENT
Start: 2025-06-23

## 2025-06-23 NOTE — TELEPHONE ENCOUNTER
Patient calling to request appointment today.    Has right ankle swelling and has now developed red patches and blisters.    Are was itchy and is now draining.    Scheduled today at 10 am with PCP to be evaluated.    Christine M Klisch, RN

## 2025-06-23 NOTE — PROGRESS NOTES
Assessment & Plan     Irritant contact dermatitis, unspecified trigger  Instructed on etiology and exasserbating factors of dermatitis, likely contact possible plant.  Reviewed efficacy of emolient creams as well as prescription options.  Update in 1 week or sooner with concerns.  - clobetasol propionate (TEMOVATE) 0.05 % external cream  Dispense: 15 g; Refill: 0    Hyperlipidemia LDL goal <160  Had blood work in March, cholesterol not completed, last check was July 2024 with LDL of 111 and HDL of 86 been reluctant to start a statin.  Will recheck at next visit/blood work    Follow-up       Subjective   Pat is a 80 year old, presenting for the following health issues:  Leg Swelling (Right leg swelling /) and blisters on ankle    Redness with blistering Rt medial mallelous are  Weepy with streak of blisters.   She was in her garden day prior to onset of symptoms; and was wearing low socks unlike other days where she is better covered up.    Leg swelling and blisters on ankle   Concern - blisters on ankle and swelling on leg  Onset: 6/20/2025  Description: right ankle swelling and developed red patches  Intensity: mild  Progression of Symptoms:  improving and worsening  Accompanying Signs & Symptoms: weeping, redness  Previous history of similar problem: -  Precipitating factors:        Worsened by: -  Alleviating factors:        Improved by: antibiotic cream, baking soda  Therapies tried and outcome: None      Review of Systems  Constitutional, HEENT, cardiovascular, pulmonary, gi and gu systems are negative, except as otherwise noted.      Objective    There were no vitals taken for this visit.  There is no height or weight on file to calculate BMI.  Physical Exam   GENERAL: alert and no distress  RESP: lungs clear to auscultation - no rales, rhonchi or wheezes  CV: regular rate and rhythm, no murmur, click or rub, no peripheral edema  MS: Rt inner malleolar area;         Dark red patch with surrounding erythema,  no swelling with vertical blisters down the ankle          Signed Electronically by: Phil Abbott MD

## 2025-07-08 ENCOUNTER — OFFICE VISIT (OUTPATIENT)
Dept: PODIATRY | Facility: CLINIC | Age: 80
End: 2025-07-08
Payer: COMMERCIAL

## 2025-07-08 VITALS — WEIGHT: 118 LBS | BODY MASS INDEX: 20.65 KG/M2

## 2025-07-08 DIAGNOSIS — L84 CALLUS: ICD-10-CM

## 2025-07-08 DIAGNOSIS — G63 POLYNEUROPATHY ASSOCIATED WITH UNDERLYING DISEASE: Primary | ICD-10-CM

## 2025-07-08 DIAGNOSIS — T14.8XXA NEURAPRAXIA: ICD-10-CM

## 2025-07-08 NOTE — PROGRESS NOTES
Subjective:    Patient is here to have her feet evaluated.  Over the last several months is an increasing burning numbness and discomfort in big toes.  Points plantar medial.  Aggravated by activity and relieved by rest.  Denies weakness edema erythema or ecchymosis.  Has not had this before.  Her foot feels best on a light shoe because of her peripheral neuropathy.  Patient Has history of peripheral neuropathy.    Primary care is Dr. Abbott last seen 6/23/25    ROS: see above         Allergies   Allergen Reactions    Vancomycin Other (See Comments)     Red man's syndrom    Sulfa Antibiotics Itching    Zithromax [Azithromycin Dihydrate]      Vomiting/ skin blisters    Adhesive Tape      Rash itches    Amoxicillin-Pot Clavulanate      vomiting    Erythromycin      vomiting    Morphine And Codeine      vomiting    Nickel      itches rash    Tegaderm Alginate Ag Rope      LW Other1: -ALL MEDICATIONS MAKE PATIENT VIOLENTLY ILL; ADHESIVE BANDAGES; NICKEL       Current Outpatient Medications   Medication Sig Dispense Refill    aspirin-acetaminophen-caffeine (EXCEDRIN MIGRAINE) 250-250-65 MG tablet Take 1 tablet by mouth every 6 hours as needed for headaches      calcium-magnesium (CALMAG) 500-250 MG TABS per tablet Take 1 tablet by mouth 2 times daily (with meals)      cholecalciferol 1000 UNITS TABS Take 1,000 Units by mouth daily      clobetasol propionate (TEMOVATE) 0.05 % external cream Apply topically 2 times daily. (For 7 days) 15 g 0    omeprazole (PRILOSEC) 20 MG DR capsule Take 20 mg by mouth daily      propranolol (INDERAL) 20 MG tablet TAKE 1 TABLET(20 MG) BY MOUTH DAILY 90 tablet 2       Patient Active Problem List   Diagnosis    GERD (gastroesophageal reflux disease)    Fibroids    Migraines    Hearing loss    Osteopenia    HYPERLIPIDEMIA LDL GOAL <160    Inflammatory arthritis    Synovitis of wrist    Chronic constipation    Granulomatosis with polyangiitis without renal involvement (H)    Chronic steroid  use    Dyspnea    Pulmonary embolism (H)    Calculus of kidney, right    Chronic anticoagulation    Long-term (current) use of anticoagulants [Z79.01]    Long-term use of immunosuppressant medication    Primary osteoarthritis involving multiple joints    Cellulitis    Cat scratch left lower extremity    Other pulmonary embolism without acute cor pulmonale, unspecified chronicity (H)    Age-related osteoporosis without current pathological fracture    Age-related osteoporosis with current pathological fracture, sequela    Adverse effects of medication    Combined forms of age-related cataract, mod, of right eye    Pseudophakia, Yag Caps, os    History of vitrectomy - macular hole, os (MVE)    Hx of macular hole of left eye    Hemifacial spasm    Immunosuppression    Clinical diagnosis of COVID-19    History of COVID-19    Lesion of right eyelid    COPD (chronic obstructive pulmonary disease) (H)    Polyneuropathy associated with underlying disease    Esodeviation    History of strabismus surgery    Current chronic use of systemic steroids    Herpes zoster ophthalmicus, right    Family history of genetic disease carrier    Nonallergic rhinitis       Past Medical History:   Diagnosis Date    Amblyopia     Atypical pneumonia 06/14/2014    Atypical pneumonia     Atypical pneumonia     Atypical pneumonia     COPD (chronic obstructive pulmonary disease) (H) 07/20/2021    Coronary artery disease 1982    heart beat hard made me tired    Dyslipidemia     Fibroids     GERD (gastroesophageal reflux disease)     Granulomatosis with polyangiitis (Wegener's)     Hearing loss     Inflammatory arthritis     thumb    Migraines     MVP (mitral valve prolapse)     had an echo that was normal in 2007 she is on Inderal    Nonsenile cataract     Osteopenia     PE (pulmonary embolism) 10/2014    ? GPA associated, on warfarin     Strabismus     Synovitis of wrist 2009    right       Past Surgical History:   Procedure Laterality Date     ABDOMEN SURGERY      appendix out    APPENDECTOMY      BIOPSY  1972    cone biopsy    CATARACT IOL, RT/LT      CL AFF SURGICAL PATHOLOGY      cone biopsy 1975    COLONOSCOPY      COLONOSCOPY WITH CO2 INSUFFLATION N/A 12/20/2017    Procedure: COLONOSCOPY WITH CO2 INSUFFLATION;  Screening  BMI: 20.7  [facility]  [phone]  [facility]  Referring: Milena  Patient get ill from [facility];  Surgeon: Duane, William Charles, MD;  Location: [facility] OR    EYE SURGERY      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    HC ESOPHAGOSCOPY, DIAGNOSTIC      LASER YAG CAPSULOTOMY      left eye    OPTICAL TRACKING SYSTEM BRONCHOSCOPY  06/19/2014    Procedure: OPTICAL TRACKING SYSTEM BRONCHOSCOPY;  Surgeon: Angel Kathleen MD;  Location:  GI    PHACOEMULSIFICATION WITH STANDARD INTRAOCULAR LENS IMPLANT  01/2018    left eye (SR)    VT HAND/FINGER SURGERY UNLISTED      SOFT TISSUE SURGERY      remove senovial fluid from right wrist    STRABISMUS SURGERY      SURGICAL HISTORY OF -   as a child    strabismus repair right eye    SURGICAL HISTORY OF -   08/2009    right wrist debridement    TONSILLECTOMY      as a child    TONSILLECTOMY & ADENOIDECTOMY      TUBAL LIGATION      VITRECTOMY PARSPLANA  01/2018    left eye - mac hole (MVE)       Family History   Problem Relation Age of Onset    Respiratory Mother         emphysema    Aneurysm Mother     Chronic Obstructive Pulmonary Disease Mother     Thyroid Disease Mother     Osteoporosis Mother     Other Cancer Father     Cancer Father         lung cancer    Cancer Sister         stage 4 anal cancer age 62 years    Colon Cancer Sister     Substance Abuse Sister     Substance Abuse Sister     Cancer - colorectal Brother     Colon Cancer Brother     Arthritis Maternal Grandmother         rheumatoid    Heart Disease Maternal Grandmother         unsure of details    Heart Disease Maternal Grandfather     Neurologic Disorder Paternal Grandmother         migraines    Alzheimer  Disease Paternal Grandmother     Unknown/Adopted Paternal Grandfather     Anesthesia Reaction Daughter     Cowden Syndrome Other     Skin Cancer Cousin     Colon Cancer Brother     Cancer - colorectal Brother     Colon Cancer Sister     Substance Abuse Sister     Anesthesia Reaction Daughter        Social History     Tobacco Use    Smoking status: Former     Current packs/day: 0.00     Average packs/day: 2.0 packs/day for 22.5 years (45.0 ttl pk-yrs)     Types: Cigarettes     Start date: 1961     Quit date: 1983     Years since quittin.9     Passive exposure: Past    Smokeless tobacco: Never   Substance Use Topics    Alcohol use: No     Comment: 1-2 glasses per year         Exam:    Vitals: Wt 53.5 kg (118 lb)   BMI 20.65 kg/m    BMI: Body mass index is 20.65 kg/m .  Height: Data Unavailable    Constitutional/ general:  Pt is in no apparent distress, appears well-nourished.  Cooperative with history and physical exam.     Psych:  The patient answered questions appropriately.  Normal affect.  Seems to have reasonable expectations, in terms of treatment.     Lymphatic:  Popliteal lymph nodes not enlarged.     Lungs:  Non labored breathing, non labored speech. No cough.  No audible wheezing. Even, quiet breathing.       Vascular:   DP 1/4 and PT 0/4.  CFT < 3 sec.  positive ankle edema and varicosities noted.  Negative pedal hair growth.    Neuro:  Alert and oriented x 3. Coordinated gait.  Light touch sensation is diminished.  Monofilament intact on all digits    Derm:  Skin thin, shiny, atrophic with no hair growth noted.  No erythema, ecchymosis, or cyanosis.      Musculoskeletal:    Lower extremity muscle strength is normal.  Patient is ambulatory without an assistive device or brace.   Normal arch with weightbearing.  No forefoot or rear foot deformities noted.  MS 5/5 all compartments.  Normal ROM all fore foot and rearfoot joints.  Bilateral fat pad quite atrophic.  Some discomfort in area of  plantar medial first MTPJ nerve.  No masses.  No clicking.  All nails are thickened, elongated, discolored with subungual debris.  Patient has calluses right foot subsecond metatarsal head and left foot subfirst metatarsal head..  No masses or breakdown in the skin bilaterally.  No erythema or ecchymosis noted bilateral.     A/P  Peripheral neuropathy  [place]'s neuroma bilaterally   Calluses     Explained cause of [place]'s neuroma.  Will try to keep cushioned and offloaded.  More prone to this with neuropathy.  Discussed no signs of arthritis or any other problems and gave reassurance.  Calluses debrided with a fifteen blade.  Gave patient instructions on daily foot examination and wearing good shoes at all times. Will RETURN TO CLINIC ASAP if notices any problems.   Return to clinic prn.          Chet Villanueva DPM, FACFAS

## 2025-07-08 NOTE — LETTER
7/8/2025      Nury Cárdenas  [address] [facility] [address] [address]      Dear Colleague,    Thank you for referring your patient, Nury Cárdenas, to [facility]. Please see a copy of my visit note below.    Subjective:    Patient is here to have her feet evaluated.  Over the last several months is an increasing burning numbness and discomfort in big toes.  Points plantar medial.  Aggravated by activity and relieved by rest.  Denies weakness edema erythema or ecchymosis.  Has not had this before.  Her foot feels best on a light shoe because of her peripheral neuropathy.  Patient Has history of peripheral neuropathy.    Primary care is Dr. Abbott last seen 6/23/25    ROS: see above         Allergies   Allergen Reactions     Vancomycin Other (See Comments)     Red man's syndrom     Sulfa Antibiotics Itching     Zithromax [Azithromycin Dihydrate]      Vomiting/ skin blisters     Adhesive Tape      Rash itches     Amoxicillin-Pot Clavulanate      vomiting     Erythromycin      vomiting     Morphine And Codeine      vomiting     Nickel      itches rash     Tegaderm Alginate Ag Rope      LW Other1: -ALL MEDICATIONS MAKE PATIENT VIOLENTLY ILL; ADHESIVE BANDAGES; NICKEL       Current Outpatient Medications   Medication Sig Dispense Refill     aspirin-acetaminophen-caffeine (EXCEDRIN MIGRAINE) 250-250-65 MG tablet Take 1 tablet by mouth every 6 hours as needed for headaches       calcium-magnesium (CALMAG) 500-250 MG TABS per tablet Take 1 tablet by mouth 2 times daily (with meals)       cholecalciferol 1000 UNITS TABS Take 1,000 Units by mouth daily       clobetasol propionate (TEMOVATE) 0.05 % external cream Apply topically 2 times daily. (For 7 days) 15 g 0     omeprazole (PRILOSEC) 20 MG DR capsule Take 20 mg by mouth daily       propranolol (INDERAL) 20 MG tablet TAKE 1 TABLET(20 MG) BY MOUTH DAILY 90 tablet 2       Patient Active Problem List   Diagnosis     GERD (gastroesophageal reflux  disease)     Fibroids     Migraines     Hearing loss     Osteopenia     HYPERLIPIDEMIA LDL GOAL <160     Inflammatory arthritis     Synovitis of wrist     Chronic constipation     Granulomatosis with polyangiitis without renal involvement (H)     Chronic steroid use     Dyspnea     Pulmonary embolism (H)     Calculus of kidney, right     Chronic anticoagulation     Long-term (current) use of anticoagulants [Z79.01]     Long-term use of immunosuppressant medication     Primary osteoarthritis involving multiple joints     Cellulitis     Cat scratch left lower extremity     Other pulmonary embolism without acute cor pulmonale, unspecified chronicity (H)     Age-related osteoporosis without current pathological fracture     Age-related osteoporosis with current pathological fracture, sequela     Adverse effects of medication     Combined forms of age-related cataract, mod, of right eye     Pseudophakia, Yag Caps, os     History of vitrectomy - macular hole, os (MVE)     Hx of macular hole of left eye     Hemifacial spasm     Immunosuppression     Clinical diagnosis of COVID-19     History of COVID-19     Lesion of right eyelid     COPD (chronic obstructive pulmonary disease) (H)     Polyneuropathy associated with underlying disease     Esodeviation     History of strabismus surgery     Current chronic use of systemic steroids     Herpes zoster ophthalmicus, right     Family history of genetic disease carrier     Nonallergic rhinitis       Past Medical History:   Diagnosis Date     Amblyopia      Atypical pneumonia 06/14/2014     Atypical pneumonia      Atypical pneumonia      Atypical pneumonia      COPD (chronic obstructive pulmonary disease) (H) 07/20/2021     Coronary artery disease 1982    heart beat hard made me tired     Dyslipidemia      Fibroids      GERD (gastroesophageal reflux disease)      Granulomatosis with polyangiitis (Wegener's)      Hearing loss      Inflammatory arthritis     thumb     Migraines       MVP (mitral valve prolapse)     had an echo that was normal in 2007 she is on Inderal     Nonsenile cataract      Osteopenia      PE (pulmonary embolism) 10/2014    ? GPA associated, on warfarin      Strabismus      Synovitis of wrist 2009    right       Past Surgical History:   Procedure Laterality Date     ABDOMEN SURGERY      appendix out     APPENDECTOMY       BIOPSY  1972    cone biopsy     CATARACT IOL, RT/LT       CL AFF SURGICAL PATHOLOGY      cone biopsy 1975     COLONOSCOPY       COLONOSCOPY WITH CO2 INSUFFLATION N/A 12/20/2017    Procedure: COLONOSCOPY WITH CO2 INSUFFLATION;  Screening  BMI: 20.7  Pharmacy: [facility]  [phone]  [facility]  [facility]  Patient get ill from [facility];  Surgeon: Duane, William Charles, MD;  Location: [facility] OR     EYE SURGERY      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     HC ESOPHAGOSCOPY, DIAGNOSTIC       LASER YAG CAPSULOTOMY      left eye     OPTICAL TRACKING SYSTEM BRONCHOSCOPY  06/19/2014    Procedure: OPTICAL TRACKING SYSTEM BRONCHOSCOPY;  Surgeon: Angel Kathleen MD;  Location:  GI     PHACOEMULSIFICATION WITH STANDARD INTRAOCULAR LENS IMPLANT  01/2018    left eye (SR)     MT HAND/FINGER SURGERY UNLISTED       SOFT TISSUE SURGERY      remove senovial fluid from right wrist     STRABISMUS SURGERY       SURGICAL HISTORY OF -   as a child    strabismus repair right eye     SURGICAL HISTORY OF -   08/2009    right wrist debridement     TONSILLECTOMY      as a child     TONSILLECTOMY & ADENOIDECTOMY       TUBAL LIGATION       VITRECTOMY PARSPLANA  01/2018    left eye - mac hole (MVE)       Family History   Problem Relation Age of Onset     Respiratory Mother         emphysema     Aneurysm Mother      Chronic Obstructive Pulmonary Disease Mother      Thyroid Disease Mother      Osteoporosis Mother      Other Cancer Father      Cancer Father         lung cancer     Cancer Sister         stage 4 anal cancer age 62 years     Colon Cancer Sister       Substance Abuse Sister      Substance Abuse Sister      Cancer - colorectal Brother      Colon Cancer Brother      Arthritis Maternal Grandmother         rheumatoid     Heart Disease Maternal Grandmother         unsure of details     Heart Disease Maternal Grandfather      Neurologic Disorder Paternal Grandmother         migraines     Alzheimer Disease Paternal Grandmother      Unknown/Adopted Paternal Grandfather      Anesthesia Reaction Daughter      Cowden Syndrome Other      Skin Cancer Cousin      Colon Cancer Brother      Cancer - colorectal Brother      Colon Cancer Sister      Substance Abuse Sister      Anesthesia Reaction Daughter        Social History     Tobacco Use     Smoking status: Former     Current packs/day: 0.00     Average packs/day: 2.0 packs/day for 22.5 years (45.0 ttl pk-yrs)     Types: Cigarettes     Start date: 1961     Quit date: 1983     Years since quittin.9     Passive exposure: Past     Smokeless tobacco: Never   Substance Use Topics     Alcohol use: No     Comment: 1-2 glasses per year         Exam:    Vitals: Wt 53.5 kg (118 lb)   BMI 20.65 kg/m    BMI: Body mass index is 20.65 kg/m .  Height: Data Unavailable    Constitutional/ general:  Pt is in no apparent distress, appears well-nourished.  Cooperative with history and physical exam.     Psych:  The patient answered questions appropriately.  Normal affect.  Seems to have reasonable expectations, in terms of treatment.     Lymphatic:  Popliteal lymph nodes not enlarged.     Lungs:  Non labored breathing, non labored speech. No cough.  No audible wheezing. Even, quiet breathing.       Vascular:   DP 1/4 and PT 0/4.  CFT < 3 sec.  positive ankle edema and varicosities noted.  Negative pedal hair growth.    Neuro:  Alert and oriented x 3. Coordinated gait.  Light touch sensation is diminished.  Monofilament intact on all digits    Derm:  Skin thin, shiny, atrophic with no hair growth noted.  No erythema, ecchymosis,  or cyanosis.      Musculoskeletal:    Lower extremity muscle strength is normal.  Patient is ambulatory without an assistive device or brace.   Normal arch with weightbearing.  No forefoot or rear foot deformities noted.  MS 5/5 all compartments.  Normal ROM all fore foot and rearfoot joints.  Bilateral fat pad quite atrophic.  Some discomfort in area of plantar medial first MTPJ nerve.  No masses.  No clicking.  All nails are thickened, elongated, discolored with subungual debris.  Patient has calluses right foot subsecond metatarsal head and left foot subfirst metatarsal head..  No masses or breakdown in the skin bilaterally.  No erythema or ecchymosis noted bilateral.     A/P  Peripheral neuropathy  [place]'s neuroma bilaterally   Calluses     Explained cause of [place]'s neuroma.  Will try to keep cushioned and offloaded.  More prone to this with neuropathy.  Discussed no signs of arthritis or any other problems and gave reassurance.  Calluses debrided with a fifteen blade.  Gave patient instructions on daily foot examination and wearing good shoes at all times. Will RETURN TO CLINIC ASAP if notices any problems.   Return to clinic prn.          Chet Villanueva DPM, FACFAS                 Again, thank you for allowing me to participate in the care of your patient.        Sincerely,        Chet Villanueva DPM    Electronically signed

## 2025-07-11 ENCOUNTER — ANCILLARY PROCEDURE (OUTPATIENT)
Dept: CARDIOLOGY | Facility: CLINIC | Age: 80
End: 2025-07-11
Attending: STUDENT IN AN ORGANIZED HEALTH CARE EDUCATION/TRAINING PROGRAM
Payer: COMMERCIAL

## 2025-07-11 DIAGNOSIS — R07.9 CHEST PAIN, UNSPECIFIED TYPE: ICD-10-CM

## 2025-07-11 LAB — LVEF ECHO: NORMAL

## 2025-07-11 PROCEDURE — 93306 TTE W/DOPPLER COMPLETE: CPT | Performed by: INTERNAL MEDICINE

## 2025-07-15 PROBLEM — U07.1 CLINICAL DIAGNOSIS OF COVID-19: Status: RESOLVED | Noted: 2021-02-12 | Resolved: 2025-07-15

## 2025-07-15 PROBLEM — Z86.16 HISTORY OF COVID-19: Status: RESOLVED | Noted: 2021-02-18 | Resolved: 2025-07-15
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
## 2025-07-15 PROBLEM — Z84.81 FAMILY HISTORY OF GENETIC DISEASE CARRIER: Status: RESOLVED | Noted: 2023-08-07 | Resolved: 2025-07-15

## 2025-07-15 PROBLEM — M80.00XS AGE-RELATED OSTEOPOROSIS WITH CURRENT PATHOLOGICAL FRACTURE, SEQUELA: Status: RESOLVED | Noted: 2019-04-22 | Resolved: 2025-07-15

## 2025-07-15 PROBLEM — L03.90 CELLULITIS: Status: RESOLVED | Noted: 2017-07-28 | Resolved: 2025-07-15

## 2025-07-24 ENCOUNTER — MYC MEDICAL ADVICE (OUTPATIENT)
Dept: CARDIOLOGY | Facility: CLINIC | Age: 80
End: 2025-07-24
Payer: COMMERCIAL

## 2025-07-29 NOTE — TELEPHONE ENCOUNTER
Patient saw [employer] message. Provider updated.    Amanda Heard, RN, BSN  Cardiology RN Care Coordinator   [place]   Phone: [phone]  Fax: [phone] ([place]) [phone] ([place])

## 2025-07-29 NOTE — TELEPHONE ENCOUNTER
Patient saw [email or web address] message. No questions at this time.    Amanda Heard, RN, BSN  Cardiology RN Care Coordinator   [place]   Phone: [phone]  Fax: [phone] ([place]) [phone] ([place])

## 2025-07-31 DIAGNOSIS — G43.909 MIGRAINE WITHOUT STATUS MIGRAINOSUS, NOT INTRACTABLE, UNSPECIFIED MIGRAINE TYPE: ICD-10-CM

## 2025-07-31 RX ORDER — PROPRANOLOL HCL 20 MG
20 TABLET ORAL DAILY
Qty: 90 TABLET | Refills: 0 | Status: SHIPPED | OUTPATIENT
Start: 2025-07-31

## 2025-08-15 SDOH — HEALTH STABILITY: PHYSICAL HEALTH: ON AVERAGE, HOW MANY MINUTES DO YOU ENGAGE IN EXERCISE AT THIS LEVEL?: 0 MIN

## 2025-08-15 SDOH — HEALTH STABILITY: PHYSICAL HEALTH: ON AVERAGE, HOW MANY DAYS PER WEEK DO YOU ENGAGE IN MODERATE TO STRENUOUS EXERCISE (LIKE A BRISK WALK)?: 0 DAYS

## 2025-08-15 ASSESSMENT — SOCIAL DETERMINANTS OF HEALTH (SDOH): HOW OFTEN DO YOU GET TOGETHER WITH FRIENDS OR RELATIVES?: PATIENT DECLINED

## 2025-08-19 ENCOUNTER — OFFICE VISIT (OUTPATIENT)
Dept: FAMILY MEDICINE | Facility: CLINIC | Age: 80
End: 2025-08-19
Attending: FAMILY MEDICINE
Payer: COMMERCIAL

## 2025-08-19 VITALS
RESPIRATION RATE: 20 BRPM | TEMPERATURE: 98.9 F | HEART RATE: 68 BPM | SYSTOLIC BLOOD PRESSURE: 136 MMHG | DIASTOLIC BLOOD PRESSURE: 70 MMHG | BODY MASS INDEX: 20.48 KG/M2 | HEIGHT: 63 IN | OXYGEN SATURATION: 95 % | WEIGHT: 115.6 LBS

## 2025-08-19 DIAGNOSIS — Z23 NEED FOR VACCINATION: ICD-10-CM

## 2025-08-19 DIAGNOSIS — G43.909 MIGRAINE WITHOUT STATUS MIGRAINOSUS, NOT INTRACTABLE, UNSPECIFIED MIGRAINE TYPE: ICD-10-CM

## 2025-08-19 DIAGNOSIS — Z00.00 ENCOUNTER FOR MEDICARE ANNUAL WELLNESS EXAM: Primary | ICD-10-CM

## 2025-08-19 PROCEDURE — G0439 PPPS, SUBSEQ VISIT: HCPCS | Performed by: FAMILY MEDICINE

## 2025-08-19 PROCEDURE — 3078F DIAST BP <80 MM HG: CPT | Performed by: FAMILY MEDICINE

## 2025-08-19 PROCEDURE — 3075F SYST BP GE 130 - 139MM HG: CPT | Performed by: FAMILY MEDICINE

## 2025-08-19 RX ORDER — PROPRANOLOL HCL 20 MG
20 TABLET ORAL DAILY
Qty: 90 TABLET | Refills: 0 | Status: CANCELLED | OUTPATIENT
Start: 2025-08-19

## 2025-08-21 ENCOUNTER — PREP FOR PROCEDURE (OUTPATIENT)
Dept: OPHTHALMOLOGY | Facility: CLINIC | Age: 80
End: 2025-08-21
Payer: COMMERCIAL

## 2025-08-21 DIAGNOSIS — H25.811 COMBINED FORM OF AGE-RELATED CATARACT, RIGHT EYE: Primary | ICD-10-CM

## (undated) DEVICE — SOL WATER IRRIG 1000ML BOTTLE 07139-09

## (undated) RX ORDER — SIMETHICONE 40MG/0.6ML
SUSPENSION, DROPS(FINAL DOSAGE FORM)(ML) ORAL
Status: DISPENSED
Start: 2017-12-20

## (undated) RX ORDER — FENTANYL CITRATE 50 UG/ML
INJECTION, SOLUTION INTRAMUSCULAR; INTRAVENOUS
Status: DISPENSED
Start: 2017-12-20